# Patient Record
Sex: FEMALE | Race: BLACK OR AFRICAN AMERICAN | Employment: FULL TIME | URBAN - METROPOLITAN AREA
[De-identification: names, ages, dates, MRNs, and addresses within clinical notes are randomized per-mention and may not be internally consistent; named-entity substitution may affect disease eponyms.]

---

## 2017-01-18 ENCOUNTER — HOSPITAL ENCOUNTER (EMERGENCY)
Age: 62
Discharge: HOME | End: 2017-01-18
Attending: EMERGENCY MEDICINE
Payer: OTHER GOVERNMENT

## 2017-01-18 ENCOUNTER — APPOINTMENT (OUTPATIENT)
Dept: CT IMAGING | Age: 62
End: 2017-01-18
Attending: EMERGENCY MEDICINE
Payer: OTHER GOVERNMENT

## 2017-01-18 VITALS
DIASTOLIC BLOOD PRESSURE: 82 MMHG | RESPIRATION RATE: 20 BRPM | WEIGHT: 272 LBS | TEMPERATURE: 97.9 F | OXYGEN SATURATION: 98 % | SYSTOLIC BLOOD PRESSURE: 120 MMHG | HEART RATE: 75 BPM | HEIGHT: 64 IN | BODY MASS INDEX: 46.44 KG/M2

## 2017-01-18 DIAGNOSIS — R10.9 RIGHT FLANK PAIN: Primary | ICD-10-CM

## 2017-01-18 DIAGNOSIS — K51.918 ULCERATIVE COLITIS WITH OTHER COMPLICATION, UNSPECIFIED LOCATION (HCC): ICD-10-CM

## 2017-01-18 LAB
ALBUMIN SERPL BCP-MCNC: 3 G/DL (ref 3.5–5)
ALBUMIN/GLOB SERPL: 0.7 {RATIO} (ref 1.1–2.2)
ALP SERPL-CCNC: 76 U/L (ref 45–117)
ALT SERPL-CCNC: 20 U/L (ref 12–78)
ANION GAP BLD CALC-SCNC: 7 MMOL/L (ref 5–15)
APPEARANCE UR: CLEAR
AST SERPL W P-5'-P-CCNC: 19 U/L (ref 15–37)
BACTERIA URNS QL MICRO: ABNORMAL /HPF
BASOPHILS # BLD AUTO: 0 K/UL (ref 0–0.1)
BASOPHILS # BLD: 0 % (ref 0–1)
BILIRUB SERPL-MCNC: 0.4 MG/DL (ref 0.2–1)
BILIRUB UR QL: NEGATIVE
BUN SERPL-MCNC: 14 MG/DL (ref 6–20)
BUN/CREAT SERPL: 16 (ref 12–20)
CALCIUM SERPL-MCNC: 8.3 MG/DL (ref 8.5–10.1)
CHLORIDE SERPL-SCNC: 107 MMOL/L (ref 97–108)
CO2 SERPL-SCNC: 28 MMOL/L (ref 21–32)
COLOR UR: ABNORMAL
CREAT SERPL-MCNC: 0.9 MG/DL (ref 0.55–1.02)
CRP SERPL-MCNC: <0.29 MG/DL
DIFFERENTIAL METHOD BLD: NORMAL
EOSINOPHIL # BLD: 0.1 K/UL (ref 0–0.4)
EOSINOPHIL NFR BLD: 2 % (ref 0–7)
EPITH CASTS URNS QL MICRO: ABNORMAL /LPF
ERYTHROCYTE [DISTWIDTH] IN BLOOD BY AUTOMATED COUNT: 14.1 % (ref 11.5–14.5)
GLOBULIN SER CALC-MCNC: 4.1 G/DL (ref 2–4)
GLUCOSE SERPL-MCNC: 97 MG/DL (ref 65–100)
GLUCOSE UR STRIP.AUTO-MCNC: NEGATIVE MG/DL
HCT VFR BLD AUTO: 36.4 % (ref 35–47)
HGB BLD-MCNC: 12 G/DL (ref 11.5–16)
HGB UR QL STRIP: NEGATIVE
KETONES UR QL STRIP.AUTO: NEGATIVE MG/DL
LEUKOCYTE ESTERASE UR QL STRIP.AUTO: ABNORMAL
LIPASE SERPL-CCNC: 101 U/L (ref 73–393)
LYMPHOCYTES # BLD AUTO: 39 % (ref 12–49)
LYMPHOCYTES # BLD: 3.2 K/UL (ref 0.8–3.5)
MCH RBC QN AUTO: 31 PG (ref 26–34)
MCHC RBC AUTO-ENTMCNC: 33 G/DL (ref 30–36.5)
MCV RBC AUTO: 94.1 FL (ref 80–99)
MONOCYTES # BLD: 0.7 K/UL (ref 0–1)
MONOCYTES NFR BLD AUTO: 9 % (ref 5–13)
NEUTS SEG # BLD: 4.1 K/UL (ref 1.8–8)
NEUTS SEG NFR BLD AUTO: 50 % (ref 32–75)
NITRITE UR QL STRIP.AUTO: NEGATIVE
PH UR STRIP: 5.5 [PH] (ref 5–8)
PLATELET # BLD AUTO: 251 K/UL (ref 150–400)
POTASSIUM SERPL-SCNC: 3.3 MMOL/L (ref 3.5–5.1)
PROT SERPL-MCNC: 7.1 G/DL (ref 6.4–8.2)
PROT UR STRIP-MCNC: NEGATIVE MG/DL
RBC # BLD AUTO: 3.87 M/UL (ref 3.8–5.2)
RBC #/AREA URNS HPF: ABNORMAL /HPF (ref 0–5)
SODIUM SERPL-SCNC: 142 MMOL/L (ref 136–145)
SP GR UR REFRACTOMETRY: 1.02 (ref 1–1.03)
UA: UC IF INDICATED,UAUC: ABNORMAL
UROBILINOGEN UR QL STRIP.AUTO: 0.2 EU/DL (ref 0.2–1)
WBC # BLD AUTO: 8.2 K/UL (ref 3.6–11)
WBC URNS QL MICRO: ABNORMAL /HPF (ref 0–4)

## 2017-01-18 PROCEDURE — 80053 COMPREHEN METABOLIC PANEL: CPT | Performed by: EMERGENCY MEDICINE

## 2017-01-18 PROCEDURE — 36415 COLL VENOUS BLD VENIPUNCTURE: CPT | Performed by: EMERGENCY MEDICINE

## 2017-01-18 PROCEDURE — 96376 TX/PRO/DX INJ SAME DRUG ADON: CPT

## 2017-01-18 PROCEDURE — 96375 TX/PRO/DX INJ NEW DRUG ADDON: CPT

## 2017-01-18 PROCEDURE — 83690 ASSAY OF LIPASE: CPT | Performed by: EMERGENCY MEDICINE

## 2017-01-18 PROCEDURE — 96361 HYDRATE IV INFUSION ADD-ON: CPT

## 2017-01-18 PROCEDURE — 99284 EMERGENCY DEPT VISIT MOD MDM: CPT

## 2017-01-18 PROCEDURE — 85025 COMPLETE CBC W/AUTO DIFF WBC: CPT | Performed by: EMERGENCY MEDICINE

## 2017-01-18 PROCEDURE — 81001 URINALYSIS AUTO W/SCOPE: CPT | Performed by: EMERGENCY MEDICINE

## 2017-01-18 PROCEDURE — 86140 C-REACTIVE PROTEIN: CPT | Performed by: EMERGENCY MEDICINE

## 2017-01-18 PROCEDURE — 74176 CT ABD & PELVIS W/O CONTRAST: CPT

## 2017-01-18 PROCEDURE — 74011250636 HC RX REV CODE- 250/636: Performed by: EMERGENCY MEDICINE

## 2017-01-18 PROCEDURE — 96374 THER/PROPH/DIAG INJ IV PUSH: CPT

## 2017-01-18 PROCEDURE — 87086 URINE CULTURE/COLONY COUNT: CPT | Performed by: EMERGENCY MEDICINE

## 2017-01-18 RX ORDER — HYDROCHLOROTHIAZIDE 25 MG/1
25 TABLET ORAL DAILY
COMMUNITY
End: 2017-03-13 | Stop reason: SDUPTHER

## 2017-01-18 RX ORDER — OXYCODONE AND ACETAMINOPHEN 5; 325 MG/1; MG/1
1 TABLET ORAL
Qty: 15 TAB | Refills: 0 | Status: SHIPPED | OUTPATIENT
Start: 2017-01-18 | End: 2017-02-22

## 2017-01-18 RX ORDER — ONDANSETRON 2 MG/ML
4 INJECTION INTRAMUSCULAR; INTRAVENOUS
Status: COMPLETED | OUTPATIENT
Start: 2017-01-18 | End: 2017-01-18

## 2017-01-18 RX ORDER — HYDROMORPHONE HYDROCHLORIDE 1 MG/ML
0.5 INJECTION, SOLUTION INTRAMUSCULAR; INTRAVENOUS; SUBCUTANEOUS ONCE
Status: COMPLETED | OUTPATIENT
Start: 2017-01-18 | End: 2017-01-18

## 2017-01-18 RX ORDER — HYDROCORTISONE 100 MG/60ML
100 SUSPENSION RECTAL 2 TIMES DAILY
Qty: 60 ML | Refills: 0 | Status: SHIPPED | OUTPATIENT
Start: 2017-01-18 | End: 2018-01-29

## 2017-01-18 RX ORDER — HYDROMORPHONE HYDROCHLORIDE 1 MG/ML
1 INJECTION, SOLUTION INTRAMUSCULAR; INTRAVENOUS; SUBCUTANEOUS ONCE
Status: COMPLETED | OUTPATIENT
Start: 2017-01-18 | End: 2017-01-18

## 2017-01-18 RX ORDER — ONDANSETRON 4 MG/1
4 TABLET, ORALLY DISINTEGRATING ORAL
Qty: 12 TAB | Refills: 0 | Status: SHIPPED | OUTPATIENT
Start: 2017-01-18 | End: 2017-10-31

## 2017-01-18 RX ORDER — KETOROLAC TROMETHAMINE 30 MG/ML
30 INJECTION, SOLUTION INTRAMUSCULAR; INTRAVENOUS
Status: COMPLETED | OUTPATIENT
Start: 2017-01-18 | End: 2017-01-18

## 2017-01-18 RX ADMIN — HYDROMORPHONE HYDROCHLORIDE 1 MG: 1 INJECTION, SOLUTION INTRAMUSCULAR; INTRAVENOUS; SUBCUTANEOUS at 09:20

## 2017-01-18 RX ADMIN — SODIUM CHLORIDE 1000 ML: 900 INJECTION, SOLUTION INTRAVENOUS at 11:46

## 2017-01-18 RX ADMIN — KETOROLAC TROMETHAMINE 30 MG: 30 INJECTION, SOLUTION INTRAMUSCULAR at 08:15

## 2017-01-18 RX ADMIN — HYDROMORPHONE HYDROCHLORIDE 1 MG: 1 INJECTION, SOLUTION INTRAMUSCULAR; INTRAVENOUS; SUBCUTANEOUS at 08:19

## 2017-01-18 RX ADMIN — ONDANSETRON 4 MG: 2 INJECTION INTRAMUSCULAR; INTRAVENOUS at 09:19

## 2017-01-18 RX ADMIN — ONDANSETRON 4 MG: 2 INJECTION INTRAMUSCULAR; INTRAVENOUS at 08:13

## 2017-01-18 RX ADMIN — HYDROMORPHONE HYDROCHLORIDE 0.5 MG: 1 INJECTION, SOLUTION INTRAMUSCULAR; INTRAVENOUS; SUBCUTANEOUS at 12:20

## 2017-01-18 RX ADMIN — SODIUM CHLORIDE 1000 ML: 900 INJECTION, SOLUTION INTRAVENOUS at 08:15

## 2017-01-18 NOTE — ED NOTES
The patient was discharged home by Dr Tex Ornelas  in stable condition. The patient is alert and oriented, in no respiratory distress and discharge vital signs obtained. The patient's diagnosis, condition and treatment were explained. The patient expressed understanding. Prescription given. A work/school note given. A discharge plan has been developed. A  was not involved in the process. Aftercare instructions were given. Pt ambulatory out of the ED with family.

## 2017-01-18 NOTE — ED NOTES
Pt returned from CT and rpts increased pain. Dr Michelle Pandey aware and additional orders received. Awaiting Pharmacy verification.

## 2017-01-18 NOTE — ED NOTES
Toileting offered; pt declined at present and rpts voiding PTA.  Spouse concerned about pt's discomfort; Dr Alexandro Hector notified of pt's arrival.

## 2017-01-18 NOTE — DISCHARGE INSTRUCTIONS
Ulcerative Colitis: Care Instructions  Your Care Instructions    Ulcerative colitis is an inflammatory bowel disease (IBD). The large intestine (colon) gets inflamed and ulcers form in your colon. These ulcers can bleed. People have \"attacks\" of ulcerative colitis. Attacks can come and go. They can cause painful belly cramps and bloody diarrhea. This disease can affect part or all of the colon. How bad the disease gets will often depend on how much of the colon is affected. Bad attacks are often treated in a hospital. There you can get medicines, fluid, and nutrition through a tube in your vein, called an IV. This lets your digestive system rest and recover. If the medicines don't work well, surgery may be needed to remove the colon. At home, you can help control your ulcerative colitis. Take your medicines and try to eat well. And see your doctor as much as he or she recommends. Follow-up care is a key part of your treatment and safety. Be sure to make and go to all appointments, and call your doctor if you are having problems. It's also a good idea to know your test results and keep a list of the medicines you take. How can you care for yourself at home? · Be safe with medicines. Take your medicines exactly as prescribed. Call your doctor if you think you are having a problem with your medicine. You will get more details on the specific medicines your doctor prescribes. · Do not take anti-inflammatory medicines, such as aspirin, ibuprofen (Advil, Motrin), or naproxen (Aleve). They may make your symptoms worse. · Talk to your doctor before you take any other medicines or herbal products. · Eat healthy foods. Avoid foods that make your symptoms worse. These might include milk, alcohol, or spicy foods. · Make sure to get enough iron. Rectal bleeding may make you lose iron. Foods with a lot of iron include beef, lentils, spinach, and raisins.  They also include iron-enriched breads and cereals. · Take any nutrition supplements that your doctor prescribes. · This disease can affect all parts of your life. Get support from friends and family. You may also want to get some counseling. When should you call for help? Call 911 anytime you think you may need emergency care. For example, call if:  · You have severe belly pain. · You passed out (lost consciousness). Call your doctor now or seek immediate medical care if:  · You have signs of needing more fluids. You have sunken eyes and a dry mouth, and you pass only a little dark urine. · You have a fever or shaking chills. · Your belly is bloated. Watch closely for changes in your health, and be sure to contact your doctor if:  · Your symptoms get worse. · You pass new bloody stools, or the bloody stools are worse. · You have diarrhea for more than 2 weeks. · You have unexplained weight loss. Where can you learn more? Go to http://reza-yen.info/. Enter X668 in the search box to learn more about \"Ulcerative Colitis: Care Instructions. \"  Current as of: August 9, 2016  Content Version: 11.1  © 9589-4326 [employer]. Care instructions adapted under license by [employer] (which disclaims liability or warranty for this information). If you have questions about a medical condition or this instruction, always ask your healthcare professional. [address] any warranty or liability for your use of this information. We hope that we have addressed all of your medical concerns. The examination and treatment you received in the Emergency Department were for an emergent problem and were not intended as complete care. It is important that you follow up with your healthcare provider(s) for ongoing care. If your symptoms worsen or do not improve as expected, and you are unable to reach your usual health care provider(s), you should return to the Emergency Department.       Today's healthcare is undergoing tremendous change, and patient satisfaction surveys are one of the many tools to assess the quality of medical care. You may receive a survey from the [employer] organization regarding your experience in the Emergency Department. I hope that your experience has been completely positive, particularly the medical care that I provided. As such, please participate in the survey; anything less than excellent does not meet my expectations or intentions. [facility] and [address] [place] [address] participate in nationally recognized quality of care measures. If your blood pressure is greater than 120/80, as reported below, we urge that you seek medical care to address the potential of high blood pressure, commonly known as hypertension. Hypertension can be hereditary or can be caused by certain medical conditions, pain, stress, or \"white coat syndrome. \"       Please make an appointment with your health care provider(s) for follow up of your Emergency Department visit. VITALS:   Patient Vitals for the past 8 hrs:   Temp Pulse Resp BP SpO2   01/18/17 1200 - - - 120/82 98 %   01/18/17 1150 - - - - 97 %   01/18/17 1148 - - - 134/76 -   01/18/17 0930 - - - 118/61 93 %   01/18/17 0904 - - - - 99 %   01/18/17 0903 - - - 133/73 -   01/18/17 0846 - - - - 96 %   01/18/17 0830 - - - 130/79 95 %   01/18/17 0820 - - 20 - 100 %   01/18/17 0818 - - - 141/81 -   01/18/17 0751 97.9 °F (36.6 °C) 75 16 179/86 98 %          Thank you for allowing us to provide you with medical care today. We realize that you have many choices for your emergency care needs. Please choose us in the future for any continued health care needs.       Mila Brunner MD    [facility].   Office: [phone]            Recent Results (from the past 24 hour(s))   CBC WITH AUTOMATED DIFF    Collection Time: 01/18/17  8:07 AM   Result Value Ref Range    WBC 8.2 3.6 - 11.0 K/uL    RBC 3.87 3.80 - 5.20 M/uL    HGB 12.0 11.5 - 16.0 g/dL    HCT 36.4 35.0 - 47.0 %    MCV 94.1 80.0 - 99.0 FL    MCH 31.0 26.0 - 34.0 PG    MCHC 33.0 30.0 - 36.5 g/dL    RDW 14.1 11.5 - 14.5 %    PLATELET 165 139 - 948 K/uL    NEUTROPHILS 50 32 - 75 %    LYMPHOCYTES 39 12 - 49 %    MONOCYTES 9 5 - 13 %    EOSINOPHILS 2 0 - 7 %    BASOPHILS 0 0 - 1 %    ABS. NEUTROPHILS 4.1 1.8 - 8.0 K/UL    ABS. LYMPHOCYTES 3.2 0.8 - 3.5 K/UL    ABS. MONOCYTES 0.7 0.0 - 1.0 K/UL    ABS. EOSINOPHILS 0.1 0.0 - 0.4 K/UL    ABS. BASOPHILS 0.0 0.0 - 0.1 K/UL    DF AUTOMATED     METABOLIC PANEL, COMPREHENSIVE    Collection Time: 01/18/17  8:45 AM   Result Value Ref Range    Sodium 142 136 - 145 mmol/L    Potassium 3.3 (L) 3.5 - 5.1 mmol/L    Chloride 107 97 - 108 mmol/L    CO2 28 21 - 32 mmol/L    Anion gap 7 5 - 15 mmol/L    Glucose 97 65 - 100 mg/dL    BUN 14 6 - 20 MG/DL    Creatinine 0.90 0.55 - 1.02 MG/DL    BUN/Creatinine ratio 16 12 - 20      GFR est AA >60 >60 ml/min/1.73m2    GFR est non-AA >60 >60 ml/min/1.73m2    Calcium 8.3 (L) 8.5 - 10.1 MG/DL    Bilirubin, total 0.4 0.2 - 1.0 MG/DL    ALT 20 12 - 78 U/L    AST 19 15 - 37 U/L    Alk.  phosphatase 76 45 - 117 U/L    Protein, total 7.1 6.4 - 8.2 g/dL    Albumin 3.0 (L) 3.5 - 5.0 g/dL    Globulin 4.1 (H) 2.0 - 4.0 g/dL    A-G Ratio 0.7 (L) 1.1 - 2.2     LIPASE    Collection Time: 01/18/17  8:45 AM   Result Value Ref Range    Lipase 101 73 - 393 U/L   C REACTIVE PROTEIN, QT    Collection Time: 01/18/17  8:45 AM   Result Value Ref Range    C-Reactive protein <0.29 <0.60 mg/dL   URINALYSIS W/ REFLEX CULTURE    Collection Time: 01/18/17 11:48 AM   Result Value Ref Range    Color YELLOW/STRAW      Appearance CLEAR CLEAR      Specific gravity 1.020 1.003 - 1.030      pH (UA) 5.5 5.0 - 8.0      Protein NEGATIVE  NEG mg/dL    Glucose NEGATIVE  NEG mg/dL    Ketone NEGATIVE  NEG mg/dL    Bilirubin NEGATIVE  NEG      Blood NEGATIVE  NEG      Urobilinogen 0.2 0.2 - 1.0 EU/dL    Nitrites NEGATIVE  NEG      Leukocyte Esterase MODERATE (A) NEG      WBC 5-10 0 - 4 /hpf    RBC 0-5 0 - 5 /hpf    Epithelial cells FEW FEW /lpf    Bacteria 1+ (A) NEG /hpf    UA:UC IF INDICATED URINE CULTURE ORDERED (A) CNI         Ct Abd Pelv Wo Cont    Result Date: 1/18/2017  EXAM: CT ABDOMEN AND PELVIS WITHOUT CONTRAST INDICATION:  Right flank pain. COMPARISON: None. CONTRAST: None. TECHNIQUE: Unenhanced multislice helical CT was performed from the diaphragm to the symphysis pubis without intravenous contrast administration. Oral contrast was not administered. Contiguous 5 mm axial images were reconstructed and lung and soft tissue windows were generated. Coronal and sagittal reformations were generated. CT dose reduction was achieved through use of a standardized protocol tailored for this examination and automatic exposure control for dose modulation. Adaptive statistical iterative reconstruction (ASIR) was utilized for this examination. FINDINGS: The patient is morbidly obese. LOWER CHEST: The visualized portions of the lung bases are clear. The absence of intravenous contrast material reduces the sensitivity for evaluation of the solid parenchymal organs of the abdomen. ABDOMEN: Liver: The liver is normal in size and contour with no focal abnormality. Gallbladder and bile ducts: There are no calcified stones and there is no biliary duct dilatation. Spleen: No abnormality. Pancreas: No abnormality. Adrenal glands: No abnormality. Kidneys: There is a 4 x 4 by 4.5 cm exophytic right lower pole renal cyst. There is no renal or ureteral calculus or obstruction. PELVIS: Reproductive organs: The uterus is normal. There are bilateral tubal ligation rings. Bladder: No abnormality. RETROPERITONEUM: The aorta tapers without aneurysm. There is no retroperitoneal adenopathy or mass. There is no pelvic mass or adenopathy. BOWEL AND MESENTERY: The small bowel is normal. The appendix is not identified. PERITONEUM: There is no ascites or free intraperitoneal air. BONES AND SOFT TISSUES: There is multilevel degenerative disc disease of the lumbar spine with loss of lordosis. IMPRESSION: 1. No renal or ureteral calculus or other acute abdominal or pelvic abnormality. 2. Right renal cyst. 3. Status post bilateral tubal ligation. 4. Lumbar lordosis. Flank Pain: Care Instructions  Your Care Instructions  Flank pain is pain on the side of the back just below the rib cage and above the waist. It can be on one or both sides. Flank pain has many possible causes, including a kidney stone, a urinary tract infection, or back strain. Flank pain may get better on its own. But don't ignore new symptoms, such as fever, nausea and vomiting, urination problems, pain that gets worse, and dizziness. These may be signs of a more serious problem. You may have to have tests or other treatment. Follow-up care is a key part of your treatment and safety. Be sure to make and go to all appointments, and call your doctor if you are having problems. It's also a good idea to know your test results and keep a list of the medicines you take. How can you care for yourself at home? · Rest until you feel better. · Take pain medicines exactly as directed. ¨ If the doctor gave you a prescription medicine for pain, take it as prescribed. ¨ If you are not taking a prescription pain medicine, ask your doctor if you can take an over-the-counter pain medicine, such as acetaminophen (Tylenol), ibuprofen (Advil, Motrin), or naproxen (Aleve). Read and follow all instructions on the label. · If your doctor prescribed antibiotics, take them as directed. Do not stop taking them just because you feel better. You need to take the full course of antibiotics. · To apply heat, put a warm water bottle, a heating pad set on low, or a warm cloth on the painful area. Do not go to sleep with a heating pad on your skin.   · To prevent dehydration, drink plenty of fluids, enough so that your urine is light yellow or clear like water. Choose water and other caffeine-free clear liquids until you feel better. If you have kidney, heart, or liver disease and have to limit fluids, talk with your doctor before you increase the amount of fluids you drink. When should you call for help? Call your doctor now or seek immediate medical care if:  · Your flank pain gets worse. · You have new symptoms, such as fever, nausea, or vomiting. · You have symptoms of a urinary problem. For example:  ¨ You have blood or pus in your urine. ¨ You have chills or body aches. ¨ It hurts to urinate. ¨ You have groin or belly pain. Watch closely for changes in your health, and be sure to contact your doctor if you do not get better as expected. Where can you learn more? Go to http://reza-yen.info/. Enter S191 in the search box to learn more about \"Flank Pain: Care Instructions. \"  Current as of: May 27, 2016  Content Version: 11.1  © 3796-4252 [employer]. Care instructions adapted under license by [employer] (which disclaims liability or warranty for this information). If you have questions about a medical condition or this instruction, always ask your healthcare professional. [address] any warranty or liability for your use of this information.

## 2017-01-18 NOTE — ED NOTES
Ongoing plan of care discussed and pts/spouses concerns/questions addressed. Pt/spouse informed of time factors with lab/imaging study results. V/S reassessed. IV fluids infusing via gravity without difficulty and pt medicated per orders. Pt provided with a pillow, arm blanket and lights dimmed for comfort. Spouse remains at bedside. Call bell within reach; will continue to monitor.

## 2017-01-18 NOTE — ED PROVIDER NOTES
HPI Comments: Hx UC (followed by Dr. Sai Trujillo), hypothyroidism, HTN, arthritis; presents with severe right flank pain; awoke her from sleep this morning; it's been constant with no known exacerbating/relieving factors; N, V times 3 (non-bloody). She denies a hx of similar pain. She had a colonoscopy 11 months ago which showed mild/moderate ulcerative proctitis. She feels the urge to  her bowels this morning but has been unable to pass much. She says she cannot afford the medication Dr. Sai Trujillo has prescribed for her UC. Patient is a 64 y.o. female presenting with flank pain. Flank Pain           Past Medical History:   Diagnosis Date    Arthritis      ostero arthritis, rhemathoid  lower back     Hypertension     Hypothyroid 3/23/2011    Ulcerative colitis ([facility] [address] 2010    [facility]ative colitis ([facility] 2010       Past Surgical History:   Procedure Laterality Date    Endoscopy, colon, diagnostic      Pr breast surgery procedure unlisted       cyst removed from left breast    Hx  section           Family History:   Problem Relation Age of Onset    Cancer Mother      pancreatic    Cancer Brother      colon       Social History     Social History    Marital status:      Spouse name: N/A    Number of children: N/A    Years of education: N/A     Occupational History    Not on file. Social History Main Topics    Smoking status: Former Smoker     Types: Cigarettes     Quit date: 1980    Smokeless tobacco: Never Used    Alcohol use No    Drug use: No    Sexual activity: Yes     Partners: Male     Other Topics Concern    Not on file     Social History Narrative         ALLERGIES: Sulfa (sulfonamide antibiotics)    Review of Systems   Genitourinary: Positive for flank pain. All other systems reviewed and are negative.       Vitals:    17 0751   BP: 179/86   Pulse: 75   Resp: 16   Temp: 97.9 °F (36.6 °C)   SpO2: 98%   Weight: 123.4 kg (272 lb)   Height: 5' 4\" (1.626 m)            Physical Exam   Constitutional: She is oriented to person, place, and time. She appears well-developed. Overweight; appears uncomfortable; standing and leaning on stretcher. HENT:   Head: Normocephalic and atraumatic. Eyes: Conjunctivae are normal. No scleral icterus. Neck: Neck supple. No tracheal deviation present. Cardiovascular: Normal rate, regular rhythm, normal heart sounds and intact distal pulses. Exam reveals no gallop and no friction rub. No murmur heard. Pulmonary/Chest: Effort normal and breath sounds normal. She has no wheezes. She has no rales. Abdominal: Soft. She exhibits no distension. There is no tenderness. There is no rebound and no guarding. Musculoskeletal: She exhibits no edema. Neurological: She is alert and oriented to person, place, and time. Skin: Skin is warm and dry. No rash noted. Psychiatric: She has a normal mood and affect. Nursing note and vitals reviewed. [facility]  ED Course       Procedures    Consult note: Dr. Dequan Barrett - recommends Brett enemas +/- Prednisone and f/u in the office. Rasta Escalante MD  9:58 AM    Progress Note:  Results, treatment, and follow up plan have been discussed with patient/. Questions were answered. Rasta Escalante MD  12:26 PM    A/P: right flank pain - suspect UC flare; pain moderately better with Dilaudid; VSS; CBC, CMP, sed rate, UA, CT ok; home with Cortenemas, Percocet, Zofran; GI f/u.   Rasta Escalante MD  12:27 PM

## 2017-01-18 NOTE — ED NOTES
Toileting offered again; pt again declined. Pt remains tolerating po water and provided with additional water. Dr Temo Lepe aware. Call bell within reach; will continue to monitor.

## 2017-01-18 NOTE — ED TRIAGE NOTES
Pt presents to the treatment room via w/c in moderate discomfort. Pt rpts awakening at 0430 am with right flank pain. Denies urinary symptoms. Pt rpts hx of ulcerative colitis with last BM just PTA.  + nausea.

## 2017-01-18 NOTE — ED NOTES
Pt tolerated 8oz po water but still rpts she is unable to void. Pt ambulatory in the treatment room and provided with additional water to continue to encourage voiding. Call bell within reach; will continue to monitor.

## 2017-01-18 NOTE — ED NOTES
Dr Shirlene Osei in to speak with pt/spouse regarding current test results. Toileting offered and pt continues to decline at present. Consult placed to GI. Will continue to monitor.

## 2017-01-18 NOTE — ED NOTES
Pt ambulatory to the restroom with RN assist.  Pt rpts she does not feel as though she could void; no specimen collected. After approval by MD pt provided with water to encourage voiding. Call bell within reach; will continue to monitor.

## 2017-01-20 ENCOUNTER — OFFICE VISIT (OUTPATIENT)
Dept: INTERNAL MEDICINE CLINIC | Age: 62
End: 2017-01-20

## 2017-01-20 VITALS
RESPIRATION RATE: 16 BRPM | SYSTOLIC BLOOD PRESSURE: 122 MMHG | HEIGHT: 64 IN | TEMPERATURE: 99.2 F | WEIGHT: 280.4 LBS | DIASTOLIC BLOOD PRESSURE: 70 MMHG | OXYGEN SATURATION: 97 % | HEART RATE: 68 BPM | BODY MASS INDEX: 47.87 KG/M2

## 2017-01-20 DIAGNOSIS — E03.4 HYPOTHYROIDISM DUE TO ACQUIRED ATROPHY OF THYROID: Chronic | ICD-10-CM

## 2017-01-20 DIAGNOSIS — I10 ESSENTIAL HYPERTENSION, BENIGN: ICD-10-CM

## 2017-01-20 DIAGNOSIS — K51.819 OTHER ULCERATIVE COLITIS WITH COMPLICATION (HCC): Primary | Chronic | ICD-10-CM

## 2017-01-20 LAB
BACTERIA SPEC CULT: NORMAL
CC UR VC: NORMAL
SERVICE CMNT-IMP: NORMAL

## 2017-01-20 RX ORDER — POTASSIUM CHLORIDE 750 MG/1
10 TABLET, EXTENDED RELEASE ORAL 2 TIMES DAILY
Qty: 60 TAB | Refills: 1 | Status: SHIPPED | OUTPATIENT
Start: 2017-01-20 | End: 2018-10-01

## 2017-01-20 NOTE — PROGRESS NOTES
HISTORY OF PRESENT ILLNESS  Opal Palm is a 64 y.o. female. [facility]  Hospital follow up: Pt was seen at emergency room on the early morning of 01/18/17 for severe right flank pain. Pt notes symptoms woke her up from sleep around 0430 AM. At the emergency room, a flare up of her ulcerative colitis was suspected. Urinalysis was normal. She was advised to start steroid enema BID and to take Percocet as needed for pain. Pt notes she is only taking the steroid enema once daily and 1/2 tablet Percocet daily. She notes she continues with right flank pain and mucous and blood in stool. Pt is reluctant to take her steroid enemas more regularly as she believes it affects her bones. Hypertension ROS: taking medications as instructed, no medication side effects noted, no TIA's, no chest pain on exertion, no dyspnea on exertion, no swelling of ankles. New concerns: stable. Lab Results   Component Value Date/Time    TSH 4.930 06/14/2016 02:33 PM     Thyroid ROS: denies fatigue, weight changes, heat/cold intolerance, bowel/skin changes or CVS symptoms. Review of Systems   Gastrointestinal: Positive for blood in stool.        + mucous in stool   Genitourinary: Positive for flank pain (right). Physical Exam   Constitutional: She is oriented to person, place, and time. She appears well-developed and well-nourished. HENT:   Head: Normocephalic and atraumatic. Right Ear: External ear normal.   Left Ear: External ear normal.   Nose: Nose normal.   Mouth/Throat: Oropharynx is clear and moist.   Neck: Normal range of motion. Neck supple. No JVD present. Carotid bruit is not present. No thyroid mass and no thyromegaly present. Cardiovascular: Normal rate, regular rhythm, S1 normal, S2 normal, normal heart sounds, intact distal pulses and normal pulses. Exam reveals no gallop and no friction rub. No murmur heard. Pulmonary/Chest: Effort normal and breath sounds normal.   Abdominal: Soft.  Bowel sounds are normal.   Musculoskeletal: Normal range of motion. Neurological: She is alert and oriented to person, place, and time. She has normal strength. Skin: Skin is warm and dry. Psychiatric: She has a normal mood and affect. Her behavior is normal. Judgment and thought content normal.   Nursing note and vitals reviewed. ASSESSMENT and PLAN  Maryjo Hoffman was seen today for hospital follow up. Diagnoses and all orders for this visit:    Other ulcerative colitis with complication ([facility] [address]  Pt was seen at emergency room 01/18/17 for flare up of ulcerative colitis. She continues with right flank pain and blood and mucous in stool. I have advised to take steroid enema BID as advised and to start potassium supplementation. Follow up with GI. Essential hypertension, benign  Bp control stable, continue current regimen. Hypothyroidism due to acquired atrophy of thyroid  Stable, continue current regimen    Other orders  -     potassium chloride (K-DUR, KLOR-CON) 10 mEq tablet; Take 1 Tab by mouth two (2) times a day.     lab results and schedule of future lab studies reviewed with patient  reviewed diet, exercise and weight control  reviewed medications and side effects in detail     Written by ruperto Lujan, as dictated by Son Nelson M.D.

## 2017-02-20 ENCOUNTER — TELEPHONE (OUTPATIENT)
Dept: INTERNAL MEDICINE CLINIC | Age: 62
End: 2017-02-20

## 2017-02-20 NOTE — TELEPHONE ENCOUNTER
----- Message from Hannah Sosa sent at 2/20/2017  3:37 PM EST -----  Regarding: Dr. Caren Kiser H/[phone]     Pt stated, she is experiencing cold sx, sore throat, headache, stuffy nose started yesterday. Pt would like a call back advising what over- the-counter Rx to take or if a Rx can be called into [address] (phone # on file).

## 2017-02-21 NOTE — TELEPHONE ENCOUNTER
Contacted pt to assist but during conversation pts cough and congestion became very obvious and did not provide the impression that an OTC medication would correct. Offered appt to pt, appt provided.

## 2017-02-22 ENCOUNTER — OFFICE VISIT (OUTPATIENT)
Dept: INTERNAL MEDICINE CLINIC | Age: 62
End: 2017-02-22

## 2017-02-22 VITALS
WEIGHT: 270 LBS | HEIGHT: 64 IN | OXYGEN SATURATION: 95 % | SYSTOLIC BLOOD PRESSURE: 137 MMHG | DIASTOLIC BLOOD PRESSURE: 85 MMHG | HEART RATE: 70 BPM | RESPIRATION RATE: 16 BRPM | TEMPERATURE: 98.4 F | BODY MASS INDEX: 46.1 KG/M2

## 2017-02-22 DIAGNOSIS — J20.9 ACUTE BRONCHITIS, UNSPECIFIED ORGANISM: ICD-10-CM

## 2017-02-22 DIAGNOSIS — I10 ESSENTIAL HYPERTENSION, BENIGN: Primary | ICD-10-CM

## 2017-02-22 DIAGNOSIS — M54.50 CHRONIC BILATERAL LOW BACK PAIN WITHOUT SCIATICA: ICD-10-CM

## 2017-02-22 DIAGNOSIS — G89.29 CHRONIC BILATERAL LOW BACK PAIN WITHOUT SCIATICA: ICD-10-CM

## 2017-02-22 RX ORDER — AZITHROMYCIN 250 MG/1
250 TABLET, FILM COATED ORAL SEE ADMIN INSTRUCTIONS
Qty: 6 TAB | Refills: 0 | Status: SHIPPED | OUTPATIENT
Start: 2017-02-22 | End: 2017-02-27

## 2017-02-22 RX ORDER — TRAMADOL HYDROCHLORIDE 50 MG/1
50 TABLET ORAL
Qty: 60 TAB | Refills: 3 | Status: SHIPPED | OUTPATIENT
Start: 2017-02-22 | End: 2017-10-31 | Stop reason: SDUPTHER

## 2017-02-22 RX ORDER — HYDROCODONE POLISTIREX AND CHLORPHENIRAMINE POLISTIREX 10; 8 MG/5ML; MG/5ML
5 SUSPENSION, EXTENDED RELEASE ORAL
Qty: 120 ML | Refills: 0 | Status: SHIPPED | OUTPATIENT
Start: 2017-02-22 | End: 2017-10-31 | Stop reason: ALTCHOICE

## 2017-02-22 NOTE — PROGRESS NOTES
HISTORY OF PRESENT ILLNESS  Isaura Frank is a 64 y.o. female. HPI     Hypertension ROS: taking medications as instructed, no medication side effects noted, no TIA's, no chest pain on exertion, no dyspnea on exertion, no swelling of ankles. New concerns: Stable- bp was 137/85 in the office today. Pt is taking potassium once/day. She complains of cough and congestion. Reports symptoms started 4 days ago with sniffles and progressed into sore throat and coughing. Pt has chills and body aches. Her temperature was 99.1 degrees last night and has not been higher than this. She has a headache and states headache is better today. Pt states she coughed up a small amount of blood this morning. She denies an increased amount of sinus drainage. Pt took cough syrup with codeine last night and was not able to sleep and coughed all night. She states this cough syrup may have helped headache because it is better today. Pt did not have flu shot this year and denies any recent contacts who have had the flu. Pt denies chest tightness, pain, or burning. She needs a refill of Tramadol that she takes periodic joint and back pain. Review of Systems   Constitutional: Positive for chills. HENT: Positive for congestion. Respiratory: Positive for cough. Neurological: Positive for headaches. All other systems reviewed and are negative. Physical Exam   Constitutional: She is oriented to person, place, and time. She appears well-developed and well-nourished. HENT:   Head: Normocephalic and atraumatic. Right Ear: External ear normal.   Left Ear: External ear normal.   Nose: Nose normal.   Mouth/Throat: Oropharynx is clear and moist.   Eyes: Conjunctivae and EOM are normal.   Neck: Normal range of motion. Neck supple. Carotid bruit is not present. No thyroid mass and no thyromegaly present. Cardiovascular: Normal rate, regular rhythm, S1 normal, S2 normal, normal heart sounds and intact distal pulses. Pulmonary/Chest: Effort normal. She has wheezes. Abdominal: Soft. Normal appearance and bowel sounds are normal. There is no hepatosplenomegaly. There is no tenderness. Musculoskeletal: Normal range of motion. Neurological: She is alert and oriented to person, place, and time. She has normal strength. No cranial nerve deficit or sensory deficit. Coordination normal.   Skin: Skin is warm, dry and intact. No abrasion and no rash noted. Psychiatric: She has a normal mood and affect. Her behavior is normal. Judgment and thought content normal.   Nursing note and vitals reviewed. ASSESSMENT and PLAN  Velvet Crouch was seen today for nasal congestion and cough. Diagnoses and all orders for this visit:    Essential hypertension, benign  BP is at goal. I do not recommend any change in medications. Acute bronchitis, unspecified organism  Pt to begin taking ZPak. Symptoms have been present for 4 days therefore out of window if it was the flu. Pt not able to sleep with codeine therefore will prescribe pt Tussionex. I wrote pt a note for work taking her out of work today. Chronic bilateral low back pain without sciatica  Pt to continue taking Tramadol prn.  -     traMADol (ULTRAM) 50 mg tablet; Take 1 Tab by mouth every six (6) hours as needed for Pain. Other orders  -     chlorpheniramine-HYDROcodone (TUSSIONEX) 10-8 mg/5 mL suspension; Take 5 mL by mouth every twelve (12) hours as needed for Cough. Max Daily Amount: 10 mL.  -     azithromycin (ZITHROMAX) 250 mg tablet; Take 1 Tab by mouth See Admin Instructions for 5 days. reviewed medications and side effects in detail     Written by Rafy Flanagan, as dictated by Lucas Michelle MD.     Current diagnosis and concerns discussed with pt at length. Understands risks and benefits or current treatment plan and medications and accepts the treatment and medication with any possible risks. Pt asks appropriate questions which were answered.  Pt instructed to call with any concerns or problems.

## 2017-03-13 RX ORDER — HYDROCHLOROTHIAZIDE 25 MG/1
25 TABLET ORAL DAILY
Qty: 30 TAB | Refills: 2 | Status: SHIPPED | OUTPATIENT
Start: 2017-03-13 | End: 2017-07-05 | Stop reason: SDUPTHER

## 2017-04-18 ENCOUNTER — ANESTHESIA (OUTPATIENT)
Dept: ENDOSCOPY | Age: 62
End: 2017-04-18
Payer: OTHER GOVERNMENT

## 2017-04-18 ENCOUNTER — ANESTHESIA EVENT (OUTPATIENT)
Dept: ENDOSCOPY | Age: 62
End: 2017-04-18
Payer: OTHER GOVERNMENT

## 2017-04-18 ENCOUNTER — HOSPITAL ENCOUNTER (OUTPATIENT)
Age: 62
Setting detail: OUTPATIENT SURGERY
Discharge: HOME | End: 2017-04-18
Attending: INTERNAL MEDICINE | Admitting: INTERNAL MEDICINE
Payer: OTHER GOVERNMENT

## 2017-04-18 VITALS
SYSTOLIC BLOOD PRESSURE: 110 MMHG | BODY MASS INDEX: 46.78 KG/M2 | RESPIRATION RATE: 17 BRPM | TEMPERATURE: 97.6 F | HEIGHT: 64 IN | OXYGEN SATURATION: 100 % | WEIGHT: 274 LBS | DIASTOLIC BLOOD PRESSURE: 72 MMHG | HEART RATE: 65 BPM

## 2017-04-18 PROCEDURE — 77030013992 HC SNR POLYP ENDOSC BSC -B: Performed by: INTERNAL MEDICINE

## 2017-04-18 PROCEDURE — 76060000031 HC ANESTHESIA FIRST 0.5 HR: Performed by: INTERNAL MEDICINE

## 2017-04-18 PROCEDURE — 88305 TISSUE EXAM BY PATHOLOGIST: CPT | Performed by: INTERNAL MEDICINE

## 2017-04-18 PROCEDURE — 74011250636 HC RX REV CODE- 250/636

## 2017-04-18 PROCEDURE — 74011250636 HC RX REV CODE- 250/636: Performed by: INTERNAL MEDICINE

## 2017-04-18 PROCEDURE — 76040000019: Performed by: INTERNAL MEDICINE

## 2017-04-18 RX ORDER — PROPOFOL 10 MG/ML
INJECTION, EMULSION INTRAVENOUS AS NEEDED
Status: DISCONTINUED | OUTPATIENT
Start: 2017-04-18 | End: 2017-04-18 | Stop reason: HOSPADM

## 2017-04-18 RX ORDER — MIDAZOLAM HYDROCHLORIDE 1 MG/ML
.25-5 INJECTION, SOLUTION INTRAMUSCULAR; INTRAVENOUS
Status: DISCONTINUED | OUTPATIENT
Start: 2017-04-18 | End: 2017-04-18 | Stop reason: HOSPADM

## 2017-04-18 RX ORDER — SODIUM CHLORIDE 9 MG/ML
50 INJECTION, SOLUTION INTRAVENOUS CONTINUOUS
Status: DISCONTINUED | OUTPATIENT
Start: 2017-04-18 | End: 2017-04-18 | Stop reason: HOSPADM

## 2017-04-18 RX ORDER — FLUMAZENIL 0.1 MG/ML
0.2 INJECTION INTRAVENOUS
Status: DISCONTINUED | OUTPATIENT
Start: 2017-04-18 | End: 2017-04-18 | Stop reason: HOSPADM

## 2017-04-18 RX ORDER — PROPOFOL 10 MG/ML
INJECTION, EMULSION INTRAVENOUS
Status: DISCONTINUED | OUTPATIENT
Start: 2017-04-18 | End: 2017-04-18 | Stop reason: HOSPADM

## 2017-04-18 RX ORDER — EPINEPHRINE 0.1 MG/ML
1 INJECTION INTRACARDIAC; INTRAVENOUS
Status: DISCONTINUED | OUTPATIENT
Start: 2017-04-18 | End: 2017-04-18 | Stop reason: HOSPADM

## 2017-04-18 RX ORDER — ATROPINE SULFATE 0.1 MG/ML
0.4 INJECTION INTRAVENOUS
Status: DISCONTINUED | OUTPATIENT
Start: 2017-04-18 | End: 2017-04-18 | Stop reason: HOSPADM

## 2017-04-18 RX ORDER — NALOXONE HYDROCHLORIDE 0.4 MG/ML
0.4 INJECTION, SOLUTION INTRAMUSCULAR; INTRAVENOUS; SUBCUTANEOUS
Status: DISCONTINUED | OUTPATIENT
Start: 2017-04-18 | End: 2017-04-18 | Stop reason: HOSPADM

## 2017-04-18 RX ORDER — DEXTROMETHORPHAN/PSEUDOEPHED 2.5-7.5/.8
1.2 DROPS ORAL
Status: DISCONTINUED | OUTPATIENT
Start: 2017-04-18 | End: 2017-04-18 | Stop reason: HOSPADM

## 2017-04-18 RX ADMIN — SODIUM CHLORIDE 50 ML/HR: 900 INJECTION, SOLUTION INTRAVENOUS at 14:44

## 2017-04-18 RX ADMIN — PROPOFOL 80 MG: 10 INJECTION, EMULSION INTRAVENOUS at 15:06

## 2017-04-18 RX ADMIN — PROPOFOL 150 MCG/KG/MIN: 10 INJECTION, EMULSION INTRAVENOUS at 15:06

## 2017-04-18 NOTE — DISCHARGE INSTRUCTIONS
[address] [place] [address]. Roosevelt Trent M.D.  [phone]            COLON DISCHARGE INSTRUCTIONS       2017    Thi Hector  :  1955  [place] Medical Record Number:  [id number]      COLONOSCOPY FINDINGS:  Your colonoscopy showed one small polyp that was removed, and mild to moderate proctosigmoiditis. DISCOMFORT:  Redness at IV site- apply warm compress to area; if redness or soreness persist- contact your physician  There may be a slight amount of blood passed from the rectum  Gaseous discomfort- walking, belching will help relieve any discomfort  You may not operate a vehicle for 12 hours  You may not engage in an occupation involving machinery or appliances for rest of today  You may not drink alcoholic beverages for at least 12 hours  Avoid making any critical decisions for at least 24 hour  DIET:   Low fat diet. - however -  remember your colon is empty and a heavy meal will produce gas. Avoid these foods:  vegetables, fried / greasy foods, carbonated drinks for today     ACTIVITY:  You may resume your normal daily activities it is recommended that you spend the remainder of the day resting -  avoid any strenuous activity. CALL M.D. ANY SIGN OF:   Increasing pain, nausea, vomiting  Abdominal distension (swelling)  New increased bleeding (oral or rectal)  Fever (chills)  Pain in chest area  Bloody discharge from nose or mouth   Shortness of breath    Follow-up Instructions:   Call Dr. Galen Whipple if any questions or problems. Telephone # [phone]  Biopsy results will be available in  5 to 7 days  Should have a repeat colonoscopy in 2 years. Will need treatment for the ulcerative colitis involving the rectum and sigmoid.

## 2017-04-18 NOTE — IP AVS SNAPSHOT
[address] [place] [address] 
[phone] Patient: Tiffany Booth MRN: [id number] QGO:[id number] You are allergic to the following Allergen Reactions Sulfa (Sulfonamide Antibiotics) Swelling Recent Documentation Height Weight Breastfeeding? BMI OB Status Smoking Status 1.626 m 124.3 kg No 47.03 kg/m2 Postmenopausal Former Smoker Emergency Contacts Name Discharge Info Relation Home Work Mobile Omar Joiner ,[address] [facility] CAREGIVER [3] Spouse [3] [phone] Greg BENAVIDEZ  Spouse [3] About your hospitalization You were admitted on:  April 18, 2017 You last received care in the:  [facility] ENDOSCOPY You were discharged on:  April 18, 2017 Unit phone number:  [phone] Why you were hospitalized Your primary diagnosis was:  Not on File Providers Seen During Your Hospitalizations Provider Role Specialty Primary office phone Khris Pimentel MD Attending Provider Gastroenterology [phone] Your Primary Care Physician (PCP) Primary Care Physician Office Phone Office Fax ADRIANAAYDEERELL, [address] [facility] [phone] Follow-up Information None Current Discharge Medication List  
  
CONTINUE these medications which have NOT CHANGED Dose & Instructions Dispensing Information Comments Morning Noon Evening Bedtime  
 chlorpheniramine-HYDROcodone 10-8 mg/5 mL suspension Commonly known as:  Morales Tavarez Your last dose was: Your next dose is:    
   
   
 Dose:  5 mL Take 5 mL by mouth every twelve (12) hours as needed for Cough. Max Daily Amount: 10 mL. Quantity:  120 mL Refills:  0  
     
   
   
   
  
 diclofenac EC 75 mg EC tablet Commonly known as:  VOLTAREN Your last dose was: Your next dose is:    
   
   
 Dose:  75 mg Take 1 Tab by mouth two (2) times a day. Quantity:  60 Tab Refills:  2  
     
   
   
   
  
 fluticasone 50 mcg/actuation nasal spray Commonly known as:  Marco Antonio Cano Your last dose was: Your next dose is:    
   
   
 Dose:  2 Spray  
2 sprays by Both Nostrils route daily. Quantity:  1 Bottle Refills:  0  
     
   
   
   
  
 * hydroCHLOROthiazide 25 mg tablet Commonly known as:  HYDRODIURIL Your last dose was: Your next dose is:    
   
   
 Dose:  25 mg Take 1 Tab by mouth daily for 30 days. Quantity:  30 Tab Refills:  4  
     
   
   
   
  
 * hydroCHLOROthiazide 25 mg tablet Commonly known as:  HYDRODIURIL Your last dose was: Your next dose is:    
   
   
 Dose:  25 mg Take 1 Tab by mouth daily for 30 days. Quantity:  30 Tab Refills:  2 HYDROcodone-acetaminophen  mg tablet Commonly known as:  Amy Yu Your last dose was: Your next dose is:    
   
   
 Dose:  1 Tab Take 1 Tab by mouth every six (6) hours as needed for Pain. Max Daily Amount: 4 Tabs. Quantity:  90 Tab Refills:  0  
     
   
   
   
  
 hydrocortisone 100 mg/60 mL enema Commonly known as:  Nathalie Sanchez Your last dose was: Your next dose is:    
   
   
 Dose:  100 mg Insert 1 Enema into rectum two (2) times a day. Quantity:  60 mL Refills:  0  
     
   
   
   
  
 levothyroxine 100 mcg tablet Commonly known as:  SYNTHROID Your last dose was: Your next dose is: TAKE 1 TAB BY MOUTH DAILY (BEFORE BREAKFAST) FOR 90 DAYS. Quantity:  90 Tab Refills:  0  
     
   
   
   
  
 ondansetron 4 mg disintegrating tablet Commonly known as:  ZOFRAN ODT Your last dose was: Your next dose is:    
   
   
 Dose:  4 mg Take 1 Tab by mouth every eight (8) hours as needed for Nausea. Quantity:  12 Tab Refills:  0  
     
   
   
   
  
 potassium chloride 10 mEq tablet Commonly known as:  K-DUR, KLOR-CON  
   
 Your last dose was: Your next dose is:    
   
   
 Dose:  10 mEq Take 1 Tab by mouth two (2) times a day. Quantity:  60 Tab Refills:  1  
     
   
   
   
  
 traMADol 50 mg tablet Commonly known as:  ULTRAM  
   
Your last dose was: Your next dose is:    
   
   
 Dose:  50 mg Take 1 Tab by mouth every six (6) hours as needed for Pain. Quantity:  60 Tab Refills:  3  
     
   
   
   
  
 * Notice: This list has 2 medication(s) that are the same as other medications prescribed for you. Read the directions carefully, and ask your doctor or other care provider to review them with you. Discharge Instructions [address] [facility]. [facility] Farhan Nolan M.D. 
[phone] COLON DISCHARGE INSTRUCTIONS 
    
2017 Thi Hector :  1955 [facility] Medical Record Number:  [id number] COLONOSCOPY FINDINGS: 
Your colonoscopy showed one small polyp that was removed, and mild to moderate proctosigmoiditis. DISCOMFORT: 
Redness at IV site- apply warm compress to area; if redness or soreness persist- contact your physician There may be a slight amount of blood passed from the rectum Gaseous discomfort- walking, belching will help relieve any discomfort You may not operate a vehicle for 12 hours You may not engage in an occupation involving machinery or appliances for rest of today You may not drink alcoholic beverages for at least 12 hours Avoid making any critical decisions for at least 24 hour DIET: 
 Low fat diet.  however -  remember your colon is empty and a heavy meal will produce gas. Avoid these foods:  vegetables, fried / greasy foods, carbonated drinks for today ACTIVITY: 
You may resume your normal daily activities it is recommended that you spend the remainder of the day resting -  avoid any strenuous activity. CALL M.D. ANY SIGN OF: Increasing pain, nausea, vomiting Abdominal distension (swelling) New increased bleeding (oral or rectal) Fever (chills) Pain in chest area Bloody discharge from nose or mouth Shortness of breath Follow-up Instructions: 
 Call Dr. Sonam Lemus if any questions or problems. Telephone # [phone] Biopsy results will be available in  5 to 7 days Should have a repeat colonoscopy in 2 years. Will need treatment for the ulcerative colitis involving the rectum and sigmoid. Discharge Orders None Introducing [facility] SERVICES! [employer] introduces #waywire patient portal. Now you can access parts of your medical record, email your doctor's office, and request medication refills online. 1. In your internet browser, go to [email or web address] 2. Click on the First Time User? Click Here link in the Sign In box. You will see the New Member Sign Up page. 3. Enter your #waywire Access Code exactly as it appears below. You will not need to use this code after youve completed the sign-up process. If you do not sign up before the expiration date, you must request a new code. · #waywire Access Code: [email or web address] Expires: 7/17/2017  1:01 PM 
 
4. Enter the last four digits of your Social Security Number (xxxx) and Date of Birth (mm/dd/yyyy) as indicated and click Submit. You will be taken to the next sign-up page. 5. Create a #waywire ID. This will be your #waywire login ID and cannot be changed, so think of one that is secure and easy to remember. 6. Create a #waywire password. You can change your password at any time. 7. Enter your Password Reset Question and Answer. This can be used at a later time if you forget your password. 8. Enter your e-mail address. You will receive e-mail notification when new information is available in [address] 9. Click Sign Up. You can now view and download portions of your medical record. 10. Click the Download Summary menu link to download a portable copy of your medical information. If you have questions, please visit the Frequently Asked Questions section of the MyChart website. Remember, MyChart is NOT to be used for urgent needs. For medical emergencies, dial 911. Now available from your iPhone and Android! General Information Please provide this summary of care documentation to your next provider. Patient Signature:  ____________________________________________________________ Date:  ____________________________________________________________  
  
Graciela Newark Provider Signature:  ____________________________________________________________ Date:  ____________________________________________________________

## 2017-04-18 NOTE — ANESTHESIA PREPROCEDURE EVALUATION
Anesthetic History   No history of anesthetic complications            Review of Systems / Medical History  Patient summary reviewed, nursing notes reviewed and pertinent labs reviewed    Pulmonary  Within defined limits                 Neuro/Psych   Within defined limits           Cardiovascular    Hypertension              Exercise tolerance: >4 METS     GI/Hepatic/Renal  Within defined limits              Endo/Other      Hypothyroidism  Morbid obesity and arthritis     Other Findings   Comments: Ulcerative colitis           Physical Exam    Airway  Mallampati: II    Neck ROM: normal range of motion   Mouth opening: Normal     Cardiovascular  Regular rate and rhythm,  S1 and S2 normal,  no murmur, click, rub, or gallop  Rhythm: regular  Rate: normal         Dental  No notable dental hx       Pulmonary  Breath sounds clear to auscultation               Abdominal  GI exam deferred       Other Findings            Anesthetic Plan    ASA: 3  Anesthesia type: MAC          Induction: Intravenous  Anesthetic plan and risks discussed with: Patient

## 2017-04-18 NOTE — PERIOP NOTES
Patient tolerated procedure well. Report from Olivia Garcia CRNA. Patient transported to recovery area and report given to CHRISTIAN Kc RN.

## 2017-04-18 NOTE — H&P
[facility]. Ana Grider  3/7/2017 1:45 PM  Location: [address]  Patient #: [id number]  : 1955   / Language: [place] / Race: Black or   Female    History of Present Illness Mae Suarez FN; 3/10/2017 1:52 PM)  The patient is a 64year old female who presents with a complaint of ulcerative colitis. The patient presents for routine follow-up. The symptoms have been associated with abdominal pain, diarrhea, fecal urgency and hematochezia. The patient had a colonoscopy 1 year(s) ago. Impact of disease: impact on work/school-moderate. Lab results: two months ago. Note for \"Ulcerative Colitis\": 16:Her colonoscopy showed a polyp and proctitis. For the past couple of months she has been using cortenemas every other day. She notices they help when she is using them. She has not had any bleeding since then. She could not get Delizol or Rowasa She reports her arthritis has been bad.    3/7/17: She has 30+ year history of ulcerative colitis. She was in the ER in January for c/o N/V. She reports she does not feel well. She is starting to having similar pain like she had when she went to ER. She took some Tramadol but this does not seem to help. She has Percocet from the ER but this makes her feel unwell as well. She does not like to take it. Last colonoscopy was 2016. She is not eating most of the day. She reports she moved into a different building for school but this has not worked well for her with her other issues including rheumatoid arthritis. She teaches first grade. She reports a lot of ill feeling in the morning and needing to go to the bathroom. She will pass small amounts of blood. She has continued the cortenemas that she got from the ER.       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 Medication History (Cathrine Holstein; 3/7/2017 1:53 PM)  Hydrocodone-Acetaminophen  (Oral as needed) Specific dose unknown - Active. TraMADol HCl  (50MG Tablet, Oral prn) Active. Levothyroxine Sodium  (88MCG Tablet, Oral) Active. Hydrochlorothiazide  (25MG Tablet, 1 Oral daily) Active. Cortenema  (100MG Enema, Rectal bid) Active. Medications Reconciled     Family History (Cathrine Holstein; 3/7/2017 1:46 PM)  Pancreatic cancer (157.9  C25.9)  Mother. Breast Cancer  Mother. Social History (Cathrine Holstein; 3/7/2017 1:46 PM)  Alcohol Use  Has never drank. Blood Transfusion  No.  Tobacco Use  Never smoker. Employment status  Full-time. Marital status  . Pregnancy / Birth History (Cathrine Holstein; 3/7/2017 1:46 PM)  Given birth x 3     Diagnostic Studies History (Cathrine Holstein; 3/7/2017 1:46 PM)  Endoscopy   Colonoscopy     Health Maintenance History (Cathrine Holstein; 3/7/2017 1:46 PM)  Pneumovax  none  Flu Vaccine  none    Review of Systems (Cathrine Holstein; 3/7/2017 1:46 PM)  General Not Present- Chronic Fatigue, Poor Appetite, Weight Gain and Weight Loss. Skin Not Present- Itching, Rash and Skin Color Changes. HEENT Not Present- Hearing Loss and Vertigo. Respiratory Not Present- Difficulty Breathing and TB exposure. Cardiovascular Present- Hypertension. Not Present- Chest Pain, Use of Antibiotics before Dental Procedures and Use of Blood Thinners. Gastrointestinal Present- See HPI. Musculoskeletal Not Present- Arthritis, Hip Replacement Surgery and Knee Replacement Surgery.   Neurological Not Present- Weakness. Psychiatric Not Present- Depression. Endocrine Present- Thyroid Problems. Not Present- Diabetes. Hematology Not Present- Anemia. Vitals (Jessica Thomas; 3/7/2017 1:51 PM)  3/7/2017 1:46 PM  Weight: 268 lb   Height: 64 in    Body Surface Area: 2.34 m²   Body Mass Index: 46 kg/m²  Temp.: 97.8° F (Temporal)    Resp. : 16 (Unlabored)     BP: 138/82 (Sitting, Left Arm, Standard)        Physical Exam [facility] KALYAN Gee Maverick FNP; 3/10/2017 1:50 PM)  General  Posture - Normal posture. Integumentary  Global Assessment  Examination of related systems reveals - Well-developed, well-nourished and in no acute distress; alert and oriented x 3. Eye  Pupil - Bilateral - Normal, Equal and Round. ENMT  Global Assessment  Examination of related systems reveals - normal voice with no communication aids required. Abdomen  Inspection  Inspection of the abdomen reveals - Soft. Contour - Obese. Palpation/Percussion  Tenderness - Left Lower Quadrant (mild) and Right Lower Quadrant (mild). Auscultation  Auscultation of the abdomen reveals - Bowel sounds normal.    Peripheral Vascular  Upper Extremity  Inspection - Bilateral - Acrocyanotic. Neurologic  Neurologic evaluation reveals  - normal attention span and ability to concentrate. Neuropsychiatric  Mental status exam performed with findings of - thought content normal with ability to perform basic computations and apply abstract reasoning, associations are intact and demonstrates appropriate judgment and insight. The patient's mood and affect are described as  - full, not anxious, not agitated. 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  Recommended taking Rowasa twice or three times a week in the meantime but ended up back on cortenemas due to cost. She is due for repeat colonoscopy with biopsies and labs. Needs further evaluation of abdominal pain and bleeding to assess level of disease activity. Current Plans    METABOLIC PANEL, COMPREHENSIVE (28894)  C-REACTIVE PROTEIN (32301)  SED RATE ERYTHROCYTE - FEMALE (ESR-F) (64705)  CBC, PLATELETS & AUT DIFF (97207)  VITAMIN B-12 (CYANOCOBALAMIN) (85722)  Assay Of Calprotectin Fecal (93679)  Colonoscopy (76373) (Discussed risks and benefits with the patient to include:; perforation, post polypectomy, or post biopsy bleeding, missed lesions, and sedation reactions.)  Started Prepopik 10-3.3-14UB-JA-GM, 1 (one) Packet as directed, 1 Packet, 03/10/2017, No Refill. Abdominal pain (789.00  R10.9)  Rectal bleeding (569.3  K62.5)  Current Plans  Pt Education - How to access health information online: discussed with patient and provided information. Patient is to call me for any questions or concerns. Follow up after testing is completed. This patient was reviewed and seen in collaboration with Dr. Rome Vazquez.

## 2017-04-18 NOTE — ROUTINE PROCESS
William Mesa  1955  [id number]    Situation:  Verbal report received from: Raeann Parr RN  Procedure: Procedure(s):  COLONOSCOPY  ENDOSCOPIC POLYPECTOMY    Background:    Preoperative diagnosis: COLITIS  Postoperative diagnosis: transverse polyp    :  Dr. Margaret Deshpande  Assistant(s): Endoscopy Technician-1: Shobha Hernandez  Endoscopy RN-1: Isadora Tyler RN    Specimens:   ID Type Source Tests Collected by Time Destination   1 : polyp Preservative Colon, Transverse  Barbra Garcia MD 4/18/2017 1519 Pathology     H. Pylori  no    Assessment:  Intra-procedure medications     Anesthesia gave intra-procedure sedation and medications, see anesthesia flow sheet yes    Intravenous fluids: NS@ KVO     Vital signs stable     Abdominal assessment: round and soft     Recommendation:  Discharge patient per MD order  Family or Friend   Permission to share finding with family or friend yes    Endoscopy discharge instructions have been reviewed and given to patient and spouse. The patient and spouse verbalized understanding and acceptance of instructions.

## 2017-04-18 NOTE — PROCEDURES
Devyn Steele M.D.  [phone]            2017          Colonoscopy Operative Report  Audra Webster  :  1955  [facility] Medical Record Number:  [id number]      Indications:    Lower rectal bleeding     :  Esteban Ortiz MD    Referring Provider: Cristina Concepcion MD    Sedation:  MAC anesthesia    Pre-Procedural Exam:      Airway: clear,  No airway problems anticipated  Heart: RRR, without gallops or rubs  Lungs: clear bilaterally without wheezes, crackles, or rhonchi  Abdomen: soft, nontender, nondistended, bowel sounds present  Mental Status: awake, alert and oriented to person, place and time     Procedure Details:  After informed consent was obtained with all risks and benefits of procedure explained and preoperative exam completed, the patient was taken to the endoscopy suite and placed in the left lateral decubitus position. Upon sequential sedation as per above, a digital rectal exam was performed. The Olympus videocolonoscope  was inserted in the rectum and carefully advanced to the cecum, which was identified by the ileocecal valve and appendiceal orifice, terminal ileum. The quality of preparation was good. The colonoscope was slowly withdrawn with careful inspection and evaluation between folds. Retroflexion in the rectum was performed. 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 EBL:  None.     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   -Low fat diet.    -Resume normal medication(s). 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     Marily Olmstead MD  4/18/2017  3:28 PM

## 2017-04-18 NOTE — ANESTHESIA POSTPROCEDURE EVALUATION
Post-Anesthesia Evaluation and Assessment    Patient: Inez Thompson MRN: [id number]  SSN: [id number]    YOB: 1955  Age: 64 y.o. Sex: female       Cardiovascular Function/Vital Signs  Visit Vitals    /72    Pulse 70    Temp 36.4 °C (97.6 °F)    Resp 19    Ht 5' 4\" (1.626 m)    Wt 124.3 kg (274 lb)    SpO2 99%    Breastfeeding No    BMI 47.03 kg/m2       Patient is status post MAC anesthesia for Procedure(s):  COLONOSCOPY  ENDOSCOPIC POLYPECTOMY. Nausea/Vomiting: None    Postoperative hydration reviewed and adequate. Pain:  Pain Scale 1: Numeric (0 - 10) (04/18/17 1539)  Pain Intensity 1: 0 (04/18/17 1539)   Managed    Neurological Status: At baseline    Mental Status and Level of Consciousness: Arousable    Pulmonary Status:   O2 Device: Room air (04/18/17 1539)   Adequate oxygenation and airway patent    Complications related to anesthesia: None    Post-anesthesia assessment completed.  No concerns    Signed By: Clem Wood MD     April 18, 2017

## 2017-04-25 ENCOUNTER — TELEPHONE (OUTPATIENT)
Dept: INTERNAL MEDICINE CLINIC | Age: 62
End: 2017-04-25

## 2017-04-26 ENCOUNTER — OFFICE VISIT (OUTPATIENT)
Dept: INTERNAL MEDICINE CLINIC | Age: 62
End: 2017-04-26

## 2017-04-26 VITALS
SYSTOLIC BLOOD PRESSURE: 115 MMHG | HEIGHT: 64 IN | TEMPERATURE: 98.5 F | DIASTOLIC BLOOD PRESSURE: 78 MMHG | RESPIRATION RATE: 14 BRPM | WEIGHT: 264.4 LBS | BODY MASS INDEX: 45.14 KG/M2 | HEART RATE: 85 BPM | OXYGEN SATURATION: 97 %

## 2017-04-26 DIAGNOSIS — E03.4 HYPOTHYROIDISM DUE TO ACQUIRED ATROPHY OF THYROID: Chronic | ICD-10-CM

## 2017-04-26 DIAGNOSIS — I10 ESSENTIAL HYPERTENSION, BENIGN: ICD-10-CM

## 2017-04-26 DIAGNOSIS — R07.9 LEFT SIDED CHEST PAIN: Primary | ICD-10-CM

## 2017-04-26 RX ORDER — HYDROCODONE BITARTRATE AND ACETAMINOPHEN 10; 325 MG/1; MG/1
1 TABLET ORAL
Qty: 90 TAB | Refills: 0 | Status: SHIPPED | OUTPATIENT
Start: 2017-04-26 | End: 2017-10-31 | Stop reason: ALTCHOICE

## 2017-04-26 RX ORDER — CYCLOBENZAPRINE HCL 10 MG
10 TABLET ORAL
Qty: 30 TAB | Refills: 0 | Status: SHIPPED | OUTPATIENT
Start: 2017-04-26 | End: 2017-10-31 | Stop reason: ALTCHOICE

## 2017-04-26 RX ORDER — LEVOTHYROXINE SODIUM 100 UG/1
100 TABLET ORAL
Qty: 90 TAB | Refills: 1 | Status: SHIPPED | OUTPATIENT
Start: 2017-04-26 | End: 2017-10-20 | Stop reason: SDUPTHER

## 2017-04-26 RX ORDER — DICLOFENAC SODIUM 75 MG/1
75 TABLET, DELAYED RELEASE ORAL 2 TIMES DAILY
Qty: 60 TAB | Refills: 2 | Status: SHIPPED | OUTPATIENT
Start: 2017-04-26 | End: 2018-01-29

## 2017-04-26 NOTE — LETTER
4/26/2017 3:41 PM 
 
 
 
 
 
RE: Ms. Meli Herrmann [phone] To Whom It May Concern: 
 
Mrs. Lashae Carlos was seen in my office for left sided shoulder pain. At this time my recommendation is for patient to refrain from lifting and pulling for one week. Thank you for your care and concern of my patient. Sincerely, 
 
 
 
 
 
Sp MUNIZ

## 2017-04-26 NOTE — PROGRESS NOTES
HISTORY OF PRESENT ILLNESS  Kaylynn Hassan is a 64 y.o. female. HPI     She complains of pain that starts in left upper chest and radiates up left shoulder up her neck and across her back. Reports pain started 3-4 days ago. Pain is exacerbated by putting pressure on left arm, internal rotation of left arm, and certain movements of left arm. Reports when she lays on her left side pain is not as severe as when she lays on her right side and it pulls. 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 Reports she has not lifted anything unusually heavy. Pt took Tramadol with no relief. She states she is not short of breath or sweaty with this pain. Pt states that Dr. Roxie Vinson does not want her to take steroids due to her bones. Hypertension ROS: taking medications as instructed, no medication side effects noted, no TIA's, no chest pain on exertion, no dyspnea on exertion, no swelling of ankles. New concerns: Stable- bp was 115/78 in the office today. Hypothyroidism:  Lab Results   Component Value Date/Time    TSH 4.930 06/14/2016 02:33 PM   Thyroid ROS: denies fatigue, weight changes, heat/cold intolerance, bowel/skin changes or CVS symptoms. Review of Systems   All other systems reviewed and are negative. Physical Exam   Constitutional: She is oriented to person, place, and time. She appears well-developed and well-nourished. HENT:   Head: Normocephalic and atraumatic. Right Ear: External ear normal.   Left Ear: External ear normal.   Nose: Nose normal.   Mouth/Throat: Oropharynx is clear and moist.   Eyes: Conjunctivae and EOM are normal.   Neck: Normal range of motion. Neck supple. Carotid bruit is not present. No thyroid mass and no thyromegaly present. Cardiovascular: Normal rate, regular rhythm, S1 normal, S2 normal, normal heart sounds and intact distal pulses.     Pulmonary/Chest: Effort normal and breath sounds normal. She exhibits tenderness (left-sided). Abdominal: Soft. Normal appearance and bowel sounds are normal. There is no hepatosplenomegaly. There is no tenderness. Musculoskeletal: Normal range of motion. She exhibits tenderness. Left shoulder: She exhibits pain (with internal rotation). Cervical back: She exhibits tenderness. Left-sided upper neck point tenderness   Neurological: She is alert and oriented to person, place, and time. She has normal strength. No cranial nerve deficit or sensory deficit. Coordination normal.   Skin: Skin is warm, dry and intact. No abrasion and no rash noted. Psychiatric: She has a normal mood and affect. Her behavior is normal. Judgment and thought content normal.   Nursing note and vitals reviewed. ASSESSMENT and PLAN  Mark Barry was seen today for shoulder pain. Diagnoses and all orders for this visit:    Left sided chest pain  Pain is musculoskeletal. Pt to begin taking Voltaren and Flexeril. Pt can take Hydrocodone prn. I wrote pt a prescription for physical therapy. -     diclofenac EC (VOLTAREN) 75 mg EC tablet; Take 1 Tab by mouth two (2) times a day. -     cyclobenzaprine (FLEXERIL) 10 mg tablet; Take 1 Tab by mouth nightly.  -     HYDROcodone-acetaminophen (NORCO)  mg tablet; Take 1 Tab by mouth every six (6) hours as needed for Pain. Max Daily Amount: 4 Tabs. Essential hypertension, benign  BP is at goal. I do not recommend any change in medications. Hypothyroidism due to acquired atrophy of thyroid  Stable- Continue current medications. -     levothyroxine (SYNTHROID) 100 mcg tablet; Take 1 Tab by mouth Daily (before breakfast). reviewed medications and side effects in detail     Written by Yoly Hernandez, as dictated by Mana Long MD.     Current diagnosis and concerns discussed with pt at length.  Understands risks and benefits or current treatment plan and medications and accepts the treatment and medication with any possible risks. Pt asks appropriate questions which were answered. Pt instructed to call with any concerns or problems.

## 2017-05-11 ENCOUNTER — APPOINTMENT (OUTPATIENT)
Dept: PHYSICAL THERAPY | Age: 62
End: 2017-05-11

## 2017-07-05 RX ORDER — HYDROCHLOROTHIAZIDE 25 MG/1
25 TABLET ORAL DAILY
Qty: 30 TAB | Refills: 2 | Status: SHIPPED | OUTPATIENT
Start: 2017-07-05 | End: 2017-10-21 | Stop reason: SDUPTHER

## 2017-10-20 DIAGNOSIS — E03.4 HYPOTHYROIDISM DUE TO ACQUIRED ATROPHY OF THYROID: Chronic | ICD-10-CM

## 2017-10-20 RX ORDER — LEVOTHYROXINE SODIUM 100 UG/1
TABLET ORAL
Qty: 90 TAB | Refills: 0 | Status: SHIPPED | OUTPATIENT
Start: 2017-10-20 | End: 2018-01-22 | Stop reason: SDUPTHER

## 2017-10-31 ENCOUNTER — OFFICE VISIT (OUTPATIENT)
Dept: INTERNAL MEDICINE CLINIC | Age: 62
End: 2017-10-31

## 2017-10-31 VITALS
DIASTOLIC BLOOD PRESSURE: 75 MMHG | BODY MASS INDEX: 43.26 KG/M2 | HEIGHT: 64 IN | RESPIRATION RATE: 14 BRPM | WEIGHT: 253.4 LBS | TEMPERATURE: 98.3 F | HEART RATE: 79 BPM | OXYGEN SATURATION: 98 % | SYSTOLIC BLOOD PRESSURE: 111 MMHG

## 2017-10-31 DIAGNOSIS — G89.29 CHRONIC BILATERAL LOW BACK PAIN WITHOUT SCIATICA: ICD-10-CM

## 2017-10-31 DIAGNOSIS — I10 ESSENTIAL HYPERTENSION, BENIGN: ICD-10-CM

## 2017-10-31 DIAGNOSIS — M54.50 CHRONIC BILATERAL LOW BACK PAIN WITHOUT SCIATICA: ICD-10-CM

## 2017-10-31 DIAGNOSIS — J06.9 VIRAL UPPER RESPIRATORY TRACT INFECTION: ICD-10-CM

## 2017-10-31 DIAGNOSIS — K51.819 OTHER ULCERATIVE COLITIS WITH COMPLICATION (HCC): Primary | Chronic | ICD-10-CM

## 2017-10-31 DIAGNOSIS — E03.4 HYPOTHYROIDISM DUE TO ACQUIRED ATROPHY OF THYROID: Chronic | ICD-10-CM

## 2017-10-31 RX ORDER — TRAMADOL HYDROCHLORIDE 50 MG/1
50 TABLET ORAL
Qty: 60 TAB | Refills: 3 | Status: SHIPPED | OUTPATIENT
Start: 2017-10-31 | End: 2018-03-20 | Stop reason: SDUPTHER

## 2017-10-31 NOTE — PROGRESS NOTES
HISTORY OF PRESENT ILLNESS  Trista Shepherd is a 64 y.o. female. HPI   Patient reports colon flare from stress. She states she has been having some bloody stool and darker stool. She states she used the steroid enema which helped. Patient reports back pain. Her son was diagnosed with colon cancer and getting treatment and her  is deciding on prostate cancer surgery= very stressed but all of that   In addition, patient reports throat pain and headache for a few days. She denies fever. Hypertension ROS: taking medications as instructed, no medication side effects noted, no TIA's, no chest pain on exertion, no dyspnea on exertion, no swelling of ankles. New concerns:  Patient's BP in office today is 111/75. hypothyroidism. Lab Results   Component Value Date/Time    TSH 4.930 06/14/2016 02:33 PM     Thyroid ROS: denies fatigue, weight changes, heat/cold intolerance, bowel/skin changes or CVS symptoms. Patient refuses flu vaccine today. Review of Systems   All other systems reviewed and are negative. Physical Exam   Constitutional: She is oriented to person, place, and time. She appears well-developed and well-nourished. HENT:   Head: Normocephalic and atraumatic. Right Ear: External ear normal.   Left Ear: External ear normal.   Nose: Nose normal.   Mouth/Throat: Oropharynx is clear and moist.   Eyes: Conjunctivae and EOM are normal.   Neck: Normal range of motion. Neck supple. Carotid bruit is not present. No thyroid mass and no thyromegaly present. Cardiovascular: Normal rate, regular rhythm, S1 normal, S2 normal, normal heart sounds and intact distal pulses. Pulmonary/Chest: Effort normal and breath sounds normal.   Abdominal: Soft. Normal appearance and bowel sounds are normal. There is no hepatosplenomegaly. There is no tenderness. Musculoskeletal: Normal range of motion. Neurological: She is alert and oriented to person, place, and time. She has normal strength.  No cranial nerve deficit or sensory deficit. Coordination normal.   Skin: Skin is warm, dry and intact. No abrasion and no rash noted. Psychiatric: She has a normal mood and affect. Her behavior is normal. Judgment and thought content normal.   Nursing note and vitals reviewed. ASSESSMENT and PLAN  Diagnoses and all orders for this visit:    1. Other ulcerative colitis with complication ([facility] [address]  Continue to monitor. Continue current medications. 2. Essential hypertension, benign  BP is at goal. I do not recommend any change in medications. -     CBC W/O DIFF  -     METABOLIC PANEL, COMPREHENSIVE  -     LIPID PANEL    3. Hypothyroidism due to acquired atrophy of thyroid  Will monitor. Stable - continue current medications.  -     TSH 3RD GENERATION  -     T4, FREE  -     LIPID PANEL    4. Chronic bilateral low back pain without sciatica  Stable, continue to manage.   -     traMADol (ULTRAM) 50 mg tablet; Take 1 Tab by mouth every six (6) hours as needed for Pain. URI- no need for antibiotics right now  Viral upper respiratory infection (URI or \"common cold\")   - Seek medical care if symptoms become more severe or if you develop      chest pain, shortness of breath, confusion or fever > 103 degrees. - Contact us if your symptoms fail to improve after 7-10 days   - Rest as much as possible and stay home from work/school at least 24 hours                  after last fever. Wash hand frequently and cough/sneeze into your sleeve to help prevent       infection of others. Drink plenty of fluids.    - Ibuprofen (Advil, Motrin) 400-800mg every 6 hours or Aleve 220 mg 1-2 pills every 8 hours                 for fever, headache, pain   - Tylenol extra strength 500 mg every 6 hours for pain, headache, fever   - Nasal saline rinses 2-3 times daily for nasal congestion   - Mucinex 1200 mg twice daily or Guaifenesin 400 mg every 4 hours for chest congestion              - Robitussin DM or Delsym for cough   - Cepacol throat losenges and saline gargles (1 tsp salt in 8 oz water) for sore throat   - Tea with honey for cough              - Benadryl (diphenhydramine) 50 mg at night for nasal congestion/allergies   - Pseudophedrine 12-hour tablets twice daily for nasal and inner ear congestion. Ask your         Pharmacist. Use with caution if you have high blood pressure   - Afrin (oxymetazoline) nasal spray 2 sprays in each nostril twice daily for severe      congestion. Do not use this medication for more than 7 days as it may cause        \"rebound congestion. Use with caution if your blood pressure is uncontrolled. lab results and schedule of future lab studies reviewed with patient  reviewed diet, exercise and weight control    Written by Varsha Monterroso, as dictated by Gardenia Deng MD.     Current diagnosis and concerns discussed with pt at length. Understands risks and benefits or current treatment plan and medications and accepts the treatment and medication with any possible risks. Pt asks appropriate questions which were answered. Pt instructed to call with any concerns or problems.

## 2017-11-01 LAB
ALBUMIN SERPL-MCNC: 4 G/DL (ref 3.6–4.8)
ALBUMIN/GLOB SERPL: 1.1 {RATIO} (ref 1.2–2.2)
ALP SERPL-CCNC: 107 IU/L (ref 39–117)
ALT SERPL-CCNC: 21 IU/L (ref 0–32)
AST SERPL-CCNC: 21 IU/L (ref 0–40)
BILIRUB SERPL-MCNC: 0.6 MG/DL (ref 0–1.2)
BUN SERPL-MCNC: 8 MG/DL (ref 8–27)
BUN/CREAT SERPL: 9 (ref 12–28)
CALCIUM SERPL-MCNC: 9.5 MG/DL (ref 8.7–10.3)
CHLORIDE SERPL-SCNC: 100 MMOL/L (ref 96–106)
CHOLEST SERPL-MCNC: 231 MG/DL (ref 100–199)
CO2 SERPL-SCNC: 28 MMOL/L (ref 18–29)
CREAT SERPL-MCNC: 0.9 MG/DL (ref 0.57–1)
ERYTHROCYTE [DISTWIDTH] IN BLOOD BY AUTOMATED COUNT: 14 % (ref 12.3–15.4)
GFR SERPLBLD CREATININE-BSD FMLA CKD-EPI: 69 ML/MIN/1.73
GFR SERPLBLD CREATININE-BSD FMLA CKD-EPI: 80 ML/MIN/1.73
GLOBULIN SER CALC-MCNC: 3.7 G/DL (ref 1.5–4.5)
GLUCOSE SERPL-MCNC: 87 MG/DL (ref 65–99)
HCT VFR BLD AUTO: 38 % (ref 34–46.6)
HDLC SERPL-MCNC: 81 MG/DL
HGB BLD-MCNC: 12.5 G/DL (ref 11.1–15.9)
INTERPRETATION, 910389: NORMAL
LDLC SERPL CALC-MCNC: 134 MG/DL (ref 0–99)
MCH RBC QN AUTO: 30.8 PG (ref 26.6–33)
MCHC RBC AUTO-ENTMCNC: 32.9 G/DL (ref 31.5–35.7)
MCV RBC AUTO: 94 FL (ref 79–97)
PLATELET # BLD AUTO: 286 X10E3/UL (ref 150–379)
POTASSIUM SERPL-SCNC: 3.7 MMOL/L (ref 3.5–5.2)
PROT SERPL-MCNC: 7.7 G/DL (ref 6–8.5)
RBC # BLD AUTO: 4.06 X10E6/UL (ref 3.77–5.28)
SODIUM SERPL-SCNC: 140 MMOL/L (ref 134–144)
T4 FREE SERPL-MCNC: 1.22 NG/DL (ref 0.82–1.77)
TRIGL SERPL-MCNC: 78 MG/DL (ref 0–149)
TSH SERPL DL<=0.005 MIU/L-ACNC: 3.64 UIU/ML (ref 0.45–4.5)
VLDLC SERPL CALC-MCNC: 16 MG/DL (ref 5–40)
WBC # BLD AUTO: 7.6 X10E3/UL (ref 3.4–10.8)

## 2017-11-09 RX ORDER — HYDROCHLOROTHIAZIDE 25 MG/1
TABLET ORAL
Qty: 90 TAB | Refills: 0 | Status: SHIPPED | OUTPATIENT
Start: 2017-11-09 | End: 2018-02-17 | Stop reason: SDUPTHER

## 2018-01-22 DIAGNOSIS — E03.4 HYPOTHYROIDISM DUE TO ACQUIRED ATROPHY OF THYROID: Chronic | ICD-10-CM

## 2018-01-22 RX ORDER — LEVOTHYROXINE SODIUM 100 UG/1
TABLET ORAL
Qty: 90 TAB | Refills: 0 | Status: SHIPPED | OUTPATIENT
Start: 2018-01-22 | End: 2018-04-28 | Stop reason: SDUPTHER

## 2018-01-29 ENCOUNTER — OFFICE VISIT (OUTPATIENT)
Dept: INTERNAL MEDICINE CLINIC | Age: 63
End: 2018-01-29

## 2018-01-29 VITALS
RESPIRATION RATE: 14 BRPM | HEART RATE: 72 BPM | BODY MASS INDEX: 45.48 KG/M2 | WEIGHT: 266.4 LBS | SYSTOLIC BLOOD PRESSURE: 127 MMHG | HEIGHT: 64 IN | OXYGEN SATURATION: 99 % | TEMPERATURE: 98 F | DIASTOLIC BLOOD PRESSURE: 79 MMHG

## 2018-01-29 DIAGNOSIS — M25.562 CHRONIC PAIN OF LEFT KNEE: ICD-10-CM

## 2018-01-29 DIAGNOSIS — G89.29 CHRONIC PAIN OF LEFT KNEE: ICD-10-CM

## 2018-01-29 DIAGNOSIS — I10 ESSENTIAL HYPERTENSION, BENIGN: ICD-10-CM

## 2018-01-29 DIAGNOSIS — H93.11 RINGING IN EAR, RIGHT: Primary | ICD-10-CM

## 2018-01-29 PROBLEM — E66.01 OBESITY, MORBID (HCC): Status: ACTIVE | Noted: 2018-01-29

## 2018-01-29 NOTE — PROGRESS NOTES
HISTORY OF PRESENT ILLNESS  Richi Muñiz is a 58 y.o. female. HPI   Patient reports right ear pain and ringing. Patient notes it started as pain and transitioned into ringing. She states she has not been using medications to treat. She states she had not had problem in a while and ran out of nasal spray. She states ringing started middle of last week and stopped. She states it started again Thursday evening. She denies taking more Advil, diclofenac, or antiinflammatories. Hypertension ROS: taking medications as instructed, no medication side effects noted, no TIA's, no chest pain on exertion, no dyspnea on exertion, no swelling of ankles. New concerns:  Patient's BP in office today is 127/79. Patient continues HCTZ. Patient reports cyst on right shoulder. Patient reports she has not followed up with orthopedic for knee pains. Patient is travelling in March. Review of Systems   All other systems reviewed and are negative. Physical Exam   Constitutional: She is oriented to person, place, and time. She appears well-developed and well-nourished. HENT:   Head: Normocephalic and atraumatic. Right Ear: External ear normal.   Left Ear: External ear normal.   Nose: Nose normal.   Mouth/Throat: Oropharynx is clear and moist.   Eyes: Conjunctivae and EOM are normal.   Neck: Normal range of motion. Neck supple. Carotid bruit is not present. No thyroid mass and no thyromegaly present. Cardiovascular: Normal rate, regular rhythm, S1 normal, S2 normal, normal heart sounds and intact distal pulses. Pulmonary/Chest: Effort normal and breath sounds normal.   Abdominal: Soft. Normal appearance and bowel sounds are normal. There is no hepatosplenomegaly. There is no tenderness. Musculoskeletal: Normal range of motion. Neurological: She is alert and oriented to person, place, and time. She has normal strength. No cranial nerve deficit or sensory deficit.  Coordination normal.   Skin: Skin is warm, dry and intact. No abrasion and no rash noted. Psychiatric: She has a normal mood and affect. Her behavior is normal. Judgment and thought content normal.   Nursing note and vitals reviewed. ASSESSMENT and PLAN  Diagnoses and all orders for this visit:    1. Ringing in ear, right  Both ears are clear. Reassured patient ears do not look infected. Encouraged patient to try Flonase. 2. Essential hypertension, benign  BP is at goal. I do not recommend any change in medications. 3. Chronic pain of left knee  Encouraged patient to follow up with Dr. Sergei Musa. lab results and schedule of future lab studies reviewed with patient  reviewed diet, exercise and weight control    Written by Constantine Hamman, as dictated by Elizabeth Paredes MD.     Current diagnosis and concerns discussed with pt at length. Understands risks and benefits or current treatment plan and medications and accepts the treatment and medication with any possible risks. Pt asks appropriate questions which were answered. Pt instructed to call with any concerns or problems.

## 2018-02-17 RX ORDER — HYDROCHLOROTHIAZIDE 25 MG/1
TABLET ORAL
Qty: 90 TAB | Refills: 0 | Status: SHIPPED | OUTPATIENT
Start: 2018-02-17 | End: 2018-05-17 | Stop reason: SDUPTHER

## 2018-03-09 ENCOUNTER — TELEPHONE (OUTPATIENT)
Dept: INTERNAL MEDICINE CLINIC | Age: 63
End: 2018-03-09

## 2018-03-09 NOTE — TELEPHONE ENCOUNTER
----- Message from Clyde Murillo sent at 3/9/2018  4:53 PM EST -----  Regarding: Dr. Marissa Crump / Telephone  Pt stated she called over a week ago about getting a referral to see Dr. Solomon Blandon ([phone] ext: [phone]) for her arthritis and has not received a phone call back.   Fax: Pt unsure    Pt's best contact [phone]

## 2018-03-20 DIAGNOSIS — G89.29 CHRONIC BILATERAL LOW BACK PAIN WITHOUT SCIATICA: ICD-10-CM

## 2018-03-20 DIAGNOSIS — M54.50 CHRONIC BILATERAL LOW BACK PAIN WITHOUT SCIATICA: ICD-10-CM

## 2018-03-20 NOTE — TELEPHONE ENCOUNTER
Pt would like call back from nurse - she is going to be traveling and she will need a letter from  stating that she needs a wheel chair in the airport.   She also needs a med refill.  [place] pt # [phone]

## 2018-03-21 RX ORDER — TRAMADOL HYDROCHLORIDE 50 MG/1
50 TABLET ORAL
Qty: 60 TAB | Refills: 3 | Status: SHIPPED | OUTPATIENT
Start: 2018-03-21 | End: 2019-11-26 | Stop reason: SDUPTHER

## 2018-04-20 ENCOUNTER — HOSPITAL ENCOUNTER (EMERGENCY)
Age: 63
Discharge: HOME | End: 2018-04-20
Attending: EMERGENCY MEDICINE
Payer: OTHER GOVERNMENT

## 2018-04-20 ENCOUNTER — APPOINTMENT (OUTPATIENT)
Dept: GENERAL RADIOLOGY | Age: 63
End: 2018-04-20
Attending: EMERGENCY MEDICINE
Payer: OTHER GOVERNMENT

## 2018-04-20 VITALS
DIASTOLIC BLOOD PRESSURE: 72 MMHG | TEMPERATURE: 98.2 F | RESPIRATION RATE: 18 BRPM | OXYGEN SATURATION: 97 % | WEIGHT: 257 LBS | SYSTOLIC BLOOD PRESSURE: 118 MMHG | BODY MASS INDEX: 43.87 KG/M2 | HEIGHT: 64 IN | HEART RATE: 64 BPM

## 2018-04-20 DIAGNOSIS — M79.605 LEFT LEG PAIN: ICD-10-CM

## 2018-04-20 DIAGNOSIS — T07.XXXA MULTIPLE CONTUSIONS: ICD-10-CM
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
## 2018-04-20 DIAGNOSIS — M25.552 LEFT HIP PAIN: ICD-10-CM

## 2018-04-20 DIAGNOSIS — M79.602 LEFT ARM PAIN: ICD-10-CM

## 2018-04-20 PROCEDURE — L3809 WHFO W/O JOINTS PRE OTS: HCPCS

## 2018-04-20 PROCEDURE — 73030 X-RAY EXAM OF SHOULDER: CPT

## 2018-04-20 PROCEDURE — 73080 X-RAY EXAM OF ELBOW: CPT

## 2018-04-20 PROCEDURE — 73502 X-RAY EXAM HIP UNI 2-3 VIEWS: CPT

## 2018-04-20 PROCEDURE — 73562 X-RAY EXAM OF KNEE 3: CPT

## 2018-04-20 PROCEDURE — 72050 X-RAY EXAM NECK SPINE 4/5VWS: CPT

## 2018-04-20 PROCEDURE — 73110 X-RAY EXAM OF WRIST: CPT

## 2018-04-20 PROCEDURE — 99283 EMERGENCY DEPT VISIT LOW MDM: CPT

## 2018-04-20 PROCEDURE — 73130 X-RAY EXAM OF HAND: CPT

## 2018-04-20 RX ORDER — HYDROCODONE BITARTRATE AND ACETAMINOPHEN 10; 325 MG/1; MG/1
1 TABLET ORAL AS NEEDED
COMMUNITY
End: 2019-04-23

## 2018-04-20 NOTE — ED TRIAGE NOTES
1030 at school today I tripped and fell with pain in left side of head and left shoulder and left hip and left knee

## 2018-04-21 NOTE — ED NOTES
Patient discharged home after receiving discharge instructions from MD.  Patient and family voiced understanding and doesn't have any questions at this time. Patient in no distress at this time.  Pt wheeled out to car via w/c per Abner Lennox, PCT

## 2018-04-21 NOTE — ED PROVIDER NOTES
HPI Comments: Ariel Younger is a 57 yo F with left arm and leg pain after a fall. 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 Cooworkers had to help her up but she was able to walk unassisted. She went home early and took one of her hydrocodone/APAP tablets around noon. Since then she has had increasing pain and difficulty walking. She did not hit her head and did not pass out. Past Medical History:   Diagnosis Date    Arthritis     ostero arthritis, rhemathoid  lower back     Hypertension     Hypothyroid 3/23/2011    Ulcerative colitis ([facility] [address] 2010    Ulcerative colitis ([facility] [address] 2010       Past Surgical History:   Procedure Laterality Date    BREAST SURGERY PROCEDURE UNLISTED      cyst removed from left breast    COLONOSCOPY N/A 2017    [facility] [place] [facility] END[facility]COPY    ENDOSCOPY, COLON, DIAGNOSTIC      HX  SECTION           Family History:   Problem Relation Age of Onset    Cancer Mother      pancreatic    Cancer Brother      colon       Social History     Social History    Marital status:      Spouse name: N/A    Number of children: N/A    Years of education: N/A     Occupational History    Not on file. Social History Main Topics    Smoking status: Former Smoker     Types: Cigarettes     Quit date: 1980    Smokeless tobacco: Never Used    Alcohol use No    Drug use: No    Sexual activity: Yes     Partners: Male     Other Topics Concern    Not on file     Social History Narrative         ALLERGIES: Sulfa (sulfonamide antibiotics)    Review of Systems   Constitutional: Negative for fever. HENT: Negative for sore throat. Eyes: Negative for visual disturbance. Respiratory: Negative for cough. Cardiovascular: Negative for chest pain.    Gastrointestinal: Negative for abdominal pain. Genitourinary: Negative for dysuria. Musculoskeletal: Positive for arthralgias (left arm, left leg and hip) and neck pain. Skin: Negative for rash. Neurological: Negative for headaches. Vitals:    04/20/18 2004 04/20/18 2214   BP: 152/83 118/72   Pulse: 71 64   Resp: 16 18   Temp: 98.2 °F (36.8 °C)    SpO2: 97% 97%   Weight: 116.6 kg (257 lb)    Height: 5' 4\" (1.626 m)             Physical Exam   Constitutional: She appears well-developed and well-nourished. No distress. HENT:   Head: Normocephalic and atraumatic. Mouth/Throat: Oropharynx is clear and moist.   Eyes: Conjunctivae and EOM are normal.   Neck: Normal range of motion and phonation normal. Muscular tenderness present. No spinous process tenderness present. Cardiovascular: Normal rate and intact distal pulses. Pulmonary/Chest: Effort normal. No respiratory distress. Abdominal: She exhibits no distension. Musculoskeletal:        Left shoulder: She exhibits tenderness. She exhibits normal range of motion. Left elbow: Tenderness found. Left hip: She exhibits tenderness. She exhibits no deformity. Left knee: She exhibits decreased range of motion. She exhibits no deformity and no erythema. Tenderness found. Neurological: She is alert. She is not disoriented. She exhibits normal muscle tone. Skin: Skin is warm and dry. Bruising (distal left upper leg) noted. No abrasion noted. Nursing note and vitals reviewed. MDM      ED Course     XR left shoulder, elbow wrist and hand as well as left hip and knee and cervical spine with no acute injuries. velcro wrist splint applied for comfort.   Procedures

## 2018-04-21 NOTE — ED NOTES
[facility] communication provided and informed of purposeful rounding to include collaboration of entire care team; patient acknowledged understanding. Pt drinking water. Family at bedside. Call bell within reach, will continue to monitor.

## 2018-04-21 NOTE — DISCHARGE INSTRUCTIONS

## 2018-04-26 ENCOUNTER — OFFICE VISIT (OUTPATIENT)
Dept: INTERNAL MEDICINE CLINIC | Age: 63
End: 2018-04-26

## 2018-04-26 VITALS
HEIGHT: 64 IN | WEIGHT: 267 LBS | BODY MASS INDEX: 45.58 KG/M2 | OXYGEN SATURATION: 98 % | DIASTOLIC BLOOD PRESSURE: 85 MMHG | RESPIRATION RATE: 14 BRPM | TEMPERATURE: 98.6 F | HEART RATE: 88 BPM | SYSTOLIC BLOOD PRESSURE: 119 MMHG

## 2018-04-26 DIAGNOSIS — M25.532 LEFT WRIST PAIN: ICD-10-CM

## 2018-04-26 DIAGNOSIS — M25.512 ACUTE PAIN OF LEFT SHOULDER: Primary | ICD-10-CM

## 2018-04-26 DIAGNOSIS — E03.4 HYPOTHYROIDISM DUE TO ACQUIRED ATROPHY OF THYROID: Chronic | ICD-10-CM

## 2018-04-26 DIAGNOSIS — I10 ESSENTIAL HYPERTENSION, BENIGN: ICD-10-CM

## 2018-04-26 NOTE — PROGRESS NOTES
HISTORY OF PRESENT ILLNESS  Sahra Moy is a 58 y.o. female. HPI   Patient admitted to ER on 4/20/18 for fall. Patient tripped over student and fell on her left side. She did not hit head or pass out. She had XR of left shoulder, elbow, wrist, and hand as well as left hip and knee and cervical spine with no acute injuries. Velcro wrist splint applied for comfort. She was discharged without medications. Patient reports she fell while at work. Patient states she stepped back and a student's foot had caused her to fall. Patient hurt wrist, hip and shoulder when she fell. She states she mostly has pain in left shoulder now. She is managing pains with tramadol and Norco.   Hypertension ROS: taking medications as instructed, no medication side effects noted, no TIA's, no chest pain on exertion, no dyspnea on exertion, no swelling of ankles. New concerns:  Patient's BP in office today is 119/85. hypothyroidism. Lab Results   Component Value Date/Time    TSH 3.640 10/31/2017 01:36 PM     Thyroid ROS: denies fatigue, weight changes, heat/cold intolerance, bowel/skin changes or CVS symptoms. Review of Systems   All other systems reviewed and are negative. Physical Exam   Constitutional: She is oriented to person, place, and time. She appears well-developed and well-nourished. HENT:   Head: Normocephalic and atraumatic. Right Ear: External ear normal.   Left Ear: External ear normal.   Nose: Nose normal.   Mouth/Throat: Oropharynx is clear and moist.   Eyes: Conjunctivae and EOM are normal.   Neck: Normal range of motion. Neck supple. Carotid bruit is not present. No thyroid mass and no thyromegaly present. Cardiovascular: Normal rate, regular rhythm, S1 normal, S2 normal, normal heart sounds and intact distal pulses. Pulmonary/Chest: Effort normal and breath sounds normal.   Abdominal: Soft. Normal appearance and bowel sounds are normal. There is no hepatosplenomegaly. There is no tenderness. Musculoskeletal: Normal range of motion. Wears brace on left wrist.    Neurological: She is alert and oriented to person, place, and time. She has normal strength. No cranial nerve deficit or sensory deficit. Coordination normal.   Skin: Skin is warm, dry and intact. No abrasion and no rash noted. Psychiatric: She has a normal mood and affect. Her behavior is normal. Judgment and thought content normal.   Nursing note and vitals reviewed. ASSESSMENT and PLAN  Diagnoses and all orders for this visit:    1. Acute pain of left shoulder  Encouraged patient to stretch and keep moving shoulder. She will continue to monitor and treat pain with tramadol. 2. Left wrist pain  Patient will continue to wear brace on wrist. Encouraged patient to stretch wrist often. She will continue to monitor and treat pain with Tramadol. 3. Essential hypertension, benign  BP is at goal. I do not recommend any change in medications.  -     METABOLIC PANEL, COMPREHENSIVE    4. Hypothyroidism due to acquired atrophy of thyroid  Stable, tolerating meds. I do not recommend a change in medications. -     T4, FREE  -     TSH 3RD GENERATION    lab results and schedule of future lab studies reviewed with patient  reviewed diet, exercise and weight control    Written by Oscar Alamo, as dictated by Amanuel Mims MD.     Current diagnosis and concerns discussed with pt at length. Understands risks and benefits or current treatment plan and medications and accepts the treatment and medication with any possible risks. Pt asks appropriate questions which were answered. Pt instructed to call with any concerns or problems.

## 2018-04-27 LAB
ALBUMIN SERPL-MCNC: 3.8 G/DL (ref 3.6–4.8)
ALBUMIN/GLOB SERPL: 1 {RATIO} (ref 1.2–2.2)
ALP SERPL-CCNC: 90 IU/L (ref 39–117)
ALT SERPL-CCNC: 18 IU/L (ref 0–32)
AST SERPL-CCNC: 19 IU/L (ref 0–40)
BILIRUB SERPL-MCNC: 0.5 MG/DL (ref 0–1.2)
BUN SERPL-MCNC: 11 MG/DL (ref 8–27)
BUN/CREAT SERPL: 12 (ref 12–28)
CALCIUM SERPL-MCNC: 9.5 MG/DL (ref 8.7–10.3)
CHLORIDE SERPL-SCNC: 98 MMOL/L (ref 96–106)
CO2 SERPL-SCNC: 27 MMOL/L (ref 18–29)
CREAT SERPL-MCNC: 0.95 MG/DL (ref 0.57–1)
GFR SERPLBLD CREATININE-BSD FMLA CKD-EPI: 64 ML/MIN/1.73
GFR SERPLBLD CREATININE-BSD FMLA CKD-EPI: 74 ML/MIN/1.73
GLOBULIN SER CALC-MCNC: 3.9 G/DL (ref 1.5–4.5)
GLUCOSE SERPL-MCNC: 80 MG/DL (ref 65–99)
POTASSIUM SERPL-SCNC: 4.3 MMOL/L (ref 3.5–5.2)
PROT SERPL-MCNC: 7.7 G/DL (ref 6–8.5)
SODIUM SERPL-SCNC: 140 MMOL/L (ref 134–144)
T4 FREE SERPL-MCNC: 1.21 NG/DL (ref 0.82–1.77)
TSH SERPL DL<=0.005 MIU/L-ACNC: 4.06 UIU/ML (ref 0.45–4.5)

## 2018-10-01 ENCOUNTER — OFFICE VISIT (OUTPATIENT)
Dept: INTERNAL MEDICINE CLINIC | Age: 63
End: 2018-10-01

## 2018-10-01 VITALS
HEIGHT: 64 IN | DIASTOLIC BLOOD PRESSURE: 78 MMHG | SYSTOLIC BLOOD PRESSURE: 121 MMHG | BODY MASS INDEX: 46.95 KG/M2 | TEMPERATURE: 98.5 F | HEART RATE: 71 BPM | WEIGHT: 275 LBS | OXYGEN SATURATION: 98 % | RESPIRATION RATE: 14 BRPM

## 2018-10-01 DIAGNOSIS — K51.819 OTHER ULCERATIVE COLITIS WITH COMPLICATION (HCC): Chronic | ICD-10-CM

## 2018-10-01 DIAGNOSIS — I10 ESSENTIAL HYPERTENSION, BENIGN: ICD-10-CM

## 2018-10-01 DIAGNOSIS — Z23 ENCOUNTER FOR IMMUNIZATION: ICD-10-CM

## 2018-10-01 DIAGNOSIS — E03.4 HYPOTHYROIDISM DUE TO ACQUIRED ATROPHY OF THYROID: Primary | Chronic | ICD-10-CM

## 2018-10-01 DIAGNOSIS — M25.50 ARTHRALGIA, UNSPECIFIED JOINT: ICD-10-CM

## 2018-10-01 NOTE — PROGRESS NOTES
HISTORY OF PRESENT ILLNESS Tad Murray is a 58 y.o. female. HPI Hypertension ROS: taking medications as instructed, no medication side effects noted, no TIA's, no chest pain on exertion, no dyspnea on exertion, no swelling of ankles. New concerns:  Patient's BP in office today is 121/78. She continues on HCTZ. She doesn't eat frequent, small meals. She eats vegetables, grilled fish, and chicken. She generally follows diabetic diet, b/c  is diabetic.   
 
hypothyroidism. Lab Results Component Value Date/Time TSH 4.060 04/26/2018 03:20 AM  
 
Thyroid ROS: denies fatigue, weight changes, heat/cold intolerance, bowel/skin changes or CVS symptoms. Pt continues on Synthroid. Ulcerative Colitis: Pt reports bowel changes and weight gain. She hasn't been able to f/u with Dr. Gerardo Bain due to finances. Arthralgia: Stable. Pt plans to f/u with Dr. Juan Luis Dennis for knee injections. Review of Systems All other systems reviewed and are negative. Physical Exam  
Constitutional: She is oriented to person, place, and time. She appears well-developed and well-nourished. HENT:  
Head: Normocephalic and atraumatic. Right Ear: External ear normal.  
Left Ear: External ear normal.  
Nose: Nose normal.  
Mouth/Throat: Oropharynx is clear and moist.  
Eyes: Conjunctivae and EOM are normal.  
Neck: Normal range of motion. Neck supple. Carotid bruit is not present. No thyroid mass and no thyromegaly present. Cardiovascular: Normal rate, regular rhythm, S1 normal, S2 normal, normal heart sounds and intact distal pulses. Pulmonary/Chest: Effort normal and breath sounds normal.  
Abdominal: Soft. Normal appearance and bowel sounds are normal. There is no hepatosplenomegaly. There is no tenderness. Musculoskeletal: Normal range of motion. Neurological: She is alert and oriented to person, place, and time. She has normal strength. No cranial nerve deficit or sensory deficit.  Coordination normal.  
 Skin: Skin is warm, dry and intact. No abrasion and no rash noted. Psychiatric: She has a normal mood and affect. Her behavior is normal. Judgment and thought content normal.  
Nursing note and vitals reviewed. ASSESSMENT and PLAN Diagnoses and all orders for this visit: 
 
1. Hypothyroidism due to acquired atrophy of thyroid Thyroid stable. I do not recommend a change in medications. 
-     METABOLIC PANEL, COMPREHENSIVE 
-     LIPID PANEL 
-     TSH 3RD GENERATION 
-     T4, FREE 2. Essential hypertension, benign BP is at goal. I do not recommend any change in medications. 3. Other ulcerative colitis with complication ([facility] [address] Stable, and well-managed. No change in medications. She hasn't been able to f/u with Dr. Khan Flemington due to finances. -     CBC W/O DIFF 4. Encounter for immunization Administered flu shot today in office.  
-     Influenza virus vaccine (QUADRIVALENT PRES FREE SYRINGE) IM ([id number]) 5. Arthralgia, unspecified joint Stable, and well-managed. No change in medications. Pt plans to f/u with Dr. Ignacia Leblanc. 6. BMI 45.0-49.9, adult ([facility] [address] Stable condition. Encouraged pt to continue exercising regularly and monitoring diet. Lab results and schedule of future lab studies reviewed with patient. Reviewed diet, exercise and weight control. Written by Rylee Rodriguez, as dictated by Florecita Mccartney MD.  
 
Current diagnosis and concerns discussed with pt at length. Understands risks and benefits or current treatment plan and medications and accepts the treatment and medication with any possible risks. Pt asks appropriate questions which were answered. Pt instructed to call with any concerns or problems.

## 2018-10-02 LAB
ALBUMIN SERPL-MCNC: 3.9 G/DL (ref 3.6–4.8)
ALBUMIN/GLOB SERPL: 1.3 {RATIO} (ref 1.2–2.2)
ALP SERPL-CCNC: 76 IU/L (ref 39–117)
ALT SERPL-CCNC: 14 IU/L (ref 0–32)
AST SERPL-CCNC: 18 IU/L (ref 0–40)
BILIRUB SERPL-MCNC: 0.5 MG/DL (ref 0–1.2)
BUN SERPL-MCNC: 9 MG/DL (ref 8–27)
BUN/CREAT SERPL: 9 (ref 12–28)
CALCIUM SERPL-MCNC: 9.5 MG/DL (ref 8.7–10.3)
CHLORIDE SERPL-SCNC: 105 MMOL/L (ref 96–106)
CHOLEST SERPL-MCNC: 211 MG/DL (ref 100–199)
CO2 SERPL-SCNC: 25 MMOL/L (ref 20–29)
CREAT SERPL-MCNC: 0.96 MG/DL (ref 0.57–1)
ERYTHROCYTE [DISTWIDTH] IN BLOOD BY AUTOMATED COUNT: 13.7 % (ref 12.3–15.4)
GLOBULIN SER CALC-MCNC: 3.1 G/DL (ref 1.5–4.5)
GLUCOSE SERPL-MCNC: 83 MG/DL (ref 65–99)
HCT VFR BLD AUTO: 35.7 % (ref 34–46.6)
HDLC SERPL-MCNC: 81 MG/DL
HGB BLD-MCNC: 11.8 G/DL (ref 11.1–15.9)
INTERPRETATION, 910389: NORMAL
LDLC SERPL CALC-MCNC: 118 MG/DL (ref 0–99)
MCH RBC QN AUTO: 31.3 PG (ref 26.6–33)
MCHC RBC AUTO-ENTMCNC: 33.1 G/DL (ref 31.5–35.7)
MCV RBC AUTO: 95 FL (ref 79–97)
PLATELET # BLD AUTO: 254 X10E3/UL (ref 150–379)
POTASSIUM SERPL-SCNC: 4.1 MMOL/L (ref 3.5–5.2)
PROT SERPL-MCNC: 7 G/DL (ref 6–8.5)
RBC # BLD AUTO: 3.77 X10E6/UL (ref 3.77–5.28)
SODIUM SERPL-SCNC: 143 MMOL/L (ref 134–144)
T4 FREE SERPL-MCNC: 1.28 NG/DL (ref 0.82–1.77)
TRIGL SERPL-MCNC: 62 MG/DL (ref 0–149)
TSH SERPL DL<=0.005 MIU/L-ACNC: 3.72 UIU/ML (ref 0.45–4.5)
VLDLC SERPL CALC-MCNC: 12 MG/DL (ref 5–40)
WBC # BLD AUTO: 5.8 X10E3/UL (ref 3.4–10.8)

## 2018-10-24 DIAGNOSIS — E03.4 HYPOTHYROIDISM DUE TO ACQUIRED ATROPHY OF THYROID: Chronic | ICD-10-CM

## 2018-10-24 RX ORDER — LEVOTHYROXINE SODIUM 100 UG/1
TABLET ORAL
Qty: 90 TAB | Refills: 1 | Status: SHIPPED | OUTPATIENT
Start: 2018-10-24 | End: 2019-05-07 | Stop reason: SDUPTHER

## 2018-12-21 RX ORDER — HYDROCHLOROTHIAZIDE 25 MG/1
TABLET ORAL
Qty: 90 TAB | Refills: 1 | Status: SHIPPED | OUTPATIENT
Start: 2018-12-21 | End: 2019-07-30 | Stop reason: SDUPTHER

## 2019-04-22 ENCOUNTER — TELEPHONE (OUTPATIENT)
Dept: INTERNAL MEDICINE CLINIC | Age: 64
End: 2019-04-22

## 2019-04-22 NOTE — TELEPHONE ENCOUNTER
----- Message from Albaro Munroe sent at 4/22/2019  3:05 PM EDT -----  Regarding: [place]/telephone  [place] her right [place] and both knees. She does have an appointment tomorrow and she is requesting to know what she could take. She did not go to the ER today. Pts number is [phone].

## 2019-04-23 ENCOUNTER — APPOINTMENT (OUTPATIENT)
Dept: GENERAL RADIOLOGY | Age: 64
End: 2019-04-23
Attending: EMERGENCY MEDICINE
Payer: OTHER GOVERNMENT

## 2019-04-23 ENCOUNTER — HOSPITAL ENCOUNTER (EMERGENCY)
Age: 64
Discharge: HOME | End: 2019-04-23
Attending: EMERGENCY MEDICINE
Payer: OTHER GOVERNMENT

## 2019-04-23 ENCOUNTER — OFFICE VISIT (OUTPATIENT)
Dept: INTERNAL MEDICINE CLINIC | Age: 64
End: 2019-04-23

## 2019-04-23 VITALS
WEIGHT: 281 LBS | SYSTOLIC BLOOD PRESSURE: 137 MMHG | OXYGEN SATURATION: 99 % | HEART RATE: 81 BPM | DIASTOLIC BLOOD PRESSURE: 85 MMHG | RESPIRATION RATE: 20 BRPM | TEMPERATURE: 98.9 F | HEIGHT: 64 IN | BODY MASS INDEX: 47.97 KG/M2

## 2019-04-23 VITALS
DIASTOLIC BLOOD PRESSURE: 77 MMHG | RESPIRATION RATE: 16 BRPM | BODY MASS INDEX: 47.97 KG/M2 | HEIGHT: 64 IN | HEART RATE: 66 BPM | TEMPERATURE: 98.1 F | WEIGHT: 281 LBS | SYSTOLIC BLOOD PRESSURE: 133 MMHG | OXYGEN SATURATION: 98 %

## 2019-04-23 DIAGNOSIS — I10 ESSENTIAL HYPERTENSION, BENIGN: ICD-10-CM
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
## 2019-04-23 DIAGNOSIS — M25.561 ACUTE PAIN OF RIGHT KNEE: Primary | ICD-10-CM

## 2019-04-23 DIAGNOSIS — M25.551 RIGHT HIP PAIN: ICD-10-CM

## 2019-04-23 DIAGNOSIS — E03.4 HYPOTHYROIDISM DUE TO ACQUIRED ATROPHY OF THYROID: ICD-10-CM

## 2019-04-23 DIAGNOSIS — M79.671 ACUTE FOOT PAIN, RIGHT: ICD-10-CM

## 2019-04-23 PROCEDURE — 74011250637 HC RX REV CODE- 250/637: Performed by: EMERGENCY MEDICINE

## 2019-04-23 PROCEDURE — 73502 X-RAY EXAM HIP UNI 2-3 VIEWS: CPT

## 2019-04-23 PROCEDURE — 73630 X-RAY EXAM OF FOOT: CPT

## 2019-04-23 PROCEDURE — 73562 X-RAY EXAM OF KNEE 3: CPT

## 2019-04-23 PROCEDURE — 99283 EMERGENCY DEPT VISIT LOW MDM: CPT

## 2019-04-23 RX ORDER — HYDROCODONE BITARTRATE AND ACETAMINOPHEN 7.5; 325 MG/1; MG/1
1 TABLET ORAL
Qty: 30 TAB | Refills: 0 | Status: SHIPPED | OUTPATIENT
Start: 2019-04-23 | End: 2019-04-30

## 2019-04-23 RX ORDER — HYDROCODONE BITARTRATE AND ACETAMINOPHEN 7.5; 325 MG/1; MG/1
1 TABLET ORAL
Status: COMPLETED | OUTPATIENT
Start: 2019-04-23 | End: 2019-04-23

## 2019-04-23 RX ADMIN — HYDROCODONE BITARTRATE AND ACETAMINOPHEN 1 TABLET: 7.5; 325 TABLET ORAL at 10:09

## 2019-04-23 NOTE — ED TRIAGE NOTES
Pt was wheeled to the treatment area. Pt states \"[place] was [place] uneven ground where the grass meets the concrete. I was standing and went to turn around I lost balance and fell on the concrete. I did not hit my head or pass out. My right hip right knee and right foot are hurting. \"

## 2019-04-23 NOTE — PROGRESS NOTES
HISTORY OF PRESENT ILLNESS Monica Leung is a 61 y.o. female. HPI Acute Pain of Right Knee: Pt went on field trip to [facility] yesterday, tripped on step, and fell on right side onto cement floor. She confirms moderate soreness in right hip. She reports difficulty walking and standing, due to difficulty bending right knee. She is currently using walker. Pt applied ice compress and took Tylenol yesterday (both ineffective). Hypertension ROS: taking medications as instructed, no medication side effects noted, no TIA's, no chest pain on exertion, no dyspnea on exertion, no swelling of ankles. New concerns:  Patient's BP in office today is 137/85. She continues on HCTZ. hypothyroidism. Lab Results Component Value Date/Time TSH 3.720 10/01/2018 12:47 PM  
 
Thyroid ROS: denies fatigue, weight changes, heat/cold intolerance, bowel/skin changes or CVS symptoms. Pt continues on Synthroid. Review of Systems All other systems reviewed and are negative. Physical Exam  
Constitutional: She is oriented to person, place, and time. She appears well-developed and well-nourished. HENT:  
Head: Normocephalic and atraumatic. Right Ear: External ear normal.  
Left Ear: External ear normal.  
Nose: Nose normal.  
Mouth/Throat: Oropharynx is clear and moist.  
Eyes: Conjunctivae and EOM are normal.  
Neck: Normal range of motion. Neck supple. Carotid bruit is not present. No thyroid mass and no thyromegaly present. Cardiovascular: Normal rate, regular rhythm, S1 normal, S2 normal, normal heart sounds and intact distal pulses. Pulmonary/Chest: Effort normal and breath sounds normal.  
Abdominal: Soft. Normal appearance and bowel sounds are normal. There is no hepatosplenomegaly. There is no tenderness. Musculoskeletal: She exhibits edema and tenderness. Unable to flex or extend right knee w/o triggering pain. Ambulates with walker today in clinic. Lymphadenopathy: Fluid build-up in right knee. Neurological: She is alert and oriented to person, place, and time. She has normal strength. No cranial nerve deficit or sensory deficit. Coordination normal.  
Skin: Skin is warm, dry and intact. No abrasion and no rash noted. Psychiatric: She has a normal mood and affect. Her behavior is normal. Judgment and thought content normal.  
Nursing note and vitals reviewed. ASSESSMENT and PLAN Diagnoses and all orders for this visit: 
 
1. Acute pain of right knee Pt will report immediately to [facility] for further evaluation. I wrote doctor's note to excuse pt from work for rest of week. 2. Essential hypertension, benign BP is at goal. I do not recommend any change in medications. 3. Hypothyroidism due to acquired atrophy of thyroid Thyroid stable. I do not recommend a change in medications. Lab results and schedule of future lab studies reviewed with patient. Reviewed diet, exercise and weight control. Written by Ira Padron, as dictated by Michel Gay MD.  
 
Current diagnosis and concerns discussed with pt at length. Understands risks and benefits or current treatment plan and medications and accepts the treatment and medication with any possible risks. Pt asks appropriate questions which were answered. Pt instructed to call with any concerns or problems. This note will not be viewable in [address]

## 2019-04-23 NOTE — ED PROVIDER NOTES
The history is provided by the patient. Fall The accident occurred yesterday. The fall occurred while walking. She fell from a height of ground level. She landed on concrete. There was no blood loss. The point of impact was the right hip and right knee. Pain location: right hip, right knee, right foot. The pain is at a severity of 10/10. She was ambulatory at the scene. There was no entrapment after the fall. There was no drug use involved in the accident. There was no alcohol use involved in the accident. Pertinent negatives include no extremity weakness, no loss of consciousness and no laceration. The symptoms are aggravated by standing, pressure on injury and use of injured limb. Past Medical History:  
Diagnosis Date  Arthritis   
 ostero arthritis, rhemathoid  lower back  Hypertension  Hypothyroid 3/23/2011  Ulcerative colitis ([facility] [address] 2010  Ulcerative colitis ([facility] [address] 2010 Past Surgical History:  
Procedure Laterality Date  BREAST SURGERY PROCEDURE UNLISTED    
 cyst removed from left breast  
 COLONOSCOPY N/A 2017 COLONOSCOPY performed by Ary Baugh MD at [facility] [address]  ENDOSCOPY, COLON, DIAGNOSTIC    
 HX  SECTION Family History:  
Problem Relation Age of Onset  Cancer Mother   
     pancreatic  Cancer Brother   
     colon Social History Socioeconomic History  Marital status:  Spouse name: Not on file  Number of children: Not on file  Years of education: Not on file  Highest education level: Not on file Occupational History  Not on file Social Needs  Financial resource strain: Not on file  Food insecurity:  
  Worry: Not on file Inability: Not on file  Transportation needs:  
  Medical: Not on file Non-medical: Not on file Tobacco Use  Smoking status: Former Smoker Types: Cigarettes Last attempt to quit: 1980 Years since quittin.3  Smokeless tobacco: Never Used Substance and Sexual Activity  Alcohol use: No  
 Drug use: No  
 Sexual activity: Yes  
  Partners: Male Lifestyle  Physical activity:  
  Days per week: Not on file Minutes per session: Not on file  Stress: Not on file Relationships  Social connections:  
  Talks on phone: Not on file Gets together: Not on file Attends Sabianist service: Not on file Active member of club or organization: Not on file Attends meetings of clubs or organizations: Not on file Relationship status: Not on file  Intimate partner violence:  
  Fear of current or ex partner: Not on file Emotionally abused: Not on file Physically abused: Not on file Forced sexual activity: Not on file Other Topics Concern  Not on file Social History Narrative  Not on file ALLERGIES: Sulfa (sulfonamide antibiotics) Review of Systems Musculoskeletal: Negative for extremity weakness. Right hip, right knee and right foot pain, otherwise no acute complaints Neurological: Negative for loss of consciousness. All other systems reviewed and are negative. Vitals:  
 04/23/19 2546 BP: 157/76 Pulse: 76 Resp: 18 Temp: 98.1 °F (36.7 °C) SpO2: 99% Weight: 127.5 kg (281 lb) Height: 5' 4\" (1.626 m) Physical Exam  
Constitutional: She appears well-developed and well-nourished. HENT:  
Head: Normocephalic and atraumatic. Cardiovascular: Normal rate and intact distal pulses. Pulmonary/Chest: Effort normal. No respiratory distress. Musculoskeletal: She exhibits tenderness. She exhibits no deformity. Right hip: She exhibits tenderness. Right knee: Tenderness found. Right ankle: She exhibits no ecchymosis, no deformity, no laceration and normal pulse. Tenderness. Head of 5th metatarsal tenderness found. No lateral malleolus, no medial malleolus and no proximal fibula tenderness found. Legs: Right foot: There is tenderness and bony tenderness. Feet: 
 
Skin: No laceration noted. Nursing note and vitals reviewed. MDM Number of Diagnoses or Management Options Diagnosis management comments: Patient s/p GLF yesterday [place], presents today c/o right knee, right hip and right foot pain. No ankle pain or tenderness noted on exam.  Check x-rays and if neg may d/c home with analgesia and refer to her PCP for PT and to orthopedics as needed. Amount and/or Complexity of Data Reviewed Tests in the radiology section of CPT®: ordered and reviewed Independent visualization of images, tracings, or specimens: yes (Xr of foot, knee, hip - shows extensive DJD, but no obvious fx on my read, final read pending) Procedures VITALS:  
Patient Vitals for the past 8 hrs: 
 Temp Pulse Resp BP SpO2  
04/23/19 1103  66 16 133/77 98 % 04/23/19 0955 98.1 °F (36.7 °C) 76 18 157/76 99 % No results found for this or any previous visit (from the past 24 hour(s)). Xr Hip Rt W Or Wo Pelv 2-3 Vws Result Date: 4/23/2019 EXAM: XR HIP RT W OR WO PELV 2-3 VWS INDICATION: Status post fall with right hip pain. COMPARISON: 4/20/2018. FINDINGS: An AP view of the pelvis and a frogleg lateral view of the right hip demonstrate no fracture, dislocation or other acute abnormality. Degenerative changes are seen in the hip joints bilaterally and visualized lumbar spine. IMPRESSION: No acute abnormality. Bilateral hip osteoarthritis and lumbar degenerative changes. Xr Foot Rt Min 3 V Result Date: 4/23/2019 EXAM: XR FOOT RT MIN 3 V INDICATION: Status post fall with right foot pain. COMPARISON: None. FINDINGS: Three views of the right foot demonstrate no fracture or other acute osseous or articular abnormality. The soft tissues are within normal limits. Hallux valgus is noted. Plantar spurring is evident. IMPRESSION: No acute abnormality. Xr Knee Rt 3 V Result Date: 4/23/2019 EXAM: XR KNEE RT 3 V INDICATION: Status post fall, right knee pain. COMPARISON: None. FINDINGS: Three views of the right knee demonstrate no fracture or other acute osseous or articular abnormality. There is a trace joint effusion. Significant degenerative changes are noted at all 3 compartments. IMPRESSION: Trace effusion. Tricompartmental DJD. No acute osseous abnormality.

## 2019-04-23 NOTE — DISCHARGE INSTRUCTIONS
Patient Education        Preventing Falls: Care Instructions  Your Care Instructions    Getting around your home safely can be a challenge if you have injuries or health problems that make it easy for you to fall. Loose rugs and furniture in walkways are among the dangers for many older people who have problems walking or who have poor eyesight. People who have conditions such as arthritis, osteoporosis, or dementia also have to be careful not to fall. You can make your home safer with a few simple measures. Follow-up care is a key part of your treatment and safety. Be sure to make and go to all appointments, and call your doctor if you are having problems. It's also a good idea to know your test results and keep a list of the medicines you take. How can you care for yourself at home? Taking care of yourself  · You may get dizzy if you do not drink enough water. To prevent dehydration, drink plenty of fluids, enough so that your urine is light yellow or clear like water. Choose water and other caffeine-free clear liquids. If you have kidney, heart, or liver disease and have to limit fluids, talk with your doctor before you increase the amount of fluids you drink. · Exercise regularly to improve your strength, muscle tone, and balance. Walk if you can. Swimming may be a good choice if you cannot walk easily. · Have your vision and hearing checked each year or any time you notice a change. If you have trouble seeing and hearing, you might not be able to avoid objects and could lose your balance. · Know the side effects of the medicines you take. Ask your doctor or pharmacist whether the medicines you take can affect your balance. Sleeping pills or sedatives can affect your balance. · Limit the amount of alcohol you drink. Alcohol can impair your balance and other senses. · Ask your doctor whether calluses or corns on your feet need to be removed.  If you wear loose-fitting shoes because of calluses or corns, you can lose your balance and fall. · Talk to your doctor if you have numbness in your feet. Preventing falls at home  · Remove raised doorway thresholds, throw rugs, and clutter. Repair loose carpet or raised areas in the floor. · Move furniture and electrical cords to keep them out of walking paths. · Use nonskid floor wax, and wipe up spills right away, especially on ceramic tile floors. · If you use a walker or cane, put rubber tips on it. If you use crutches, clean the bottoms of them regularly with an abrasive pad, such as steel wool. · Keep your house well lit, especially [place], and outside walkways. Use night-lights in areas such as hallways and bathrooms. Add extra light switches or use remote switches (such as switches that go on or off when you clap your hands) to make it easier to turn lights on if you have to get up during the night. · Install sturdy handrails on stairways. · Move items in your cabinets so that the things you use a lot are on the lower shelves (about waist level). · Keep a cordless phone and a flashlight with new batteries by your bed. If possible, put a phone in each of the main rooms of your house, or carry a cell phone in case you fall and cannot reach a phone. Or, you can wear a device around your neck or wrist. You push a button that sends a signal for help. · Wear low-heeled shoes that fit well and give your feet good support. Use footwear with nonskid soles. Check the heels and soles of your shoes for wear. Repair or replace worn heels or soles. · Do not wear socks without shoes on wood floors. · Walk on the grass when the sidewalks are slippery. If you live in an area that gets snow and ice in the winter, sprinkle salt on slippery steps and sidewalks. Preventing falls in the bath  · Install grab bars and nonskid mats inside and outside your shower or tub and near the toilet and sinks. · Use shower chairs and bath benches.   · Use a hand-held shower head that will allow you to sit while showering. · Get into a tub or shower by putting the weaker leg in first. Get out of a tub or shower with your strong side first.  · Repair loose toilet seats and consider installing a raised toilet seat to make getting on and off the toilet easier. · Keep your bathroom door unlocked while you are in the shower. Where can you learn more? Go to http://reza-yen.info/. Enter [id number] [id number] in the search box to learn more about \"Preventing Falls: Care Instructions. \"  Current as of: March 15, 2018  Content Version: 11.9  © 9669-9540 [employer]. Care instructions adapted under license by [employer]* (which disclaims liability or warranty for this information). If you have questions about a medical condition or this instruction, always ask your healthcare professional. [place] [address] any warranty or liability for your use of this information. Patient Education        Hip Pain: Care Instructions  Your Care Instructions    Hip pain may be caused by many things, including overuse, a fall, or a twisting movement. Another cause of hip pain is arthritis. Your pain may increase when you stand up, walk, or squat. The pain may come and go or may be constant. Home treatment can help relieve hip pain, swelling, and stiffness. If your pain is ongoing, you may need more tests and treatment. Follow-up care is a key part of your treatment and safety. Be sure to make and go to all appointments, and call your doctor if you are having problems. It's also a good idea to know your test results and keep a list of the medicines you take. How can you care for yourself at home? · Take pain medicines exactly as directed. ? If the doctor gave you a prescription medicine for pain, take it as prescribed. ? If you are not taking a prescription pain medicine, ask your doctor if you can take an over-the-counter medicine.   · Rest and protect your hip. Take a break from any activity, including standing or walking, that may cause pain. · Put ice or a cold pack against your hip for 10 to 20 minutes at a time. Try to do this every 1 to 2 hours for the next 3 days (when you are awake) or until the swelling goes down. Put a thin cloth between the ice and your skin. · Sleep on your healthy side with a pillow between your knees, or sleep on your back with pillows under your knees. · If there is no swelling, you can put moist heat, a heating pad, or a warm cloth on your hip. Do gentle stretching exercises to help keep your hip flexible. · Learn how to prevent falls. Have your vision and hearing checked regularly. Wear slippers or shoes with a nonskid sole. · Stay at a healthy weight. · Wear comfortable shoes. When should you call for help? Call 911 anytime you think you may need emergency care. For example, call if:    · You have sudden chest pain and shortness of breath, or you cough up blood.     · You are not able to stand or walk or bear weight.     · Your buttocks, legs, or feet feel numb or tingly.     · Your leg or foot is cool or pale or changes color.     · You have severe pain.    Call your doctor now or seek immediate medical care if:    · You have signs of infection, such as:  ? Increased pain, swelling, warmth, or redness in the hip area. ? Red streaks leading from the hip area. ? Pus draining from the hip area. ? A fever.     · You have signs of a blood clot, such as:  ? Pain in your calf, back of the knee, thigh, or groin. ? Redness and swelling in your leg or groin.     · You are not able to bend, straighten, or move your leg normally.     · You have trouble urinating or having bowel movements.    Watch closely for changes in your health, and be sure to contact your doctor if:    · You do not get better as expected. Where can you learn more? Go to http://reza-yen.info/.   Enter C856 in the search box to learn more about \"Hip Pain: Care Instructions. \"  Current as of: September 23, 2018  Content Version: 11.9  © 4176-5804 [employer]. Care instructions adapted under license by [employer] (which disclaims liability or warranty for this information). If you have questions about a medical condition or this instruction, always ask your healthcare professional. [facility] any warranty or liability for your use of this information. Patient Education        Knee Pain or Injury: Care Instructions  Your Care Instructions    Injuries are a common cause of knee problems. Sudden (acute) injuries may be caused by a direct blow to the knee. They can also be caused by abnormal twisting, bending, or falling on the knee. Pain, bruising, or swelling may be severe, and may start within minutes of the injury. Overuse is another cause of knee pain. Other causes are climbing stairs, kneeling, and other activities that use the knee. Everyday wear and tear, especially as you get older, also can cause knee pain. Rest, along with home treatment, often relieves pain and allows your knee to heal. If you have a serious knee injury, you may need tests and treatment. Follow-up care is a key part of your treatment and safety. Be sure to make and go to all appointments, and call your doctor if you are having problems. It's also a good idea to know your test results and keep a list of the medicines you take. How can you care for yourself at home? · Be safe with medicines. Read and follow all instructions on the label. ? If the doctor gave you a prescription medicine for pain, take it as prescribed. ? If you are not taking a prescription pain medicine, ask your doctor if you can take an over-the-counter medicine. · Rest and protect your knee. Take a break from any activity that may cause pain. · Put ice or a cold pack on your knee for 10 to 20 minutes at a time.  Put a thin cloth between the ice and your skin. · Prop up a sore knee on a pillow when you ice it or anytime you sit or lie down for the next 3 days. Try to keep it above the level of your heart. This will help reduce swelling. · If your knee is not swollen, you can put moist heat, a heating pad, or a warm cloth on your knee. · If your doctor recommends an elastic bandage, sleeve, or other type of support for your knee, wear it as directed. · Follow your doctor's instructions about how much weight you can put on your leg. Use a cane, crutches, or a walker as instructed. · Follow your doctor's instructions about activity during your healing process. If you can do mild exercise, slowly increase your activity. · Reach and stay at a healthy weight. Extra weight can strain the joints, especially the knees and hips, and make the pain worse. Losing even a few pounds may help. When should you call for help? Call 911 anytime you think you may need emergency care. For example, call if:    · You have symptoms of a blood clot in your lung (called a pulmonary embolism). These may include:  ? Sudden chest pain. ? Trouble breathing. ? Coughing up blood.    Call your doctor now or seek immediate medical care if:    · You have severe or increasing pain.     · Your leg or foot turns cold or changes color.     · You cannot stand or put weight on your knee.     · Your knee looks twisted or bent out of shape.     · You cannot move your knee.     · You have signs of infection, such as:  ? Increased pain, swelling, warmth, or redness. ? Red streaks leading from the knee. ? Pus draining from a place on your knee. ? A fever.     · You have signs of a blood clot in your leg (called a deep vein thrombosis), such as:  ? Pain in your calf, back of the knee, thigh, or groin. ?  Redness and swelling in your leg or groin.    Watch closely for changes in your health, and be sure to contact your doctor if:    · You have tingling, weakness, or numbness in your knee.     · You have any new symptoms, such as swelling.     · You have bruises from a knee injury that last longer than 2 weeks.     · You do not get better as expected. Where can you learn more? Go to http://reza-yen.info/. Enter K195 in the search box to learn more about \"Knee Pain or Injury: Care Instructions. \"  Current as of: September 23, 2018  Content Version: 11.9  © 3320-0927 [employer]. Care instructions adapted under license by [employer] (which disclaims liability or warranty for this information). If you have questions about a medical condition or this instruction, always ask your healthcare professional. [address] any warranty or liability for your use of this information.

## 2019-05-07 DIAGNOSIS — E03.4 HYPOTHYROIDISM DUE TO ACQUIRED ATROPHY OF THYROID: Chronic | ICD-10-CM

## 2019-05-07 RX ORDER — LEVOTHYROXINE SODIUM 100 UG/1
TABLET ORAL
Qty: 90 TAB | Refills: 1 | Status: SHIPPED | OUTPATIENT
Start: 2019-05-07 | End: 2019-10-28 | Stop reason: SDUPTHER

## 2019-07-30 RX ORDER — HYDROCHLOROTHIAZIDE 25 MG/1
TABLET ORAL
Qty: 90 TAB | Refills: 1 | Status: SHIPPED | OUTPATIENT
Start: 2019-07-30 | End: 2020-01-28

## 2019-10-28 DIAGNOSIS — E03.4 HYPOTHYROIDISM DUE TO ACQUIRED ATROPHY OF THYROID: Chronic | ICD-10-CM

## 2019-10-28 RX ORDER — LEVOTHYROXINE SODIUM 100 UG/1
TABLET ORAL
Qty: 90 TAB | Refills: 1 | Status: SHIPPED | OUTPATIENT
Start: 2019-10-28 | End: 2019-11-26 | Stop reason: DRUGHIGH

## 2019-11-21 ENCOUNTER — OFFICE VISIT (OUTPATIENT)
Dept: INTERNAL MEDICINE CLINIC | Age: 64
End: 2019-11-21

## 2019-11-21 ENCOUNTER — HOSPITAL ENCOUNTER (OUTPATIENT)
Dept: LAB | Age: 64
Discharge: HOME | End: 2019-11-21

## 2019-11-21 VITALS
TEMPERATURE: 98.4 F | WEIGHT: 281.6 LBS | OXYGEN SATURATION: 98 % | DIASTOLIC BLOOD PRESSURE: 72 MMHG | HEIGHT: 64 IN | SYSTOLIC BLOOD PRESSURE: 125 MMHG | BODY MASS INDEX: 48.07 KG/M2 | HEART RATE: 85 BPM | RESPIRATION RATE: 18 BRPM

## 2019-11-21 DIAGNOSIS — I10 ESSENTIAL HYPERTENSION, BENIGN: ICD-10-CM

## 2019-11-21 DIAGNOSIS — E03.4 HYPOTHYROIDISM DUE TO ACQUIRED ATROPHY OF THYROID: ICD-10-CM

## 2019-11-21 DIAGNOSIS — I10 ESSENTIAL HYPERTENSION, BENIGN: Primary | ICD-10-CM

## 2019-11-21 DIAGNOSIS — R10.9 RIGHT LATERAL ABDOMINAL PAIN: ICD-10-CM

## 2019-11-21 DIAGNOSIS — M25.50 ARTHRALGIA, UNSPECIFIED JOINT: ICD-10-CM

## 2019-11-21 LAB
ALBUMIN SERPL-MCNC: 3.5 G/DL (ref 3.5–5)
ALBUMIN/GLOB SERPL: 0.9 {RATIO} (ref 1.1–2.2)
ALP SERPL-CCNC: 88 U/L (ref 45–117)
ALT SERPL-CCNC: 19 U/L (ref 12–78)
ANION GAP SERPL CALC-SCNC: 1 MMOL/L (ref 5–15)
AST SERPL-CCNC: 19 U/L (ref 15–37)
BILIRUB SERPL-MCNC: 0.5 MG/DL (ref 0.2–1)
BUN SERPL-MCNC: 12 MG/DL (ref 6–20)
BUN/CREAT SERPL: 13 (ref 12–20)
CALCIUM SERPL-MCNC: 9.2 MG/DL (ref 8.5–10.1)
CHLORIDE SERPL-SCNC: 109 MMOL/L (ref 97–108)
CO2 SERPL-SCNC: 31 MMOL/L (ref 21–32)
CREAT SERPL-MCNC: 0.94 MG/DL (ref 0.55–1.02)
ERYTHROCYTE [DISTWIDTH] IN BLOOD BY AUTOMATED COUNT: 14 % (ref 11.5–14.5)
GLOBULIN SER CALC-MCNC: 3.9 G/DL (ref 2–4)
GLUCOSE SERPL-MCNC: 84 MG/DL (ref 65–100)
HCT VFR BLD AUTO: 36.9 % (ref 35–47)
HGB BLD-MCNC: 11.7 G/DL (ref 11.5–16)
MCH RBC QN AUTO: 30.9 PG (ref 26–34)
MCHC RBC AUTO-ENTMCNC: 31.7 G/DL (ref 30–36.5)
MCV RBC AUTO: 97.4 FL (ref 80–99)
NRBC # BLD: 0 K/UL (ref 0–0.01)
NRBC BLD-RTO: 0 PER 100 WBC
PLATELET # BLD AUTO: 218 K/UL (ref 150–400)
PMV BLD AUTO: 11.6 FL (ref 8.9–12.9)
POTASSIUM SERPL-SCNC: 4.1 MMOL/L (ref 3.5–5.1)
PROT SERPL-MCNC: 7.4 G/DL (ref 6.4–8.2)
RBC # BLD AUTO: 3.79 M/UL (ref 3.8–5.2)
SODIUM SERPL-SCNC: 141 MMOL/L (ref 136–145)
T4 FREE SERPL-MCNC: 1 NG/DL (ref 0.8–1.5)
TSH SERPL DL<=0.05 MIU/L-ACNC: 4.63 UIU/ML (ref 0.36–3.74)
WBC # BLD AUTO: 6.6 K/UL (ref 3.6–11)

## 2019-11-21 NOTE — PROGRESS NOTES
HISTORY OF PRESENT ILLNESS  Jaky Arevalo is a 59 y.o. female. HPI  Hypertension: Stable on hydrochlorothiazide. ROS: taking medications as instructed, no medication side effects noted, no TIA's, no chest pain on exertion, no dyspnea on exertion, no swelling of ankles. BP in office today is 125/72. Hypothyroidism: She says that she is feeling more fatigued and does not have the energy to exercise when she gets home from work. She has also gained weight. She is compliant with Levothyroxine. Lab Results   Component Value Date/Time    TSH 3.720 10/01/2018 12:47 PM     Thyroid ROS: denies heat/cold intolerance, bowel/skin changes or CVS symptoms. Arthralgia: She continues to have pain in her legs, and she notes having injections by Dr. Florencia Celeste, her rheumatologist, that have mildly helped. She says she still feels pain, mostly in her heels. She is taking tramadol 50 mg prn. Abdominal Pain: She says she experienced acute RLQ abdominal pain that has since resolved. Review of Systems   Constitutional: Positive for malaise/fatigue. Negative for chills. Respiratory: Negative for shortness of breath. Cardiovascular: Negative for chest pain. Gastrointestinal: Positive for abdominal pain (resolved). Musculoskeletal: Positive for joint pain. Skin: Negative. All other systems reviewed and are negative. Physical Exam  Vitals signs and nursing note reviewed. Constitutional:       Appearance: She is well-developed and normal weight. HENT:      Head: Normocephalic and atraumatic. Right Ear: External ear normal.      Left Ear: External ear normal.      Nose: Nose normal.      Mouth/Throat:      Pharynx: Oropharynx is clear. Eyes:      Extraocular Movements: Extraocular movements intact. Conjunctiva/sclera: Conjunctivae normal.      Pupils: Pupils are equal, round, and reactive to light. Neck:      Musculoskeletal: Normal range of motion and neck supple.       Thyroid: No thyroid mass or thyromegaly. Vascular: No carotid bruit. Cardiovascular:      Rate and Rhythm: Normal rate and regular rhythm. Pulses: Normal pulses. Pulmonary:      Effort: Pulmonary effort is normal.      Breath sounds: Normal breath sounds. Abdominal:      General: Bowel sounds are normal.      Palpations: Abdomen is soft. Tenderness: There is no tenderness. Musculoskeletal: Normal range of motion. General: No tenderness. Skin:     General: Skin is warm and dry. Findings: No abrasion or rash. Neurological:      Mental Status: She is alert and oriented to person, place, and time. Cranial Nerves: No cranial nerve deficit. Sensory: No sensory deficit. Motor: No weakness. Coordination: Coordination is intact. Psychiatric:         Mood and Affect: Mood and affect normal.         Behavior: Behavior normal.         Thought Content: Thought content normal.         Judgment: Judgment normal.         ASSESSMENT and PLAN  Diagnoses and all orders for this visit:    1. Essential hypertension, benign  Stable with hydrochlorothiazide. BP is at goal. I do not recommend any change in medications. We will have labs drawn today and she will follow-up with me. -     CBC W/O DIFF; Future  -     METABOLIC PANEL, COMPREHENSIVE; Future    2. Hypothyroidism due to acquired atrophy of thyroid  Stable with Synthroid 100 mcg, patient is tolerating pain medications, no myalgias. I do not recommend any change in medications.   -     TSH 3RD GENERATION; Future  -     T4, FREE; Future    3. Arthralgia, unspecified joint  Pain is still bothersome. She will continue to follow-up with rheumatology for treatment. 4. Right lateral abdominal pain  I ordered abdominal ultrasound to further evaluate RLQ abdominal pain. Will reach out with results.      lab results and schedule of future lab studies reviewed with patient  reviewed diet, exercise and weight control        Written by Mary Pimentel Rubio-Reyes, Scribe, as dictated by Sonia Wong MD.      Current diagnosis and concerns discussed with pt at length. Understands risks and benefits or current treatment plan and medications and accepts the treatment and medication with any possible risks. Pt asks appropriate questions which were answered. Pt instructed to call with any concerns or problems. This note will not be viewable in [address]

## 2019-11-25 NOTE — PROGRESS NOTES
Can you see if she is willing to go up on her thyroid medicine to 112 mcg a day and follow up with me in Jan to repeat it?

## 2019-11-26 ENCOUNTER — TELEPHONE (OUTPATIENT)
Dept: INTERNAL MEDICINE CLINIC | Age: 64
End: 2019-11-26

## 2019-11-26 DIAGNOSIS — M54.50 CHRONIC BILATERAL LOW BACK PAIN WITHOUT SCIATICA: ICD-10-CM

## 2019-11-26 DIAGNOSIS — G89.29 CHRONIC BILATERAL LOW BACK PAIN WITHOUT SCIATICA: ICD-10-CM

## 2019-11-26 RX ORDER — LEVOTHYROXINE SODIUM 112 UG/1
112 TABLET ORAL
Qty: 90 TAB | Refills: 1 | Status: SHIPPED | OUTPATIENT
Start: 2019-11-26 | End: 2020-06-07

## 2019-11-26 RX ORDER — TRAMADOL HYDROCHLORIDE 50 MG/1
50 TABLET ORAL
Qty: 60 TAB | Refills: 3 | Status: SHIPPED | OUTPATIENT
Start: 2019-11-26 | End: 2021-03-04 | Stop reason: SDUPTHER

## 2019-11-26 NOTE — TELEPHONE ENCOUNTER
Pt advised of results and agrees to take increased dose of levothyroxine. Pt also asked for a refill on her Tramadol.

## 2019-11-26 NOTE — TELEPHONE ENCOUNTER
----- Message from Ayesha Hermosillo MD sent at 11/25/2019  5:46 PM EST -----  Can you see if she is willing to go up on her thyroid medicine to 112 mcg a day and follow up with me in Jan to repeat it?

## 2020-01-28 RX ORDER — HYDROCHLOROTHIAZIDE 25 MG/1
TABLET ORAL
Qty: 90 TAB | Refills: 1 | Status: SHIPPED | OUTPATIENT
Start: 2020-01-28 | End: 2020-09-01

## 2020-06-07 RX ORDER — LEVOTHYROXINE SODIUM 112 UG/1
TABLET ORAL
Qty: 90 TAB | Refills: 1 | Status: SHIPPED | OUTPATIENT
Start: 2020-06-07 | End: 2020-08-18 | Stop reason: DRUGHIGH

## 2020-08-12 ENCOUNTER — OFFICE VISIT (OUTPATIENT)
Dept: INTERNAL MEDICINE CLINIC | Age: 65
End: 2020-08-12
Payer: OTHER GOVERNMENT

## 2020-08-12 ENCOUNTER — HOSPITAL ENCOUNTER (OUTPATIENT)
Dept: LAB | Age: 65
Discharge: HOME | End: 2020-08-12

## 2020-08-12 VITALS
WEIGHT: 285.4 LBS | BODY MASS INDEX: 48.99 KG/M2 | DIASTOLIC BLOOD PRESSURE: 85 MMHG | OXYGEN SATURATION: 98 % | HEART RATE: 74 BPM | SYSTOLIC BLOOD PRESSURE: 132 MMHG | TEMPERATURE: 98.2 F | RESPIRATION RATE: 18 BRPM

## 2020-08-12 DIAGNOSIS — I10 ESSENTIAL HYPERTENSION, BENIGN: ICD-10-CM

## 2020-08-12 DIAGNOSIS — E03.4 HYPOTHYROIDISM DUE TO ACQUIRED ATROPHY OF THYROID: ICD-10-CM

## 2020-08-12 DIAGNOSIS — G89.29 CHRONIC BILATERAL LOW BACK PAIN WITHOUT SCIATICA: ICD-10-CM

## 2020-08-12 DIAGNOSIS — K51.819 OTHER ULCERATIVE COLITIS WITH COMPLICATION (HCC): ICD-10-CM

## 2020-08-12 DIAGNOSIS — M54.50 CHRONIC BILATERAL LOW BACK PAIN WITHOUT SCIATICA: ICD-10-CM

## 2020-08-12 DIAGNOSIS — I10 ESSENTIAL HYPERTENSION, BENIGN: Primary | ICD-10-CM

## 2020-08-12 LAB
ALBUMIN SERPL-MCNC: 3.4 G/DL (ref 3.5–5)
ALBUMIN/GLOB SERPL: 0.8 {RATIO} (ref 1.1–2.2)
ALP SERPL-CCNC: 92 U/L (ref 45–117)
ALT SERPL-CCNC: 20 U/L (ref 12–78)
ANION GAP SERPL CALC-SCNC: 7 MMOL/L (ref 5–15)
AST SERPL-CCNC: 19 U/L (ref 15–37)
BILIRUB SERPL-MCNC: 0.5 MG/DL (ref 0.2–1)
BUN SERPL-MCNC: 13 MG/DL (ref 6–20)
BUN/CREAT SERPL: 14 (ref 12–20)
CALCIUM SERPL-MCNC: 9.3 MG/DL (ref 8.5–10.1)
CHLORIDE SERPL-SCNC: 106 MMOL/L (ref 97–108)
CHOLEST SERPL-MCNC: 227 MG/DL
CO2 SERPL-SCNC: 27 MMOL/L (ref 21–32)
CREAT SERPL-MCNC: 0.94 MG/DL (ref 0.55–1.02)
GLOBULIN SER CALC-MCNC: 4.1 G/DL (ref 2–4)
GLUCOSE SERPL-MCNC: 82 MG/DL (ref 65–100)
HDLC SERPL-MCNC: 75 MG/DL
HDLC SERPL: 3 {RATIO} (ref 0–5)
LDLC SERPL CALC-MCNC: 138 MG/DL (ref 0–100)
LIPID PROFILE,FLP: ABNORMAL
POTASSIUM SERPL-SCNC: 4 MMOL/L (ref 3.5–5.1)
PROT SERPL-MCNC: 7.5 G/DL (ref 6.4–8.2)
SODIUM SERPL-SCNC: 140 MMOL/L (ref 136–145)
T4 FREE SERPL-MCNC: 1.1 NG/DL (ref 0.8–1.5)
TRIGL SERPL-MCNC: 70 MG/DL (ref ?–150)
TSH SERPL DL<=0.05 MIU/L-ACNC: 5.18 UIU/ML (ref 0.36–3.74)
VLDLC SERPL CALC-MCNC: 14 MG/DL

## 2020-08-12 PROCEDURE — 99214 OFFICE O/P EST MOD 30 MIN: CPT | Performed by: INTERNAL MEDICINE

## 2020-08-12 RX ORDER — TRAMADOL HYDROCHLORIDE 50 MG/1
50 TABLET ORAL
COMMUNITY
End: 2021-03-04 | Stop reason: ALTCHOICE

## 2020-08-12 NOTE — PROGRESS NOTES
HISTORY OF PRESENT ILLNESS  Sergio Jacobson is a 59 y.o. female. HPI  Hypertension ROS: taking medications as instructed, no medication side effects noted, no TIA's, no chest pain on exertion, no dyspnea on exertion, no swelling of ankles. New concerns: BP in office today is 132/85. Continues on HCTZ. Back pain: Pt followed with Dr. Krissy Carrero since last year. Stable, pt continues on Tramadol 1-2 tablets per month. She notes that injections did not provide much relief in the past.     hypothyroidism. Lab Results   Component Value Date/Time    TSH 4.63 (H) 11/21/2019 04:49 PM     Thyroid ROS: denies fatigue, weight changes, heat/cold intolerance, bowel/skin changes or CVS symptoms. Continues on Synthroid. UC: Stable with no noted flares. Health maintenance: Pt states that she plans on getting the flu shot in the Fall. Pt states that she may do a mammogram in the future but she is reluctant. Pt asks to not order one since she is afraid of the results. Pt wants to get a colonoscopy but she is having some issues with billing. Review of Systems   All other systems reviewed and are negative. Physical Exam  Constitutional:       Appearance: Normal appearance. HENT:      Right Ear: Hearing, tympanic membrane and external ear normal.      Left Ear: Hearing, tympanic membrane and external ear normal.      Mouth/Throat:      Mouth: Mucous membranes are moist.      Pharynx: Oropharynx is clear. Cardiovascular:      Rate and Rhythm: Normal rate and regular rhythm. Pulses: Normal pulses. Heart sounds: Normal heart sounds. Pulmonary:      Effort: Pulmonary effort is normal.      Breath sounds: Normal breath sounds and air entry. Musculoskeletal: Normal range of motion. Skin:     General: Skin is warm and dry. Neurological:      General: No focal deficit present. Mental Status: She is alert and oriented to person, place, and time.    Psychiatric:         Mood and Affect: Mood normal. Behavior: Behavior normal.         ASSESSMENT and PLAN  Diagnoses and all orders for this visit:    1. Essential hypertension, benign  BP is at goal. I do not recommend any change in medications.   -     METABOLIC PANEL, COMPREHENSIVE; Future  -     LIPID PANEL; Future    2. Hypothyroidism due to acquired atrophy of thyroid  Thyroid presumed stable. Rechecking labs to confirm. Pt tolerating medication. I do not recommend a change in treatment plan. -     TSH 3RD GENERATION; Future  -     T4, FREE; Future    3. Chronic bilateral low back pain without sciatica  Stable and well-managed. No change in medications. 4. Other ulcerative colitis with complication (HCC)  Stable and well-managed. No change in treatment. Lab results and schedule of future lab studies reviewed with patient. Reviewed diet, exercise and weight control. Written by Zara Valles, as dictated by Michele Thompson MD.     Current diagnosis and concerns discussed with pt at length. Understands risks and benefits or current treatment plan and medications and accepts the treatment and medication with any possible risks. Pt asks appropriate questions which were answered. Pt instructed to call with any concerns or problems.

## 2020-08-17 NOTE — PROGRESS NOTES
Please call patient= thyroid is off and we need to increase the dose.  And we need to recheck the labs in 6-8 weeks

## 2020-08-17 NOTE — PROGRESS NOTES
And the lipids are slightly off so needs to keep working on eating right and making healthy food choices. Lashaun Ask

## 2020-08-18 ENCOUNTER — TELEPHONE (OUTPATIENT)
Dept: INTERNAL MEDICINE CLINIC | Age: 65
End: 2020-08-18

## 2020-08-18 NOTE — TELEPHONE ENCOUNTER
----- Message from Kemar John MD sent at 8/17/2020  7:18 AM EDT -----  Please call patient= thyroid is off and we need to increase the dose.  And we need to recheck the labs in 6-8 weeks

## 2020-08-27 RX ORDER — LEVOTHYROXINE SODIUM 125 UG/1
125 TABLET ORAL
Qty: 90 TAB | Refills: 1 | Status: SHIPPED | OUTPATIENT
Start: 2020-08-27 | End: 2021-02-05

## 2020-09-01 RX ORDER — HYDROCHLOROTHIAZIDE 25 MG/1
TABLET ORAL
Qty: 90 TAB | Refills: 1 | Status: SHIPPED | OUTPATIENT
Start: 2020-09-01 | End: 2021-02-05

## 2020-10-13 ENCOUNTER — NURSE TRIAGE (OUTPATIENT)
Dept: OTHER | Facility: CLINIC | Age: 65
End: 2020-10-13

## 2020-10-13 ENCOUNTER — VIRTUAL VISIT (OUTPATIENT)
Dept: INTERNAL MEDICINE CLINIC | Age: 65
End: 2020-10-13
Payer: OTHER GOVERNMENT

## 2020-10-13 DIAGNOSIS — J30.2 SEASONAL ALLERGIC RHINITIS, UNSPECIFIED TRIGGER: Primary | ICD-10-CM

## 2020-10-13 DIAGNOSIS — R05.9 COUGH: ICD-10-CM

## 2020-10-13 PROCEDURE — 99213 OFFICE O/P EST LOW 20 MIN: CPT | Performed by: NURSE PRACTITIONER

## 2020-10-13 RX ORDER — CETIRIZINE HCL 10 MG
10 TABLET ORAL DAILY
Qty: 30 TAB | Refills: 0
Start: 2020-10-13 | End: 2021-10-14

## 2020-10-13 NOTE — PROGRESS NOTES
Consent: Tony Ibrahim, who was seen by synchronous (real-time) audio-video technology, and/or her healthcare decision maker, is aware that this patient-initiated, Telehealth encounter on 10/13/2020 is a billable service, with coverage as determined by her insurance carrier. She is aware that she may receive a bill and has provided verbal consent to proceed: Yes. 712  Subjective:   Tony Ibrahim is a 59 y.o. female who was seen for Cough (since yesterday) and Nasal Congestion (post nasal drip)    Visit conducted via [employer] me platform. Presents with complaints of clear nasal drainage, post nasal drip, sneezing and occasional cough since yesterday. Has had mildly scratchy throat but denies difficulty swallowing, fever, chills, shortness of breath, purulent mucous. Denies any known contact with Covid positive individuals. Current Outpatient Medications   Medication Sig    cetirizine (ZYRTEC) 10 mg tablet Take 1 Tab by mouth daily.  hydroCHLOROthiazide (HYDRODIURIL) 25 mg tablet TAKE 1 TABLET BY MOUTH EVERY DAY    levothyroxine (SYNTHROID) 125 mcg tablet Take 1 Tab by mouth Daily (before breakfast).  traMADoL (ULTRAM) 50 mg tablet Take 50 mg by mouth every six (6) hours as needed for Pain.  traMADol (ULTRAM) 50 mg tablet Take 1 Tab by mouth every six (6) hours as needed for Pain for up to 30 days.  hydrochlorothiazide (HYDRODIURIL) 25 mg tablet Take 1 Tab by mouth daily for 30 days. No current facility-administered medications for this visit.         Allergies   Allergen Reactions    Sulfa (Sulfonamide Antibiotics) Swelling       Past Medical History:   Diagnosis Date    Arthritis     ostero arthritis, rhemathoid  lower back     Hypertension     Hypothyroid 3/23/2011    Ulcerative colitis ([facility] [address] 9/16/2010    Ulcerative colitis ([facility] [address] 9/16/2010       ROS  All other systems reviewed and negative, unless mentioned in HPI    Objective:   Vital Signs: (As obtained by patient/caregiver at home)  There were no vitals taken for this visit. [INSTRUCTIONS:  \"[x]\" Indicates a positive item  \"[]\" Indicates a negative item  -- DELETE ALL ITEMS NOT EXAMINED]    Constitutional: [x] Appears well-developed and well-nourished [x] No apparent distress      [] Abnormal -     Mental status: [x] Alert and awake  [x] Oriented to person/place/time [x] Able to follow commands    [] Abnormal -     Eyes:   EOM    [x]  Normal    [] Abnormal -   Sclera  [x]  Normal    [] Abnormal -          Discharge [x]  None visible   [] Abnormal -     HENT: [x] Normocephalic, atraumatic  [] Abnormal -   [x] Mouth/Throat: Mucous membranes are moist    External Ears [x] Normal  [] Abnormal -    Neck: [x] No visualized mass [] Abnormal -     Pulmonary/Chest: [x] Respiratory effort normal   [x] No visualized signs of difficulty breathing or respiratory distress        [] Abnormal -      Musculoskeletal:   [x] Normal gait with no signs of ataxia         [x] Normal range of motion of neck        [] Abnormal -     Neurological:        [x] No Facial Asymmetry (Cranial nerve 7 motor function) (limited exam due to video visit)          [x] No gaze palsy        [] Abnormal -          Skin:        [x] No significant exanthematous lesions or discoloration noted on facial skin         [] Abnormal -            Psychiatric:       [x] Normal Affect [] Abnormal -        [x] No Hallucinations          Assessment & Plan:   Diagnoses and all orders for this visit:    1. Seasonal allergic rhinitis, unspecified trigger  -     cetirizine (ZYRTEC) 10 mg tablet; Take 1 Tab by mouth daily. 2. Cough -- low risk of Covid and has not had any known exposure. -     cetirizine (ZYRTEC) 10 mg tablet; Take 1 Tab by mouth daily. We discussed the expected course, resolution and complications of the diagnosis(es) in detail. Medication risks, benefits, costs, interactions, and alternatives were discussed as indicated.   I advised her to contact the office if her condition worsens, changes or fails to improve as anticipated. She expressed understanding with the diagnosis(es) and plan. Heena Hunt is a 59 y.o. female who was evaluated by an audio-video encounter for concerns as above. Patient identification was verified prior to start of the visit. A caregiver was present when appropriate. Due to this being a TeleHealth encounter (During FPJYE-39 public health emergency), evaluation of the following organ systems was limited: Vitals/Constitutional/EENT/Resp/CV/GI//MS/Neuro/Skin/Heme-Lymph-Imm. Pursuant to the emergency declaration under the [facility], 1135 waiver authority and the [employer] Act, this Virtual Visit was conducted, with patient's (and/or legal guardian's) consent, to reduce the patient's risk of exposure to COVID-19 and provide necessary medical care. Services were provided through a synchronous discussion virtually to substitute for in-person clinic visit. I was in the office. The patient was at home.      Nel Flores NP

## 2020-10-13 NOTE — TELEPHONE ENCOUNTER
Received cold call from [address]. Call soft transferred Jay Jay Disla at [place] to [address] to schedule appointment. Advised pt of home care, would like information about testing, connected her with the office. Attention Provider: Thank you for allowing me to participate in the care of your patient. The  patient was connected to triage in response to information provided to the [facility]. Please do not respond through this encounter as the response is not directed to a shared pool. Reason for Disposition   COVID-19 Testing, questions about    Answer Assessment - Initial Assessment Questions  1. COVID-19 DIAGNOSIS: \"Who made your Coronavirus (COVID-19) diagnosis? \" \"Was it confirmed by a positive lab test?\" If not diagnosed by a HCP, ask \"Are there lots of cases (community spread) where you live? \" (See public health department website, if unsure)      Is not aware     2. ONSET: \"When did the COVID-19 symptoms start? \"       10/12/20    3. WORST SYMPTOM: \"What is your worst symptom? \" (e.g., cough, fever, shortness of breath, muscle aches)      Throat itchy    4. COUGH: \"Do you have a cough? \" If so, ask: \"How bad is the cough? \"        Dry cough     5. FEVER: \"Do you have a fever? \" If so, ask: \"What is your temperature, how was it measured, and when did it start? \"      Denies     6. RESPIRATORY STATUS: \"Describe your breathing? \" (e.g., shortness of breath, wheezing, unable to speak)       Denies     7. BETTER-SAME-WORSE: Lanora Pagoda you getting better, staying the same or getting worse compared to yesterday? \"  If getting worse, ask, \"In what way? \"      Same    8. HIGH RISK DISEASE: \"Do you have any chronic medical problems? \" (e.g., asthma, heart or lung disease, weak immune system, etc.)      Ulcerative colitis     9. PREGNANCY: \"Is there any chance you are pregnant? \" \"When was your last menstrual period? \"      N/a     10. OTHER SYMPTOMS: \"Do you have any other symptoms? \"  (e.g., chills, fatigue, headache, loss of smell or taste, muscle pain, sore throat)        Denies    Protocols used: CORONAVIRUS (COVID-19) DIAGNOSED OR SUSPECTED-ADULT-[place]

## 2020-11-06 ENCOUNTER — TELEPHONE (OUTPATIENT)
Dept: INTERNAL MEDICINE CLINIC | Age: 65
End: 2020-11-06

## 2020-11-06 NOTE — TELEPHONE ENCOUNTER
----- Message from Red Scanlon sent at 11/6/2020 12:37 PM EST -----  Regarding: Dr. Joseph Mann first and last name: Pt      Reason for call: Pt is requesting a note stating that she is not able to do jury duty. Callback required yes/no and why: yes      Best contact number(s): [phone]      Details to clarify the request: Pt stated that she is quarantining because someone at her job tested positive for [place].        Red Scanlon

## 2020-11-06 NOTE — LETTER
11/9/2020 1:46 PM 
 
Ms. Jeremie Maxwell [facility] [phone] To whom it may concern: Ms. Leeroy Hardy has been exposed to [phone] and is currently under quarantine, therefore she will be unable to serve jury duty. If you have any questions or concerns please direct them through Ms. Nano Fields to my office. Sincerely, Erick Mckay MD

## 2020-11-06 NOTE — TELEPHONE ENCOUNTER
----- Message from Josh Osborne sent at 11/6/2020 12:37 PM EST -----  Regarding: Dr. Jayla Preston first and last name: Pt      Reason for call: Pt is requesting a note stating that she is not able to do jury duty. Callback required yes/no and why: yes      Best contact number(s): [phone]      Details to clarify the request: Pt stated that she is quarantining because someone at her job tested positive for [place].        Josh Osborne

## 2020-11-10 NOTE — TELEPHONE ENCOUNTER
Left v/m for patient that her letter is ready for  at the screening table. Recommended she have someone come pick it up for her since she is in quarantine due to possible exposure.

## 2021-02-05 RX ORDER — HYDROCHLOROTHIAZIDE 25 MG/1
TABLET ORAL
Qty: 90 TAB | Refills: 1 | Status: SHIPPED | OUTPATIENT
Start: 2021-02-05 | End: 2021-08-11

## 2021-02-05 RX ORDER — LEVOTHYROXINE SODIUM 125 UG/1
TABLET ORAL
Qty: 90 TAB | Refills: 1 | Status: SHIPPED | OUTPATIENT
Start: 2021-02-05 | End: 2021-08-11

## 2021-03-01 ENCOUNTER — TELEPHONE (OUTPATIENT)
Dept: INTERNAL MEDICINE CLINIC | Age: 66
End: 2021-03-01

## 2021-03-01 NOTE — TELEPHONE ENCOUNTER
Patient is requesting to speak with th enurse t discuss her health and which covid vaccine would be right for her . Also PCP asked that she return in 6-8 weeks back in 08/2020 to repeat blood work, can patient still stop by to complete blood work    She can be reached at  [phone].

## 2021-03-03 ENCOUNTER — TELEPHONE (OUTPATIENT)
Dept: INTERNAL MEDICINE CLINIC | Age: 66
End: 2021-03-03

## 2021-03-03 NOTE — TELEPHONE ENCOUNTER
Left message for patient notifying her that Dr. Juan Sanchez would like her to schedule her six month follow-up and we can recheck labs at that appointment. Pt can also come get labs done prior to her f/u appt if she desires.

## 2021-03-03 NOTE — TELEPHONE ENCOUNTER
----- Message from Harsha Castillo sent at 3/3/2021  1:31 PM EST -----  Regarding: Dr. Saintclair Sailor Message  To: [facility]  Subject: Dr. Jean Yo  Patient's first and last name:Zoe Butler  :1955  MRN number:[id number]  Caller's first and last name: paient  Reason for call: Wants to speak w/Nurse or Dr. John Benitez. Callback required yes/no and why: Yes, has questions  Best contact number(s):[phone]  Details to clarify the request:  Ms Elias Butler is needing bloodword for thyroid and concerned about medication that she takes w/regards to the covid vacinne. Also has a question about vaccines if sulphavazine is in one of these vaccines. She had a very bad allergic reaction to Sulphavazine and doesn't want that to happen again.

## 2021-03-04 ENCOUNTER — OFFICE VISIT (OUTPATIENT)
Dept: INTERNAL MEDICINE CLINIC | Age: 66
End: 2021-03-04
Payer: MEDICARE

## 2021-03-04 VITALS
HEIGHT: 64 IN | RESPIRATION RATE: 18 BRPM | DIASTOLIC BLOOD PRESSURE: 78 MMHG | BODY MASS INDEX: 48.79 KG/M2 | SYSTOLIC BLOOD PRESSURE: 119 MMHG | TEMPERATURE: 98.3 F | OXYGEN SATURATION: 98 % | HEART RATE: 78 BPM | WEIGHT: 285.8 LBS

## 2021-03-04 DIAGNOSIS — E03.4 HYPOTHYROIDISM DUE TO ACQUIRED ATROPHY OF THYROID: ICD-10-CM

## 2021-03-04 DIAGNOSIS — I10 ESSENTIAL HYPERTENSION, BENIGN: Primary | ICD-10-CM

## 2021-03-04 DIAGNOSIS — M54.50 CHRONIC BILATERAL LOW BACK PAIN WITHOUT SCIATICA: ICD-10-CM

## 2021-03-04 DIAGNOSIS — G89.29 CHRONIC BILATERAL LOW BACK PAIN WITHOUT SCIATICA: ICD-10-CM

## 2021-03-04 DIAGNOSIS — M25.50 ARTHRALGIA, UNSPECIFIED JOINT: ICD-10-CM

## 2021-03-04 DIAGNOSIS — K51.819 OTHER ULCERATIVE COLITIS WITH COMPLICATION (HCC): ICD-10-CM

## 2021-03-04 LAB
ALBUMIN SERPL-MCNC: 3.3 G/DL (ref 3.5–5)
ALBUMIN/GLOB SERPL: 0.8 {RATIO} (ref 1.1–2.2)
ALP SERPL-CCNC: 90 U/L (ref 45–117)
ALT SERPL-CCNC: 20 U/L (ref 12–78)
ANION GAP SERPL CALC-SCNC: 4 MMOL/L (ref 5–15)
AST SERPL-CCNC: 19 U/L (ref 15–37)
BILIRUB SERPL-MCNC: 0.8 MG/DL (ref 0.2–1)
BUN SERPL-MCNC: 11 MG/DL (ref 6–20)
BUN/CREAT SERPL: 11 (ref 12–20)
CALCIUM SERPL-MCNC: 8.9 MG/DL (ref 8.5–10.1)
CHLORIDE SERPL-SCNC: 107 MMOL/L (ref 97–108)
CHOLEST SERPL-MCNC: 228 MG/DL
CO2 SERPL-SCNC: 28 MMOL/L (ref 21–32)
CREAT SERPL-MCNC: 1.01 MG/DL (ref 0.55–1.02)
ERYTHROCYTE [DISTWIDTH] IN BLOOD BY AUTOMATED COUNT: 13.8 % (ref 11.5–14.5)
GLOBULIN SER CALC-MCNC: 4.1 G/DL (ref 2–4)
GLUCOSE SERPL-MCNC: 85 MG/DL (ref 65–100)
HCT VFR BLD AUTO: 36.2 % (ref 35–47)
HDLC SERPL-MCNC: 87 MG/DL
HDLC SERPL: 2.6 {RATIO} (ref 0–5)
HGB BLD-MCNC: 12 G/DL (ref 11.5–16)
LDLC SERPL CALC-MCNC: 128.2 MG/DL (ref 0–100)
LIPID PROFILE,FLP: ABNORMAL
MCH RBC QN AUTO: 31.6 PG (ref 26–34)
MCHC RBC AUTO-ENTMCNC: 33.1 G/DL (ref 30–36.5)
MCV RBC AUTO: 95.3 FL (ref 80–99)
NRBC # BLD: 0 K/UL (ref 0–0.01)
NRBC BLD-RTO: 0 PER 100 WBC
PLATELET # BLD AUTO: 225 K/UL (ref 150–400)
PMV BLD AUTO: 11.8 FL (ref 8.9–12.9)
POTASSIUM SERPL-SCNC: 3.8 MMOL/L (ref 3.5–5.1)
PROT SERPL-MCNC: 7.4 G/DL (ref 6.4–8.2)
RBC # BLD AUTO: 3.8 M/UL (ref 3.8–5.2)
SODIUM SERPL-SCNC: 139 MMOL/L (ref 136–145)
T4 FREE SERPL-MCNC: 1.3 NG/DL (ref 0.8–1.5)
TRIGL SERPL-MCNC: 64 MG/DL (ref ?–150)
TSH SERPL DL<=0.05 MIU/L-ACNC: 3.46 UIU/ML (ref 0.36–3.74)
VLDLC SERPL CALC-MCNC: 12.8 MG/DL
WBC # BLD AUTO: 6.3 K/UL (ref 3.6–11)

## 2021-03-04 PROCEDURE — 99214 OFFICE O/P EST MOD 30 MIN: CPT | Performed by: INTERNAL MEDICINE

## 2021-03-04 RX ORDER — TRAMADOL HYDROCHLORIDE 50 MG/1
50 TABLET ORAL
Qty: 60 TAB | Refills: 3 | Status: SHIPPED | OUTPATIENT
Start: 2021-03-04 | End: 2021-09-11

## 2021-03-04 NOTE — PROGRESS NOTES
Liz Lezama (: 1955) is a 72 y.o. female, established patient, here for evaluation of the following chief complaint(s):  Follow-up (Thyroid)       ASSESSMENT/PLAN:  1. Essential hypertension, benign  BP is at goal. I do not recommend any change in HCTZ.   -     METABOLIC PANEL, COMPREHENSIVE; Future  -     LIPID PANEL; Future    2. Hypothyroidism due to acquired atrophy of thyroid  Thyroid presumed stable. Rechecking labs to confirm. Pt tolerating medication. I do not recommend a change in Synthroid. -     TSH 3RD GENERATION; Future  -     T4, FREE; Future    3. Other ulcerative colitis with complication ([facility] [address]  Stable and well-managed. Continue with ongoing regimen of stool softener.   -     CBC W/O DIFF; Future    4. Arthralgia, unspecified joint  Stable and well-managed. Continue with ongoing regimen of Tramadol and Tylenol.   -     traMADoL (ULTRAM) 50 mg tablet; Take 1 Tab by mouth every six (6) hours as needed for Pain for up to 30 days. , Normal, Disp-60 Tab, R-3     5. Chronic bilateral low back pain without sciatica  Stable and well-managed. Continue with ongoing regimen of Tramadol and Tylenol.   -     traMADoL (ULTRAM) 50 mg tablet; Take 1 Tab by mouth every six (6) hours as needed for Pain for up to 30 days. , Normal, Disp-60 Tab, R-3      Discussed with pt that she is due for pneumonia vaccine, but I will wait to administer since she will sign up for COVID vaccine soon. Informed pt that I am comfortable with her signing up for the COVID vaccine since she has tolerated other vaccines well in the past.       SUBJECTIVE/OBJECTIVE:  HPI  UC: Pt reports that her bowels have been stable. She notes that she has had a few episodes of constipation which she was able to manage with stool softener. Denies blood in stool or melena. Hypertension ROS: taking medications as instructed, no medication side effects noted, no TIA's, no chest pain on exertion, no dyspnea on exertion, no swelling of ankles. New concerns: BP in office today is 119/78. Continues on HCTZ. She notes that she has not been exercising. Knee pain: Pt reports that she is taking half tablet Tylenol to manage pain. She notes that she takes Tramadol half tablet PRN. hypothyroidism. Lab Results   Component Value Date/Time    TSH 5.18 (H) 08/12/2020 12:04 PM     Thyroid ROS: denies fatigue, weight changes, heat/cold intolerance, bowel/skin changes or CVS symptoms. Review of Systems   All other systems reviewed and are negative. Physical Exam  Constitutional:       Appearance: Normal appearance. HENT:      Right Ear: Tympanic membrane and external ear normal.      Left Ear: Tympanic membrane and external ear normal.      Mouth/Throat:      Mouth: Mucous membranes are moist.      Pharynx: Oropharynx is clear. Cardiovascular:      Rate and Rhythm: Normal rate and regular rhythm. Pulses: Normal pulses. Heart sounds: Normal heart sounds. Pulmonary:      Effort: Pulmonary effort is normal.      Breath sounds: Normal breath sounds. Musculoskeletal: Normal range of motion. Skin:     General: Skin is warm and dry. Neurological:      General: No focal deficit present. Mental Status: She is alert and oriented to person, place, and time. Psychiatric:         Mood and Affect: Mood normal.         Behavior: Behavior normal.               Lab results and schedule of future lab studies reviewed with patient. Reviewed diet, exercise and weight control. Written by Adan Barnard, as dictated by Khushi Banks MD.     Current diagnosis and concerns discussed with pt at length. Understands risks and benefits or current treatment plan and medications and accepts the treatment and medication with any possible risks. Pt asks appropriate questions which were answered. Pt instructed to call with any concerns or problems.

## 2021-08-11 RX ORDER — HYDROCHLOROTHIAZIDE 25 MG/1
TABLET ORAL
Qty: 90 TABLET | Refills: 1 | Status: SHIPPED | OUTPATIENT
Start: 2021-08-11 | End: 2022-02-14

## 2021-08-11 RX ORDER — LEVOTHYROXINE SODIUM 125 UG/1
TABLET ORAL
Qty: 90 TABLET | Refills: 1 | Status: SHIPPED | OUTPATIENT
Start: 2021-08-11 | End: 2022-02-14

## 2021-09-10 DIAGNOSIS — G89.29 CHRONIC BILATERAL LOW BACK PAIN WITHOUT SCIATICA: ICD-10-CM

## 2021-09-10 DIAGNOSIS — M25.50 ARTHRALGIA, UNSPECIFIED JOINT: ICD-10-CM

## 2021-09-10 DIAGNOSIS — M54.50 CHRONIC BILATERAL LOW BACK PAIN WITHOUT SCIATICA: ICD-10-CM

## 2021-09-11 RX ORDER — TRAMADOL HYDROCHLORIDE 50 MG/1
50 TABLET ORAL
Qty: 60 TABLET | Refills: 0 | Status: SHIPPED | OUTPATIENT
Start: 2021-09-11 | End: 2021-09-29

## 2021-09-20 ENCOUNTER — TELEPHONE (OUTPATIENT)
Dept: INTERNAL MEDICINE CLINIC | Age: 66
End: 2021-09-20

## 2021-09-20 RX ORDER — NITROFURANTOIN 25; 75 MG/1; MG/1
100 CAPSULE ORAL 2 TIMES DAILY
Qty: 14 CAPSULE | Refills: 0 | Status: SHIPPED | OUTPATIENT
Start: 2021-09-20 | End: 2021-09-29

## 2021-09-20 NOTE — TELEPHONE ENCOUNTER
Spoke with patient and she states she is having some itching and burning with frequent urination. She states that her urine has been very dark although she is trying to drink a lot of fluids. Advised patient that her symptoms are consistent with a urinary tract infection and Dr. Jeremy Conti said she will send in some antibiotics. Advised patient if her symptoms do not resolve after completing the course of antibiotics she is to contact the office to let us know. Pt understood and was thankful for the call.

## 2021-09-20 NOTE — TELEPHONE ENCOUNTER
----- Message from Jasper Preston sent at 9/20/2021  8:47 AM EDT -----  Regarding: /telephone  Contact: [phone]  General Message/Vendor Calls    Caller's first and last name:pt      Reason for call:vaginal itch;blood in urine      Callback required yes/no and why:yes      Best contact number(s):[phone]      Details to clarify the request:can not come in for an appt;pt  has tested positive for covid;denied virtual visit      [address] [facility]

## 2021-09-23 ENCOUNTER — TELEPHONE (OUTPATIENT)
Dept: INTERNAL MEDICINE CLINIC | Age: 66
End: 2021-09-23

## 2021-09-23 NOTE — TELEPHONE ENCOUNTER
[facility] due to the medication prescribed causing her to feel sick with each dose . States her urine is dark in color and has blood , [facility] asking for us to request her results.  Blood work and urine tested

## 2021-09-24 ENCOUNTER — HOSPITAL ENCOUNTER (INPATIENT)
Age: 66
LOS: 5 days | Discharge: HOME | DRG: 435 | End: 2021-09-29
Attending: STUDENT IN AN ORGANIZED HEALTH CARE EDUCATION/TRAINING PROGRAM | Admitting: HOSPITALIST
Payer: MEDICARE

## 2021-09-24 ENCOUNTER — APPOINTMENT (OUTPATIENT)
Dept: ULTRASOUND IMAGING | Age: 66
DRG: 435 | End: 2021-09-24
Attending: PHYSICIAN ASSISTANT
Payer: MEDICARE

## 2021-09-24 ENCOUNTER — TELEPHONE (OUTPATIENT)
Dept: INTERNAL MEDICINE CLINIC | Age: 66
End: 2021-09-24

## 2021-09-24 ENCOUNTER — APPOINTMENT (OUTPATIENT)
Dept: CT IMAGING | Age: 66
DRG: 435 | End: 2021-09-24
Attending: PHYSICIAN ASSISTANT
Payer: MEDICARE

## 2021-09-24 DIAGNOSIS — R74.01 TRANSAMINITIS: Primary | ICD-10-CM

## 2021-09-24 DIAGNOSIS — C25.0 MALIGNANT NEOPLASM OF HEAD OF PANCREAS (HCC): ICD-10-CM

## 2021-09-24 DIAGNOSIS — K51.819 OTHER ULCERATIVE COLITIS WITH COMPLICATION (HCC): Chronic | ICD-10-CM

## 2021-09-24 DIAGNOSIS — E87.6 HYPOKALEMIA: ICD-10-CM

## 2021-09-24 DIAGNOSIS — K83.8 COMMON BILE DUCT DILATATION: ICD-10-CM

## 2021-09-24 DIAGNOSIS — I10 ESSENTIAL HYPERTENSION, BENIGN: ICD-10-CM

## 2021-09-24 DIAGNOSIS — E03.4 HYPOTHYROIDISM DUE TO ACQUIRED ATROPHY OF THYROID: ICD-10-CM

## 2021-09-24 DIAGNOSIS — K80.21 CALCULUS OF GALLBLADDER WITH BILIARY OBSTRUCTION BUT WITHOUT CHOLECYSTITIS: ICD-10-CM

## 2021-09-24 DIAGNOSIS — K83.1 OBSTRUCTIVE JAUNDICE: ICD-10-CM

## 2021-09-24 DIAGNOSIS — R79.89 ELEVATED LFTS: ICD-10-CM

## 2021-09-24 DIAGNOSIS — K86.89 PANCREATIC MASS: ICD-10-CM

## 2021-09-24 LAB
ALBUMIN SERPL-MCNC: 3.2 G/DL (ref 3.5–5)
ALBUMIN/GLOB SERPL: 0.6 {RATIO} (ref 1.1–2.2)
ALP SERPL-CCNC: 323 U/L (ref 45–117)
ALT SERPL-CCNC: 607 U/L (ref 12–78)
ANION GAP SERPL CALC-SCNC: 6 MMOL/L (ref 5–15)
APPEARANCE UR: CLEAR
AST SERPL-CCNC: 505 U/L (ref 15–37)
BACTERIA URNS QL MICRO: NEGATIVE /HPF
BASOPHILS # BLD: 0 K/UL (ref 0–0.1)
BASOPHILS NFR BLD: 0 % (ref 0–1)
BILIRUB SERPL-MCNC: 6.3 MG/DL (ref 0.2–1)
BILIRUB UR QL CFM: NEGATIVE
BUN SERPL-MCNC: 8 MG/DL (ref 6–20)
BUN/CREAT SERPL: 8 (ref 12–20)
CALCIUM SERPL-MCNC: 9.1 MG/DL (ref 8.5–10.1)
CHLORIDE SERPL-SCNC: 103 MMOL/L (ref 97–108)
CO2 SERPL-SCNC: 28 MMOL/L (ref 21–32)
COLOR UR: ABNORMAL
COMMENT, HOLDF: NORMAL
COVID-19 RAPID TEST, COVR: NOT DETECTED
CREAT SERPL-MCNC: 1.03 MG/DL (ref 0.55–1.02)
DIFFERENTIAL METHOD BLD: NORMAL
EOSINOPHIL # BLD: 0.3 K/UL (ref 0–0.4)
EOSINOPHIL NFR BLD: 4 % (ref 0–7)
EPITH CASTS URNS QL MICRO: ABNORMAL /LPF
ERYTHROCYTE [DISTWIDTH] IN BLOOD BY AUTOMATED COUNT: 14.3 % (ref 11.5–14.5)
GLOBULIN SER CALC-MCNC: 5.1 G/DL (ref 2–4)
GLUCOSE SERPL-MCNC: 90 MG/DL (ref 65–100)
GLUCOSE UR STRIP.AUTO-MCNC: NEGATIVE MG/DL
HCT VFR BLD AUTO: 37.4 % (ref 35–47)
HGB BLD-MCNC: 12.9 G/DL (ref 11.5–16)
HGB UR QL STRIP: NEGATIVE
HYALINE CASTS URNS QL MICRO: ABNORMAL /LPF (ref 0–5)
IMM GRANULOCYTES # BLD AUTO: 0 K/UL (ref 0–0.04)
IMM GRANULOCYTES NFR BLD AUTO: 0 % (ref 0–0.5)
KETONES UR QL STRIP.AUTO: NEGATIVE MG/DL
LEUKOCYTE ESTERASE UR QL STRIP.AUTO: ABNORMAL
LIPASE SERPL-CCNC: 358 U/L (ref 73–393)
LYMPHOCYTES # BLD: 2.8 K/UL (ref 0.8–3.5)
LYMPHOCYTES NFR BLD: 42 % (ref 12–49)
MCH RBC QN AUTO: 31.5 PG (ref 26–34)
MCHC RBC AUTO-ENTMCNC: 34.5 G/DL (ref 30–36.5)
MCV RBC AUTO: 91.4 FL (ref 80–99)
MONOCYTES # BLD: 0.8 K/UL (ref 0–1)
MONOCYTES NFR BLD: 12 % (ref 5–13)
NEUTS SEG # BLD: 2.8 K/UL (ref 1.8–8)
NEUTS SEG NFR BLD: 42 % (ref 32–75)
NITRITE UR QL STRIP.AUTO: NEGATIVE
NRBC # BLD: 0 K/UL (ref 0–0.01)
NRBC BLD-RTO: 0 PER 100 WBC
PH UR STRIP: 6 [PH] (ref 5–8)
PLATELET # BLD AUTO: 234 K/UL (ref 150–400)
PMV BLD AUTO: 11.3 FL (ref 8.9–12.9)
POTASSIUM SERPL-SCNC: 2.7 MMOL/L (ref 3.5–5.1)
PROT SERPL-MCNC: 8.3 G/DL (ref 6.4–8.2)
PROT UR STRIP-MCNC: NEGATIVE MG/DL
RBC # BLD AUTO: 4.09 M/UL (ref 3.8–5.2)
RBC #/AREA URNS HPF: ABNORMAL /HPF (ref 0–5)
SAMPLES BEING HELD,HOLD: NORMAL
SODIUM SERPL-SCNC: 137 MMOL/L (ref 136–145)
SOURCE, COVRS: NORMAL
SP GR UR REFRACTOMETRY: <1.005 (ref 1–1.03)
UR CULT HOLD, URHOLD: NORMAL
UROBILINOGEN UR QL STRIP.AUTO: 0.2 EU/DL (ref 0.2–1)
WBC # BLD AUTO: 6.7 K/UL (ref 3.6–11)
WBC URNS QL MICRO: ABNORMAL /HPF (ref 0–4)

## 2021-09-24 PROCEDURE — 74177 CT ABD & PELVIS W/CONTRAST: CPT

## 2021-09-24 PROCEDURE — 99283 EMERGENCY DEPT VISIT LOW MDM: CPT

## 2021-09-24 PROCEDURE — 80053 COMPREHEN METABOLIC PANEL: CPT

## 2021-09-24 PROCEDURE — 74011250636 HC RX REV CODE- 250/636: Performed by: HOSPITALIST

## 2021-09-24 PROCEDURE — 81001 URINALYSIS AUTO W/SCOPE: CPT

## 2021-09-24 PROCEDURE — 65270000029 HC RM PRIVATE

## 2021-09-24 PROCEDURE — 74011000636 HC RX REV CODE- 636: Performed by: RADIOLOGY

## 2021-09-24 PROCEDURE — 83690 ASSAY OF LIPASE: CPT

## 2021-09-24 PROCEDURE — 36415 COLL VENOUS BLD VENIPUNCTURE: CPT

## 2021-09-24 PROCEDURE — 87635 SARS-COV-2 COVID-19 AMP PRB: CPT

## 2021-09-24 PROCEDURE — 87086 URINE CULTURE/COLONY COUNT: CPT

## 2021-09-24 PROCEDURE — 76705 ECHO EXAM OF ABDOMEN: CPT

## 2021-09-24 PROCEDURE — 94761 N-INVAS EAR/PLS OXIMETRY MLT: CPT

## 2021-09-24 PROCEDURE — 80074 ACUTE HEPATITIS PANEL: CPT

## 2021-09-24 PROCEDURE — 85025 COMPLETE CBC W/AUTO DIFF WBC: CPT

## 2021-09-24 PROCEDURE — 74011250637 HC RX REV CODE- 250/637: Performed by: STUDENT IN AN ORGANIZED HEALTH CARE EDUCATION/TRAINING PROGRAM

## 2021-09-24 RX ORDER — SODIUM CHLORIDE 0.9 % (FLUSH) 0.9 %
5-40 SYRINGE (ML) INJECTION AS NEEDED
Status: DISCONTINUED | OUTPATIENT
Start: 2021-09-24 | End: 2021-09-29 | Stop reason: HOSPADM

## 2021-09-24 RX ORDER — MORPHINE SULFATE 2 MG/ML
2 INJECTION, SOLUTION INTRAMUSCULAR; INTRAVENOUS
Status: DISCONTINUED | OUTPATIENT
Start: 2021-09-24 | End: 2021-09-29 | Stop reason: HOSPADM

## 2021-09-24 RX ORDER — CALCIUM CARB/MAGNESIUM CARB 311-232MG
5 TABLET ORAL
Status: DISCONTINUED | OUTPATIENT
Start: 2021-09-24 | End: 2021-09-29 | Stop reason: HOSPADM

## 2021-09-24 RX ORDER — OXYCODONE HYDROCHLORIDE 5 MG/1
5 TABLET ORAL
Status: DISCONTINUED | OUTPATIENT
Start: 2021-09-24 | End: 2021-09-29 | Stop reason: HOSPADM

## 2021-09-24 RX ORDER — SODIUM CHLORIDE 0.9 % (FLUSH) 0.9 %
5-40 SYRINGE (ML) INJECTION EVERY 8 HOURS
Status: DISCONTINUED | OUTPATIENT
Start: 2021-09-24 | End: 2021-09-29 | Stop reason: HOSPADM

## 2021-09-24 RX ORDER — DIPHENHYDRAMINE HYDROCHLORIDE 50 MG/ML
25 INJECTION, SOLUTION INTRAMUSCULAR; INTRAVENOUS
Status: DISCONTINUED | OUTPATIENT
Start: 2021-09-24 | End: 2021-09-29 | Stop reason: HOSPADM

## 2021-09-24 RX ORDER — SIMETHICONE 80 MG
80 TABLET,CHEWABLE ORAL
Status: DISCONTINUED | OUTPATIENT
Start: 2021-09-24 | End: 2021-09-29 | Stop reason: HOSPADM

## 2021-09-24 RX ORDER — SODIUM CHLORIDE AND POTASSIUM CHLORIDE .9; .15 G/100ML; G/100ML
SOLUTION INTRAVENOUS CONTINUOUS
Status: DISCONTINUED | OUTPATIENT
Start: 2021-09-24 | End: 2021-09-26

## 2021-09-24 RX ORDER — ACETAMINOPHEN 650 MG/1
650 SUPPOSITORY RECTAL
Status: DISCONTINUED | OUTPATIENT
Start: 2021-09-24 | End: 2021-09-29 | Stop reason: HOSPADM

## 2021-09-24 RX ORDER — ONDANSETRON 2 MG/ML
4 INJECTION INTRAMUSCULAR; INTRAVENOUS
Status: DISCONTINUED | OUTPATIENT
Start: 2021-09-24 | End: 2021-09-29 | Stop reason: HOSPADM

## 2021-09-24 RX ORDER — LORAZEPAM 2 MG/ML
0.5 INJECTION INTRAMUSCULAR
Status: DISCONTINUED | OUTPATIENT
Start: 2021-09-24 | End: 2021-09-29 | Stop reason: HOSPADM

## 2021-09-24 RX ORDER — DIPHENHYDRAMINE HCL 25 MG
25 CAPSULE ORAL
Status: DISCONTINUED | OUTPATIENT
Start: 2021-09-24 | End: 2021-09-29 | Stop reason: HOSPADM

## 2021-09-24 RX ORDER — POTASSIUM CHLORIDE 750 MG/1
40 TABLET, FILM COATED, EXTENDED RELEASE ORAL ONCE
Status: COMPLETED | OUTPATIENT
Start: 2021-09-24 | End: 2021-09-25

## 2021-09-24 RX ORDER — POLYETHYLENE GLYCOL 3350 17 G/17G
17 POWDER, FOR SOLUTION ORAL DAILY PRN
Status: DISCONTINUED | OUTPATIENT
Start: 2021-09-24 | End: 2021-09-29 | Stop reason: HOSPADM

## 2021-09-24 RX ORDER — MAG HYDROX/ALUMINUM HYD/SIMETH 200-200-20
30 SUSPENSION, ORAL (FINAL DOSE FORM) ORAL
Status: DISCONTINUED | OUTPATIENT
Start: 2021-09-24 | End: 2021-09-29 | Stop reason: HOSPADM

## 2021-09-24 RX ORDER — HYDRALAZINE HYDROCHLORIDE 20 MG/ML
10 INJECTION INTRAMUSCULAR; INTRAVENOUS
Status: DISCONTINUED | OUTPATIENT
Start: 2021-09-24 | End: 2021-09-29 | Stop reason: HOSPADM

## 2021-09-24 RX ORDER — ACETAMINOPHEN 325 MG/1
650 TABLET ORAL
Status: DISCONTINUED | OUTPATIENT
Start: 2021-09-24 | End: 2021-09-29 | Stop reason: HOSPADM

## 2021-09-24 RX ORDER — FACIAL-BODY WIPES
10 EACH TOPICAL DAILY PRN
Status: DISCONTINUED | OUTPATIENT
Start: 2021-09-24 | End: 2021-09-29 | Stop reason: HOSPADM

## 2021-09-24 RX ADMIN — POTASSIUM CHLORIDE AND SODIUM CHLORIDE: 900; 150 INJECTION, SOLUTION INTRAVENOUS at 23:39

## 2021-09-24 RX ADMIN — IOPAMIDOL 100 ML: 755 INJECTION, SOLUTION INTRAVENOUS at 20:58

## 2021-09-24 RX ADMIN — Medication 10 ML: at 23:39

## 2021-09-24 RX ADMIN — POTASSIUM BICARBONATE 40 MEQ: 391 TABLET, EFFERVESCENT ORAL at 23:38

## 2021-09-24 NOTE — ED TRIAGE NOTES
Pt arrives w/ c/c of nausea. Pt went to [facility] yesterday & they advised her to come to ED d/t elevated LFTs. Pt reports she is fully vaccinated & COVID testing @ [facility] was negative.

## 2021-09-24 NOTE — TELEPHONE ENCOUNTER
Patient is calling to speak back with Valorie Steinberg about some information she got from [facility], please call back when you have a chance.

## 2021-09-24 NOTE — TELEPHONE ENCOUNTER
Spoke with patient and advised her to continue taking the antibiotics that [facility] prescribed for her and they will let her know if what they prescribed will not cure her UTI. Pt understood and was thankful for the call.

## 2021-09-25 ENCOUNTER — APPOINTMENT (OUTPATIENT)
Dept: MRI IMAGING | Age: 66
DRG: 435 | End: 2021-09-25
Attending: INTERNAL MEDICINE
Payer: MEDICARE

## 2021-09-25 PROBLEM — E87.6 HYPOKALEMIA DUE TO LOSS OF POTASSIUM: Status: ACTIVE | Noted: 2021-09-25

## 2021-09-25 LAB
ALBUMIN SERPL-MCNC: 2.8 G/DL (ref 3.5–5)
ALBUMIN/GLOB SERPL: 0.7 {RATIO} (ref 1.1–2.2)
ALP SERPL-CCNC: 272 U/L (ref 45–117)
ALT SERPL-CCNC: 555 U/L (ref 12–78)
ANION GAP SERPL CALC-SCNC: 1 MMOL/L (ref 5–15)
AST SERPL-CCNC: 472 U/L (ref 15–37)
BACTERIA SPEC CULT: NORMAL
BASOPHILS # BLD: 0 K/UL (ref 0–0.1)
BASOPHILS NFR BLD: 0 % (ref 0–1)
BILIRUB SERPL-MCNC: 5.3 MG/DL (ref 0.2–1)
BUN SERPL-MCNC: 6 MG/DL (ref 6–20)
BUN/CREAT SERPL: 7 (ref 12–20)
CALCIUM SERPL-MCNC: 8.3 MG/DL (ref 8.5–10.1)
CEA SERPL-MCNC: 3.5 NG/ML
CHLORIDE SERPL-SCNC: 108 MMOL/L (ref 97–108)
CO2 SERPL-SCNC: 30 MMOL/L (ref 21–32)
CREAT SERPL-MCNC: 0.88 MG/DL (ref 0.55–1.02)
DIFFERENTIAL METHOD BLD: ABNORMAL
EOSINOPHIL # BLD: 0.4 K/UL (ref 0–0.4)
EOSINOPHIL NFR BLD: 6 % (ref 0–7)
ERYTHROCYTE [DISTWIDTH] IN BLOOD BY AUTOMATED COUNT: 14.5 % (ref 11.5–14.5)
GLOBULIN SER CALC-MCNC: 4.1 G/DL (ref 2–4)
GLUCOSE SERPL-MCNC: 84 MG/DL (ref 65–100)
HAV IGM SER QL: NONREACTIVE
HBV CORE IGM SER QL: NONREACTIVE
HBV SURFACE AG SER QL: <0.1 INDEX
HBV SURFACE AG SER QL: NEGATIVE
HCT VFR BLD AUTO: 34.3 % (ref 35–47)
HCV AB SERPL QL IA: NONREACTIVE
HGB BLD-MCNC: 11.7 G/DL (ref 11.5–16)
IMM GRANULOCYTES # BLD AUTO: 0 K/UL (ref 0–0.04)
IMM GRANULOCYTES NFR BLD AUTO: 0 % (ref 0–0.5)
LYMPHOCYTES # BLD: 2.3 K/UL (ref 0.8–3.5)
LYMPHOCYTES NFR BLD: 40 % (ref 12–49)
MCH RBC QN AUTO: 31.3 PG (ref 26–34)
MCHC RBC AUTO-ENTMCNC: 34.1 G/DL (ref 30–36.5)
MCV RBC AUTO: 91.7 FL (ref 80–99)
MONOCYTES # BLD: 0.9 K/UL (ref 0–1)
MONOCYTES NFR BLD: 16 % (ref 5–13)
NEUTS SEG # BLD: 2.1 K/UL (ref 1.8–8)
NEUTS SEG NFR BLD: 38 % (ref 32–75)
NRBC # BLD: 0 K/UL (ref 0–0.01)
NRBC BLD-RTO: 0 PER 100 WBC
PLATELET # BLD AUTO: 218 K/UL (ref 150–400)
PMV BLD AUTO: 11.3 FL (ref 8.9–12.9)
POTASSIUM SERPL-SCNC: 3 MMOL/L (ref 3.5–5.1)
PROT SERPL-MCNC: 6.9 G/DL (ref 6.4–8.2)
RBC # BLD AUTO: 3.74 M/UL (ref 3.8–5.2)
SERVICE CMNT-IMP: NORMAL
SODIUM SERPL-SCNC: 139 MMOL/L (ref 136–145)
SP1: NORMAL
SP2: NORMAL
SP3: NORMAL
WBC # BLD AUTO: 5.7 K/UL (ref 3.6–11)

## 2021-09-25 PROCEDURE — 36415 COLL VENOUS BLD VENIPUNCTURE: CPT

## 2021-09-25 PROCEDURE — 74011250637 HC RX REV CODE- 250/637: Performed by: HOSPITALIST

## 2021-09-25 PROCEDURE — 82378 CARCINOEMBRYONIC ANTIGEN: CPT

## 2021-09-25 PROCEDURE — 74011250636 HC RX REV CODE- 250/636: Performed by: HOSPITALIST

## 2021-09-25 PROCEDURE — 74181 MRI ABDOMEN W/O CONTRAST: CPT

## 2021-09-25 PROCEDURE — 85025 COMPLETE CBC W/AUTO DIFF WBC: CPT

## 2021-09-25 PROCEDURE — 94760 N-INVAS EAR/PLS OXIMETRY 1: CPT

## 2021-09-25 PROCEDURE — 80053 COMPREHEN METABOLIC PANEL: CPT

## 2021-09-25 PROCEDURE — 65270000029 HC RM PRIVATE

## 2021-09-25 PROCEDURE — 74011250637 HC RX REV CODE- 250/637: Performed by: SURGERY

## 2021-09-25 PROCEDURE — 86301 IMMUNOASSAY TUMOR CA 19-9: CPT

## 2021-09-25 RX ORDER — LEVOTHYROXINE SODIUM 125 UG/1
125 TABLET ORAL
Status: DISCONTINUED | OUTPATIENT
Start: 2021-09-25 | End: 2021-09-29 | Stop reason: HOSPADM

## 2021-09-25 RX ADMIN — LEVOTHYROXINE SODIUM 125 MCG: 125 TABLET ORAL at 08:34

## 2021-09-25 RX ADMIN — POTASSIUM CHLORIDE AND SODIUM CHLORIDE: 900; 150 INJECTION, SOLUTION INTRAVENOUS at 07:44

## 2021-09-25 RX ADMIN — POTASSIUM CHLORIDE 40 MEQ: 750 TABLET, FILM COATED, EXTENDED RELEASE ORAL at 01:08

## 2021-09-25 RX ADMIN — Medication 10 ML: at 06:11

## 2021-09-25 RX ADMIN — ONDANSETRON 4 MG: 2 INJECTION INTRAMUSCULAR; INTRAVENOUS at 06:10

## 2021-09-25 NOTE — H&P
[address]  [address]  [phone]     Hospitalist Admission Note      NAME: Selene Alanis   :  1955   MRN:  [id number]     Date/Time:  2021     Patient PCP: Carmen Shannon MD    Emergency Contact:    Extended Emergency Contact Information  Primary Emergency Contact: Leonor Avila. ,[address] Phone: [phone]  Relation: Spouse      Code: Full Code     Isolation Precautions: There are currently no Active Isolations        Subjective:     CHIEF COMPLAINT: Abnormal labs     HISTORY OF PRESENT ILLNESS:     Ms. Leonor Avila is a 72 y.o. female with history that includes HTN, hypothyroidism, and UC has been experiencing malaise for the past couple of weeks along with fatigue. Possibly 5 days ago her urine started becoming dark and she was having some dysuria. She went to her PCP who put her on Macrobid. She noticed that she was having scleral icterus and was then switched over to cefuroxime and underwent blood work. Laboratory studies revealed elevated LFTs and she was sent for evaluation. In the ER she was noted to have bilirubin of 6.3, ALT of 607 AST of 505 and alk phosphatase of 323. In addition she was noted to have a potassium of 2.7.  UA did not reveal bacteria but did have leukocyte esterase. CT of the abdomen showed dilated CBD with stone. She has not experienced any abdominal pain has had occasional nausea. Allergies   Allergen Reactions    Sulfa (Sulfonamide Antibiotics) Swelling       Prior to Admission medications    Medication Sig Start Date End Date Taking? Authorizing Provider   nitrofurantoin, macrocrystal-monohydrate, (MACROBID) 100 mg capsule Take 1 Capsule by mouth two (2) times a day for 7 days. 21  Anthony Mack MD   traMADoL (ULTRAM) 50 mg tablet TAKE 1 TAB BY MOUTH EVERY SIX (6) HOURS AS NEEDED FOR PAIN FOR UP TO 30 DAYS.  9/11/21 10/11/21  Martina Brittnee Roe MD   hydroCHLOROthiazide (HYDRODIURIL) 25 mg tablet TAKE 1 TABLET BY MOUTH EVERY DAY 21   Dennis Barros MD   levothyroxine (SYNTHROID) 125 mcg tablet TAKE 1 TABLET BY MOUTH EVERY DAY BEFORE BREAKFAST 21   Dennis Barros MD   cetirizine (ZYRTEC) 10 mg tablet Take 1 Tab by mouth daily. 10/13/20   Sharan Sinha NP   hydrochlorothiazide (HYDRODIURIL) 25 mg tablet Take 1 Tab by mouth daily for 30 days.  10/3/16 11/21/19  Yoni Smalls MD       Past Medical History:   Diagnosis Date    Arthritis     ostero arthritis, rhemathoid  lower back     Hypertension     Hypothyroid 3/23/2011    Ulcerative colitis ([facility] [address] 2010    Ulcerative colitis ([facility] [address] 2010        Past Surgical History:   Procedure Laterality Date    COLONOSCOPY N/A 2017    COLONOS[facility] Salbador Lobo [facility] ENDOSCOPY    ENDOSCOPY, COLON, DIAGNOSTIC      HX  SECTION      LA BREAST SURGERY PROCEDURE UNLISTED      cyst removed from left breast       Social History     Tobacco Use    Smoking status: Former Smoker     Types: Cigarettes     Quit date: 1980     Years since quittin.7    Smokeless tobacco: Never Used   Substance Use Topics    Alcohol use: No        Family History   Problem Relation Age of Onset    Cancer Mother         pancreatic    Cancer Brother         colon        Review of Systems (14 point ROS):    Gen:   Negative, fatigue, malaise, denies chills and denies fevers  Eyes:  negative except for icterus  ENT:    negative  Resp:  negative  CVS:   negative  GI:       nausea, denies vomiting and denies abdominal pain  :     dysuria  Skin:   negative except for Jaundice  Hem:   negative  MS:     negative  Beto:    negative   Psy:    negative  Endo:  negative  ALL:   negative         Objective:      Visit Vitals  BP (!) 141/98 (BP 1 Location: Right upper arm, BP Patient Position: At rest)   Pulse 88   Temp 97.1 °F (36.2 °C)   Resp 16   Ht 5' 4\" (1.626 m)   Wt 124.7 kg (275 lb)   SpO2 97%   BMI 47.20 kg/m²       Exam:     Physical Exam:    General: alert, well appearing and no distress  Head: Normocephalic, without obvious abnormality, atraumatic  Eyes: PERRL, EOMI, icteric sclerae and conjuntiva clear  ENT: Lips, mucosa, and tongue normal.   Neck: normal, supple and no tenderness  Lungs: clear to auscultation with good breath sounds and normal respiratory effort  Heart: S1, S2 normal, regular rate and regular rhythm  Abd: not distended, soft, nontender, BS present and normactive  Ext: no cyanosis and no edema  Pulses: present 2+ and symmetric  Skin: no rashes, texture normal and slightly jaundiced  Neuro:  alert, oriented x 3, no defects noted in general exam.  Psych: not anxious, cooperative, appropriate affect    Labs:    Recent Labs     09/24/21  1855   WBC 6.7   HGB 12.9   HCT 37.4        Recent Labs     09/24/21  1855      K 2.7*      CO2 28   GLU 90   BUN 8   CREA 1.03*   CA 9.1   ALB 3.2*   TBILI 6.3*   *         Radiology:    US ABD LTD    Result Date: 9/24/2021  Cholelithiasis. Gallbladder sludge. Prominent CBD with mild intrahepatic ductal dilatation as well. Nonemergent MRI with MRCP to delineate etiology for CBD distention. I personally reviewed and interpreted the imaging studies and agree with official reading. The chart, ER course, the above PMSFH, lab work, and radiological studies was reviewed by me on: September 24, 2021         Assessment/Plan:      Calculus of gallbladder with biliary obstruction but without cholecystitis / Common bile duct dilation / Elevated LFTs: Patient will be admitted for further work-up. GI and surgery will be consulted. In the meantime continue patient n.p.o. IV fluids and supportive care. At this point she is having no pain but will add as needed pain medications ranging from Tylenol to morphine depending on degree of pain. Hypokalemia due to loss of potassium:  We will replete and monitor labs.     Body mass index is 47.2 kg/m².:  40 or above Morbid obesity      Risk of deterioration: high      Discussed:  Code Status and Care Plan discussed with: Patient, ED Provider and RN    Prophylaxis:  SCD's    Probable disposition:  Home with family    Total time: 79 Minutes **I personally saw and examined the patient during this time period**    Date of service:    9/24/2021                ___________________________________________________    Admitting Physician: Jamaica Del Castillo MD       CC: Rajesh Crawford MD

## 2021-09-25 NOTE — ED NOTES
Per Dr. Adrianne Jeffery, pt may take first dose of PO potassium (40 mEq) upon arrival to floor, then two hours later take second dose of PO potassium.  (40 mEq)

## 2021-09-25 NOTE — ROUTINE PROCESS
Bedside shift change report given to Chandler Burton RN (oncoming nurse) by Kelle Ferraro RN (offgoing nurse). Report included the following information SBAR, Kardex, Intake/Output, MAR, Accordion, Recent Results and Med Rec Status.

## 2021-09-25 NOTE — PROGRESS NOTES
[facility]: MED RECONCILIATION    Comments/Recommendations:       Information obtained from: RX Query/Pharmacy records    **RxQuery data reflects medications filled and processed through the patient's insurance, however   this data does NOT capture whether the medication was picked, is currently being taken by the patient or if the patient is adherent to the medication in question. Allergies: Sulfa (sulfonamide antibiotics)    Prior to Admission Medications:     Prior to Admission Medications   Prescriptions Last Dose Informant Patient Reported? Taking? cetirizine (ZYRTEC) 10 mg tablet  Other No No   Sig: Take 1 Tab by mouth daily. hydroCHLOROthiazide (HYDRODIURIL) 25 mg tablet  Other No No   Sig: TAKE 1 TABLET BY MOUTH EVERY DAY   levothyroxine (SYNTHROID) 125 mcg tablet  Other No No   Sig: TAKE 1 TABLET BY MOUTH EVERY DAY BEFORE BREAKFAST   nitrofurantoin, macrocrystal-monohydrate, (MACROBID) 100 mg capsule  Other No No   Sig: Take 1 Capsule by mouth two (2) times a day for 7 days. traMADoL (ULTRAM) 50 mg tablet  Other No No   Sig: TAKE 1 TAB BY MOUTH EVERY SIX (6) HOURS AS NEEDED FOR PAIN FOR UP TO 30 DAYS. Facility-Administered Medications: None         [employer]    Clinical Staff Pharmacist  [address] [place] [address]  [phone]

## 2021-09-25 NOTE — PROGRESS NOTES
Bedside shift change report given to Sruthi Thomas RN (oncoming nurse) by Tiana Conroy (offgoing nurse). Report included the following information SBAR, Kardex, Intake/Output and Recent Results.

## 2021-09-25 NOTE — PROGRESS NOTES
[facility] [address], [place], [address] [facility]  [phone]      Medical Progress Note      NAME:         Julianna Burroughs   :        1955  MRM:        [id number]    Date of service: 2021      Subjective: Patient has been seen and examined as a follow up for multiple medical issues. Chart, labs, diagnostics reviewed. She has no abdominal pain but nausea. No vomiting or fever. Denies any cough or SOB    Objective:    Vital Signs:    Visit Vitals  /61 (BP 1 Location: Left upper arm, BP Patient Position: At rest)   Pulse 68   Temp 98.6 °F (37 °C)   Resp 17   Ht 5' 4\" (1.626 m)   Wt 124.7 kg (275 lb)   SpO2 98%   BMI 47.20 kg/m²          Intake/Output Summary (Last 24 hours) at 2021 9007  Last data filed at 2021 0747  Gross per 24 hour   Intake 1016.67 ml   Output 0 ml   Net 1016.67 ml        Physical Examination:    General:   Well looking patient in no acute distress  Eyes:   icteric conjunctivae, PERRLA with no discharge. ENT:   no ottorrhea or rhinorrhea with dry mucous membranes  Neck: no masses, thyroid non-tender and trachea central.  Pulm:  no accessory muscle use, clear breath sounds without crackles or wheezes  Card:  no JVD or murmurs, has regular and normal S1, S2 without thrills, bruits or peripheral edema  Abd:  Soft, non-tender, non-distended, normoactive bowel sounds   Musc:  No cyanosis, clubbing, atrophy or deformities. Skin:  No rashes, bruising or ulcers. Neuro: Awake and alert.  Generally a non focal exam. Follows commands appropriately  Psych:  Has a good insight and is oriented x 3    Current Facility-Administered Medications   Medication Dose Route Frequency    levothyroxine (SYNTHROID) tablet 125 mcg  125 mcg Oral 6am    hydrALAZINE (APRESOLINE) 20 mg/mL injection 10 mg  10 mg IntraVENous Q4H PRN    melatonin (rapid dissolve) tablet 5 mg  5 mg Oral QHS PRN    alum-mag hydroxide-simeth (MYLANTA) oral suspension 30 mL  30 mL Oral Q4H PRN    LORazepam (ATIVAN) injection 0.5 mg  0.5 mg IntraVENous Q6H PRN    oxyCODONE IR (ROXICODONE) tablet 5 mg  5 mg Oral Q4H PRN    morphine injection 2 mg  2 mg IntraVENous Q4H PRN    diphenhydrAMINE (BENADRYL) injection 25 mg  25 mg IntraVENous Q6H PRN    diphenhydrAMINE (BENADRYL) capsule 25 mg  25 mg Oral Q6H PRN    simethicone (MYLICON) tablet 80 mg  80 mg Oral QID PRN    sodium chloride (NS) flush 5-40 mL  5-40 mL IntraVENous Q8H    sodium chloride (NS) flush 5-40 mL  5-40 mL IntraVENous PRN    acetaminophen (TYLENOL) tablet 650 mg  650 mg Oral Q6H PRN    Or    acetaminophen (TYLENOL) suppository 650 mg  650 mg Rectal Q6H PRN    polyethylene glycol (MIRALAX) packet 17 g  17 g Oral DAILY PRN    bisacodyL (DULCOLAX) suppository 10 mg  10 mg Rectal DAILY PRN    ondansetron (ZOFRAN) injection 4 mg  4 mg IntraVENous Q6H PRN    0.9% sodium chloride with KCl 20 mEq/L infusion   IntraVENous CONTINUOUS        Laboratory data and review:    Recent Labs     09/25/21  0509 09/24/21  1855   WBC 5.7 6.7   HGB 11.7 12.9   HCT 34.3* 37.4    234     Recent Labs     09/25/21  0509 09/24/21  1855    137   K 3.0* 2.7*    103   CO2 30 28   GLU 84 90   BUN 6 8   CREA 0.88 1.03*   CA 8.3* 9.1   ALB 2.8* 3.2*   * 607*     No components found for: [place]    Diagnostics: Imaging studies have been reviewed    Assessment and Plan:    Elevated LFTs (9/24/2021) / Common bile duct dilation (9/24/2021) POA: She has moderate intra-hepatic biliary ductal dilatation on CT abdomen. Concern for an impacted stone vs stricture vs other. Rapid COVID neg. Acute hepatitis neg. Get an MRCP. Consult Gi. Monitor LFTs     Calculus of gallbladder with biliary obstruction but without cholecystitis (9/24/2021) POA: patient with minimal symptoms. Given her CBD dilatation, she will need cholecystectomy.  Consult general surgery    Hypothyroidism (3/23/2011) POA: Last TSH was 3.46 in march 2021. Continue Levothyroxine    Essential hypertension, benign (3/23/2011) POA: BP low normal. Hold Hydrodiuril    Hypokalemia due to loss of potassium (9/25/2021) POA: likely from diuretic use. Replete with IV fluids.  Monitor renal panel    Total time spent for the patient's care: [address] discussed with: Patient and Nursing Staff    Discussed:  Care Plan and D/C Planning    Prophylaxis:  SCD's    Anticipated Disposition:  Home w/Family           ___________________________________________________    Attending Physician:   Madison Martin MD

## 2021-09-25 NOTE — CONSULTS
[address]  [address] [facility], [address] [facility]  [phone]          GI CONSULTATION NOTE      NAME:  Alfreda Alcantar   :   1955   MRN:   [id number]       Referring Physician: Dr Isiah Ellis Date: 2021     Chief Complaint:  Painless jaundice    History of Present Illness:  Patient is a 72 y.o. who is seen in consultation at the request of Presley Orellana. Pt presents to her PCP with UTI stx and started on abx 5 days ago. Stx did not iprove and [facility]  Labs perfomed showed marked abnormal LFT's . She coplains of itching and painless jaundice for about 1 weeks. Pt reports some nausea after eating on Tuesday  but no abdominal pain, denies fevers, denies chills, denies bloating, denies reflux. Has been tolerating diet  Got sent here since labs drawn at PCP showed elevated LFTs. On admission TBil 6.3 no 5.3. AST in 600 's  and 's  US showed some stone and sludge, dilated CBD  CT done does not show an obvious  Mass on pancreas,CT but does show dilated CBD. No dilated Pancreatic duct. MRCP is pending. PMH:  Past Medical History:   Diagnosis Date    Arthritis     ostero arthritis, rhemathoid  lower back     Hypertension     Hypothyroid 3/23/2011    Ulcerative colitis ([facility] [address] 2010    Ulcerative colitis ([facility] [address] 2010       PSH:  Past Surgical History:   Procedure Laterality Date    COLONOSCOPY N/A 2017    [facility] Phill [facility] ENDOSCOPY    ENDOSCOPY, COLON, DIAGNOSTIC      HX  SECTION      [place] BREAST SURGERY PROCEDURE UNLISTED      cyst removed from left breast       Allergies: Allergies   Allergen Reactions    Sulfa (Sulfonamide Antibiotics) Swelling       Home Medications:  Prior to Admission Medications   Prescriptions Last Dose Informant Patient Reported? Taking? cetirizine (ZYRTEC) 10 mg tablet  Other No No   Sig: Take 1 Tab by mouth daily.    hydroCHLOROthiazide (HYDRODIURIL) 25 mg tablet Other No No   Sig: TAKE 1 TABLET BY MOUTH EVERY DAY   levothyroxine (SYNTHROID) 125 mcg tablet  Other No No   Sig: TAKE 1 TABLET BY MOUTH EVERY DAY BEFORE BREAKFAST   nitrofurantoin, macrocrystal-monohydrate, (MACROBID) 100 mg capsule  Other No No   Sig: Take 1 Capsule by mouth two (2) times a day for 7 days. traMADoL (ULTRAM) 50 mg tablet  Other No No   Sig: TAKE 1 TAB BY MOUTH EVERY SIX (6) HOURS AS NEEDED FOR PAIN FOR UP TO 30 DAYS.       Facility-Administered Medications: None       Hospital Medications:  Current Facility-Administered Medications   Medication Dose Route Frequency    levothyroxine (SYNTHROID) tablet 125 mcg  125 mcg Oral 6am    hydrALAZINE (APRESOLINE) 20 mg/mL injection 10 mg  10 mg IntraVENous Q4H PRN    melatonin (rapid dissolve) tablet 5 mg  5 mg Oral QHS PRN    alum-mag hydroxide-simeth (MYLANTA) oral suspension 30 mL  30 mL Oral Q4H PRN    LORazepam (ATIVAN) injection 0.5 mg  0.5 mg IntraVENous Q6H PRN    oxyCODONE IR (ROXICODONE) tablet 5 mg  5 mg Oral Q4H PRN    morphine injection 2 mg  2 mg IntraVENous Q4H PRN    diphenhydrAMINE (BENADRYL) injection 25 mg  25 mg IntraVENous Q6H PRN    diphenhydrAMINE (BENADRYL) capsule 25 mg  25 mg Oral Q6H PRN    simethicone (MYLICON) tablet 80 mg  80 mg Oral QID PRN    sodium chloride (NS) flush 5-40 mL  5-40 mL IntraVENous Q8H    sodium chloride (NS) flush 5-40 mL  5-40 mL IntraVENous PRN    acetaminophen (TYLENOL) tablet 650 mg  650 mg Oral Q6H PRN    Or    acetaminophen (TYLENOL) suppository 650 mg  650 mg Rectal Q6H PRN    polyethylene glycol (MIRALAX) packet 17 g  17 g Oral DAILY PRN    bisacodyL (DULCOLAX) suppository 10 mg  10 mg Rectal DAILY PRN    ondansetron (ZOFRAN) injection 4 mg  4 mg IntraVENous Q6H PRN    0.9% sodium chloride with KCl 20 mEq/L infusion   IntraVENous CONTINUOUS       Social History:  Social History     Tobacco Use    Smoking status: Former Smoker     Types: Cigarettes     Quit date: 1/1/1980     Years since quittin.7    Smokeless tobacco: Never Used   Substance Use Topics    Alcohol use: No       Family History:  Family History   Problem Relation Age of Onset    Cancer Mother         pancreatic    Cancer Brother         colon       Review of Systems:  A detailed 10 system ROS is obtained, with pertinent positives as listed in the HPI and PMH. All others are negative. Objective:     Patient Vitals for the past 8 hrs:   BP Temp Pulse Resp SpO2   21 1110 121/83 99 °F (37.2 °C) 66 17 99 %   21 0725 104/61 98.6 °F (37 °C) 68 17 98 %   21 0507 131/80 98 °F (36.7 °C) 73 18 94 %      0701 -  1900  In: 1016.7 [I.V.:1016.7]  Out: 0   No intake/output data recorded. PHYSICAL EXAM:  General: WD, WN. Alert, cooperative, no acute distress    HEENT: NC, Atraumatic. PERRLA, EOMI. Anicteric sclerae. Lungs:  CTA Bilaterally. No Wheezing/Rhonchi/Rales. Heart:  Regular  rhythm,  No murmur (), No Rubs, No Gallops  Abdomen: Soft, Non distended, Non tender.  +Bowel sounds, no HSM  Extremities: No c/c/e  Neurologic:  CN 2-12 gi, Alert and oriented X 3. No acute neurological distress   Psych:   Good insight. Not anxious nor agitated. Data Review     Recent Labs     21  0509 21  1855   WBC 5.7 6.7   HGB 11.7 12.9   HCT 34.3* 37.4    234     Recent Labs     21  0509 21  1855    137   K 3.0* 2.7*    103   CO2 30 28   BUN 6 8   CREA 0.88 1.03*   GLU 84 90   CA 8.3* 9.1     Recent Labs     21  0509 21  1855   * 323*   TP 6.9 8.3*   ALB 2.8* 3.2*   GLOB 4.1* 5.1*   LPSE  --  358     No results for input(s): INR, PTP, APTT, INREXT in the last 72 hours. Imaging studies reviewed          Assessment:    73 yo female with painless jaundice and itching She has fmaily hx of panc ca in mother. Her labs showed cholestasis and transaminitis with biliary obstruction noted on imaging.  Concern for underlying neoplasm vs stone disease given her presentation and fam hx       Plan:   Aubree Bates to give clears  MRI/MRCP today  Ca 19-9 and CEA  ERCP +/- EUS based on w/u  D/w Dr Kenyon Tierney  F/u results       Dierdre Brunner, MD

## 2021-09-25 NOTE — ED NOTES
TRANSFER - OUT REPORT:    Verbal report given to Danna Dia RN (name) on Moni Pablo  being transferred to Med/Surg (unit) for routine progression of care       Report consisted of patients Situation, Background, Assessment and   Recommendations(SBAR). Information from the following report(s) SBAR, Kardex, ED Summary, [facility] and Recent Results was reviewed with the receiving nurse. Lines:   Peripheral IV 09/24/21 Right Antecubital (Active)   Site Assessment Clean, dry, & intact 09/24/21 1855   Phlebitis Assessment 0 09/24/21 1855   Infiltration Assessment 0 09/24/21 1855   Dressing Status Clean, dry, & intact 09/24/21 1855   Hub Color/Line Status Pink 09/24/21 1855   Action Taken Blood drawn 09/24/21 1855        Opportunity for questions and clarification was provided.       Patient transported with:   Registered Nurse

## 2021-09-25 NOTE — ED PROVIDER NOTES
42-year-old female with past medical history significant for UC on no maintenance meds, HTN, hypothyroidism presents ambulatory for evaluation of elevated LFTs, scleral icterus, x 3 days. She began with vaginal itching and urinary sx's on , called in macrobid by PCP and began to have scleral icterus, and urine began to look dark [facility] and [facility]bid was stopped and was started on cefuroxime, labs were drawn and was called today and told her LFTs were \"400\". She states her urinary sx's have improved, but still having scleral icterus. Denies hx of liver issues.  is currently in the hospital due to complications from [place]. She states she is vaccinated against COVID.     GI: Melva               Past Medical History:   Diagnosis Date    Arthritis     ostero arthritis, rhemathoid  lower back     Hypertension     Hypothyroid 3/23/2011    Ulcerative colitis ([address] [address] 2010    Ulcerative colitis ([facility] [address] 2010       Past Surgical History:   Procedure Laterality Date    COLONOSCOPY N/A 2017    COLONOSCOPY performed by Dai Felton [facility] ENDOSCOPY    ENDOSCOPY, COLON, DIAGNOSTIC      HX  SECTION      [place] BREAST SURGERY PROCEDURE UNLISTED      cyst removed from left breast         Family History:   Problem Relation Age of Onset    Cancer Mother         pancreatic    Cancer Brother         colon       Social History     Socioeconomic History    Marital status:      Spouse name: Not on file    Number of children: Not on file    Years of education: Not on file    Highest education level: Not on file   Occupational History    Not on file   Tobacco Use    Smoking status: Former Smoker     Types: Cigarettes     Quit date: 1980     Years since quittin.7    Smokeless tobacco: Never Used   Substance and Sexual Activity    Alcohol use: No    Drug use: No    Sexual activity: Yes     Partners: Male   Other Topics Concern    Not on file   Social History Narrative    Not on file     Social Determinants of Health     Financial Resource Strain:     Difficulty of Paying Living Expenses:    Food Insecurity:     Worried About Running Out of Food in the Last Year:     [address] in the Last Year:    Transportation Needs:     Lack of Transportation (Medical):  Lack of Transportation (Non-Medical):    Physical Activity:     Days of Exercise per Week:     Minutes of Exercise per Session:    Stress:     Feeling of Stress :    Social Connections:     Frequency of Communication with Friends and Family:     Frequency of Social Gatherings with Friends and Family:     Attends Taoism Services:     Active Member of Clubs or Organizations:     Attends Club or Organization Meetings:     Marital Status:    Intimate Partner Violence:     Fear of Current or Ex-Partner:     Emotionally Abused:     Physically Abused:     Sexually Abused: ALLERGIES: Sulfa (sulfonamide antibiotics)    Review of Systems   Constitutional: Negative. Negative for activity change, chills, fatigue and unexpected weight change. HENT: Negative for trouble swallowing. Eyes:        Scleral icterus   Respiratory: Negative for cough, chest tightness, shortness of breath and wheezing. Cardiovascular: Negative. Negative for chest pain and palpitations. Gastrointestinal: Negative. Negative for abdominal pain, diarrhea, nausea and vomiting. Genitourinary: Negative. Negative for dysuria, flank pain, frequency and hematuria. Musculoskeletal: Negative. Negative for arthralgias, back pain, neck pain and neck stiffness. Skin: Negative. Negative for color change and rash. Neurological: Negative. Negative for dizziness, numbness and headaches. All other systems reviewed and are negative.       Vitals:    09/24/21 1809   BP: (!) 141/98   Pulse: 88   Resp: 16   Temp: 97.1 °F (36.2 °C)   SpO2: 97%   Weight: 124.7 kg (275 lb)   Height: 5' 4\" (1.626 m) Physical Exam  Vitals and nursing note reviewed. Constitutional:       General: She is not in acute distress. Appearance: She is well-developed. She is not toxic-appearing or diaphoretic. HENT:      Head: Normocephalic and atraumatic. Eyes:      General: Scleral icterus present. Right eye: No discharge. Left eye: No discharge. Conjunctiva/sclera: Conjunctivae normal.      Pupils: Pupils are equal, round, and reactive to light. Neck:      Trachea: No tracheal tenderness. Cardiovascular:      Rate and Rhythm: Normal rate and regular rhythm. Pulses: Normal pulses. Heart sounds: Normal heart sounds. No murmur heard. No friction rub. No gallop. Pulmonary:      Effort: Pulmonary effort is normal. No respiratory distress. Breath sounds: Normal breath sounds. No wheezing or rales. Chest:      Chest wall: No tenderness. Abdominal:      General: Bowel sounds are normal. There is no distension. Palpations: Abdomen is soft. There is no mass. Tenderness: There is no abdominal tenderness. There is no right CVA tenderness, left CVA tenderness, guarding or rebound. Hernia: No hernia is present. Musculoskeletal:         General: No tenderness. Normal range of motion. Cervical back: Full passive range of motion without pain and normal range of motion. Skin:     General: Skin is warm and dry. Capillary Refill: Capillary refill takes less than 2 seconds. Findings: No abrasion, erythema or rash. Neurological:      Mental Status: She is alert and oriented to person, place, and time. Cranial Nerves: No cranial nerve deficit. Sensory: No sensory deficit.       Coordination: Coordination normal.   Psychiatric:         Speech: Speech normal.         Behavior: Behavior normal.          MDM  Number of Diagnoses or Management Options  Calculus of gallbladder with biliary obstruction but without cholecystitis  Common bile duct dilatation  Hypokalemia  Transaminitis  Diagnosis management comments:   Ddx: Hepatitis, transaminitis, cholecystitis, biliary obstruction       Amount and/or Complexity of Data Reviewed  Clinical lab tests: ordered and reviewed  Tests in the radiology section of CPT®: ordered and reviewed  Decide to obtain previous medical records or to obtain history from someone other than the patient: yes  Discuss the patient with other providers: yes (ER attending, GI, hospitalist)    Patient Progress  Patient progress: stable         Procedures    Presentation, management, and disposition were discussed with the attending physician, Dr. Otilio Irizarry, who is in agreement with plan of care. Patient is over the age of 72 and being admitted to the hospital , and the attending will see the patient. Perfect Serve Consult for Admission  8:26 PM    ED Room Number: ERG/G  Patient Name and age:  Kaylyn Schlatter 72 y.o.  female  Working Diagnosis:   1. Transaminitis    2. Common bile duct dilatation    3. Calculus of gallbladder with biliary obstruction but without cholecystitis    4. Hypokalemia        COVID-19 Suspicion:  Other -   has COVID, she has no respiratory sx's; RAPID covid ordered. Sepsis present:  no  Reassessment needed: N/A  Code Status:  Full Code  Readmission: no  Isolation Requirements:  Other only if COVID+  Recommended Level of Care:  med/surg  Department:[facility] ED - [phone]  Other:  Gallstones, dilated CBD and elevated LFTs with scleral icterus x 3 days. Was on nitrofurantoin recently. I have a consult out for GI for further recommendations, MRI with MRCP is recommended by radiologist.    CONSULT NOTE:   8:47 PM  Zaheer Paz PA-C spoke with Dr. Katie Martinez,   Specialty: GI  Discussed pt's hx, disposition, and available diagnostic and imaging results. Reviewed care plans. Consultant agrees with plans as outlined.  Dr. Katie Martinez recommends CT abd/pelvis with IV contrast to rule out pancreatic mass or other obstructive process and consider adding tumor marker labs. NPO after midnight just in case and he'll see her tomorrow. .   Written by Danna Chavez PA-C. I passed along GI recommendations to the hospitalist Dr. Gilmer Mendez.

## 2021-09-25 NOTE — CONSULTS
Surgery Consult    Subjective:      Myke Hoover is a 72 y.o. female who presented with dark urine and some itchiniess in vaginal area when voiding. She went to PCP who prescribed antibiotics. Reports only mild nausea which got worse with the antibiotics. Denies any abdominal pain, denies fevers, denies chills, denies bloating, denies reflux. Has been tolerating diet  Got sent here since labs drawn at PCP showed elevated LFTs. Patient admitted with painless jaundice  TBil 6.3 no 5.3. AST and ALT all elevated, with slight decrease today  US showed some stone and sludge, dilated CBD  CT done does not show a mass, no stones seen in gallbladder on CT but does show dilated CBD.   No dilated Pancreatic duct    Mother had pancreatic cancer    Past Medical History:   Diagnosis Date    Arthritis     ostero arthritis, rhemathoid  lower back     Hypertension     Hypothyroid 3/23/2011    Ulcerative colitis ([facility] [address] 2010    Ulcerative colitis ([facility] [address] 2010     Past Surgical History:   Procedure Laterality Date    COLONOSCOPY N/A 2017    COLONOSCOPY performed by Todd Frazier MD at [facility] ENDOSCOPY    ENDOSCOPY, COLON, DIAGNOSTIC      HX  SECTION      AR BREAST SURGERY PROCEDURE UNLISTED      cyst removed from left breast      Family History   Problem Relation Age of Onset    Cancer Mother         pancreatic    Cancer Brother         colon     Social History     Socioeconomic History    Marital status:      Spouse name: Not on file    Number of children: Not on file    Years of education: Not on file    Highest education level: Not on file   Tobacco Use    Smoking status: Former Smoker     Types: Cigarettes     Quit date: 1980     Years since quittin.7    Smokeless tobacco: Never Used   Substance and Sexual Activity    Alcohol use: No    Drug use: No    Sexual activity: Yes     Partners: Male     Social Determinants of Health     Financial Resource Strain:     Difficulty of Paying Living Expenses:    Food Insecurity:     Worried About Running Out of Food in the Last Year:     [address] in the Last Year:    Transportation Needs:     Lack of Transportation (Medical):      Lack of Transportation (Non-Medical):    Physical Activity:     Days of Exercise per Week:     Minutes of Exercise per Session:    Stress:     Feeling of Stress :    Social Connections:     Frequency of Communication with Friends and Family:     Frequency of Social Gatherings with Friends and Family:     Attends Yazdanism Services:     Active Member of Clubs or Organizations:     Attends Club or Organization Meetings:     Marital Status:       Current Facility-Administered Medications   Medication Dose Route Frequency    levothyroxine (SYNTHROID) tablet 125 mcg  125 mcg Oral 6am    hydrALAZINE (APRESOLINE) 20 mg/mL injection 10 mg  10 mg IntraVENous Q4H PRN    melatonin (rapid dissolve) tablet 5 mg  5 mg Oral QHS PRN    alum-mag hydroxide-simeth (MYLANTA) oral suspension 30 mL  30 mL Oral Q4H PRN    LORazepam (ATIVAN) injection 0.5 mg  0.5 mg IntraVENous Q6H PRN    oxyCODONE IR (ROXICODONE) tablet 5 mg  5 mg Oral Q4H PRN    morphine injection 2 mg  2 mg IntraVENous Q4H PRN    diphenhydrAMINE (BENADRYL) injection 25 mg  25 mg IntraVENous Q6H PRN    diphenhydrAMINE (BENADRYL) capsule 25 mg  25 mg Oral Q6H PRN    simethicone (MYLICON) tablet 80 mg  80 mg Oral QID PRN    sodium chloride (NS) flush 5-40 mL  5-40 mL IntraVENous Q8H    sodium chloride (NS) flush 5-40 mL  5-40 mL IntraVENous PRN    acetaminophen (TYLENOL) tablet 650 mg  650 mg Oral Q6H PRN    Or    acetaminophen (TYLENOL) suppository 650 mg  650 mg Rectal Q6H PRN    polyethylene glycol (MIRALAX) packet 17 g  17 g Oral DAILY PRN    bisacodyL (DULCOLAX) suppository 10 mg  10 mg Rectal DAILY PRN    ondansetron (ZOFRAN) injection 4 mg  4 mg IntraVENous Q6H PRN    0.9% sodium chloride with KCl 20 mEq/L infusion   IntraVENous CONTINUOUS      Allergies   Allergen Reactions    Sulfa (Sulfonamide Antibiotics) Swelling       Review of Systems:REVIEW OF SYSTEMS:     []     Unable to obtain  ROS due to  []    mental status change  []    sedated   []    intubated   [x]    Total of 12 systems reviewed as follows:    Constitutional: neg for fevers, chills, weight loss, malaise  Eyes: negative for blurry vision  Ears, nose, mouth, throat, and face: negative for sore throat  Respiratory: negative for SOB  Cardiovascular: negative for CP  Gastrointestinal: negative for vomiting, abdominal pain, diarrhea, constipation, melena, hematochezia  Genitourinary: negative for dysuria  Integument/breast: neg for skin rash  Hematologic/lymphatic: neg for bruising  Musculoskeletal: negative for muscle aches  Neurological: no dizziness or h/a    Objective:        Patient Vitals for the past 8 hrs:   BP Temp Pulse Resp SpO2   21 0725 104/61 98.6 °F (37 °C) 68 17 98 %   21 0507 131/80 98 °F (36.7 °C) 73 18 94 %       Temp (24hrs), Av °F (36.7 °C), Min:97.1 °F (36.2 °C), Max:98.6 °F (37 °C)      Physical Exam:  General:  Alert, cooperative, no distress, appears stated age. Eyes:  Conjunctivae/corneas some icterus    Nose: Nares normal. Septum midline   Mouth/Throat: Lips, mucosa, and tongue normal.    Neck: Supple, symmetrical, trachea midline   Lungs:   Clear to auscultation bilaterally. Heart:  Regular rate and rhythm   Abdomen:   Soft, non-tender, non-distended, no rebound, no guarding, normal bowel sounds   Extremities: Extremities normal, atraumatic, no cyanosis or edema.    Skin: Skin color, texture, turgor normal. No rashes or lesions   Neuro: Alert, oriented, speech clear     Labs:   Recent Labs     21  0509   WBC 5.7   HGB 11.7   HCT 34.3*        Recent Labs     21  0509      K 3.0*      CO2 30   GLU 84   BUN 6   CREA 0.88   CA 8.3*   ALB 2.8*   TBILI 5.3*   *     No results for input(s): INR, INREXT in the last 72 hours. Assessment and Plan:   71 y/o female with painless jaundice and elevated LFTs. Only some nausea otherwise no abdominal pain whatsoever and has been tolerating diet.   CT sand US show dilated CBD, no pancreatic mass seen  Agree with MRCP  If nothing seen on MRCP then possible OR for cholecystectomy with IOC  All of this discussed with patient along with risks of surgery and post op care discussed  All questions answered        Signed By: Alicja Purvis MD     September 25, 2021

## 2021-09-26 ENCOUNTER — APPOINTMENT (OUTPATIENT)
Dept: GENERAL RADIOLOGY | Age: 66
DRG: 435 | End: 2021-09-26
Attending: INTERNAL MEDICINE
Payer: MEDICARE

## 2021-09-26 ENCOUNTER — ANESTHESIA EVENT (OUTPATIENT)
Dept: SURGERY | Age: 66
DRG: 435 | End: 2021-09-26
Payer: MEDICARE

## 2021-09-26 ENCOUNTER — ANESTHESIA (OUTPATIENT)
Dept: SURGERY | Age: 66
DRG: 435 | End: 2021-09-26
Payer: MEDICARE

## 2021-09-26 LAB
ALBUMIN SERPL-MCNC: 2.5 G/DL (ref 3.5–5)
ALBUMIN/GLOB SERPL: 0.6 {RATIO} (ref 1.1–2.2)
ALP SERPL-CCNC: 271 U/L (ref 45–117)
ALT SERPL-CCNC: 561 U/L (ref 12–78)
ANION GAP SERPL CALC-SCNC: 2 MMOL/L (ref 5–15)
AST SERPL-CCNC: 428 U/L (ref 15–37)
BILIRUB SERPL-MCNC: 5 MG/DL (ref 0.2–1)
BUN SERPL-MCNC: 5 MG/DL (ref 6–20)
BUN/CREAT SERPL: 6 (ref 12–20)
CALCIUM SERPL-MCNC: 8.1 MG/DL (ref 8.5–10.1)
CHLORIDE SERPL-SCNC: 111 MMOL/L (ref 97–108)
CO2 SERPL-SCNC: 29 MMOL/L (ref 21–32)
CREAT SERPL-MCNC: 0.79 MG/DL (ref 0.55–1.02)
GLOBULIN SER CALC-MCNC: 4.1 G/DL (ref 2–4)
GLUCOSE SERPL-MCNC: 84 MG/DL (ref 65–100)
POTASSIUM SERPL-SCNC: 3.3 MMOL/L (ref 3.5–5.1)
PROT SERPL-MCNC: 6.6 G/DL (ref 6.4–8.2)
SODIUM SERPL-SCNC: 142 MMOL/L (ref 136–145)

## 2021-09-26 PROCEDURE — 74011250636 HC RX REV CODE- 250/636: Performed by: ANESTHESIOLOGY

## 2021-09-26 PROCEDURE — 94760 N-INVAS EAR/PLS OXIMETRY 1: CPT

## 2021-09-26 PROCEDURE — 74330 X-RAY BILE/PANC ENDOSCOPY: CPT

## 2021-09-26 PROCEDURE — 74011250637 HC RX REV CODE- 250/637: Performed by: SURGERY

## 2021-09-26 PROCEDURE — 77030013079 HC BLNKT BAIR HGGR 3M -A: Performed by: NURSE ANESTHETIST, CERTIFIED REGISTERED

## 2021-09-26 PROCEDURE — 76210000006 HC OR PH I REC 0.5 TO 1 HR: Performed by: INTERNAL MEDICINE

## 2021-09-26 PROCEDURE — 80053 COMPREHEN METABOLIC PANEL: CPT

## 2021-09-26 PROCEDURE — 88112 CYTOPATH CELL ENHANCE TECH: CPT

## 2021-09-26 PROCEDURE — 0F798DZ DILATION OF COMMON BILE DUCT WITH INTRALUMINAL DEVICE, VIA NATURAL OR ARTIFICIAL OPENING ENDOSCOPIC: ICD-10-PCS | Performed by: INTERNAL MEDICINE

## 2021-09-26 PROCEDURE — 76060000032 HC ANESTHESIA 0.5 TO 1 HR: Performed by: INTERNAL MEDICINE

## 2021-09-26 PROCEDURE — C2625 STENT, NON-COR, TEM W/DEL SY: HCPCS | Performed by: INTERNAL MEDICINE

## 2021-09-26 PROCEDURE — 2709999900 HC NON-CHARGEABLE SUPPLY: Performed by: INTERNAL MEDICINE

## 2021-09-26 PROCEDURE — 74011250636 HC RX REV CODE- 250/636: Performed by: HOSPITALIST

## 2021-09-26 PROCEDURE — 74011250637 HC RX REV CODE- 250/637: Performed by: INTERNAL MEDICINE

## 2021-09-26 PROCEDURE — 65270000029 HC RM PRIVATE

## 2021-09-26 PROCEDURE — 77030007288 HC DEV LOK BILI BSC -A: Performed by: INTERNAL MEDICINE

## 2021-09-26 PROCEDURE — 74011250636 HC RX REV CODE- 250/636: Performed by: INTERNAL MEDICINE

## 2021-09-26 PROCEDURE — 36415 COLL VENOUS BLD VENIPUNCTURE: CPT

## 2021-09-26 PROCEDURE — 77030036933 HC BRSH RX CYTO WREGD BSC -C: Performed by: INTERNAL MEDICINE

## 2021-09-26 PROCEDURE — 74011000636 HC RX REV CODE- 636: Performed by: INTERNAL MEDICINE

## 2021-09-26 PROCEDURE — C1726 CATH, BAL DIL, NON-VASCULAR: HCPCS | Performed by: INTERNAL MEDICINE

## 2021-09-26 PROCEDURE — 77030031656 HC FCPS ENDO GRSP DISP BSC -B: Performed by: INTERNAL MEDICINE

## 2021-09-26 PROCEDURE — C1769 GUIDE WIRE: HCPCS | Performed by: INTERNAL MEDICINE

## 2021-09-26 PROCEDURE — 74011250637 HC RX REV CODE- 250/637: Performed by: HOSPITALIST

## 2021-09-26 PROCEDURE — 77030009038 HC CATH BILI STN RTVR BSC -C: Performed by: INTERNAL MEDICINE

## 2021-09-26 PROCEDURE — 77030041276 HC SPHNTOM BILI BSC -F: Performed by: INTERNAL MEDICINE

## 2021-09-26 PROCEDURE — 77030011761 HC SPHNTOM BILI BSC -C: Performed by: INTERNAL MEDICINE

## 2021-09-26 PROCEDURE — 77030021593 HC FCPS BIOP ENDOSC BSC -A: Performed by: INTERNAL MEDICINE

## 2021-09-26 PROCEDURE — 77030010603 HC BLN DEV INFL BSC -B: Performed by: INTERNAL MEDICINE

## 2021-09-26 PROCEDURE — 76010000138 HC OR TIME 0.5 TO 1 HR: Performed by: INTERNAL MEDICINE

## 2021-09-26 PROCEDURE — 77030008684 HC TU ET CUF COVD -B: Performed by: NURSE ANESTHETIST, CERTIFIED REGISTERED

## 2021-09-26 PROCEDURE — 74011250636 HC RX REV CODE- 250/636: Performed by: NURSE ANESTHETIST, CERTIFIED REGISTERED

## 2021-09-26 PROCEDURE — 77030010104 HC SEAL PRT ENDOSC BYRN -B: Performed by: INTERNAL MEDICINE

## 2021-09-26 PROCEDURE — 77030026438 HC STYL ET INTUB CARD -A: Performed by: NURSE ANESTHETIST, CERTIFIED REGISTERED

## 2021-09-26 PROCEDURE — 74011000250 HC RX REV CODE- 250: Performed by: NURSE ANESTHETIST, CERTIFIED REGISTERED

## 2021-09-26 PROCEDURE — 77030013992 HC SNR POLYP ENDOSC BSC -B: Performed by: INTERNAL MEDICINE

## 2021-09-26 DEVICE — BILIARY STENT WITH NAVIFLEXTM RX DELIVERY SYSTEM
Type: IMPLANTABLE DEVICE | Status: FUNCTIONAL
Brand: ADVANIX™ BILIARY

## 2021-09-26 RX ORDER — HYDROMORPHONE HYDROCHLORIDE 1 MG/ML
.25-1 INJECTION, SOLUTION INTRAMUSCULAR; INTRAVENOUS; SUBCUTANEOUS
Status: DISCONTINUED | OUTPATIENT
Start: 2021-09-26 | End: 2021-09-26 | Stop reason: HOSPADM

## 2021-09-26 RX ORDER — PROPOFOL 10 MG/ML
INJECTION, EMULSION INTRAVENOUS AS NEEDED
Status: DISCONTINUED | OUTPATIENT
Start: 2021-09-26 | End: 2021-09-26 | Stop reason: HOSPADM

## 2021-09-26 RX ORDER — SUCCINYLCHOLINE CHLORIDE 20 MG/ML
INJECTION INTRAMUSCULAR; INTRAVENOUS AS NEEDED
Status: DISCONTINUED | OUTPATIENT
Start: 2021-09-26 | End: 2021-09-26 | Stop reason: HOSPADM

## 2021-09-26 RX ORDER — ONDANSETRON 2 MG/ML
INJECTION INTRAMUSCULAR; INTRAVENOUS AS NEEDED
Status: DISCONTINUED | OUTPATIENT
Start: 2021-09-26 | End: 2021-09-26 | Stop reason: HOSPADM

## 2021-09-26 RX ORDER — SODIUM CHLORIDE, SODIUM LACTATE, POTASSIUM CHLORIDE, CALCIUM CHLORIDE 600; 310; 30; 20 MG/100ML; MG/100ML; MG/100ML; MG/100ML
125 INJECTION, SOLUTION INTRAVENOUS CONTINUOUS
Status: DISCONTINUED | OUTPATIENT
Start: 2021-09-26 | End: 2021-09-27

## 2021-09-26 RX ORDER — LIDOCAINE HYDROCHLORIDE 10 MG/ML
0.1 INJECTION, SOLUTION EPIDURAL; INFILTRATION; INTRACAUDAL; PERINEURAL AS NEEDED
Status: DISCONTINUED | OUTPATIENT
Start: 2021-09-26 | End: 2021-09-26 | Stop reason: HOSPADM

## 2021-09-26 RX ORDER — LIDOCAINE HYDROCHLORIDE 20 MG/ML
INJECTION, SOLUTION EPIDURAL; INFILTRATION; INTRACAUDAL; PERINEURAL AS NEEDED
Status: DISCONTINUED | OUTPATIENT
Start: 2021-09-26 | End: 2021-09-26 | Stop reason: HOSPADM

## 2021-09-26 RX ORDER — FAMOTIDINE 10 MG/ML
INJECTION INTRAVENOUS AS NEEDED
Status: DISCONTINUED | OUTPATIENT
Start: 2021-09-26 | End: 2021-09-26 | Stop reason: HOSPADM

## 2021-09-26 RX ORDER — DIPHENHYDRAMINE HYDROCHLORIDE 50 MG/ML
12.5 INJECTION, SOLUTION INTRAMUSCULAR; INTRAVENOUS AS NEEDED
Status: DISCONTINUED | OUTPATIENT
Start: 2021-09-26 | End: 2021-09-26 | Stop reason: HOSPADM

## 2021-09-26 RX ORDER — GLYCOPYRROLATE 0.2 MG/ML
INJECTION INTRAMUSCULAR; INTRAVENOUS AS NEEDED
Status: DISCONTINUED | OUTPATIENT
Start: 2021-09-26 | End: 2021-09-26 | Stop reason: HOSPADM

## 2021-09-26 RX ORDER — ROCURONIUM BROMIDE 10 MG/ML
INJECTION, SOLUTION INTRAVENOUS AS NEEDED
Status: DISCONTINUED | OUTPATIENT
Start: 2021-09-26 | End: 2021-09-26 | Stop reason: HOSPADM

## 2021-09-26 RX ORDER — SODIUM CHLORIDE, SODIUM LACTATE, POTASSIUM CHLORIDE, CALCIUM CHLORIDE 600; 310; 30; 20 MG/100ML; MG/100ML; MG/100ML; MG/100ML
125 INJECTION, SOLUTION INTRAVENOUS CONTINUOUS
Status: DISCONTINUED | OUTPATIENT
Start: 2021-09-26 | End: 2021-09-26 | Stop reason: HOSPADM

## 2021-09-26 RX ORDER — FENTANYL CITRATE 50 UG/ML
INJECTION, SOLUTION INTRAMUSCULAR; INTRAVENOUS AS NEEDED
Status: DISCONTINUED | OUTPATIENT
Start: 2021-09-26 | End: 2021-09-26 | Stop reason: HOSPADM

## 2021-09-26 RX ORDER — NALOXONE HYDROCHLORIDE 0.4 MG/ML
0.2 INJECTION, SOLUTION INTRAMUSCULAR; INTRAVENOUS; SUBCUTANEOUS
Status: DISCONTINUED | OUTPATIENT
Start: 2021-09-26 | End: 2021-09-26 | Stop reason: HOSPADM

## 2021-09-26 RX ORDER — MIDAZOLAM HYDROCHLORIDE 1 MG/ML
INJECTION, SOLUTION INTRAMUSCULAR; INTRAVENOUS AS NEEDED
Status: DISCONTINUED | OUTPATIENT
Start: 2021-09-26 | End: 2021-09-26 | Stop reason: HOSPADM

## 2021-09-26 RX ORDER — FLUMAZENIL 0.1 MG/ML
0.2 INJECTION INTRAVENOUS
Status: DISCONTINUED | OUTPATIENT
Start: 2021-09-26 | End: 2021-09-26 | Stop reason: HOSPADM

## 2021-09-26 RX ADMIN — LEVOTHYROXINE SODIUM 125 MCG: 125 TABLET ORAL at 05:52

## 2021-09-26 RX ADMIN — ROCURONIUM BROMIDE 10 MG: 10 INJECTION INTRAVENOUS at 11:15

## 2021-09-26 RX ADMIN — GLUCAGON HYDROCHLORIDE 0.5 MG: KIT at 11:35

## 2021-09-26 RX ADMIN — SODIUM CHLORIDE, POTASSIUM CHLORIDE, SODIUM LACTATE AND CALCIUM CHLORIDE 150 ML/HR: 600; 310; 30; 20 INJECTION, SOLUTION INTRAVENOUS at 20:51

## 2021-09-26 RX ADMIN — MIDAZOLAM 1 MG: 1 INJECTION, SOLUTION INTRAMUSCULAR; INTRAVENOUS at 11:08

## 2021-09-26 RX ADMIN — LIDOCAINE HYDROCHLORIDE 40 MG: 20 INJECTION, SOLUTION EPIDURAL; INFILTRATION; INTRACAUDAL; PERINEURAL at 11:15

## 2021-09-26 RX ADMIN — Medication 10 ML: at 20:51

## 2021-09-26 RX ADMIN — POTASSIUM CHLORIDE AND SODIUM CHLORIDE: 900; 150 INJECTION, SOLUTION INTRAVENOUS at 08:58

## 2021-09-26 RX ADMIN — POTASSIUM CHLORIDE AND SODIUM CHLORIDE: 900; 150 INJECTION, SOLUTION INTRAVENOUS at 00:47

## 2021-09-26 RX ADMIN — FENTANYL CITRATE 50 MCG: 50 INJECTION, SOLUTION INTRAMUSCULAR; INTRAVENOUS at 11:15

## 2021-09-26 RX ADMIN — SODIUM CHLORIDE, POTASSIUM CHLORIDE, SODIUM LACTATE AND CALCIUM CHLORIDE 125 ML/HR: 600; 310; 30; 20 INJECTION, SOLUTION INTRAVENOUS at 10:21

## 2021-09-26 RX ADMIN — FAMOTIDINE 20 MG: 10 INJECTION INTRAVENOUS at 11:09

## 2021-09-26 RX ADMIN — GLYCOPYRROLATE 0.2 MG: 0.2 INJECTION INTRAMUSCULAR; INTRAVENOUS at 11:46

## 2021-09-26 RX ADMIN — ACETAMINOPHEN 650 MG: 325 TABLET ORAL at 14:17

## 2021-09-26 RX ADMIN — SODIUM CHLORIDE, POTASSIUM CHLORIDE, SODIUM LACTATE AND CALCIUM CHLORIDE 150 ML/HR: 600; 310; 30; 20 INJECTION, SOLUTION INTRAVENOUS at 11:19

## 2021-09-26 RX ADMIN — ONDANSETRON HYDROCHLORIDE 4 MG: 2 SOLUTION INTRAMUSCULAR; INTRAVENOUS at 11:09

## 2021-09-26 RX ADMIN — SUCCINYLCHOLINE CHLORIDE 140 MG: 20 INJECTION, SOLUTION INTRAMUSCULAR; INTRAVENOUS at 11:16

## 2021-09-26 RX ADMIN — ONDANSETRON 4 MG: 2 INJECTION INTRAMUSCULAR; INTRAVENOUS at 23:20

## 2021-09-26 RX ADMIN — SODIUM CHLORIDE, POTASSIUM CHLORIDE, SODIUM LACTATE AND CALCIUM CHLORIDE 150 ML/HR: 600; 310; 30; 20 INJECTION, SOLUTION INTRAVENOUS at 12:47

## 2021-09-26 RX ADMIN — PROPOFOL 180 MG: 10 INJECTION, EMULSION INTRAVENOUS at 11:15

## 2021-09-26 RX ADMIN — PROMETHAZINE HYDROCHLORIDE 6.25 MG: 25 INJECTION INTRAMUSCULAR; INTRAVENOUS at 12:19

## 2021-09-26 NOTE — PROGRESS NOTES
Bedside shift change report given to Lorenzo Padron (oncoming nurse) by Monik Kate (offgoing nurse). Report included the following information SBAR, Kardex, Intake/Output and Recent Results.

## 2021-09-26 NOTE — PROCEDURES
[address]  [address] [facility], [address] [facility]  [phone]                   ERCP NOTE    NAME:  Ion Joseph   :   1955   MRN:   [id number]     Date/Time:  2021 12:00 PM    Procedure Type:   ERCPwith biliary sphincterotomy, biliary tissue sampling     Indications: abnormal CT/MRCP, jaundice     Pre-operative Diagnosis: see indication above    Post-operative Diagnosis:  See findings below    : Alyson Rojo MD    Assistants: None    Referring Provider:   , Pavel Holliday MD    Sedation:  General anesthesia    Airway assessment: No airway problems anticipated    Pre-Procedural Exam:  Airway: clear, no airway problems anticipated  Heart: RRR, without gallops or rubs  Lungs: clear bilaterally without wheezes, crackles, or rhonchi  Abdomen: soft, nontender, nondistended, bowel sounds present  Mental Status: awake, alert and oriented to person, place and time     Procedure Details:  After informed consent was obtained with all risks and benefits of procedure explained, the patient was taken to the endoscopy suite and placed in the prone position. The  film was normal.  Upon sequential sedation as per above,  The esophagus was successfully intubated . The scope was carefully advanced advanced to a normal major papilla in the descending duodenum without detailed examination of the pharynx, larynx and associated structures, and upper GI tract. The upper GI tract  was grossly normal .The quality of visualization was excellent. The duodenoscope was withdrawn into a short position.       Findings:   Esophagus:normal  Stomach: normal   Duodenum/jejunum: normal  Ampulla:small   Cholangiogram: -biliary ductal dilation, with a maximum common duct diameter of 15 mm, -stricture in the distal CBD 30 mm, involving the common bile duct , -tumor/mass, located in the common bile duct  brushed  Pancreatogram:not performed    Specimen Removed:  Brushings from the common bile duct    Implants: none    Complications: None. EBL:  None. Interventions:  Endoscopic ampullary sphincterotomy and biliary stent placement      Pancreatic: none  Biliary:  Wire guided selective cannulation of the bile duct was obtained with short-nosed traction sphincterotome. Contrast was injected. I personally interpreted the bile duct images. There was brisk flow of contrast through the ducts. Image quality was adequate. Contrast extended to the cystic duct. Opacification of the entire biliary tree was successful. Marked intra and extrahepatic ductal dilation with malignant appearing severe stenosis in the  distal CBD/Head of pancreas area. A 10 mm biliary sphincterotomy was made with a monofilament short-tip traction sphincterotome using ERBE electrocautery. There was nopost-sphincterotomy bleeding. The biliary tree was swept with a balloon starting at the bifurcation. No stones retrieved. In summary : Canulation  successful,  standard Papillotomy and Stent Placement a straight plastic  7 [facility]  for malignant appearing distal CBD stricture    Impression:  -biliary ductal dilation, with a maximum common duct diameter of 15 mm,                         -Severe stenosis, involving distal CBD /pancreas head area concerning for neoplasia    Recommendations:    -Clear liquid diet and advance as tolerated. -Resume normal medication(s).    -Call for pathology results in 1 week  -EUS based on brushings in pt vs outpt  -Check ca 19-9  -Surgery following  -Chest CT for staging  -Dr Erica Colorado to follow in am      Discharge Disposition:  Return to hospital [facility]il following recovery in [facility] Ruth Encarnacion MD  9/26/2021  12:00 PM

## 2021-09-26 NOTE — ANESTHESIA PREPROCEDURE EVALUATION
Relevant Problems   CARDIOVASCULAR   (+) Essential hypertension, benign      ENDOCRINE   (+) Hypothyroidism   (+) Obesity, morbid (HCC)       Anesthetic History   No history of anesthetic complications            Review of Systems / Medical History  Patient summary reviewed and pertinent labs reviewed    Pulmonary  Within defined limits                 Neuro/Psych   Within defined limits           Cardiovascular    Hypertension              Exercise tolerance: >4 METS     GI/Hepatic/Renal               Comments: [facility] Endo/Other      Hypothyroidism  Morbid obesity and arthritis (RA and OA)     Other Findings   Comments: Patient with elevated LFT's.             Physical Exam    Airway  Mallampati: II  TM Distance: 4 - 6 cm  Neck ROM: normal range of motion   Mouth opening: Normal     Cardiovascular    Rhythm: regular  Rate: normal         Dental    Dentition: Upper dentition intact and Lower dentition intact     Pulmonary  Breath sounds clear to auscultation               Abdominal         Other Findings            Anesthetic Plan    ASA: 3, emergent  Anesthesia type: general          Induction: Intravenous  Anesthetic plan and risks discussed with: Patient

## 2021-09-26 NOTE — ANESTHESIA POSTPROCEDURE EVALUATION
Procedure(s):  ENDOSCOPIC RETROGRADE CHOLANGIOPANCREATOGRAPHY  ENDOSCOPIC STONE EXTRACTION/BALLOON SWEEP  SPHINCTEROTOMY  BILIARY STENT PLACEMENT  ENDOSCOPIC BRUSHING. general    Anesthesia Post Evaluation      Multimodal analgesia: multimodal analgesia used between 6 hours prior to anesthesia start to PACU discharge  Patient location during evaluation: bedside  Patient participation: complete - patient participated  Level of consciousness: awake  Pain management: adequate  Airway patency: patent  Anesthetic complications: no  Cardiovascular status: acceptable  Respiratory status: acceptable  Hydration status: acceptable        INITIAL Post-op Vital signs:   Vitals Value Taken Time   /66 09/26/21 1230   Temp 36.7 °C (98 °F) 09/26/21 1228   Pulse 90 09/26/21 1232   Resp 19 09/26/21 1232   SpO2 94 % 09/26/21 1232   Vitals shown include unvalidated device data.

## 2021-09-26 NOTE — ROUTINE PROCESS
Bedside shift change report given to Dai Marsh RN (oncoming nurse) by Lindsay Garcia RN (offgoing nurse). Report included the following information SBAR, Kardex, Intake/Output, MAR, Accordion, Recent Results and Med Rec Status.

## 2021-09-26 NOTE — PROGRESS NOTES
[facility] MD  [phone]           GI PROGRESS NOTE      NAME: Keyla Landaverde   :  1955   MRN:  [id number]       Subjective:   Pt reports itching better , remains jaundice. No fever overnight        VITALS:   Last 24hrs VS reviewed since prior progress note. Most recent are:  Visit Vitals  /81 (BP 1 Location: Left upper arm, BP Patient Position: At rest)   Pulse 68   Temp 98.7 °F (37.1 °C)   Resp 16   Ht 5' 4\" (1.626 m)   Wt 124.7 kg (275 lb)   SpO2 98%   BMI 47.20 kg/m²     No intake or output data in the 24 hours ending 21 0853  PHYSICAL EXAM:  General: Alert, in no acute distress    HEENT: Anicteric sclerae. Lungs:  CTA Bilaterally. Heart:  Regular  rhythm,    Abdomen: Soft, Non distended, Non tender.  (+)Bowel sounds, no HSM  Extremities: No c/c/e  Neurologic:  CN 2-12 gi, Alert and oriented X 3. No acute neurological distress   Psych:   Good insight. Not anxious nor agitated. Lab Data Reviewed:   Recent Labs     21   WBC 5.7 6.7   HGB 11.7 12.9   HCT 34.3* 37.4    234     Recent Labs     21    139   K 3.3* 3.0*   * 108   CO2 29 30   BUN 5* 6   CREA 0.79 0.88   GLU 84 84   CA 8.1* 8.3*     Recent Labs     21  05021   * 272*   < > 323*   TP 6.6 6.9   < > 8.3*   ALB 2.5* 2.8*   < > 3.2*   GLOB 4.1* 4.1*   < > 5.1*   LPSE  --   --   --  358    < > = values in this interval not displayed.        ________________________________________________________________________  Patient Active Problem List   Diagnosis Code    Ulcerative colitis ([facility] [address] K51.90    Hypothyroidism E03.9    Essential hypertension, benign I10    Obesity, morbid ([facility] E66.01    Common bile duct dilation K83.8    Calculus of gallbladder with biliary obstruction but without cholecystitis K80.21    Elevated LFTs R79.89    Hypokalemia due to loss of potassium E87.6        Assessment:   · Obstructive jaundice- MRCP showed possible extrinsic copression from  mass vs stone. D/w pt in detail plans for ERCP today in OR. Pending ca 19-9 and cea  · Abnl LFT's - due to above  I have reviewed with the patient the indications for the procedure, as well as potential complications(with emphasis on, but not limited to, bleeding, perforation, cardiovascular/cerebrovascular/pulmonary events, reactions to the medications, infection, risk of missing a lesions/a cancer, and the imponderables including death), alternative options, and patient/family voices understanding.    Plan:   · LR IV fluids  · Npo  · Further recommendations after ERCP     Signed By: Martha Rojas MD     9/26/2021  8:53 AM

## 2021-09-26 NOTE — PROGRESS NOTES
General Surgery Daily Progress Note    Patient: Luz Garcia MRN: [id number]  SSN: [id number]    YOB: 1955  Age: 72 y.o. Sex: female      Admit Date: 9/24/2021    POD * No surgery found *    Procedure: * No surgery found *    Subjective:   Denies any pain, nausea or vomiting  Worried because her  is in rehab recovering from [facility] and is overwhelmed with everything going on    Current Facility-Administered Medications   Medication Dose Route Frequency    levothyroxine (SYNTHROID) tablet 125 mcg  125 mcg Oral 6am    hydrALAZINE (APRESOLINE) 20 mg/mL injection 10 mg  10 mg IntraVENous Q4H PRN    melatonin (rapid dissolve) tablet 5 mg  5 mg Oral QHS PRN    alum-mag hydroxide-simeth (MYLANTA) oral suspension 30 mL  30 mL Oral Q4H PRN    LORazepam (ATIVAN) injection 0.5 mg  0.5 mg IntraVENous Q6H PRN    oxyCODONE IR (ROXICODONE) tablet 5 mg  5 mg Oral Q4H PRN    morphine injection 2 mg  2 mg IntraVENous Q4H PRN    diphenhydrAMINE (BENADRYL) injection 25 mg  25 mg IntraVENous Q6H PRN    diphenhydrAMINE (BENADRYL) capsule 25 mg  25 mg Oral Q6H PRN    simethicone (MYLICON) tablet 80 mg  80 mg Oral QID PRN    sodium chloride (NS) flush 5-40 mL  5-40 mL IntraVENous Q8H    sodium chloride (NS) flush 5-40 mL  5-40 mL IntraVENous PRN    acetaminophen (TYLENOL) tablet 650 mg  650 mg Oral Q6H PRN    Or    acetaminophen (TYLENOL) suppository 650 mg  650 mg Rectal Q6H PRN    polyethylene glycol (MIRALAX) packet 17 g  17 g Oral DAILY PRN    bisacodyL (DULCOLAX) suppository 10 mg  10 mg Rectal DAILY PRN    ondansetron (ZOFRAN) injection 4 mg  4 mg IntraVENous Q6H PRN    0.9% sodium chloride with KCl 20 mEq/L infusion   IntraVENous CONTINUOUS        Objective:   No intake/output data recorded.   09/24 1901 - 09/26 0700  In: 1016.7 [I.V.:1016.7]  Out: 0   Patient Vitals for the past 8 hrs:   BP Temp Pulse Resp SpO2   09/26/21 0550 126/81 98.7 °F (37.1 °C) 68 16 98 %       Physical Exam:  General: Alert, cooperative, NAD  Lungs: Unlabored  Abdomen: Soft, NT, ND  Extremities: Warm, moves all, no edema  Skin:  Warm and dry, no rash    Labs:   Recent Labs     09/25/21  0509   WBC 5.7   HGB 11.7   HCT 34.3*        Recent Labs     09/26/21  0055      K 3.3*   *   CO2 29   GLU 84   BUN 5*   CREA 0.79   CA 8.1*   ALB 2.5*   TBILI 5.0*   *       Assessment / Plan:     POD * No surgery found *    Procedure: * No surgery found *  MRCP shows possible cause of obstruction coming from outside duct  Spoke with patient and with Dr. Braden Umanzor and will move forward with ERCP today   Depending on results would either need EUS (likely Tuesday) or if there is stone found then can proceed with cholecystectomy likely tomorrow        Sumi Rodriguez MD

## 2021-09-26 NOTE — PERIOP NOTES
TRANSFER - OUT REPORT:    Verbal report given to kalie xavier(name) on Robert De  being transferred to 53(unit) for routine post - op       Report consisted of patients Situation, Background, Assessment and   Recommendations(SBAR). Information from the following report(s) SBAR, Procedure Summary, Intake/Output and MAR was reviewed with the receiving nurse. Lines:   Peripheral IV 09/24/21 Right Antecubital (Active)   Site Assessment Clean, dry, & intact 09/26/21 1213   Phlebitis Assessment 0 09/26/21 1213   Infiltration Assessment 0 09/26/21 1213   Dressing Status Clean, dry, & intact 09/26/21 1213   Dressing Type Tape;Transparent 09/26/21 1213   Hub Color/Line Status Pink 09/26/21 1213   Action Taken Open ports on tubing capped 09/26/21 1012   Alcohol Cap Used Yes 09/26/21 1012       Peripheral IV 09/26/21 Right Hand (Active)   Site Assessment Clean, dry, & intact 09/26/21 1213   Phlebitis Assessment 0 09/26/21 1213   Infiltration Assessment 0 09/26/21 1213   Dressing Status Clean, dry, & intact 09/26/21 1213   Dressing Type Tape;Transparent 09/26/21 1213   Hub Color/Line Status Green 09/26/21 1213        Opportunity for questions and clarification was provided.       Patient transported with:   Registered Nurse

## 2021-09-26 NOTE — PROGRESS NOTES
[facility] [address]  [address] [facility], [address] [place] [address]  [phone]      Medical Progress Note      NAME:         Julianna Burroughs   :        1955  MRM:        [id number]    Date of service: 2021      Subjective: Patient has been seen and examined as a follow up for multiple medical issues. Chart, labs, diagnostics reviewed. She has no abdominal pain but nausea. No vomiting or fever. Denies any cough or SOB. Going for an ERCP this morning    Objective:    Vital Signs:    Visit Vitals  /81 (BP 1 Location: Left upper arm, BP Patient Position: At rest)   Pulse 68   Temp 98.7 °F (37.1 °C)   Resp 16   Ht 5' 4\" (1.626 m)   Wt 124.7 kg (275 lb)   SpO2 98%   BMI 47.20 kg/m²          Intake/Output Summary (Last 24 hours) at 2021 0925  Last data filed at 2021 2386  Gross per 24 hour   Intake 3147.92 ml   Output 0 ml   Net 3147.92 ml        Physical Examination:    General:   Well looking patient in no acute distress  Eyes:   icteric conjunctivae, PERRLA with no discharge. ENT:   no ottorrhea or rhinorrhea with dry mucous membranes  Neck: no masses, thyroid non-tender and trachea central.  Pulm: clear breath sounds without crackles or wheezes  Card:  no JVD or murmurs, has regular and normal S1, S2 without thrills, bruits or peripheral edema  Abd:  Soft, non-tender, non-distended, normoactive bowel sounds   Musc:  No cyanosis, clubbing, atrophy or deformities. Skin:  No rashes, bruising or ulcers. Neuro: Awake and alert.  Generally a non focal exam. Follows commands appropriately  Psych:  Has a good insight and is oriented x 3    Current Facility-Administered Medications   Medication Dose Route Frequency    lactated Ringers infusion  150 mL/hr IntraVENous CONTINUOUS    levothyroxine (SYNTHROID) tablet 125 mcg  125 mcg Oral 6am    hydrALAZINE (APRESOLINE) 20 mg/mL injection 10 mg  10 mg IntraVENous Q4H PRN    melatonin (rapid dissolve) tablet 5 mg  5 mg Oral QHS PRN    alum-mag hydroxide-simeth (MYLANTA) oral suspension 30 mL  30 mL Oral Q4H PRN    LORazepam (ATIVAN) injection 0.5 mg  0.5 mg IntraVENous Q6H PRN    oxyCODONE IR (ROXICODONE) tablet 5 mg  5 mg Oral Q4H PRN    morphine injection 2 mg  2 mg IntraVENous Q4H PRN    diphenhydrAMINE (BENADRYL) injection 25 mg  25 mg IntraVENous Q6H PRN    diphenhydrAMINE (BENADRYL) capsule 25 mg  25 mg Oral Q6H PRN    simethicone (MYLICON) tablet 80 mg  80 mg Oral QID PRN    sodium chloride (NS) flush 5-40 mL  5-40 mL IntraVENous Q8H    sodium chloride (NS) flush 5-40 mL  5-40 mL IntraVENous PRN    acetaminophen (TYLENOL) tablet 650 mg  650 mg Oral Q6H PRN    Or    acetaminophen (TYLENOL) suppository 650 mg  650 mg Rectal Q6H PRN    polyethylene glycol (MIRALAX) packet 17 g  17 g Oral DAILY PRN    bisacodyL (DULCOLAX) suppository 10 mg  10 mg Rectal DAILY PRN    ondansetron (ZOFRAN) injection 4 mg  4 mg IntraVENous Q6H PRN    0.9% sodium chloride with KCl 20 mEq/L infusion   IntraVENous CONTINUOUS        Laboratory data and review:    Recent Labs     09/25/21  0509 09/24/21  1855   WBC 5.7 6.7   HGB 11.7 12.9   HCT 34.3* 37.4    234     Recent Labs     09/26/21  0055 09/25/21  0509 09/24/21  1855    139 137   K 3.3* 3.0* 2.7*   * 108 103   CO2 29 30 28   GLU 84 84 90   BUN 5* 6 8   CREA 0.79 0.88 1.03*   CA 8.1* 8.3* 9.1   ALB 2.5* 2.8* 3.2*   * 555* 607*     No components found for: [place]    Diagnostics: Imaging studies have been reviewed    Assessment and Plan:    Elevated LFTs (9/24/2021) / Common bile duct dilation (9/24/2021) POA: She has moderate intra-hepatic biliary ductal dilatation on CT abdomen. Concern for an impacted stone vs stricture vs other. Rapid COVID neg. Acute hepatitis neg. 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 Seen by Gi who plan for an ERCP this morning. Continue to monitor LFTs.  Further recommendations to follow     Calculus of gallbladder with biliary obstruction but without cholecystitis (9/24/2021) POA: patient with minimal symptoms. Given her CBD dilatation, she will need cholecystectomy. Seen by general surgery. Await ERCP then plan for surgery. Hypothyroidism (3/23/2011) POA: Last TSH was 3.46 in march 2021. Continue Levothyroxine    Essential hypertension, benign (3/23/2011) POA: BP stable. Continue to hold Hydrodiuril for now    Hypokalemia due to loss of potassium (9/25/2021) POA: likely from diuretic use. Replete with IV fluids.  Monitor renal panel    Total time spent for the patient's care: [address] discussed with: Patient and Nursing Staff as well as consulting physicians    Discussed:  Care Plan and D/C Planning    Prophylaxis:  SCD's    Anticipated Disposition:  Home w/Family           ___________________________________________________    Attending Physician:   Melissa Juárez MD

## 2021-09-27 ENCOUNTER — APPOINTMENT (OUTPATIENT)
Dept: CT IMAGING | Age: 66
DRG: 435 | End: 2021-09-27
Attending: NURSE PRACTITIONER
Payer: MEDICARE

## 2021-09-27 LAB
ALBUMIN SERPL-MCNC: 2.2 G/DL (ref 3.5–5)
ALBUMIN/GLOB SERPL: 0.6 {RATIO} (ref 1.1–2.2)
ALP SERPL-CCNC: 231 U/L (ref 45–117)
ALT SERPL-CCNC: 423 U/L (ref 12–78)
ANION GAP SERPL CALC-SCNC: 3 MMOL/L (ref 5–15)
AST SERPL-CCNC: 250 U/L (ref 15–37)
BILIRUB SERPL-MCNC: 2.1 MG/DL (ref 0.2–1)
BUN SERPL-MCNC: 4 MG/DL (ref 6–20)
BUN/CREAT SERPL: 5 (ref 12–20)
CALCIUM SERPL-MCNC: 8.4 MG/DL (ref 8.5–10.1)
CANCER AG19-9 SERPL-ACNC: 198 U/ML (ref 0–35)
CHLORIDE SERPL-SCNC: 112 MMOL/L (ref 97–108)
CO2 SERPL-SCNC: 27 MMOL/L (ref 21–32)
CREAT SERPL-MCNC: 0.78 MG/DL (ref 0.55–1.02)
ERYTHROCYTE [DISTWIDTH] IN BLOOD BY AUTOMATED COUNT: 15.2 % (ref 11.5–14.5)
GLOBULIN SER CALC-MCNC: 3.8 G/DL (ref 2–4)
GLUCOSE SERPL-MCNC: 88 MG/DL (ref 65–100)
HCT VFR BLD AUTO: 31.9 % (ref 35–47)
HGB BLD-MCNC: 10.6 G/DL (ref 11.5–16)
MAGNESIUM SERPL-MCNC: 1.7 MG/DL (ref 1.6–2.4)
MCH RBC QN AUTO: 31.2 PG (ref 26–34)
MCHC RBC AUTO-ENTMCNC: 33.2 G/DL (ref 30–36.5)
MCV RBC AUTO: 93.8 FL (ref 80–99)
NRBC # BLD: 0 K/UL (ref 0–0.01)
NRBC BLD-RTO: 0 PER 100 WBC
PHOSPHATE SERPL-MCNC: 2.7 MG/DL (ref 2.6–4.7)
PLATELET # BLD AUTO: 206 K/UL (ref 150–400)
PMV BLD AUTO: 11.5 FL (ref 8.9–12.9)
POTASSIUM SERPL-SCNC: 3.3 MMOL/L (ref 3.5–5.1)
PROT SERPL-MCNC: 6 G/DL (ref 6.4–8.2)
RBC # BLD AUTO: 3.4 M/UL (ref 3.8–5.2)
SODIUM SERPL-SCNC: 142 MMOL/L (ref 136–145)
WBC # BLD AUTO: 5.6 K/UL (ref 3.6–11)

## 2021-09-27 PROCEDURE — 94760 N-INVAS EAR/PLS OXIMETRY 1: CPT

## 2021-09-27 PROCEDURE — 83735 ASSAY OF MAGNESIUM: CPT

## 2021-09-27 PROCEDURE — 71260 CT THORAX DX C+: CPT

## 2021-09-27 PROCEDURE — 74011000636 HC RX REV CODE- 636: Performed by: STUDENT IN AN ORGANIZED HEALTH CARE EDUCATION/TRAINING PROGRAM

## 2021-09-27 PROCEDURE — 85027 COMPLETE CBC AUTOMATED: CPT

## 2021-09-27 PROCEDURE — 84100 ASSAY OF PHOSPHORUS: CPT

## 2021-09-27 PROCEDURE — 36415 COLL VENOUS BLD VENIPUNCTURE: CPT

## 2021-09-27 PROCEDURE — 74011250637 HC RX REV CODE- 250/637: Performed by: INTERNAL MEDICINE

## 2021-09-27 PROCEDURE — 80053 COMPREHEN METABOLIC PANEL: CPT

## 2021-09-27 PROCEDURE — 99223 1ST HOSP IP/OBS HIGH 75: CPT | Performed by: INTERNAL MEDICINE

## 2021-09-27 PROCEDURE — 74011250636 HC RX REV CODE- 250/636: Performed by: INTERNAL MEDICINE

## 2021-09-27 PROCEDURE — 74011250637 HC RX REV CODE- 250/637: Performed by: SURGERY

## 2021-09-27 PROCEDURE — 65270000029 HC RM PRIVATE

## 2021-09-27 RX ORDER — DEXTROSE, SODIUM CHLORIDE, AND POTASSIUM CHLORIDE 5; .45; .15 G/100ML; G/100ML; G/100ML
75 INJECTION INTRAVENOUS CONTINUOUS
Status: DISCONTINUED | OUTPATIENT
Start: 2021-09-27 | End: 2021-09-29 | Stop reason: HOSPADM

## 2021-09-27 RX ORDER — CHOLESTYRAMINE 4 G/4.8G
4 POWDER, FOR SUSPENSION ORAL 2 TIMES DAILY WITH MEALS
Status: DISCONTINUED | OUTPATIENT
Start: 2021-09-27 | End: 2021-09-29 | Stop reason: HOSPADM

## 2021-09-27 RX ORDER — LOPERAMIDE HYDROCHLORIDE 2 MG/1
2 CAPSULE ORAL
Status: DISCONTINUED | OUTPATIENT
Start: 2021-09-27 | End: 2021-09-29 | Stop reason: HOSPADM

## 2021-09-27 RX ADMIN — CHOLESTYRAMINE 4 G: 4 POWDER, FOR SUSPENSION ORAL at 18:06

## 2021-09-27 RX ADMIN — CHOLESTYRAMINE 4 G: 4 POWDER, FOR SUSPENSION ORAL at 10:50

## 2021-09-27 RX ADMIN — IOPAMIDOL 100 ML: 612 INJECTION, SOLUTION INTRAVENOUS at 09:56

## 2021-09-27 RX ADMIN — Medication 10 ML: at 15:32

## 2021-09-27 RX ADMIN — POTASSIUM CHLORIDE, DEXTROSE MONOHYDRATE AND SODIUM CHLORIDE 75 ML/HR: 150; 5; 450 INJECTION, SOLUTION INTRAVENOUS at 08:16

## 2021-09-27 RX ADMIN — POTASSIUM CHLORIDE, DEXTROSE MONOHYDRATE AND SODIUM CHLORIDE 75 ML/HR: 150; 5; 450 INJECTION, SOLUTION INTRAVENOUS at 22:22

## 2021-09-27 RX ADMIN — Medication 10 ML: at 22:21

## 2021-09-27 RX ADMIN — LEVOTHYROXINE SODIUM 125 MCG: 125 TABLET ORAL at 06:23

## 2021-09-27 RX ADMIN — Medication 1 CAPSULE: at 10:50

## 2021-09-27 RX ADMIN — Medication 10 ML: at 06:24

## 2021-09-27 RX ADMIN — POTASSIUM BICARBONATE 40 MEQ: 391 TABLET, EFFERVESCENT ORAL at 08:16

## 2021-09-27 RX ADMIN — LOPERAMIDE HYDROCHLORIDE 2 MG: 2 CAPSULE ORAL at 10:50

## 2021-09-27 NOTE — TELEPHONE ENCOUNTER
I discussed results with patient as she is in hospital- still waiting on biospy results. She was given support and told her we would help her once we have the information.  Her  is in rehab post covid but son and daughter have been visiting and involved

## 2021-09-27 NOTE — PROGRESS NOTES
Spiritual Care Partner Volunteer visited patient at [address] [facility] in [place] [address] on 9/27/2021   Documented by:  KALYAN CardonaDiv.  [phone])

## 2021-09-27 NOTE — ROUTINE PROCESS
Bedside shift change report given to Tiffany Sol (oncoming nurse) by Clem Frey (offgoing nurse). Report included the following information SBAR, Kardex, ED Summary, Intake/Output, MAR, Recent Results and Med Rec Status.

## 2021-09-27 NOTE — PROGRESS NOTES
Bedside shift change report given to Alfie Martínez RN (oncoming nurse) by Aurelia Breaux RN (offgoing nurse). Report included the following information SBAR, Kardex, Intake/Output, MAR and Recent Results.

## 2021-09-27 NOTE — PROGRESS NOTES
[address] NP  [phone]           GI PROGRESS NOTE        NAME: Karyn Torres   :  1955   MRN:  [id number]       Subjective:   Complains of sore throat after endoscopic exam yesterday. Objective:   NAD      VITALS:   Last 24hrs VS reviewed since prior progress note. Most recent are:  Visit Vitals  /75 (BP 1 Location: Left upper arm, BP Patient Position: At rest)   Pulse 74   Temp 98 °F (36.7 °C)   Resp 16   Ht 5' 4\" (1.626 m)   Wt 126.6 kg (279 lb 1.6 oz)   SpO2 98%   BMI 47.91 kg/m²       Intake/Output Summary (Last 24 hours) at 2021 1609  Last data filed at 2021 1533  Gross per 24 hour   Intake 3476.25 ml   Output 1150 ml   Net 2326.25 ml       PHYSICAL EXAM:  General: Alert, in no acute distress    HEENT: Anicteric sclerae. Lungs:            CTA Bilaterally. Heart:  Regular  rhythm,    Abdomen: Soft, Non distended, Non tender.  (+)Bowel sounds, no HSM  Extremities: No c/c/e  Neurologic:  CN 2-12 gi, Alert and oriented X 3. No acute neurological distress   Psych:   Good insight. Not anxious nor agitated. Lab Data Reviewed:   Recent Labs     21  0509   WBC 5.6 5.7   HGB 10.6* 11.7   HCT 31.9* 34.3*    218     Recent Labs     21  0055    142   K 3.3* 3.3*   * 111*   CO2 27 29   BUN 4* 5*   CREA 0.78 0.79   GLU 88 84   PHOS 2.7  --    CA 8.4* 8.1*     Recent Labs     21  0325 21  0055 21  0509 21  1855   * 271*   < > 323*   TP 6.0* 6.6   < > 8.3*   ALB 2.2* 2.5*   < > 3.2*   GLOB 3.8 4.1*   < > 5.1*   LPSE  --   --   --  358    < > = values in this interval not displayed.        ________________________________________________________________________  Patient Active Problem List   Diagnosis Code    Ulcerative colitis ([facility] [address] K51.90    Hypothyroidism E03.9    Essential hypertension, benign I10    Obesity, morbid ([facility] [address] E66.01    Common bile duct dilation K83.8    Calculus of gallbladder with biliary obstruction but without cholecystitis K80.21    Elevated LFTs R79.89    Hypokalemia due to loss of potassium E87.6         Assessment and Plan:  Obstructive Jaundice:  Status post ERCP yesterday with plastic stent pcmt. CT scan showing pulmonary nodules concerning for metastatic disease.   CA 19-9 - 198, CEA - 3.5.      - Diet as tolerated  - Continue monitoring LFTs and Bili  - Pathology pending  - Consider EUS based on path report  - Surgery and Oncology are on board    Following    Discussed with Dr. Jorge Bowman By: Shen Lemons NP     9/27/2021  4:09 PM

## 2021-09-27 NOTE — PROGRESS NOTES
Comprehensive Nutrition Assessment    Type and Reason for Visit: RD nutrition re-screen/LOS, Initial    Nutrition Recommendations/Plan:   1. Advance diet to GI bland, low fat as tolerated. 2. Initiate Ensure Clear once daily to increase kcal/protein intake (240 kcal, 52 g carbs, 8 g protein)     Nutrition Assessment:   Pt is a 72year old female admitted with Calculus of gallbladder with biliary obstruction but without cholecystitis. She has a past medical history of Autoimmune disease, Hypertension, Hypothyroid, Ulcerative colitis. Endorses decreased appetite x 1 week- \"since being here\". No weight changes - states #. Patient states some intolerance to lactose, unwilling to try any protein supplements containing milk. No N/V today but c/o diarrhea, chronic. No chewing/swallowing problems. Friends have been providing patient with food, but patient denies eating anything not provided by hospital food services. Patient Vitals for the past 168 hrs:   % Diet Eaten   09/26/21 1839 26 - 50%   09/26/21 0858 0%   09/25/21 0747 0%       Wt Readings from Last 10 Encounters:   09/27/21 126.6 kg (279 lb 1.6 oz)   03/04/21 129.6 kg (285 lb 12.8 oz)   08/12/20 129.5 kg (285 lb 6.4 oz)   11/21/19 127.7 kg (281 lb 9.6 oz)   04/23/19 127.5 kg (281 lb)   04/23/19 127.5 kg (281 lb)   10/01/18 124.7 kg (275 lb)   04/26/18 121.1 kg (267 lb)   04/20/18 116.6 kg (257 lb)   01/29/18 120.8 kg (266 lb 6.4 oz)       Malnutrition Assessment:  Malnutrition Status:   At risk for malnutrition (specify)  - decreased appetite, mass at head of pancreas, onc to see  Context:  Acute illness     Findings of the 6 clinical characteristics of malnutrition:   Energy Intake:  7 - 50% or less of est energy requirements for 5 or more days  Weight Loss:  No significant weight loss     Body Fat Loss:  No significant body fat loss,     Muscle Mass Loss:  No significant muscle mass loss,    Fluid Accumulation:  No significant fluid accumulation,  Strength:  Normal  strength     Estimated Daily Nutrient Needs:  Energy (kcal): 7460; Weight Used for Energy Requirements: Current  Protein (g): 83 (1.5 g/kg IBW); Weight Used for Protein Requirements: Ideal  Fluid (ml/day): 3441; Method Used for Fluid Requirements: 1 ml/kcal    Nutrition Related Findings:    ABD: WNL with active bowel sounds 9/27  Last BM: 9/26, watery  Edema: None noted 9/27    Nutr. Labs:  Mg 1.7 WNL  Phos 2.7 WNL  Lab Results   Component Value Date/Time    GFR est AA >60 09/27/2021 03:25 AM    GFR est non-AA >60 09/27/2021 03:25 AM    Creatinine (POC) 1.0 10/06/2013 08:45 AM    Creatinine 0.78 09/27/2021 03:25 AM    BUN 4 (L) 09/27/2021 03:25 AM    BUN (POC) 11 10/06/2013 08:45 AM    Sodium (POC) 143 10/06/2013 08:45 AM    Sodium 142 09/27/2021 03:25 AM    Potassium 3.3 (L) 09/27/2021 03:25 AM    Potassium (POC) 3.4 (L) 10/06/2013 08:45 AM    Chloride (POC) 104 10/06/2013 08:45 AM    Chloride 112 (H) 09/27/2021 03:25 AM    CO2 27 09/27/2021 03:25 AM       Nutr.  Meds:  Dulcolax  Questran Light  Dextrose 5% - 0.45% NaCl with KCl 20 mEq/L infusion  Risaquad  Synthroid  Imodium  Miralax PRN    Wounds:    Surgical incision (mouth, per flowsheets)       Current Nutrition Therapies:  DIET ONE TIME MESSAGE  ADULT DIET Full Liquid; pt is kosher, please send strained chicken soup (no beef)    Anthropometric Measures:  · Height:  5' 4\" (162.6 cm)  · Current Body Wt:  126.6 kg (279 lb 1.6 oz)   · Usual Body Wt:  127 kg (280 lb)     · Ideal Body Wt:  120 lbs:  232.6 %   · BMI: 47.91  · BMI Category:  Obese class 3 (BMI 40.0 or greater)       Nutrition Diagnosis:   · Inadequate protein-energy intake related to impaired nutrient utilization as evidenced by NPO or clear liquid status due to medical condition, poor intake prior to admission, diarrhea      Nutrition Interventions:   Food and/or Nutrient Delivery: Modify current diet, Start oral nutrition supplement  Nutrition Education and Counseling: Survival skills/brief education completed  Coordination of Nutrition Care: Continue to monitor while inpatient, Interdisciplinary rounds    Goals:  PO intake meeting >/= 80% estimated protein needs within 2 - 3 days       Nutrition Monitoring and Evaluation:   Behavioral-Environmental Outcomes: None identified  Food/Nutrient Intake Outcomes: Food and nutrient intake, Supplement intake  Physical Signs/Symptoms Outcomes: Biochemical data, Skin, Weight, Diarrhea    Discharge Planning:     Too soon to determine     Electronically signed by Susana Carpio RD on 1/62/4397  Contact: [phone] or via [employer]

## 2021-09-27 NOTE — CONSULTS
[facility] [place] [facility] [place] [address] [address] [facility]  [facility]: [phone]  F: [phone]      Reason for Visit:   Kaylyn Schlatter is a 72 y.o. female who is seen for evaluation of new bilary or pancreatic head mass    Hematology/Oncology Treatment History:     Hematology Oncology Treatment History:     Diagnosis:     Stage: Pending    Pathology: 21 ERCP/ Dr Cesar Bryson: brushings from common bile duct: path pending    Prior Treatment: None    Current Treatment: pending    History of Present Illness:   Kaylyn Schlatter is a pleasant 72 y.o. female who was admitted on 21 for malaise, transaminitis, associated with dark urine, dysuria and icterus. Has been on Macrobid and cefuroxime by PCP given complaint of dysuria and dark urine. She then developed nausea and went to [facility]. She was eventually found to have transaminitis and was referred to ED. ED labs notable for K+ 2.7 Creat 1.03, t-bili 6.3, , . CT A/P revealed mild/mod intrahepatic biliary ductal dilatation; prominence of CBD and hydropic gallbladder, suggestive of stricture/stenosis/mass of distal CBD. MRI of abdomen showed narrowing of CBD pancreatic head suggestive of extrinisic compression concerning for periampullary mass vs eccentric intraluminal lesion. GI evaluated and completed ERCP on 21 notable for distal CBD stricture; possible tumor/mass located in the CBD; brushed and biliary stent placed. General surgery evaluated patient for cholelithiasis, but team is awaiting results from recent pathology before making decision on whether cholecystectomy is needed or not. Today, reports diarrhea that started this am. Denies RUQ pain, but states she has abdominal discomfort across the front of her abdomen, associated with nausea. Denies SOB. Of note, her mother  of pancreatic cancer age 80. Has had numerous colonoscopies due to Ulcerative colitis; last one > 3 yrs ago.        PMHx: OA and Rheumatoid arthritis in lower back, Ulcerative colitis, HTN, Hypothyroidism  PSurgHx: , Cyst removed from left breast  SHx: , has 3 children (1 daughter and 2 sons). Works as  in [employer]. Nonsmoker, no EtOH.  in rehab recovering from [facility] in [place]. FHx: Mother  of pancreatic cancer age 80. Brother and son had colon cancer. Past Medical History:   Diagnosis Date    Arthritis     ostero arthritis, rhemathoid  lower back     Autoimmune disease ([address]     Ulcerative Colitis    Hypertension     Hypothyroid 3/23/2011    Ulcerative colitis ([address] 2010    Ulcerative colitis ([facility] 2010    Ulcerative colitis ([facility]        Past Surgical History:   Procedure Laterality Date    COLONOSCOPY N/A 2017    COL[facility] at [facility] ENDOSCOPY    ENDOSCOPY, COLON, DIAGNOSTIC      HX  SECTION      KS BREAST SURGERY PROCEDURE UNLISTED      cyst removed from left breast       Social History     Socioeconomic History    Marital status:      Spouse name: Not on file    Number of children: Not on file    Years of education: Not on file    Highest education level: Not on file   Occupational History    Not on file   Tobacco Use    Smoking status: Former Smoker     Types: Cigarettes     Quit date: 1980     Years since quittin.7    Smokeless tobacco: Never Used   Substance and Sexual Activity    Alcohol use: No    Drug use: No    Sexual activity: Yes     Partners: Male   Other Topics Concern    Not on file   Social History Narrative    Not on file     Social Determinants of Health     Financial Resource Strain:     Difficulty of Paying Living Expenses:    Food Insecurity:     Worried About Running Out of Food in the Last Year:     Ran Out of Food in the Last Year:    Transportation Needs:     Lack of Transportation (Medical):      Lack of Transportation (Non-Medical):    Physical Activity:     Days of Exercise per Week:     Minutes of Exercise per Session:    Stress:     Feeling of Stress :    Social Connections:     Frequency of Communication with Friends and Family:     Frequency of Social Gatherings with Friends and Family:     Attends Advent Services:     Active Member of Clubs or Organizations:     Attends Club or Organization Meetings:     Marital Status:    Intimate Partner Violence:     Fear of Current or Ex-Partner:     Emotionally Abused:     Physically Abused:     Sexually Abused:        Family History   Problem Relation Age of Onset    Cancer Mother         pancreatic    Cancer Brother         colon       Current Facility-Administered Medications   Medication Dose Route Frequency    dextrose 5% - 0.45% NaCl with KCl 20 mEq/L infusion  75 mL/hr IntraVENous CONTINUOUS    phenol throat spray (CHLORASEPTIC) 1 Spray  1 Spray Oral PRN    levothyroxine (SYNTHROID) tablet 125 mcg  125 mcg Oral 6am    hydrALAZINE (APRESOLINE) 20 mg/mL injection 10 mg  10 mg IntraVENous Q4H PRN    melatonin (rapid dissolve) tablet 5 mg  5 mg Oral QHS PRN    alum-mag hydroxide-simeth (MYLANTA) oral suspension 30 mL  30 mL Oral Q4H PRN    LORazepam (ATIVAN) injection 0.5 mg  0.5 mg IntraVENous Q6H PRN    oxyCODONE IR (ROXICODONE) tablet 5 mg  5 mg Oral Q4H PRN    morphine injection 2 mg  2 mg IntraVENous Q4H PRN    diphenhydrAMINE (BENADRYL) injection 25 mg  25 mg IntraVENous Q6H PRN    diphenhydrAMINE (BENADRYL) capsule 25 mg  25 mg Oral Q6H PRN    simethicone (MYLICON) tablet 80 mg  80 mg Oral QID PRN    sodium chloride (NS) flush 5-40 mL  5-40 mL IntraVENous Q8H    sodium chloride (NS) flush 5-40 mL  5-40 mL IntraVENous PRN    acetaminophen (TYLENOL) tablet 650 mg  650 mg Oral Q6H PRN    Or    acetaminophen (TYLENOL) suppository 650 mg  650 mg Rectal Q6H PRN    polyethylene glycol (MIRALAX) packet 17 g  17 g Oral DAILY PRN    bisacodyL (DULCOLAX) suppository 10 mg  10 mg Rectal DAILY PRN    ondansetron (ZOFRAN) injection 4 mg  4 mg IntraVENous Q6H PRN       Allergies   Allergen Reactions    Sulfa (Sulfonamide Antibiotics) Swelling          Review of Systems: A complete review of systems was obtained, reviewed. Pertinent findings reviewed above. Physical Exam:     Visit Vitals  /85 (BP 1 Location: Left upper arm, BP Patient Position: At rest)   Pulse 67   Temp 97.9 °F (36.6 °C)   Resp 18   Ht 5' 4\" (1.626 m)   Wt 124.7 kg (275 lb)   SpO2 95%   BMI 47.20 kg/m²     ECOG PS: 1  General: obese; no distress  Eyes: PERRLA, EOMI, Anicteric sclerae  HENT: atraumatic, oropharynx clear  Neck: supple, no JVD or thyromegaly  Lymphatic: no cervical, supraclavicular, axillary or inguinal adenopathy  Respiratory: CTAB, normal respiratory effort  CV: normal rate, regular rhythm, no murmurs, no peripheral edema  GI: soft, nontender, nondistended, no masses, no hepatomegaly, no splenomegaly  MS: digits without clubbing or cyanosis. Skin: no rashes, no ecchymoses, no petechia. normal temperature, turgor, and texture. Psych: alert, oriented, appropriate affect, normal judgment/insight    Results:     Lab Results   Component Value Date/Time    WBC 5.6 09/27/2021 03:25 AM    HGB 10.6 (L) 09/27/2021 03:25 AM    HCT 31.9 (L) 09/27/2021 03:25 AM    PLATELET 889 23/17/3529 03:25 AM    MCV 93.8 09/27/2021 03:25 AM    ABS.  NEUTROPHILS 2.1 09/25/2021 05:09 AM    Hemoglobin (POC) 12.6 10/06/2013 08:45 AM    Hematocrit (POC) 37 10/06/2013 08:45 AM     Lab Results   Component Value Date/Time    Sodium 142 09/27/2021 03:25 AM    Potassium 3.3 (L) 09/27/2021 03:25 AM    Chloride 112 (H) 09/27/2021 03:25 AM    CO2 27 09/27/2021 03:25 AM    Glucose 88 09/27/2021 03:25 AM    BUN 4 (L) 09/27/2021 03:25 AM    Creatinine 0.78 09/27/2021 03:25 AM    GFR est AA >60 09/27/2021 03:25 AM    GFR est non-AA >60 09/27/2021 03:25 AM    Calcium 8.4 (L) 09/27/2021 03:25 AM    Sodium (POC) 143 10/06/2013 08:45 AM    Potassium (POC) 3.4 (L) 10/06/2013 08:45 AM    Chloride (POC) 104 10/06/2013 08:45 AM    Glucose (POC) 91 10/06/2013 08:45 AM    BUN (POC) 11 10/06/2013 08:45 AM    Creatinine (POC) 1.0 10/06/2013 08:45 AM    Calcium, ionized (POC) 1.27 10/06/2013 08:45 AM     Lab Results   Component Value Date/Time    Bilirubin, total 2.1 (H) 09/27/2021 03:25 AM    ALT (SGPT) 423 (H) 09/27/2021 03:25 AM    Alk. phosphatase 231 (H) 09/27/2021 03:25 AM    Protein, total 6.0 (L) 09/27/2021 03:25 AM    Albumin 2.2 (L) 09/27/2021 03:25 AM    Globulin 3.8 09/27/2021 03:25 AM     Lab Results   Component Value Date/Time    Iron % saturation 32 07/07/2014 02:28 PM    TIBC 304 07/07/2014 02:28 PM    Sed rate (ESR) 17 09/16/2010 12:04 PM    C-Reactive protein <0.29 01/18/2017 08:45 AM    TSH 3.46 03/04/2021 08:57 AM    ALBA, Direct Detected (A) 09/16/2010 12:04 PM    Lipase 358 09/24/2021 06:55 PM    Hep C virus Ab Interp. NONREACTIVE 09/24/2021 06:55 PM     Lab Results   Component Value Date/Time    CEA 3.5 09/25/2021 11:46 AM       Imaging / Procedures:     9/24/21 US ABD   IMPRESSION  Cholelithiasis. Gallbladder sludge. Prominent CBD with mild intrahepatic ductal dilatation as well. Nonemergent MRI with MRCP to delineate etiology for CBD distention. 9/24/21 CT ABD PELV W CONT  IMPRESSION  There is mild/moderate intrahepatic biliary ductal dilatation, prominence of the  CBD and a hydropic gallbladder. No pancreatic duct dilatation. No clearly  delineated pancreatic head mass. Imaging findings suggestive of  stricture/stenosis/mass of the distal CBD, no extrinsic pancreatic head mass is  identified. Gastroenterology consult   Correlation with MRI/MRCP on a nonemergent basis. There is severe degenerative change of the lumbar spine. 9/25/21 MRI ABD WO CONT  IMPRESSION  1. Intrahepatic and extra hepatic biliary dilatation with abrupt narrowing of  the common bile duct pancreatic head just proximal to the ampulla.  Suggestion of  extrinsic compression on the duct. This raises the possibility of a  periampullary mass. Alternatively, this may be an eccentric intraluminal lesion. Evaluation is limited. Recommend GI consultation for possible ERCP and EUS. 2.  Questionable small lesion in the left hepatic lobe with mild increased T2  signal and T1 hypointensity. Recommend follow-up imaging a contrast-enhanced  study preferably MRI. 3.  Cholelithiasis. Distended gallbladder with mild gallbladder wall thickening. Correlate for acute cholecystitis    9/26/21 XR ERCP/ERCB / Dr. Erika Arzola  Impression: Biliary ductal dilation, with a maximum common duct diameter of 15 mm; Severe stenosis, involving distal CBD /pancreas head area concerning for neoplasia  Interventions: Endoscopic ampullary sphincterotomy and biliary stent placement; brushings from the CBD    9/27/21 CT CHEST W CONT:   IMPRESSION  1. There is a minimal right pleural effusion. 2. There are small pulmonary nodules present. There is a nodule in the plane of  the right major fissure and posteriorly in left lower lobe. These were not  present on examination of 1/18/2017. These could be on the basis of metastatic  disease. Close follow-up is suggested. There are also 2 small subpleural nodules  anteriorly in the right upper lobe as described above which can also be  evaluated on follow-up. Assessment/Recommendations:   72 y.o. female with Ulcerative colitis, Hyopthyroidism, admitted with obstructive jaundice concerning for underlying malignant obstructing mass. 1. Obstructive jaundice / Transaminitis:  CBD stricture related to mass or other etiology with admission t-bili 6.3. t-bili now improved to 2.1 after stent placement. Bile duct brushings sent to pathology during ERCP on 9/26/21 by Dr. Erika Arzola. CEA normal, CA 19-9 pending. Chest CT shows small (2mm) pulmonary nodules of unclear etiology which are too small to biopsy or see on PET. Will need to repeat chest CT in 2-3 months.  I suspect patient may need EUS for further evaluation, biopsy and pathologic diagnosis. -- Awaiting pathology from bile duct brushings taken during ERCP    2. Cholelithiasis:   General surgery consulted and evaluated: considering cholecystectomy pending ERCP results    3. Hypokalemia:   Is currently receiving repletion. 4. HTN:   Currently with stable BP and home HCTZ on hold per primary team.     5. Diarrhea: May be related to biliary obstruction. I have personally seen and evaluated the patient in conjunction with Analisa Parks NP. I find the patient's history and physical exam are consistent with the NP's documentation. I agree with the above assessment and plan, which I have edited if needed. The obstructive jaundice with CBD stricture is concerning for malignant process, but discrete mass seen on imaging. Pt may need EUS for further evaluation and biopsy.      Signed By: Heather Calderon MD

## 2021-09-27 NOTE — PROGRESS NOTES
[facility] [address]  [address]  [phone]      Medical Progress Note      NAME: Moni Pablo   :  1955  MRM:  [id number]    Date/Time of service: 2021  10:17 AM       Subjective:     Chief Complaint:  Patient was personally seen and examined by me during this time period. Chart reviewed. Denied abd pain, chest pain, N/V       Objective:       Vitals:       Last 24hrs VS reviewed since prior progress note.  Most recent are:    Visit Vitals  /85 (BP 1 Location: Left upper arm, BP Patient Position: At rest)   Pulse 67   Temp 97.9 °F (36.6 °C)   Resp 18   Ht 5' 4\" (1.626 m)   Wt 124.7 kg (275 lb)   SpO2 95%   BMI 47.20 kg/m²     SpO2 Readings from Last 6 Encounters:   21 95%   21 98%   20 98%   19 98%   19 98%   19 99%            Intake/Output Summary (Last 24 hours) at 2021 1017  Last data filed at 2021 0816  Gross per 24 hour   Intake 4126.25 ml   Output    Net 4126.25 ml        Exam:     Physical Exam:    Gen:  Well-developed, well-nourished, obese, in no acute distress  HEENT:  Pink conjunctivae, PERRL, hearing intact to voice, moist mucous membranes  Neck:  Supple, without masses, thyroid non-tender  Resp:  No accessory muscle use, clear breath sounds without wheezes rales or rhonchi  Card:  No murmurs, normal S1, S2 without thrills, bruits or peripheral edema  Abd:  Soft, non-tender, non-distended, normoactive bowel sounds are present  Musc:  No cyanosis or clubbing  Skin:  No rashes   Neuro:  Cranial nerves 3-12 are grossly intact, follows commands appropriately  Psych:  Good insight, oriented to person, place and time, alert    Medications Reviewed: (see below)    Lab Data Reviewed: (see below)    ______________________________________________________________________    Medications:     Current Facility-Administered Medications   Medication Dose Route Frequency    dextrose 5% - 0.45% NaCl with KCl 20 mEq/L infusion  75 mL/hr IntraVENous CONTINUOUS    phenol throat spray (CHLORASEPTIC) 1 Spray  1 Spray Oral PRN    levothyroxine (SYNTHROID) tablet 125 mcg  125 mcg Oral 6am    hydrALAZINE (APRESOLINE) 20 mg/mL injection 10 mg  10 mg IntraVENous Q4H PRN    melatonin (rapid dissolve) tablet 5 mg  5 mg Oral QHS PRN    alum-mag hydroxide-simeth (MYLANTA) oral suspension 30 mL  30 mL Oral Q4H PRN    LORazepam (ATIVAN) injection 0.5 mg  0.5 mg IntraVENous Q6H PRN    oxyCODONE IR (ROXICODONE) tablet 5 mg  5 mg Oral Q4H PRN    morphine injection 2 mg  2 mg IntraVENous Q4H PRN    diphenhydrAMINE (BENADRYL) injection 25 mg  25 mg IntraVENous Q6H PRN    diphenhydrAMINE (BENADRYL) capsule 25 mg  25 mg Oral Q6H PRN    simethicone (MYLICON) tablet 80 mg  80 mg Oral QID PRN    sodium chloride (NS) flush 5-40 mL  5-40 mL IntraVENous Q8H    sodium chloride (NS) flush 5-40 mL  5-40 mL IntraVENous PRN    acetaminophen (TYLENOL) tablet 650 mg  650 mg Oral Q6H PRN    Or    acetaminophen (TYLENOL) suppository 650 mg  650 mg Rectal Q6H PRN    polyethylene glycol (MIRALAX) packet 17 g  17 g Oral DAILY PRN    bisacodyL (DULCOLAX) suppository 10 mg  10 mg Rectal DAILY PRN    ondansetron (ZOFRAN) injection 4 mg  4 mg IntraVENous Q6H PRN          Lab Review:     Recent Labs     09/27/21  0325 09/25/21  0509 09/24/21  1855   WBC 5.6 5.7 6.7   HGB 10.6* 11.7 12.9   HCT 31.9* 34.3* 37.4    218 234     Recent Labs     09/27/21  0325 09/26/21  0055 09/25/21  0509    142 139   K 3.3* 3.3* 3.0*   * 111* 108   CO2 27 29 30   GLU 88 84 84   BUN 4* 5* 6   CREA 0.78 0.79 0.88   CA 8.4* 8.1* 8.3*   MG 1.7  --   --    PHOS 2.7  --   --    ALB 2.2* 2.5* 2.8*   TBILI 2.1* 5.0* 5.3*   * 561* 555*     Lab Results   Component Value Date/Time    Glucose (POC) 91 10/06/2013 08:45 AM          Assessment / Plan:     Principal Problem:    71 yo hx of HTN, UC, presented w/ weakness, weight loss, elevated LFTs, CBD dilatation. Found to have a biliary/pancreatic head mass    1) Biliary/pancreatic mass/obstructive jaundice/biliary dilatation: s/p ERCP on 09/26. Will await path. Consult Oncology    2) Cholelithiasis: biliary obstruction likely due to mass rather than choledocholithiasis. Will defer to Gen surg    3) HTN: BP stable. Holding HCTZ    4) Hypothyroid: cont synthroid    5) Abnormal U/A: urine Cx neg. No need for abx at this point    6) Diarrhea: due to biliary issues.   Will start pro-biotics, questran, imodium prn    Total time spent with patient: 35 min **I personally saw and examined the patient during this time period**                 Care Plan discussed with: Patient, nursing     Discussed:  Care Plan    Prophylaxis:  SCD's    Disposition:  Home w/Family           ___________________________________________________    Attending Physician: Ros Marin MD

## 2021-09-27 NOTE — PROGRESS NOTES
9/27/21  12:28 PM    Care Management Initial Evaluation:    CM reviewed EMR and met with patient in room to complete the initial evaluation. Confirmed charted demographics.      Reason for Admission:  Calculus of gallbladder                     RUR Score:     12%                Plan for utilizing home health:    No previous history      PCP: First and Last name:  Joie Solano MD     Name of Practice:    Are you a current patient: Yes/No: Yes   Approximate date of last visit: 3/2021   Can you participate in a virtual visit with your PCP:                     Current Advanced Directive/Advance Care Plan: Full Code      Healthcare Decision Maker:   Click here to complete [address] including selection of the Healthcare Decision Maker Relationship (ie \"Primary\")  Spouse-- Analisa Bolanos                        Transition of Care Plan:

## 2021-09-28 ENCOUNTER — ANESTHESIA (OUTPATIENT)
Dept: ENDOSCOPY | Age: 66
DRG: 435 | End: 2021-09-28
Payer: MEDICARE

## 2021-09-28 ENCOUNTER — ANESTHESIA EVENT (OUTPATIENT)
Dept: ENDOSCOPY | Age: 66
DRG: 435 | End: 2021-09-28
Payer: MEDICARE

## 2021-09-28 PROBLEM — C25.0 MALIGNANT NEOPLASM OF HEAD OF PANCREAS (HCC): Status: ACTIVE | Noted: 2021-09-28

## 2021-09-28 LAB
ALBUMIN SERPL-MCNC: 2.4 G/DL (ref 3.5–5)
ALBUMIN/GLOB SERPL: 0.6 {RATIO} (ref 1.1–2.2)
ALP SERPL-CCNC: 211 U/L (ref 45–117)
ALT SERPL-CCNC: 335 U/L (ref 12–78)
ANION GAP SERPL CALC-SCNC: 3 MMOL/L (ref 5–15)
AST SERPL-CCNC: 144 U/L (ref 15–37)
BILIRUB SERPL-MCNC: 1.7 MG/DL (ref 0.2–1)
BUN SERPL-MCNC: 3 MG/DL (ref 6–20)
BUN/CREAT SERPL: 4 (ref 12–20)
CALCIUM SERPL-MCNC: 8.4 MG/DL (ref 8.5–10.1)
CHLORIDE SERPL-SCNC: 110 MMOL/L (ref 97–108)
CO2 SERPL-SCNC: 28 MMOL/L (ref 21–32)
COMMENT, HOLDF: NORMAL
CREAT SERPL-MCNC: 0.76 MG/DL (ref 0.55–1.02)
GLOBULIN SER CALC-MCNC: 4 G/DL (ref 2–4)
GLUCOSE SERPL-MCNC: 96 MG/DL (ref 65–100)
MAGNESIUM SERPL-MCNC: 2.2 MG/DL (ref 1.6–2.4)
PHOSPHATE SERPL-MCNC: 3.5 MG/DL (ref 2.6–4.7)
POTASSIUM SERPL-SCNC: 3.5 MMOL/L (ref 3.5–5.1)
PROT SERPL-MCNC: 6.4 G/DL (ref 6.4–8.2)
SAMPLES BEING HELD,HOLD: NORMAL
SODIUM SERPL-SCNC: 141 MMOL/L (ref 136–145)

## 2021-09-28 PROCEDURE — 2709999900 HC NON-CHARGEABLE SUPPLY: Performed by: INTERNAL MEDICINE

## 2021-09-28 PROCEDURE — 88305 TISSUE EXAM BY PATHOLOGIST: CPT

## 2021-09-28 PROCEDURE — 83735 ASSAY OF MAGNESIUM: CPT

## 2021-09-28 PROCEDURE — 74011250637 HC RX REV CODE- 250/637: Performed by: INTERNAL MEDICINE

## 2021-09-28 PROCEDURE — 74011250636 HC RX REV CODE- 250/636: Performed by: NURSE ANESTHETIST, CERTIFIED REGISTERED

## 2021-09-28 PROCEDURE — 76040000007: Performed by: INTERNAL MEDICINE

## 2021-09-28 PROCEDURE — 74011250636 HC RX REV CODE- 250/636: Performed by: HOSPITALIST

## 2021-09-28 PROCEDURE — 88172 CYTP DX EVAL FNA 1ST EA SITE: CPT

## 2021-09-28 PROCEDURE — 94760 N-INVAS EAR/PLS OXIMETRY 1: CPT

## 2021-09-28 PROCEDURE — 80053 COMPREHEN METABOLIC PANEL: CPT

## 2021-09-28 PROCEDURE — 77030028690 HC NDL ASPIR ULTRSND BSC -E: Performed by: INTERNAL MEDICINE

## 2021-09-28 PROCEDURE — 74011250637 HC RX REV CODE- 250/637: Performed by: HOSPITALIST

## 2021-09-28 PROCEDURE — 65270000029 HC RM PRIVATE

## 2021-09-28 PROCEDURE — 84100 ASSAY OF PHOSPHORUS: CPT

## 2021-09-28 PROCEDURE — 74011250636 HC RX REV CODE- 250/636: Performed by: INTERNAL MEDICINE

## 2021-09-28 PROCEDURE — 0FC98ZZ EXTIRPATION OF MATTER FROM COMMON BILE DUCT, VIA NATURAL OR ARTIFICIAL OPENING ENDOSCOPIC: ICD-10-PCS | Performed by: INTERNAL MEDICINE

## 2021-09-28 PROCEDURE — 0FBG3ZX EXCISION OF PANCREAS, PERCUTANEOUS APPROACH, DIAGNOSTIC: ICD-10-PCS | Performed by: INTERNAL MEDICINE

## 2021-09-28 PROCEDURE — 76060000032 HC ANESTHESIA 0.5 TO 1 HR: Performed by: INTERNAL MEDICINE

## 2021-09-28 PROCEDURE — 99232 SBSQ HOSP IP/OBS MODERATE 35: CPT | Performed by: INTERNAL MEDICINE

## 2021-09-28 PROCEDURE — 74011250637 HC RX REV CODE- 250/637: Performed by: SURGERY

## 2021-09-28 PROCEDURE — 88173 CYTOPATH EVAL FNA REPORT: CPT

## 2021-09-28 RX ORDER — EPINEPHRINE 0.1 MG/ML
1 INJECTION INTRACARDIAC; INTRAVENOUS
Status: DISCONTINUED | OUTPATIENT
Start: 2021-09-28 | End: 2021-09-28 | Stop reason: HOSPADM

## 2021-09-28 RX ORDER — SODIUM CHLORIDE 9 MG/ML
INJECTION, SOLUTION INTRAVENOUS
Status: DISCONTINUED | OUTPATIENT
Start: 2021-09-28 | End: 2021-09-28 | Stop reason: HOSPADM

## 2021-09-28 RX ORDER — LOPERAMIDE HYDROCHLORIDE 2 MG/1
2 CAPSULE ORAL
Qty: 20 CAPSULE | Refills: 0 | Status: SHIPPED | OUTPATIENT
Start: 2021-09-28 | End: 2021-10-08

## 2021-09-28 RX ORDER — FLUMAZENIL 0.1 MG/ML
0.2 INJECTION INTRAVENOUS
Status: DISCONTINUED | OUTPATIENT
Start: 2021-09-28 | End: 2021-09-28 | Stop reason: HOSPADM

## 2021-09-28 RX ORDER — DEXTROMETHORPHAN/PSEUDOEPHED 2.5-7.5/.8
1.2 DROPS ORAL
Status: DISCONTINUED | OUTPATIENT
Start: 2021-09-28 | End: 2021-09-28 | Stop reason: HOSPADM

## 2021-09-28 RX ORDER — MIDAZOLAM HYDROCHLORIDE 1 MG/ML
.25-5 INJECTION, SOLUTION INTRAMUSCULAR; INTRAVENOUS
Status: DISCONTINUED | OUTPATIENT
Start: 2021-09-28 | End: 2021-09-28 | Stop reason: HOSPADM

## 2021-09-28 RX ORDER — CHOLESTYRAMINE 4 G/4.8G
4 POWDER, FOR SUSPENSION ORAL 2 TIMES DAILY WITH MEALS
Qty: 120 PACKET | Refills: 0 | Status: SHIPPED | OUTPATIENT
Start: 2021-09-28 | End: 2021-12-27 | Stop reason: SINTOL

## 2021-09-28 RX ORDER — ATROPINE SULFATE 0.1 MG/ML
0.4 INJECTION INTRAVENOUS
Status: DISCONTINUED | OUTPATIENT
Start: 2021-09-28 | End: 2021-09-28 | Stop reason: HOSPADM

## 2021-09-28 RX ORDER — NALOXONE HYDROCHLORIDE 0.4 MG/ML
0.4 INJECTION, SOLUTION INTRAMUSCULAR; INTRAVENOUS; SUBCUTANEOUS
Status: DISCONTINUED | OUTPATIENT
Start: 2021-09-28 | End: 2021-09-28 | Stop reason: HOSPADM

## 2021-09-28 RX ORDER — PROPOFOL 10 MG/ML
INJECTION, EMULSION INTRAVENOUS AS NEEDED
Status: DISCONTINUED | OUTPATIENT
Start: 2021-09-28 | End: 2021-09-28 | Stop reason: HOSPADM

## 2021-09-28 RX ORDER — OXYCODONE HYDROCHLORIDE 5 MG/1
5 TABLET ORAL
Qty: 10 TABLET | Refills: 0 | Status: SHIPPED | OUTPATIENT
Start: 2021-09-28 | End: 2021-10-04

## 2021-09-28 RX ORDER — ONDANSETRON 4 MG/1
4 TABLET, ORALLY DISINTEGRATING ORAL
Qty: 20 TABLET | Refills: 0 | Status: SHIPPED | OUTPATIENT
Start: 2021-09-28 | End: 2021-12-27

## 2021-09-28 RX ORDER — SODIUM CHLORIDE 9 MG/ML
50 INJECTION, SOLUTION INTRAVENOUS CONTINUOUS
Status: DISPENSED | OUTPATIENT
Start: 2021-09-28 | End: 2021-09-28

## 2021-09-28 RX ADMIN — CHOLESTYRAMINE 4 G: 4 POWDER, FOR SUSPENSION ORAL at 18:06

## 2021-09-28 RX ADMIN — LEVOTHYROXINE SODIUM 125 MCG: 125 TABLET ORAL at 06:05

## 2021-09-28 RX ADMIN — SODIUM CHLORIDE 50 ML/HR: 9 INJECTION, SOLUTION INTRAVENOUS at 13:57

## 2021-09-28 RX ADMIN — SODIUM CHLORIDE: 9 INJECTION, SOLUTION INTRAVENOUS at 12:38

## 2021-09-28 RX ADMIN — Medication 10 ML: at 06:04

## 2021-09-28 RX ADMIN — ONDANSETRON 4 MG: 2 INJECTION INTRAMUSCULAR; INTRAVENOUS at 13:42

## 2021-09-28 RX ADMIN — PROPOFOL 75 MCG/KG/MIN: 10 INJECTION, EMULSION INTRAVENOUS at 12:52

## 2021-09-28 RX ADMIN — MORPHINE SULFATE 2 MG: 2 INJECTION, SOLUTION INTRAMUSCULAR; INTRAVENOUS at 13:56

## 2021-09-28 RX ADMIN — Medication 10 ML: at 15:11

## 2021-09-28 RX ADMIN — PROPOFOL 50 MG: 10 INJECTION, EMULSION INTRAVENOUS at 12:50

## 2021-09-28 RX ADMIN — Medication 10 ML: at 22:41

## 2021-09-28 RX ADMIN — OXYCODONE 5 MG: 5 TABLET ORAL at 15:10

## 2021-09-28 RX ADMIN — PROPOFOL 100 MG: 10 INJECTION, EMULSION INTRAVENOUS at 12:49

## 2021-09-28 NOTE — PROGRESS NOTES
Patient complaining of abdominal pain 8/10. Dr Aris Chen and Dr Kuhn Devoid notified. Dr Aris Chen at bedside talking and examining patient. Patient given morphine sulfate as ordered.

## 2021-09-28 NOTE — PERIOP NOTES
Report from April, CRNA, see anesthesia record. ABD remains soft and non-tender post procedure. Pt has no complaints at this time and tolerated the procedure well. Endoscope was pre-cleaned at bedside immediately following procedure by Beatriz Jose.

## 2021-09-28 NOTE — PROGRESS NOTES
Patient complaining of her stomach \"feeling sick\". Patient has Zofran 4mg ordered, spoke to Dr Som Wolf who approved.

## 2021-09-28 NOTE — DISCHARGE SUMMARY
[facility]  [address] [facility], [address] [place] [address]  [phone]    Physician Discharge Summary     Patient ID:  Ana Flores  [id number]  72 y.o.  1955    Admit date: 9/24/2021    Discharge date and time: 9/28/2021 4:28 PM    Admission Diagnoses: Transaminitis [R74.01]  Calculus of gallbladder with biliary obstruction but without cholecystitis [K80.21]  Common bile duct dilation [K83.8]    Discharge Diagnoses:  Principal Diagnosis Malignant neoplasm of head of pancreas [facility])                                            Principal Problem:    Malignant neoplasm of head of pancreas ([facility] (9/28/2021)    Active Problems:    Hypothyroidism (3/23/2011)      Essential hypertension, benign (3/23/2011)      Common bile duct dilation (9/24/2021)      Calculus of gallbladder with biliary obstruction but without cholecystitis (9/24/2021)      Elevated LFTs (9/24/2021)      Hypokalemia due to loss of potassium (9/25/2021)           Hospital Course:     73 yo hx of HTN, UC, presented w/ weakness, weight loss, elevated LFTs, CBD dilatation. Found to have a biliary/pancreatic head mass     1) Biliary/pancreatic mass/obstructive jaundice/biliary dilatation: s/p ERCP w/ stent on 09/26. GI will also perform EUS w/ biopsy today. Oncology also following. Plan for discharge after procedure. Will f/u with GI, Oncology, surg onc for biopsy results and further management     2) Cholelithiasis: biliary obstruction likely due to mass rather than choledocholithiasis. No need for cholecystectomy per surgery      3) HTN: BP stable. Can resume HCTZ on discharge     4) Hypothyroid: cont synthroid     5) Abnormal U/A: urine Cx neg. No need for abx at this point     6) Diarrhea: due to biliary issues.   Will cont pro-biotics, questran, imodium prn    PCP: Shanel Valentino MD     Consults: GI, gen surg, oncology    Significant Diagnostic Studies: ERCP, EUS with biopsy    Discharge Exam:  Physical Exam:    See daily note    Disposition: home  Discharge Condition: Stable    Patient Instructions:   Current Discharge Medication List      START taking these medications    Details   cholestyramine-aspartame (QUESTRAN LIGHT) 4 gram packet Take 1 Packet by mouth two (2) times daily (with meals). Qty: 120 Packet, Refills: 0      loperamide (IMODIUM) 2 mg capsule Take 1 Capsule by mouth every eight (8) hours as needed for Diarrhea for up to 10 days. Qty: 20 Capsule, Refills: 0      oxyCODONE IR (ROXICODONE) 5 mg immediate release tablet Take 1 Tablet by mouth every six (6) hours as needed for Pain for up to 5 days. Max Daily Amount: 20 mg. Indications: pain  Qty: 10 Tablet, Refills: 0    Associated Diagnoses: Calculus of gallbladder with biliary obstruction but without cholecystitis      ondansetron (Zofran ODT) 4 mg disintegrating tablet Take 1 Tablet by mouth every eight (8) hours as needed for Nausea or Vomiting. Qty: 20 Tablet, Refills: 0         CONTINUE these medications which have NOT CHANGED    Details   hydroCHLOROthiazide (HYDRODIURIL) 25 mg tablet TAKE 1 TABLET BY MOUTH EVERY DAY  Qty: 90 Tablet, Refills: 1      levothyroxine (SYNTHROID) 125 mcg tablet TAKE 1 TABLET BY MOUTH EVERY DAY BEFORE BREAKFAST  Qty: 90 Tablet, Refills: 1      cetirizine (ZYRTEC) 10 mg tablet Take 1 Tab by mouth daily.   Qty: 30 Tab, Refills: 0    Associated Diagnoses: Seasonal allergic rhinitis, unspecified trigger; Cough         STOP taking these medications       nitrofurantoin, macrocrystal-monohydrate, (MACROBID) 100 mg capsule Comments:   Reason for Stopping:         traMADoL (ULTRAM) 50 mg tablet Comments:   Reason for Stopping:             Activity: Activity as tolerated  Diet: Low fat, Low cholesterol  Wound Care: None needed    Follow-up with  Follow-up Information     Follow up With Specialties Details Why Hawa Shane MD Hematology and Oncology, Internal Medicine, Hematology, Oncology Go on 10/8/2021 appt at 1 pm to review pathology and discuss possible treatment options [address] [facility]  [phone]      Eloy Borges MD Internal Medicine Schedule an appointment as soon as possible for a visit in 5 days  [facility] [address]  [address] [facility]  [phone]      Louie Fitzpatrick MD General Surgery, Surgical Oncology, Colon and Rectal Surgery Schedule an appointment as soon as possible for a visit in 2 weeks  [address] [facility] [address]  [phone]            Follow-up tests/labs none    Signed:  Omer Villarreal MD  9/28/2021  4:28 PM  **I personally spent 35 min on discharge**

## 2021-09-28 NOTE — ANESTHESIA POSTPROCEDURE EVALUATION
Procedure(s):  ENDOSCOPIC ULTRASOUND (EUS)  FINE NEEDLE ASPIRATION. MAC    Anesthesia Post Evaluation      Multimodal analgesia: multimodal analgesia not used between 6 hours prior to anesthesia start to PACU discharge  Patient location during evaluation: PACU  Patient participation: complete - patient participated  Level of consciousness: awake  Pain management: adequate  Airway patency: patent  Anesthetic complications: no  Cardiovascular status: acceptable, blood pressure returned to baseline and hemodynamically stable  Respiratory status: acceptable  Hydration status: acceptable  Post anesthesia nausea and vomiting:  controlled      INITIAL Post-op Vital signs:   Vitals Value Taken Time   /95 09/28/21 1341   Temp     Pulse 73 09/28/21 1345   Resp 19 09/28/21 1345   SpO2 98 % 09/28/21 1345   Vitals shown include unvalidated device data.

## 2021-09-28 NOTE — PROGRESS NOTES
Varsha Qualia  1955  [id number]    Situation:  Verbal report received from: Mindy LEDESMA  Procedure: Procedure(s):  ENDOSCOPIC ULTRASOUND (EUS)  FINE NEEDLE ASPIRATION    Background:    Preoperative diagnosis: abnormal CT  Postoperative diagnosis: Pancreas Head Mass    :  Dr. Sanchez Mendez  Assistant(s): Endoscopy Technician-1: Willie Black  Endoscopy RN-1: Peyman Morrell RN    Specimens: * No specimens in log *  H. Pylori  no    Assessment:  Intra-procedure medications     Anesthesia gave intra-procedure sedation and medications, see anesthesia flow sheet yes    Intravenous fluids: NS@ KVO     Vital signs stable yes    Abdominal assessment: round and soft yes    Recommendation:  Discharge patient per MD order yes.   Return to floor yes  Family or Friend  Sister in waiting room  Permission to share finding with family or friend yes

## 2021-09-28 NOTE — PROGRESS NOTES
Bedside and Verbal shift change report given to [address] (oncoming nurse) by Nayana Mendoza (offgoing nurse). Report included the following information SBAR, Kardex and MAR.

## 2021-09-28 NOTE — PROGRESS NOTES
Verbal shift change report given to Lucia Mena RN (oncoming nurse) by Shakeel Pringle RN (offgoing nurse). Report included the following information SBAR, Kardex, Procedure Summary, Intake/Output, MAR, Accordion, Recent Results and Med Rec Status. As above

## 2021-09-28 NOTE — PROCEDURES
Isadora Sheth M.D.  [phone]       2021                EUS Operative Report  Myke Hoover  :  1955  [address] [place] [address] Medical Record Number:  [id number]      Procedure Type: EUS with Fine Needle Biopsy      Indications: Bile duct stricture, Abnormal CT scan showing dilated bile ducts and biliary stricture    Pre-operative Diagnosis: see indication above    Post-operative Diagnosis:  See findings below    : Todd Frazier MD    Referring Provider: Kate Grubbs MD    Procedure Details:    Exam:  Airway: clear, no airway problems anticipated  Heart: RRR, without gallops or rubs  Lungs: clear bilaterally without wheezes, crackles, or rhonchi  Abdomen: soft, nontender, nondistended, bowel sounds present  Mental Status: awake, alert and oriented to person, place and time     Anethesia/Sedation:  MAC anesthesia      Procedure Details   After infom consent was obtained for the procedure, with all risks and benefits of procedure explained the patient was taken to the endoscopy suite and placed in the left lateral decubitus position. Following sequential administration of sedation as per above, the linear echoendoscope was inserted into the mouth and advanced under direct vision to second portion of the duodenum. A careful inspection was made as the gastroscope was withdrawn, including a retroflexed view of the proximal stomach; findings and interventions are described below. Findings:     Endoscopic:   Esophagus:normal   Stomach: normal    Duodenum/jejunum: normal and protruding biliary stent    Ultrasound:   Esophagus: normal findings   Stomach: normal findings   Pancreas:     Areas examined: the entire gland    Parenchyma: -Evidence of a hypoechoic mass with poorly defined borders seen in the head of the pancreas. It appeared involving the CBD where a stent is seen, however it did not involve the major vessels.  It measured about 3.2 cm in widest diameter on one view.    Pancreatic Duct: normal findings, not dilated   Liver:     Parenchyma: normal    Gallbladder: normal    Bile Duct: the common bile duct is dilated in the proximal and mid portion and a plastic stent could be seen               Lymph Node: no adenopathy        Specimen Removed:  Biopsy of  the head of the pancreas    Complications: None. EBL:  None. Interventions: Fine needle aspirate for biopsy of  pancreas  using a 22 gauge needle with 6 of passes with preliminary results suggesting malignancy, adenocarcinoma as interpreted during the procedure by Dr. Joshua Espinoza. Impression:     Pancreas head mass with fine needle biopsy showing adenocarcinoma on preliminary cytology    Recommendations: Follow-up on pathology  Follow-up with oncology, will need further evaluation of pulmonary nodule  Surgical oncology consultation  Resume GI lite diet today and can discharge in am from GI standpoint  Will need outpatient colonoscopy with history of ulcerative procto-sigmoiditis, and one adenoma polyp removed, last colonoscopy was performed in April 2017 and was due for repeat in 2019 but she has not followed up with the practice since her last colonoscopy.       Edie Munoz MD

## 2021-09-28 NOTE — PERIOP NOTES
TRANSFER - OUT REPORT:    Verbal report given to Sanam Bal RN (name) on Luz Garcia  being transferred to 5th floor (unit) for routine post - op       Report consisted of patients Situation, Background, Assessment and   Recommendations(SBAR). Information from the following report(s) Procedure Summary and Recent Results was reviewed with the receiving nurse. Lines:   Peripheral IV 09/26/21 Right Hand (Active)   Site Assessment Clean, dry, & intact 09/28/21 1222   Phlebitis Assessment 0 09/28/21 1222   Infiltration Assessment 0 09/28/21 1222   Dressing Status Clean, dry, & intact 09/28/21 1222   Dressing Type Tape;Transparent 09/28/21 1222   Hub Color/Line Status Green; Infusing 09/28/21 1222   Action Taken Open ports on tubing capped 09/27/21 2015   Alcohol Cap Used Yes 09/27/21 2015        Opportunity for questions and clarification was provided.       Patient transported with:   [facility]

## 2021-09-28 NOTE — PROGRESS NOTES
Spiritual Care Assessment/Progress Note  [address]      NAME: Victor Hugo Babin      MRN: [id number]  AGE: 72 y.o. SEX: female  Denominational Affiliation: Hindu   Language: English     9/28/2021     Total Time (in minutes): 7     Spiritual Assessment begun in [facility]  MED SURG 2 through conversation with:         []Patient        [] Family    [] Friend(s)        Reason for Consult: Initial/Spiritual assessment, patient floor     Spiritual beliefs: (Please include comment if needed)     [] Identifies with a kush tradition:         [] Supported by a kush community:            [] Claims no spiritual orientation:           [] Seeking spiritual identity:                [] Adheres to an individual form of spirituality:           [x] Not able to assess:                           Identified resources for coping:      [] Prayer                               [] Music                  [] Guided Imagery     [] Family/friends                 [] Pet visits     [] Devotional reading                         [x] Unknown     [] Other:                                               Interventions offered during this visit: (See comments for more details)    Patient Interventions: Other (comment) (Unable to assess)           Plan of Care:     [] Support spiritual and/or cultural needs    [] Support AMD and/or advance care planning process      [] Support grieving process   [] Coordinate Rites and/or Rituals    [] Coordination with community clergy   [] No spiritual needs identified at this time   [] Detailed Plan of Care below (See Comments)  [] Make referral to Music Therapy  [] Make referral to Pet Therapy     [] Make referral to Addiction services  [] Make referral to [facility]  [] Make referral to Spiritual Care Partner  [] No future visits requested        [x] Follow up upon further referrals     Comments:  attempted to visit Mrs. Melanie Santiago for an initial spiritual assessment on the Med. Surgical Unit.  Mrs. Melanie Santiago was lying in bed and appeared to be resting comfortably.  did not disturb her at this time. 's are available for further support upon referral  Alessandro Arriaga. [facility].      Paging Service: [phone])

## 2021-09-28 NOTE — ANESTHESIA PREPROCEDURE EVALUATION
Relevant Problems   CARDIOVASCULAR   (+) Essential hypertension, benign      ENDOCRINE   (+) Hypothyroidism   (+) Obesity, morbid (HCC)       Anesthetic History   No history of anesthetic complications            Review of Systems / Medical History  Patient summary reviewed and pertinent labs reviewed    Pulmonary  Within defined limits                 Neuro/Psych   Within defined limits           Cardiovascular    Hypertension              Exercise tolerance: >4 METS     GI/Hepatic/Renal               Comments: [facility] End[facility]/Other      Hypothyroidism  Morbid obesity and arthritis (RA and OA)     Other Findings   Comments: Patient with elevated LFT's.             Physical Exam    Airway  Mallampati: II  TM Distance: 4 - 6 cm  Neck ROM: normal range of motion   Mouth opening: Normal     Cardiovascular    Rhythm: regular  Rate: normal         Dental    Dentition: Upper dentition intact and Lower dentition intact     Pulmonary  Breath sounds clear to auscultation               Abdominal         Other Findings            Anesthetic Plan    ASA: 3  Anesthesia type: MAC          Induction: Intravenous  Anesthetic plan and risks discussed with: Patient

## 2021-09-28 NOTE — PROGRESS NOTES
[facility] [place] [facility] [place] [address] [facility]  [facility]: [phone]  F: [phone]      Reason for Visit:   Kaylyn Schlatter is a 72 y.o. female who is seen for evaluation of new bilary or pancreatic head mass    Hematology/Oncology Treatment History:     Hematology Oncology Treatment History:     Diagnosis:     Stage: Pending    Pathology: 21 ERCP/ Dr Cesar Bryson: brushings from common bile duct: path pending    Prior Treatment: None    Current Treatment: pending    History of Present Illness:   Kaylyn Schlatter is a pleasant 72 y.o. female who was admitted on 21 for malaise, transaminitis, associated with dark urine, dysuria and icterus. Has been on Macrobid and cefuroxime by PCP given complaint of dysuria and dark urine. She then developed nausea and went to [facility]. She was eventually found to have transaminitis and was referred to ED. ED labs notable for K+ 2.7 Creat 1.03, t-bili 6.3, , . CT A/P revealed mild/mod intrahepatic biliary ductal dilatation; prominence of CBD and hydropic gallbladder, suggestive of stricture/stenosis/mass of distal CBD. MRI of abdomen showed narrowing of CBD pancreatic head suggestive of extrinisic compression concerning for periampullary mass vs eccentric intraluminal lesion. GI evaluated and completed ERCP on 21 notable for distal CBD stricture; possible tumor/mass located in the CBD; brushed and biliary stent placed. General surgery evaluated patient for cholelithiasis, but team is awaiting results from recent pathology before making decision on whether cholecystectomy is needed or not. Today, reports diarrhea that started this am. Denies RUQ pain, but states she has abdominal discomfort across the front of her abdomen, associated with nausea. Denies SOB. Of note, her mother  of pancreatic cancer age 80. Has had numerous colonoscopies due to Ulcerative colitis; last one > 3 yrs ago.        PMHx: OA and Rheumatoid arthritis in lower back, Ulcerative colitis, HTN, Hypothyroidism  PSurgHx: , Cyst removed from left breast  SHx: , has 3 children (1 daughter and 2 sons). Works as  in [employer]. Nonsmoker, no EtOH.  in rehab recovering from [facility]. FHx: Mother  of pancreatic cancer age 80. Brother and son had colon cancer. Interval History:     Pt tolerated procedure; having some nausea and pain to lower abd area. Has received medication and symptoms improving. Aware that they found a mass in the pancreas and recommending consult with surgery. Sister at bedside.          Current Facility-Administered Medications   Medication Dose Route Frequency    0.9% sodium chloride infusion  50 mL/hr IntraVENous CONTINUOUS    dextrose 5% - 0.45% NaCl with KCl 20 mEq/L infusion  75 mL/hr IntraVENous CONTINUOUS    L.acidophilus-paracasei-S.thermophil-bifidobacter (RISAQUAD) 8 billion cell capsule  1 Capsule Oral DAILY    loperamide (IMODIUM) capsule 2 mg  2 mg Oral Q8H PRN    cholestyramine-aspartame (QUESTRAN LIGHT) packet 4 g  4 g Oral BID WITH MEALS    phenol throat spray (CHLORASEPTIC) 1 Spray  1 Spray Oral PRN    levothyroxine (SYNTHROID) tablet 125 mcg  125 mcg Oral 6am    hydrALAZINE (APRESOLINE) 20 mg/mL injection 10 mg  10 mg IntraVENous Q4H PRN    melatonin (rapid dissolve) tablet 5 mg  5 mg Oral QHS PRN    alum-mag hydroxide-simeth (MYLANTA) oral suspension 30 mL  30 mL Oral Q4H PRN    LORazepam (ATIVAN) injection 0.5 mg  0.5 mg IntraVENous Q6H PRN    oxyCODONE IR (ROXICODONE) tablet 5 mg  5 mg Oral Q4H PRN    morphine injection 2 mg  2 mg IntraVENous Q4H PRN    diphenhydrAMINE (BENADRYL) injection 25 mg  25 mg IntraVENous Q6H PRN    diphenhydrAMINE (BENADRYL) capsule 25 mg  25 mg Oral Q6H PRN    simethicone (MYLICON) tablet 80 mg  80 mg Oral QID PRN    sodium chloride (NS) flush 5-40 mL  5-40 mL IntraVENous Q8H    sodium chloride (NS) flush 5-40 mL  5-40 mL IntraVENous PRN    acetaminophen (TYLENOL) tablet 650 mg  650 mg Oral Q6H PRN    Or    acetaminophen (TYLENOL) suppository 650 mg  650 mg Rectal Q6H PRN    polyethylene glycol (MIRALAX) packet 17 g  17 g Oral DAILY PRN    bisacodyL (DULCOLAX) suppository 10 mg  10 mg Rectal DAILY PRN    ondansetron (ZOFRAN) injection 4 mg  4 mg IntraVENous Q6H PRN       Allergies   Allergen Reactions    Sulfa (Sulfonamide Antibiotics) Swelling          Review of Systems: A complete review of systems was obtained, reviewed. Pertinent findings reviewed above. Physical Exam:     Visit Vitals  BP (!) 149/87 (BP 1 Location: Right upper arm, BP Patient Position: At rest)   Pulse 60   Temp 97.4 °F (36.3 °C)   Resp 20   Ht 5' 4\" (1.626 m)   Wt 126.6 kg (279 lb 1.6 oz)   SpO2 95%   BMI 47.91 kg/m²     ECOG PS: 1  General: obese; no distress  Eyes: PERRLA, EOMI, Anicteric sclerae  HENT: atraumatic, oropharynx clear  Neck: supple, no JVD or thyromegaly  Lymphatic: no cervical, supraclavicular, axillary or inguinal adenopathy  Respiratory: CTAB, normal respiratory effort  CV: normal rate, regular rhythm, no murmurs, no peripheral edema  GI: soft, nontender, nondistended, no masses, no hepatomegaly, no splenomegaly  MS: digits without clubbing or cyanosis. Skin: no rashes, no ecchymoses, no petechia. normal temperature, turgor, and texture. Psych: alert, oriented, appropriate affect, normal judgment/insight    Results:     Lab Results   Component Value Date/Time    WBC 5.6 09/27/2021 03:25 AM    HGB 10.6 (L) 09/27/2021 03:25 AM    HCT 31.9 (L) 09/27/2021 03:25 AM    PLATELET 718 09/80/7498 03:25 AM    MCV 93.8 09/27/2021 03:25 AM    ABS.  NEUTROPHILS 2.1 09/25/2021 05:09 AM    Hemoglobin (POC) 12.6 10/06/2013 08:45 AM    Hematocrit (POC) 37 10/06/2013 08:45 AM     Lab Results   Component Value Date/Time    Sodium 141 09/28/2021 05:39 AM    Potassium 3.5 09/28/2021 05:39 AM    Chloride 110 (H) 09/28/2021 05:39 AM CO2 28 09/28/2021 05:39 AM    Glucose 96 09/28/2021 05:39 AM    BUN 3 (L) 09/28/2021 05:39 AM    Creatinine 0.76 09/28/2021 05:39 AM    GFR est AA >60 09/28/2021 05:39 AM    GFR est non-AA >60 09/28/2021 05:39 AM    Calcium 8.4 (L) 09/28/2021 05:39 AM    Sodium (POC) 143 10/06/2013 08:45 AM    Potassium (POC) 3.4 (L) 10/06/2013 08:45 AM    Chloride (POC) 104 10/06/2013 08:45 AM    Glucose (POC) 91 10/06/2013 08:45 AM    BUN (POC) 11 10/06/2013 08:45 AM    Creatinine (POC) 1.0 10/06/2013 08:45 AM    Calcium, ionized (POC) 1.27 10/06/2013 08:45 AM     Lab Results   Component Value Date/Time    Bilirubin, total 1.7 (H) 09/28/2021 05:39 AM    ALT (SGPT) 335 (H) 09/28/2021 05:39 AM    Alk. phosphatase 211 (H) 09/28/2021 05:39 AM    Protein, total 6.4 09/28/2021 05:39 AM    Albumin 2.4 (L) 09/28/2021 05:39 AM    Globulin 4.0 09/28/2021 05:39 AM     Lab Results   Component Value Date/Time    Iron % saturation 32 07/07/2014 02:28 PM    TIBC 304 07/07/2014 02:28 PM    Sed rate (ESR) 17 09/16/2010 12:04 PM    C-Reactive protein <0.29 01/18/2017 08:45 AM    TSH 3.46 03/04/2021 08:57 AM    ALBA, Direct Detected (A) 09/16/2010 12:04 PM    Lipase 358 09/24/2021 06:55 PM    Hep C virus Ab Interp. NONREACTIVE 09/24/2021 06:55 PM     Lab Results   Component Value Date/Time    CEA 3.5 09/25/2021 11:46 AM    Carbohydrate Antigen 19-9, (CA 19-9) 198 (H) 09/25/2021 11:46 AM       Imaging / Procedures:     9/24/21 US ABD   IMPRESSION  Cholelithiasis. Gallbladder sludge. Prominent CBD with mild intrahepatic ductal dilatation as well. Nonemergent MRI with MRCP to delineate etiology for CBD distention. 9/24/21 CT ABD PELV W CONT  IMPRESSION  There is mild/moderate intrahepatic biliary ductal dilatation, prominence of the  CBD and a hydropic gallbladder. No pancreatic duct dilatation. No clearly  delineated pancreatic head mass.  Imaging findings suggestive of  stricture/stenosis/mass of the distal CBD, no extrinsic pancreatic head mass is  identified. Gastroenterology consult   Correlation with MRI/MRCP on a nonemergent basis. There is severe degenerative change of the lumbar spine. 9/25/21 MRI ABD WO CONT  IMPRESSION  1. Intrahepatic and extra hepatic biliary dilatation with abrupt narrowing of  the common bile duct pancreatic head just proximal to the ampulla. Suggestion of  extrinsic compression on the duct. This raises the possibility of a  periampullary mass. Alternatively, this may be an eccentric intraluminal lesion. Evaluation is limited. Recommend GI consultation for possible ERCP and EUS. 2.  Questionable small lesion in the left hepatic lobe with mild increased T2  signal and T1 hypointensity. Recommend follow-up imaging a contrast-enhanced  study preferably MRI. 3.  Cholelithiasis. Distended gallbladder with mild gallbladder wall thickening. Correlate for acute cholecystitis    9/26/21 XR ERCP/ERCB / Dr. Edd Mckeon  Impression: Biliary ductal dilation, with a maximum common duct diameter of 15 mm; Severe stenosis, involving distal CBD /pancreas head area concerning for neoplasia  Interventions: Endoscopic ampullary sphincterotomy and biliary stent placement; brushings from the CBD    9/27/21 CT CHEST W CONT:   IMPRESSION  1. There is a minimal right pleural effusion. 2. There are small pulmonary nodules present. There is a nodule in the plane of  the right major fissure and posteriorly in left lower lobe. These were not  present on examination of 1/18/2017. These could be on the basis of metastatic  disease. Close follow-up is suggested. There are also 2 small subpleural nodules  anteriorly in the right upper lobe as described above which can also be  evaluated on follow-up. Assessment/Recommendations:   72 y.o. female with Ulcerative colitis, Hyopthyroidism, admitted with obstructive jaundice concerning for underlying malignant obstructing mass.       1. Obstructive jaundice / Transaminitis:  CBD stricture related to mass or other etiology with admission t-bili 6.3. t-bili continues to trend down  after stent placement. Bile duct brushings sent to pathology during ERCP on 9/26/21 by Dr. Vargas Kelsey. CEA normal, CA 19-9  (198). Chest CT shows small (2mm) pulmonary nodules of unclear etiology which are too small to biopsy or see on PET. Will need to repeat chest CT in 2-3 months.  -- 9/26 ERCP : bile duct brushings; awaiting pathology   --9/28 EUS/ Dr Stacey Markham : hypoechoic mass in head of pancreas involving CBD; bx of head of pancreas obtained with preliminary results showing adenocarcinoma; final path pending   Recommending surgical oncology consultation    2. Cholelithiasis:   General surgery consulted and evaluated: considering cholecystectomy pending ERCP results    3. Hypokalemia:   Is currently receiving repletion. 4. HTN:   Currently with stable BP and home HCTZ on hold per primary team.     5. Diarrhea: May be related to biliary obstruction.      6. Hx Ulcerative procto-sigmoiditis last colonoscopy ( 4/2017) : past due for colonoscopy; recommending outpatient colonoscopy    Plan reviewed with Dr Mine Mims By: Aime Villatoro, MUSA

## 2021-09-28 NOTE — PROGRESS NOTES
Chart reviewed, case discussed with Dr. Amirah Nuñez, I talked to Ms. Fang Stahl on the phone this morning. Will proceed with EUS and possible FNB at noon today.

## 2021-09-28 NOTE — PROGRESS NOTES
General Surgery Daily Progress Note    Patient: Smith Mckeon MRN: [id number]  SSN: [id number]    YOB: 1955  Age: 72 y.o.   Sex: female      Admit Date: 9/24/2021    POD 2 Days Post-Op    Procedure: Procedure(s) with comments:  ENDOSCOPIC RETROGRADE CHOLANGIOPANCREATOGRAPHY - EMERGENCY  ENDOSCOPIC STONE EXTRACTION/BALLOON SWEEP  SPHINCTEROTOMY  BILIARY STENT PLACEMENT  ENDOSCOPIC BRUSHING    Subjective:   Denies abdominal pain  For EUS today    Current Facility-Administered Medications   Medication Dose Route Frequency    dextrose 5% - 0.45% NaCl with KCl 20 mEq/L infusion  75 mL/hr IntraVENous CONTINUOUS    L.acidophilus-paracasei-S.thermophil-bifidobacter (RISAQUAD) 8 billion cell capsule  1 Capsule Oral DAILY    loperamide (IMODIUM) capsule 2 mg  2 mg Oral Q8H PRN    cholestyramine-aspartame (QUESTRAN LIGHT) packet 4 g  4 g Oral BID WITH MEALS    phenol throat spray (CHLORASEPTIC) 1 Spray  1 Spray Oral PRN    levothyroxine (SYNTHROID) tablet 125 mcg  125 mcg Oral 6am    hydrALAZINE (APRESOLINE) 20 mg/mL injection 10 mg  10 mg IntraVENous Q4H PRN    melatonin (rapid dissolve) tablet 5 mg  5 mg Oral QHS PRN    alum-mag hydroxide-simeth (MYLANTA) oral suspension 30 mL  30 mL Oral Q4H PRN    LORazepam (ATIVAN) injection 0.5 mg  0.5 mg IntraVENous Q6H PRN    oxyCODONE IR (ROXICODONE) tablet 5 mg  5 mg Oral Q4H PRN    morphine injection 2 mg  2 mg IntraVENous Q4H PRN    diphenhydrAMINE (BENADRYL) injection 25 mg  25 mg IntraVENous Q6H PRN    diphenhydrAMINE (BENADRYL) capsule 25 mg  25 mg Oral Q6H PRN    simethicone (MYLICON) tablet 80 mg  80 mg Oral QID PRN    sodium chloride (NS) flush 5-40 mL  5-40 mL IntraVENous Q8H    sodium chloride (NS) flush 5-40 mL  5-40 mL IntraVENous PRN    acetaminophen (TYLENOL) tablet 650 mg  650 mg Oral Q6H PRN    Or    acetaminophen (TYLENOL) suppository 650 mg  650 mg Rectal Q6H PRN    polyethylene glycol (MIRALAX) packet 17 g  17 g Oral DAILY PRN    bisacodyL (DULCOLAX) suppository 10 mg  10 mg Rectal DAILY PRN    ondansetron (ZOFRAN) injection 4 mg  4 mg IntraVENous Q6H PRN        Objective:   No intake/output data recorded. 09/26 1901 - 09/28 0700  In: 4533.8 [P.O.:600;  I.V.:3933.8]  Out: 1150   Patient Vitals for the past 8 hrs:   BP Temp Pulse Resp SpO2   09/28/21 1132 (!) 158/75 98.4 °F (36.9 °C) 67 20 94 %   09/28/21 0736 136/85 98.3 °F (36.8 °C) 67 18 99 %       Physical Exam:  General: Alert, cooperative, NAD  Lungs: Unlabored  Abdomen: Soft, NT, ND  Extremities: Warm, moves all, no edema  Skin:  Warm and dry, no rash    Labs:   Recent Labs     09/27/21  0325   WBC 5.6   HGB 10.6*   HCT 31.9*        Recent Labs     09/28/21  0539      K 3.5   *   CO2 28   GLU 96   BUN 3*   CREA 0.76   CA 8.4*   MG 2.2   PHOS 3.5   ALB 2.4*   TBILI 1.7*   *       Assessment / Plan:     POD 2 Days Post-Op    Procedure: Procedure(s) with comments:  ENDOSCOPIC RETROGRADE CHOLANGIOPANCREATOGRAPHY - EMERGENCY  ENDOSCOPIC STONE EXTRACTION/BALLOON SWEEP  SPHINCTEROTOMY  BILIARY STENT PLACEMENT  ENDOSCOPIC BRUSHING    Waiting for results from ERCP brushings  No surgical intervention in regards to gallbladder, as patient completely asymptomatic from it  Provided patient with Dr. Richa colby (Surgical oncologist)        John Espinoza MD

## 2021-09-28 NOTE — PROGRESS NOTES
Attempted to schedule a Hospital Follow Up. Extremely Long Wait Time. Called Practice at 4:30pm and was on a long hold. Not sure if they closed but no one ever came on the line.

## 2021-09-28 NOTE — PROGRESS NOTES
[facility]  [address] [facility], [address] [place] [address]  [phone]      Medical Progress Note      NAME: Smith Mckeon   :  1955  MRM:  [id number]    Date/Time of service: 2021  12:01 PM       Subjective:     Chief Complaint:  Patient was personally seen and examined by me during this time period. Chart reviewed. Awaiting EUS. Spoke to sister at bedside        Objective:       Vitals:       Last 24hrs VS reviewed since prior progress note.  Most recent are:    Visit Vitals  BP (!) 158/75 (BP 1 Location: Left upper arm, BP Patient Position: At rest)   Pulse 67   Temp 98.4 °F (36.9 °C)   Resp 20   Ht 5' 4\" (1.626 m)   Wt 126.6 kg (279 lb 1.6 oz)   SpO2 94%   BMI 47.91 kg/m²     SpO2 Readings from Last 6 Encounters:   21 94%   21 98%   20 98%   19 98%   19 98%   19 99%            Intake/Output Summary (Last 24 hours) at 2021 1201  Last data filed at 2021 2222  Gross per 24 hour   Intake 1297.5 ml   Output 900 ml   Net 397.5 ml        Exam:     Physical Exam:    Gen:  Well-developed, well-nourished, obese, in no acute distress  HEENT:  Pink conjunctivae, PERRL, hearing intact to voice, moist mucous membranes  Neck:  Supple, without masses, thyroid non-tender  Resp:  No accessory muscle use, clear breath sounds without wheezes rales or rhonchi  Card:  No murmurs, normal S1, S2 without thrills, bruits or peripheral edema  Abd:  Soft, non-tender, non-distended, normoactive bowel sounds are present  Musc:  No cyanosis or clubbing  Skin:  No rashes   Neuro:  Cranial nerves 3-12 are grossly intact, follows commands appropriately  Psych:  Good insight, oriented to person, place and time, alert    Medications Reviewed: (see below)    Lab Data Reviewed: (see below)    ______________________________________________________________________    Medications:     Current Facility-Administered Medications   Medication Dose Route Frequency    dextrose 5% - 0.45% NaCl with KCl 20 mEq/L infusion  75 mL/hr IntraVENous CONTINUOUS    L.acidophilus-paracasei-S.thermophil-bifidobacter (RISAQUAD) 8 billion cell capsule  1 Capsule Oral DAILY    loperamide (IMODIUM) capsule 2 mg  2 mg Oral Q8H PRN    cholestyramine-aspartame (QUESTRAN LIGHT) packet 4 g  4 g Oral BID WITH MEALS    phenol throat spray (CHLORASEPTIC) 1 Spray  1 Spray Oral PRN    levothyroxine (SYNTHROID) tablet 125 mcg  125 mcg Oral 6am    hydrALAZINE (APRESOLINE) 20 mg/mL injection 10 mg  10 mg IntraVENous Q4H PRN    melatonin (rapid dissolve) tablet 5 mg  5 mg Oral QHS PRN    alum-mag hydroxide-simeth (MYLANTA) oral suspension 30 mL  30 mL Oral Q4H PRN    LORazepam (ATIVAN) injection 0.5 mg  0.5 mg IntraVENous Q6H PRN    oxyCODONE IR (ROXICODONE) tablet 5 mg  5 mg Oral Q4H PRN    morphine injection 2 mg  2 mg IntraVENous Q4H PRN    diphenhydrAMINE (BENADRYL) injection 25 mg  25 mg IntraVENous Q6H PRN    diphenhydrAMINE (BENADRYL) capsule 25 mg  25 mg Oral Q6H PRN    simethicone (MYLICON) tablet 80 mg  80 mg Oral QID PRN    sodium chloride (NS) flush 5-40 mL  5-40 mL IntraVENous Q8H    sodium chloride (NS) flush 5-40 mL  5-40 mL IntraVENous PRN    acetaminophen (TYLENOL) tablet 650 mg  650 mg Oral Q6H PRN    Or    acetaminophen (TYLENOL) suppository 650 mg  650 mg Rectal Q6H PRN    polyethylene glycol (MIRALAX) packet 17 g  17 g Oral DAILY PRN    bisacodyL (DULCOLAX) suppository 10 mg  10 mg Rectal DAILY PRN    ondansetron (ZOFRAN) injection 4 mg  4 mg IntraVENous Q6H PRN          Lab Review:     Recent Labs     09/27/21  0325   WBC 5.6   HGB 10.6*   HCT 31.9*        Recent Labs     09/28/21  0539 09/27/21  0325 09/26/21  0055    142 142   K 3.5 3.3* 3.3*   * 112* 111*   CO2 28 27 29   GLU 96 88 84   BUN 3* 4* 5*   CREA 0.76 0.78 0.79   CA 8.4* 8.4* 8.1*   MG 2.2 1.7  --    PHOS 3.5 2.7  --    ALB 2.4* 2.2* 2.5*   TBILI 1.7* 2.1* 5.0*   * 423* 561* Lab Results   Component Value Date/Time    Glucose (POC) 91 10/06/2013 08:45 AM          Assessment / Plan:     Principal Problem:    71 yo hx of HTN, UC, presented w/ weakness, weight loss, elevated LFTs, CBD dilatation. Found to have a biliary/pancreatic head mass    1) Biliary/pancreatic mass/obstructive jaundice/biliary dilatation: s/p ERCP w/ stent on 09/26. GI will also perform EUS w/ biopsy today. Oncology also following. Plan for possible discharge after procedure. Will f/u with GI, Oncology, surg onc    2) Cholelithiasis: biliary obstruction likely due to mass rather than choledocholithiasis. No need for cholecystectomy per surgery     3) HTN: BP stable. Holding HCTZ    4) Hypothyroid: cont synthroid    5) Abnormal U/A: urine Cx neg. No need for abx at this point    6) Diarrhea: due to biliary issues.   Will cont pro-biotics, questran, imodium prn    Total time spent with patient: 35 min **I personally saw and examined the patient during this time period**                 Care Plan discussed with: Patient, nursing, family     Discussed:  Care Plan    Prophylaxis:  SCD's    Disposition:  Home w/Family           ___________________________________________________    Attending Physician: Aide Nagel MD

## 2021-09-29 VITALS
OXYGEN SATURATION: 97 % | HEART RATE: 67 BPM | SYSTOLIC BLOOD PRESSURE: 136 MMHG | DIASTOLIC BLOOD PRESSURE: 85 MMHG | TEMPERATURE: 98.2 F | WEIGHT: 279.1 LBS | HEIGHT: 64 IN | BODY MASS INDEX: 47.65 KG/M2 | RESPIRATION RATE: 17 BRPM

## 2021-09-29 DIAGNOSIS — K86.89 PANCREATIC MASS: Primary | ICD-10-CM

## 2021-09-29 LAB
ALBUMIN SERPL-MCNC: 2.3 G/DL (ref 3.5–5)
ALBUMIN/GLOB SERPL: 0.6 {RATIO} (ref 1.1–2.2)
ALP SERPL-CCNC: 182 U/L (ref 45–117)
ALT SERPL-CCNC: 253 U/L (ref 12–78)
ANION GAP SERPL CALC-SCNC: 6 MMOL/L (ref 5–15)
AST SERPL-CCNC: 84 U/L (ref 15–37)
BILIRUB SERPL-MCNC: 1.2 MG/DL (ref 0.2–1)
BUN SERPL-MCNC: 4 MG/DL (ref 6–20)
BUN/CREAT SERPL: 6 (ref 12–20)
CALCIUM SERPL-MCNC: 8.4 MG/DL (ref 8.5–10.1)
CHLORIDE SERPL-SCNC: 113 MMOL/L (ref 97–108)
CO2 SERPL-SCNC: 26 MMOL/L (ref 21–32)
CREAT SERPL-MCNC: 0.68 MG/DL (ref 0.55–1.02)
GLOBULIN SER CALC-MCNC: 3.7 G/DL (ref 2–4)
GLUCOSE SERPL-MCNC: 96 MG/DL (ref 65–100)
LIPASE SERPL-CCNC: 1214 U/L (ref 73–393)
MAGNESIUM SERPL-MCNC: 1.9 MG/DL (ref 1.6–2.4)
PHOSPHATE SERPL-MCNC: 3.9 MG/DL (ref 2.6–4.7)
POTASSIUM SERPL-SCNC: 3.7 MMOL/L (ref 3.5–5.1)
PROT SERPL-MCNC: 6 G/DL (ref 6.4–8.2)
SODIUM SERPL-SCNC: 145 MMOL/L (ref 136–145)

## 2021-09-29 PROCEDURE — 84100 ASSAY OF PHOSPHORUS: CPT

## 2021-09-29 PROCEDURE — 74011250637 HC RX REV CODE- 250/637: Performed by: SURGERY

## 2021-09-29 PROCEDURE — 94760 N-INVAS EAR/PLS OXIMETRY 1: CPT

## 2021-09-29 PROCEDURE — 74011250637 HC RX REV CODE- 250/637: Performed by: INTERNAL MEDICINE

## 2021-09-29 PROCEDURE — 74011250636 HC RX REV CODE- 250/636: Performed by: INTERNAL MEDICINE

## 2021-09-29 PROCEDURE — 80053 COMPREHEN METABOLIC PANEL: CPT

## 2021-09-29 PROCEDURE — 83735 ASSAY OF MAGNESIUM: CPT

## 2021-09-29 PROCEDURE — 99232 SBSQ HOSP IP/OBS MODERATE 35: CPT | Performed by: INTERNAL MEDICINE

## 2021-09-29 PROCEDURE — 36415 COLL VENOUS BLD VENIPUNCTURE: CPT

## 2021-09-29 PROCEDURE — 83690 ASSAY OF LIPASE: CPT

## 2021-09-29 RX ADMIN — Medication 1 CAPSULE: at 07:39

## 2021-09-29 RX ADMIN — POTASSIUM CHLORIDE, DEXTROSE MONOHYDRATE AND SODIUM CHLORIDE 75 ML/HR: 150; 5; 450 INJECTION, SOLUTION INTRAVENOUS at 04:05

## 2021-09-29 RX ADMIN — CHOLESTYRAMINE 4 G: 4 POWDER, FOR SUSPENSION ORAL at 07:39

## 2021-09-29 RX ADMIN — LEVOTHYROXINE SODIUM 125 MCG: 125 TABLET ORAL at 04:07

## 2021-09-29 RX ADMIN — Medication 10 ML: at 04:08

## 2021-09-29 NOTE — PROGRESS NOTES
Written shift change report given to [address] [place] [address] (oncoming nurse) by Molina Hoskins (offgoing nurse). Report included the following information SBAR, Kardex, Intake/Output, MAR, Accordion, Recent Results and Dual Neuro Assessment.

## 2021-09-29 NOTE — PROGRESS NOTES
Discharge instructions reviewed with patient, verbalizes understanding. Made aware of med changes and meds available at preferred pharmacy. Peripheral IV removed. Aware of follow up appointments.

## 2021-09-29 NOTE — DISCHARGE SUMMARY
[facility] [address]  [address] [facility], [address] [place] [address]  [phone]    Physician Discharge Summary     Patient ID:  Keyla Landaverde  [id number]  72 y.o.  1955    Admit date: 9/24/2021    Discharge date and time: 9/29/2021 4:28 PM    Admission Diagnoses: Transaminitis [R74.01]  Calculus of gallbladder with biliary obstruction but without cholecystitis [K80.21]  Common bile duct dilation [K83.8]    Discharge Diagnoses:  Principal Diagnosis Malignant neoplasm of head of pancreas [facility])                                            Principal Problem:    Malignant neoplasm of head of pancreas ([facility] [address] (9/28/2021)    Active Problems:    Hypothyroidism (3/23/2011)      Essential hypertension, benign (3/23/2011)      Common bile duct dilation (9/24/2021)      Calculus of gallbladder with biliary obstruction but without cholecystitis (9/24/2021)      Elevated LFTs (9/24/2021)      Hypokalemia due to loss of potassium (9/25/2021)           Hospital Course:     73 yo hx of HTN, UC, presented w/ weakness, weight loss, elevated LFTs, CBD dilatation. Found to have a biliary/pancreatic head mass     1) Biliary/pancreatic mass/obstructive jaundice/biliary dilatation: s/p ERCP w/ stent on 09/26. GI also performed EUS w/ biopsy on 09/28. Oncology also following. Will f/u with GI, Oncology, surg onc for biopsy results and further management     2) Cholelithiasis: biliary obstruction likely due to mass rather than choledocholithiasis. No need for cholecystectomy per surgery      3) HTN: BP stable. Can resume HCTZ on discharge     4) Hypothyroid: cont synthroid     5) Abnormal U/A: urine Cx neg. No need for abx at this point     6) Diarrhea: due to biliary issues.   Will cont pro-biotics, questran, imodium prn    PCP: Kade Pittman MD     Consults: GI, gen surg, oncology    Significant Diagnostic Studies: ERCP, EUS with biopsy    Discharge Exam:  Physical Exam:    Gen:  Well-developed, well-nourished, obese, in no acute distress  HEENT:  Pink conjunctivae, PERRL, hearing intact to voice, moist mucous membranes  Neck:  Supple, without masses, thyroid non-tender  Resp:  No accessory muscle use, clear breath sounds without wheezes rales or rhonchi  Card:  No murmurs, normal S1, S2 without thrills, bruits or peripheral edema  Abd:  Soft, non-tender, non-distended, normoactive bowel sounds are present  Musc:  No cyanosis or clubbing  Skin:  No rashes   Neuro:  Cranial nerves 3-12 are grossly intact, follows commands appropriately  Psych:  Good insight, oriented to person, place and time, alert    Disposition: home  Discharge Condition: Stable    Patient Instructions:   Current Discharge Medication List      START taking these medications    Details   cholestyramine-aspartame (QUESTRAN LIGHT) 4 gram packet Take 1 Packet by mouth two (2) times daily (with meals). Qty: 120 Packet, Refills: 0      loperamide (IMODIUM) 2 mg capsule Take 1 Capsule by mouth every eight (8) hours as needed for Diarrhea for up to 10 days. Qty: 20 Capsule, Refills: 0      oxyCODONE IR (ROXICODONE) 5 mg immediate release tablet Take 1 Tablet by mouth every six (6) hours as needed for Pain for up to 5 days. Max Daily Amount: 20 mg. Indications: pain  Qty: 10 Tablet, Refills: 0    Associated Diagnoses: Calculus of gallbladder with biliary obstruction but without cholecystitis      ondansetron (Zofran ODT) 4 mg disintegrating tablet Take 1 Tablet by mouth every eight (8) hours as needed for Nausea or Vomiting. Qty: 20 Tablet, Refills: 0         CONTINUE these medications which have NOT CHANGED    Details   hydroCHLOROthiazide (HYDRODIURIL) 25 mg tablet TAKE 1 TABLET BY MOUTH EVERY DAY  Qty: 90 Tablet, Refills: 1      levothyroxine (SYNTHROID) 125 mcg tablet TAKE 1 TABLET BY MOUTH EVERY DAY BEFORE BREAKFAST  Qty: 90 Tablet, Refills: 1      cetirizine (ZYRTEC) 10 mg tablet Take 1 Tab by mouth daily.   Qty: 30 Tab, Refills: 0 Associated Diagnoses: Seasonal allergic rhinitis, unspecified trigger; Cough         STOP taking these medications       nitrofurantoin, macrocrystal-monohydrate, (MACROBID) 100 mg capsule Comments:   Reason for Stopping:         traMADoL (ULTRAM) 50 mg tablet Comments:   Reason for Stopping:             Activity: Activity as tolerated  Diet: Low fat, Low cholesterol  Wound Care: None needed    Follow-up with  Follow-up Information     Follow up With Specialties Details Why Michael Sevilla MD Hematology and Oncology, Internal Medicine, Hematology, Oncology Go on 10/8/2021 appt at 1 pm to review pathology and discuss possible treatment options [address] [place] [address]  [phone]      Venkata Woodruff MD Internal Medicine Schedule an appointment as soon as possible for a visit in 5 days Hospital Follow Up. Please contact your PCP to schedule your appointment.  [address] [facility]  [phone]      Juan Rooney MD General Surgery, Surgical Oncology, Colon and Rectal Surgery Schedule an appointment as soon as possible for a visit in 2 weeks  [address] [place] [id number]  [phone]            Follow-up tests/labs none    Signed:  Taty Garcia MD  9/29/2021  4:28 PM  **I personally spent 35 min on discharge**

## 2021-09-29 NOTE — PROGRESS NOTES
[facility] at [address], [address] [facility] [facility] Evy: [phone]  F: [phone]      Reason for Visit:   Peng Rocha is a 72 y.o. female who is seen for evaluation of new bilary or pancreatic head mass    Hematology/Oncology Treatment History:     Hematology Oncology Treatment History:     Diagnosis:     Stage: Pending    Pathology: 21 ERCP/ Dr Jeannette Philip: brushings from common bile duct: path pending    Prior Treatment: None    Current Treatment: pending    History of Present Illness:   Peng Rocha is a pleasant 72 y.o. female who was admitted on 21 for malaise, transaminitis, associated with dark urine, dysuria and icterus. Has been on Macrobid and cefuroxime by PCP given complaint of dysuria and dark urine. She then developed nausea and went to [facility]. She was eventually found to have transaminitis and was referred to ED. ED labs notable for K+ 2.7 Creat 1.03, t-bili 6.3, , . CT A/P revealed mild/mod intrahepatic biliary ductal dilatation; prominence of CBD and hydropic gallbladder, suggestive of stricture/stenosis/mass of distal CBD. MRI of abdomen showed narrowing of CBD pancreatic head suggestive of extrinisic compression concerning for periampullary mass vs eccentric intraluminal lesion. GI evaluated and completed ERCP on 21 notable for distal CBD stricture; possible tumor/mass located in the CBD; brushed and biliary stent placed. General surgery evaluated patient for cholelithiasis, but team is awaiting results from recent pathology before making decision on whether cholecystectomy is needed or not. Today, reports diarrhea that started this am. Denies RUQ pain, but states she has abdominal discomfort across the front of her abdomen, associated with nausea. Denies SOB. Of note, her mother  of pancreatic cancer age 80. Has had numerous colonoscopies due to Ulcerative colitis; last one > 3 yrs ago.        PMHx: OA and Rheumatoid arthritis in lower back, Ulcerative colitis, HTN, Hypothyroidism  PSurgHx: , Cyst removed from left breast  SHx: , has 3 children (1 daughter and 2 sons). Works as  in [employer]. Nonsmoker, no EtOH.  in rehab recovering from [facility]. FHx: Mother  of pancreatic cancer age 80. Brother and son had colon cancer. Interval History:   Feeling better; no diarrhea, tolerating diet. Pain improved. No family at bedside.      Current Facility-Administered Medications   Medication Dose Route Frequency    dextrose 5% - 0.45% NaCl with KCl 20 mEq/L infusion  75 mL/hr IntraVENous CONTINUOUS    L.acidophilus-paracasei-S.thermophil-bifidobacter (RISAQUAD) 8 billion cell capsule  1 Capsule Oral DAILY    loperamide (IMODIUM) capsule 2 mg  2 mg Oral Q8H PRN    cholestyramine-aspartame (QUESTRAN LIGHT) packet 4 g  4 g Oral BID WITH MEALS    phenol throat spray (CHLORASEPTIC) 1 Spray  1 Spray Oral PRN    levothyroxine (SYNTHROID) tablet 125 mcg  125 mcg Oral 6am    hydrALAZINE (APRESOLINE) 20 mg/mL injection 10 mg  10 mg IntraVENous Q4H PRN    melatonin (rapid dissolve) tablet 5 mg  5 mg Oral QHS PRN    alum-mag hydroxide-simeth (MYLANTA) oral suspension 30 mL  30 mL Oral Q4H PRN    LORazepam (ATIVAN) injection 0.5 mg  0.5 mg IntraVENous Q6H PRN    oxyCODONE IR (ROXICODONE) tablet 5 mg  5 mg Oral Q4H PRN    morphine injection 2 mg  2 mg IntraVENous Q4H PRN    diphenhydrAMINE (BENADRYL) injection 25 mg  25 mg IntraVENous Q6H PRN    diphenhydrAMINE (BENADRYL) capsule 25 mg  25 mg Oral Q6H PRN    simethicone (MYLICON) tablet 80 mg  80 mg Oral QID PRN    sodium chloride (NS) flush 5-40 mL  5-40 mL IntraVENous Q8H    sodium chloride (NS) flush 5-40 mL  5-40 mL IntraVENous PRN    acetaminophen (TYLENOL) tablet 650 mg  650 mg Oral Q6H PRN    Or    acetaminophen (TYLENOL) suppository 650 mg  650 mg Rectal Q6H PRN    polyethylene glycol (MIRALAX) packet 17 g  17 g Oral DAILY PRN    bisacodyL (DULCOLAX) suppository 10 mg  10 mg Rectal DAILY PRN    ondansetron (ZOFRAN) injection 4 mg  4 mg IntraVENous Q6H PRN     Current Outpatient Medications   Medication Sig    cholestyramine-aspartame (QUESTRAN LIGHT) 4 gram packet Take 1 Packet by mouth two (2) times daily (with meals).  loperamide (IMODIUM) 2 mg capsule Take 1 Capsule by mouth every eight (8) hours as needed for Diarrhea for up to 10 days.  oxyCODONE IR (ROXICODONE) 5 mg immediate release tablet Take 1 Tablet by mouth every six (6) hours as needed for Pain for up to 5 days. Max Daily Amount: 20 mg. Indications: pain    ondansetron (Zofran ODT) 4 mg disintegrating tablet Take 1 Tablet by mouth every eight (8) hours as needed for Nausea or Vomiting.  hydroCHLOROthiazide (HYDRODIURIL) 25 mg tablet TAKE 1 TABLET BY MOUTH EVERY DAY    levothyroxine (SYNTHROID) 125 mcg tablet TAKE 1 TABLET BY MOUTH EVERY DAY BEFORE BREAKFAST    cetirizine (ZYRTEC) 10 mg tablet Take 1 Tab by mouth daily. Allergies   Allergen Reactions    Sulfa (Sulfonamide Antibiotics) Swelling          Review of Systems: A complete review of systems was obtained, reviewed. Pertinent findings reviewed above. Physical Exam:     Visit Vitals  /85 (BP 1 Location: Left upper arm, BP Patient Position: At rest)   Pulse 67   Temp 98.2 °F (36.8 °C)   Resp 17   Ht 5' 4\" (1.626 m)   Wt 126.6 kg (279 lb 1.6 oz)   SpO2 97%   BMI 47.91 kg/m²     ECOG PS: 1  General: obese; no distress  Eyes: PERRLA, EOMI, Anicteric sclerae  HENT: atraumatic, oropharynx clear  Neck: supple, no JVD or thyromegaly  Lymphatic: no cervical, supraclavicular, axillary or inguinal adenopathy  Respiratory: CTAB, normal respiratory effort  CV: normal rate, regular rhythm, no murmurs, no peripheral edema  GI: soft, nontender, nondistended, no masses, no hepatomegaly, no splenomegaly  MS: digits without clubbing or cyanosis.   Skin: no rashes, no ecchymoses, no petechia. normal temperature, turgor, and texture. Psych: alert, oriented, appropriate affect, normal judgment/insight    Results:     Lab Results   Component Value Date/Time    WBC 5.6 09/27/2021 03:25 AM    HGB 10.6 (L) 09/27/2021 03:25 AM    HCT 31.9 (L) 09/27/2021 03:25 AM    PLATELET 161 49/13/0140 03:25 AM    MCV 93.8 09/27/2021 03:25 AM    ABS. NEUTROPHILS 2.1 09/25/2021 05:09 AM    Hemoglobin (POC) 12.6 10/06/2013 08:45 AM    Hematocrit (POC) 37 10/06/2013 08:45 AM     Lab Results   Component Value Date/Time    Sodium 145 09/29/2021 04:10 AM    Potassium 3.7 09/29/2021 04:10 AM    Chloride 113 (H) 09/29/2021 04:10 AM    CO2 26 09/29/2021 04:10 AM    Glucose 96 09/29/2021 04:10 AM    BUN 4 (L) 09/29/2021 04:10 AM    Creatinine 0.68 09/29/2021 04:10 AM    GFR est AA >60 09/29/2021 04:10 AM    GFR est non-AA >60 09/29/2021 04:10 AM    Calcium 8.4 (L) 09/29/2021 04:10 AM    Sodium (POC) 143 10/06/2013 08:45 AM    Potassium (POC) 3.4 (L) 10/06/2013 08:45 AM    Chloride (POC) 104 10/06/2013 08:45 AM    Glucose (POC) 91 10/06/2013 08:45 AM    BUN (POC) 11 10/06/2013 08:45 AM    Creatinine (POC) 1.0 10/06/2013 08:45 AM    Calcium, ionized (POC) 1.27 10/06/2013 08:45 AM     Lab Results   Component Value Date/Time    Bilirubin, total 1.2 (H) 09/29/2021 04:10 AM    ALT (SGPT) 253 (H) 09/29/2021 04:10 AM    Alk. phosphatase 182 (H) 09/29/2021 04:10 AM    Protein, total 6.0 (L) 09/29/2021 04:10 AM    Albumin 2.3 (L) 09/29/2021 04:10 AM    Globulin 3.7 09/29/2021 04:10 AM     Lab Results   Component Value Date/Time    Iron % saturation 32 07/07/2014 02:28 PM    TIBC 304 07/07/2014 02:28 PM    Sed rate (ESR) 17 09/16/2010 12:04 PM    C-Reactive protein <0.29 01/18/2017 08:45 AM    TSH 3.46 03/04/2021 08:57 AM    ALBA, Direct Detected (A) 09/16/2010 12:04 PM    Lipase 1,214 (H) 09/29/2021 04:10 AM    Hep C virus Ab Interp.  NONREACTIVE 09/24/2021 06:55 PM     Lab Results   Component Value Date/Time    CEA 3.5 09/25/2021 11:46 AM    Carbohydrate Antigen 19-9, (CA 19-9) 198 (H) 09/25/2021 11:46 AM       Imaging / Procedures:     9/24/21 US ABD   IMPRESSION  Cholelithiasis. Gallbladder sludge. Prominent CBD with mild intrahepatic ductal dilatation as well. Nonemergent MRI with MRCP to delineate etiology for CBD distention. 9/24/21 CT ABD PELV W CONT  IMPRESSION  There is mild/moderate intrahepatic biliary ductal dilatation, prominence of the  CBD and a hydropic gallbladder. No pancreatic duct dilatation. No clearly  delineated pancreatic head mass. Imaging findings suggestive of  stricture/stenosis/mass of the distal CBD, no extrinsic pancreatic head mass is  identified. Gastroenterology consult   Correlation with MRI/MRCP on a nonemergent basis. There is severe degenerative change of the lumbar spine. 9/25/21 MRI ABD WO CONT  IMPRESSION  1. Intrahepatic and extra hepatic biliary dilatation with abrupt narrowing of  the common bile duct pancreatic head just proximal to the ampulla. Suggestion of  extrinsic compression on the duct. This raises the possibility of a  periampullary mass. Alternatively, this may be an eccentric intraluminal lesion. Evaluation is limited. Recommend GI consultation for possible ERCP and EUS. 2.  Questionable small lesion in the left hepatic lobe with mild increased T2  signal and T1 hypointensity. Recommend follow-up imaging a contrast-enhanced  study preferably MRI. 3.  Cholelithiasis. Distended gallbladder with mild gallbladder wall thickening. Correlate for acute cholecystitis    9/26/21 XR ERCP/ERCB / Dr. Amirah Nuñez  Impression: Biliary ductal dilation, with a maximum common duct diameter of 15 mm; Severe stenosis, involving distal CBD /pancreas head area concerning for neoplasia  Interventions: Endoscopic ampullary sphincterotomy and biliary stent placement; brushings from the CBD    9/27/21 CT CHEST W CONT:   IMPRESSION  1. There is a minimal right pleural effusion.   2. There are small pulmonary nodules present. There is a nodule in the plane of  the right major fissure and posteriorly in left lower lobe. These were not  present on examination of 1/18/2017. These could be on the basis of metastatic  disease. Close follow-up is suggested. There are also 2 small subpleural nodules  anteriorly in the right upper lobe as described above which can also be  evaluated on follow-up. Assessment/Recommendations:   72 y.o. female with Ulcerative colitis, Hyopthyroidism, admitted with obstructive jaundice concerning for underlying malignant obstructing mass. 1. Obstructive jaundice / Transaminitis:  CBD stricture related to mass or other etiology with admission t-bili 6.3. t-bili continues to trend down  after stent placement. Bile duct brushings sent to pathology during ERCP on 9/26/21 by Dr. Chetan Fischer and negative for malignancy. CEA normal, CA 19-9  (198). Chest CT shows small (2mm) pulmonary nodules of unclear etiology which are too small to biopsy or see on PET. Will need to repeat chest CT in 2-3 months.  -- 9/26 ERCP : bile duct brushings; no cells diagnostic for malignancy  --9/28 EUS/ Dr Yohannes Larsen : hypoechoic mass in head of pancreas involving CBD; bx of head of pancreas obtained with preliminary results showing adenocarcinoma; final path pending   Recommending surgical oncology consultation  -follow up in our clinic established and placed in discharge summary  -consult with Dr Maryann Quach surgical oncologist; clinic working on establishing appt    2. Cholelithiasis:   General surgery evaluated: no plans for surgical intervention as pt is asymptomatic     3. Hypokalemia:   Resolved: K+ 3.7 this am after  receiving repletion. 4. HTN:   Currently with stable BP and home HCTZ on hold per primary team.     5. Diarrhea: May be related to biliary obstruction. Improved; using Questran in the hospital    6.  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

## 2021-09-30 ENCOUNTER — PATIENT OUTREACH (OUTPATIENT)
Dept: CASE MANAGEMENT | Age: 66
End: 2021-09-30

## 2021-09-30 NOTE — PROGRESS NOTES
Care Transitions Initial Call    Call within 2 business days of discharge: Yes     Patient: Lucy Ibanez Patient : 1955 MRN: [id number]    [facility]       Complaint Diagnosis Description Type Department Provider    21 Abnormal Lab Results Transaminitis . .. ED to Hosp-Admission (Discharged) (ADMIT) [id number] Cyndi Wheat MD; Roshni Bucio. .. Was this an external facility discharge? No     Challenges to be reviewed by the provider   Additional needs identified to be addressed with provider: yes  Component      Latest Ref Rng & Units 2021           4:10 AM   Lipase      73 - 393 U/L 1,214 (H)     Component      Latest Ref Rng & Units 2021   Bilirubin, total      0.2 - 1.0 MG/DL 1.2 (H)   ALT      12 - 78 U/L 253 (H)   AST      15 - 37 U/L 84 (H)   Alk. phosphatase      45 - 117 U/L 182 (H)   Protein, total      6.4 - 8.2 g/dL 6.0 (L)   Albumin      3.5 - 5.0 g/dL 2.3 (L)   Impression: Marisel Alvares MD  21  Pancreas head mass with fine needle biopsy showing adenocarcinoma on preliminary cytology  Recommendations: Follow-up on pathology  Follow-up with oncology, will need further evaluation of pulmonary nodule  Surgical oncology consultation  Will need outpatient colonoscopy      Method of communication with provider : chart routing    Discussed COVID-19 related testing which was available at this time. Test results were negative. Patient informed of results, if available? yes      Advance Care Planning:   Does patient have an Advance Directive:  Radha on file    Inpatient Readmission Risk score: Unplanned Readmit Risk Score: 13    Was this a readmission?  no   Patient stated reason for the admission: weakness, wt.loss, dark urine ,     Patients top risk factors for readmission: medical condition-Pancreas Head Mass   Interventions to address risk factors: Scheduled appointment with PCP-10/6/21, Scheduled appointment with Specialist-10/5/21, Obtained and reviewed discharge summary and/or continuity of care documents and Education of patient/family/caregiver/guardian to support self-management-Pancreatic Mass    Care Transition Nurse (CTN) contacted the patient by telephone to perform post hospital discharge assessment. Verified name and  with patient as identifiers. Provided introduction to self, and explanation of the CTN role. CTN reviewed discharge instructions, medical action plan and red flags with patient who verbalized understanding. Were discharge instructions available to patient? yes. Reviewed appropriate site of care based on symptoms and resources available to patient including: PCP, Specialist, When to call 911 and [facility] . Patient given an opportunity to ask questions and does not have any further questions or concerns at this time. The patient agrees to contact the PCP office for questions related to their healthcare. Medication reconciliation was performed with patient, who verbalizes understanding of administration of home medications. Advised obtaining a 90-day supply of all daily and as-needed medications. Referral to Pharm D needed: no   START taking these medications     Details   cholestyramine-aspartame (QUESTRAN LIGHT) 4 gram packet Take 1 Packet by mouth two (2) times daily (with meals). Qty: 120 Packet, Refills: 0       loperamide (IMODIUM) 2 mg capsule Take 1 Capsule by mouth every eight (8) hours as needed for Diarrhea for up to 10 days. Qty: 20 Capsule, Refills: 0       oxyCODONE IR (ROXICODONE) 5 mg immediate release tablet Take 1 Tablet by mouth every six (6) hours as needed for Pain for up to 5 days. Max Daily Amount: 20 mg.  Indications: pain  Qty: 10 Tablet, Refills: 0     Associated Diagnoses: Calculus of gallbladder with biliary obstruction but without cholecystitis       ondansetron (Zofran ODT) 4 mg disintegrating tablet Take 1 Tablet by mouth every eight (8) hours as needed for Nausea or Vomiting. Qty: 20 Tablet, Refills: 0       STOP taking:  nitrofurantoin (macrocrystal-monohydrate) 100 mg capsule (MACROBID)  traMADoL 50 mg tablet Chanel Caul)    Covid Risk Education    Patient decline education COVID-19 and CDC guidelines. CTN reviewed discharge instructions, medical action plan and red flag symptoms with the patient who verbalized understanding. Discussed COVID vaccination status: yes, vaccinated. Patient was given an opportunity to verbalize any questions and concerns and agrees to contact CTN or health care provider for questions related to their healthcare. Discussed follow-up appointments. If no appointment was previously scheduled, appointment scheduling offered: yes. Is follow up appointment scheduled within 7 days of discharge? yes. [facility] follow up appointment(s):   Future Appointments   Date Time Provider Sally Carrizales   10/5/2021  1:40 PM Salud Gallegos MD [facility]   10/6/2021  8:15 AM Ramos Prather MD [facility]   10/8/2021  1:00 PM Shantal Romero MD [facility]     Plan for follow-up call in 5-7 days based on severity of symptoms and risk factors. Plan for next call: follow up appointment-pcp/specialist  CTN provided contact information for future needs. Goals      Establish PCP relationships and regularly scheduled appointments. 9/30 Pt.will attend all recommended follow ups with pcp and specialist w/n 30 day period. Pt.scheduled hosp.follow up:  Future Appointments   Date Time Provider   10/5/2021  1:40 PM Salud Gallegos MD   10/6/2021  8:15 AM Kathy Verde MD   10/8/2021  1:00 PM Shantal Romero MD   CTN provided pt. information [employer] [phone]) explain briefly type of service; contact information provided, f/u with pt.in 5-7 days. -[address]

## 2021-10-01 ENCOUNTER — TELEPHONE (OUTPATIENT)
Dept: ONCOLOGY | Age: 66
End: 2021-10-01

## 2021-10-01 NOTE — TELEPHONE ENCOUNTER
[address] at [place] [address]  [phone]        10/01/21 12:03 PM Attempted to call patient via home/cell number listed to remind her of her upcoming appointment with Dr. Imer Thomas on Friday, 10/08, at 1 PM. No answer, left message of above. Provided office phone number should patient have any additional questions or needs to reschedule.

## 2021-10-04 ENCOUNTER — OFFICE VISIT (OUTPATIENT)
Dept: INTERNAL MEDICINE CLINIC | Age: 66
End: 2021-10-04
Payer: MEDICARE

## 2021-10-04 VITALS
HEART RATE: 76 BPM | BODY MASS INDEX: 46.2 KG/M2 | RESPIRATION RATE: 16 BRPM | HEIGHT: 64 IN | WEIGHT: 270.6 LBS | OXYGEN SATURATION: 97 % | TEMPERATURE: 98.1 F | SYSTOLIC BLOOD PRESSURE: 130 MMHG | DIASTOLIC BLOOD PRESSURE: 83 MMHG

## 2021-10-04 DIAGNOSIS — E03.4 HYPOTHYROIDISM DUE TO ACQUIRED ATROPHY OF THYROID: ICD-10-CM

## 2021-10-04 DIAGNOSIS — I10 ESSENTIAL HYPERTENSION, BENIGN: ICD-10-CM

## 2021-10-04 DIAGNOSIS — C25.0 MALIGNANT NEOPLASM OF HEAD OF PANCREAS (HCC): Primary | ICD-10-CM

## 2021-10-04 PROCEDURE — G8510 SCR DEP NEG, NO PLAN REQD: HCPCS | Performed by: INTERNAL MEDICINE

## 2021-10-04 PROCEDURE — 99215 OFFICE O/P EST HI 40 MIN: CPT | Performed by: INTERNAL MEDICINE

## 2021-10-04 PROCEDURE — G8752 SYS BP LESS 140: HCPCS | Performed by: INTERNAL MEDICINE

## 2021-10-04 PROCEDURE — G8400 PT W/DXA NO RESULTS DOC: HCPCS | Performed by: INTERNAL MEDICINE

## 2021-10-04 PROCEDURE — 1090F PRES/ABSN URINE INCON ASSESS: CPT | Performed by: INTERNAL MEDICINE

## 2021-10-04 PROCEDURE — G8427 DOCREV CUR MEDS BY ELIG CLIN: HCPCS | Performed by: INTERNAL MEDICINE

## 2021-10-04 PROCEDURE — G8536 NO DOC ELDER MAL SCRN: HCPCS | Performed by: INTERNAL MEDICINE

## 2021-10-04 PROCEDURE — 1111F DSCHRG MED/CURRENT MED MERGE: CPT | Performed by: INTERNAL MEDICINE

## 2021-10-04 PROCEDURE — G8417 CALC BMI ABV UP PARAM F/U: HCPCS | Performed by: INTERNAL MEDICINE

## 2021-10-04 PROCEDURE — G8754 DIAS BP LESS 90: HCPCS | Performed by: INTERNAL MEDICINE

## 2021-10-04 PROCEDURE — 3017F COLORECTAL CA SCREEN DOC REV: CPT | Performed by: INTERNAL MEDICINE

## 2021-10-04 PROCEDURE — 1101F PT FALLS ASSESS-DOCD LE1/YR: CPT | Performed by: INTERNAL MEDICINE

## 2021-10-04 NOTE — PROGRESS NOTES
Luz Garcia (: 1955) is a 72 y.o. female, established patient, here for evaluation of the following chief complaint(s):  Hospital Follow Up (liver enzymes high)       ASSESSMENT/PLAN:  Below is the assessment and plan developed based on review of pertinent history, physical exam, labs, studies, and medications. 1. Malignant neoplasm of head of pancreas ([address]  I discussed at length the type of questions the pt should ask her oncologists given the conversation that I had with her today. I expressed my satisfaction with Dr. Argenis Land and feel comfortable with him performing her surgery. I am pleased and impressed with the mindset that she is fostering. 2. Essential hypertension, benign  BP is at goal. I do not recommend any change in medications. Continue with ongoing regimen of HCTZ. 3. Hypothyroidism due to acquired atrophy of thyroid  Thyroid stable. I do not recommend a change in medications. Continue with ongoing regimen of Synthroid. No follow-ups on file. SUBJECTIVE/OBJECTIVE:  HPI    LONA: Pt was treated for with macrobid for what was believed to be a UTI. She subsequently presented to [facility] for nausea, where she was redirected to the ED. After diagnostic scans and procedures, the pt was diagnosed with pancreatic cancer (adenocarcinoma) via endoscopic US . She has an apt with Dr. Argenis Land on Oct 5th and Dr. Chad Scherer on Oct 8th. Pt is disappointed that she cannot undergo surgery at [address] [place] [address], but agrees to go to [facility]. She states that she attributed her sx this summer to ulcerative colitis, but now knows that it is due to her diagnosis. Pt notes that her tumor was under 3 cm and not attached to any blood vessels, according to the pathology report. Her pain is a lot better since she had stent put in. The records and results were reviewed with the patient     Other: Pt's  had prostate cancer and son suffered from colon cancer.      Review of Systems   All other systems reviewed and are negative. Physical Exam  Constitutional:       Appearance: Normal appearance. HENT:      Right Ear: Tympanic membrane and external ear normal.      Left Ear: Tympanic membrane and external ear normal.      Mouth/Throat:      Mouth: Mucous membranes are moist.      Pharynx: Oropharynx is clear. Cardiovascular:      Rate and Rhythm: Normal rate and regular rhythm. Pulses: Normal pulses. Heart sounds: Normal heart sounds. Pulmonary:      Effort: Pulmonary effort is normal.      Breath sounds: Normal breath sounds. Musculoskeletal:         General: Normal range of motion. Skin:     General: Skin is warm and dry. Neurological:      General: No focal deficit present. Mental Status: She is alert and oriented to person, place, and time. Psychiatric:         Mood and Affect: Mood normal.         Behavior: Behavior normal.     On this date 10/04/2021 I have spent 45 minutes reviewing previous notes, test results and face to face with the patient discussing the diagnosis and importance of compliance with the treatment plan as well as documenting on the day of the visit. An electronic signature was used to authenticate this note. Written by Roma Freitas as dictated by Dr. Jasmine Serrato.    -- Roma Freitas

## 2021-10-05 ENCOUNTER — OFFICE VISIT (OUTPATIENT)
Dept: SURGERY | Age: 66
End: 2021-10-05
Payer: MEDICARE

## 2021-10-05 VITALS
BODY MASS INDEX: 45.58 KG/M2 | SYSTOLIC BLOOD PRESSURE: 137 MMHG | RESPIRATION RATE: 18 BRPM | WEIGHT: 267 LBS | DIASTOLIC BLOOD PRESSURE: 80 MMHG | OXYGEN SATURATION: 95 % | HEART RATE: 83 BPM | TEMPERATURE: 98.5 F | HEIGHT: 64 IN

## 2021-10-05 DIAGNOSIS — C25.9 PANCREATIC ADENOCARCINOMA (HCC): Primary | ICD-10-CM

## 2021-10-05 PROCEDURE — G8427 DOCREV CUR MEDS BY ELIG CLIN: HCPCS | Performed by: SURGERY

## 2021-10-05 PROCEDURE — 1101F PT FALLS ASSESS-DOCD LE1/YR: CPT | Performed by: SURGERY

## 2021-10-05 PROCEDURE — G8510 SCR DEP NEG, NO PLAN REQD: HCPCS | Performed by: SURGERY

## 2021-10-05 PROCEDURE — 99205 OFFICE O/P NEW HI 60 MIN: CPT | Performed by: SURGERY

## 2021-10-05 PROCEDURE — 1090F PRES/ABSN URINE INCON ASSESS: CPT | Performed by: SURGERY

## 2021-10-05 PROCEDURE — G8400 PT W/DXA NO RESULTS DOC: HCPCS | Performed by: SURGERY

## 2021-10-05 PROCEDURE — 1111F DSCHRG MED/CURRENT MED MERGE: CPT | Performed by: SURGERY

## 2021-10-05 PROCEDURE — 3017F COLORECTAL CA SCREEN DOC REV: CPT | Performed by: SURGERY

## 2021-10-05 PROCEDURE — G8754 DIAS BP LESS 90: HCPCS | Performed by: SURGERY

## 2021-10-05 PROCEDURE — G8752 SYS BP LESS 140: HCPCS | Performed by: SURGERY

## 2021-10-05 PROCEDURE — G8536 NO DOC ELDER MAL SCRN: HCPCS | Performed by: SURGERY

## 2021-10-05 PROCEDURE — G8417 CALC BMI ABV UP PARAM F/U: HCPCS | Performed by: SURGERY

## 2021-10-05 NOTE — LETTER
10/5/2021    Patient: Ashley Rodrigues   YOB: 1955   Date of Visit: 10/5/2021     Jose Wolfe MD  [address] [facility]  [address]  [address] [facility]  [address] [place]  [address] [facility] [address] [facility]    Dear MD Rome Barber MD,      Thank you for referring Ms. Jesus Montague to [facility] for evaluation. My notes for this consultation are attached. If you have questions, please do not hesitate to call me. I look forward to following your patient along with you.       Sincerely,    Humberto Nails MD

## 2021-10-05 NOTE — PROGRESS NOTES
[facility] History and Physical    Chief Complaint: Pancreatic adenocarcinoma    History of Present Illness:      Cherise Cobb is a 72 y.o. female who is kindly referred by Dr. Marino Gunderson for evaluation of pancreatic adenocarcinoma. The patient states she had several days of dark urine, urticaria, nausea and fatty stools. She was found to have elevated LFT's and referred to the ER. She was admitted and found to have evidence of a biliary obstruction on imaging. An obvious pancreatic mass was not clearly identified and the patient underwent an ERCP as well as endoscopic ultrasound by [place] Kelsey and Melva. ERCP showed a distal common bile duct stricture within the head of the pancreas concerning for malignancy. EUS revealed a 3.2 cm mass in the pancreatic head. No evidence of vascular involvement was noted on EUS. Biopsy from the endoscopic ultrasound was consistent with adenocarcinoma. The patient did have some areas of question/concern on her staging imaging including small pulmonary nodules as well as a small liver lesion that was indeterminate. The patient states that she feels much better after stent placement. Her urine is now clear and she is having regular bowel movements. She denies any fevers. She is eating well and has a good appetite. She thinks she may have lost a small amount of weight but is unsure how much. She denies any pain complaints. She does have a history of ulcerative colitis which is stable and has not required previous surgery. She is not on medication for this currently. She has not taking steroids in many years.     Past Medical History:   Diagnosis Date    Arthritis     ostero arthritis, rhemathoid  lower back     Autoimmune disease ([facility] [address]     Ulcerative Colitis    Hypertension     Hypothyroid 3/23/2011    Malignant neoplasm of head of pancreas ([address] 9/28/2021    Ulcerative colitis ([address] 9/16/2010    Ulcerative colitis ([address] 9/16/2010    Ulcerative colitis [facility])        Past Surgical History:   Procedure Laterality Date    COLONOSCOPY N/A 2017    COLONOSCOPY performed by Khushi Betts MD at [facility] ENDOSCOPY    ENDOSCOPY, COLON, DIAGNOSTIC      HX  SECTION      NE BREAST SURGERY PROCEDURE UNLISTED      cyst removed from left breast       Social History     Socioeconomic History    Marital status:      Spouse name: Not on file    Number of children: Not on file    Years of education: Not on file    Highest education level: Not on file   Occupational History    Not on file   Tobacco Use    Smoking status: Former Smoker     Types: Cigarettes     Quit date: 1980     Years since quittin.7    Smokeless tobacco: Never Used   Substance and Sexual Activity    Alcohol use: No    Drug use: No    Sexual activity: Yes     Partners: Male   Other Topics Concern    Not on file   Social History Narrative    Not on file     Social Determinants of Health     Financial Resource Strain:     Difficulty of Paying Living Expenses:    Food Insecurity:     Worried About Running Out of Food in the Last Year:     [address] in the Last Year:    Transportation Needs:     Lack of Transportation (Medical):      Lack of Transportation (Non-Medical):    Physical Activity:     Days of Exercise per Week:     Minutes of Exercise per Session:    Stress:     Feeling of Stress :    Social Connections:     Frequency of Communication with Friends and Family:     Frequency of Social Gatherings with Friends and Family:     Attends [employer] Services:     Active Member of Clubs or Organizations:     Attends Club or Organization Meetings:     Marital Status:    Intimate Partner Violence:     Fear of Current or Ex-Partner:     Emotionally Abused:     Physically Abused:     Sexually Abused:        Family History   Problem Relation Age of Onset    Cancer Mother         pancreatic    Cancer Brother         colon         Current Outpatient Medications:     cholestyramine-aspartame (QUESTRAN LIGHT) 4 gram packet, Take 1 Packet by mouth two (2) times daily (with meals). , Disp: 120 Packet, Rfl: 0    loperamide (IMODIUM) 2 mg capsule, Take 1 Capsule by mouth every eight (8) hours as needed for Diarrhea for up to 10 days. , Disp: 20 Capsule, Rfl: 0    hydroCHLOROthiazide (HYDRODIURIL) 25 mg tablet, TAKE 1 TABLET BY MOUTH EVERY DAY, Disp: 90 Tablet, Rfl: 1    levothyroxine (SYNTHROID) 125 mcg tablet, TAKE 1 TABLET BY MOUTH EVERY DAY BEFORE BREAKFAST, Disp: 90 Tablet, Rfl: 1    cetirizine (ZYRTEC) 10 mg tablet, Take 1 Tab by mouth daily. , Disp: 30 Tab, Rfl: 0    ondansetron (Zofran ODT) 4 mg disintegrating tablet, Take 1 Tablet by mouth every eight (8) hours as needed for Nausea or Vomiting. (Patient not taking: Reported on 10/5/2021), Disp: 20 Tablet, Rfl: 0    Allergies   Allergen Reactions    Sulfa (Sulfonamide Antibiotics) Swelling       ROS   Constitutional: Weight loss  Ears, Nose, Mouth, Throat, and Face: Negative  Respiratory: Negative  Cardiovascular: Negative  Gastrointestinal: As in HPI, history of UC. Genitourinary: Negative  Integument/Breast: Negative  Hematologic/Lymphatic: Negative  Behavioral/Psychiatric: Negative  Allergic/Immunologic: Negative      Physical Exam:     Visit Vitals  /80 (BP 1 Location: Right arm, BP Patient Position: Sitting, BP Cuff Size: Adult)   Pulse 83   Temp 98.5 °F (36.9 °C) (Oral)   Resp 18   Ht 5' 4\" (1.626 m)   Wt 267 lb (121.1 kg)   SpO2 95%   BMI 45.83 kg/m²       General - alert and oriented, no apparent distress  HEENT - NC/AT. No scleral icterus  Pulm - CTAB, normal inspiratory effort  CV - RRR, no M/R/G  Abd - soft, NT, ND. No palpable masses or organomegaly. Ext - warm, well perfused, no edema  Lymphatics - no cervical, supraclavicular or inguinal adenopathy noted.    Skin - supple, no rashes  Psychiatric - normal affect, good mood    Labs  Lab Results   Component Value Date/Time WBC 5.6 09/27/2021 03:25 AM    Hemoglobin (POC) 12.6 10/06/2013 08:45 AM    HGB 10.6 (L) 09/27/2021 03:25 AM    Hematocrit (POC) 37 10/06/2013 08:45 AM    HCT 31.9 (L) 09/27/2021 03:25 AM    PLATELET 808 82/04/3343 03:25 AM    MCV 93.8 09/27/2021 03:25 AM     Lab Results   Component Value Date/Time    Sodium 145 09/29/2021 04:10 AM    Potassium 3.7 09/29/2021 04:10 AM    Chloride 113 (H) 09/29/2021 04:10 AM    CO2 26 09/29/2021 04:10 AM    Anion gap 6 09/29/2021 04:10 AM    Glucose 96 09/29/2021 04:10 AM    BUN 4 (L) 09/29/2021 04:10 AM    Creatinine 0.68 09/29/2021 04:10 AM    BUN/Creatinine ratio 6 (L) 09/29/2021 04:10 AM    GFR est AA >60 09/29/2021 04:10 AM    GFR est non-AA >60 09/29/2021 04:10 AM    Calcium 8.4 (L) 09/29/2021 04:10 AM    Bilirubin, total 1.2 (H) 09/29/2021 04:10 AM    Alk. phosphatase 182 (H) 09/29/2021 04:10 AM    Protein, total 6.0 (L) 09/29/2021 04:10 AM    Albumin 2.3 (L) 09/29/2021 04:10 AM    Globulin 3.7 09/29/2021 04:10 AM    A-G Ratio 0.6 (L) 09/29/2021 04:10 AM    ALT (SGPT) 253 (H) 09/29/2021 04:10 AM    AST (SGOT) 84 (H) 09/29/2021 04:10 AM     CA 19-9: 198  CEA: 3.5    Imaging  MRI Results (most recent):  Results from [place] encounter on 09/24/21    MRI ABD WO CONT    Narrative  Procedure: MRI of the abdomen with MRCP    DATE: 9/25/2021 10:31 AM    TECHNIQUE: Multiplanar MRI of the abdomen was performed without contrast  including T2-weighted fat-sat and nonfat sat images, axial in phase and out of  phase imaging, as well as 3-D MRCP. Contrast: No contrast    COMPARISON: CT dated 9/24/2021    INDICATION: CBD dilatation, elevated LFTs    FINDINGS:    Liver: Limited without contrast. Small T1 hyperintense lesion series 10 image 51  segment 3 of the liver. There is subtle increased T2 signal in this region. No  other liver lesions are identified. Gallbladder: Dilated with multiple stones, wall is mildly thickened. Biliary tree:  Intrahepatic and extra hepatic biliary dilatation. Common bile  duct measures approximately 12 mm in pancreatic head. There is focal narrowing  of the distal common bile duct proximal to the ampulla. On series 11, image 28,  there appears to be extrinsic compression of the duct. This is also identified  on. This demonstrates mild increased T2 signal is not well-visualized on the  T1-weighted images. Spleen: Unremarkable    Pancreas: Major of the pancreas is unremarkable    Adrenals: Unremarkable    Kidneys: Simple appearing cysts in the bilateral kidneys. Vascular: Evaluation is limited. Unremarkable    Soft tissues and osseous structures: Unremarkable    Impression  1. Intrahepatic and extra hepatic biliary dilatation with abrupt narrowing of  the common bile duct pancreatic head just proximal to the ampulla. Suggestion of  extrinsic compression on the duct. This raises the possibility of a  periampullary mass. Alternatively, this may be an eccentric intraluminal lesion. Evaluation is limited. Recommend GI consultation for possible ERCP and EUS. 2.  Questionable small lesion in the left hepatic lobe with mild increased T2  signal and T1 hypointensity. Recommend follow-up imaging a contrast-enhanced  study preferably MRI. 3.  Cholelithiasis. Distended gallbladder with mild gallbladder wall thickening. Correlate for acute cholecystitis    CT Results (most recent):  Results from Hospital Encounter encounter on 09/24/21    CT CHEST W CONT    Narrative  INDICATION: Concern for neoplasm distal common bile duct, staging    COMPARISON: CT ABD 9/24/2021 and CT scan abdomen 1/18/2017    CONTRAST: 100 cc of Isovue-300. TECHNIQUE:  Following the uneventful intravenous administration of 100 cc  Isovue-300, 5 mm axial images were obtained through the chest. Coronal and  sagittal reconstructions were generated.  CT dose reduction was achieved through  use of a standardized protocol tailored for this examination and automatic  exposure control for dose modulation. FINDINGS:    THYROID: No nodule. MEDIASTINUM: No mass or lymphadenopathy. EDVIN: No mass or lymphadenopathy. THORACIC AORTA: No aneurysm. MAIN PULMONARY ARTERY: Normal in caliber. TRACHEA/BRONCHI: Patent. ESOPHAGUS: No wall thickening or dilatation. HEART: Normal in size. PLEURA: There is a very small right pleural effusion. .  LUNGS:    The lungs demonstrate minimal areas of atelectasis at each lung base. There is a calcified granuloma in the right middle lobe on image 27. On image 27 in the plane of the right major fissure is a 2 mm nodule-like  density that is consistent with a fissural nodule. However this was not present  previously. Close follow-up is suggested. Posteriorly in left lower lobe on image 27 is a 3 mm nodule that was not present  on examination of 1/18/2017. Anteriorly in the right upper lobe on image 11 are 2 subpleural nodules  measuring 2 mm in size each. These small nodules are too small to characterize  close follow-up is suggested. Metastatic disease is not excluded. INCIDENTALLY IMAGED UPPER ABDOMEN: Partially imaged is a biliary stent. No  biliary dilatation. High density material is noted in the partially imaged  gallbladder. There is a small hiatal hernia. Crystal Liil BONES: No destructive bone lesion. Impression  1. There is a minimal right pleural effusion. 2. There are small pulmonary nodules present. There is a nodule in the plane of  the right major fissure and posteriorly in left lower lobe. These were not  present on examination of 1/18/2017. These could be on the basis of metastatic  disease. Close follow-up is suggested. There are also 2 small subpleural nodules  anteriorly in the right upper lobe as described above which can also be  evaluated on follow-up.         I have reviewed and agree with all of the pertinent images    Assessment:     Milka Anguiano is a 72 y.o. female with a new diagnosis of pancreatic adenocarcinoma that was causing obstructive jaundice. She has indeterminate small pulmonary nodules and a small indeterminate lesion within the liver. Recommendations:     1. The patient's primary pancreatic tumor appears resectable based upon imaging and EUS findings, however the small pulmonary nodules and liver nodule are concerning. I think these are too small to be differentiated on the PET scan or for biopsy. I will plan to review the patient's case at our multidisciplinary tumor board this week but would advocate for a neoadjuvant chemotherapy approach with imaging after 2 to 3 months to assess for response and to further characterize these pulmonary and liver nodules. I discussed the rationale for both upfront surgery and chemotherapy 1st approaches in patients with resectable pancreatic adenocarcinoma. I recommended a chemotherapy 1st approach in this patient for the above reasons. She sees Dr. Linda Freitas with Oncology later this week. I will plan to set her up for port placement if Oncology is in agreement with this. 60 mins of time was spent with the patient of which > 50% of the time involved face-to-face counseling of the patient regarding the proposed treatment plan.       Ashley Gautam MD  10/5/2021    CC: MD Dr. Kaylee Fall

## 2021-10-05 NOTE — PROGRESS NOTES
1. Have you been to the ER, urgent care clinic since your last visit? Hospitalized since your last visit? Yes, 9/24/21     2. Have you seen or consulted any other health care providers outside of the [address] [place] [address] since your last visit? Include any pap smears or colon screening.  No

## 2021-10-06 NOTE — PROGRESS NOTES
[facility] [place] at [address], 2[address] [facility] 100[facility]: [phone]  F: [phone]      Reason for Visit:   Selene Alanis is a 72 y.o. female who is seen for evaluation of new bilary or pancreatic head mass    Hematology/Oncology Treatment History:     Diagnosis: Pancreatic adenocarcinoma    Stage: clinical Stage Ib [T2N0]    Pathology:   -21 Cytology - brushings from common bile duct: Reactive glandular epithelial cells and bile   -21 pancreatic head mass biopsy: Adenocarcinoma. Prior Treatment: None    Current Treatment: planning for neoadjuvant FOLFIRNOX to start 10/18/21. History of Present Illness:   Selene Alanis is a pleasant 72 y.o. female who comes in for evaluation of pancreatic cancer. She presented in 2021 with obstructive jaundice, transaminitis. ERCP on 21 notable for distal CBD stricture; possible tumor/mass located in the CBD; underwent biliary stent placement to relieve biliary obstruction. CT A/P revealed mild/mod intrahepatic biliary ductal dilatation; prominence of CBD and hydropic gallbladder, suggestive of stricture/stenosis/mass of distal CBD. MRI of abdomen showed narrowing of CBD pancreatic head suggestive of extrinisic compression concerning for periampullary mass vs eccentric intraluminal lesion. EUS showed 3.2cm hypoechoic mass in pancreas head. Biopsy revealed adenocarcinoma. After discharge from the hospital, patient met with Dr. Remberto Silverman in [place]. Today, she states she is feeling well without any further lower abdominal discomfort. Her  was also released from the rehab center and is home now. No f/c/n/v/d. PMHx: OA and Rheumatoid arthritis in lower back, Ulcerative colitis, HTN, Hypothyroidism  PSurgHx: , Cyst removed from left breast  SHx: , has 3 children (1 daughter and 2 sons). Works as  in [address] Nonsmoker, no EtOH.   in rehab recovering from [facility]. FHx: Mother  of pancreatic cancer age 80. Brother and son had colon cancer. Review of Systems: A complete review of systems was obtained, reviewed. Pertinent findings reviewed above. Physical Exam:     Visit Vitals  BP (!) 155/96   Pulse 89   Temp 98.2 °F (36.8 °C)   Resp 16   Ht 5' 4\" (1.626 m)   Wt 268 lb 3.2 oz (121.7 kg)   SpO2 98%   BMI 46.04 kg/m²     ECOG PS: 1  General: obese; no distress  Eyes:  Anicteric sclerae  HENT: atraumatic, oropharynx clear  Neck: supple, no JVD or thyromegaly  Lymphatic: Deferred today  Respiratory: CTAB, normal respiratory effort  CV: normal rate, regular rhythm, no murmurs, no peripheral edema  GI: soft, nontender, nondistended, no masses  MS: digits without clubbing or cyanosis. Skin: no rashes, no ecchymoses, no petechia. normal temperature, turgor, and texture. Psych: alert, oriented, appropriate affect, normal judgment/insight    Results:     Lab Results   Component Value Date/Time    WBC 5.6 2021 03:25 AM    HGB 10.6 (L) 2021 03:25 AM    HCT 31.9 (L) 2021 03:25 AM    PLATELET 471  03:25 AM    MCV 93.8 2021 03:25 AM    ABS.  NEUTROPHILS 2.1 2021 05:09 AM    Hemoglobin (POC) 12.6 10/06/2013 08:45 AM    Hematocrit (POC) 37 10/06/2013 08:45 AM     Lab Results   Component Value Date/Time    Sodium 145 2021 04:10 AM    Potassium 3.7 2021 04:10 AM    Chloride 113 (H) 2021 04:10 AM    CO2 26 2021 04:10 AM    Glucose 96 2021 04:10 AM    BUN 4 (L) 2021 04:10 AM    Creatinine 0.68 2021 04:10 AM    GFR est AA >60 2021 04:10 AM    GFR est non-AA >60 2021 04:10 AM    Calcium 8.4 (L) 2021 04:10 AM    Sodium (POC) 143 10/06/2013 08:45 AM    Potassium (POC) 3.4 (L) 10/06/2013 08:45 AM    Chloride (POC) 104 10/06/2013 08:45 AM    Glucose (POC) 91 10/06/2013 08:45 AM    BUN (POC) 11 10/06/2013 08:45 AM    Creatinine (POC) 1.0 10/06/2013 08:45 AM    Calcium, ionized (POC) 1.27 10/06/2013 08:45 AM     Lab Results   Component Value Date/Time    Bilirubin, total 1.2 (H) 09/29/2021 04:10 AM    ALT (SGPT) 253 (H) 09/29/2021 04:10 AM    Alk. phosphatase 182 (H) 09/29/2021 04:10 AM    Protein, total 6.0 (L) 09/29/2021 04:10 AM    Albumin 2.3 (L) 09/29/2021 04:10 AM    Globulin 3.7 09/29/2021 04:10 AM     Lab Results   Component Value Date/Time    Iron % saturation 32 07/07/2014 02:28 PM    TIBC 304 07/07/2014 02:28 PM    Sed rate (ESR) 17 09/16/2010 12:04 PM    C-Reactive protein <0.29 01/18/2017 08:45 AM    TSH 3.46 03/04/2021 08:57 AM    ALBA, Direct Detected (A) 09/16/2010 12:04 PM    Lipase 1,214 (H) 09/29/2021 04:10 AM    Hep C virus Ab Interp. NONREACTIVE 09/24/2021 06:55 PM     Lab Results   Component Value Date/Time    CEA 3.5 09/25/2021 11:46 AM    Carbohydrate Antigen 19-9, (CA 19-9) 198 (H) 09/25/2021 11:46 AM       Imaging / Procedures:     9/24/21 US ABD   IMPRESSION  Cholelithiasis. Gallbladder sludge. Prominent CBD with mild intrahepatic ductal dilatation as well. Nonemergent MRI with MRCP to delineate etiology for CBD distention. 9/24/21 CT ABD PELV W CONT  IMPRESSION  There is mild/moderate intrahepatic biliary ductal dilatation, prominence of the  CBD and a hydropic gallbladder. No pancreatic duct dilatation. No clearly  delineated pancreatic head mass. Imaging findings suggestive of  stricture/stenosis/mass of the distal CBD, no extrinsic pancreatic head mass is  identified. Gastroenterology consult   Correlation with MRI/MRCP on a nonemergent basis. There is severe degenerative change of the lumbar spine. 9/25/21 MRI ABD WO CONT  IMPRESSION  1. Intrahepatic and extra hepatic biliary dilatation with abrupt narrowing of  the common bile duct pancreatic head just proximal to the ampulla. Suggestion of  extrinsic compression on the duct. This raises the possibility of a  periampullary mass. Alternatively, this may be an eccentric intraluminal lesion.   Evaluation is limited. Recommend GI consultation for possible ERCP and EUS. 2.  Questionable small lesion in the left hepatic lobe with mild increased T2  signal and T1 hypointensity. Recommend follow-up imaging a contrast-enhanced  study preferably MRI. 3.  Cholelithiasis. Distended gallbladder with mild gallbladder wall thickening. Correlate for acute cholecystitis    9/26/21 XR ERCP/ERCB / Dr. Heidi Ball  Impression: Biliary ductal dilation, with a maximum common duct diameter of 15 mm; Severe stenosis, involving distal CBD /pancreas head area concerning for neoplasia  Interventions: Endoscopic ampullary sphincterotomy and biliary stent placement; brushings from the CBD    9/27/21 CT CHEST W CONT:   IMPRESSION  1. There is a minimal right pleural effusion. 2. There are small pulmonary nodules present. There is a nodule in the plane of  the right major fissure and posteriorly in left lower lobe. These were not  present on examination of 1/18/2017. These could be on the basis of metastatic  disease. Close follow-up is suggested. There are also 2 small subpleural nodules  anteriorly in the right upper lobe as described above which can also be  evaluated on follow-up. 9/28/21 EUS:  Endoscopic: Esophagus:normal; Stomach: normal; Duodenum/jejunum: normal and protruding biliary stent  Ultrasound: Esophagus: normal findings;     Stomach: normal findings:     Pancreas: Areas examined: the entire gland                          Parenchyma: -Evidence of a hypoechoic mass with poorly defined borders seen in the head of the pancreas. It appeared involving the CBD where a stent is seen, however it did not involve the major vessels. It measured about 3.2 cm in widest diameter on one view.                           Pancreatic Duct: normal findings, not dilated               Liver: Parenchyma: normal                          Gallbladder: normal                          Bile Duct: the common bile duct is dilated in the proximal and mid portion and a plastic stent could be seen                          Lymph Node: no adenopathy  Impression:   Pancreas head mass with fine needle biopsy showing adenocarcinoma on preliminary cytology  Recommendations: Follow-up on pathology  Follow-up with oncology, will need further evaluation of pulmonary nodule  Surgical oncology consultation  Resume GI lite diet today and can discharge in am from GI standpoint  . Assessment/Recommendations:   72 y.o. female with Ulcerative colitis, Hyopthyroidism, admitted with obstructive jaundice concerning for underlying malignant obstructing mass. 1. Pancreatic adenocarcinoma:  Clinically stage Ib [T2N0], but need further evaluation in future regarding pulmonary nodules and liver lesion. Presented with obstructive jaundice and transaminitis, CA 19-9 was 198 in 9/2021 at diagnosis. Underwent biliary stent placement with improvement in biliary obstruction. 3.2cm mass seen in pancreatic head on EUS, not involving vasculature, possibly resectable. Dr. Manfred Andrade in [place] has evaluated patient and recommends consideration of neoadjuvant chemotherapy with close attention to lung nodules and liver lesion. I agree with this recommendation for earlier treatment of micrometastatic disease, ability to determine whether pt has chemosensitive disease. We discussed the risks and benefits of FOLFIRINOX chemotherapy, including potential side effects. These include but are not limited to fatigue, nausea vomiting, diarrhea, neuropathy, taste changes, cold intolerance, esophageal spasm, allergic reactions, alopecia, mucositis, myelosuppression, risk for infection, infertility, and rarely, death. Rarely, a patient may have a condition where they do not metabolize fluorouracil appropriately (called DPD deficiency), and they may have excessive toxicity. A Port-A-Cath will be required in order to deliver the continuous infusion. The patient has consented to beginning therapy.   Supportive care meds include: Emla cream, Zofran, Compazine, Dexamethasone  -- [facility] genetic testing today  -- Port placement with Dr. Leslie Elizabeth  -- Discussed neoadjuvant chemotherapy with mFOLFIRINOX x 6 followed by surgery and adjuvant therapy x 6 cycles. -- Chemo teaching and consent today  -- Check CA 19-9 on date of C1, C6, C7, C12.   -- Chest CT with contrast and abd/pelvis MRI after C4  -- Needs to follow up with Dr. Leslie Elizabeth immediately before C6.  -- Return on 10/18/21 for C1 of mFOLFIRINOX. 2. Diarrhea:  Likely related to biliary obstruction and pt has been started on Cholestyramine (Questran). 3. Pulmonary nodules:   Tiny of unclear etiology. Will repeat imaging after C4 of chemo. 4. HTN:   Currently with stable BP and home HCTZ on hold per primary team.     5. H/o Ulcerative procto-sigmoiditis:  Past due for colonoscopy with one adenoma polyp removed at last colonoscopy in April 2017. Was due for repeat in 2019 but she has not followed up with the GI practice since her last colonoscopy. -- Outpatient colonoscopy overdue    6. Morbid obesity:  Discussed healthy food options and will have dietician reach out to patient once she is starting chemotherapy.     Signed By: Julian Stinson MD

## 2021-10-08 ENCOUNTER — OFFICE VISIT (OUTPATIENT)
Dept: ONCOLOGY | Age: 66
End: 2021-10-08
Payer: MEDICARE

## 2021-10-08 ENCOUNTER — DOCUMENTATION ONLY (OUTPATIENT)
Dept: ONCOLOGY | Age: 66
End: 2021-10-08

## 2021-10-08 VITALS
SYSTOLIC BLOOD PRESSURE: 155 MMHG | WEIGHT: 268.2 LBS | OXYGEN SATURATION: 98 % | RESPIRATION RATE: 16 BRPM | HEIGHT: 64 IN | TEMPERATURE: 98.2 F | DIASTOLIC BLOOD PRESSURE: 96 MMHG | BODY MASS INDEX: 45.79 KG/M2 | HEART RATE: 89 BPM

## 2021-10-08 DIAGNOSIS — K51.20 ULCERATIVE PROCTITIS WITHOUT COMPLICATION (HCC): ICD-10-CM

## 2021-10-08 DIAGNOSIS — R11.2 CINV (CHEMOTHERAPY-INDUCED NAUSEA AND VOMITING): ICD-10-CM

## 2021-10-08 DIAGNOSIS — C25.9 PANCREATIC ADENOCARCINOMA (HCC): Primary | ICD-10-CM

## 2021-10-08 DIAGNOSIS — T45.1X5A CINV (CHEMOTHERAPY-INDUCED NAUSEA AND VOMITING): ICD-10-CM

## 2021-10-08 DIAGNOSIS — E66.01 MORBID OBESITY WITH BMI OF 45.0-49.9, ADULT (HCC): ICD-10-CM

## 2021-10-08 PROCEDURE — G8400 PT W/DXA NO RESULTS DOC: HCPCS | Performed by: INTERNAL MEDICINE

## 2021-10-08 PROCEDURE — 3017F COLORECTAL CA SCREEN DOC REV: CPT | Performed by: INTERNAL MEDICINE

## 2021-10-08 PROCEDURE — G8417 CALC BMI ABV UP PARAM F/U: HCPCS | Performed by: INTERNAL MEDICINE

## 2021-10-08 PROCEDURE — 1101F PT FALLS ASSESS-DOCD LE1/YR: CPT | Performed by: INTERNAL MEDICINE

## 2021-10-08 PROCEDURE — 99215 OFFICE O/P EST HI 40 MIN: CPT | Performed by: INTERNAL MEDICINE

## 2021-10-08 PROCEDURE — G8427 DOCREV CUR MEDS BY ELIG CLIN: HCPCS | Performed by: INTERNAL MEDICINE

## 2021-10-08 PROCEDURE — 1090F PRES/ABSN URINE INCON ASSESS: CPT | Performed by: INTERNAL MEDICINE

## 2021-10-08 PROCEDURE — 1111F DSCHRG MED/CURRENT MED MERGE: CPT | Performed by: INTERNAL MEDICINE

## 2021-10-08 PROCEDURE — G8753 SYS BP > OR = 140: HCPCS | Performed by: INTERNAL MEDICINE

## 2021-10-08 PROCEDURE — G8755 DIAS BP > OR = 90: HCPCS | Performed by: INTERNAL MEDICINE

## 2021-10-08 PROCEDURE — G8536 NO DOC ELDER MAL SCRN: HCPCS | Performed by: INTERNAL MEDICINE

## 2021-10-08 PROCEDURE — G8510 SCR DEP NEG, NO PLAN REQD: HCPCS | Performed by: INTERNAL MEDICINE

## 2021-10-08 RX ORDER — HEPARIN 100 UNIT/ML
300-500 SYRINGE INTRAVENOUS AS NEEDED
Status: CANCELLED
Start: 2021-10-20

## 2021-10-08 RX ORDER — ACETAMINOPHEN 325 MG/1
650 TABLET ORAL AS NEEDED
Status: CANCELLED
Start: 2021-10-18

## 2021-10-08 RX ORDER — LIDOCAINE AND PRILOCAINE 25; 25 MG/G; MG/G
CREAM TOPICAL
Qty: 30 G | Refills: 0 | Status: SHIPPED | OUTPATIENT
Start: 2021-10-08 | End: 2022-03-21 | Stop reason: ALTCHOICE

## 2021-10-08 RX ORDER — ATROPINE SULFATE 0.4 MG/ML
0.4 INJECTION, SOLUTION ENDOTRACHEAL; INTRAMEDULLARY; INTRAMUSCULAR; INTRAVENOUS; SUBCUTANEOUS
Status: CANCELLED | OUTPATIENT
Start: 2021-10-18

## 2021-10-08 RX ORDER — ALBUTEROL SULFATE 0.83 MG/ML
2.5 SOLUTION RESPIRATORY (INHALATION) AS NEEDED
Status: CANCELLED
Start: 2021-10-18

## 2021-10-08 RX ORDER — SODIUM CHLORIDE 9 MG/ML
10 INJECTION INTRAMUSCULAR; INTRAVENOUS; SUBCUTANEOUS AS NEEDED
Status: CANCELLED | OUTPATIENT
Start: 2021-10-18

## 2021-10-08 RX ORDER — EPINEPHRINE 1 MG/ML
0.3 INJECTION, SOLUTION, CONCENTRATE INTRAVENOUS AS NEEDED
Status: CANCELLED | OUTPATIENT
Start: 2021-10-18

## 2021-10-08 RX ORDER — PALONOSETRON 0.05 MG/ML
0.25 INJECTION, SOLUTION INTRAVENOUS ONCE
Status: CANCELLED | OUTPATIENT
Start: 2021-10-18 | End: 2021-10-18

## 2021-10-08 RX ORDER — HEPARIN 100 UNIT/ML
300-500 SYRINGE INTRAVENOUS AS NEEDED
Status: CANCELLED
Start: 2021-10-18

## 2021-10-08 RX ORDER — SODIUM CHLORIDE 9 MG/ML
10 INJECTION INTRAMUSCULAR; INTRAVENOUS; SUBCUTANEOUS AS NEEDED
Status: CANCELLED | OUTPATIENT
Start: 2021-10-20

## 2021-10-08 RX ORDER — SODIUM CHLORIDE 0.9 % (FLUSH) 0.9 %
10 SYRINGE (ML) INJECTION AS NEEDED
Status: CANCELLED | OUTPATIENT
Start: 2021-10-20

## 2021-10-08 RX ORDER — SODIUM CHLORIDE 0.9 % (FLUSH) 0.9 %
10 SYRINGE (ML) INJECTION AS NEEDED
Status: CANCELLED | OUTPATIENT
Start: 2021-10-18

## 2021-10-08 RX ORDER — HYDROCORTISONE SODIUM SUCCINATE 100 MG/2ML
100 INJECTION, POWDER, FOR SOLUTION INTRAMUSCULAR; INTRAVENOUS AS NEEDED
Status: CANCELLED | OUTPATIENT
Start: 2021-10-18

## 2021-10-08 RX ORDER — ONDANSETRON 2 MG/ML
8 INJECTION INTRAMUSCULAR; INTRAVENOUS AS NEEDED
Status: CANCELLED | OUTPATIENT
Start: 2021-10-18

## 2021-10-08 RX ORDER — DEXAMETHASONE 4 MG/1
TABLET ORAL
Qty: 24 TABLET | Refills: 3 | Status: SHIPPED | OUTPATIENT
Start: 2021-10-08 | End: 2022-03-21 | Stop reason: ALTCHOICE

## 2021-10-08 RX ORDER — PROCHLORPERAZINE MALEATE 10 MG
10 TABLET ORAL
Qty: 30 TABLET | Refills: 2 | Status: SHIPPED | OUTPATIENT
Start: 2021-10-08 | End: 2022-05-13 | Stop reason: SDUPTHER

## 2021-10-08 RX ORDER — ONDANSETRON HYDROCHLORIDE 8 MG/1
8 TABLET, FILM COATED ORAL
Qty: 30 TABLET | Refills: 2 | Status: SHIPPED | OUTPATIENT
Start: 2021-10-08 | End: 2022-03-21 | Stop reason: ALTCHOICE

## 2021-10-08 RX ORDER — DIPHENHYDRAMINE HYDROCHLORIDE 50 MG/ML
25 INJECTION, SOLUTION INTRAMUSCULAR; INTRAVENOUS AS NEEDED
Status: CANCELLED
Start: 2021-10-18

## 2021-10-08 RX ORDER — DIPHENHYDRAMINE HYDROCHLORIDE 50 MG/ML
50 INJECTION, SOLUTION INTRAMUSCULAR; INTRAVENOUS AS NEEDED
Status: CANCELLED
Start: 2021-10-18

## 2021-10-08 RX ORDER — DEXTROSE MONOHYDRATE 50 MG/ML
25 INJECTION, SOLUTION INTRAVENOUS CONTINUOUS
Status: CANCELLED
Start: 2021-10-18

## 2021-10-08 NOTE — PROGRESS NOTES
Oncology Social Work  Psychosocial Assessment    Reason for Assessment:      [] Social Work Referral [x] Initial Assessment [] New Diagnosis [] Other    Advance Care Planning:  Advance Care Planning 9/25/2021   Confirm Advance Directive None       Sources of Information:    [x]Patient  []Family  [x]Staff  [x]Medical Record    Mental Status:    [x]Alert  []Lethargic  []Unresponsive   [] Unable to assess   Oriented to:  [x]Person  [x]Place  [x]Time  [x]Situation      Relationship Status:  []Single  [x]  []Significant Other/Life Partner  []  []  []    Living Circumstances:  []Lives Alone  [x]Family/Significant Other in Household  []Roommates  []Children in the Home  []Paid Caregivers  []Assisted Living Facility/Group [address]  []Homeless  []Incarcerated  []Environmental/Care Concerns  []Other:    Employment Status:  [x]Employed Full-time []Employed Part-time []Homemaker  [] Retired [] Short-Term Disability [] [place]  [] Unemployed     Barriers to Learning:    []Language  []Developmental  []Cognitive  []Altered Mental Status  []Visual/Hearing Impairment  []Unable to Read/Write  []Motivational  [] Challenges Understanding Medical Jargon [x]No Barriers Identified      Financial/Legal Concerns:    []Uninsured  []Limited Income/Resources  []Non-Citizen  []Food Insecurity []No Concerns Identified   [x]Other: no income while working    Lutheran/Spiritual/Existential:  Does patient have any spiritual or Hoahaoism beliefs? [x] Yes [] No  Is the patient involved in a spiritual, kush or Hoahaoism community?  [x] Yes [] No  Patient expressing spiritual/existential angst? [] Yes [x] No  Notes: Faith     Support System:    Identified Support Person/Group:  Jessie Vasquez), Daughter, Son, Sister  [x]Strong  []Fair  []Limited    Coping with Illness:   [x]  Coping Well  [] Challenges Coping with Serious Illness [] Situational Depression [] Situational Anxiety [] Anticipatory Grief  [] Recent Loss [] Caregiver [place]            Narrative: Patient here for consultation with Dr. Jero Zavala for the management of pancreatic cancer. Met with patient to introduce social work role and supports. Patient lives with her  in their private residence. They have three adult children who live locally. Her one some is special needs and lives in a group home. She is currently on leave but works full-time as a . She has Medicaid [employer] and [employer]. Patient is well supported by family, friends, and Zoroastrian family with practical and emotional needs. Patient endorsed feeling nervous about the physical effects of chemotherapy as she prefers to use naturopathic medicine. Patient's mother passed from pancreatic cancer. That experience, talking to family and pray has guided her decision to move forward with chemotherapy and surgery. Patient's reports anxiety when she was the primary caregiver to her son with disabilities. She denies anxiety at this time. She feels she is actively coping with this diagnosis. Patient has adequate transportation to appointments. She denies barriers to care a this time. She declined support group referral. Encouraged patient to contact me as needed for ongoing psychosocial support. Plan: Ongoing psychosocial support as desired by patient.      Referral:    Thank you,  Michael Velasco LCSW

## 2021-10-08 NOTE — ONCOLOGY PATHWAY NOTE
START ON PATHWAY REGIMEN - Pancreatic Adenocarcinoma    [id number]        Oxaliplatin (Eloxatin)       Leucovorin       Irinotecan (Camptosar)       Fluorouracil     **Always confirm dose/schedule in your pharmacy ordering system**    Patient Characteristics:  Preoperative (Clinical Staging), Resectable, Neoadjuvant Therapy Planned, BRCA1/2 and PALB2 Mutation Absent/Unknown  Therapeutic Status: Preoperative (Clinical Staging)  AJCC T Category: cT2  AJCC N Category: cN0  Resectability Status: Resectable  AJCC M Category: cM0  AJCC 8 Stage Grouping: IB  BRCA1/2 Mutation Status: Awaiting Test Results  PALB2 Mutation Status: Awaiting Test Results  Intent of Therapy:  Curative Intent, Discussed with Patient

## 2021-10-08 NOTE — PROGRESS NOTES
Chief Complaint   Patient presents with    Follow-up     Jaylin Zuniga is a pleasant 72year old woman who presents as a follow up for new bilary or pancreatic head mass.  She denies pain

## 2021-10-08 NOTE — LETTER
10/8/2021    Patient: Keyla Landaverde   YOB: 1955   Date of Visit: 10/8/2021     Ash Hopkins MD  [address] [facility] [address]  [address] [place] [address]    Dear Ash Hopkins MD,      Thank you for referring Ms. Griselda Flores to [facility] for evaluation. My notes for this consultation are attached. If you have questions, please do not hesitate to call me. I look forward to following your patient along with you.       Sincerely,    Allan Gutierrez MD

## 2021-10-11 ENCOUNTER — TELEPHONE (OUTPATIENT)
Dept: ONCOLOGY | Age: 66
End: 2021-10-11

## 2021-10-11 ENCOUNTER — TRANSCRIBE ORDER (OUTPATIENT)
Dept: REGISTRATION | Age: 66
End: 2021-10-11

## 2021-10-11 DIAGNOSIS — Z01.812 PRE-PROCEDURE LAB EXAM: Primary | ICD-10-CM

## 2021-10-11 NOTE — TELEPHONE ENCOUNTER
[facility] [place] at [address], [address] [place] [phone]  W: [phone]  F: [phone]    Medical Nutrition Therapy  Nutrition Referral:    Referral received regarding elderberry. Called and spoke with patient, explained that RD is available to address nutrition throughout the spectrum of care. She is ok to drink Elderberry tea as needed. Contact info provided.        Signed By: Jean Araiza, [facility]

## 2021-10-11 NOTE — TELEPHONE ENCOUNTER
[address] at [facility]  [phone]        10/11/21 9:01 AM Specimen collected for [facility] testing and requisition completed. Copy scanned into patient chart. Scheduled [employer] express -- #[id number]. Will continue to monitor. 10/18/21 9:07 AM Per [facility] portal, specimen processing. Estimated report date noted as 10/17/21-10/24/21. Will continue to monitor. 10/25/21 9:49 AM Received [facility] results. Copy to be scanned into patient chart and then placed on NP desk for review.

## 2021-10-12 ENCOUNTER — TELEPHONE (OUTPATIENT)
Dept: ONCOLOGY | Age: 66
End: 2021-10-12

## 2021-10-12 ENCOUNTER — HOSPITAL ENCOUNTER (OUTPATIENT)
Dept: PREADMISSION TESTING | Age: 66
Discharge: HOME | End: 2021-10-12
Payer: MEDICARE

## 2021-10-12 DIAGNOSIS — Z01.812 PRE-PROCEDURE LAB EXAM: ICD-10-CM

## 2021-10-12 PROCEDURE — U0005 INFEC AGEN DETEC AMPLI PROBE: HCPCS

## 2021-10-12 NOTE — TELEPHONE ENCOUNTER
Called patient to set up her treatment and she had some questions about her medications.   CJ#[phone]

## 2021-10-13 ENCOUNTER — PATIENT OUTREACH (OUTPATIENT)
Dept: CASE MANAGEMENT | Age: 66
End: 2021-10-13

## 2021-10-13 LAB
SARS-COV-2, XPLCVT: NOT DETECTED
SOURCE, COVRS: NORMAL

## 2021-10-13 NOTE — PROGRESS NOTES
Care Transitions Follow Up Call    Challenges to be reviewed by the provider   Additional needs identified to be addressed with provider: no             Method of communication with provider : none    Care Transition Nurse (CTN) contacted the patient by telephone to follow up after admission on 21. Verified name and  with patient as identifiers. Follow up appointment completed? yes. Was follow up appointment scheduled within 7 days of discharge? yes. [address] follow up appointment(s):   Future Appointments   Date Time Provider Sally Carrizales   10/18/2021  7:30 AM SS INF4 CH2 >7H [facility]   10/18/2021  8:45 AM Codey Paez, [facility]   10/20/2021  2:00 PM SS INF4 CH4 <1H [facility]   10/29/2021 11:00 AM [facility] Davila [facility]   2021  8:00 AM [facility]   11/3/2021  2:30 PM SS INF6 CH4 <1H [facility]   11/15/2021  8:00 AM SS INF3 CH2 >7H [facility]   2021  2:30 PM SS INF7 CH3 <1H [facility]   2021  7:30 AM SS INF6 CH3 >7H [facility]   2021  2:30 PM SS INF4 CH4 <1H [facility]   2021  8:00 AM SS INF6 CH3 >7H [facility]   12/15/2021  2:30 PM SS INF6 CH4 <1H [facility]   2021  8:00 AM SS INF6 CH3 >7H [facility]   2021  2:30 PM SS INF7 [facility]2 [facility]     CTN provided contact information for future needs. Plan for follow-up call in 7-10 days based on severity of symptoms and risk factors. Plan for next call: community resources-as needed, (transportation)     Goals Addressed                 This Visit's Progress     Establish PCP relationships and regularly scheduled appointments.  Pt.will attend all recommended follow ups with pcp and specialist w/n 30 day period.  Pt.scheduled hosp.follow up:  Future Appointments   Date Time Provider   10/5/2021  1:40 PM Lili Hernandez MD   10/6/2021  8:15 AM Ruben Mabry MD 10/8/2021  1:00 PM Dia Smith MD   [facility] information [facility] [phone][facility] explain briefly type of service; contact information provided, f/u with pt.in 5-7 days. -[address]       10/13 Pt. appts attended as scheduled, has upcoming appt. with Mike Mixon MD 10/15/21 (Port-A-Cath). CTN will follow up with pt.in 7-14 days. -[address]

## 2021-10-13 NOTE — TELEPHONE ENCOUNTER
[employer] at [facility]  [phone]        10/13/21 12:36 PM Attempted to call patient via contact number provided. No answer, left message requesting return call. Provided office phone number as well for call back. 10/14/21 1:32 PM Attempted to call patient via home/cell number listed. No answer, left message requesting return call. Provided office phone number as well for call back. 2:00 PM Placed return call to patient. She had questions about dexamethasone instructions. Reviewed dosing and instructions per written order in medication list. Patient also inquired if she was starting with large dose of chemotherapy or if this was something that was going to be titrated up. Patient shared she is nervous about upcoming chemotherapy appointment. She had also talked with a friend who stated he or she could only tolerate one chemotherapy treatment and stopped after that, stating it was too much. Advised this nurse would defer to Dr. Aleks Mar for clarification. Explained that Dr. Aleks Mar may review this with patient at upcoming appointment, but this nurse will call patient back sooner with response if not. Patient voiced understanding.

## 2021-10-14 NOTE — PERIOP NOTES
KIRBY MARTINEZ COMPLETED. GAVE PATIENT PREOP INSTRUCTIONS PER ANESTHESIA PROTOCOL. PATIENT VERBALIZED UNDERSTANDING. EMAILED [facility] SOAP INSTRUCTIONS TO PATIENT.

## 2021-10-15 ENCOUNTER — ANESTHESIA EVENT (OUTPATIENT)
Dept: SURGERY | Age: 66
End: 2021-10-15
Payer: MEDICARE

## 2021-10-15 ENCOUNTER — APPOINTMENT (OUTPATIENT)
Dept: GENERAL RADIOLOGY | Age: 66
End: 2021-10-15
Attending: SURGERY
Payer: MEDICARE

## 2021-10-15 ENCOUNTER — ANESTHESIA (OUTPATIENT)
Dept: SURGERY | Age: 66
End: 2021-10-15
Payer: MEDICARE

## 2021-10-15 ENCOUNTER — HOSPITAL ENCOUNTER (OUTPATIENT)
Age: 66
Setting detail: OUTPATIENT SURGERY
Discharge: HOME | End: 2021-10-15
Attending: SURGERY | Admitting: SURGERY
Payer: MEDICARE

## 2021-10-15 VITALS
HEIGHT: 64 IN | RESPIRATION RATE: 10 BRPM | SYSTOLIC BLOOD PRESSURE: 112 MMHG | WEIGHT: 263.45 LBS | TEMPERATURE: 98 F | HEART RATE: 76 BPM | DIASTOLIC BLOOD PRESSURE: 81 MMHG | OXYGEN SATURATION: 98 % | BODY MASS INDEX: 44.98 KG/M2

## 2021-10-15 DIAGNOSIS — C25.0 MALIGNANT NEOPLASM OF HEAD OF PANCREAS (HCC): Primary | ICD-10-CM

## 2021-10-15 PROCEDURE — 77030002933 HC SUT MCRYL J&J -A: Performed by: SURGERY

## 2021-10-15 PROCEDURE — 77030002986 HC SUT PROL J&J -A: Performed by: SURGERY

## 2021-10-15 PROCEDURE — 76060000034 HC ANESTHESIA 1.5 TO 2 HR: Performed by: SURGERY

## 2021-10-15 PROCEDURE — 74011250636 HC RX REV CODE- 250/636: Performed by: NURSE ANESTHETIST, CERTIFIED REGISTERED

## 2021-10-15 PROCEDURE — 74011250637 HC RX REV CODE- 250/637: Performed by: ANESTHESIOLOGY

## 2021-10-15 PROCEDURE — 76210000021 HC REC RM PH II 0.5 TO 1 HR: Performed by: SURGERY

## 2021-10-15 PROCEDURE — 36561 INSERT TUNNELED CV CATH: CPT | Performed by: PHYSICIAN ASSISTANT

## 2021-10-15 PROCEDURE — 71045 X-RAY EXAM CHEST 1 VIEW: CPT

## 2021-10-15 PROCEDURE — 74011000250 HC RX REV CODE- 250: Performed by: SURGERY

## 2021-10-15 PROCEDURE — 77030010507 HC ADH SKN DERMBND J&J -B: Performed by: SURGERY

## 2021-10-15 PROCEDURE — C1788 PORT, INDWELLING, IMP: HCPCS | Performed by: SURGERY

## 2021-10-15 PROCEDURE — 77030031139 HC SUT VCRL2 J&J -A: Performed by: SURGERY

## 2021-10-15 PROCEDURE — 77030040361 HC SLV COMPR DVT MDII -B: Performed by: SURGERY

## 2021-10-15 PROCEDURE — 74011250636 HC RX REV CODE- 250/636: Performed by: SURGERY

## 2021-10-15 PROCEDURE — 74011000250 HC RX REV CODE- 250: Performed by: NURSE ANESTHETIST, CERTIFIED REGISTERED

## 2021-10-15 PROCEDURE — 2709999900 HC NON-CHARGEABLE SUPPLY: Performed by: SURGERY

## 2021-10-15 PROCEDURE — 74011250636 HC RX REV CODE- 250/636: Performed by: ANESTHESIOLOGY

## 2021-10-15 PROCEDURE — 36561 INSERT TUNNELED CV CATH: CPT | Performed by: SURGERY

## 2021-10-15 PROCEDURE — 76010000153 HC OR TIME 1.5 TO 2 HR: Performed by: SURGERY

## 2021-10-15 PROCEDURE — 77001 FLUOROGUIDE FOR VEIN DEVICE: CPT | Performed by: SURGERY

## 2021-10-15 PROCEDURE — 76937 US GUIDE VASCULAR ACCESS: CPT | Performed by: SURGERY

## 2021-10-15 PROCEDURE — 76210000016 HC OR PH I REC 1 TO 1.5 HR: Performed by: SURGERY

## 2021-10-15 PROCEDURE — 77030040922 HC BLNKT HYPOTHRM STRY -A

## 2021-10-15 DEVICE — POWERPORT IMPLANTABLE PORT WITH ATTACHABLE 8F CHRONOFLEX OPEN-ENDED SINGLE-LUMEN VENOUS CATHETER INTERMEDIATE KIT (WITH SUTURE PLUGS)
Type: IMPLANTABLE DEVICE | Site: CHEST | Status: FUNCTIONAL
Brand: POWERPORT, CHRONOFLEX

## 2021-10-15 RX ORDER — SODIUM CHLORIDE, SODIUM LACTATE, POTASSIUM CHLORIDE, CALCIUM CHLORIDE 600; 310; 30; 20 MG/100ML; MG/100ML; MG/100ML; MG/100ML
125 INJECTION, SOLUTION INTRAVENOUS CONTINUOUS
Status: DISCONTINUED | OUTPATIENT
Start: 2021-10-15 | End: 2021-10-15 | Stop reason: HOSPADM

## 2021-10-15 RX ORDER — ACETAMINOPHEN 325 MG/1
650 TABLET ORAL ONCE
Status: COMPLETED | OUTPATIENT
Start: 2021-10-15 | End: 2021-10-15

## 2021-10-15 RX ORDER — FENTANYL CITRATE 50 UG/ML
25 INJECTION, SOLUTION INTRAMUSCULAR; INTRAVENOUS
Status: DISCONTINUED | OUTPATIENT
Start: 2021-10-15 | End: 2021-10-15 | Stop reason: HOSPADM

## 2021-10-15 RX ORDER — SODIUM CHLORIDE 0.9 % (FLUSH) 0.9 %
5-40 SYRINGE (ML) INJECTION EVERY 8 HOURS
Status: DISCONTINUED | OUTPATIENT
Start: 2021-10-15 | End: 2021-10-15 | Stop reason: HOSPADM

## 2021-10-15 RX ORDER — HEPARIN 100 UNIT/ML
SYRINGE INTRAVENOUS AS NEEDED
Status: DISCONTINUED | OUTPATIENT
Start: 2021-10-15 | End: 2021-10-15 | Stop reason: HOSPADM

## 2021-10-15 RX ORDER — OXYCODONE AND ACETAMINOPHEN 5; 325 MG/1; MG/1
1 TABLET ORAL AS NEEDED
Status: DISCONTINUED | OUTPATIENT
Start: 2021-10-15 | End: 2021-10-15 | Stop reason: HOSPADM

## 2021-10-15 RX ORDER — PROPOFOL 10 MG/ML
INJECTION, EMULSION INTRAVENOUS AS NEEDED
Status: DISCONTINUED | OUTPATIENT
Start: 2021-10-15 | End: 2021-10-15 | Stop reason: HOSPADM

## 2021-10-15 RX ORDER — PROPOFOL 10 MG/ML
INJECTION, EMULSION INTRAVENOUS
Status: DISCONTINUED | OUTPATIENT
Start: 2021-10-15 | End: 2021-10-15 | Stop reason: HOSPADM

## 2021-10-15 RX ORDER — MIDAZOLAM HYDROCHLORIDE 1 MG/ML
1 INJECTION, SOLUTION INTRAMUSCULAR; INTRAVENOUS AS NEEDED
Status: DISCONTINUED | OUTPATIENT
Start: 2021-10-15 | End: 2021-10-15 | Stop reason: HOSPADM

## 2021-10-15 RX ORDER — FENTANYL CITRATE 50 UG/ML
INJECTION, SOLUTION INTRAMUSCULAR; INTRAVENOUS AS NEEDED
Status: DISCONTINUED | OUTPATIENT
Start: 2021-10-15 | End: 2021-10-15 | Stop reason: HOSPADM

## 2021-10-15 RX ORDER — LIDOCAINE HYDROCHLORIDE 20 MG/ML
INJECTION, SOLUTION EPIDURAL; INFILTRATION; INTRACAUDAL; PERINEURAL AS NEEDED
Status: DISCONTINUED | OUTPATIENT
Start: 2021-10-15 | End: 2021-10-15 | Stop reason: HOSPADM

## 2021-10-15 RX ORDER — DEXAMETHASONE SODIUM PHOSPHATE 4 MG/ML
INJECTION, SOLUTION INTRA-ARTICULAR; INTRALESIONAL; INTRAMUSCULAR; INTRAVENOUS; SOFT TISSUE AS NEEDED
Status: DISCONTINUED | OUTPATIENT
Start: 2021-10-15 | End: 2021-10-15 | Stop reason: HOSPADM

## 2021-10-15 RX ORDER — ONDANSETRON 2 MG/ML
4 INJECTION INTRAMUSCULAR; INTRAVENOUS AS NEEDED
Status: DISCONTINUED | OUTPATIENT
Start: 2021-10-15 | End: 2021-10-15 | Stop reason: HOSPADM

## 2021-10-15 RX ORDER — EPHEDRINE SULFATE/0.9% NACL/PF 50 MG/5 ML
5 SYRINGE (ML) INTRAVENOUS AS NEEDED
Status: DISCONTINUED | OUTPATIENT
Start: 2021-10-15 | End: 2021-10-15 | Stop reason: HOSPADM

## 2021-10-15 RX ORDER — SODIUM CHLORIDE 9 MG/ML
1000 INJECTION, SOLUTION INTRAVENOUS CONTINUOUS
Status: DISCONTINUED | OUTPATIENT
Start: 2021-10-15 | End: 2021-10-15 | Stop reason: HOSPADM

## 2021-10-15 RX ORDER — MIDAZOLAM HYDROCHLORIDE 1 MG/ML
0.5 INJECTION, SOLUTION INTRAMUSCULAR; INTRAVENOUS
Status: DISCONTINUED | OUTPATIENT
Start: 2021-10-15 | End: 2021-10-15 | Stop reason: HOSPADM

## 2021-10-15 RX ORDER — FENTANYL CITRATE 50 UG/ML
50 INJECTION, SOLUTION INTRAMUSCULAR; INTRAVENOUS AS NEEDED
Status: DISCONTINUED | OUTPATIENT
Start: 2021-10-15 | End: 2021-10-15 | Stop reason: HOSPADM

## 2021-10-15 RX ORDER — LIDOCAINE HYDROCHLORIDE AND EPINEPHRINE 10; 10 MG/ML; UG/ML
INJECTION, SOLUTION INFILTRATION; PERINEURAL AS NEEDED
Status: DISCONTINUED | OUTPATIENT
Start: 2021-10-15 | End: 2021-10-15 | Stop reason: HOSPADM

## 2021-10-15 RX ORDER — SODIUM CHLORIDE 0.9 % (FLUSH) 0.9 %
5-40 SYRINGE (ML) INJECTION AS NEEDED
Status: DISCONTINUED | OUTPATIENT
Start: 2021-10-15 | End: 2021-10-15 | Stop reason: HOSPADM

## 2021-10-15 RX ORDER — SODIUM CHLORIDE, SODIUM LACTATE, POTASSIUM CHLORIDE, CALCIUM CHLORIDE 600; 310; 30; 20 MG/100ML; MG/100ML; MG/100ML; MG/100ML
INJECTION, SOLUTION INTRAVENOUS
Status: DISCONTINUED | OUTPATIENT
Start: 2021-10-15 | End: 2021-10-15 | Stop reason: HOSPADM

## 2021-10-15 RX ORDER — DIPHENHYDRAMINE HYDROCHLORIDE 50 MG/ML
12.5 INJECTION, SOLUTION INTRAMUSCULAR; INTRAVENOUS AS NEEDED
Status: DISCONTINUED | OUTPATIENT
Start: 2021-10-15 | End: 2021-10-15 | Stop reason: HOSPADM

## 2021-10-15 RX ORDER — SODIUM CHLORIDE 9 MG/ML
50 INJECTION, SOLUTION INTRAVENOUS CONTINUOUS
Status: DISCONTINUED | OUTPATIENT
Start: 2021-10-15 | End: 2021-10-15 | Stop reason: HOSPADM

## 2021-10-15 RX ORDER — LIDOCAINE HYDROCHLORIDE 10 MG/ML
0.1 INJECTION, SOLUTION EPIDURAL; INFILTRATION; INTRACAUDAL; PERINEURAL AS NEEDED
Status: DISCONTINUED | OUTPATIENT
Start: 2021-10-15 | End: 2021-10-15 | Stop reason: HOSPADM

## 2021-10-15 RX ORDER — ONDANSETRON 2 MG/ML
INJECTION INTRAMUSCULAR; INTRAVENOUS AS NEEDED
Status: DISCONTINUED | OUTPATIENT
Start: 2021-10-15 | End: 2021-10-15 | Stop reason: HOSPADM

## 2021-10-15 RX ORDER — HYDROMORPHONE HYDROCHLORIDE 1 MG/ML
0.2 INJECTION, SOLUTION INTRAMUSCULAR; INTRAVENOUS; SUBCUTANEOUS
Status: DISCONTINUED | OUTPATIENT
Start: 2021-10-15 | End: 2021-10-15 | Stop reason: HOSPADM

## 2021-10-15 RX ORDER — MORPHINE SULFATE 2 MG/ML
2 INJECTION, SOLUTION INTRAMUSCULAR; INTRAVENOUS
Status: DISCONTINUED | OUTPATIENT
Start: 2021-10-15 | End: 2021-10-15 | Stop reason: HOSPADM

## 2021-10-15 RX ORDER — TRAMADOL HYDROCHLORIDE 50 MG/1
50 TABLET ORAL
Qty: 12 TABLET | Refills: 0 | Status: SHIPPED | OUTPATIENT
Start: 2021-10-15 | End: 2021-10-22

## 2021-10-15 RX ADMIN — PROPOFOL 75 MCG/KG/MIN: 10 INJECTION, EMULSION INTRAVENOUS at 07:38

## 2021-10-15 RX ADMIN — ONDANSETRON HYDROCHLORIDE 4 MG: 2 INJECTION, SOLUTION INTRAMUSCULAR; INTRAVENOUS at 08:06

## 2021-10-15 RX ADMIN — PROPOFOL 20 MG: 10 INJECTION, EMULSION INTRAVENOUS at 07:47

## 2021-10-15 RX ADMIN — SODIUM CHLORIDE, POTASSIUM CHLORIDE, SODIUM LACTATE AND CALCIUM CHLORIDE 125 ML/HR: 600; 310; 30; 20 INJECTION, SOLUTION INTRAVENOUS at 06:34

## 2021-10-15 RX ADMIN — DEXAMETHASONE SODIUM PHOSPHATE 4 MG: 4 INJECTION, SOLUTION INTRAMUSCULAR; INTRAVENOUS at 07:48

## 2021-10-15 RX ADMIN — PROPOFOL 30 MG: 10 INJECTION, EMULSION INTRAVENOUS at 07:41

## 2021-10-15 RX ADMIN — FENTANYL CITRATE 25 MCG: 50 INJECTION, SOLUTION INTRAMUSCULAR; INTRAVENOUS at 08:21

## 2021-10-15 RX ADMIN — PROPOFOL 30 MG: 10 INJECTION, EMULSION INTRAVENOUS at 08:12

## 2021-10-15 RX ADMIN — PROPOFOL 40 MG: 10 INJECTION, EMULSION INTRAVENOUS at 07:49

## 2021-10-15 RX ADMIN — SODIUM CHLORIDE, POTASSIUM CHLORIDE, SODIUM LACTATE AND CALCIUM CHLORIDE: 600; 310; 30; 20 INJECTION, SOLUTION INTRAVENOUS at 07:26

## 2021-10-15 RX ADMIN — PROPOFOL 40 MG: 10 INJECTION, EMULSION INTRAVENOUS at 07:38

## 2021-10-15 RX ADMIN — PROPOFOL 20 MG: 10 INJECTION, EMULSION INTRAVENOUS at 08:21

## 2021-10-15 RX ADMIN — WATER 2 G: 1 INJECTION INTRAMUSCULAR; INTRAVENOUS; SUBCUTANEOUS at 07:54

## 2021-10-15 RX ADMIN — FENTANYL CITRATE 25 MCG: 50 INJECTION, SOLUTION INTRAMUSCULAR; INTRAVENOUS at 07:48

## 2021-10-15 RX ADMIN — FENTANYL CITRATE 25 MCG: 50 INJECTION, SOLUTION INTRAMUSCULAR; INTRAVENOUS at 08:06

## 2021-10-15 RX ADMIN — ACETAMINOPHEN 650 MG: 325 TABLET ORAL at 06:24

## 2021-10-15 RX ADMIN — LIDOCAINE HYDROCHLORIDE 40 MG: 20 INJECTION, SOLUTION EPIDURAL; INFILTRATION; INTRACAUDAL; PERINEURAL at 07:38

## 2021-10-15 RX ADMIN — PROPOFOL 20 MG: 10 INJECTION, EMULSION INTRAVENOUS at 07:45

## 2021-10-15 RX ADMIN — FENTANYL CITRATE 25 MCG: 50 INJECTION, SOLUTION INTRAMUSCULAR; INTRAVENOUS at 08:32

## 2021-10-15 NOTE — ANESTHESIA POSTPROCEDURE EVALUATION
Procedure(s):  PORT-A-CATH PLACEMENT WITH FLUOROSCOPY AND ULTRASOUND GUIDANCE. MAC    Anesthesia Post Evaluation      Multimodal analgesia: multimodal analgesia used between 6 hours prior to anesthesia start to PACU discharge  Patient location during evaluation: PACU  Patient participation: complete - patient participated  Level of consciousness: awake  Pain score: 2  Pain management: adequate  Airway patency: patent  Anesthetic complications: no  Cardiovascular status: acceptable  Respiratory status: acceptable  Hydration status: acceptable  Comments: I have evaluated the patient and meets criteria for discharge from PACU. Kaylynn Cullen MD  Post anesthesia nausea and vomiting:  controlled  Final Post Anesthesia Temperature Assessment:  Normothermia (36.0-37.5 degrees C)      INITIAL Post-op Vital signs:   Vitals Value Taken Time   /148 10/15/21 1016   Temp 36.4 °C (97.6 °F) 10/15/21 0913   Pulse 83 10/15/21 1016   Resp 19 10/15/21 1016   SpO2 96 % 10/15/21 1016   Vitals shown include unvalidated device data.

## 2021-10-15 NOTE — PROGRESS NOTES
[facility] [place] at [address], [address] [facility] [address] [facility] [address] [facility]  Radha Morrell: [phone]  F: [phone]      Reason for Visit:   Juan Garza is a 72 y.o. female who is seen for evaluation of new bilary or pancreatic head mass    Hematology/Oncology Treatment History:     Diagnosis: Pancreatic adenocarcinoma    Stage: clinical Stage Ib [T2N0]    Pathology:   -21 Cytology - brushings from common bile duct: Reactive glandular epithelial cells and bile   -21 pancreatic head mass biopsy: Adenocarcinoma. Prior Treatment: None    Current Treatment: neoadjuvant FOLFIRNOX to 10/18/21 - current    History of Present Illness:   Juan Garza is a pleasant 72 y.o. female who comes in for evaluation of pancreatic cancer. She presented in 2021 with obstructive jaundice, transaminitis. ERCP on 21 notable for distal CBD stricture; possible tumor/mass located in the CBD; underwent biliary stent placement to relieve biliary obstruction. CT A/P revealed mild/mod intrahepatic biliary ductal dilatation; prominence of CBD and hydropic gallbladder, suggestive of stricture/stenosis/mass of distal CBD. MRI of abdomen showed narrowing of CBD pancreatic head suggestive of extrinisic compression concerning for periampullary mass vs eccentric intraluminal lesion. EUS showed 3.2cm hypoechoic mass in pancreas head. Biopsy revealed adenocarcinoma. Pt met with  [facility] in [place] and plans for neoadjuvant chemotherapy prior to surgery. Interval history:  Patient here for follow up and start of treatment. Since the last visit, pt had her port placed by  [facility]. She has some questions about apprehension about chemotherapy dosing. Reports daily hard stools. Is hesitant to take stool softener given history of colitis. No CP, SOB, nausea, neuropathy, rashes, fevers or chills. She wants the influenza vaccine.        PMHx: OA and Rheumatoid arthritis in lower back, Ulcerative colitis, HTN, Hypothyroidism  PSurgHx: , Cyst removed from left breast  SHx: , has 3 children (1 daughter and 2 sons). Works as  in [employer]. Nonsmoker, no EtOH.  in rehab recovering from [facility]. FHx: Mother  of pancreatic cancer age 80. Brother and son had colon cancer. Review of Systems: A complete review of systems was obtained, reviewed. Pertinent findings reviewed above. Physical Exam:     Visit Vitals  /75   Pulse 74   Temp (!) 96.5 °F (35.8 °C)   Ht 5' 4\" (1.626 m)   Wt 264 lb (119.7 kg)   SpO2 98%   BMI 45.32 kg/m²     ECOG PS: 1  General: obese; no distress  Eyes:  Anicteric sclerae  HENT: atraumatic, face mask in place  Neck: supple  Lymphatic: Deferred today  Respiratory: CTAB, normal respiratory effort  CV: normal rate, regular rhythm, no murmurs, no peripheral edema, port in place. GI: soft, nontender, nondistended, no masses  MS: digits without clubbing or cyanosis. Skin: no rashes, no ecchymoses, no petechia. normal temperature, turgor, and texture. Psych: alert, oriented, appropriate affect, normal judgment/insight    Results:     Lab Results   Component Value Date/Time    WBC 6.8 10/18/2021 08:04 AM    HGB 12.4 10/18/2021 08:04 AM    HCT 37.5 10/18/2021 08:04 AM    PLATELET 382  08:04 AM    MCV 94.5 10/18/2021 08:04 AM    ABS.  NEUTROPHILS 2.3 10/18/2021 08:04 AM    Hemoglobin (POC) 12.6 10/06/2013 08:45 AM    Hematocrit (POC) 37 10/06/2013 08:45 AM     Lab Results   Component Value Date/Time    Sodium 140 10/18/2021 08:04 AM    Potassium 3.5 10/18/2021 08:04 AM    Chloride 108 10/18/2021 08:04 AM    CO2 27 10/18/2021 08:04 AM    Glucose 92 10/18/2021 08:04 AM    BUN 12 10/18/2021 08:04 AM    Creatinine 0.95 10/18/2021 08:04 AM    GFR est AA >60 10/18/2021 08:04 AM    GFR est non-AA 59 (L) 10/18/2021 08:04 AM    Calcium 8.7 10/18/2021 08:04 AM    Sodium (POC) 143 10/06/2013 08:45 AM    Potassium (POC) 3.4 (L) 10/06/2013 08:45 AM Chloride (POC) 104 10/06/2013 08:45 AM    Glucose (POC) 91 10/06/2013 08:45 AM    BUN (POC) 11 10/06/2013 08:45 AM    Creatinine (POC) 1.0 10/06/2013 08:45 AM    Calcium, ionized (POC) 1.27 10/06/2013 08:45 AM     Lab Results   Component Value Date/Time    Bilirubin, total 0.8 10/18/2021 08:04 AM    ALT (SGPT) 36 10/18/2021 08:04 AM    Alk. phosphatase 92 10/18/2021 08:04 AM    Protein, total 6.9 10/18/2021 08:04 AM    Albumin 3.0 (L) 10/18/2021 08:04 AM    Globulin 3.9 10/18/2021 08:04 AM     Lab Results   Component Value Date/Time    Iron % saturation 32 07/07/2014 02:28 PM    TIBC 304 07/07/2014 02:28 PM    Sed rate (ESR) 17 09/16/2010 12:04 PM    C-Reactive protein <0.29 01/18/2017 08:45 AM    TSH 3.46 03/04/2021 08:57 AM    ALBA, Direct Detected (A) 09/16/2010 12:04 PM    Lipase 1,214 (H) 09/29/2021 04:10 AM    Hep C virus Ab Interp. NONREACTIVE 09/24/2021 06:55 PM     Lab Results   Component Value Date/Time    CEA 3.5 09/25/2021 11:46 AM    Carbohydrate Antigen 19-9, (CA 19-9) 198 (H) 09/25/2021 11:46 AM       Imaging / Procedures:     9/24/21 US ABD   IMPRESSION  Cholelithiasis. Gallbladder sludge. Prominent CBD with mild intrahepatic ductal dilatation as well. Nonemergent MRI with MRCP to delineate etiology for CBD distention. 9/24/21 CT ABD PELV W CONT  IMPRESSION  There is mild/moderate intrahepatic biliary ductal dilatation, prominence of the  CBD and a hydropic gallbladder. No pancreatic duct dilatation. No clearly  delineated pancreatic head mass. Imaging findings suggestive of  stricture/stenosis/mass of the distal CBD, no extrinsic pancreatic head mass is  identified. Gastroenterology consult   Correlation with MRI/MRCP on a nonemergent basis. There is severe degenerative change of the lumbar spine. 9/25/21 MRI ABD WO CONT  IMPRESSION  1. Intrahepatic and extra hepatic biliary dilatation with abrupt narrowing of  the common bile duct pancreatic head just proximal to the ampulla.  Suggestion of  extrinsic compression on the duct. This raises the possibility of a  periampullary mass. Alternatively, this may be an eccentric intraluminal lesion. Evaluation is limited. Recommend GI consultation for possible ERCP and EUS. 2.  Questionable small lesion in the left hepatic lobe with mild increased T2  signal and T1 hypointensity. Recommend follow-up imaging a contrast-enhanced  study preferably MRI. 3.  Cholelithiasis. Distended gallbladder with mild gallbladder wall thickening. Correlate for acute cholecystitis    9/26/21 XR ERCP/ERCB / Dr. Faraz Singh  Impression: Biliary ductal dilation, with a maximum common duct diameter of 15 mm; Severe stenosis, involving distal CBD /pancreas head area concerning for neoplasia  Interventions: Endoscopic ampullary sphincterotomy and biliary stent placement; brushings from the CBD    9/27/21 CT CHEST W CONT:   IMPRESSION  1. There is a minimal right pleural effusion. 2. There are small pulmonary nodules present. There is a nodule in the plane of  the right major fissure and posteriorly in left lower lobe. These were not  present on examination of 1/18/2017. These could be on the basis of metastatic  disease. Close follow-up is suggested. There are also 2 small subpleural nodules  anteriorly in the right upper lobe as described above which can also be  evaluated on follow-up. 9/28/21 EUS:  Endoscopic: Esophagus:normal; Stomach: normal; Duodenum/jejunum: normal and protruding biliary stent  Ultrasound: Esophagus: normal findings;     Stomach: normal findings:     Pancreas: Areas examined: the entire gland                          Parenchyma: -Evidence of a hypoechoic mass with poorly defined borders seen in the head of the pancreas. It appeared involving the CBD where a stent is seen, however it did not involve the major vessels. It measured about 3.2 cm in widest diameter on one view.                           Pancreatic Duct: normal findings, not dilated Liver: Parenchyma: normal                          Gallbladder: normal                          Bile Duct: the common bile duct is dilated in the proximal and mid portion and a plastic stent could be seen                          Lymph Node: no adenopathy  Impression:   Pancreas head mass with fine needle biopsy showing adenocarcinoma on preliminary cytology  Recommendations: Follow-up on pathology  Follow-up with oncology, will need further evaluation of pulmonary nodule  Surgical oncology consultation  Resume GI lite diet today and can discharge in am from GI standpoint  . Assessment/Recommendations:   72 y.o. female with Ulcerative colitis, Hyopthyroidism, admitted with obstructive jaundice concerning for underlying malignant obstructing mass. 1. Pancreatic adenocarcinoma:  Clinically stage Ib [T2N0], but need further evaluation in future regarding pulmonary nodules and liver lesion. Presented with obstructive jaundice and transaminitis, CA 19-9 was 198 in 9/2021 at diagnosis. Underwent biliary stent placement with improvement in biliary obstruction. 3.2cm mass seen in pancreatic head on EUS, not involving vasculature, possibly resectable. Dr. Ted Haynes in [place] has evaluated patient and recommends consideration of neoadjuvant chemotherapy with close attention to lung nodules and liver lesion. I agree with this recommendation for earlier treatment of micrometastatic disease, ability to determine whether pt has chemosensitive disease. We discussed the risks and benefits of FOLFIRINOX chemotherapy, including potential side effects. These include but are not limited to fatigue, nausea vomiting, diarrhea, neuropathy, taste changes, cold intolerance, esophageal spasm, allergic reactions, alopecia, mucositis, myelosuppression, risk for infection, infertility, and rarely, death.  Rarely, a patient may have a condition where they do not metabolize fluorouracil appropriately (called DPD deficiency), and they may have excessive toxicity. A Port-A-Cath will be required in order to deliver the continuous infusion. BRCA 1/2 negative. The patient has consented to beginning therapy. Planning for 6 cycles of mFOLFIRINOX in neoadjuvant setting, followed by surgery and adjuvant therapy x 6 cycles. Supportive care meds include: Emla cream, Zofran, Compazine, Dexamethasone  -- Proceed with C1 of neoadjuvant mFOLFIRINOX today  -- Checking CA 19-9 on date of C1, C6, C7, C12.   -- Chest CT with contrast and abd/pelvis MRI after C4 (per Dr. Janine Pandey). -- Needs to follow up with Dr. Janine Pandey immediately before C6.  -- Return in 2 weeks for C2 mFOLFIRINOX. 2. Diarrhea / Constipation:  Has history of UC. Likely related to biliary obstruction and pt has been started on Cholestyramine (Questran). Now having daily hard stools. Discussed management with stool softeners and fiber supplements. 3. Pulmonary nodules:   Tiny of unclear etiology. Will repeat imaging after C4 of chemo. 4. HTN:   Currently with stable BP and home HCTZ on hold per primary team.     5. H/o Ulcerative procto-sigmoiditis:  Past due for colonoscopy with one adenoma polyp removed at last colonoscopy in April 2017. Was due for repeat in 2019 but she has not followed up with the GI practice since her last colonoscopy. -- Outpatient colonoscopy overdue    6. Morbid obesity:  Discussed healthy food options and ways to incorporate more protein into diet. -- Kelly Ford RD     I have personally seen and evaluated the patient in conjunction with Silvia Lizarraga NP. I find the patient's history and physical exam are consistent with the NP's documentation. I agree with the above assessment and plan, which I have modified as needed. Proceed with C1 of mFOLFIRINOX today. Reviewed dosing and when/why we may need dose adjustments.      Signed By: Uziel Dockery MD

## 2021-10-15 NOTE — PERIOP NOTES
I have reviewed discharge instructions with the patient and son, Ben Melton. The patient and son, Ben Melton, verbalized understanding. All belongings returned to patient in PACU.

## 2021-10-15 NOTE — OP NOTES
OPERATIVE NOTE    Date of Procedure: 10/15/2021     Preoperative Diagnosis: PANCREATIC ADENOCARCINOMA    Postoperative Diagnosis: PANCREATIC ADENOCARCINOMA      Procedure:   RIGHT IJ PORT-A-CATH PLACEMENT WITH FLUOROSCOPY AND ULTRASOUND GUIDANCE    Surgeon: Adamaris Jones MD     Assistant(s): JESSICA Jay.  Ev Bourgeois helped with the entirety of the procedure with rectraction due to the patient's body habitus and with closure. Anesthesia: MAC     Estimated Blood Loss: 2 mL    Specimens: * No specimens in log *     Findings: Right IJ vein accessed with ultrasound guidance. Catheter positioned in SVC with fluoroscopy. Port flushed and withdrew easily at the completion of the case. Indication: The patient was recently diagnosed with pancreatic adenocarcinoma. She has some indeterminant lung and liver lesions and the decision was made for chemotherapy as first line of treatment with possible surgical resection if these indeterminant lesions turn out to not be metastatic disease. She was referred for port placement for chemotherapy. Description of the Procedure: A complete discussion of risks, benefits and alternatives to surgery were had with the patient, after which informed consent was obtained. The patient was in agreement to proceed. The patient was brought back to the operating room and placed under MAC anesthesia. She was in the supine position on the operating room table with the right arm tucked. The patient was then prepped and draped in the usual sterile fashion and a proper timeout was performed. The patient was then placed in Trendelenburg position. A sterile ultrasound was used to identify the right IJ vein. This was compressible and adjacent to the carotid artery. Local anesthetic was injected into the skin and subcutaneous tissues with ultrasound guidance adjacent to the IJ vein. A small stab incision was then made in the base of the neck with an 11 blade.   A needle was then passed into the IJ vein using ultrasound guidance and dark venous blood was aspirated. A wire was passed into the vein and the position in the SVC was confirmed on fluoroscopy. The needle was removed. We then created our port pocket 1 fingerbreadth below the clavicle near the delto-pectoral groove. A 2.5 cm incision was made after injection of local anesthetic and we dissected down to the fascia with cautery. A pocket was made anterior to the fascia large enough to house the port and 3 2-0 prolene sutures were placed to anchor the port. The catheter was flushed and then tunneled through the subcutaneous tissues over the clavicle and out through the stab incision in the neck. The dilator and introducer sheath were then passed over the wire into the SVC. Fluoro was used to confirm the position. The catheter was then advanced into the introducer sheath which was then torn away and removed. The catheter was pulled back into appropriate position in the SVC under fluoroscopy. This was then connected to the port and secured with the provided plastic connector. The port was secured in the pocket with the 2-0 prolene sutures. A final fluoroscopy image was obtained to confirm position of the catheter. The port was flushed using heparinized saline followed by 2.5 mL of the heparin lock solution. The port flushed and withdrew easily. The incision was then closed in layers using a 3-0 vicryl for interrupted sutures to close the deep dermis followed by a running 4-0 monocryl subcuticular stitch. The neck incision was closed using an interrupted 4-0 monocryl. Both incisions were covered with dermabond skin adhesive. The patient was then awakened and taken to the PACU in stable condition. All needle and instrument counts were correct at the completion of the case. I was present and scrubbed through the entirety of the case. There were no immediate complications.   A chest xray was ordered to confirm position of the catheter in the PACU. Complications: None    Implants:   Implant Name Type Inv.  Item Serial No.  Lot No. LRB No. Used Action   PORT ATTACH CATH POWERPORT 8FR --  - SNA  PORT ATTACH CATH POWERPORT 8FR --  NA [employer]_WD [id number] N/A 1 Implanted       Katty Blanco MD  10/15/2021

## 2021-10-15 NOTE — DISCHARGE INSTRUCTIONS
Implanted Port: What to Expect at [address] have had a procedure to implant a port. A port is a device placed, in most cases, under the skin of your chest below your collarbone. It is made of plastic, stainless steel, or titanium. It's about the size of a quarter, but thicker. It looks like a small bump under your skin. A thin, flexible tube called a catheter runs under the skin from the port into a large vein. With the port, you will be able to get medicines (such as chemotherapy) with more comfort. You also can get blood, nutrients, or other fluids. Blood can be taken through the port for tests. You will probably have some discomfort and bruising at the port site. This will go away in a few days. The port can be used right away. You may have the port for weeks, months, or longer. Your port will need to be flushed out regularly to keep it open. A nurse or other health professional will do this for you. This care sheet gives you a general idea about how long it will take for you to recover. But each person recovers at a different pace. Follow the steps below to feel better as quickly as possible. How can you care for yourself at home? Activity  ? · Avoid arm and upper body movements that may pull on the catheter. These movements include heavy weight lifting and vigorous use of your arms. ? · You will probably need to take 1 day off from work and will be able to return to normal activities shortly after. This depends on the type of work you do, why you have the catheter, and how you feel. ? · You probably will be able to take baths and swim. But you may need to avoid some activities. Talk to your doctor about any limits on your activity. ? · Ask your doctor when you can drive again. Be careful when you pull your seat belt across your chest so it doesn't pull out the catheter. It's okay if the seat belt lays over the catheter. Medicines  ?  · Your doctor will tell you if and when you can restart your medicines. He or she will also give you instructions about taking any new medicines. ? · If you take blood thinners, such as warfarin (Coumadin), clopidogrel (Plavix), or aspirin, be sure to talk to your doctor. He or she will tell you if and when to start taking those medicines again. Make sure that you understand exactly what your doctor wants you to do. ? · Be safe with medicines. Read and follow all instructions on the label. ¨ If the doctor gave you a prescription medicine for pain, take it as prescribed. ¨ If you are not taking a prescription pain medicine, ask your doctor if you can take an over-the-counter medicine. Incision care  ? · If you have a bandage, your doctor will tell you when you can remove it. After you remove the bandage, you may shower. Wash the area with soap and water and pat it dry. Don't use hydrogen peroxide or alcohol, which can slow healing. You may cover the area with a gauze bandage if it weeps or rubs against clothing. Change the bandage every day. ? · If you have strips of tape on the cut (incision) the doctor made, leave the tape on for a week or until it falls off. Other instructions  ? · Always carry the medical alert card that your doctor gives you. It contains information about your port. It will tell health care workers that you have a port in case you need emergency care. ? · When you get dressed, be careful not to rub the port. Do not wear a bra or suspenders that irritate your skin near the port. ? · Do not have your blood pressure taken on the arm with the port. Follow-up care is a key part of your treatment and safety. Be sure to make and go to all appointments, and call your doctor if you are having problems. It's also a good idea to know your test results and keep a list of the medicines you take. When should you call for help?   Call your doctor now or seek immediate medical care if:  ? · You have signs of infection, such as:  ¨ Increased pain, swelling, warmth, or redness. ¨ Red streaks leading from the area. ¨ Pus draining from the area. ¨ A fever. ? · You have pain or swelling in your neck or arm. ? · You have trouble breathing. ? Watch closely for changes in your health, and be sure to contact your doctor if:  ? · You have any problems with your line or port. Where can you learn more? Go to http://www.gray.com/. Enter M256 in the search box to learn more about \"Implanted Port: What to Expect at Home. \"  Current as of: 2017  Content Version: 11.4  © 5898-3984 [employer]. Care instructions adapted under license by [employer] (which disclaims liability or warranty for this information). If you have questions about a medical condition or this instruction, always ask your healthcare professional. [place] [address] any warranty or liability for your use of this information.         Patient Discharge Instructions    Katerinajt Witt / [id number] : 1955    Admitted 10/15/2021 Discharged: 10/15/2021       · It is important that you take the medication exactly as they are prescribed. · Keep your medication in the bottles provided by the pharmacist and keep a list of the medication names, dosages, and times to be taken in your wallet. · Do not take other medications without consulting your doctor. What to do at Home    Recommended diet: Resume previous diet    Recommended activity: No strenuous activity for 2-3 days until soreness resolves. No Driving While Taking narcotic pain medication. May Take Shower when you go home. Do not submerge your incision under water for at least 7-10 days. If you experience any of the following symptoms Fevers, Chills, Redness or Drainage at Surgical Site(s) or Any Other Questions or Concerns Please Call -  [phone]. Follow-up with Dr. Zehra Lake in ~14 days for wound check. Information obtained by :   I understand that if any problems occur once I am at home I am to contact my physician. I understand and acknowledge receipt of the instructions indicated above. Physician's or R.N.'s Signature                                                                  Date/Time                                                                                                                                              Patient or Representative Signature                                                          Date/Time    ______________________________________________________________________    Anesthesia Discharge Instructions    After general anesthesia or intervenous sedation, for 24 hours or while taking prescription Narcotics:  · Limit your activities  · Do not drive or operate hazardous machinery  · If you have not urinated within 8 hours after discharge, please contact your surgeon on call. · Do not make important personal or business decisions  · Do not drink alcoholic beverages    Report the following to your surgeon:  · Excessive pain, swelling, redness or odor of or around the surgical area  · Temperature over 100.5 degrees  · Nausea and vomiting lasting longer than 4 hours or if unable to take medication  · Any signs of decreased circulation or nerve impairment to extremity:  Change in color, persistent numbness, tingling, coldness or increased pain.   · Any questions      **No Tylenol until after 12:00 pm today**

## 2021-10-15 NOTE — PERIOP NOTES
10/15/2021      RE: Ana Flores      To Whom it May Concern: This is to certify that Ana Flores had surgery at [facility] on Friday, October 15. Please allow her son, Rosa Elliott, to be excused from work, as he was her ride to and from the hospital for surgery. Please feel free to contact Dr. Stevie Dee office ([phone]) if you have any questions or concerns. Thank you for your assistance in this matter.     Sincerely,      Aureliano Goodell

## 2021-10-15 NOTE — ANESTHESIA PREPROCEDURE EVALUATION
Relevant Problems   CARDIOVASCULAR   (+) Essential hypertension, benign      ENDOCRINE   (+) Hypothyroidism   (+) Obesity, morbid (HCC)      PERSONAL HX & FAMILY HX OF CANCER   (+) Malignant neoplasm of head of pancreas (HCC)       Anesthetic History   No history of anesthetic complications            Review of Systems / Medical History  Patient summary reviewed, nursing notes reviewed and pertinent labs reviewed    Pulmonary  Within defined limits                 Neuro/Psych   Within defined limits           Cardiovascular  Within defined limits  Hypertension                   GI/Hepatic/Renal  Within defined limits         PUD    Comments: UC Endo/Other  Within defined limits    Hypothyroidism  Morbid obesity, arthritis and cancer     Other Findings              Physical Exam    Airway  Mallampati: II  TM Distance: > 6 cm  Neck ROM: normal range of motion   Mouth opening: Normal     Cardiovascular  Regular rate and rhythm,  S1 and S2 normal,  no murmur, click, rub, or gallop             Dental  No notable dental hx       Pulmonary  Breath sounds clear to auscultation               Abdominal  GI exam deferred       Other Findings            Anesthetic Plan    ASA: 3  Anesthesia type: MAC            Anesthetic plan and risks discussed with: Patient

## 2021-10-15 NOTE — H&P
Date of Surgery Update:  Luz Garcia was seen and examined. History and physical has been reviewed. The patient has been examined. There have been no significant clinical changes since the completion of the originally dated History and Physical.  Patient with new diagnosis of pancreatic adenocarcinoma. Plan for port-a-cath placement for neoadjuvant chemotherapy. A complete discussion of the risks, benefits and alternatives to surgery were discussed with the patient who was keen to proceed. The patient was counseled at length about the risks of kevyn Covid-19 during their perioperative period and any recovery window from their procedure. The patient was made aware that kevyn Covid-19  may worsen their prognosis for recovering from their procedure and lend to a higher morbidity and/or mortality risk. All material risks, benefits, and reasonable alternatives including postponing the procedure were discussed. The patient does wish to proceed with the procedure at this time. Signed By: Monika Khalil MD     October 15, 2021 7:23 AM         Please note from the office and include the additional information below:    Past Medical History  Past Medical History:   Diagnosis Date    Arthritis     ostero arthritis, rhemathoid  lower back     Autoimmune disease ([address]     Ulcerative Colitis    Hypertension     Hypothyroid 3/23/2011    Malignant neoplasm of head of pancreas ([address] 2021    Ulcerative colitis ([address] 2010    Ulcerative colitis ([address] 2010    Ulcerative colitis ([address]         Past Surgical History  Past Surgical History:   Procedure Laterality Date    COLONOSCOPY N/A 2017    COLONOS[facility] performed by William [facility] at [facility] ENDOSCOPY    ENDOSCOPY, COLON, DIAGNOSTIC      HX  SECTION      [place] BREAST SURGERY PROCEDURE UNLISTED      cyst removed from left breast        Social History  The patient Luz Garcia  reports that she quit smoking about 41 years ago. Her smoking use included cigarettes. She has never used smokeless tobacco. She reports that she does not drink alcohol and does not use drugs.      Family History  Family History   Problem Relation Age of Onset    Cancer Mother         pancreatic    Cancer Brother         colon    Anesth Problems Neg Hx           Matt Hinojosa MD

## 2021-10-18 ENCOUNTER — OFFICE VISIT (OUTPATIENT)
Dept: ONCOLOGY | Age: 66
End: 2021-10-18

## 2021-10-18 ENCOUNTER — HOSPITAL ENCOUNTER (OUTPATIENT)
Dept: INFUSION THERAPY | Age: 66
Discharge: HOME | End: 2021-10-18
Payer: MEDICARE

## 2021-10-18 VITALS
RESPIRATION RATE: 16 BRPM | TEMPERATURE: 96.5 F | HEIGHT: 64 IN | BODY MASS INDEX: 45.07 KG/M2 | OXYGEN SATURATION: 98 % | HEART RATE: 76 BPM | SYSTOLIC BLOOD PRESSURE: 126 MMHG | DIASTOLIC BLOOD PRESSURE: 72 MMHG | WEIGHT: 264 LBS

## 2021-10-18 VITALS
HEIGHT: 64 IN | OXYGEN SATURATION: 98 % | SYSTOLIC BLOOD PRESSURE: 132 MMHG | WEIGHT: 264 LBS | TEMPERATURE: 96.5 F | HEART RATE: 74 BPM | DIASTOLIC BLOOD PRESSURE: 75 MMHG | BODY MASS INDEX: 45.07 KG/M2

## 2021-10-18 DIAGNOSIS — Z51.11 CHEMOTHERAPY MANAGEMENT, ENCOUNTER FOR: ICD-10-CM

## 2021-10-18 DIAGNOSIS — C25.9 PANCREATIC ADENOCARCINOMA (HCC): Primary | ICD-10-CM

## 2021-10-18 DIAGNOSIS — C25.0 MALIGNANT NEOPLASM OF HEAD OF PANCREAS (HCC): Primary | ICD-10-CM

## 2021-10-18 DIAGNOSIS — K51.20 ULCERATIVE PROCTITIS WITHOUT COMPLICATION (HCC): ICD-10-CM

## 2021-10-18 DIAGNOSIS — E66.01 MORBID OBESITY WITH BMI OF 45.0-49.9, ADULT (HCC): ICD-10-CM

## 2021-10-18 LAB
ALBUMIN SERPL-MCNC: 3 G/DL (ref 3.5–5)
ALBUMIN/GLOB SERPL: 0.8 {RATIO} (ref 1.1–2.2)
ALP SERPL-CCNC: 92 U/L (ref 45–117)
ALT SERPL-CCNC: 36 U/L (ref 12–78)
ANION GAP SERPL CALC-SCNC: 5 MMOL/L (ref 5–15)
AST SERPL-CCNC: 24 U/L (ref 15–37)
BASOPHILS # BLD: 0 K/UL (ref 0–0.1)
BASOPHILS NFR BLD: 0 % (ref 0–1)
BILIRUB SERPL-MCNC: 0.8 MG/DL (ref 0.2–1)
BUN SERPL-MCNC: 12 MG/DL (ref 6–20)
BUN/CREAT SERPL: 13 (ref 12–20)
CALCIUM SERPL-MCNC: 8.7 MG/DL (ref 8.5–10.1)
CHLORIDE SERPL-SCNC: 108 MMOL/L (ref 97–108)
CO2 SERPL-SCNC: 27 MMOL/L (ref 21–32)
CREAT SERPL-MCNC: 0.95 MG/DL (ref 0.55–1.02)
DIFFERENTIAL METHOD BLD: ABNORMAL
EOSINOPHIL # BLD: 0.2 K/UL (ref 0–0.4)
EOSINOPHIL NFR BLD: 3 % (ref 0–7)
ERYTHROCYTE [DISTWIDTH] IN BLOOD BY AUTOMATED COUNT: 14.1 % (ref 11.5–14.5)
GLOBULIN SER CALC-MCNC: 3.9 G/DL (ref 2–4)
GLUCOSE SERPL-MCNC: 92 MG/DL (ref 65–100)
HBV SURFACE AB SER QL: NONREACTIVE
HBV SURFACE AB SER-ACNC: <3.1 MIU/ML
HBV SURFACE AG SER QL: 0.45 INDEX
HBV SURFACE AG SER QL: NEGATIVE
HCT VFR BLD AUTO: 37.5 % (ref 35–47)
HGB BLD-MCNC: 12.4 G/DL (ref 11.5–16)
IMM GRANULOCYTES # BLD AUTO: 0 K/UL (ref 0–0.04)
IMM GRANULOCYTES NFR BLD AUTO: 0 % (ref 0–0.5)
LYMPHOCYTES # BLD: 3.3 K/UL (ref 0.8–3.5)
LYMPHOCYTES NFR BLD: 50 % (ref 12–49)
MCH RBC QN AUTO: 31.2 PG (ref 26–34)
MCHC RBC AUTO-ENTMCNC: 33.1 G/DL (ref 30–36.5)
MCV RBC AUTO: 94.5 FL (ref 80–99)
MONOCYTES # BLD: 0.9 K/UL (ref 0–1)
MONOCYTES NFR BLD: 13 % (ref 5–13)
NEUTS SEG # BLD: 2.3 K/UL (ref 1.8–8)
NEUTS SEG NFR BLD: 34 % (ref 32–75)
NRBC # BLD: 0 K/UL (ref 0–0.01)
NRBC BLD-RTO: 0 PER 100 WBC
PLATELET # BLD AUTO: 221 K/UL (ref 150–400)
PMV BLD AUTO: 11.8 FL (ref 8.9–12.9)
POTASSIUM SERPL-SCNC: 3.5 MMOL/L (ref 3.5–5.1)
PROT SERPL-MCNC: 6.9 G/DL (ref 6.4–8.2)
RBC # BLD AUTO: 3.97 M/UL (ref 3.8–5.2)
SODIUM SERPL-SCNC: 140 MMOL/L (ref 136–145)
WBC # BLD AUTO: 6.8 K/UL (ref 3.6–11)

## 2021-10-18 PROCEDURE — 87340 HEPATITIS B SURFACE AG IA: CPT

## 2021-10-18 PROCEDURE — 96368 THER/DIAG CONCURRENT INF: CPT

## 2021-10-18 PROCEDURE — 74011000258 HC RX REV CODE- 258: Performed by: INTERNAL MEDICINE

## 2021-10-18 PROCEDURE — G8427 DOCREV CUR MEDS BY ELIG CLIN: HCPCS | Performed by: NURSE PRACTITIONER

## 2021-10-18 PROCEDURE — G8536 NO DOC ELDER MAL SCRN: HCPCS | Performed by: NURSE PRACTITIONER

## 2021-10-18 PROCEDURE — 86706 HEP B SURFACE ANTIBODY: CPT

## 2021-10-18 PROCEDURE — G8417 CALC BMI ABV UP PARAM F/U: HCPCS | Performed by: NURSE PRACTITIONER

## 2021-10-18 PROCEDURE — 86301 IMMUNOASSAY TUMOR CA 19-9: CPT

## 2021-10-18 PROCEDURE — 80053 COMPREHEN METABOLIC PANEL: CPT

## 2021-10-18 PROCEDURE — G8432 DEP SCR NOT DOC, RNG: HCPCS | Performed by: NURSE PRACTITIONER

## 2021-10-18 PROCEDURE — 3017F COLORECTAL CA SCREEN DOC REV: CPT | Performed by: NURSE PRACTITIONER

## 2021-10-18 PROCEDURE — 90471 IMMUNIZATION ADMIN: CPT

## 2021-10-18 PROCEDURE — 99215 OFFICE O/P EST HI 40 MIN: CPT | Performed by: NURSE PRACTITIONER

## 2021-10-18 PROCEDURE — 1090F PRES/ABSN URINE INCON ASSESS: CPT | Performed by: NURSE PRACTITIONER

## 2021-10-18 PROCEDURE — 96375 TX/PRO/DX INJ NEW DRUG ADDON: CPT

## 2021-10-18 PROCEDURE — 85025 COMPLETE CBC W/AUTO DIFF WBC: CPT

## 2021-10-18 PROCEDURE — 74011250636 HC RX REV CODE- 250/636: Performed by: NURSE PRACTITIONER

## 2021-10-18 PROCEDURE — 1101F PT FALLS ASSESS-DOCD LE1/YR: CPT | Performed by: NURSE PRACTITIONER

## 2021-10-18 PROCEDURE — 96372 THER/PROPH/DIAG INJ SC/IM: CPT

## 2021-10-18 PROCEDURE — 96415 CHEMO IV INFUSION ADDL HR: CPT

## 2021-10-18 PROCEDURE — 36415 COLL VENOUS BLD VENIPUNCTURE: CPT

## 2021-10-18 PROCEDURE — 86704 HEP B CORE ANTIBODY TOTAL: CPT

## 2021-10-18 PROCEDURE — 1111F DSCHRG MED/CURRENT MED MERGE: CPT | Performed by: NURSE PRACTITIONER

## 2021-10-18 PROCEDURE — 77030016057 HC NDL HUBR APOL -B

## 2021-10-18 PROCEDURE — G8754 DIAS BP LESS 90: HCPCS | Performed by: NURSE PRACTITIONER

## 2021-10-18 PROCEDURE — G8752 SYS BP LESS 140: HCPCS | Performed by: NURSE PRACTITIONER

## 2021-10-18 PROCEDURE — 74011250636 HC RX REV CODE- 250/636: Performed by: INTERNAL MEDICINE

## 2021-10-18 PROCEDURE — 96413 CHEMO IV INFUSION 1 HR: CPT

## 2021-10-18 PROCEDURE — 90686 IIV4 VACC NO PRSV 0.5 ML IM: CPT | Performed by: NURSE PRACTITIONER

## 2021-10-18 PROCEDURE — 96416 CHEMO PROLONG INFUSE W/PUMP: CPT

## 2021-10-18 PROCEDURE — 96417 CHEMO IV INFUS EACH ADDL SEQ: CPT

## 2021-10-18 PROCEDURE — G8400 PT W/DXA NO RESULTS DOC: HCPCS | Performed by: NURSE PRACTITIONER

## 2021-10-18 PROCEDURE — 74011000250 HC RX REV CODE- 250: Performed by: INTERNAL MEDICINE

## 2021-10-18 RX ORDER — SODIUM CHLORIDE 9 MG/ML
10 INJECTION INTRAMUSCULAR; INTRAVENOUS; SUBCUTANEOUS AS NEEDED
Status: ACTIVE | OUTPATIENT
Start: 2021-10-18 | End: 2021-10-18

## 2021-10-18 RX ORDER — PALONOSETRON 0.05 MG/ML
0.25 INJECTION, SOLUTION INTRAVENOUS ONCE
Status: COMPLETED | OUTPATIENT
Start: 2021-10-18 | End: 2021-10-18

## 2021-10-18 RX ORDER — SODIUM CHLORIDE 0.9 % (FLUSH) 0.9 %
10 SYRINGE (ML) INJECTION AS NEEDED
Status: DISPENSED | OUTPATIENT
Start: 2021-10-18 | End: 2021-10-18

## 2021-10-18 RX ORDER — HEPARIN 100 UNIT/ML
300-500 SYRINGE INTRAVENOUS AS NEEDED
Status: ACTIVE | OUTPATIENT
Start: 2021-10-18 | End: 2021-10-18

## 2021-10-18 RX ORDER — ATROPINE SULFATE 0.4 MG/ML
0.4 INJECTION, SOLUTION ENDOTRACHEAL; INTRAMEDULLARY; INTRAMUSCULAR; INTRAVENOUS; SUBCUTANEOUS
Status: DISPENSED | OUTPATIENT
Start: 2021-10-18 | End: 2021-10-18

## 2021-10-18 RX ORDER — DEXTROSE MONOHYDRATE 50 MG/ML
25 INJECTION, SOLUTION INTRAVENOUS CONTINUOUS
Status: DISPENSED | OUTPATIENT
Start: 2021-10-18 | End: 2021-10-18

## 2021-10-18 RX ADMIN — ATROPINE SULFATE 0.4 MG: 0.4 INJECTION, SOLUTION INTRAMUSCULAR; INTRAVENOUS; SUBCUTANEOUS at 14:08

## 2021-10-18 RX ADMIN — SODIUM CHLORIDE 10 ML: 9 INJECTION, SOLUTION INTRAMUSCULAR; INTRAVENOUS; SUBCUTANEOUS at 07:47

## 2021-10-18 RX ADMIN — FLUOROURACIL 5616 MG: 50 INJECTION, SOLUTION INTRAVENOUS at 14:42

## 2021-10-18 RX ADMIN — LEUCOVORIN CALCIUM 936 MG: 100 INJECTION, POWDER, LYOPHILIZED, FOR SUSPENSION INTRAMUSCULAR; INTRAVENOUS at 12:55

## 2021-10-18 RX ADMIN — DEXAMETHASONE SODIUM PHOSPHATE 12 MG: 10 INJECTION, SOLUTION INTRAMUSCULAR; INTRAVENOUS at 09:41

## 2021-10-18 RX ADMIN — PALONOSETRON 0.25 MG: 0.25 INJECTION, SOLUTION INTRAVENOUS at 09:40

## 2021-10-18 RX ADMIN — IRINOTECAN HYDROCHLORIDE 351 MG: 20 INJECTION, SOLUTION INTRAVENOUS at 12:55

## 2021-10-18 RX ADMIN — DEXTROSE MONOHYDRATE 25 ML/HR: 50 INJECTION, SOLUTION INTRAVENOUS at 09:40

## 2021-10-18 RX ADMIN — INFLUENZA VIRUS VACCINE 0.5 ML: 15; 15; 15; 15 SUSPENSION INTRAMUSCULAR at 14:49

## 2021-10-18 RX ADMIN — DEXTROSE MONOHYDRATE 199 MG: 5 INJECTION, SOLUTION INTRAVENOUS at 10:41

## 2021-10-18 NOTE — PROGRESS NOTES
Alhaji Fischer is a 72 y.o. female for follow up of pancreatic cancer. Patient with no complaints of pain at this time.

## 2021-10-18 NOTE — PROGRESS NOTES
[facility] Progress Note    Date: 2021    Name: Liz Lezama    MRN: [id number]         : 1955    Ms. Key Doctor Arrived ambulatory and in no distress for C1D1 of Folfirinox Regimen. Assessment was completed by Quiana Lazar RN, no acute issues at this time, no new complaints voiced. PAC accessed without difficulty with 1\" calloway needle, labs drawn & sent for processing. Chemotherapy Flowsheet 10/18/2021   Cycle C1D1   Date 10/18/2021   Drug / Regimen Folfirinox   Pre Meds given   Notes given        Patient proceed to appointment with Dr. Brandon Mackey. Ms. Briseida Palumbo vitals were reviewed. Visit Vitals  /72   Pulse 76   Temp (!) 96.5 °F (35.8 °C)   Resp 16   Ht 5' 4\" (1.626 m)   Wt 119.7 kg (264 lb)   SpO2 98%   Breastfeeding No   BMI 45.32 kg/m²       Lab results were obtained and reviewed. Recent Results (from the past 12 hour(s))   CBC WITH AUTOMATED DIFF    Collection Time: 10/18/21  8:04 AM   Result Value Ref Range    WBC 6.8 3.6 - 11.0 K/uL    RBC 3.97 3.80 - 5.20 M/uL    HGB 12.4 11.5 - 16.0 g/dL    HCT 37.5 35.0 - 47.0 %    MCV 94.5 80.0 - 99.0 FL    MCH 31.2 26.0 - 34.0 PG    MCHC 33.1 30.0 - 36.5 g/dL    RDW 14.1 11.5 - 14.5 %    PLATELET 415 465 - 698 K/uL    MPV 11.8 8.9 - 12.9 FL    NRBC 0.0 0  WBC    ABSOLUTE NRBC 0.00 0.00 - 0.01 K/uL    NEUTROPHILS 34 32 - 75 %    LYMPHOCYTES 50 (H) 12 - 49 %    MONOCYTES 13 5 - 13 %    EOSINOPHILS 3 0 - 7 %    BASOPHILS 0 0 - 1 %    IMMATURE GRANULOCYTES 0 0.0 - 0.5 %    ABS. NEUTROPHILS 2.3 1.8 - 8.0 K/UL    ABS. LYMPHOCYTES 3.3 0.8 - 3.5 K/UL    ABS. MONOCYTES 0.9 0.0 - 1.0 K/UL    ABS. EOSINOPHILS 0.2 0.0 - 0.4 K/UL    ABS. BASOPHILS 0.0 0.0 - 0.1 K/UL    ABS. IMM.  GRANS. 0.0 0.00 - 0.04 K/UL    DF AUTOMATED     METABOLIC PANEL, COMPREHENSIVE    Collection Time: 10/18/21  8:04 AM   Result Value Ref Range    Sodium 140 136 - 145 mmol/L    Potassium 3.5 3.5 - 5.1 mmol/L    Chloride 108 97 - 108 mmol/L    CO2 27 21 - 32 mmol/L    Anion gap 5 5 - 15 mmol/L    Glucose 92 65 - 100 mg/dL    BUN 12 6 - 20 MG/DL    Creatinine 0.95 0.55 - 1.02 MG/DL    BUN/Creatinine ratio 13 12 - 20      GFR est AA >60 >60 ml/min/1.73m2    GFR est non-AA 59 (L) >60 ml/min/1.73m2    Calcium 8.7 8.5 - 10.1 MG/DL    Bilirubin, total 0.8 0.2 - 1.0 MG/DL    ALT (SGPT) 36 12 - 78 U/L    AST (SGOT) 24 15 - 37 U/L    Alk. phosphatase 92 45 - 117 U/L    Protein, total 6.9 6.4 - 8.2 g/dL    Albumin 3.0 (L) 3.5 - 5.0 g/dL    Globulin 3.9 2.0 - 4.0 g/dL    A-G Ratio 0.8 (L) 1.1 - 2.2     HEP B SURFACE AB    Collection Time: 10/18/21  8:04 AM   Result Value Ref Range    Hepatitis B surface Ab <3.10 mIU/mL    Hep B surface Ab Interp. NONREACTIVE NR     HEP B SURFACE AG    Collection Time: 10/18/21  8:04 AM   Result Value Ref Range    Hepatitis B surface Ag 0.45 Index    Hep B surface Ag Interp.  Negative NEG         Medications:  Medications Administered     0.9% sodium chloride injection 10 mL     Admin Date  10/18/2021 Action  Given Dose  10 mL Route  IntraVENous Administered By  Grant Maki, BALTAZAR          atropine 0.4 mg/mL injection 0.4 mg     Admin Date  10/18/2021 Action  Given Dose  0.4 mg Route  SubCUTAneous Administered By  [place], [place]          dexamethasone (DECADRON) 12 mg in 0.9% sodium chloride 50 mL IVPB     Admin Date  10/18/2021 Action  New Bag Dose  12 mg Route  IntraVENous Administered By  [place], [place]          dextrose 5% infusion     Admin Date  10/18/2021 Action  New Bag Dose  25 mL/hr Rate  25 mL/hr Route  IntraVENous Administered By  [place], [place]          fluorouraciL (ADRUCIL) 5,616 mg in 0.9% sodium chloride 250 mL CADD Cassette     Admin Date  10/18/2021 Action  New Bag Dose  5,616 mg Rate  5.4 mL/hr Route  IntraVENous Administered By  [place], [place]          influenza vaccine 2021-22 (6 mos+)(PF) (FLUARIX/FLULAVAL/FLUZONE QUAD) injection 0.5 mL     Admin Date  10/18/2021 Action  Given Dose  0.5 mL Route  IntraMUSCular Administered By  [employer], [place]          irinotecan (CAMPTOSAR) 351 mg in dextrose 5% 250 mL, overfill volume 25 mL chemo infusion     Admin Date  10/18/2021 Action  New Bag Dose  351 mg Rate  195 mL/hr Route  IntraVENous Administered By  Alexandru Beal [place]          leucovorin (WELLCOVORIN) 936 mg in dextrose 5% 250 mL, overfill volume 25 mL IVPB     Admin Date  10/18/2021 Action  New Bag Dose  936 mg Rate  214.5 mL/hr Route  IntraVENous Administered By  Alexandru Beal [place]          oxaliplatin (ELOXATIN) 199 mg in dextrose 5% 250 mL, overfill volume 25 mL chemo infusion     Admin Date  10/18/2021 Action  New Bag Dose  199 mg Rate  157.4 mL/hr Route  IntraVENous Administered By  Alexandru Beal [place]          palonosetron HCl (ALOXI) injection 0.25 mg     Admin Date  10/18/2021 Action  Given Dose  0.25 mg Route  IntraVENous Administered By  Alexandru Beal [place]          sodium chloride (NS) flush 10 mL     Admin Date  10/18/2021 Action  Given Dose  10 mL Route  IntraVENous Administered By  John Stephens RN                Ms. Isai Abdullahi tolerated treatment well and was discharged from [place] [address] in stable condition at 1500. Port remains accessed with Fluorouracil infusing via Cadd pump per order. Patient received Flu vaccine at the end of treatment. She is to return on October 20 at 0400 for her next appointment.     [facility]  October 18, 2021

## 2021-10-19 LAB
CANCER AG19-9 SERPL-ACNC: 138 U/ML (ref 0–35)
HBV CORE AB SERPL QL IA: NEGATIVE

## 2021-10-20 ENCOUNTER — HOSPITAL ENCOUNTER (OUTPATIENT)
Dept: INFUSION THERAPY | Age: 66
Discharge: HOME | End: 2021-10-20
Payer: MEDICARE

## 2021-10-20 ENCOUNTER — TELEPHONE (OUTPATIENT)
Dept: ONCOLOGY | Age: 66
End: 2021-10-20

## 2021-10-20 VITALS
DIASTOLIC BLOOD PRESSURE: 76 MMHG | SYSTOLIC BLOOD PRESSURE: 129 MMHG | HEART RATE: 66 BPM | RESPIRATION RATE: 16 BRPM | TEMPERATURE: 97.9 F

## 2021-10-20 DIAGNOSIS — C25.0 MALIGNANT NEOPLASM OF HEAD OF PANCREAS (HCC): Primary | ICD-10-CM

## 2021-10-20 PROCEDURE — 74011250636 HC RX REV CODE- 250/636: Performed by: INTERNAL MEDICINE

## 2021-10-20 PROCEDURE — 96523 IRRIG DRUG DELIVERY DEVICE: CPT

## 2021-10-20 RX ORDER — SODIUM CHLORIDE 0.9 % (FLUSH) 0.9 %
10 SYRINGE (ML) INJECTION AS NEEDED
Status: DISCONTINUED | OUTPATIENT
Start: 2021-10-20 | End: 2021-10-21 | Stop reason: HOSPADM

## 2021-10-20 RX ORDER — SODIUM CHLORIDE 9 MG/ML
10 INJECTION INTRAMUSCULAR; INTRAVENOUS; SUBCUTANEOUS AS NEEDED
Status: DISCONTINUED | OUTPATIENT
Start: 2021-10-20 | End: 2021-10-21 | Stop reason: HOSPADM

## 2021-10-20 RX ORDER — HEPARIN 100 UNIT/ML
300-500 SYRINGE INTRAVENOUS AS NEEDED
Status: DISCONTINUED | OUTPATIENT
Start: 2021-10-20 | End: 2021-10-21 | Stop reason: HOSPADM

## 2021-10-20 RX ADMIN — SODIUM CHLORIDE 10 ML: 9 INJECTION INTRAMUSCULAR; INTRAVENOUS; SUBCUTANEOUS at 13:50

## 2021-10-20 RX ADMIN — Medication 500 UNITS: at 13:50

## 2021-10-20 NOTE — PROGRESS NOTES
[facility] Progress Note    Date: 2021    Name: Smith Mckeon    MRN: [id number]         : 1955    Ms. Anamaria Xiao Arrived ambulatory and in no distress for Pump Removal.  Assessment was completed, no acute issues at this time, no new complaints voiced. CADD completed-250ml infused per order. Ms. Rick Hensley vitals were reviewed. Visit Vitals  /76   Pulse 66   Temp 97.9 °F (36.6 °C)   Resp 16           Ms. Bhatti tolerated treatment well and was discharged from [place] [address] in stable condition at 1355. Port de-accessed, flushed & heparinized per protocol. She is to return on  at 0800 for her next appointment.     [facility]  2021

## 2021-10-20 NOTE — TELEPHONE ENCOUNTER
[facility] [place] at [address] [facility] [address] [facility]  Lorie Pompa: [phone]  F: [phone]    Medical Nutrition Therapy  Nutrition Referral:    Referral received from Richie Loera NP regarding increasing protein sources. Patient with pancreatic cancer. Does not tolerate milk products. Called patient, no answer. Left a message. Contact information provided. Addendum: Patient called back. We discussed strategies to increase protein intake. She does eat chicken and turkey. We talked about trying Muscle milk and premier protein shakes, aiming for at least one daily. She is appreciative of info.      Chemotherapy Flowsheet 10/18/2021   Cycle C1D1   Date 10/18/2021   Drug / Regimen Folfirinox   Pre Meds given   Notes given     Wt Readings from Last 5 Encounters:   10/18/21 264 lb (119.7 kg)   10/18/21 264 lb (119.7 kg)   10/15/21 263 lb 7.2 oz (119.5 kg)   10/08/21 268 lb 3.2 oz (121.7 kg)   10/05/21 267 lb (121.1 kg)       Signed By: Iza Norris, [address] [employer]

## 2021-10-25 RX ORDER — DIPHENHYDRAMINE HYDROCHLORIDE 50 MG/ML
25 INJECTION, SOLUTION INTRAMUSCULAR; INTRAVENOUS AS NEEDED
Status: CANCELLED
Start: 2021-11-01

## 2021-10-25 RX ORDER — SODIUM CHLORIDE 0.9 % (FLUSH) 0.9 %
10 SYRINGE (ML) INJECTION AS NEEDED
Status: CANCELLED | OUTPATIENT
Start: 2021-11-10

## 2021-10-25 RX ORDER — ACETAMINOPHEN 325 MG/1
650 TABLET ORAL AS NEEDED
Status: CANCELLED
Start: 2021-11-01

## 2021-10-25 RX ORDER — ONDANSETRON 2 MG/ML
8 INJECTION INTRAMUSCULAR; INTRAVENOUS AS NEEDED
Status: CANCELLED | OUTPATIENT
Start: 2021-11-01

## 2021-10-25 RX ORDER — HYDROCORTISONE SODIUM SUCCINATE 100 MG/2ML
100 INJECTION, POWDER, FOR SOLUTION INTRAMUSCULAR; INTRAVENOUS AS NEEDED
Status: CANCELLED | OUTPATIENT
Start: 2021-11-01

## 2021-10-25 RX ORDER — ALBUTEROL SULFATE 0.83 MG/ML
2.5 SOLUTION RESPIRATORY (INHALATION) AS NEEDED
Status: CANCELLED
Start: 2021-11-01

## 2021-10-25 RX ORDER — DIPHENHYDRAMINE HYDROCHLORIDE 50 MG/ML
50 INJECTION, SOLUTION INTRAMUSCULAR; INTRAVENOUS AS NEEDED
Status: CANCELLED
Start: 2021-11-01

## 2021-10-25 RX ORDER — HEPARIN 100 UNIT/ML
300-500 SYRINGE INTRAVENOUS AS NEEDED
Status: CANCELLED
Start: 2021-11-10

## 2021-10-25 RX ORDER — EPINEPHRINE 1 MG/ML
0.3 INJECTION, SOLUTION, CONCENTRATE INTRAVENOUS AS NEEDED
Status: CANCELLED | OUTPATIENT
Start: 2021-11-01

## 2021-10-25 RX ORDER — SODIUM CHLORIDE 9 MG/ML
10 INJECTION INTRAMUSCULAR; INTRAVENOUS; SUBCUTANEOUS AS NEEDED
Status: CANCELLED | OUTPATIENT
Start: 2021-11-10

## 2021-10-25 NOTE — PROGRESS NOTES
Cancer [place] at [place] [address]  [address] [facility], [address] [facility] [address] [facility]  Jorge Cuco: [phone]  F: [phone]      Reason for Visit:   Dominik Quintero is a 72 y.o. female who is seen for evaluation of new bilary or pancreatic head mass    Hematology/Oncology Treatment History:     Diagnosis: Pancreatic adenocarcinoma    Stage: clinical Stage Ib [T2N0]    Pathology:   -9/26/21 Cytology - brushings from common bile duct: Reactive glandular epithelial cells and bile   -9/28/21 pancreatic head mass biopsy: Adenocarcinoma.   -10/22/21 [facility]ie ca-cancer panel -negative     Prior Treatment: None    Current Treatment: neoadjuvant FOLFIRNOX to 10/18/21 - current. Added neulasta with C2. History of Present Illness:   Dominik Quintero is a pleasant 72 y.o. female who comes in for evaluation of pancreatic cancer. She presented in 9/2021 with obstructive jaundice, transaminitis. ERCP on 9/26/21 notable for distal CBD stricture; possible tumor/mass located in the CBD; underwent biliary stent placement to relieve biliary obstruction. CT A/P revealed mild/mod intrahepatic biliary ductal dilatation; prominence of CBD and hydropic gallbladder, suggestive of stricture/stenosis/mass of distal CBD. MRI of abdomen showed narrowing of CBD pancreatic head suggestive of extrinisic compression concerning for periampullary mass vs eccentric intraluminal lesion. EUS showed 3.2cm hypoechoic mass in pancreas head. Biopsy revealed adenocarcinoma. Pt met with Dr. Marybeth Gurrola in [place] and plans for neoadjuvant chemotherapy prior to surgery. Interval history:  Patient here for follow up and C2 of FOLFIRNOX. After the first cycle, she developed nausea and GI upset. Tried Zofran, which she thought made her feel worse. She was advised to try Compazine, but she didn't end up having to take. No vomiting. Was able to eat and drink well. Is drinking Ensure once a day.  Is eating soups with lentils and vegetables, boiled potato, half a turkey sandwich. Was previously constipated, but now normal bowel movements. No neuropathy. PMHx: OA and Rheumatoid arthritis in lower back, Ulcerative colitis, HTN, Hypothyroidism  PSurgHx: , Cyst removed from left breast  SHx: , has 3 children (1 daughter and 2 sons). Works as  in [employer]. Nonsmoker, no EtOH.  in rehab recovering from [facility]. FHx: Mother  of pancreatic cancer age 80. Brother and son had colon cancer. Review of Systems: A complete review of systems was obtained, reviewed. Pertinent findings reviewed above. Physical Exam:     Visit Vitals  /81   Pulse 76   Temp (!) 96.7 °F (35.9 °C)   Ht 5' 4\" (1.626 m)   Wt 256 lb 3.2 oz (116.2 kg)   SpO2 98%   BMI 43.98 kg/m²     ECOG PS: 1  General: obese; no distress  Eyes:  Anicteric sclerae  HENT: atraumatic, face mask in place  Neck: supple  Lymphatic: Deferred today  Respiratory: CTAB, normal respiratory effort  CV: normal rate, regular rhythm, no murmurs, no peripheral edema, port in place. GI: soft, nontender, nondistended, no masses  MS: digits without clubbing or cyanosis. Skin: no rashes, no ecchymoses, no petechia. normal temperature, turgor, and texture. Psych: alert, oriented, appropriate affect, normal judgment/insight    Results:     Lab Results   Component Value Date/Time    WBC 4.4 2021 09:26 AM    HGB 11.9 2021 09:26 AM    HCT 35.1 2021 09:26 AM    PLATELET 927  09:26 AM    MCV 91.6 2021 09:26 AM    ABS.  NEUTROPHILS 0.7 (L) 2021 09:26 AM    Hemoglobin (POC) 12.6 10/06/2013 08:45 AM    Hematocrit (POC) 37 10/06/2013 08:45 AM     Lab Results   Component Value Date/Time    Sodium 136 2021 09:26 AM    Potassium 2.8 (L) 2021 09:26 AM    Chloride 102 2021 09:26 AM    CO2 29 2021 09:26 AM    Glucose 103 (H) 2021 09:26 AM    BUN 12 2021 09:26 AM    Creatinine 0.87 2021 09:26 AM GFR est AA >60 11/01/2021 09:26 AM    GFR est non-AA >60 11/01/2021 09:26 AM    Calcium 8.3 (L) 11/01/2021 09:26 AM    Sodium (POC) 143 10/06/2013 08:45 AM    Potassium (POC) 3.4 (L) 10/06/2013 08:45 AM    Chloride (POC) 104 10/06/2013 08:45 AM    Glucose (POC) 91 10/06/2013 08:45 AM    BUN (POC) 11 10/06/2013 08:45 AM    Creatinine (POC) 1.0 10/06/2013 08:45 AM    Calcium, ionized (POC) 1.27 10/06/2013 08:45 AM     Lab Results   Component Value Date/Time    Bilirubin, total 0.8 10/18/2021 08:04 AM    ALT (SGPT) 36 10/18/2021 08:04 AM    Alk. phosphatase 92 10/18/2021 08:04 AM    Protein, total 6.9 10/18/2021 08:04 AM    Albumin 3.0 (L) 10/18/2021 08:04 AM    Globulin 3.9 10/18/2021 08:04 AM     Lab Results   Component Value Date/Time    Iron % saturation 32 07/07/2014 02:28 PM    TIBC 304 07/07/2014 02:28 PM    Sed rate (ESR) 17 09/16/2010 12:04 PM    C-Reactive protein <0.29 01/18/2017 08:45 AM    TSH 3.46 03/04/2021 08:57 AM    ALBA, Direct Detected (A) 09/16/2010 12:04 PM    Lipase 1,214 (H) 09/29/2021 04:10 AM    Hep C virus Ab Interp. NONREACTIVE 09/24/2021 06:55 PM     Lab Results   Component Value Date/Time    CEA 3.5 09/25/2021 11:46 AM    Carbohydrate Antigen 19-9, (CA 19-9) 138 (H) 10/18/2021 08:04 AM       Imaging / Procedures:     9/24/21 US ABD   IMPRESSION  Cholelithiasis. Gallbladder sludge. Prominent CBD with mild intrahepatic ductal dilatation as well. Nonemergent MRI with MRCP to delineate etiology for CBD distention. 9/24/21 CT ABD PELV W CONT  IMPRESSION  There is mild/moderate intrahepatic biliary ductal dilatation, prominence of the  CBD and a hydropic gallbladder. No pancreatic duct dilatation. No clearly  delineated pancreatic head mass. Imaging findings suggestive of  stricture/stenosis/mass of the distal CBD, no extrinsic pancreatic head mass is  identified. Gastroenterology consult   Correlation with MRI/MRCP on a nonemergent basis.   There is severe degenerative change of the lumbar spine.    9/25/21 MRI ABD WO CONT  IMPRESSION  1. Intrahepatic and extra hepatic biliary dilatation with abrupt narrowing of  the common bile duct pancreatic head just proximal to the ampulla. Suggestion of  extrinsic compression on the duct. This raises the possibility of a  periampullary mass. Alternatively, this may be an eccentric intraluminal lesion. Evaluation is limited. Recommend GI consultation for possible ERCP and EUS. 2.  Questionable small lesion in the left hepatic lobe with mild increased T2  signal and T1 hypointensity. Recommend follow-up imaging a contrast-enhanced  study preferably MRI. 3.  Cholelithiasis. Distended gallbladder with mild gallbladder wall thickening. Correlate for acute cholecystitis    9/26/21 XR ERCP/ERCB / Dr. Cristy Cedillo  Impression: Biliary ductal dilation, with a maximum common duct diameter of 15 mm; Severe stenosis, involving distal CBD /pancreas head area concerning for neoplasia  Interventions: Endoscopic ampullary sphincterotomy and biliary stent placement; brushings from the CBD    9/27/21 CT CHEST W CONT:   IMPRESSION  1. There is a minimal right pleural effusion. 2. There are small pulmonary nodules present. There is a nodule in the plane of  the right major fissure and posteriorly in left lower lobe. These were not  present on examination of 1/18/2017. These could be on the basis of metastatic  disease. Close follow-up is suggested. There are also 2 small subpleural nodules  anteriorly in the right upper lobe as described above which can also be  evaluated on follow-up. 9/28/21 EUS:  Endoscopic: Esophagus:normal; Stomach: normal; Duodenum/jejunum: normal and protruding biliary stent  Ultrasound: Esophagus: normal findings;     Stomach: normal findings:     Pancreas: Areas examined: the entire gland                          Parenchyma: -Evidence of a hypoechoic mass with poorly defined borders seen in the head of the pancreas.  It appeared involving the CBD where a stent is seen, however it did not involve the major vessels. It measured about 3.2 cm in widest diameter on one view. Pancreatic Duct: normal findings, not dilated               Liver: Parenchyma: normal                          Gallbladder: normal                          Bile Duct: the common bile duct is dilated in the proximal and mid portion and a plastic stent could be seen                          Lymph Node: no adenopathy  Impression:   Pancreas head mass with fine needle biopsy showing adenocarcinoma on preliminary cytology  Recommendations: Follow-up on pathology  Follow-up with oncology, will need further evaluation of pulmonary nodule  Surgical oncology consultation  Resume GI lite diet today and can discharge in am from GI standpoint  . Assessment/Recommendations:   72 y.o. female with Ulcerative colitis, Hyopthyroidism, admitted with obstructive jaundice concerning for underlying malignant obstructing mass. 1. Pancreatic adenocarcinoma:  Clinically stage Ib [T2N0], but need further evaluation in future regarding pulmonary nodules and liver lesion. Presented with obstructive jaundice and transaminitis, CA 19-9 was 198 in 9/2021 at diagnosis. Underwent biliary stent placement with improvement in biliary obstruction. 3.2cm mass seen in pancreatic head on EUS, not involving vasculature, possibly resectable. Dr. Uma Ordonez in [place] has evaluated patient and recommends consideration of neoadjuvant chemotherapy with close attention to lung nodules and liver lesion. I agree with this recommendation for earlier treatment of micrometastatic disease, ability to determine whether pt has chemosensitive disease. We discussed the risks and benefits of FOLFIRINOX chemotherapy, including potential side effects.  These include but are not limited to fatigue, nausea vomiting, diarrhea, neuropathy, taste changes, cold intolerance, esophageal spasm, allergic reactions, alopecia, mucositis, myelosuppression, risk for infection, infertility, and rarely, death. Rarely, a patient may have a condition where they do not metabolize fluorouracil appropriately (called DPD deficiency), and they may have excessive toxicity. A Port-A-Cath will be required in order to deliver the continuous infusion. BRCA 1/2 negative. The patient has consented to beginning therapy. Planning for 6 cycles of mFOLFIRINOX in neoadjuvant setting, followed by surgery and adjuvant therapy x 6 cycles. Supportive care meds include: Emla cream, Zofran, Compazine, Dexamethasone  -- Hold C2 of neoadjuvant mFOLFIRINOX due to neutropenia. -- Checking CA 19-9 on date of C1, C6, C7, C12.   -- Chest CT with contrast and abd/pelvis MRI after C4 (per Dr. Sumi Laureano). -- Needs to follow up with Dr. Sumi Laureano immediately before C6.  -- Return in 1 week for retry C2 mFOLFIRINOX with neulasta support, MD/Np visit. 2. Diarrhea / Constipation / hypokalemia:  Has history of UC. Likely related to biliary obstruction and pt has been started on Cholestyramine (Questran). Now having daily hard stools. Discussed management with stool softeners and fiber supplements. Low potassium likely due to diarrhea. -- Potassium 60 meq in [facility] today. -- Start potassium 20meq daily. Rx sent. 3. Pulmonary nodules:   Tiny of unclear etiology. Will repeat imaging after C4 of chemo. 4. HTN:   Currently with stable BP and home HCTZ on hold per primary team.     5. H/o Ulcerative procto-sigmoiditis:  Past due for colonoscopy with one adenoma polyp removed at last colonoscopy in April 2017. Was due for repeat in 2019 but she has not followed up with the GI practice since her last colonoscopy. -- Outpatient colonoscopy overdue    6. Morbid obesity:  Discussed healthy food options and ways to incorporate more protein into diet. -- Candance Rosette, RD     7. Chemotherapy induced neutropenia:   Hold treatment today.  Added neulasta with pump removal.     I have personally seen and evaluated the patient in conjunction with Brooklyn Wagoner NP. I find the patient's history and physical exam are consistent with the NP's documentation. I agree with the above assessment and plan, which I have modified as needed. Holding C2 of FOLFIRINOX given neutropenia. Will add Neulasta to remaining cycles. Advised on nausea and diet while undergoing chemotherapy.     Signed By: Pineda Bolton MD

## 2021-10-26 ENCOUNTER — TELEPHONE (OUTPATIENT)
Dept: ONCOLOGY | Age: 66
End: 2021-10-26

## 2021-10-26 NOTE — TELEPHONE ENCOUNTER
Patient called and left  stating she would like a call back to talk about how she is feeling after her chemo.  Please advise       # [phone]

## 2021-10-26 NOTE — TELEPHONE ENCOUNTER
[address] at [facility]  [phone]        10/26/21 2:31 PM Return call placed to patient. See other telephone encounter.

## 2021-10-26 NOTE — TELEPHONE ENCOUNTER
Patient called and left  stating she was returning a call.  Please advise             CB# [phone]

## 2021-10-26 NOTE — TELEPHONE ENCOUNTER
[address] at [facility]  [phone]        10/26/21 1:00 PM Return call placed to patient. Patient with complaints of \"nervous/quesy stomach. \" Denies abdominal cramping and diarrhea, aside from one loose stool today. Prior to today, patient having bowel movement daily. Patient states that sensation often feels better after having bowel movement. Patient has complaints of nausea as well. Denies vomiting and fevers. Patient attempted Zofran x 3 and stated this made her feel worse. Patient has not yet attempted Compazine. Also shared that she has a history of ulcerative colitis and is unsure if this is contributing to her symptoms. Patient stated that she also experiences dizziness when standing for more than five minutes at a time. Patient voiced frustration because prefers to be active and doing things. Patient states that she performs transitions slowly when changing positions. She stated that she is not eating her typical diet---has been eating soups. Patient is attempting to increase protein today to see if this helps alleviate above symptoms. Patient drinking water and tea, but notes that she is not drinking as much water and also not sleeping well. Patient also shared that she had episode during the night previously where she felt dizzy, \"not thinking straight,\" and nauseous. Patient increased hydration at that point and took Zofran x 1. Episode resolved thereafter. Advised this nurse would forward above to Dr. Porter Sanchez and Duke Vera and call patient back with recommendations. She voiced understanding. 2:28 PM Called patient and advised of NP recommendations. Patient did not proceed with scheduling IV fluids at this time. Encouraged patient to call office back if she would like to schedule IV fluids. Patient voiced understanding. No additional questions or concerns at this time.

## 2021-10-27 ENCOUNTER — TELEPHONE (OUTPATIENT)
Dept: SURGERY | Age: 66
End: 2021-10-27

## 2021-10-27 NOTE — TELEPHONE ENCOUNTER
I tried contacting the patient to remind her of her upcoming appointment on 10/29.     LVM with clinic policies

## 2021-10-29 ENCOUNTER — OFFICE VISIT (OUTPATIENT)
Dept: SURGERY | Age: 66
End: 2021-10-29
Payer: MEDICARE

## 2021-10-29 VITALS
HEIGHT: 64 IN | DIASTOLIC BLOOD PRESSURE: 74 MMHG | BODY MASS INDEX: 43.77 KG/M2 | TEMPERATURE: 98.7 F | OXYGEN SATURATION: 97 % | RESPIRATION RATE: 18 BRPM | WEIGHT: 256.4 LBS | SYSTOLIC BLOOD PRESSURE: 126 MMHG | HEART RATE: 90 BPM

## 2021-10-29 DIAGNOSIS — C25.0 MALIGNANT NEOPLASM OF HEAD OF PANCREAS (HCC): ICD-10-CM

## 2021-10-29 DIAGNOSIS — Z09 POSTOPERATIVE EXAMINATION: Primary | ICD-10-CM

## 2021-10-29 PROCEDURE — 99024 POSTOP FOLLOW-UP VISIT: CPT | Performed by: NURSE PRACTITIONER

## 2021-10-29 NOTE — PROGRESS NOTES
Subjective:      Mandie Henao is a 72 y.o. female presents for postop care port a cath placement on 10/15/2021. She states that she is feeling well today. She has her first chemo on 10/20/2021 and is schedule for her second on 11/1/2021. Ms. Abrahan Godinez has a reminder for a \"due or due soon\" health maintenance. I have asked that she contact her primary care provider for follow-up on this health maintenance. Objective:     Visit Vitals  /74 (BP 1 Location: Right arm, BP Patient Position: Sitting, BP Cuff Size: Adult)   Pulse 90   Temp 98.7 °F (37.1 °C) (Oral)   Resp 18   Ht 5' 4\" (1.626 m)   Wt 256 lb 6.4 oz (116.3 kg)   SpO2 97%   BMI 44.01 kg/m²       General:  alert, cooperative, no distress       Incision:   healing well, no drainage, no erythema, no dehiscence, incision well approximated     Assessment:     Doing well postoperatively. Plan: 1. Follow up with Dr. Paez/oncologu  2. Follow up here when needed. Pt verbalized understanding and questions were answered to the best of my knowledge and ability.

## 2021-10-29 NOTE — PROGRESS NOTES
1. Have you been to the ER, urgent care clinic since your last visit? Hospitalized since your last visit? No    2. Have you seen or consulted any other health care providers outside of the [address] [place] [address] since your last visit? Include any pap smears or colon screening.  No

## 2021-11-01 ENCOUNTER — TELEPHONE (OUTPATIENT)
Dept: ONCOLOGY | Age: 66
End: 2021-11-01

## 2021-11-01 ENCOUNTER — HOSPITAL ENCOUNTER (OUTPATIENT)
Dept: INFUSION THERAPY | Age: 66
Discharge: HOME | End: 2021-11-01
Payer: MEDICARE

## 2021-11-01 ENCOUNTER — OFFICE VISIT (OUTPATIENT)
Dept: ONCOLOGY | Age: 66
End: 2021-11-01

## 2021-11-01 VITALS
DIASTOLIC BLOOD PRESSURE: 81 MMHG | SYSTOLIC BLOOD PRESSURE: 122 MMHG | OXYGEN SATURATION: 98 % | HEIGHT: 64 IN | WEIGHT: 256.2 LBS | BODY MASS INDEX: 43.74 KG/M2 | TEMPERATURE: 96.7 F | HEART RATE: 76 BPM

## 2021-11-01 VITALS
BODY MASS INDEX: 43.74 KG/M2 | RESPIRATION RATE: 16 BRPM | DIASTOLIC BLOOD PRESSURE: 81 MMHG | SYSTOLIC BLOOD PRESSURE: 122 MMHG | TEMPERATURE: 96.7 F | HEIGHT: 64 IN | WEIGHT: 256.2 LBS | OXYGEN SATURATION: 98 % | HEART RATE: 76 BPM

## 2021-11-01 DIAGNOSIS — R11.2 CINV (CHEMOTHERAPY-INDUCED NAUSEA AND VOMITING): ICD-10-CM

## 2021-11-01 DIAGNOSIS — K51.20 ULCERATIVE PROCTITIS WITHOUT COMPLICATION (HCC): ICD-10-CM

## 2021-11-01 DIAGNOSIS — E87.6 HYPOKALEMIA: ICD-10-CM

## 2021-11-01 DIAGNOSIS — Z51.11 CHEMOTHERAPY MANAGEMENT, ENCOUNTER FOR: ICD-10-CM

## 2021-11-01 DIAGNOSIS — C25.9 PANCREATIC ADENOCARCINOMA (HCC): Primary | ICD-10-CM

## 2021-11-01 DIAGNOSIS — C25.0 MALIGNANT NEOPLASM OF HEAD OF PANCREAS (HCC): Primary | ICD-10-CM

## 2021-11-01 DIAGNOSIS — T45.1X5A CINV (CHEMOTHERAPY-INDUCED NAUSEA AND VOMITING): ICD-10-CM

## 2021-11-01 PROBLEM — Z76.89 PREVENTION OF CHEMOTHERAPY-INDUCED NEUTROPENIA: Status: ACTIVE | Noted: 2021-11-01

## 2021-11-01 LAB
ANION GAP SERPL CALC-SCNC: 5 MMOL/L (ref 5–15)
BASOPHILS # BLD: 0 K/UL (ref 0–0.1)
BASOPHILS # BLD: 0 K/UL (ref 0–0.1)
BASOPHILS NFR BLD: 0 % (ref 0–1)
BASOPHILS NFR BLD: 0 % (ref 0–1)
BUN SERPL-MCNC: 12 MG/DL (ref 6–20)
BUN/CREAT SERPL: 14 (ref 12–20)
CALCIUM SERPL-MCNC: 8.3 MG/DL (ref 8.5–10.1)
CHLORIDE SERPL-SCNC: 102 MMOL/L (ref 97–108)
CO2 SERPL-SCNC: 29 MMOL/L (ref 21–32)
CREAT SERPL-MCNC: 0.87 MG/DL (ref 0.55–1.02)
DIFFERENTIAL METHOD BLD: ABNORMAL
DIFFERENTIAL METHOD BLD: ABNORMAL
EOSINOPHIL # BLD: 0.3 K/UL (ref 0–0.4)
EOSINOPHIL # BLD: 0.3 K/UL (ref 0–0.4)
EOSINOPHIL NFR BLD: 6 % (ref 0–7)
EOSINOPHIL NFR BLD: 6 % (ref 0–7)
ERYTHROCYTE [DISTWIDTH] IN BLOOD BY AUTOMATED COUNT: 13.4 % (ref 11.5–14.5)
ERYTHROCYTE [DISTWIDTH] IN BLOOD BY AUTOMATED COUNT: 13.5 % (ref 11.5–14.5)
GLUCOSE SERPL-MCNC: 103 MG/DL (ref 65–100)
HCT VFR BLD AUTO: 35.1 % (ref 35–47)
HCT VFR BLD AUTO: 35.3 % (ref 35–47)
HGB BLD-MCNC: 11.9 G/DL (ref 11.5–16)
HGB BLD-MCNC: 12.1 G/DL (ref 11.5–16)
IMM GRANULOCYTES # BLD AUTO: 0 K/UL (ref 0–0.04)
IMM GRANULOCYTES # BLD AUTO: 0 K/UL (ref 0–0.04)
IMM GRANULOCYTES NFR BLD AUTO: 0 % (ref 0–0.5)
IMM GRANULOCYTES NFR BLD AUTO: 0 % (ref 0–0.5)
LYMPHOCYTES # BLD: 2.7 K/UL (ref 0.8–3.5)
LYMPHOCYTES # BLD: 2.8 K/UL (ref 0.8–3.5)
LYMPHOCYTES NFR BLD: 63 % (ref 12–49)
LYMPHOCYTES NFR BLD: 64 % (ref 12–49)
MCH RBC QN AUTO: 31.1 PG (ref 26–34)
MCH RBC QN AUTO: 31.3 PG (ref 26–34)
MCHC RBC AUTO-ENTMCNC: 33.9 G/DL (ref 30–36.5)
MCHC RBC AUTO-ENTMCNC: 34.3 G/DL (ref 30–36.5)
MCV RBC AUTO: 91.2 FL (ref 80–99)
MCV RBC AUTO: 91.6 FL (ref 80–99)
MONOCYTES # BLD: 0.6 K/UL (ref 0–1)
MONOCYTES # BLD: 0.6 K/UL (ref 0–1)
MONOCYTES NFR BLD: 13 % (ref 5–13)
MONOCYTES NFR BLD: 13 % (ref 5–13)
NEUTS SEG # BLD: 0.7 K/UL (ref 1.8–8)
NEUTS SEG # BLD: 0.8 K/UL (ref 1.8–8)
NEUTS SEG NFR BLD: 17 % (ref 32–75)
NEUTS SEG NFR BLD: 18 % (ref 32–75)
NRBC # BLD: 0 K/UL (ref 0–0.01)
NRBC # BLD: 0 K/UL (ref 0–0.01)
NRBC BLD-RTO: 0 PER 100 WBC
NRBC BLD-RTO: 0 PER 100 WBC
PLATELET # BLD AUTO: 197 K/UL (ref 150–400)
PLATELET # BLD AUTO: 202 K/UL (ref 150–400)
PMV BLD AUTO: 10.1 FL (ref 8.9–12.9)
PMV BLD AUTO: 9.7 FL (ref 8.9–12.9)
POTASSIUM SERPL-SCNC: 2.8 MMOL/L (ref 3.5–5.1)
RBC # BLD AUTO: 3.83 M/UL (ref 3.8–5.2)
RBC # BLD AUTO: 3.87 M/UL (ref 3.8–5.2)
RBC MORPH BLD: ABNORMAL
RBC MORPH BLD: ABNORMAL
SODIUM SERPL-SCNC: 136 MMOL/L (ref 136–145)
WBC # BLD AUTO: 4.4 K/UL (ref 3.6–11)
WBC # BLD AUTO: 4.4 K/UL (ref 3.6–11)
WBC MORPH BLD: ABNORMAL

## 2021-11-01 PROCEDURE — G8400 PT W/DXA NO RESULTS DOC: HCPCS | Performed by: INTERNAL MEDICINE

## 2021-11-01 PROCEDURE — G8754 DIAS BP LESS 90: HCPCS | Performed by: INTERNAL MEDICINE

## 2021-11-01 PROCEDURE — 80048 BASIC METABOLIC PNL TOTAL CA: CPT

## 2021-11-01 PROCEDURE — 74011000258 HC RX REV CODE- 258: Performed by: INTERNAL MEDICINE

## 2021-11-01 PROCEDURE — 74011250636 HC RX REV CODE- 250/636: Performed by: INTERNAL MEDICINE

## 2021-11-01 PROCEDURE — 1090F PRES/ABSN URINE INCON ASSESS: CPT | Performed by: INTERNAL MEDICINE

## 2021-11-01 PROCEDURE — 3017F COLORECTAL CA SCREEN DOC REV: CPT | Performed by: INTERNAL MEDICINE

## 2021-11-01 PROCEDURE — G8417 CALC BMI ABV UP PARAM F/U: HCPCS | Performed by: INTERNAL MEDICINE

## 2021-11-01 PROCEDURE — 85025 COMPLETE CBC W/AUTO DIFF WBC: CPT

## 2021-11-01 PROCEDURE — 99214 OFFICE O/P EST MOD 30 MIN: CPT | Performed by: INTERNAL MEDICINE

## 2021-11-01 PROCEDURE — 77030016057 HC NDL HUBR APOL -B

## 2021-11-01 PROCEDURE — 36591 DRAW BLOOD OFF VENOUS DEVICE: CPT

## 2021-11-01 PROCEDURE — 74011250637 HC RX REV CODE- 250/637: Performed by: NURSE PRACTITIONER

## 2021-11-01 PROCEDURE — G8432 DEP SCR NOT DOC, RNG: HCPCS | Performed by: INTERNAL MEDICINE

## 2021-11-01 PROCEDURE — 36415 COLL VENOUS BLD VENIPUNCTURE: CPT

## 2021-11-01 PROCEDURE — 1101F PT FALLS ASSESS-DOCD LE1/YR: CPT | Performed by: INTERNAL MEDICINE

## 2021-11-01 PROCEDURE — G8536 NO DOC ELDER MAL SCRN: HCPCS | Performed by: INTERNAL MEDICINE

## 2021-11-01 PROCEDURE — 96374 THER/PROPH/DIAG INJ IV PUSH: CPT

## 2021-11-01 PROCEDURE — G8427 DOCREV CUR MEDS BY ELIG CLIN: HCPCS | Performed by: INTERNAL MEDICINE

## 2021-11-01 PROCEDURE — G8752 SYS BP LESS 140: HCPCS | Performed by: INTERNAL MEDICINE

## 2021-11-01 RX ORDER — EPINEPHRINE 1 MG/ML
0.3 INJECTION, SOLUTION, CONCENTRATE INTRAVENOUS AS NEEDED
Status: CANCELLED | OUTPATIENT
Start: 2021-11-08

## 2021-11-01 RX ORDER — ATROPINE SULFATE 0.4 MG/ML
0.4 INJECTION, SOLUTION ENDOTRACHEAL; INTRAMEDULLARY; INTRAMUSCULAR; INTRAVENOUS; SUBCUTANEOUS
Status: CANCELLED | OUTPATIENT
Start: 2021-11-08

## 2021-11-01 RX ORDER — ALBUTEROL SULFATE 0.83 MG/ML
2.5 SOLUTION RESPIRATORY (INHALATION) AS NEEDED
Status: CANCELLED
Start: 2021-11-08

## 2021-11-01 RX ORDER — SODIUM CHLORIDE 0.9 % (FLUSH) 0.9 %
10 SYRINGE (ML) INJECTION AS NEEDED
Status: CANCELLED | OUTPATIENT
Start: 2021-11-08

## 2021-11-01 RX ORDER — ACETAMINOPHEN 325 MG/1
650 TABLET ORAL AS NEEDED
Status: CANCELLED
Start: 2021-11-08

## 2021-11-01 RX ORDER — ONDANSETRON 2 MG/ML
8 INJECTION INTRAMUSCULAR; INTRAVENOUS AS NEEDED
Status: CANCELLED | OUTPATIENT
Start: 2021-11-08

## 2021-11-01 RX ORDER — POTASSIUM CHLORIDE 750 MG/1
60 TABLET, FILM COATED, EXTENDED RELEASE ORAL
Status: COMPLETED | OUTPATIENT
Start: 2021-11-01 | End: 2021-11-01

## 2021-11-01 RX ORDER — SODIUM CHLORIDE 9 MG/ML
10 INJECTION INTRAMUSCULAR; INTRAVENOUS; SUBCUTANEOUS AS NEEDED
Status: CANCELLED | OUTPATIENT
Start: 2021-11-08

## 2021-11-01 RX ORDER — PALONOSETRON 0.05 MG/ML
0.25 INJECTION, SOLUTION INTRAVENOUS ONCE
Status: COMPLETED | OUTPATIENT
Start: 2021-11-01 | End: 2021-11-01

## 2021-11-01 RX ORDER — DEXTROSE MONOHYDRATE 50 MG/ML
25 INJECTION, SOLUTION INTRAVENOUS CONTINUOUS
Status: DISPENSED | OUTPATIENT
Start: 2021-11-01 | End: 2021-11-01

## 2021-11-01 RX ORDER — POTASSIUM CHLORIDE 750 MG/1
20 TABLET, EXTENDED RELEASE ORAL DAILY
Qty: 60 TABLET | Refills: 1 | Status: SHIPPED | OUTPATIENT
Start: 2021-11-01 | End: 2021-11-23

## 2021-11-01 RX ORDER — SODIUM CHLORIDE 0.9 % (FLUSH) 0.9 %
10 SYRINGE (ML) INJECTION AS NEEDED
Status: DISPENSED | OUTPATIENT
Start: 2021-11-01 | End: 2021-11-01

## 2021-11-01 RX ORDER — ATROPINE SULFATE 0.4 MG/ML
0.4 INJECTION, SOLUTION ENDOTRACHEAL; INTRAMEDULLARY; INTRAMUSCULAR; INTRAVENOUS; SUBCUTANEOUS
Status: ACTIVE | OUTPATIENT
Start: 2021-11-01 | End: 2021-11-01

## 2021-11-01 RX ORDER — DEXTROSE MONOHYDRATE 50 MG/ML
25 INJECTION, SOLUTION INTRAVENOUS CONTINUOUS
Status: CANCELLED | OUTPATIENT
Start: 2021-11-08

## 2021-11-01 RX ORDER — DIPHENHYDRAMINE HYDROCHLORIDE 50 MG/ML
25 INJECTION, SOLUTION INTRAMUSCULAR; INTRAVENOUS AS NEEDED
Status: CANCELLED
Start: 2021-11-08

## 2021-11-01 RX ORDER — SODIUM CHLORIDE 9 MG/ML
10 INJECTION INTRAMUSCULAR; INTRAVENOUS; SUBCUTANEOUS AS NEEDED
Status: ACTIVE | OUTPATIENT
Start: 2021-11-01 | End: 2021-11-01

## 2021-11-01 RX ORDER — PALONOSETRON 0.05 MG/ML
0.25 INJECTION, SOLUTION INTRAVENOUS ONCE
Status: CANCELLED | OUTPATIENT
Start: 2021-11-08 | End: 2021-11-08

## 2021-11-01 RX ORDER — HEPARIN 100 UNIT/ML
300-500 SYRINGE INTRAVENOUS AS NEEDED
Status: ACTIVE | OUTPATIENT
Start: 2021-11-01 | End: 2021-11-01

## 2021-11-01 RX ORDER — DIPHENHYDRAMINE HYDROCHLORIDE 50 MG/ML
50 INJECTION, SOLUTION INTRAMUSCULAR; INTRAVENOUS AS NEEDED
Status: CANCELLED
Start: 2021-11-08

## 2021-11-01 RX ORDER — HEPARIN 100 UNIT/ML
300-500 SYRINGE INTRAVENOUS AS NEEDED
Status: CANCELLED | OUTPATIENT
Start: 2021-11-08

## 2021-11-01 RX ORDER — HYDROCORTISONE SODIUM SUCCINATE 100 MG/2ML
100 INJECTION, POWDER, FOR SOLUTION INTRAMUSCULAR; INTRAVENOUS AS NEEDED
Status: CANCELLED | OUTPATIENT
Start: 2021-11-08

## 2021-11-01 RX ADMIN — Medication 500 UNITS: at 10:33

## 2021-11-01 RX ADMIN — POTASSIUM CHLORIDE 60 MEQ: 750 TABLET, FILM COATED, EXTENDED RELEASE ORAL at 10:33

## 2021-11-01 RX ADMIN — SODIUM CHLORIDE 10 ML: 9 INJECTION INTRAMUSCULAR; INTRAVENOUS; SUBCUTANEOUS at 10:33

## 2021-11-01 RX ADMIN — PALONOSETRON 0.25 MG: 0.05 INJECTION, SOLUTION INTRAVENOUS at 09:15

## 2021-11-01 NOTE — PROGRESS NOTES
\[facility] [place] [facility]\"" Progress Note    Date: 2021    Name: Rosmery Zelaya    MRN: [id number]         : 1955    Ms. Lorenza Reza Arrived ambulatory and in no distress for C4D1 of Folfirinox Regimen HELD. Assessment was completed, no acute issues at this time, no new complaints voiced. Right chest wall port accessed without difficulty by Juan R Romano, labs drawn & sent for processing. Covid questionnaire completed. 1. Do you have any symptoms of COVID-19? SOB, coughing, fever, or generally not feeling well - no    2. Have you been exposed to COVID-19 recently? - no    3. Have you had any recent contact with family/friend that has a pending COVID test? - no    Patient proceed to appointment with Dr. Imer Thomas. Ms. Krissy Casillas vitals were reviewed. Visit Vitals  /81 (BP 1 Location: Right arm, BP Patient Position: Sitting)   Pulse 76   Temp (!) 96.7 °F (35.9 °C)   Resp 16   Ht 5' 4\" (1.626 m)   Wt 116.2 kg (256 lb 3.2 oz)   SpO2 98%   BMI 43.98 kg/m²       Lab results were obtained and reviewed. Recent Results (from the past 12 hour(s))   CBC WITH AUTOMATED DIFF    Collection Time: 21  7:57 AM   Result Value Ref Range    WBC 4.4 3.6 - 11.0 K/uL    RBC 3.87 3.80 - 5.20 M/uL    HGB 12.1 11.5 - 16.0 g/dL    HCT 35.3 35.0 - 47.0 %    MCV 91.2 80.0 - 99.0 FL    MCH 31.3 26.0 - 34.0 PG    MCHC 34.3 30.0 - 36.5 g/dL    RDW 13.4 11.5 - 14.5 %    PLATELET 692 029 - 195 K/uL    MPV 9.7 8.9 - 12.9 FL    NRBC 0.0 0  WBC    ABSOLUTE NRBC 0.00 0.00 - 0.01 K/uL    NEUTROPHILS 18 (L) 32 - 75 %    LYMPHOCYTES 63 (H) 12 - 49 %    MONOCYTES 13 5 - 13 %    EOSINOPHILS 6 0 - 7 %    BASOPHILS 0 0 - 1 %    IMMATURE GRANULOCYTES 0 0.0 - 0.5 %    ABS. NEUTROPHILS 0.8 (L) 1.8 - 8.0 K/UL    ABS. LYMPHOCYTES 2.7 0.8 - 3.5 K/UL    ABS. MONOCYTES 0.6 0.0 - 1.0 K/UL    ABS. EOSINOPHILS 0.3 0.0 - 0.4 K/UL    ABS. BASOPHILS 0.0 0.0 - 0.1 K/UL    ABS. IMM.  GRANS. 0.0 0.00 - 0.04 K/UL    DF SMEAR SCANNED      RBC COMMENTS NORMOCYTIC, NORMOCHROMIC          Abs neutrophils below parameter. CBC and CMP redrew per MD office. Treatment RN waiting on results. Recent Results (from the past 12 hour(s))   CBC WITH AUTOMATED DIFF    Collection Time: 11/01/21  7:57 AM   Result Value Ref Range    WBC 4.4 3.6 - 11.0 K/uL    RBC 3.87 3.80 - 5.20 M/uL    HGB 12.1 11.5 - 16.0 g/dL    HCT 35.3 35.0 - 47.0 %    MCV 91.2 80.0 - 99.0 FL    MCH 31.3 26.0 - 34.0 PG    MCHC 34.3 30.0 - 36.5 g/dL    RDW 13.4 11.5 - 14.5 %    PLATELET 495 239 - 947 K/uL    MPV 9.7 8.9 - 12.9 FL    NRBC 0.0 0  WBC    ABSOLUTE NRBC 0.00 0.00 - 0.01 K/uL    NEUTROPHILS 18 (L) 32 - 75 %    LYMPHOCYTES 63 (H) 12 - 49 %    MONOCYTES 13 5 - 13 %    EOSINOPHILS 6 0 - 7 %    BASOPHILS 0 0 - 1 %    IMMATURE GRANULOCYTES 0 0.0 - 0.5 %    ABS. NEUTROPHILS 0.8 (L) 1.8 - 8.0 K/UL    ABS. LYMPHOCYTES 2.7 0.8 - 3.5 K/UL    ABS. MONOCYTES 0.6 0.0 - 1.0 K/UL    ABS. EOSINOPHILS 0.3 0.0 - 0.4 K/UL    ABS. BASOPHILS 0.0 0.0 - 0.1 K/UL    ABS. IMM. GRANS. 0.0 0.00 - 0.04 K/UL    DF SMEAR SCANNED      RBC COMMENTS NORMOCYTIC, NORMOCHROMIC     CBC WITH AUTOMATED DIFF    Collection Time: 11/01/21  9:26 AM   Result Value Ref Range    WBC 4.4 3.6 - 11.0 K/uL    RBC 3.83 3.80 - 5.20 M/uL    HGB 11.9 11.5 - 16.0 g/dL    HCT 35.1 35.0 - 47.0 %    MCV 91.6 80.0 - 99.0 FL    MCH 31.1 26.0 - 34.0 PG    MCHC 33.9 30.0 - 36.5 g/dL    RDW 13.5 11.5 - 14.5 %    PLATELET 180 840 - 351 K/uL    MPV 10.1 8.9 - 12.9 FL    NRBC 0.0 0  WBC    ABSOLUTE NRBC 0.00 0.00 - 0.01 K/uL    NEUTROPHILS 17 (L) 32 - 75 %    LYMPHOCYTES 64 (H) 12 - 49 %    MONOCYTES 13 5 - 13 %    EOSINOPHILS 6 0 - 7 %    BASOPHILS 0 0 - 1 %    IMMATURE GRANULOCYTES 0 0.0 - 0.5 %    ABS. NEUTROPHILS 0.7 (L) 1.8 - 8.0 K/UL    ABS. LYMPHOCYTES 2.8 0.8 - 3.5 K/UL    ABS. MONOCYTES 0.6 0.0 - 1.0 K/UL    ABS. EOSINOPHILS 0.3 0.0 - 0.4 K/UL    ABS. BASOPHILS 0.0 0.0 - 0.1 K/UL    ABS. IMM.  GRANS. 0.0 0.00 - 0.04 K/UL    DF SMEAR SCANNED      RBC COMMENTS NORMOCYTIC, NORMOCHROMIC      WBC COMMENTS REACTIVE LYMPHS     METABOLIC PANEL, BASIC    Collection Time: 11/01/21  9:26 AM   Result Value Ref Range    Sodium 136 136 - 145 mmol/L    Potassium 2.8 (L) 3.5 - 5.1 mmol/L    Chloride 102 97 - 108 mmol/L    CO2 29 21 - 32 mmol/L    Anion gap 5 5 - 15 mmol/L    Glucose 103 (H) 65 - 100 mg/dL    BUN 12 6 - 20 MG/DL    Creatinine 0.87 0.55 - 1.02 MG/DL    BUN/Creatinine ratio 14 12 - 20      GFR est AA >60 >60 ml/min/1.73m2    GFR est non-AA >60 >60 ml/min/1.73m2    Calcium 8.3 (L) 8.5 - 10.1 MG/DL       Medications:  Medications Administered     0.9% sodium chloride injection 10 mL     Admin Date  11/01/2021 Action  Given Dose  10 mL Route  IntraVENous Administered By  John Stephens RN          heparin (porcine) pf 300-500 Units     Admin Date  11/01/2021 Action  Given Dose  500 Units Route  InterCATHeter Administered By  John Stephens RN          palonosetron HCl (ALOXI) injection 0.25 mg     Admin Date  11/01/2021 Action  Given Dose  0.25 mg Route  IntraVENous Administered By  John Stephens RN          potassium chloride SR (KLOR-CON 10) tablet 60 mEq     Admin Date  11/01/2021 Action  Given Dose  60 mEq Route  Oral Administered By  John Stephens RN              Abs neutrophils remain below parameter and MD ordered to HOLD treatment. Pharmacy notified. MD and pharmacy notified that pt did receive aloxi. Ms. Isai Abdullahi was discharged from [place] [address] in stable condition. Port de-accessed, flushed & heparinized per protocol. She is to return on 11/8/21 for her next appointment.     Radha Enamorado RN  November 1, 2021

## 2021-11-01 NOTE — TELEPHONE ENCOUNTER
[address] at [facility]  [phone]        11/01/21 12:20 PM Attempted to call patient via home/cell number listed to notify her of new prescription for potassium 10 mEQ, 2 tablets daily. Patient to begin taking medication tomorrow, per Phill Herring. No answer, left message requesting a call back. Provided office phone number as well. 12:26 PM Patient returned call. Advised of above. Also verified that she is aware of rescheduled [facility] and MD appointments for next week. No further questions or concerns at this time.

## 2021-11-01 NOTE — PROGRESS NOTES
Mandie Henao is a 72 y.o. female here for follow up of pancreatic cancer. Patient with no complaints of pain at this time.

## 2021-11-03 ENCOUNTER — HOSPITAL ENCOUNTER (OUTPATIENT)
Dept: INFUSION THERAPY | Age: 66
End: 2021-11-03

## 2021-11-03 NOTE — PROGRESS NOTES
Cancer [place] at [place] [address] [facility]  Braeden Minor: [phone]  F: [phone]      Reason for Visit:   Liz Lezama is a 72 y.o. female who is seen for evaluation of new bilary or pancreatic head mass    Hematology/Oncology Treatment History:     Diagnosis: Pancreatic adenocarcinoma    Stage: clinical Stage Ib [T2N0]    Pathology:   -9/26/21 Cytology - brushings from common bile duct: Reactive glandular epithelial cells and bile   -9/28/21 pancreatic head mass biopsy: Adenocarcinoma.   -10/22/21 [facility] mult-cancer panel -negative     Prior Treatment: None    Current Treatment: neoadjuvant FOLFIRNOX to 10/18/21 - current. Added neulasta with C2. History of Present Illness:   Liz Lezama is a pleasant 72 y.o. female who comes in for evaluation of pancreatic cancer. She presented in 9/2021 with obstructive jaundice, transaminitis. ERCP on 9/26/21 notable for distal CBD stricture; possible tumor/mass located in the CBD; underwent biliary stent placement to relieve biliary obstruction. CT A/P revealed mild/mod intrahepatic biliary ductal dilatation; prominence of CBD and hydropic gallbladder, suggestive of stricture/stenosis/mass of distal CBD. MRI of abdomen showed narrowing of CBD pancreatic head suggestive of extrinisic compression concerning for periampullary mass vs eccentric intraluminal lesion. EUS showed 3.2cm hypoechoic mass in pancreas head. Biopsy revealed adenocarcinoma. Pt met with Dr. Eric Mckoy in [place] and plans for neoadjuvant chemotherapy prior to surgery. Interval history:  Patient here for follow up and retry of C2 of FOLFIRNOX. Reports one day of constipation- resolved after increasing stool softener to twice daily and eating prunes. Had to strain to have a BM and noticed a scant amount of bright red blood, which she thinks is due to hemorrhoids. Gained weight-attributes to improved appetite.  Denies increased swelling ankles or SOB. No n/v, diarrhea, fevers or chills. PMHx: OA and Rheumatoid arthritis in lower back, Ulcerative colitis, HTN, Hypothyroidism  PSurgHx: , Cyst removed from left breast  SHx: , has 3 children (1 daughter and 2 sons). Works as  in [employer]. Nonsmoker, no EtOH.  in rehab recovering from [facility]. FHx: Mother  of pancreatic cancer age 80. Brother and son had colon cancer. Review of Systems: A complete review of systems was obtained, reviewed. Pertinent findings reviewed above. Physical Exam:     Visit Vitals  /61   Pulse 68   Temp (!) 96.1 °F (35.6 °C)   Ht 5' 4\" (1.626 m)   Wt 266 lb (120.7 kg)   SpO2 98%   BMI 45.66 kg/m²     ECOG PS: 1  General: obese; no distress  Eyes:  Anicteric sclerae  HENT: atraumatic, face mask in place  Neck: supple  Lymphatic: Deferred today  Respiratory: CTAB, normal respiratory effort  CV: normal rate, regular rhythm, no murmurs, no peripheral edema, port in place. GI: soft, nontender, nondistended, no masses  MS: digits without clubbing or cyanosis. Skin: no rashes, no ecchymoses, no petechia. normal temperature, turgor, and texture. Psych: alert, oriented, appropriate affect, normal judgment/insight    Results:     Lab Results   Component Value Date/Time    WBC 6.2 2021 07:36 AM    HGB 11.4 (L) 2021 07:36 AM    HCT 34.0 (L) 2021 07:36 AM    PLATELET 069  07:36 AM    MCV 93.9 2021 07:36 AM    ABS.  NEUTROPHILS 1.8 2021 07:36 AM    Hemoglobin (POC) 12.6 10/06/2013 08:45 AM    Hematocrit (POC) 37 10/06/2013 08:45 AM     Lab Results   Component Value Date/Time    Sodium 140 2021 07:36 AM    Potassium 3.7 2021 07:36 AM    Chloride 109 (H) 2021 07:36 AM    CO2 28 2021 07:36 AM    Glucose 100 2021 07:36 AM    BUN 11 2021 07:36 AM    Creatinine 0.81 2021 07:36 AM    GFR est AA >60 2021 07:36 AM    GFR est non-AA >60 2021 07:36 AM    Calcium 9.0 11/08/2021 07:36 AM    Sodium (POC) 143 10/06/2013 08:45 AM    Potassium (POC) 3.4 (L) 10/06/2013 08:45 AM    Chloride (POC) 104 10/06/2013 08:45 AM    Glucose (POC) 91 10/06/2013 08:45 AM    BUN (POC) 11 10/06/2013 08:45 AM    Creatinine (POC) 1.0 10/06/2013 08:45 AM    Calcium, ionized (POC) 1.27 10/06/2013 08:45 AM     Lab Results   Component Value Date/Time    Bilirubin, total 0.4 11/08/2021 07:36 AM    ALT (SGPT) 26 11/08/2021 07:36 AM    Alk. phosphatase 90 11/08/2021 07:36 AM    Protein, total 7.1 11/08/2021 07:36 AM    Albumin 2.8 (L) 11/08/2021 07:36 AM    Globulin 4.3 (H) 11/08/2021 07:36 AM     Lab Results   Component Value Date/Time    Iron % saturation 32 07/07/2014 02:28 PM    TIBC 304 07/07/2014 02:28 PM    Sed rate (ESR) 17 09/16/2010 12:04 PM    C-Reactive protein <0.29 01/18/2017 08:45 AM    TSH 3.46 03/04/2021 08:57 AM    ALBA, Direct Detected (A) 09/16/2010 12:04 PM    Lipase 1,214 (H) 09/29/2021 04:10 AM    Hep C virus Ab Interp. NONREACTIVE 09/24/2021 06:55 PM     Lab Results   Component Value Date/Time    CEA 3.5 09/25/2021 11:46 AM    Carbohydrate Antigen 19-9, (CA 19-9) 138 (H) 10/18/2021 08:04 AM       10/18/21 CA 19-9:  138      Imaging / Procedures:     9/24/21 US ABD   IMPRESSION  Cholelithiasis. Gallbladder sludge. Prominent CBD with mild intrahepatic ductal dilatation as well. Nonemergent MRI with MRCP to delineate etiology for CBD distention. 9/24/21 CT ABD PELV W CONT  IMPRESSION  There is mild/moderate intrahepatic biliary ductal dilatation, prominence of the  CBD and a hydropic gallbladder. No pancreatic duct dilatation. No clearly  delineated pancreatic head mass. Imaging findings suggestive of  stricture/stenosis/mass of the distal CBD, no extrinsic pancreatic head mass is  identified. Gastroenterology consult   Correlation with MRI/MRCP on a nonemergent basis. There is severe degenerative change of the lumbar spine.     9/25/21 MRI ABD WO CONT  IMPRESSION  1. Intrahepatic and extra hepatic biliary dilatation with abrupt narrowing of  the common bile duct pancreatic head just proximal to the ampulla. Suggestion of  extrinsic compression on the duct. This raises the possibility of a  periampullary mass. Alternatively, this may be an eccentric intraluminal lesion. Evaluation is limited. Recommend GI consultation for possible ERCP and EUS. 2.  Questionable small lesion in the left hepatic lobe with mild increased T2  signal and T1 hypointensity. Recommend follow-up imaging a contrast-enhanced  study preferably MRI. 3.  Cholelithiasis. Distended gallbladder with mild gallbladder wall thickening. Correlate for acute cholecystitis    9/26/21 XR ERCP/ERCB / Dr. Molly Ordonez  Impression: Biliary ductal dilation, with a maximum common duct diameter of 15 mm; Severe stenosis, involving distal CBD /pancreas head area concerning for neoplasia  Interventions: Endoscopic ampullary sphincterotomy and biliary stent placement; brushings from the CBD    9/27/21 CT CHEST W CONT:   IMPRESSION  1. There is a minimal right pleural effusion. 2. There are small pulmonary nodules present. There is a nodule in the plane of  the right major fissure and posteriorly in left lower lobe. These were not  present on examination of 1/18/2017. These could be on the basis of metastatic  disease. Close follow-up is suggested. There are also 2 small subpleural nodules  anteriorly in the right upper lobe as described above which can also be  evaluated on follow-up. 9/28/21 EUS:  Endoscopic: Esophagus:normal; Stomach: normal; Duodenum/jejunum: normal and protruding biliary stent  Ultrasound: Esophagus: normal findings;     Stomach: normal findings:     Pancreas: Areas examined: the entire gland                          Parenchyma: -Evidence of a hypoechoic mass with poorly defined borders seen in the head of the pancreas.  It appeared involving the CBD where a stent is seen, however it did not involve the major vessels. It measured about 3.2 cm in widest diameter on one view. Pancreatic Duct: normal findings, not dilated               Liver: Parenchyma: normal                          Gallbladder: normal                          Bile Duct: the common bile duct is dilated in the proximal and mid portion and a plastic stent could be seen                          Lymph Node: no adenopathy  Impression:   Pancreas head mass with fine needle biopsy showing adenocarcinoma on preliminary cytology  Recommendations: Follow-up on pathology  Follow-up with oncology, will need further evaluation of pulmonary nodule  Surgical oncology consultation  Resume GI lite diet today and can discharge in am from GI standpoint  . Assessment/Recommendations:   72 y.o. female with Ulcerative colitis, Hyopthyroidism, admitted with obstructive jaundice concerning for underlying malignant obstructing mass. 1. Pancreatic adenocarcinoma:  Clinically stage Ib [T2N0], but need further evaluation in future regarding pulmonary nodules and liver lesion. Presented with obstructive jaundice and transaminitis, CA 19-9 was 198 in 9/2021 at diagnosis. Underwent biliary stent placement with improvement in biliary obstruction. 3.2cm mass seen in pancreatic head on EUS, not involving vasculature, possibly resectable. Dr. Ted Haynes in [place] has evaluated patient and recommends consideration of neoadjuvant chemotherapy with close attention to lung nodules and liver lesion. I agree with this recommendation for earlier treatment of micrometastatic disease, ability to determine whether pt has chemosensitive disease. We discussed the risks and benefits of FOLFIRINOX chemotherapy, including potential side effects.  These include but are not limited to fatigue, nausea vomiting, diarrhea, neuropathy, taste changes, cold intolerance, esophageal spasm, allergic reactions, alopecia, mucositis, myelosuppression, risk for infection, infertility, and rarely, death. Rarely, a patient may have a condition where they do not metabolize fluorouracil appropriately (called DPD deficiency), and they may have excessive toxicity. A Port-A-Cath will be required in order to deliver the continuous infusion. BRCA 1/2 negative. The patient has consented to beginning therapy. Planning for 6 cycles of mFOLFIRINOX in neoadjuvant setting, followed by surgery and adjuvant therapy x 6 cycles. Supportive care meds include: Emla cream, Zofran, Compazine, Dexamethasone  -- Proceed with C2 of neoadjuvant mFOLFIRINOX with neulasta support. -- Checking CA 19-9 on date of C1, C6, C7, C12.   -- Chest CT with contrast and abd/pelvis MRI after C4 (per Dr. Argenis Land). -- Needs to follow up with Dr. Argenis Land immediately before C6.  -- Return in 2 weeks for C3, MD/Np visit. 2. Diarrhea / Constipation / hypokalemia:  Has history of UC. Likely related to biliary obstruction and pt has been started on Cholestyramine (Questran). For constipation - discussed management with stool softeners, miralax and fiber supplements. On potassium 20meq daily. 3. Pulmonary nodules:   Tiny of unclear etiology. Will repeat imaging after C4 of chemo. 4. HTN:   Currently with stable BP and home HCTZ on hold per primary team.     5. H/o Ulcerative procto-sigmoiditis:  Past due for colonoscopy with one adenoma polyp removed at last colonoscopy in April 2017. Was due for repeat in 2019 but she has not followed up with the GI practice since her last colonoscopy. -- Outpatient colonoscopy overdue    6. Morbid obesity:  Discussed healthy food options and ways to incorporate more protein into diet. 7. Chemotherapy induced neutropenia:    Added neulasta with pump removal.     I have personally seen and evaluated the patient in conjunction with Tami Shepherd NP. I find the patient's history and physical exam are consistent with the NP's documentation.   I agree with the above assessment and plan, which I have edited if needed. Proceed with C2 of FOLFIRINOX. Added Neulasta given neutropenia after C1. Reviewed nausea and constipation management.       Signed By: Pineda Bolton MD

## 2021-11-08 ENCOUNTER — HOSPITAL ENCOUNTER (OUTPATIENT)
Dept: INFUSION THERAPY | Age: 66
Discharge: HOME | End: 2021-11-08
Payer: MEDICARE

## 2021-11-08 ENCOUNTER — OFFICE VISIT (OUTPATIENT)
Dept: ONCOLOGY | Age: 66
End: 2021-11-08
Payer: MEDICARE

## 2021-11-08 VITALS
BODY MASS INDEX: 45.17 KG/M2 | RESPIRATION RATE: 16 BRPM | HEART RATE: 65 BPM | HEIGHT: 64 IN | WEIGHT: 264.6 LBS | DIASTOLIC BLOOD PRESSURE: 62 MMHG | TEMPERATURE: 97 F | SYSTOLIC BLOOD PRESSURE: 112 MMHG | OXYGEN SATURATION: 98 %

## 2021-11-08 VITALS
OXYGEN SATURATION: 98 % | BODY MASS INDEX: 45.41 KG/M2 | HEART RATE: 68 BPM | SYSTOLIC BLOOD PRESSURE: 103 MMHG | DIASTOLIC BLOOD PRESSURE: 61 MMHG | HEIGHT: 64 IN | TEMPERATURE: 96.1 F | WEIGHT: 266 LBS

## 2021-11-08 DIAGNOSIS — C25.9 PANCREATIC ADENOCARCINOMA (HCC): Primary | ICD-10-CM

## 2021-11-08 DIAGNOSIS — K59.03 DRUG-INDUCED CONSTIPATION: ICD-10-CM

## 2021-11-08 DIAGNOSIS — C25.0 MALIGNANT NEOPLASM OF HEAD OF PANCREAS (HCC): ICD-10-CM

## 2021-11-08 DIAGNOSIS — K51.20 ULCERATIVE PROCTITIS WITHOUT COMPLICATION (HCC): ICD-10-CM

## 2021-11-08 DIAGNOSIS — Z76.89 PREVENTION OF CHEMOTHERAPY-INDUCED NEUTROPENIA: Primary | ICD-10-CM

## 2021-11-08 DIAGNOSIS — Z51.11 CHEMOTHERAPY MANAGEMENT, ENCOUNTER FOR: ICD-10-CM

## 2021-11-08 LAB
ALBUMIN SERPL-MCNC: 2.8 G/DL (ref 3.5–5)
ALBUMIN/GLOB SERPL: 0.7 {RATIO} (ref 1.1–2.2)
ALP SERPL-CCNC: 90 U/L (ref 45–117)
ALT SERPL-CCNC: 26 U/L (ref 12–78)
ANION GAP SERPL CALC-SCNC: 3 MMOL/L (ref 5–15)
AST SERPL-CCNC: 22 U/L (ref 15–37)
BASOPHILS # BLD: 0 K/UL (ref 0–0.1)
BASOPHILS NFR BLD: 1 % (ref 0–1)
BILIRUB SERPL-MCNC: 0.4 MG/DL (ref 0.2–1)
BUN SERPL-MCNC: 11 MG/DL (ref 6–20)
BUN/CREAT SERPL: 14 (ref 12–20)
CALCIUM SERPL-MCNC: 9 MG/DL (ref 8.5–10.1)
CHLORIDE SERPL-SCNC: 109 MMOL/L (ref 97–108)
CO2 SERPL-SCNC: 28 MMOL/L (ref 21–32)
CREAT SERPL-MCNC: 0.81 MG/DL (ref 0.55–1.02)
DIFFERENTIAL METHOD BLD: ABNORMAL
EOSINOPHIL # BLD: 0.2 K/UL (ref 0–0.4)
EOSINOPHIL NFR BLD: 4 % (ref 0–7)
ERYTHROCYTE [DISTWIDTH] IN BLOOD BY AUTOMATED COUNT: 14.4 % (ref 11.5–14.5)
GLOBULIN SER CALC-MCNC: 4.3 G/DL (ref 2–4)
GLUCOSE SERPL-MCNC: 100 MG/DL (ref 65–100)
HCT VFR BLD AUTO: 34 % (ref 35–47)
HGB BLD-MCNC: 11.4 G/DL (ref 11.5–16)
IMM GRANULOCYTES # BLD AUTO: 0 K/UL (ref 0–0.04)
IMM GRANULOCYTES NFR BLD AUTO: 0 % (ref 0–0.5)
LYMPHOCYTES # BLD: 3.2 K/UL (ref 0.8–3.5)
LYMPHOCYTES NFR BLD: 51 % (ref 12–49)
MCH RBC QN AUTO: 31.5 PG (ref 26–34)
MCHC RBC AUTO-ENTMCNC: 33.5 G/DL (ref 30–36.5)
MCV RBC AUTO: 93.9 FL (ref 80–99)
MONOCYTES # BLD: 0.9 K/UL (ref 0–1)
MONOCYTES NFR BLD: 15 % (ref 5–13)
NEUTS SEG # BLD: 1.8 K/UL (ref 1.8–8)
NEUTS SEG NFR BLD: 29 % (ref 32–75)
NRBC # BLD: 0 K/UL (ref 0–0.01)
NRBC BLD-RTO: 0 PER 100 WBC
PLATELET # BLD AUTO: 281 K/UL (ref 150–400)
PMV BLD AUTO: 9.8 FL (ref 8.9–12.9)
POTASSIUM SERPL-SCNC: 3.7 MMOL/L (ref 3.5–5.1)
PROT SERPL-MCNC: 7.1 G/DL (ref 6.4–8.2)
RBC # BLD AUTO: 3.62 M/UL (ref 3.8–5.2)
SODIUM SERPL-SCNC: 140 MMOL/L (ref 136–145)
WBC # BLD AUTO: 6.2 K/UL (ref 3.6–11)

## 2021-11-08 PROCEDURE — 85025 COMPLETE CBC W/AUTO DIFF WBC: CPT

## 2021-11-08 PROCEDURE — 74011000258 HC RX REV CODE- 258: Performed by: NURSE PRACTITIONER

## 2021-11-08 PROCEDURE — 74011250636 HC RX REV CODE- 250/636: Performed by: NURSE PRACTITIONER

## 2021-11-08 PROCEDURE — 1101F PT FALLS ASSESS-DOCD LE1/YR: CPT | Performed by: INTERNAL MEDICINE

## 2021-11-08 PROCEDURE — 36415 COLL VENOUS BLD VENIPUNCTURE: CPT

## 2021-11-08 PROCEDURE — G8417 CALC BMI ABV UP PARAM F/U: HCPCS | Performed by: INTERNAL MEDICINE

## 2021-11-08 PROCEDURE — 96375 TX/PRO/DX INJ NEW DRUG ADDON: CPT

## 2021-11-08 PROCEDURE — 96368 THER/DIAG CONCURRENT INF: CPT

## 2021-11-08 PROCEDURE — G8400 PT W/DXA NO RESULTS DOC: HCPCS | Performed by: INTERNAL MEDICINE

## 2021-11-08 PROCEDURE — G8752 SYS BP LESS 140: HCPCS | Performed by: INTERNAL MEDICINE

## 2021-11-08 PROCEDURE — G8427 DOCREV CUR MEDS BY ELIG CLIN: HCPCS | Performed by: INTERNAL MEDICINE

## 2021-11-08 PROCEDURE — 96417 CHEMO IV INFUS EACH ADDL SEQ: CPT

## 2021-11-08 PROCEDURE — G8432 DEP SCR NOT DOC, RNG: HCPCS | Performed by: INTERNAL MEDICINE

## 2021-11-08 PROCEDURE — 96415 CHEMO IV INFUSION ADDL HR: CPT

## 2021-11-08 PROCEDURE — 80053 COMPREHEN METABOLIC PANEL: CPT

## 2021-11-08 PROCEDURE — 96413 CHEMO IV INFUSION 1 HR: CPT

## 2021-11-08 PROCEDURE — G8536 NO DOC ELDER MAL SCRN: HCPCS | Performed by: INTERNAL MEDICINE

## 2021-11-08 PROCEDURE — 3017F COLORECTAL CA SCREEN DOC REV: CPT | Performed by: INTERNAL MEDICINE

## 2021-11-08 PROCEDURE — G8754 DIAS BP LESS 90: HCPCS | Performed by: INTERNAL MEDICINE

## 2021-11-08 PROCEDURE — 99215 OFFICE O/P EST HI 40 MIN: CPT | Performed by: INTERNAL MEDICINE

## 2021-11-08 PROCEDURE — 77030012965 HC NDL HUBR BBMI -A

## 2021-11-08 PROCEDURE — 1090F PRES/ABSN URINE INCON ASSESS: CPT | Performed by: INTERNAL MEDICINE

## 2021-11-08 PROCEDURE — 96416 CHEMO PROLONG INFUSE W/PUMP: CPT

## 2021-11-08 PROCEDURE — 96367 TX/PROPH/DG ADDL SEQ IV INF: CPT

## 2021-11-08 RX ORDER — POLYETHYLENE GLYCOL 3350 17 G/17G
17 POWDER, FOR SOLUTION ORAL AS NEEDED
COMMUNITY
End: 2021-11-08 | Stop reason: SDUPTHER

## 2021-11-08 RX ORDER — SODIUM CHLORIDE 9 MG/ML
10 INJECTION INTRAMUSCULAR; INTRAVENOUS; SUBCUTANEOUS AS NEEDED
Status: ACTIVE | OUTPATIENT
Start: 2021-11-08 | End: 2021-11-08

## 2021-11-08 RX ORDER — HEPARIN 100 UNIT/ML
300-500 SYRINGE INTRAVENOUS AS NEEDED
Status: ACTIVE | OUTPATIENT
Start: 2021-11-08 | End: 2021-11-08

## 2021-11-08 RX ORDER — POLYETHYLENE GLYCOL 3350 17 G/17G
17 POWDER, FOR SOLUTION ORAL AS NEEDED
Qty: 14 EACH | Refills: 2 | Status: SHIPPED | OUTPATIENT
Start: 2021-11-08 | End: 2022-03-16 | Stop reason: SDUPTHER

## 2021-11-08 RX ORDER — ASPIRIN 81 MG
TABLET, DELAYED RELEASE (ENTERIC COATED) ORAL AS NEEDED
COMMUNITY
End: 2022-10-10

## 2021-11-08 RX ORDER — SODIUM CHLORIDE 0.9 % (FLUSH) 0.9 %
10 SYRINGE (ML) INJECTION AS NEEDED
Status: DISPENSED | OUTPATIENT
Start: 2021-11-08 | End: 2021-11-08

## 2021-11-08 RX ORDER — ATROPINE SULFATE 0.4 MG/ML
0.4 INJECTION, SOLUTION ENDOTRACHEAL; INTRAMEDULLARY; INTRAMUSCULAR; INTRAVENOUS; SUBCUTANEOUS
Status: DISPENSED | OUTPATIENT
Start: 2021-11-08 | End: 2021-11-08

## 2021-11-08 RX ORDER — PALONOSETRON 0.05 MG/ML
0.25 INJECTION, SOLUTION INTRAVENOUS ONCE
Status: COMPLETED | OUTPATIENT
Start: 2021-11-08 | End: 2021-11-08

## 2021-11-08 RX ORDER — DEXTROSE MONOHYDRATE 50 MG/ML
25 INJECTION, SOLUTION INTRAVENOUS CONTINUOUS
Status: DISPENSED | OUTPATIENT
Start: 2021-11-08 | End: 2021-11-08

## 2021-11-08 RX ADMIN — PALONOSETRON 0.25 MG: 0.05 INJECTION, SOLUTION INTRAVENOUS at 09:14

## 2021-11-08 RX ADMIN — SODIUM CHLORIDE 10 ML: 9 INJECTION INTRAMUSCULAR; INTRAVENOUS; SUBCUTANEOUS at 09:11

## 2021-11-08 RX ADMIN — SODIUM CHLORIDE 10 ML: 9 INJECTION INTRAMUSCULAR; INTRAVENOUS; SUBCUTANEOUS at 09:10

## 2021-11-08 RX ADMIN — IRINOTECAN HYDROCHLORIDE 351 MG: 20 INJECTION, SOLUTION INTRAVENOUS at 12:42

## 2021-11-08 RX ADMIN — DEXAMETHASONE SODIUM PHOSPHATE 12 MG: 10 INJECTION, SOLUTION INTRAMUSCULAR; INTRAVENOUS at 09:16

## 2021-11-08 RX ADMIN — LEUCOVORIN CALCIUM 936 MG: 500 INJECTION, POWDER, LYOPHILIZED, FOR SOLUTION INTRAMUSCULAR; INTRAVENOUS at 12:42

## 2021-11-08 RX ADMIN — DEXTROSE MONOHYDRATE 25 ML/HR: 50 INJECTION, SOLUTION INTRAVENOUS at 09:11

## 2021-11-08 RX ADMIN — ATROPINE SULFATE 0.4 MG: 0.4 INJECTION, SOLUTION INTRAMUSCULAR; INTRAVENOUS; SUBCUTANEOUS at 13:42

## 2021-11-08 RX ADMIN — OXALIPLATIN 199 MG: 100 INJECTION, SOLUTION, CONCENTRATE INTRAVENOUS at 10:28

## 2021-11-08 RX ADMIN — FLUOROURACIL 5616 MG: 50 INJECTION, SOLUTION INTRAVENOUS at 14:36

## 2021-11-08 NOTE — PROGRESS NOTES
[facility] - Chemotherapy Progress Note    0730 Pt admit to [facility] for C 2 D 1 Folfirinox ambulatory in stable condition. Assessment completed. No new concerns voiced. PACwith positive blood return. Chemotherapy Flowsheet 11/8/2021   Cycle C2D1   Date 11/8/2021   Drug / Regimen Folfirinox   Pre Meds -   Notes -       Visit Vitals  /61   Pulse 68   Temp (!) 96.1 °F (35.6 °C)   Resp 18   Ht 5' 4\" (1.626 m)   Wt 120 kg (264 lb 9.6 oz)   SpO2 98%   BMI 45.42 kg/m²     Labs obtained and patient proceeded to scheduled appointment. Assessment and access by Valeria Poole. Pt returned- no change to treatment.     Medications:  Medications Administered     0.9% sodium chloride injection 10 mL     Admin Date  11/08/2021 Action  Given Dose  10 mL Route  IntraVENous Administered By  Freddy Mckeon RN           Admin Date  11/08/2021 Action  Given Dose  10 mL Route  IntraVENous Administered By  Freddy Mckeon RN          atropine 0.4 mg/mL injection 0.4 mg     Admin Date  11/08/2021 Action  Given Dose  0.4 mg Route  SubCUTAneous Administered By  Freddy Mckeon RN          dexamethasone (DECADRON) 12 mg in 0.9% sodium chloride 50 mL IVPB     Admin Date  11/08/2021 Action  New Bag Dose  12 mg Route  IntraVENous Administered By  Freddy Mckeon RN          dextrose 5% infusion     Admin Date  11/08/2021 Action  New Bag Dose  25 mL/hr Rate  25 mL/hr Route  IntraVENous Administered By  Freddy Mckeon RN          fluorouraciL (ADRUCIL) 5,616 mg in 0.9% sodium chloride 250 mL CADD Cassette     Admin Date  11/08/2021 Action  New Bag Dose  5,616 mg Rate  5.4 mL/hr Route  IntraVENous Administered By  Freddy Mckeon RN          irinotecan (CAMPTOSAR) 351 mg in dextrose 5% 250 mL, overfill volume 25 mL chemo infusion     Admin Date  11/08/2021 Action  New Bag Dose  351 mg Rate  195 mL/hr Route  IntraVENous Administered By  Freddy Mckeon RN          leucovorin (WELLCOVORIN) 936 mg in dextrose 5% 250 mL, overfill volume 25 mL IVPB     Admin Date  11/08/2021 Action  New Bag Dose  936 mg Rate  214.5 mL/hr Route  IntraVENous Administered By  Adolfo Carrero RN          oxaliplatin (ELOXATIN) 199 mg in dextrose 5% 250 mL, overfill volume 25 mL chemo infusion     Admin Date  11/08/2021 Action  New Bag Dose  199 mg Rate  157.4 mL/hr Route  IntraVENous Administered By  Adolfo Carrero RN          palonosetron HCl (ALOXI) injection 0.25 mg     Admin Date  11/08/2021 Action  Given Dose  0.25 mg Route  IntraVENous Administered By  Adolfo Carrero RN                 Pt tolerated treatment well. PAC maintained positive blood return throughout treatment, flushed with positive blood return at conclusion and throughout treatment. Leti Melo remains intact for home CADD. D/c home ambulatory in no distress. Pt aware of next appointment scheduled for 11/10/21 @ 3 pm.      Recent Results (from the past 12 hour(s))   METABOLIC PANEL, COMPREHENSIVE    Collection Time: 11/08/21  7:36 AM   Result Value Ref Range    Sodium 140 136 - 145 mmol/L    Potassium 3.7 3.5 - 5.1 mmol/L    Chloride 109 (H) 97 - 108 mmol/L    CO2 28 21 - 32 mmol/L    Anion gap 3 (L) 5 - 15 mmol/L    Glucose 100 65 - 100 mg/dL    BUN 11 6 - 20 MG/DL    Creatinine 0.81 0.55 - 1.02 MG/DL    BUN/Creatinine ratio 14 12 - 20      GFR est AA >60 >60 ml/min/1.73m2    GFR est non-AA >60 >60 ml/min/1.73m2    Calcium 9.0 8.5 - 10.1 MG/DL    Bilirubin, total 0.4 0.2 - 1.0 MG/DL    ALT (SGPT) 26 12 - 78 U/L    AST (SGOT) 22 15 - 37 U/L    Alk.  phosphatase 90 45 - 117 U/L    Protein, total 7.1 6.4 - 8.2 g/dL    Albumin 2.8 (L) 3.5 - 5.0 g/dL    Globulin 4.3 (H) 2.0 - 4.0 g/dL    A-G Ratio 0.7 (L) 1.1 - 2.2     CBC WITH AUTOMATED DIFF    Collection Time: 11/08/21  7:36 AM   Result Value Ref Range    WBC 6.2 3.6 - 11.0 K/uL    RBC 3.62 (L) 3.80 - 5.20 M/uL    HGB 11.4 (L) 11.5 - 16.0 g/dL    HCT 34.0 (L) 35.0 - 47.0 %    MCV 93.9 80.0 - 99.0 FL    MCH 31.5 26.0 - 34.0 PG    MCHC 33.5 30.0 - 36.5 g/dL    RDW 14.4 11.5 - 14.5 %    PLATELET 128 994 - 161 K/uL    MPV 9.8 8.9 - 12.9 FL    NRBC 0.0 0  WBC    ABSOLUTE NRBC 0.00 0.00 - 0.01 K/uL    NEUTROPHILS 29 (L) 32 - 75 %    LYMPHOCYTES 51 (H) 12 - 49 %    MONOCYTES 15 (H) 5 - 13 %    EOSINOPHILS 4 0 - 7 %    BASOPHILS 1 0 - 1 %    IMMATURE GRANULOCYTES 0 0.0 - 0.5 %    ABS. NEUTROPHILS 1.8 1.8 - 8.0 K/UL    ABS. LYMPHOCYTES 3.2 0.8 - 3.5 K/UL    ABS. MONOCYTES 0.9 0.0 - 1.0 K/UL    ABS. EOSINOPHILS 0.2 0.0 - 0.4 K/UL    ABS. BASOPHILS 0.0 0.0 - 0.1 K/UL    ABS. IMM.  GRANS. 0.0 0.00 - 0.04 K/UL    DF AUTOMATED

## 2021-11-08 NOTE — PATIENT INSTRUCTIONS
How to increase protein   Hard or semi-soft cheese   ?  Melt or have an extra 2 slices on sandwiches, bread, English muffins, tortillas, hamburgers, hot dogs, other meats or fish, vegetables, eggs, and desserts such as stewed fruit or pies. ?  Add to bread and muffin mixes. ?  Grate and add to soups, sauces, casseroles, vegetable dishes, mashed potatoes, rice, noodles, or meatloaf. ?  Eat as a snack. ?  Melt and use as sauce on vegetables. Cottage cheese/ricotta cheese   ?  Mix with or use as a stuffing for fruits and vegetables   ?  Add to casseroles, spaghetti, noodles, and egg dishes such as omletes, scrambled eggs, and souffles   ?  Use in gelatin, pudding-type desserts, cheesecake, and pancake batter. ?  Use to stuff crepes and pasta shells or manicotti   ?  Mix with jams, jellies, applebutter, or preserves. Milk   ?  Use milk in beverages and in cooking when possible. ?  Use in preparing foods such as hot cereal, soups, cocoa, or pudding. ?  Add cream sauces to vegetables and other dishes. ?  Add a tablespoon of nonfat dry powdered milk to each cup of regular milk, cream soups, and mashed potatoes. ?  Add a tablespoon or two of nonfat dry milk to each cup of regular milk, cream soups, and mashed potatoes. ?  Try whole milk or half and half cream in soups and milk desserts. Powdered milk, evaporated milk, sweetened condensed milk, or carnation instant breakfast powder   ?  Add to regular milk or milk drinks such as pasteurized eggnog and milkshakes. ?  Use in casseroles, meatloaf, hamburgers, bread, muffins, sauces, cream soups, mashed potatoes, puddings, custards, and milk-based desserts. ?  Add one cup dry skim milk powder to one quart whole milk for double strength milk; or one-third cup dry skim milk powder to each cup of whole milk. Nutrition Recommendations   How to increase protein   Ice cream, yogurt, frozen yogurt   ?   Add to carbonate or milk based beverages such as gingerale to make milkshakes. ?  Add to cereals, fruits, gelatin desserts, and pies; blend or whip with soft fruits. ?  [place] ice cream or frozen yogurt between enriched cake slices, cookies, or john crackers. ?  Use on top of pancakes and waffles; fill crepes. Eggs and egg yolks   ?  Add chopped, hard-cooked eggs to salads and dressings, vegetables, casseroles, and creamed meats. ?  Make a rich custard with egg yolks, high-protein milk, and sugar. ?  Add extra hard-cooked yolks to deviled egg filling and sandwich spreads. Nuts, seeds and wheat germ   ?  Add to baked goods such as; casseroles, breads, muffins, pancakes, cookies, and waffles. ?  Sprinkle on fruit, cereal, ice cream, yogurt, vegetables, salads, and toast as a crunchy topping; use in place of bread crumbs. ?  Blend with parlsey or spinach, herbs and cream for a noodle, pasta or vegetable sauce. ?  Roll banana in chopped nuts. ?  Use as a snack by themselves or mix together with dried fruit and chocolate. Peanut Butter   ?  Spread on sandwiches, toast, muffins, crackers, waffles, pancakes, and fruit slices. ?  Use a dip for raw vegetables such as carrots, cauliflower, and celery. ?  Blend with milk drinks and beverages. ?  Swirl through soft ice cream and yogurt. ?  Try \"Peanut butter balls\", mix peanut butter with crushed john crackers, raisins, whole milk, and honey. Roll into balls and chill. Michelle  may be coated with grated coconut or chocolate. ?  Eat a spoonful by itself as a snack. Beans   ?  Cook and use dried peas and beans and tofu into soups or add to casseroles, pastas, and grain dishes that also contain cheese or meat. Meat and Fish   ?  Add chopped cooked meat or fish to vegetables, salads, casseroles, soups, sauces, and biscuit dough. ?  Use in omelets, souffles, quiches, sandwich fillings, and chicken and turkey stuffings.    ?  Adding breading; serve with gravy. ?  Wrap in pie crust or biscuit dough as turnovers. ?  Tuna salad with mayonnaise   ?   Add to stuffed baked potatoes

## 2021-11-10 ENCOUNTER — HOSPITAL ENCOUNTER (OUTPATIENT)
Dept: INFUSION THERAPY | Age: 66
Discharge: HOME | End: 2021-11-10
Payer: MEDICARE

## 2021-11-10 VITALS
TEMPERATURE: 97.8 F | HEART RATE: 74 BPM | DIASTOLIC BLOOD PRESSURE: 57 MMHG | OXYGEN SATURATION: 95 % | SYSTOLIC BLOOD PRESSURE: 123 MMHG | RESPIRATION RATE: 20 BRPM

## 2021-11-10 DIAGNOSIS — C25.0 MALIGNANT NEOPLASM OF HEAD OF PANCREAS (HCC): ICD-10-CM

## 2021-11-10 DIAGNOSIS — Z76.89 PREVENTION OF CHEMOTHERAPY-INDUCED NEUTROPENIA: Primary | ICD-10-CM

## 2021-11-10 PROCEDURE — 96523 IRRIG DRUG DELIVERY DEVICE: CPT

## 2021-11-10 PROCEDURE — 74011250636 HC RX REV CODE- 250/636: Performed by: INTERNAL MEDICINE

## 2021-11-10 PROCEDURE — 74011250636 HC RX REV CODE- 250/636: Performed by: NURSE PRACTITIONER

## 2021-11-10 PROCEDURE — 96377 APPLICATON ON-BODY INJECTOR: CPT

## 2021-11-10 RX ORDER — SODIUM CHLORIDE 0.9 % (FLUSH) 0.9 %
10 SYRINGE (ML) INJECTION AS NEEDED
Status: DISCONTINUED | OUTPATIENT
Start: 2021-11-10 | End: 2021-11-11 | Stop reason: HOSPADM

## 2021-11-10 RX ORDER — HEPARIN 100 UNIT/ML
300-500 SYRINGE INTRAVENOUS AS NEEDED
Status: DISCONTINUED | OUTPATIENT
Start: 2021-11-10 | End: 2021-11-11 | Stop reason: HOSPADM

## 2021-11-10 RX ORDER — SODIUM CHLORIDE 9 MG/ML
10 INJECTION INTRAMUSCULAR; INTRAVENOUS; SUBCUTANEOUS AS NEEDED
Status: DISCONTINUED | OUTPATIENT
Start: 2021-11-10 | End: 2021-11-11 | Stop reason: HOSPADM

## 2021-11-10 RX ADMIN — HEPARIN 500 UNITS: 100 SYRINGE at 15:05

## 2021-11-10 RX ADMIN — PEGFILGRASTIM 6 MG: KIT SUBCUTANEOUS at 15:00

## 2021-11-10 RX ADMIN — SODIUM CHLORIDE 10 ML: 9 INJECTION INTRAMUSCULAR; INTRAVENOUS; SUBCUTANEOUS at 15:05

## 2021-11-10 NOTE — PROGRESS NOTES
[facility] Progress Note    Date: November 10, 2021    Name: Karissa Virgen    MRN: [id number]         : 1955    Ms. Fahad Diaz arrived ambulatory and in no distress for Pump Removal.  Assessment was completed, patient complains of some nausea today and poor appetite. CADD completed- 250 ml infused per order. Ms. Shona Alvarado vitals were reviewed. Visit Vitals  BP (!) 123/57 (BP 1 Location: Left upper arm, BP Patient Position: Sitting)   Pulse 74   Temp 97.8 °F (36.6 °C)   Resp 20   SpO2 95%       Medications Administered     0.9% sodium chloride injection 10 mL     Admin Date  11/10/2021 Action  Given Dose  10 mL Route  IntraVENous Administered By  Gari Goltz, RN          heparin (porcine) pf 300-500 Units     Admin Date  11/10/2021 Action  Given Dose  500 Units Route  InterCATHeter Administered By  Gari Goltz, RN          pegfilgrastim (NEULASTA) wearable SQ injector 6 mg     Admin Date  11/10/2021 Action  Given Dose  6 mg Route  SubCUTAneous Administered By  Gari Goltz, RN                Education provided to patient about Neulasta On Body Injector including: side effects, how/when to remove the device, as well as what to do in the event of device malfunction. Opportunity for questions was provided and all questions were answered. Patient verbalized understanding. Ms. Fahad Diaz tolerated treatment well and was discharged from [place] [address] in stable condition at 1510. Port de-accessed, flushed & heparinized per protocol. She is to return on  at 0730 for her next appointment.     Bee Parry RN  November 10, 2021

## 2021-11-12 RX ORDER — ONDANSETRON 2 MG/ML
8 INJECTION INTRAMUSCULAR; INTRAVENOUS AS NEEDED
Status: CANCELLED | OUTPATIENT
Start: 2021-11-22

## 2021-11-12 RX ORDER — ALBUTEROL SULFATE 0.83 MG/ML
2.5 SOLUTION RESPIRATORY (INHALATION) AS NEEDED
Status: CANCELLED
Start: 2021-11-22

## 2021-11-12 RX ORDER — HEPARIN 100 UNIT/ML
300-500 SYRINGE INTRAVENOUS AS NEEDED
Status: CANCELLED
Start: 2021-11-24

## 2021-11-12 RX ORDER — DIPHENHYDRAMINE HYDROCHLORIDE 50 MG/ML
25 INJECTION, SOLUTION INTRAMUSCULAR; INTRAVENOUS AS NEEDED
Status: CANCELLED
Start: 2021-11-22

## 2021-11-12 RX ORDER — SODIUM CHLORIDE 0.9 % (FLUSH) 0.9 %
10 SYRINGE (ML) INJECTION AS NEEDED
Status: CANCELLED | OUTPATIENT
Start: 2021-11-24

## 2021-11-12 RX ORDER — EPINEPHRINE 1 MG/ML
0.3 INJECTION, SOLUTION, CONCENTRATE INTRAVENOUS AS NEEDED
Status: CANCELLED | OUTPATIENT
Start: 2021-11-22

## 2021-11-12 RX ORDER — DIPHENHYDRAMINE HYDROCHLORIDE 50 MG/ML
50 INJECTION, SOLUTION INTRAMUSCULAR; INTRAVENOUS AS NEEDED
Status: CANCELLED
Start: 2021-11-22

## 2021-11-12 RX ORDER — SODIUM CHLORIDE 9 MG/ML
10 INJECTION INTRAMUSCULAR; INTRAVENOUS; SUBCUTANEOUS AS NEEDED
Status: CANCELLED | OUTPATIENT
Start: 2021-11-24

## 2021-11-12 RX ORDER — ACETAMINOPHEN 325 MG/1
650 TABLET ORAL AS NEEDED
Status: CANCELLED
Start: 2021-11-22

## 2021-11-12 RX ORDER — HYDROCORTISONE SODIUM SUCCINATE 100 MG/2ML
100 INJECTION, POWDER, FOR SOLUTION INTRAMUSCULAR; INTRAVENOUS AS NEEDED
Status: CANCELLED | OUTPATIENT
Start: 2021-11-22

## 2021-11-15 ENCOUNTER — APPOINTMENT (OUTPATIENT)
Dept: INFUSION THERAPY | Age: 66
End: 2021-11-15

## 2021-11-17 ENCOUNTER — APPOINTMENT (OUTPATIENT)
Dept: INFUSION THERAPY | Age: 66
End: 2021-11-17

## 2021-11-17 NOTE — PROGRESS NOTES
[facility] at [address], [address] [facility] [address] [facility]  W: [phone]  F: [phone]      Reason for Visit:   Melissa Yadav is a 77 y.o. female who is seen for evaluation of new bilary or pancreatic head mass    Hematology/Oncology Treatment History:     Diagnosis: Pancreatic adenocarcinoma    Stage: clinical Stage Ib [T2N0]    Pathology:   -9/26/21 Cytology - brushings from common bile duct: Reactive glandular epithelial cells and bile   -9/28/21 pancreatic head mass biopsy: Adenocarcinoma.   -10/22/21 [facility] mult-cancer panel -negative     Prior Treatment: None    Current Treatment: neoadjuvant FOLFIRNOX to 10/18/21 - current. Added neulasta with C2. History of Present Illness:   Melissa Yadav is a pleasant 77 y.o. female who comes in for evaluation of pancreatic cancer. She presented in 9/2021 with obstructive jaundice, transaminitis. ERCP on 9/26/21 notable for distal CBD stricture; possible tumor/mass located in the CBD; underwent biliary stent placement to relieve biliary obstruction. CT A/P revealed mild/mod intrahepatic biliary ductal dilatation; prominence of CBD and hydropic gallbladder, suggestive of stricture/stenosis/mass of distal CBD. MRI of abdomen showed narrowing of CBD pancreatic head suggestive of extrinisic compression concerning for periampullary mass vs eccentric intraluminal lesion. EUS showed 3.2cm hypoechoic mass in pancreas head. Biopsy revealed adenocarcinoma. Pt met with Dr. Gregorio Peña in [place] and plans for neoadjuvant chemotherapy prior to surgery. Interval history:  Patient here for follow up and retry of C3 of FOLFIRNOX. Reports nausea after treatment - but notes improvement with taking zofran every day. She is not alternating with compazine. Forgot steroid medication on day 2 but took on day 3 and 4. Staying hydrated. Mild constipation - managed with stool softeners. Appetite stable - lost some weight.  No fevers, chills, rashes. PMHx: OA and Rheumatoid arthritis in lower back, Ulcerative colitis, HTN, Hypothyroidism  PSurgHx: , Cyst removed from left breast  SHx: , has 3 children (1 daughter and 2 sons). Works as  in [employer]. Nonsmoker, no EtOH.  in rehab [facility] from [facility]. FHx: Mother  of pancreatic cancer age 80. Brother and son had colon cancer. Review of Systems: A complete review of systems was obtained, reviewed. Pertinent findings reviewed above. Physical Exam:     Visit Vitals  /72   Pulse 72   Temp 97.8 °F (36.6 °C)   Ht 5' 4\" (1.626 m)   Wt 257 lb (116.6 kg)   SpO2 98%   BMI 44.11 kg/m²     ECOG PS: 1  General: obese; no distress  Eyes:  Anicteric sclerae  HENT: atraumatic, face mask in place  Neck: supple  Lymphatic: Deferred today  Respiratory: CTAB, normal respiratory effort  CV: normal rate, regular rhythm, no murmurs, no peripheral edema, port in place. GI: soft, nontender, nondistended, no masses  MS: digits without clubbing or cyanosis. Skin: no rashes, no ecchymoses, no petechia. normal temperature, turgor, and texture. Psych: alert, oriented, appropriate affect, normal judgment/insight    Results:     Lab Results   Component Value Date/Time    WBC 16.7 (H) 2021 07:42 AM    HGB 12.1 2021 07:42 AM    HCT 35.7 2021 07:42 AM    PLATELET 536  07:42 AM    MCV 94.7 2021 07:42 AM    ABS.  NEUTROPHILS PENDING 2021 07:42 AM    Hemoglobin (POC) 12.6 10/06/2013 08:45 AM    Hematocrit (POC) 37 10/06/2013 08:45 AM     Lab Results   Component Value Date/Time    Sodium 138 2021 07:42 AM    Potassium 3.2 (L) 2021 07:42 AM    Chloride 106 2021 07:42 AM    CO2 28 2021 07:42 AM    Glucose 95 2021 07:42 AM    BUN 11 2021 07:42 AM    Creatinine 0.96 2021 07:42 AM    GFR est AA >60 2021 07:42 AM    GFR est non-AA 58 (L) 2021 07:42 AM    Calcium 9.1 2021 07:42 AM Sodium (POC) 143 10/06/2013 08:45 AM    Potassium (POC) 3.4 (L) 10/06/2013 08:45 AM    Chloride (POC) 104 10/06/2013 08:45 AM    Glucose (POC) 91 10/06/2013 08:45 AM    BUN (POC) 11 10/06/2013 08:45 AM    Creatinine (POC) 1.0 10/06/2013 08:45 AM    Calcium, ionized (POC) 1.27 10/06/2013 08:45 AM     Lab Results   Component Value Date/Time    Bilirubin, total 0.5 11/22/2021 07:42 AM    ALT (SGPT) 38 11/22/2021 07:42 AM    Alk. phosphatase 120 (H) 11/22/2021 07:42 AM    Protein, total 7.5 11/22/2021 07:42 AM    Albumin 3.1 (L) 11/22/2021 07:42 AM    Globulin 4.4 (H) 11/22/2021 07:42 AM     Lab Results   Component Value Date/Time    Iron % saturation 32 07/07/2014 02:28 PM    TIBC 304 07/07/2014 02:28 PM    Sed rate (ESR) 17 09/16/2010 12:04 PM    C-Reactive protein <0.29 01/18/2017 08:45 AM    TSH 3.46 03/04/2021 08:57 AM    ALBA, Direct Detected (A) 09/16/2010 12:04 PM    Lipase 1,214 (H) 09/29/2021 04:10 AM    Hep C virus Ab Interp. NONREACTIVE 09/24/2021 06:55 PM     Lab Results   Component Value Date/Time    CEA 3.5 09/25/2021 11:46 AM    Carbohydrate Antigen 19-9, (CA 19-9) 138 (H) 10/18/2021 08:04 AM       10/18/21 CA 19-9:  138      Imaging / Procedures:     9/24/21 US ABD   IMPRESSION  Cholelithiasis. Gallbladder sludge. Prominent CBD with mild intrahepatic ductal dilatation as well. Nonemergent MRI with MRCP to delineate etiology for CBD distention. 9/24/21 CT ABD PELV W CONT  IMPRESSION  There is mild/moderate intrahepatic biliary ductal dilatation, prominence of the  CBD and a hydropic gallbladder. No pancreatic duct dilatation. No clearly  delineated pancreatic head mass. Imaging findings suggestive of  stricture/stenosis/mass of the distal CBD, no extrinsic pancreatic head mass is  identified. Gastroenterology consult   Correlation with MRI/MRCP on a nonemergent basis. There is severe degenerative change of the lumbar spine. 9/25/21 MRI ABD WO CONT  IMPRESSION  1.   Intrahepatic and extra hepatic biliary dilatation with abrupt narrowing of  the common bile duct pancreatic head just proximal to the ampulla. Suggestion of  extrinsic compression on the duct. This raises the possibility of a  periampullary mass. Alternatively, this may be an eccentric intraluminal lesion. Evaluation is limited. Recommend GI consultation for possible ERCP and EUS. 2.  Questionable small lesion in the left hepatic lobe with mild increased T2  signal and T1 hypointensity. Recommend follow-up imaging a contrast-enhanced  study preferably MRI. 3.  Cholelithiasis. Distended gallbladder with mild gallbladder wall thickening. Correlate for acute cholecystitis    9/26/21 XR ERCP/ERCB / Dr. Micaela Simon  Impression: Biliary ductal dilation, with a maximum common duct diameter of 15 mm; Severe stenosis, involving distal CBD /pancreas head area concerning for neoplasia  Interventions: Endoscopic ampullary sphincterotomy and biliary stent placement; brushings from the CBD    9/27/21 CT CHEST W CONT:   IMPRESSION  1. There is a minimal right pleural effusion. 2. There are small pulmonary nodules present. There is a nodule in the plane of  the right major fissure and posteriorly in left lower lobe. These were not  present on examination of 1/18/2017. These could be on the basis of metastatic  disease. Close follow-up is suggested. There are also 2 small subpleural nodules  anteriorly in the right upper lobe as described above which can also be  evaluated on follow-up. 9/28/21 EUS:  Endoscopic: Esophagus:normal; Stomach: normal; Duodenum/jejunum: normal and protruding biliary stent  Ultrasound: Esophagus: normal findings;     Stomach: normal findings:     Pancreas: Areas examined: the entire gland                          Parenchyma: -Evidence of a hypoechoic mass with poorly defined borders seen in the head of the pancreas. It appeared involving the CBD where a stent is seen, however it did not involve the major vessels.  It measured about 3.2 cm in widest diameter on one view. Pancreatic Duct: normal findings, not dilated               Liver: Parenchyma: normal                          Gallbladder: normal                          Bile Duct: the common bile duct is dilated in the proximal and mid portion and a plastic stent could be seen                          Lymph Node: no adenopathy  Impression:   Pancreas head mass with fine needle biopsy showing adenocarcinoma on preliminary cytology  Recommendations: Follow-up on pathology  Follow-up with oncology, will need further evaluation of pulmonary nodule  Surgical oncology consultation  Resume GI lite diet today and can discharge in am from GI standpoint  . Assessment/Recommendations:   77 y.o. female with Ulcerative colitis, Hyopthyroidism, admitted with obstructive jaundice concerning for underlying malignant obstructing mass. 1. Pancreatic adenocarcinoma:  Clinically stage Ib [T2N0], but need further evaluation in future regarding pulmonary nodules and liver lesion. Presented with obstructive jaundice and transaminitis, CA 19-9 was 198 in 9/2021 at diagnosis. Underwent biliary stent placement with improvement in biliary obstruction. 3.2cm mass seen in pancreatic head on EUS, not involving vasculature, possibly resectable. Dr. Timmy Brown in [place] has evaluated patient and recommends consideration of neoadjuvant chemotherapy with close attention to lung nodules and liver lesion. I agree with this recommendation for earlier treatment of micrometastatic disease, ability to determine whether pt has chemosensitive disease. We discussed the risks and benefits of FOLFIRINOX chemotherapy, including potential side effects.  These include but are not limited to fatigue, nausea vomiting, diarrhea, neuropathy, taste changes, cold intolerance, esophageal spasm, allergic reactions, alopecia, mucositis, myelosuppression, risk for infection, infertility, and rarely, death. Rarely, a patient may have a condition where they do not metabolize fluorouracil appropriately (called DPD deficiency), and they may have excessive toxicity. A Port-A-Cath will be required in order to deliver the continuous infusion. BRCA 1/2 negative. The patient has consented to beginning therapy. Planning for 6 cycles of mFOLFIRINOX in neoadjuvant setting, followed by surgery and adjuvant therapy x 6 cycles. Supportive care meds include: Emla cream, Zofran, Compazine, Dexamethasone  -- Proceed with C3 of neoadjuvant mFOLFIRINOX with neulasta support. -- Checking CA 19-9 on date of C1, C6, C7, C12.   -- Chest CT with contrast and abd/pelvis MRI after C4 (per Dr. Leti Samayoa). -- Needs to follow up with Dr. Leti Samayoa immediately before C6.  -- Return in 2 weeks for C4, MD/Np visit. 2. Diarrhea / Constipation / hypokalemia:  Has history of UC. Likely related to biliary obstruction and pt has been started on Cholestyramine (Questran). For constipation - discussed management with stool softeners, miralax and fiber supplements. On potassium 20meq daily. -- Extra dose KCL 40 meq in opic today. -- For nausea - discussed increasing frequency of zofran and compazine if needed. 3. Pulmonary nodules:   Tiny of unclear etiology. Will repeat imaging after C4 of chemo. 4. HTN:   Currently with stable BP and home HCTZ on hold per primary team.     5. H/o Ulcerative procto-sigmoiditis:  Past due for colonoscopy with one adenoma polyp removed at last colonoscopy in April 2017. Was due for repeat in 2019 but she has not followed up with the GI practice since her last colonoscopy. -- Outpatient colonoscopy overdue    6. Morbid obesity:  Discussed healthy food options and ways to incorporate more protein into diet. 7. Chemotherapy induced neutropenia / Leukocytosis:    Added neulasta with pump removal. Leukocytosis due to neulasta.      I have personally seen and evaluated the patient in conjunction with Fransico Mcdowell MUSA Paez. I find the patient's history and physical exam are consistent with the NP's documentation. I agree with the above assessment and plan, which I have modified as needed. Proceed with C3 neoadjuvant FOLFOX which pt is tolerating moderately well, albeit with nausea. Advised on nausea and constipation, diet.      Signed By: Sejal Rucker MD

## 2021-11-22 ENCOUNTER — HOSPITAL ENCOUNTER (OUTPATIENT)
Dept: INFUSION THERAPY | Age: 66
Discharge: HOME | End: 2021-11-22
Payer: MEDICARE

## 2021-11-22 ENCOUNTER — OFFICE VISIT (OUTPATIENT)
Dept: ONCOLOGY | Age: 66
End: 2021-11-22
Payer: MEDICARE

## 2021-11-22 VITALS
DIASTOLIC BLOOD PRESSURE: 78 MMHG | SYSTOLIC BLOOD PRESSURE: 110 MMHG | RESPIRATION RATE: 16 BRPM | BODY MASS INDEX: 43.71 KG/M2 | HEART RATE: 86 BPM | HEIGHT: 64 IN | TEMPERATURE: 97.8 F | WEIGHT: 256 LBS | OXYGEN SATURATION: 98 %

## 2021-11-22 VITALS
BODY MASS INDEX: 43.87 KG/M2 | DIASTOLIC BLOOD PRESSURE: 72 MMHG | TEMPERATURE: 97.8 F | HEART RATE: 72 BPM | WEIGHT: 257 LBS | HEIGHT: 64 IN | OXYGEN SATURATION: 98 % | SYSTOLIC BLOOD PRESSURE: 133 MMHG

## 2021-11-22 DIAGNOSIS — T45.1X5A CINV (CHEMOTHERAPY-INDUCED NAUSEA AND VOMITING): ICD-10-CM

## 2021-11-22 DIAGNOSIS — Z76.89 PREVENTION OF CHEMOTHERAPY-INDUCED NEUTROPENIA: Primary | ICD-10-CM

## 2021-11-22 DIAGNOSIS — R11.2 CINV (CHEMOTHERAPY-INDUCED NAUSEA AND VOMITING): ICD-10-CM

## 2021-11-22 DIAGNOSIS — C25.9 PANCREATIC ADENOCARCINOMA (HCC): Primary | ICD-10-CM

## 2021-11-22 DIAGNOSIS — D72.828 OTHER ELEVATED WHITE BLOOD CELL (WBC) COUNT: ICD-10-CM

## 2021-11-22 DIAGNOSIS — E87.6 HYPOKALEMIA: ICD-10-CM

## 2021-11-22 DIAGNOSIS — C25.0 MALIGNANT NEOPLASM OF HEAD OF PANCREAS (HCC): ICD-10-CM

## 2021-11-22 DIAGNOSIS — Z51.11 CHEMOTHERAPY MANAGEMENT, ENCOUNTER FOR: ICD-10-CM

## 2021-11-22 LAB
ALBUMIN SERPL-MCNC: 3.1 G/DL (ref 3.5–5)
ALBUMIN/GLOB SERPL: 0.7 {RATIO} (ref 1.1–2.2)
ALP SERPL-CCNC: 120 U/L (ref 45–117)
ALT SERPL-CCNC: 38 U/L (ref 12–78)
ANION GAP SERPL CALC-SCNC: 4 MMOL/L (ref 5–15)
AST SERPL-CCNC: 34 U/L (ref 15–37)
BASOPHILS # BLD: 0 K/UL (ref 0–0.1)
BASOPHILS NFR BLD: 0 % (ref 0–1)
BILIRUB SERPL-MCNC: 0.5 MG/DL (ref 0.2–1)
BUN SERPL-MCNC: 11 MG/DL (ref 6–20)
BUN/CREAT SERPL: 11 (ref 12–20)
CALCIUM SERPL-MCNC: 9.1 MG/DL (ref 8.5–10.1)
CHLORIDE SERPL-SCNC: 106 MMOL/L (ref 97–108)
CO2 SERPL-SCNC: 28 MMOL/L (ref 21–32)
CREAT SERPL-MCNC: 0.96 MG/DL (ref 0.55–1.02)
DIFFERENTIAL METHOD BLD: ABNORMAL
EOSINOPHIL # BLD: 0.5 K/UL (ref 0–0.4)
EOSINOPHIL NFR BLD: 3 % (ref 0–7)
ERYTHROCYTE [DISTWIDTH] IN BLOOD BY AUTOMATED COUNT: 14.7 % (ref 11.5–14.5)
GLOBULIN SER CALC-MCNC: 4.4 G/DL (ref 2–4)
GLUCOSE SERPL-MCNC: 95 MG/DL (ref 65–100)
HCT VFR BLD AUTO: 35.7 % (ref 35–47)
HGB BLD-MCNC: 12.1 G/DL (ref 11.5–16)
IMM GRANULOCYTES # BLD AUTO: 0 K/UL
IMM GRANULOCYTES NFR BLD AUTO: 0 %
LYMPHOCYTES # BLD: 4.2 K/UL (ref 0.8–3.5)
LYMPHOCYTES NFR BLD: 25 % (ref 12–49)
MCH RBC QN AUTO: 32.1 PG (ref 26–34)
MCHC RBC AUTO-ENTMCNC: 33.9 G/DL (ref 30–36.5)
MCV RBC AUTO: 94.7 FL (ref 80–99)
METAMYELOCYTES NFR BLD MANUAL: 2 %
MONOCYTES # BLD: 1.8 K/UL (ref 0–1)
MONOCYTES NFR BLD: 11 % (ref 5–13)
NEUTS BAND NFR BLD MANUAL: 11 % (ref 0–6)
NEUTS SEG # BLD: 9.9 K/UL (ref 1.8–8)
NEUTS SEG NFR BLD: 48 % (ref 32–75)
NRBC # BLD: 0.05 K/UL (ref 0–0.01)
NRBC BLD-RTO: 0.3 PER 100 WBC
PLATELET # BLD AUTO: 176 K/UL (ref 150–400)
PLATELET COMMENTS,PCOM: ABNORMAL
PMV BLD AUTO: 11.2 FL (ref 8.9–12.9)
POTASSIUM SERPL-SCNC: 3.2 MMOL/L (ref 3.5–5.1)
PROT SERPL-MCNC: 7.5 G/DL (ref 6.4–8.2)
RBC # BLD AUTO: 3.77 M/UL (ref 3.8–5.2)
RBC MORPH BLD: ABNORMAL
SODIUM SERPL-SCNC: 138 MMOL/L (ref 136–145)
WBC # BLD AUTO: 16.7 K/UL (ref 3.6–11)
WBC MORPH BLD: ABNORMAL

## 2021-11-22 PROCEDURE — 99215 OFFICE O/P EST HI 40 MIN: CPT | Performed by: INTERNAL MEDICINE

## 2021-11-22 PROCEDURE — 74011000258 HC RX REV CODE- 258: Performed by: INTERNAL MEDICINE

## 2021-11-22 PROCEDURE — 96417 CHEMO IV INFUS EACH ADDL SEQ: CPT

## 2021-11-22 PROCEDURE — G8432 DEP SCR NOT DOC, RNG: HCPCS | Performed by: INTERNAL MEDICINE

## 2021-11-22 PROCEDURE — 74011250637 HC RX REV CODE- 250/637: Performed by: NURSE PRACTITIONER

## 2021-11-22 PROCEDURE — 96413 CHEMO IV INFUSION 1 HR: CPT

## 2021-11-22 PROCEDURE — 74011250636 HC RX REV CODE- 250/636: Performed by: INTERNAL MEDICINE

## 2021-11-22 PROCEDURE — 3017F COLORECTAL CA SCREEN DOC REV: CPT | Performed by: INTERNAL MEDICINE

## 2021-11-22 PROCEDURE — 85025 COMPLETE CBC W/AUTO DIFF WBC: CPT

## 2021-11-22 PROCEDURE — 1090F PRES/ABSN URINE INCON ASSESS: CPT | Performed by: INTERNAL MEDICINE

## 2021-11-22 PROCEDURE — 96416 CHEMO PROLONG INFUSE W/PUMP: CPT

## 2021-11-22 PROCEDURE — 36415 COLL VENOUS BLD VENIPUNCTURE: CPT

## 2021-11-22 PROCEDURE — 96415 CHEMO IV INFUSION ADDL HR: CPT

## 2021-11-22 PROCEDURE — G8417 CALC BMI ABV UP PARAM F/U: HCPCS | Performed by: INTERNAL MEDICINE

## 2021-11-22 PROCEDURE — G8400 PT W/DXA NO RESULTS DOC: HCPCS | Performed by: INTERNAL MEDICINE

## 2021-11-22 PROCEDURE — 77030016057 HC NDL HUBR APOL -B

## 2021-11-22 PROCEDURE — G8536 NO DOC ELDER MAL SCRN: HCPCS | Performed by: INTERNAL MEDICINE

## 2021-11-22 PROCEDURE — G8427 DOCREV CUR MEDS BY ELIG CLIN: HCPCS | Performed by: INTERNAL MEDICINE

## 2021-11-22 PROCEDURE — G0498 CHEMO EXTEND IV INFUS W/PUMP: HCPCS

## 2021-11-22 PROCEDURE — 1101F PT FALLS ASSESS-DOCD LE1/YR: CPT | Performed by: INTERNAL MEDICINE

## 2021-11-22 PROCEDURE — 96375 TX/PRO/DX INJ NEW DRUG ADDON: CPT

## 2021-11-22 PROCEDURE — G8754 DIAS BP LESS 90: HCPCS | Performed by: INTERNAL MEDICINE

## 2021-11-22 PROCEDURE — G8752 SYS BP LESS 140: HCPCS | Performed by: INTERNAL MEDICINE

## 2021-11-22 PROCEDURE — 80053 COMPREHEN METABOLIC PANEL: CPT

## 2021-11-22 RX ORDER — POTASSIUM CHLORIDE 750 MG/1
40 TABLET, FILM COATED, EXTENDED RELEASE ORAL
Status: COMPLETED | OUTPATIENT
Start: 2021-11-22 | End: 2021-11-22

## 2021-11-22 RX ORDER — SODIUM CHLORIDE 9 MG/ML
10 INJECTION INTRAMUSCULAR; INTRAVENOUS; SUBCUTANEOUS AS NEEDED
Status: ACTIVE | OUTPATIENT
Start: 2021-11-22 | End: 2021-11-22

## 2021-11-22 RX ORDER — HEPARIN 100 UNIT/ML
300-500 SYRINGE INTRAVENOUS AS NEEDED
Status: ACTIVE | OUTPATIENT
Start: 2021-11-22 | End: 2021-11-22

## 2021-11-22 RX ORDER — DEXTROSE MONOHYDRATE 50 MG/ML
25 INJECTION, SOLUTION INTRAVENOUS CONTINUOUS
Status: DISPENSED | OUTPATIENT
Start: 2021-11-22 | End: 2021-11-22

## 2021-11-22 RX ORDER — SODIUM CHLORIDE 0.9 % (FLUSH) 0.9 %
10 SYRINGE (ML) INJECTION AS NEEDED
Status: DISPENSED | OUTPATIENT
Start: 2021-11-22 | End: 2021-11-22

## 2021-11-22 RX ORDER — PALONOSETRON 0.05 MG/ML
0.25 INJECTION, SOLUTION INTRAVENOUS ONCE
Status: COMPLETED | OUTPATIENT
Start: 2021-11-22 | End: 2021-11-22

## 2021-11-22 RX ORDER — ATROPINE SULFATE 0.4 MG/ML
0.4 INJECTION, SOLUTION ENDOTRACHEAL; INTRAMEDULLARY; INTRAMUSCULAR; INTRAVENOUS; SUBCUTANEOUS
Status: DISPENSED | OUTPATIENT
Start: 2021-11-22 | End: 2021-11-22

## 2021-11-22 RX ADMIN — LEUCOVORIN CALCIUM 936 MG: 350 INJECTION, POWDER, LYOPHILIZED, FOR SUSPENSION INTRAMUSCULAR; INTRAVENOUS at 13:15

## 2021-11-22 RX ADMIN — PALONOSETRON 0.25 MG: 0.25 INJECTION, SOLUTION INTRAVENOUS at 09:22

## 2021-11-22 RX ADMIN — FLUOROURACIL 5616 MG: 50 INJECTION, SOLUTION INTRAVENOUS at 15:14

## 2021-11-22 RX ADMIN — Medication 10 ML: at 07:41

## 2021-11-22 RX ADMIN — OXALIPLATIN 199 MG: 5 INJECTION, SOLUTION INTRAVENOUS at 10:38

## 2021-11-22 RX ADMIN — DEXAMETHASONE SODIUM PHOSPHATE 12 MG: 10 INJECTION, SOLUTION INTRAMUSCULAR; INTRAVENOUS at 09:25

## 2021-11-22 RX ADMIN — IRINOTECAN HYDROCHLORIDE 351 MG: 20 INJECTION, SOLUTION INTRAVENOUS at 13:15

## 2021-11-22 RX ADMIN — ATROPINE SULFATE 0.4 MG: 0.4 INJECTION, SOLUTION INTRAMUSCULAR; INTRAVENOUS; SUBCUTANEOUS at 13:50

## 2021-11-22 RX ADMIN — POTASSIUM CHLORIDE 40 MEQ: 750 TABLET, FILM COATED, EXTENDED RELEASE ORAL at 09:19

## 2021-11-22 RX ADMIN — SODIUM CHLORIDE 10 ML: 9 INJECTION INTRAMUSCULAR; INTRAVENOUS; SUBCUTANEOUS at 07:41

## 2021-11-22 RX ADMIN — DEXTROSE MONOHYDRATE 25 ML/HR: 50 INJECTION, SOLUTION INTRAVENOUS at 09:19

## 2021-11-22 NOTE — PROGRESS NOTES
Carrol Lau is a 77 y.o. female here for follow up of pancreatic cancer. Patient with no complaints of pain at this time.

## 2021-11-22 NOTE — PROGRESS NOTES
[facility] Progress Note    Date: 2021    Name: Ivan Bassett    MRN: [id number]         : 1955    Ms. Roman Echevarria Arrived ambulatory and in no distress for C3D1 of Folfirinox Regimen. Assessment was completed by Adriel Hay RN, no acute issues at this time, no new complaints voiced. Right chest wall port accessed without difficulty, labs drawn & sent for processing. Chemotherapy Flowsheet 2021   Cycle C3D1   Date 2021   Drug / Regimen Folfirinox   Pre Meds given   Notes given + Oral Potassium     Patient proceed to appointment with Dr. Narendra Kauffman. Ms. Amiel Primrose vitals were reviewed. Visit Vitals  /72   Pulse 72   Temp 97.8 °F (36.6 °C)   Resp 16   Ht 5' 4\" (1.626 m)   Wt 116.1 kg (256 lb)   SpO2 98%   Breastfeeding No   BMI 43.94 kg/m²       Lab results were obtained and reviewed. Recent Results (from the past 12 hour(s))   CBC WITH AUTOMATED DIFF    Collection Time: 21  7:42 AM   Result Value Ref Range    WBC 16.7 (H) 3.6 - 11.0 K/uL    RBC 3.77 (L) 3.80 - 5.20 M/uL    HGB 12.1 11.5 - 16.0 g/dL    HCT 35.7 35.0 - 47.0 %    MCV 94.7 80.0 - 99.0 FL    MCH 32.1 26.0 - 34.0 PG    MCHC 33.9 30.0 - 36.5 g/dL    RDW 14.7 (H) 11.5 - 14.5 %    PLATELET 139 779 - 415 K/uL    MPV 11.2 8.9 - 12.9 FL    NRBC 0.3 (H) 0  WBC    ABSOLUTE NRBC 0.05 (H) 0.00 - 0.01 K/uL    NEUTROPHILS 48 32 - 75 %    BAND NEUTROPHILS 11 (H) 0 - 6 %    LYMPHOCYTES 25 12 - 49 %    MONOCYTES 11 5 - 13 %    EOSINOPHILS 3 0 - 7 %    BASOPHILS 0 0 - 1 %    METAMYELOCYTES 2 (H) 0 %    IMMATURE GRANULOCYTES 0 %    ABS. NEUTROPHILS 9.9 (H) 1.8 - 8.0 K/UL    ABS. LYMPHOCYTES 4.2 (H) 0.8 - 3.5 K/UL    ABS. MONOCYTES 1.8 (H) 0.0 - 1.0 K/UL    ABS. EOSINOPHILS 0.5 (H) 0.0 - 0.4 K/UL    ABS. BASOPHILS 0.0 0.0 - 0.1 K/UL    ABS. IMM.  GRANS. 0.0 K/UL    DF MANUAL      PLATELET COMMENTS Large Platelets      RBC COMMENTS NORMOCYTIC, NORMOCHROMIC      WBC COMMENTS REACTIVE LYMPHS     METABOLIC PANEL, COMPREHENSIVE    Collection Time: 11/22/21  7:42 AM   Result Value Ref Range    Sodium 138 136 - 145 mmol/L    Potassium 3.2 (L) 3.5 - 5.1 mmol/L    Chloride 106 97 - 108 mmol/L    CO2 28 21 - 32 mmol/L    Anion gap 4 (L) 5 - 15 mmol/L    Glucose 95 65 - 100 mg/dL    BUN 11 6 - 20 MG/DL    Creatinine 0.96 0.55 - 1.02 MG/DL    BUN/Creatinine ratio 11 (L) 12 - 20      GFR est AA >60 >60 ml/min/1.73m2    GFR est non-AA 58 (L) >60 ml/min/1.73m2    Calcium 9.1 8.5 - 10.1 MG/DL    Bilirubin, total 0.5 0.2 - 1.0 MG/DL    ALT (SGPT) 38 12 - 78 U/L    AST (SGOT) 34 15 - 37 U/L    Alk.  phosphatase 120 (H) 45 - 117 U/L    Protein, total 7.5 6.4 - 8.2 g/dL    Albumin 3.1 (L) 3.5 - 5.0 g/dL    Globulin 4.4 (H) 2.0 - 4.0 g/dL    A-G Ratio 0.7 (L) 1.1 - 2.2         Medications:  Medications Administered     0.9% sodium chloride injection 10 mL     Admin Date  11/22/2021 Action  Given Dose  10 mL Route  IntraVENous Administered By  Vincent Rodriguez RN          atropine 0.4 mg/mL injection 0.4 mg     Admin Date  11/22/2021 Action  Given Dose  0.4 mg Route  SubCUTAneous Administered By  Fany Sanchez RN          dexamethasone (DECADRON) 12 mg in 0.9% sodium chloride 50 mL IVPB     Admin Date  11/22/2021 Action  New Bag Dose  12 mg Route  IntraVENous Administered By  Fany Sanchez RN          dextrose 5% infusion     Admin Date  11/22/2021 Action  New Bag Dose  25 mL/hr Rate  25 mL/hr Route  IntraVENous Administered By  Fany Sanchez RN          fluorouraciL (ADRUCIL) 5,616 mg in 0.9% sodium chloride 250 mL CADD Cassette     Admin Date  11/22/2021 Action  New Bag Dose  5,616 mg Rate  5.4 mL/hr Route  IntraVENous Administered By  Zak Muñiz RN          irinotecan (CAMPTOSAR) 351 mg in dextrose 5% 250 mL, overfill volume 25 mL chemo infusion     Admin Date  11/22/2021 Action  New Bag Dose  351 mg Rate  195 mL/hr Route  IntraVENous Administered By  Fany Sanchez RN          leucovorin (WELLCOVORIN) 936 mg in dextrose 5% 250 mL, overfill volume 25 mL IVPB     Admin Date  11/22/2021 Action  New Bag Dose  936 mg Rate  214.5 mL/hr Route  IntraVENous Administered By  Rod Harris RN          oxaliplatin (ELOXATIN) 199 mg in dextrose 5% 250 mL, overfill volume 25 mL chemo infusion     Admin Date  11/22/2021 Action  New Bag Dose  199 mg Rate  157.4 mL/hr Route  IntraVENous Administered By  Brain Tamayo RN          palonosetron HCl (ALOXI) injection 0.25 mg     Admin Date  11/22/2021 Action  Given Dose  0.25 mg Route  IntraVENous Administered By  Rod Harris RN          potassium chloride SR (KLOR-CON 10) tablet 40 mEq     Admin Date  11/22/2021 Action  Given Dose  40 mEq Route  Oral Administered By  Rod Harris RN          sodium chloride (NS) flush 10 mL     Admin Date  11/22/2021 Action  Given Dose  10 mL Route  IntraVENous Administered By  Bishop Jacqueline RN              Patient started having some abdominal cramping and nausea about 30 minutes into Irinotecan infusion. Atropine given. Symptoms resolved shortly after. Ms. Cortes Thomas tolerated treatment well and was discharged from [place] [address] in stable condition at 1520. Port flushed and attached to 5FU CADD cassette. Cassette infusing. She is to return on November 24 2021 at 1430 for her next appointment.     Jared Gardner RN  November 22, 2021

## 2021-11-23 DIAGNOSIS — E87.6 HYPOKALEMIA: ICD-10-CM

## 2021-11-23 RX ORDER — POTASSIUM CHLORIDE 750 MG/1
TABLET, EXTENDED RELEASE ORAL
Qty: 60 TABLET | Refills: 1 | Status: SHIPPED | OUTPATIENT
Start: 2021-11-23 | End: 2021-12-14 | Stop reason: DRUGHIGH

## 2021-11-24 ENCOUNTER — HOSPITAL ENCOUNTER (OUTPATIENT)
Dept: INFUSION THERAPY | Age: 66
Discharge: HOME | End: 2021-11-24
Payer: MEDICARE

## 2021-11-24 VITALS
HEART RATE: 42 BPM | RESPIRATION RATE: 16 BRPM | DIASTOLIC BLOOD PRESSURE: 64 MMHG | SYSTOLIC BLOOD PRESSURE: 136 MMHG | TEMPERATURE: 98.7 F | OXYGEN SATURATION: 96 %

## 2021-11-24 DIAGNOSIS — C25.0 MALIGNANT NEOPLASM OF HEAD OF PANCREAS (HCC): ICD-10-CM

## 2021-11-24 DIAGNOSIS — Z76.89 PREVENTION OF CHEMOTHERAPY-INDUCED NEUTROPENIA: Primary | ICD-10-CM

## 2021-11-24 PROCEDURE — 96377 APPLICATON ON-BODY INJECTOR: CPT

## 2021-11-24 PROCEDURE — 74011250636 HC RX REV CODE- 250/636: Performed by: INTERNAL MEDICINE

## 2021-11-24 RX ORDER — SODIUM CHLORIDE 0.9 % (FLUSH) 0.9 %
10 SYRINGE (ML) INJECTION AS NEEDED
Status: DISCONTINUED | OUTPATIENT
Start: 2021-11-24 | End: 2021-11-25 | Stop reason: HOSPADM

## 2021-11-24 RX ORDER — SODIUM CHLORIDE 9 MG/ML
10 INJECTION INTRAMUSCULAR; INTRAVENOUS; SUBCUTANEOUS AS NEEDED
Status: DISCONTINUED | OUTPATIENT
Start: 2021-11-24 | End: 2021-11-25 | Stop reason: HOSPADM

## 2021-11-24 RX ORDER — HEPARIN 100 UNIT/ML
300-500 SYRINGE INTRAVENOUS AS NEEDED
Status: DISCONTINUED | OUTPATIENT
Start: 2021-11-24 | End: 2021-11-25 | Stop reason: HOSPADM

## 2021-11-24 RX ADMIN — HEPARIN 500 UNITS: 100 SYRINGE at 14:56

## 2021-11-24 RX ADMIN — PEGFILGRASTIM 6 MG: KIT SUBCUTANEOUS at 14:53

## 2021-11-24 RX ADMIN — SODIUM CHLORIDE 10 ML: 9 INJECTION INTRAMUSCULAR; INTRAVENOUS; SUBCUTANEOUS at 14:56

## 2021-11-24 NOTE — PROGRESS NOTES
11/24/21 2:43 PM Spoke with [facility] RN - advised I would like patient to proceed with neulasta on-body today b/c I am unsure if she will be approved for injection by Friday. Will discuss at next visit if she needs to continue with neulasta vs. Changing to the injection.

## 2021-11-24 NOTE — PROGRESS NOTES
[facility]   Visit Progress Note    0134 Patient admitted to [facility] for pump DC and Neulasta OBI in stable condition. Assessment completed. Pt reports increased neuropathy. Pt reports anxiety and dislike for Neulasta OBI - was interested in returning to [facility] for injection instead. Provider notified. Pt to get OBI today and will revisit with provider next visit. VS  Patient Vitals for the past 12 hrs:   Temp Pulse Resp BP SpO2   11/24/21 1445 98.7 °F (37.1 °C) (!) 42 16 136/64 96 %       Pt reports that CADD completed at approx 1315. No visible med remains in cassette. PAC with positive blood return. Medications:  Medications Administered     0.9% sodium chloride injection 10 mL     Admin Date  11/24/2021 Action  Given Dose  10 mL Route  IntraVENous Administered By  Linda Melo RN          heparin (porcine) pf 300-500 Units     Admin Date  11/24/2021 Action  Given Dose  500 Units Route  InterCATHeter Administered By  Linda Melo RN          pegfilgrastim (NEULASTA) wearable SQ injector 6 mg     Admin Date  11/24/2021 Action  Given Dose  6 mg Route  SubCUTAneous Administered By  Linda Melo RN                  Education provided to patient about Neulasta On Body Injector including: side effects, how/when to remove the device, as well as what to do in the event of device malfunction. Opportunity for questions was provided and all questions were answered. Patient verbalized understanding. 1500 Patient tolerated treatment well. Patient discharged from [place] [address] in no distress. Patient aware of next appointment.

## 2021-11-27 ENCOUNTER — TELEPHONE (OUTPATIENT)
Dept: ONCOLOGY | Age: 66
End: 2021-11-27

## 2021-11-29 ENCOUNTER — TELEPHONE (OUTPATIENT)
Dept: ONCOLOGY | Age: 66
End: 2021-11-29

## 2021-11-29 ENCOUNTER — HOSPITAL ENCOUNTER (OUTPATIENT)
Dept: INFUSION THERAPY | Age: 66
Discharge: HOME | End: 2021-11-29
Payer: MEDICARE

## 2021-11-29 ENCOUNTER — APPOINTMENT (OUTPATIENT)
Dept: INFUSION THERAPY | Age: 66
End: 2021-11-29

## 2021-11-29 VITALS
DIASTOLIC BLOOD PRESSURE: 63 MMHG | HEART RATE: 80 BPM | OXYGEN SATURATION: 99 % | WEIGHT: 247 LBS | HEIGHT: 64 IN | BODY MASS INDEX: 42.17 KG/M2 | RESPIRATION RATE: 16 BRPM | SYSTOLIC BLOOD PRESSURE: 114 MMHG | TEMPERATURE: 97.4 F

## 2021-11-29 DIAGNOSIS — Z76.89 PREVENTION OF CHEMOTHERAPY-INDUCED NEUTROPENIA: ICD-10-CM

## 2021-11-29 DIAGNOSIS — C25.0 MALIGNANT NEOPLASM OF HEAD OF PANCREAS (HCC): ICD-10-CM

## 2021-11-29 DIAGNOSIS — K52.1 DIARRHEA DUE TO DRUG: Primary | ICD-10-CM

## 2021-11-29 DIAGNOSIS — E86.0 DEHYDRATION: Primary | ICD-10-CM

## 2021-11-29 LAB
ANION GAP SERPL CALC-SCNC: 8 MMOL/L (ref 5–15)
BUN SERPL-MCNC: 13 MG/DL (ref 6–20)
BUN/CREAT SERPL: 14 (ref 12–20)
CALCIUM SERPL-MCNC: 9 MG/DL (ref 8.5–10.1)
CHLORIDE SERPL-SCNC: 106 MMOL/L (ref 97–108)
CO2 SERPL-SCNC: 26 MMOL/L (ref 21–32)
CREAT SERPL-MCNC: 0.93 MG/DL (ref 0.55–1.02)
ERYTHROCYTE [DISTWIDTH] IN BLOOD BY AUTOMATED COUNT: 14.1 % (ref 11.5–14.5)
GLUCOSE SERPL-MCNC: 98 MG/DL (ref 65–100)
HCT VFR BLD AUTO: 37.7 % (ref 35–47)
HGB BLD-MCNC: 12.8 G/DL (ref 11.5–16)
MCH RBC QN AUTO: 32 PG (ref 26–34)
MCHC RBC AUTO-ENTMCNC: 34 G/DL (ref 30–36.5)
MCV RBC AUTO: 94.3 FL (ref 80–99)
NRBC # BLD: 0 K/UL (ref 0–0.01)
NRBC BLD-RTO: 0 PER 100 WBC
PLATELET # BLD AUTO: 74 K/UL (ref 150–400)
PMV BLD AUTO: 12.3 FL (ref 8.9–12.9)
POTASSIUM SERPL-SCNC: 3.5 MMOL/L (ref 3.5–5.1)
RBC # BLD AUTO: 4 M/UL (ref 3.8–5.2)
SODIUM SERPL-SCNC: 140 MMOL/L (ref 136–145)
WBC # BLD AUTO: 11.8 K/UL (ref 3.6–11)

## 2021-11-29 PROCEDURE — 77030016057 HC NDL HUBR APOL -B

## 2021-11-29 PROCEDURE — 36415 COLL VENOUS BLD VENIPUNCTURE: CPT

## 2021-11-29 PROCEDURE — 74011250636 HC RX REV CODE- 250/636: Performed by: NURSE PRACTITIONER

## 2021-11-29 PROCEDURE — 85027 COMPLETE CBC AUTOMATED: CPT

## 2021-11-29 PROCEDURE — 96360 HYDRATION IV INFUSION INIT: CPT

## 2021-11-29 PROCEDURE — 74011000250 HC RX REV CODE- 250: Performed by: NURSE PRACTITIONER

## 2021-11-29 PROCEDURE — 80048 BASIC METABOLIC PNL TOTAL CA: CPT

## 2021-11-29 RX ORDER — SODIUM CHLORIDE 9 MG/ML
10 INJECTION INTRAMUSCULAR; INTRAVENOUS; SUBCUTANEOUS AS NEEDED
Status: CANCELLED | OUTPATIENT
Start: 2021-11-29

## 2021-11-29 RX ORDER — SODIUM CHLORIDE 0.9 % (FLUSH) 0.9 %
10 SYRINGE (ML) INJECTION AS NEEDED
Status: DISCONTINUED | OUTPATIENT
Start: 2021-11-29 | End: 2021-11-30 | Stop reason: HOSPADM

## 2021-11-29 RX ORDER — HEPARIN 100 UNIT/ML
300-500 SYRINGE INTRAVENOUS AS NEEDED
Status: CANCELLED
Start: 2021-11-29

## 2021-11-29 RX ORDER — ONDANSETRON 2 MG/ML
8 INJECTION INTRAMUSCULAR; INTRAVENOUS AS NEEDED
Status: CANCELLED | OUTPATIENT
Start: 2021-12-06

## 2021-11-29 RX ORDER — ACETAMINOPHEN 325 MG/1
650 TABLET ORAL AS NEEDED
Status: CANCELLED
Start: 2021-12-06

## 2021-11-29 RX ORDER — DIPHENHYDRAMINE HYDROCHLORIDE 50 MG/ML
25 INJECTION, SOLUTION INTRAMUSCULAR; INTRAVENOUS AS NEEDED
Status: CANCELLED
Start: 2021-12-06

## 2021-11-29 RX ORDER — SODIUM CHLORIDE 9 MG/ML
10 INJECTION INTRAMUSCULAR; INTRAVENOUS; SUBCUTANEOUS AS NEEDED
Status: CANCELLED | OUTPATIENT
Start: 2021-12-08

## 2021-11-29 RX ORDER — SODIUM CHLORIDE 0.9 % (FLUSH) 0.9 %
10 SYRINGE (ML) INJECTION AS NEEDED
Status: CANCELLED | OUTPATIENT
Start: 2021-11-29

## 2021-11-29 RX ORDER — ALBUTEROL SULFATE 0.83 MG/ML
2.5 SOLUTION RESPIRATORY (INHALATION) AS NEEDED
Status: CANCELLED
Start: 2021-12-06

## 2021-11-29 RX ORDER — HEPARIN 100 UNIT/ML
300-500 SYRINGE INTRAVENOUS AS NEEDED
Status: DISCONTINUED | OUTPATIENT
Start: 2021-11-29 | End: 2021-11-30 | Stop reason: HOSPADM

## 2021-11-29 RX ORDER — DIPHENOXYLATE HYDROCHLORIDE AND ATROPINE SULFATE 2.5; .025 MG/1; MG/1
1 TABLET ORAL
Qty: 21 TABLET | Refills: 1 | Status: SHIPPED | OUTPATIENT
Start: 2021-11-29 | End: 2022-02-15

## 2021-11-29 RX ORDER — EPINEPHRINE 1 MG/ML
0.3 INJECTION, SOLUTION, CONCENTRATE INTRAVENOUS AS NEEDED
Status: CANCELLED | OUTPATIENT
Start: 2021-12-06

## 2021-11-29 RX ORDER — HEPARIN 100 UNIT/ML
300-500 SYRINGE INTRAVENOUS AS NEEDED
Status: CANCELLED
Start: 2021-12-08

## 2021-11-29 RX ORDER — HYDROCORTISONE SODIUM SUCCINATE 100 MG/2ML
100 INJECTION, POWDER, FOR SOLUTION INTRAMUSCULAR; INTRAVENOUS AS NEEDED
Status: CANCELLED | OUTPATIENT
Start: 2021-12-06

## 2021-11-29 RX ORDER — SODIUM CHLORIDE 9 MG/ML
10 INJECTION INTRAMUSCULAR; INTRAVENOUS; SUBCUTANEOUS AS NEEDED
Status: DISCONTINUED | OUTPATIENT
Start: 2021-11-29 | End: 2021-11-30 | Stop reason: HOSPADM

## 2021-11-29 RX ORDER — SODIUM CHLORIDE 0.9 % (FLUSH) 0.9 %
10 SYRINGE (ML) INJECTION AS NEEDED
Status: CANCELLED | OUTPATIENT
Start: 2021-12-08

## 2021-11-29 RX ORDER — DIPHENHYDRAMINE HYDROCHLORIDE 50 MG/ML
50 INJECTION, SOLUTION INTRAMUSCULAR; INTRAVENOUS AS NEEDED
Status: CANCELLED
Start: 2021-12-06

## 2021-11-29 RX ADMIN — SODIUM CHLORIDE 10 ML: 9 INJECTION, SOLUTION INTRAMUSCULAR; INTRAVENOUS; SUBCUTANEOUS at 14:23

## 2021-11-29 RX ADMIN — SODIUM CHLORIDE 10 ML: 9 INJECTION, SOLUTION INTRAMUSCULAR; INTRAVENOUS; SUBCUTANEOUS at 15:34

## 2021-11-29 RX ADMIN — SODIUM CHLORIDE 1000 ML: 9 INJECTION, SOLUTION INTRAVENOUS at 14:30

## 2021-11-29 RX ADMIN — SODIUM CHLORIDE 10 ML: 9 INJECTION, SOLUTION INTRAMUSCULAR; INTRAVENOUS; SUBCUTANEOUS at 14:22

## 2021-11-29 RX ADMIN — HEPARIN 500 UNITS: 100 SYRINGE at 15:34

## 2021-11-29 NOTE — PROGRESS NOTES
Called patient and advised of lab results per Rosas Pate NP. Patient voiced understanding. No further questions or concerns at this time.

## 2021-11-29 NOTE — ADDENDUM NOTE
Encounter addended by: Karol Howe RN on: 11/29/2021 2:43 PM   Actions taken: Charge Capture section accepted

## 2021-11-29 NOTE — PROGRESS NOTES
[facility] Progress Note    Date: 2021    Name: Myke Hoover    MRN: [id number]         : 1955    Ms. Kelley Jordan Arrived ambulatory and in no distress for Hydration Regimen. Assessment was completed, pt having diarrhea constantly and feels like she cannot eat anything without it going through her. Right chest wall port accessed without difficulty, labs drawn & sent for processing. Please see [facility]. Covid questionnaire completed. 1. Do you have any symptoms of COVID-19? SOB, coughing, fever, or generally not feeling well - no    2. Have you been exposed to COVID-19 recently? - no    3. Have you had any recent contact with family/friend that has a pending COVID test? - no      Ms. Bhatti's vitals were reviewed. Patient Vitals for the past 12 hrs:   Temp Pulse Resp BP SpO2   21 1419 97.4 °F (36.3 °C) (!) 51 17 (!) 140/73 99 %       Medications:  1 L NS    Ms. Kelley Jordan tolerated treatment well and was discharged from [place] [address] in stable condition. Port de-accessed, flushed & heparinized per protocol. She is to return on 21 for her next appointment.     Du Salcedo RN  2021

## 2021-11-29 NOTE — TELEPHONE ENCOUNTER
[address] at [place]  [phone]        11/29/21 11:15 AM Return call placed to patient. Patient with complaints of persistent diarrhea since chemotherapy treatment. Symptoms had started to lessen when speaking with on call provider on 11/27, but worsened again that night. Patient has complaints of watery stool after each time she eats or drinks. Denies blood in stool. Denies fevers as well. Patient had attempted broths and dry turkey. Patient states she has had at least 3-4 stools per day over weekend. Patient with decreased PO intake since she has to go to the bathroom anytime she eats or drinks. Patient with complaints of weakness and dizziness, shared she is only able to be up for short amount of time before needing to lie down. Patient has also attempted Imodium which decreases frequency of stools. Patient requesting to come in for IV fluids today and also inquired if this is an expected side effect from chemotherapy as she had not experienced this with prior cycles. Advised this nurse would forward above to Dr. Natalie Lacey and Ann Gates and call back with their recommendations. Patient voiced understanding. 1:26 PM Called patient and advised of NP response and recommendations. Discussed following BRAT diet until diarrhea lessens. Per [facility] charge nurse, 2 PM appointment available today for labs and IV fluids. Patient states she will attempt to arrange transportation and will call this nurse back to verify that she is able to attend appointment. 1:43 PM Patient called back and confirmed that she can attend [facility] appointment today at 2 PM. No further questions or concerns at this time.

## 2021-11-29 NOTE — TELEPHONE ENCOUNTER
Patient called and stated that she is having a really hard time with chemo she stated she can not eat or drink anything without running to the bathroom.  Please advise      CB# [phone]

## 2021-11-30 ENCOUNTER — TELEPHONE (OUTPATIENT)
Dept: ONCOLOGY | Age: 66
End: 2021-11-30

## 2021-12-01 ENCOUNTER — TELEPHONE (OUTPATIENT)
Dept: ONCOLOGY | Age: 66
End: 2021-12-01

## 2021-12-01 ENCOUNTER — APPOINTMENT (OUTPATIENT)
Dept: INFUSION THERAPY | Age: 66
End: 2021-12-01

## 2021-12-01 NOTE — TELEPHONE ENCOUNTER
[employer] at [facility]  [phone]      12/01/21 8:28 AM Received incoming call from patient. Patient tearful on phone, stating she is not feeling better. Has complaints of continued diarrhea, averaging 3-4 watery stools per day which occur after patient eats or drinks anything. Patient had been taking Imodium, but took first dose of Lomotil last night. Patient reports no stools overnight. Had another episode of watery stool this morning so patient took an additional dose of Lomotil prior to call. Patient has been trying to eat a bland diet, such as rice, and drinking water. Denies dizziness, blood in stool, and fevers. Patient states she feels weak. Patient shared that she has not experienced this symptom before in relation to treatment and her typical symptoms would have resolved by now. Advised this nurse would forward above update to NP and call back with further recommendations. Patient voiced understanding.

## 2021-12-01 NOTE — PROGRESS NOTES
[facility] at [address], [address] [facility] [address] [facility]  W: [phone]  F: [phone]      Reason for Visit:   Bisi Velasquez is a 77 y.o. female who is seen for evaluation of new bilary or pancreatic head mass    Hematology/Oncology Treatment History:     Diagnosis: Pancreatic adenocarcinoma    Stage: clinical Stage Ib [T2N0]    Pathology:   -9/26/21 Cytology - brushings from common bile duct: Reactive glandular epithelial cells and bile   -9/28/21 pancreatic head mass biopsy: Adenocarcinoma.   -10/22/21 [facility] ca-cancer panel -negative     Prior Treatment: None    Current Treatment: neoadjuvant FOLFIRNOX to 10/18/21 - current. Added neulasta with C2. History of Present Illness:   Bisi Velasquez is a pleasant 77 y.o. female who comes in for evaluation of pancreatic cancer. She presented in 9/2021 with obstructive jaundice, transaminitis. ERCP on 9/26/21 notable for distal CBD stricture; possible tumor/mass located in the CBD; underwent biliary stent placement to relieve biliary obstruction. CT A/P revealed mild/mod intrahepatic biliary ductal dilatation; prominence of CBD and hydropic gallbladder, suggestive of stricture/stenosis/mass of distal CBD. MRI of abdomen showed narrowing of CBD pancreatic head suggestive of extrinisic compression concerning for periampullary mass vs eccentric intraluminal lesion. EUS showed 3.2cm hypoechoic mass in pancreas head. Biopsy revealed adenocarcinoma. Pt met with Dr. Boris Florentino in [place] and plans for neoadjuvant chemotherapy prior to surgery. Interval history:  Patient here for follow up and retry of C4 of FOLFIRNOX. Reports frequent, watery stools that started day after the last treatment. Took imodium with minimal relief of loose stools. Once she started lomotil, was able to get relief for several hours. Now having semi-formed once daily stool for the past 3-4 days. No blood in stool.  Nausea a few days after treatment managed with antiemetics. No fevers, chills, sob or CP. PMHx: OA and Rheumatoid arthritis in lower back, Ulcerative colitis, HTN, Hypothyroidism  PSurgHx: , Cyst removed from left breast  SHx: , has 3 children (1 daughter and 2 sons). Works as  in [employer]. Nonsmoker, no EtOH.  in rehab recovering from [facility]. FHx: Mother  of pancreatic cancer age 80. Brother and son had colon cancer. Review of Systems: A complete review of systems was obtained, reviewed. Pertinent findings reviewed above. Physical Exam:     Visit Vitals  BP (!) 140/65   Pulse 87   Temp (!) 96.7 °F (35.9 °C)   Ht 5' 4\" (1.626 m)   Wt 247 lb 14.4 oz (112.4 kg)   SpO2 97%   BMI 42.55 kg/m²     ECOG PS: 1  General: obese; no distress  Eyes:  Anicteric sclerae  HENT: atraumatic, face mask in place  Neck: supple  Lymphatic: Deferred today  Respiratory: CTAB, normal respiratory effort  CV: normal rate, regular rhythm, no murmurs, no peripheral edema, port in place. GI: soft, nontender, nondistended, no masses  MS: digits without clubbing or cyanosis. Skin: no rashes, no ecchymoses, no petechia. normal temperature, turgor, and texture. Psych: alert, oriented, appropriate affect, normal judgment/insight    Results:     Lab Results   Component Value Date/Time    WBC 10.0 2021 07:55 AM    HGB 11.7 2021 07:55 AM    HCT 34.0 (L) 2021 07:55 AM    PLATELET 504  07:55 AM    MCV 92.6 2021 07:55 AM    ABS.  NEUTROPHILS PENDING 2021 07:55 AM    Hemoglobin (POC) 12.6 10/06/2013 08:45 AM    Hematocrit (POC) 37 10/06/2013 08:45 AM     Lab Results   Component Value Date/Time    Sodium 140 2021 02:27 PM    Potassium 3.5 2021 02:27 PM    Chloride 106 2021 02:27 PM    CO2 26 2021 02:27 PM    Glucose 98 2021 02:27 PM    BUN 13 2021 02:27 PM    Creatinine 0.93 2021 02:27 PM    GFR est AA >60 2021 02:27 PM    GFR est non-AA >60 11/29/2021 02:27 PM    Calcium 9.0 11/29/2021 02:27 PM    Sodium (POC) 143 10/06/2013 08:45 AM    Potassium (POC) 3.4 (L) 10/06/2013 08:45 AM    Chloride (POC) 104 10/06/2013 08:45 AM    Glucose (POC) 91 10/06/2013 08:45 AM    BUN (POC) 11 10/06/2013 08:45 AM    Creatinine (POC) 1.0 10/06/2013 08:45 AM    Calcium, ionized (POC) 1.27 10/06/2013 08:45 AM     Lab Results   Component Value Date/Time    Bilirubin, total 0.5 11/22/2021 07:42 AM    ALT (SGPT) 38 11/22/2021 07:42 AM    Alk. phosphatase 120 (H) 11/22/2021 07:42 AM    Protein, total 7.5 11/22/2021 07:42 AM    Albumin 3.1 (L) 11/22/2021 07:42 AM    Globulin 4.4 (H) 11/22/2021 07:42 AM     Lab Results   Component Value Date/Time    Iron % saturation 32 07/07/2014 02:28 PM    TIBC 304 07/07/2014 02:28 PM    Sed rate (ESR) 17 09/16/2010 12:04 PM    C-Reactive protein <0.29 01/18/2017 08:45 AM    TSH 3.46 03/04/2021 08:57 AM    ALBA, Direct Detected (A) 09/16/2010 12:04 PM    Lipase 1,214 (H) 09/29/2021 04:10 AM    Hep C virus Ab Interp. NONREACTIVE 09/24/2021 06:55 PM     Lab Results   Component Value Date/Time    CEA 3.5 09/25/2021 11:46 AM    Carbohydrate Antigen 19-9, (CA 19-9) 138 (H) 10/18/2021 08:04 AM       10/18/21 CA 19-9:  138      Imaging / Procedures:     9/24/21 US ABD   IMPRESSION  Cholelithiasis. Gallbladder sludge. Prominent CBD with mild intrahepatic ductal dilatation as well. Nonemergent MRI with MRCP to delineate etiology for CBD distention. 9/24/21 CT ABD PELV W CONT  IMPRESSION  There is mild/moderate intrahepatic biliary ductal dilatation, prominence of the  CBD and a hydropic gallbladder. No pancreatic duct dilatation. No clearly  delineated pancreatic head mass. Imaging findings suggestive of  stricture/stenosis/mass of the distal CBD, no extrinsic pancreatic head mass is  identified. Gastroenterology consult   Correlation with MRI/MRCP on a nonemergent basis.   There is severe degenerative change of the lumbar spine.    9/25/21 MRI ABD WO CONT  IMPRESSION  1. Intrahepatic and extra hepatic biliary dilatation with abrupt narrowing of  the common bile duct pancreatic head just proximal to the ampulla. Suggestion of  extrinsic compression on the duct. This raises the possibility of a  periampullary mass. Alternatively, this may be an eccentric intraluminal lesion. Evaluation is limited. Recommend GI consultation for possible ERCP and EUS. 2.  Questionable small lesion in the left hepatic lobe with mild increased T2  signal and T1 hypointensity. Recommend follow-up imaging a contrast-enhanced  study preferably MRI. 3.  Cholelithiasis. Distended gallbladder with mild gallbladder wall thickening. Correlate for acute cholecystitis    9/26/21 XR ERCP/ERCB / Dr. Pavithra Bronson  Impression: Biliary ductal dilation, with a maximum common duct diameter of 15 mm; Severe stenosis, involving distal CBD /pancreas head area concerning for neoplasia  Interventions: Endoscopic ampullary sphincterotomy and biliary stent placement; brushings from the CBD    9/27/21 CT CHEST W CONT:   IMPRESSION  1. There is a minimal right pleural effusion. 2. There are small pulmonary nodules present. There is a nodule in the plane of  the right major fissure and posteriorly in left lower lobe. These were not  present on examination of 1/18/2017. These could be on the basis of metastatic  disease. Close follow-up is suggested. There are also 2 small subpleural nodules  anteriorly in the right upper lobe as described above which can also be  evaluated on follow-up. 9/28/21 EUS:  Endoscopic: Esophagus:normal; Stomach: normal; Duodenum/jejunum: normal and protruding biliary stent  Ultrasound: Esophagus: normal findings;     Stomach: normal findings:     Pancreas: Areas examined: the entire gland                          Parenchyma: -Evidence of a hypoechoic mass with poorly defined borders seen in the head of the pancreas.  It appeared involving the CBD where a stent is seen, however it did not involve the major vessels. It measured about 3.2 cm in widest diameter on one view. Pancreatic Duct: normal findings, not dilated               Liver: Parenchyma: normal                          Gallbladder: normal                          Bile Duct: the common bile duct is dilated in the proximal and mid portion and a plastic stent could be seen                          Lymph Node: no adenopathy  Impression:   Pancreas head mass with fine needle biopsy showing adenocarcinoma on preliminary cytology  Recommendations: Follow-up on pathology  Follow-up with oncology, will need further evaluation of pulmonary nodule  Surgical oncology consultation  Resume GI lite diet today and can discharge in am from GI standpoint  . Assessment/Recommendations:   77 y.o. female with Ulcerative colitis, Hyopthyroidism, admitted with obstructive jaundice concerning for underlying malignant obstructing mass. 1. Pancreatic adenocarcinoma:  Clinically stage Ib [T2N0], but need further evaluation in future regarding pulmonary nodules and liver lesion. Presented with obstructive jaundice and transaminitis, CA 19-9 was 198 in 9/2021 at diagnosis. Underwent biliary stent placement with improvement in biliary obstruction. 3.2cm mass seen in pancreatic head on EUS, not involving vasculature, possibly resectable. Dr. Kimo Cooney in [place] has evaluated patient and recommends consideration of neoadjuvant chemotherapy with close attention to lung nodules and liver lesion. I agree with this recommendation for earlier treatment of micrometastatic disease, ability to determine whether pt has chemosensitive disease. We discussed the risks and benefits of FOLFIRINOX chemotherapy, including potential side effects.  These include but are not limited to fatigue, nausea vomiting, diarrhea, neuropathy, taste changes, cold intolerance, esophageal spasm, allergic reactions, alopecia, mucositis, myelosuppression, risk for infection, infertility, and rarely, death. Rarely, a patient may have a condition where they do not metabolize fluorouracil appropriately (called DPD deficiency), and they may have excessive toxicity. A Port-A-Cath will be required in order to deliver the continuous infusion. BRCA 1/2 negative. The patient has consented to beginning therapy. Planning for 6 cycles of mFOLFIRINOX in neoadjuvant setting, followed by surgery and adjuvant therapy x 6 cycles. Supportive care meds include: Emla cream, Zofran, Compazine, Dexamethasone  -- Proceed with C4 of neoadjuvant mFOLFIRINOX with neulasta support. -- Checking CA 19-9 on date of C1, C6, C7, C12.   -- Chest CT with contrast and abd/pelvis MRI after C4 (per Dr. Timmy Brown). -- Needs to follow up with Dr. Timmy Brown immediately before C6.  -- Return in 2 weeks for C5, MD/Np visit. 2. Diarrhea / Constipation / hypokalemia:  Has history of UC. Likely related to biliary obstruction and pt has been started on Cholestyramine (Questran). For constipation - discussed management with stool softeners, miralax and fiber supplements. On potassium 20meq daily. Had worsening diarrhea after C3 of treatment. -- For nausea - discussed increasing frequency of zofran and compazine if needed. -- Advised alternating lomotil and imodium prn diarrhea  -- Referral to Dr. Arin Olivas for management of UC    3. Pulmonary nodules:   Tiny of unclear etiology. Will repeat imaging after C4 of chemo. 4. HTN:   Currently with stable BP and home HCTZ on hold per primary team.     5. H/o Ulcerative procto-sigmoiditis:  Past due for colonoscopy with one adenoma polyp removed at last colonoscopy in April 2017. Was due for repeat in 2019 but she has not followed up with the GI practice since her last colonoscopy. -- Outpatient colonoscopy overdue - referral to Dr. Arin Olivas placed     6.  Morbid obesity:  Discussed healthy food options and ways to incorporate more protein into diet. 7. Chemotherapy induced neutropenia / Leukocytosis:    Added neulasta with pump removal. Leukocytosis due to neulasta. I have personally seen and evaluated the patient in conjunction with Kirby Bravo NP. I find the patient's history and physical exam are consistent with the NP's documentation. I agree with the above assessment and plan, which I have modified as needed. Proceed with C4 of FOLFIRINOX today. Adding Atropine, advised on Imodium and Lomotil,  and additional IVF on pump disconnect day. Repeat imaging next week to evaluate response to treatment.     Signed By: Cari Lam MD

## 2021-12-01 NOTE — TELEPHONE ENCOUNTER
12/01/21 1:19 PM Called patient to review symptoms - reports only one small loose stool since this morning. Discussed follow up with GI provider - patient is in agreement.

## 2021-12-01 NOTE — TELEPHONE ENCOUNTER
12/01/21 9:38 AM Called patient to follow up on her symptoms. Reports since taking lomotil, no watery stools overnight and none today. She was able to eat turkey w/o a loose stool and is hydrating well. Reports weakness improved since getting IV fluids. No nausea or abdominal pain. Denies blood in stool. She questions if this is a colitis flare up due to her UC. Advised patient she should take lomotil up to three times daily, continue hydrating and following bland diet. Will discuss with Dr. Narendra Kauffman if she needs steroid. Also, discussed stool sample needs to be collected to r/o cdiff if she has recurrence of watery stools. Will call patient back later today to check on her. Patient verbalized understanding.

## 2021-12-02 DIAGNOSIS — K51.818 OTHER ULCERATIVE COLITIS WITH OTHER COMPLICATION (HCC): Primary | ICD-10-CM

## 2021-12-03 ENCOUNTER — TELEPHONE (OUTPATIENT)
Dept: ONCOLOGY | Age: 66
End: 2021-12-03

## 2021-12-03 NOTE — TELEPHONE ENCOUNTER
[address] at [facility]  [phone]        12/03/21 10:20 AM Return call placed to patient. Patient reports that she is feeling much better and more like herself. Has not had any recent watery stools. Patient states she took one Imodium Wednesday, 12/01, as precaution. Patient had one loose bowel movement yesterday after eating broth and crackers. Patient tolerated eating eggs with mushrooms and toast as well. Notes increased gas, but denies abdominal pain and nausea. Patient also states she is able to drink more water. Patient inquired if stool sample was still needed. Advised, per previous telephone encounter, that Dr. Nathaniel Malone recommended that stool sample be collected if diarrhea returned. Discussed that test likely not needed at this time since patient's symptoms are improving; however, will defer to Dr. Nathaniel Malone and Fransico Shelton to confirm. Advised clinical team will either call patient back if stool sample needed or discuss further with patient while here on Monday, 12/06, for appointment. Patient voiced understanding. No additional questions or concerns at this time.

## 2021-12-03 NOTE — TELEPHONE ENCOUNTER
Patient called and stated that she was informed to call team to give an update on her diarrhea and she stated that team had mention maybe a stool sample.  Please advise       CB# [phone]

## 2021-12-06 ENCOUNTER — HOSPITAL ENCOUNTER (OUTPATIENT)
Dept: INFUSION THERAPY | Age: 66
Discharge: HOME | End: 2021-12-06
Payer: MEDICARE

## 2021-12-06 ENCOUNTER — OFFICE VISIT (OUTPATIENT)
Dept: ONCOLOGY | Age: 66
End: 2021-12-06
Payer: MEDICARE

## 2021-12-06 VITALS
TEMPERATURE: 96.3 F | BODY MASS INDEX: 42.32 KG/M2 | RESPIRATION RATE: 18 BRPM | DIASTOLIC BLOOD PRESSURE: 66 MMHG | SYSTOLIC BLOOD PRESSURE: 119 MMHG | HEART RATE: 81 BPM | HEIGHT: 64 IN | WEIGHT: 247.9 LBS

## 2021-12-06 VITALS
DIASTOLIC BLOOD PRESSURE: 65 MMHG | HEIGHT: 64 IN | HEART RATE: 87 BPM | BODY MASS INDEX: 42.32 KG/M2 | TEMPERATURE: 96.7 F | SYSTOLIC BLOOD PRESSURE: 140 MMHG | OXYGEN SATURATION: 97 % | WEIGHT: 247.9 LBS

## 2021-12-06 DIAGNOSIS — K51.80 OTHER ULCERATIVE COLITIS WITHOUT COMPLICATION (HCC): ICD-10-CM

## 2021-12-06 DIAGNOSIS — R91.8 PULMONARY NODULES: ICD-10-CM

## 2021-12-06 DIAGNOSIS — K52.1 DIARRHEA DUE TO DRUG: ICD-10-CM

## 2021-12-06 DIAGNOSIS — Z76.89 PREVENTION OF CHEMOTHERAPY-INDUCED NEUTROPENIA: Primary | ICD-10-CM

## 2021-12-06 DIAGNOSIS — C25.0 MALIGNANT NEOPLASM OF HEAD OF PANCREAS (HCC): ICD-10-CM

## 2021-12-06 DIAGNOSIS — C25.9 PANCREATIC ADENOCARCINOMA (HCC): Primary | ICD-10-CM

## 2021-12-06 DIAGNOSIS — Z51.11 CHEMOTHERAPY MANAGEMENT, ENCOUNTER FOR: ICD-10-CM

## 2021-12-06 LAB
ALBUMIN SERPL-MCNC: 3 G/DL (ref 3.5–5)
ALBUMIN/GLOB SERPL: 0.8 {RATIO} (ref 1.1–2.2)
ALP SERPL-CCNC: 115 U/L (ref 45–117)
ALT SERPL-CCNC: 55 U/L (ref 12–78)
ANION GAP SERPL CALC-SCNC: 7 MMOL/L (ref 5–15)
AST SERPL-CCNC: 46 U/L (ref 15–37)
BASOPHILS # BLD: 0 K/UL (ref 0–0.1)
BASOPHILS NFR BLD: 0 % (ref 0–1)
BILIRUB SERPL-MCNC: 0.5 MG/DL (ref 0.2–1)
BUN SERPL-MCNC: 8 MG/DL (ref 6–20)
BUN/CREAT SERPL: 8 (ref 12–20)
CALCIUM SERPL-MCNC: 8.6 MG/DL (ref 8.5–10.1)
CHLORIDE SERPL-SCNC: 107 MMOL/L (ref 97–108)
CO2 SERPL-SCNC: 27 MMOL/L (ref 21–32)
CREAT SERPL-MCNC: 1 MG/DL (ref 0.55–1.02)
DIFFERENTIAL METHOD BLD: ABNORMAL
EOSINOPHIL # BLD: 0.3 K/UL (ref 0–0.4)
EOSINOPHIL NFR BLD: 3 % (ref 0–7)
ERYTHROCYTE [DISTWIDTH] IN BLOOD BY AUTOMATED COUNT: 15.3 % (ref 11.5–14.5)
GLOBULIN SER CALC-MCNC: 3.8 G/DL (ref 2–4)
GLUCOSE SERPL-MCNC: 93 MG/DL (ref 65–100)
HCT VFR BLD AUTO: 34 % (ref 35–47)
HGB BLD-MCNC: 11.7 G/DL (ref 11.5–16)
IMM GRANULOCYTES # BLD AUTO: 0 K/UL
IMM GRANULOCYTES NFR BLD AUTO: 0 %
LYMPHOCYTES # BLD: 4.4 K/UL (ref 0.8–3.5)
LYMPHOCYTES NFR BLD: 44 % (ref 12–49)
MCH RBC QN AUTO: 31.9 PG (ref 26–34)
MCHC RBC AUTO-ENTMCNC: 34.4 G/DL (ref 30–36.5)
MCV RBC AUTO: 92.6 FL (ref 80–99)
METAMYELOCYTES NFR BLD MANUAL: 1 %
MONOCYTES # BLD: 1.2 K/UL (ref 0–1)
MONOCYTES NFR BLD: 12 % (ref 5–13)
MYELOCYTES NFR BLD MANUAL: 2 %
NEUTS BAND NFR BLD MANUAL: 2 % (ref 0–6)
NEUTS SEG # BLD: 3.8 K/UL (ref 1.8–8)
NEUTS SEG NFR BLD: 36 % (ref 32–75)
NRBC # BLD: 0.02 K/UL (ref 0–0.01)
NRBC BLD-RTO: 0.2 PER 100 WBC
PLATELET # BLD AUTO: 153 K/UL (ref 150–400)
PMV BLD AUTO: 11.3 FL (ref 8.9–12.9)
POTASSIUM SERPL-SCNC: 2.8 MMOL/L (ref 3.5–5.1)
PROT SERPL-MCNC: 6.8 G/DL (ref 6.4–8.2)
RBC # BLD AUTO: 3.67 M/UL (ref 3.8–5.2)
RBC MORPH BLD: ABNORMAL
SODIUM SERPL-SCNC: 141 MMOL/L (ref 136–145)
WBC # BLD AUTO: 10 K/UL (ref 3.6–11)
WBC MORPH BLD: ABNORMAL

## 2021-12-06 PROCEDURE — 74011000250 HC RX REV CODE- 250: Performed by: INTERNAL MEDICINE

## 2021-12-06 PROCEDURE — 74011000258 HC RX REV CODE- 258: Performed by: INTERNAL MEDICINE

## 2021-12-06 PROCEDURE — 77030012965 HC NDL HUBR BBMI -A

## 2021-12-06 PROCEDURE — 74011250637 HC RX REV CODE- 250/637: Performed by: NURSE PRACTITIONER

## 2021-12-06 PROCEDURE — G8417 CALC BMI ABV UP PARAM F/U: HCPCS | Performed by: INTERNAL MEDICINE

## 2021-12-06 PROCEDURE — 96361 HYDRATE IV INFUSION ADD-ON: CPT

## 2021-12-06 PROCEDURE — 74011250636 HC RX REV CODE- 250/636: Performed by: INTERNAL MEDICINE

## 2021-12-06 PROCEDURE — 96415 CHEMO IV INFUSION ADDL HR: CPT

## 2021-12-06 PROCEDURE — 1101F PT FALLS ASSESS-DOCD LE1/YR: CPT | Performed by: INTERNAL MEDICINE

## 2021-12-06 PROCEDURE — 85025 COMPLETE CBC W/AUTO DIFF WBC: CPT

## 2021-12-06 PROCEDURE — G8754 DIAS BP LESS 90: HCPCS | Performed by: INTERNAL MEDICINE

## 2021-12-06 PROCEDURE — G8536 NO DOC ELDER MAL SCRN: HCPCS | Performed by: INTERNAL MEDICINE

## 2021-12-06 PROCEDURE — 80053 COMPREHEN METABOLIC PANEL: CPT

## 2021-12-06 PROCEDURE — 36415 COLL VENOUS BLD VENIPUNCTURE: CPT

## 2021-12-06 PROCEDURE — 96375 TX/PRO/DX INJ NEW DRUG ADDON: CPT

## 2021-12-06 PROCEDURE — G8432 DEP SCR NOT DOC, RNG: HCPCS | Performed by: INTERNAL MEDICINE

## 2021-12-06 PROCEDURE — 1090F PRES/ABSN URINE INCON ASSESS: CPT | Performed by: INTERNAL MEDICINE

## 2021-12-06 PROCEDURE — 99215 OFFICE O/P EST HI 40 MIN: CPT | Performed by: INTERNAL MEDICINE

## 2021-12-06 PROCEDURE — G8427 DOCREV CUR MEDS BY ELIG CLIN: HCPCS | Performed by: INTERNAL MEDICINE

## 2021-12-06 PROCEDURE — G8400 PT W/DXA NO RESULTS DOC: HCPCS | Performed by: INTERNAL MEDICINE

## 2021-12-06 PROCEDURE — G8753 SYS BP > OR = 140: HCPCS | Performed by: INTERNAL MEDICINE

## 2021-12-06 PROCEDURE — 96413 CHEMO IV INFUSION 1 HR: CPT

## 2021-12-06 PROCEDURE — 3017F COLORECTAL CA SCREEN DOC REV: CPT | Performed by: INTERNAL MEDICINE

## 2021-12-06 PROCEDURE — 96417 CHEMO IV INFUS EACH ADDL SEQ: CPT

## 2021-12-06 PROCEDURE — 74011250636 HC RX REV CODE- 250/636: Performed by: NURSE PRACTITIONER

## 2021-12-06 PROCEDURE — 96368 THER/DIAG CONCURRENT INF: CPT

## 2021-12-06 PROCEDURE — 96416 CHEMO PROLONG INFUSE W/PUMP: CPT

## 2021-12-06 RX ORDER — SODIUM CHLORIDE 9 MG/ML
10 INJECTION INTRAMUSCULAR; INTRAVENOUS; SUBCUTANEOUS AS NEEDED
Status: ACTIVE | OUTPATIENT
Start: 2021-12-06 | End: 2021-12-06

## 2021-12-06 RX ORDER — DEXTROSE MONOHYDRATE 50 MG/ML
25 INJECTION, SOLUTION INTRAVENOUS CONTINUOUS
Status: DISPENSED | OUTPATIENT
Start: 2021-12-06 | End: 2021-12-06

## 2021-12-06 RX ORDER — SODIUM CHLORIDE 9 MG/ML
1000 INJECTION, SOLUTION INTRAVENOUS CONTINUOUS
Status: CANCELLED
Start: 2021-12-08

## 2021-12-06 RX ORDER — SODIUM CHLORIDE 0.9 % (FLUSH) 0.9 %
10 SYRINGE (ML) INJECTION AS NEEDED
Status: DISPENSED | OUTPATIENT
Start: 2021-12-06 | End: 2021-12-06

## 2021-12-06 RX ORDER — SODIUM CHLORIDE 9 MG/ML
500 INJECTION, SOLUTION INTRAVENOUS CONTINUOUS
Status: DISCONTINUED | OUTPATIENT
Start: 2021-12-06 | End: 2021-12-08 | Stop reason: HOSPADM

## 2021-12-06 RX ORDER — HEPARIN 100 UNIT/ML
300-500 SYRINGE INTRAVENOUS AS NEEDED
Status: ACTIVE | OUTPATIENT
Start: 2021-12-06 | End: 2021-12-06

## 2021-12-06 RX ORDER — PALONOSETRON 0.05 MG/ML
0.25 INJECTION, SOLUTION INTRAVENOUS ONCE
Status: COMPLETED | OUTPATIENT
Start: 2021-12-06 | End: 2021-12-06

## 2021-12-06 RX ORDER — ATROPINE SULFATE 0.4 MG/ML
0.4 INJECTION, SOLUTION ENDOTRACHEAL; INTRAMEDULLARY; INTRAMUSCULAR; INTRAVENOUS; SUBCUTANEOUS ONCE
Status: DISCONTINUED | OUTPATIENT
Start: 2021-12-06 | End: 2021-12-06 | Stop reason: SDUPTHER

## 2021-12-06 RX ORDER — POTASSIUM CHLORIDE 750 MG/1
60 TABLET, FILM COATED, EXTENDED RELEASE ORAL
Status: COMPLETED | OUTPATIENT
Start: 2021-12-06 | End: 2021-12-06

## 2021-12-06 RX ORDER — ATROPINE SULFATE 0.4 MG/ML
0.4 INJECTION, SOLUTION ENDOTRACHEAL; INTRAMEDULLARY; INTRAMUSCULAR; INTRAVENOUS; SUBCUTANEOUS
Status: ACTIVE | OUTPATIENT
Start: 2021-12-06 | End: 2021-12-06

## 2021-12-06 RX ORDER — ATROPINE SULFATE 0.4 MG/ML
0.4 INJECTION, SOLUTION ENDOTRACHEAL; INTRAMEDULLARY; INTRAMUSCULAR; INTRAVENOUS; SUBCUTANEOUS
Status: DISCONTINUED | OUTPATIENT
Start: 2021-12-06 | End: 2021-12-06

## 2021-12-06 RX ADMIN — DEXAMETHASONE SODIUM PHOSPHATE 12 MG: 10 INJECTION, SOLUTION INTRAMUSCULAR; INTRAVENOUS at 09:34

## 2021-12-06 RX ADMIN — POTASSIUM CHLORIDE 60 MEQ: 750 TABLET, FILM COATED, EXTENDED RELEASE ORAL at 09:22

## 2021-12-06 RX ADMIN — SODIUM CHLORIDE 500 ML: 9 INJECTION, SOLUTION INTRAVENOUS at 09:29

## 2021-12-06 RX ADMIN — SODIUM CHLORIDE 10 ML: 9 INJECTION, SOLUTION INTRAMUSCULAR; INTRAVENOUS; SUBCUTANEOUS at 07:45

## 2021-12-06 RX ADMIN — LEUCOVORIN CALCIUM 936 MG: 350 INJECTION, POWDER, LYOPHILIZED, FOR SUSPENSION INTRAMUSCULAR; INTRAVENOUS at 13:33

## 2021-12-06 RX ADMIN — DEXTROSE MONOHYDRATE 199 MG: 5 INJECTION, SOLUTION INTRAVENOUS at 10:45

## 2021-12-06 RX ADMIN — PALONOSETRON 0.25 MG: 0.05 INJECTION, SOLUTION INTRAVENOUS at 09:34

## 2021-12-06 RX ADMIN — FLUOROURACIL 5616 MG: 50 INJECTION, SOLUTION INTRAVENOUS at 15:07

## 2021-12-06 RX ADMIN — ATROPINE SULFATE 0.4 MG: 0.4 INJECTION, SOLUTION INTRAMUSCULAR; INTRAVENOUS; SUBCUTANEOUS at 13:21

## 2021-12-06 RX ADMIN — DEXTROSE MONOHYDRATE 25 ML/HR: 50 INJECTION, SOLUTION INTRAVENOUS at 10:41

## 2021-12-06 RX ADMIN — IRINOTECAN HYDROCHLORIDE 351 MG: 20 INJECTION, SOLUTION INTRAVENOUS at 13:28

## 2021-12-06 RX ADMIN — SODIUM CHLORIDE 10 ML: 9 INJECTION INTRAMUSCULAR; INTRAVENOUS; SUBCUTANEOUS at 09:34

## 2021-12-06 NOTE — PROGRESS NOTES
Ayden Benitez is a 77 y.o. female here for follow up of pancreatic cancer. Patient with no complaints of pain at this time.

## 2021-12-06 NOTE — PROGRESS NOTES
[facility] [place] Progress Note    Date: 2021    Name: Alfreda Alcantar    MRN: [id number]         : 1955    Ms. Fang Stahl Arrived ambulatory and in no distress for C4D1 of Folfirinox Regimen. Assessment was completed, no acute issues at this time, no new complaints voiced. Right chest wall port accessed without difficulty, labs drawn & sent for processing. Chemotherapy Flowsheet 2021   Cycle C4D1   Date 2021   Drug / Regimen Folfirinox   Pre Meds -   Notes -        Patient proceed to appointment with Dr. Felipa Umanzor. Ms. Ash Owens vitals were reviewed. Visit Vitals  BP (!) 140/65   Pulse 87   Temp (!) 96.3 °F (35.7 °C)   Resp 18   Ht 5' 4\" (1.626 m)   Wt 112.4 kg (247 lb 14.4 oz)   BMI 42.55 kg/m²       Lab results were obtained and reviewed. Recent Results (from the past 12 hour(s))   CBC WITH AUTOMATED DIFF    Collection Time: 21  7:55 AM   Result Value Ref Range    WBC 10.0 3.6 - 11.0 K/uL    RBC 3.67 (L) 3.80 - 5.20 M/uL    HGB 11.7 11.5 - 16.0 g/dL    HCT 34.0 (L) 35.0 - 47.0 %    MCV 92.6 80.0 - 99.0 FL    MCH 31.9 26.0 - 34.0 PG    MCHC 34.4 30.0 - 36.5 g/dL    RDW 15.3 (H) 11.5 - 14.5 %    PLATELET 579 173 - 194 K/uL    MPV 11.3 8.9 - 12.9 FL    NRBC 0.2 (H) 0  WBC    ABSOLUTE NRBC 0.02 (H) 0.00 - 0.01 K/uL    NEUTROPHILS 36 32 - 75 %    BAND NEUTROPHILS 2 0 - 6 %    LYMPHOCYTES 44 12 - 49 %    MONOCYTES 12 5 - 13 %    EOSINOPHILS 3 0 - 7 %    BASOPHILS 0 0 - 1 %    METAMYELOCYTES 1 (H) 0 %    MYELOCYTES 2 (H) 0 %    IMMATURE GRANULOCYTES 0 %    ABS. NEUTROPHILS 3.8 1.8 - 8.0 K/UL    ABS. LYMPHOCYTES 4.4 (H) 0.8 - 3.5 K/UL    ABS. MONOCYTES 1.2 (H) 0.0 - 1.0 K/UL    ABS. EOSINOPHILS 0.3 0.0 - 0.4 K/UL    ABS. BASOPHILS 0.0 0.0 - 0.1 K/UL    ABS. IMM.  GRANS. 0.0 K/UL    DF MANUAL      RBC COMMENTS NORMOCYTIC, NORMOCHROMIC      WBC COMMENTS ATYPICAL LYMPHOCYTES PRESENT     METABOLIC PANEL, COMPREHENSIVE    Collection Time: 21  7:55 AM   Result Value Ref Range    Sodium 141 136 - 145 mmol/L    Potassium 2.8 (L) 3.5 - 5.1 mmol/L    Chloride 107 97 - 108 mmol/L    CO2 27 21 - 32 mmol/L    Anion gap 7 5 - 15 mmol/L    Glucose 93 65 - 100 mg/dL    BUN 8 6 - 20 MG/DL    Creatinine 1.00 0.55 - 1.02 MG/DL    BUN/Creatinine ratio 8 (L) 12 - 20      GFR est AA >60 >60 ml/min/1.73m2    GFR est non-AA 55 (L) >60 ml/min/1.73m2    Calcium 8.6 8.5 - 10.1 MG/DL    Bilirubin, total 0.5 0.2 - 1.0 MG/DL    ALT (SGPT) 55 12 - 78 U/L    AST (SGOT) 46 (H) 15 - 37 U/L    Alk.  phosphatase 115 45 - 117 U/L    Protein, total 6.8 6.4 - 8.2 g/dL    Albumin 3.0 (L) 3.5 - 5.0 g/dL    Globulin 3.8 2.0 - 4.0 g/dL    A-G Ratio 0.8 (L) 1.1 - 2.2         Medications:  Medications Administered     0.9% sodium chloride infusion 500 mL     Admin Date  12/06/2021 Action  New Bag Dose  500 mL Rate  1,000 mL/hr Route  IntraVENous Administered By  Aury Lopez RN          0.9% sodium chloride injection 10 mL     Admin Date  12/06/2021 Action  Given Dose  10 mL Route  IntraVENous Administered By  Aury Lopez RN          atropine 0.4 mg/mL injection 0.4 mg     Admin Date  12/06/2021 Action  Given Dose  0.4 mg Route  IntraVENous Administered By  Aury Lopez RN          dexamethasone (DECADRON) 12 mg in 0.9% sodium chloride 50 mL IVPB     Admin Date  12/06/2021 Action  New Bag Dose  12 mg Route  IntraVENous Administered By  Aury Lopez RN          dextrose 5% infusion     Admin Date  12/06/2021 Action  New Bag Dose  25 mL/hr Rate  25 mL/hr Route  IntraVENous Administered By  Aury Lopez RN          fluorouraciL (ADRUCIL) 5,616 mg in 0.9% sodium chloride 250 mL CADD Cassette     Admin Date  12/06/2021 Action  New Bag Dose  5,616 mg Rate  5.4 mL/hr Route  IntraVENous Administered By  Aury Lopez RN          irinotecan (CAMPTOSAR) 351 mg in dextrose 5% 250 mL, overfill volume 25 mL chemo infusion     Admin Date  12/06/2021 Action  New Bag Dose  351 mg Rate  195 mL/hr Route  IntraVENous Administered By  Millie Lim RN          leucovorin (WELLCOVORIN) 936 mg in dextrose 5% 250 mL, overfill volume 25 mL IVPB     Admin Date  12/06/2021 Action  New Bag Dose  936 mg Rate  214.5 mL/hr Route  IntraVENous Administered By  Millie Lim RN          oxaliplatin (ELOXATIN) 199 mg in dextrose 5% 250 mL, overfill volume 25 mL chemo infusion     Admin Date  12/06/2021 Action  New Bag Dose  199 mg Rate  157.4 mL/hr Route  IntraVENous Administered By  Millie Lim RN          palonosetron HCl (ALOXI) injection 0.25 mg     Admin Date  12/06/2021 Action  Given Dose  0.25 mg Route  IntraVENous Administered By  Millie Lim RN          potassium chloride SR (KLOR-CON 10) tablet 60 mEq     Admin Date  12/06/2021 Action  Given Dose  60 mEq Route  Oral Administered By  Millie Lim RN          sodium chloride (NS) flush 10 mL     Admin Date  12/06/2021 Action  Given Dose  10 mL Route  IntraVENous Administered By  Dasha Andrade RN                  Ms. Carrie Isabel tolerated treatment well and was discharged from [place] [address] in stable condition at 1510. Discharged with CADD pump infusing. She is to return on December 8 for her next appointment.     Dara Diana RN  December 6, 2021

## 2021-12-08 ENCOUNTER — HOSPITAL ENCOUNTER (OUTPATIENT)
Dept: INFUSION THERAPY | Age: 66
Discharge: HOME | End: 2021-12-08
Payer: MEDICARE

## 2021-12-08 VITALS — TEMPERATURE: 97.5 F | SYSTOLIC BLOOD PRESSURE: 112 MMHG | DIASTOLIC BLOOD PRESSURE: 77 MMHG | RESPIRATION RATE: 18 BRPM

## 2021-12-08 DIAGNOSIS — C25.0 MALIGNANT NEOPLASM OF HEAD OF PANCREAS (HCC): ICD-10-CM

## 2021-12-08 DIAGNOSIS — Z76.89 PREVENTION OF CHEMOTHERAPY-INDUCED NEUTROPENIA: Primary | ICD-10-CM

## 2021-12-08 PROCEDURE — 96377 APPLICATON ON-BODY INJECTOR: CPT

## 2021-12-08 PROCEDURE — 96523 IRRIG DRUG DELIVERY DEVICE: CPT

## 2021-12-08 PROCEDURE — 74011250636 HC RX REV CODE- 250/636: Performed by: INTERNAL MEDICINE

## 2021-12-08 RX ORDER — SODIUM CHLORIDE 0.9 % (FLUSH) 0.9 %
10 SYRINGE (ML) INJECTION AS NEEDED
Status: DISCONTINUED | OUTPATIENT
Start: 2021-12-08 | End: 2021-12-09 | Stop reason: HOSPADM

## 2021-12-08 RX ORDER — SODIUM CHLORIDE 9 MG/ML
1000 INJECTION, SOLUTION INTRAVENOUS CONTINUOUS
Status: DISCONTINUED | OUTPATIENT
Start: 2021-12-08 | End: 2021-12-10 | Stop reason: HOSPADM

## 2021-12-08 RX ORDER — HEPARIN 100 UNIT/ML
300-500 SYRINGE INTRAVENOUS AS NEEDED
Status: DISCONTINUED | OUTPATIENT
Start: 2021-12-08 | End: 2021-12-09 | Stop reason: HOSPADM

## 2021-12-08 RX ORDER — SODIUM CHLORIDE 9 MG/ML
10 INJECTION INTRAMUSCULAR; INTRAVENOUS; SUBCUTANEOUS AS NEEDED
Status: DISCONTINUED | OUTPATIENT
Start: 2021-12-08 | End: 2021-12-09 | Stop reason: HOSPADM

## 2021-12-08 RX ADMIN — Medication 10 ML: at 14:49

## 2021-12-08 RX ADMIN — HEPARIN 500 UNITS: 100 SYRINGE at 14:49

## 2021-12-08 RX ADMIN — PEGFILGRASTIM 6 MG: KIT SUBCUTANEOUS at 14:49

## 2021-12-08 NOTE — PROGRESS NOTES
\[facility]\"" Progress Note    Date: 2021    Name: Alfreda Alcantar    MRN: [id number]         : 1955    Ms. Fang Stahl Arrived ambulatory and in no distress for Pump Removal.  Assessment was completed, no acute issues at this time, no new complaints voiced. CADD completed-250ml infused per order. Patient declined hydration option ordered for today. Ms. Ash Owens vitals were reviewed. Visit Vitals  /77   Temp 97.5 °F (36.4 °C)   Resp 18     Education provided to patient about Neulasta On Body Injector including; side effects, how/when to remove the device, as well as what to do in the event of device malfunction. Opportunity for questions was provided and all questions were answered. Patient verbalized understanding. Ms. Fang Stahl tolerated treatment well and was discharged from [place] [address] in stable condition at 1500. Port de-accessed, flushed & heparinized per protocol. She is to return on  at 0815 for her next appointment.     [facility]  2021

## 2021-12-12 ENCOUNTER — TELEPHONE (OUTPATIENT)
Dept: ONCOLOGY | Age: 66
End: 2021-12-12

## 2021-12-12 NOTE — TELEPHONE ENCOUNTER
[address] at [facility]  [phone]    Patient called to report she is feeling dehydrated and asks if she can receive IVFs today. Having nausea which is limiting her PO intake. She has been taking zofran every 8 hours and compazine every 6 hours, fairly consistently, though yesterday she \"lost track. \"    She is also having significant diarrhea, taking imodium and lomotil. However, not taking these as often as she can. Discussed increasing frequency. Unfortunately, the [address] [place] [address] Road is closed today and the [facility] closes in about half an hour, so we can't give any outpatient IVFs today. She thinks she can wait until tomorrow. Discussed urgent care or ED this weekend if things worsen.

## 2021-12-13 ENCOUNTER — APPOINTMENT (OUTPATIENT)
Dept: INFUSION THERAPY | Age: 66
End: 2021-12-13

## 2021-12-13 DIAGNOSIS — C25.9 PANCREATIC ADENOCARCINOMA (HCC): Primary | ICD-10-CM

## 2021-12-13 DIAGNOSIS — R91.8 PULMONARY NODULES: ICD-10-CM

## 2021-12-13 RX ORDER — DEXTROSE MONOHYDRATE 50 MG/ML
25 INJECTION, SOLUTION INTRAVENOUS CONTINUOUS
Status: CANCELLED
Start: 2021-12-20

## 2021-12-13 RX ORDER — SODIUM CHLORIDE 0.9 % (FLUSH) 0.9 %
10 SYRINGE (ML) INJECTION AS NEEDED
Status: CANCELLED | OUTPATIENT
Start: 2021-12-20

## 2021-12-13 RX ORDER — PALONOSETRON 0.05 MG/ML
0.25 INJECTION, SOLUTION INTRAVENOUS ONCE
Status: CANCELLED | OUTPATIENT
Start: 2021-12-20 | End: 2021-12-20

## 2021-12-13 RX ORDER — SODIUM CHLORIDE 0.9 % (FLUSH) 0.9 %
10 SYRINGE (ML) INJECTION AS NEEDED
Status: CANCELLED | OUTPATIENT
Start: 2021-12-29

## 2021-12-13 RX ORDER — ONDANSETRON 2 MG/ML
8 INJECTION INTRAMUSCULAR; INTRAVENOUS AS NEEDED
Status: CANCELLED | OUTPATIENT
Start: 2021-12-20

## 2021-12-13 RX ORDER — SODIUM CHLORIDE 9 MG/ML
10 INJECTION INTRAMUSCULAR; INTRAVENOUS; SUBCUTANEOUS AS NEEDED
Status: CANCELLED | OUTPATIENT
Start: 2021-12-14

## 2021-12-13 RX ORDER — DIPHENHYDRAMINE HYDROCHLORIDE 50 MG/ML
25 INJECTION, SOLUTION INTRAMUSCULAR; INTRAVENOUS AS NEEDED
Status: CANCELLED
Start: 2021-12-20

## 2021-12-13 RX ORDER — HYDROCORTISONE SODIUM SUCCINATE 100 MG/2ML
100 INJECTION, POWDER, FOR SOLUTION INTRAMUSCULAR; INTRAVENOUS AS NEEDED
Status: CANCELLED | OUTPATIENT
Start: 2021-12-20

## 2021-12-13 RX ORDER — DIPHENHYDRAMINE HYDROCHLORIDE 50 MG/ML
50 INJECTION, SOLUTION INTRAMUSCULAR; INTRAVENOUS AS NEEDED
Status: CANCELLED
Start: 2021-12-20

## 2021-12-13 RX ORDER — EPINEPHRINE 1 MG/ML
0.3 INJECTION, SOLUTION, CONCENTRATE INTRAVENOUS AS NEEDED
Status: CANCELLED | OUTPATIENT
Start: 2021-12-20

## 2021-12-13 RX ORDER — HEPARIN 100 UNIT/ML
300-500 SYRINGE INTRAVENOUS AS NEEDED
Status: CANCELLED | OUTPATIENT
Start: 2021-12-14

## 2021-12-13 RX ORDER — HEPARIN 100 UNIT/ML
300-500 SYRINGE INTRAVENOUS AS NEEDED
Status: CANCELLED
Start: 2021-12-29

## 2021-12-13 RX ORDER — ACETAMINOPHEN 325 MG/1
650 TABLET ORAL AS NEEDED
Status: CANCELLED
Start: 2021-12-20

## 2021-12-13 RX ORDER — HEPARIN 100 UNIT/ML
300-500 SYRINGE INTRAVENOUS AS NEEDED
Status: CANCELLED
Start: 2021-12-20

## 2021-12-13 RX ORDER — SODIUM CHLORIDE 0.9 % (FLUSH) 0.9 %
10 SYRINGE (ML) INJECTION AS NEEDED
Status: CANCELLED | OUTPATIENT
Start: 2021-12-14

## 2021-12-13 RX ORDER — SODIUM CHLORIDE 9 MG/ML
10 INJECTION INTRAMUSCULAR; INTRAVENOUS; SUBCUTANEOUS AS NEEDED
Status: CANCELLED | OUTPATIENT
Start: 2021-12-29

## 2021-12-13 RX ORDER — ATROPINE SULFATE 0.4 MG/ML
0.4 INJECTION, SOLUTION ENDOTRACHEAL; INTRAMEDULLARY; INTRAMUSCULAR; INTRAVENOUS; SUBCUTANEOUS
Status: CANCELLED | OUTPATIENT
Start: 2021-12-20

## 2021-12-13 RX ORDER — ALBUTEROL SULFATE 0.83 MG/ML
2.5 SOLUTION RESPIRATORY (INHALATION) AS NEEDED
Status: CANCELLED
Start: 2021-12-20

## 2021-12-13 RX ORDER — SODIUM CHLORIDE 9 MG/ML
10 INJECTION INTRAMUSCULAR; INTRAVENOUS; SUBCUTANEOUS AS NEEDED
Status: CANCELLED | OUTPATIENT
Start: 2021-12-20

## 2021-12-13 NOTE — TELEPHONE ENCOUNTER
[address] at [facility]  [phone]        12/13/21 12:32 PM Called patient to check on her. Patient reports that diarrhea and nausea have not been as bad as what she experienced with past treatments. Patient keeping medication log to keep track of when to take her anti-nausea medications and anti-diarrheal medications as suggested by on call provider. Patient states diarrhea has decreased overnight, has had one loose stool today. Patient with complaints of weakness and dizziness; however, symptoms improving today. Patient able to drink more yesterday and tolerated chicken and rice soup as well as a boiled potato today. Patient shared she did note light colored blood with stool x 1 on Saturday--patient describes that there were a couple spots of blood on toilet paper. GI referral pending. Patient states she received call from Dr. Ailyn Mejia' office but was unavailable to phone at time of call. She is planning on calling office back today. Patient also inquired if she should still take Imodium and/or Lomotil on a schedule, even if not experiencing diarrhea. Patient voiced concern about upcoming imaging appointments on 12/16 and what she would do if she had to use the restroom. Advised this nurse would forward above to Dr. Jonnathan Dhaliwal and Mannie Acosta and call patient back regarding her medication question. Patient voiced understanding. 1:46 PM Called patient and advised of NP response and recommendations. Patient voiced understanding. Scheduled for IV fluids tomorrow, 12/14, at 3 PM. Patient states she has also been in communication with GI. Office is attempting to coordinate appointment around treatment dates and will be calling patient back. No further questions or concerns at this time.

## 2021-12-14 ENCOUNTER — HOSPITAL ENCOUNTER (OUTPATIENT)
Dept: INFUSION THERAPY | Age: 66
Discharge: HOME | End: 2021-12-14
Payer: MEDICARE

## 2021-12-14 ENCOUNTER — TELEPHONE (OUTPATIENT)
Dept: ONCOLOGY | Age: 66
End: 2021-12-14

## 2021-12-14 VITALS
OXYGEN SATURATION: 96 % | HEART RATE: 80 BPM | RESPIRATION RATE: 16 BRPM | TEMPERATURE: 97.5 F | SYSTOLIC BLOOD PRESSURE: 119 MMHG | DIASTOLIC BLOOD PRESSURE: 80 MMHG

## 2021-12-14 DIAGNOSIS — C25.0 MALIGNANT NEOPLASM OF HEAD OF PANCREAS (HCC): ICD-10-CM

## 2021-12-14 DIAGNOSIS — E87.6 HYPOKALEMIA: ICD-10-CM

## 2021-12-14 DIAGNOSIS — E86.0 DEHYDRATION: Primary | ICD-10-CM

## 2021-12-14 LAB
ANION GAP SERPL CALC-SCNC: 8 MMOL/L (ref 5–15)
BUN SERPL-MCNC: 12 MG/DL (ref 6–20)
BUN/CREAT SERPL: 13 (ref 12–20)
CALCIUM SERPL-MCNC: 8.8 MG/DL (ref 8.5–10.1)
CHLORIDE SERPL-SCNC: 100 MMOL/L (ref 97–108)
CO2 SERPL-SCNC: 30 MMOL/L (ref 21–32)
CREAT SERPL-MCNC: 0.94 MG/DL (ref 0.55–1.02)
GLUCOSE SERPL-MCNC: 101 MG/DL (ref 65–100)
MAGNESIUM SERPL-MCNC: 2 MG/DL (ref 1.6–2.4)
POTASSIUM SERPL-SCNC: 2.8 MMOL/L (ref 3.5–5.1)
SODIUM SERPL-SCNC: 138 MMOL/L (ref 136–145)

## 2021-12-14 PROCEDURE — 83735 ASSAY OF MAGNESIUM: CPT

## 2021-12-14 PROCEDURE — 96361 HYDRATE IV INFUSION ADD-ON: CPT

## 2021-12-14 PROCEDURE — 77030012965 HC NDL HUBR BBMI -A

## 2021-12-14 PROCEDURE — 74011250636 HC RX REV CODE- 250/636: Performed by: NURSE PRACTITIONER

## 2021-12-14 PROCEDURE — 36415 COLL VENOUS BLD VENIPUNCTURE: CPT

## 2021-12-14 PROCEDURE — 96365 THER/PROPH/DIAG IV INF INIT: CPT

## 2021-12-14 PROCEDURE — 96360 HYDRATION IV INFUSION INIT: CPT

## 2021-12-14 PROCEDURE — 80048 BASIC METABOLIC PNL TOTAL CA: CPT

## 2021-12-14 PROCEDURE — 74011250637 HC RX REV CODE- 250/637: Performed by: NURSE PRACTITIONER

## 2021-12-14 RX ORDER — SODIUM CHLORIDE 0.9 % (FLUSH) 0.9 %
10 SYRINGE (ML) INJECTION AS NEEDED
Status: DISCONTINUED | OUTPATIENT
Start: 2021-12-14 | End: 2021-12-15 | Stop reason: HOSPADM

## 2021-12-14 RX ORDER — SODIUM CHLORIDE 9 MG/ML
10 INJECTION INTRAMUSCULAR; INTRAVENOUS; SUBCUTANEOUS AS NEEDED
Status: DISCONTINUED | OUTPATIENT
Start: 2021-12-14 | End: 2021-12-15 | Stop reason: HOSPADM

## 2021-12-14 RX ORDER — POTASSIUM CHLORIDE 14.9 MG/ML
10 INJECTION INTRAVENOUS ONCE
Status: DISCONTINUED | OUTPATIENT
Start: 2021-12-14 | End: 2021-12-14

## 2021-12-14 RX ORDER — HEPARIN 100 UNIT/ML
300-500 SYRINGE INTRAVENOUS AS NEEDED
Status: DISCONTINUED | OUTPATIENT
Start: 2021-12-14 | End: 2021-12-15 | Stop reason: HOSPADM

## 2021-12-14 RX ORDER — POTASSIUM CHLORIDE 750 MG/1
20 TABLET, EXTENDED RELEASE ORAL 2 TIMES DAILY
Qty: 120 TABLET | Refills: 0 | Status: SHIPPED | OUTPATIENT
Start: 2021-12-14 | End: 2022-01-10

## 2021-12-14 RX ORDER — POTASSIUM CHLORIDE 7.45 MG/ML
10 INJECTION INTRAVENOUS ONCE
Status: COMPLETED | OUTPATIENT
Start: 2021-12-14 | End: 2021-12-14

## 2021-12-14 RX ORDER — POTASSIUM CHLORIDE 750 MG/1
40 TABLET, FILM COATED, EXTENDED RELEASE ORAL
Status: COMPLETED | OUTPATIENT
Start: 2021-12-14 | End: 2021-12-14

## 2021-12-14 RX ADMIN — SODIUM CHLORIDE 1000 ML: 9 INJECTION, SOLUTION INTRAVENOUS at 15:19

## 2021-12-14 RX ADMIN — POTASSIUM CHLORIDE 40 MEQ: 750 TABLET, FILM COATED, EXTENDED RELEASE ORAL at 16:34

## 2021-12-14 RX ADMIN — POTASSIUM CHLORIDE 10 MEQ: 10 INJECTION, SOLUTION INTRAVENOUS at 16:34

## 2021-12-14 NOTE — TELEPHONE ENCOUNTER
[address] at [facility]  [phone]        12/14/21 12:01 PM Attempted to place return call via contact number provided. No answer, left message requesting a call back. Provided office phone number as well. 4:30 PM Spoke with Angela Blackwood nurse, and advised of MD message. Reviewed Imodium administration instructions. Nurse performed read back and will review with patient. No further questions or concerns at this time.

## 2021-12-14 NOTE — TELEPHONE ENCOUNTER
Patient called and stated that she does not think she will be able to come to her treatment on Monday due to her feeling so weak and still having diarrhea.  Please advise        CB# [phone]

## 2021-12-14 NOTE — PROGRESS NOTES
[facility] Progress Note    Date: 2021    Name: Cesar Betts    MRN: [id number]         : 1955    Ms. Yue Ely Arrived ambulatory and in no distress for Hydration. Assessment was completed, patient having a lot of diarrhea, minimal PO intake. .  R chest wall port accessed without difficulty, labs drawn & sent for processing. Ms. Douglas Loving vitals were reviewed. Visit Vitals  /78   Pulse 74   Temp 97.5 °F (36.4 °C)   Resp 16   SpO2 96%     Recent Results (from the past 12 hour(s))   MAGNESIUM    Collection Time: 21  3:13 PM   Result Value Ref Range    Magnesium 2.0 1.6 - 2.4 mg/dL   METABOLIC PANEL, BASIC    Collection Time: 21  3:13 PM   Result Value Ref Range    Sodium 138 136 - 145 mmol/L    Potassium 2.8 (L) 3.5 - 5.1 mmol/L    Chloride 100 97 - 108 mmol/L    CO2 30 21 - 32 mmol/L    Anion gap 8 5 - 15 mmol/L    Glucose 101 (H) 65 - 100 mg/dL    BUN 12 6 - 20 MG/DL    Creatinine 0.94 0.55 - 1.02 MG/DL    BUN/Creatinine ratio 13 12 - 20      GFR est AA >60 >60 ml/min/1.73m2    GFR est non-AA 60 (L) >60 ml/min/1.73m2    Calcium 8.8 8.5 - 10.1 MG/DL         Medications:  Medications Administered     sodium chloride 0.9 % bolus infusion 1,000 mL     Admin Date  2021 Action  New Bag Dose  1,000 mL Rate  1,000 mL/hr Route  IntraVENous Administered By  Kota Pederson RN              Communicated K 2.8 level to Dr Brittnee Dumont nurse, waiting for  Communication about potassium repletion.      SBAR given to BALTAZAR Yeh RN  2021

## 2021-12-14 NOTE — PROGRESS NOTES
[facility] Short Visit Progress Note             SBAR received from Desiree Subramanian RN. Vital Signs:  Visit Vitals  /80   Pulse 80   Temp 97.5 °F (36.4 °C)   Resp 16   SpO2 96%         Orders obtained for IV and PO potassium. Medications:  Medications Administered     potassium chloride 10 mEq in 100 ml IVPB     Admin Date  12/14/2021 Action  New Bag Dose  10 mEq Rate  100 mL/hr Route  IntraVENous Administered By  [place], [place]          potassium chloride SR (KLOR-CON 10) tablet 40 mEq     Admin Date  12/14/2021 Action  Given Dose  40 mEq Route  Oral Administered By  [place], [place]          sodium chloride 0.9 % bolus infusion 1,000 mL     Admin Date  12/14/2021 Action  New Bag Dose  1,000 mL Rate  1,000 mL/hr Route  IntraVENous Administered By  Bridget Cavanaugh RN                 Patient tolerated treatment well. Port flushed, heparinized and de accessed per protocol. Patient discharged from [place] [address] ambulatory in no distress at 1745. Patient aware of next appointment.     Future Appointments   Date Time Provider Sally Carrizales   12/16/2021  4:00 PM [facility] CT 1 SFMRCT [facility]   12/20/2021  7:30 AM [facility]   12/20/2021  8:15 AM Omero Paez, NP [facility]   12/22/2021  2:30 PM [facility]6 [facility]   1/3/2022  7:30 AM SS INF4 [facility]2 >7H [facility]   1/3/2022  8:15 AM Omero Paez, MUSA [facility]   1/5/2022  1:30 PM SS INF6 CH4 <1H [facility]   1/17/2022  7:30 AM [facility]5 [facility]   1/17/2022  8:45 AM Omero Paez, NP [facility]   1/19/2022  2:30 PM SS INF6 CH4 <1H [facility]   1/31/2022  7:30 AM SS INF5 [facility]7H [facility]   1/31/2022  8:15 AM Omero Paez, NP [facility]   2/2/2022  2:30 PM SS INF6 CH4 <1H [facility]   2/14/2022  7:30 AM SS INF5 CH1 >7H [facility]   2/14/2022  8:15 AM Omero Paez, MUSA [facility]B 2/16/2022  2:30 PM [facility]   2/28/2022  7:30 AM SS INF2 CH2 >7[facility]   3/2/2022  2:30 PM [facility]   3/14/2022  7:30 AM SS INF1 [facility]2 >7[facility]   3/16/2022  2:30 PM SS INF6 [facility]4 [facility]H [facility] [address] [place] [address]

## 2021-12-15 ENCOUNTER — TELEPHONE (OUTPATIENT)
Dept: ONCOLOGY | Age: 66
End: 2021-12-15

## 2021-12-15 ENCOUNTER — APPOINTMENT (OUTPATIENT)
Dept: INFUSION THERAPY | Age: 66
End: 2021-12-15

## 2021-12-15 NOTE — TELEPHONE ENCOUNTER
[address] at [facility]  [phone]        12/15/21 1:40 PM Return call placed to patient. Patient states that her weakness is improved after receiving IV fluids yesterday. Patient ate an egg this morning, 1/2 piece of bread, and a protein drink. Patient also took her potassium with water. Afterwards, has had watery stool x 2 today. Took two Imodium this morning, but patient is unsure if she should take another Imodium at this point or Lomotil. Patient shared that Lomotil seems to alleviate symptoms more so than Imodium. Patient denies any blood in stool. Also denies fevers and abdominal pain. Patient with complaints of mild nausea. Reviewed administration instructions for Imodium per MD recommendations in prior telephone encounter. Patient states she will take another Imodium now. Advised this nurse will clarify timing of when patient should take Lomotil in relation to her Imodium and call patient back. Patient states she is also scheduled to see GI this Friday, 12/17. Patient's MRI abd/pelv also cancelled as equipment is down. Advised this nurse will defer to Dr. Natalie Lacey to confirm how to proceed. Per last note, MRI imaging ordered per Dr. Kasi West. Patient voiced understanding. 2:08 PM Called patient and advised of MD response and recommendations. Patient voiced understanding. No further questions or concerns at this time. 12/17/21 11:25 AM Called patient to check on her. She states she is still having diarrhea, though lessened in frequency. Yesterday, patient had episodes of diarrhea about 30-40 minutes after each time she ate. Patient has had one episode today, has not taken any anti-diarrheals yet today. Seeing GI at 3:30 PM for appointment today as well. Patient reports that she is able to drink a lot more the past couple days--drinking water, sugar free Gatorade, and Ensure clear.  Patient states she still feels very weak and does not feel she can proceed with chemotherapy treatment as scheduled next week. Patient denies recent falls. Patient also inquired what timeframe she should reschedule her imaging. Advised this nurse would forward above to Dr. Jake Gibson and Rosas Pate and call patient back. She voiced understanding. 1:38 PM Called patient and advised of NP response with exception that Dr. Jake Gibson would like patient to proceed with MD visit. Patient verified she has  number to call to reschedule her imaging. No further questions or concerns at this time.

## 2021-12-15 NOTE — TELEPHONE ENCOUNTER
Patient called and stated that she is still having diarrhea. Patient is scheduled for an MRI scan tomorrow and stated that the equipment is down and they are not able to do this scan. Patient also stated that she is not going to be able to get  MRI done while going through chemo.  Please advise       CB# [phone]

## 2021-12-17 ENCOUNTER — TELEPHONE (OUTPATIENT)
Dept: ONCOLOGY | Age: 66
End: 2021-12-17

## 2021-12-17 RX ORDER — HEPARIN 100 UNIT/ML
300-500 SYRINGE INTRAVENOUS AS NEEDED
Status: CANCELLED | OUTPATIENT
Start: 2021-12-20

## 2021-12-17 RX ORDER — HEPARIN 100 UNIT/ML
300-500 SYRINGE INTRAVENOUS AS NEEDED
Status: CANCELLED
Start: 2021-12-27

## 2021-12-17 RX ORDER — SODIUM CHLORIDE 0.9 % (FLUSH) 0.9 %
10 SYRINGE (ML) INJECTION AS NEEDED
Status: CANCELLED | OUTPATIENT
Start: 2021-12-27

## 2021-12-17 RX ORDER — DEXTROSE MONOHYDRATE 50 MG/ML
25 INJECTION, SOLUTION INTRAVENOUS CONTINUOUS
Status: CANCELLED
Start: 2021-12-27

## 2021-12-17 RX ORDER — EPINEPHRINE 1 MG/ML
0.3 INJECTION, SOLUTION, CONCENTRATE INTRAVENOUS AS NEEDED
Status: CANCELLED | OUTPATIENT
Start: 2021-12-27

## 2021-12-17 RX ORDER — ALBUTEROL SULFATE 0.83 MG/ML
2.5 SOLUTION RESPIRATORY (INHALATION) AS NEEDED
Status: CANCELLED
Start: 2021-12-27

## 2021-12-17 RX ORDER — DIPHENHYDRAMINE HYDROCHLORIDE 50 MG/ML
25 INJECTION, SOLUTION INTRAMUSCULAR; INTRAVENOUS AS NEEDED
Status: CANCELLED
Start: 2021-12-27

## 2021-12-17 RX ORDER — SODIUM CHLORIDE 0.9 % (FLUSH) 0.9 %
10 SYRINGE (ML) INJECTION AS NEEDED
Status: CANCELLED | OUTPATIENT
Start: 2021-12-20

## 2021-12-17 RX ORDER — ONDANSETRON 2 MG/ML
8 INJECTION INTRAMUSCULAR; INTRAVENOUS AS NEEDED
Status: CANCELLED | OUTPATIENT
Start: 2021-12-27

## 2021-12-17 RX ORDER — ATROPINE SULFATE 0.4 MG/ML
0.4 INJECTION, SOLUTION ENDOTRACHEAL; INTRAMEDULLARY; INTRAMUSCULAR; INTRAVENOUS; SUBCUTANEOUS
Status: CANCELLED | OUTPATIENT
Start: 2021-12-27

## 2021-12-17 RX ORDER — SODIUM CHLORIDE 9 MG/ML
10 INJECTION INTRAMUSCULAR; INTRAVENOUS; SUBCUTANEOUS AS NEEDED
Status: CANCELLED | OUTPATIENT
Start: 2021-12-27

## 2021-12-17 RX ORDER — ACETAMINOPHEN 325 MG/1
650 TABLET ORAL AS NEEDED
Status: CANCELLED
Start: 2021-12-27

## 2021-12-17 RX ORDER — DIPHENHYDRAMINE HYDROCHLORIDE 50 MG/ML
50 INJECTION, SOLUTION INTRAMUSCULAR; INTRAVENOUS AS NEEDED
Status: CANCELLED
Start: 2021-12-27

## 2021-12-17 RX ORDER — PALONOSETRON 0.05 MG/ML
0.25 INJECTION, SOLUTION INTRAVENOUS ONCE
Status: CANCELLED | OUTPATIENT
Start: 2021-12-27 | End: 2021-12-27

## 2021-12-17 RX ORDER — HYDROCORTISONE SODIUM SUCCINATE 100 MG/2ML
100 INJECTION, POWDER, FOR SOLUTION INTRAMUSCULAR; INTRAVENOUS AS NEEDED
Status: CANCELLED | OUTPATIENT
Start: 2021-12-27

## 2021-12-17 RX ORDER — SODIUM CHLORIDE 9 MG/ML
10 INJECTION INTRAMUSCULAR; INTRAVENOUS; SUBCUTANEOUS AS NEEDED
Status: CANCELLED | OUTPATIENT
Start: 2021-12-20

## 2021-12-17 NOTE — PROGRESS NOTES
[facility] at [address], [address] [facility] 100[address] [facility]  W: [phone]  F: [phone]      Reason for Visit:   Varsha Paredes is a 77 y.o. female who is seen for evaluation of new bilary or pancreatic head mass    Hematology/Oncology Treatment History:     Diagnosis: Pancreatic adenocarcinoma    Stage: clinical Stage Ib [T2N0]    Pathology:   -9/26/21 Cytology - brushings from common bile duct: Reactive glandular epithelial cells and bile   -9/28/21 pancreatic head mass biopsy: Adenocarcinoma.   -10/22/21 [facility]-cancer panel -negative     Prior Treatment: None    Current Treatment: neoadjuvant FOLFIRNOX to 10/18/21 - current. Added neulasta with C2. History of Present Illness:   Varsha Paredes is a pleasant 77 y.o. female who comes in for evaluation of pancreatic cancer. She presented in 9/2021 with obstructive jaundice, transaminitis. ERCP on 9/26/21 notable for distal CBD stricture; possible tumor/mass located in the CBD; underwent biliary stent placement to relieve biliary obstruction. CT A/P revealed mild/mod intrahepatic biliary ductal dilatation; prominence of CBD and hydropic gallbladder, suggestive of stricture/stenosis/mass of distal CBD. MRI of abdomen showed narrowing of CBD pancreatic head suggestive of extrinisic compression concerning for periampullary mass vs eccentric intraluminal lesion. EUS showed 3.2cm hypoechoic mass in pancreas head. Biopsy revealed adenocarcinoma. Pt met with Dr. Franchesca Castrejon in [place] and plans for neoadjuvant chemotherapy prior to surgery. Interval history:  Patient here for follow up of pancreas cancer. Holding C5 of FOLFIRNOX today due to diarrhea and weakness. Had consultation with GI on 12/17/21. Diarrhea started slowing down this past Thursday. States prior to this she was passing stools 7-8 times per day, like a water faucet. Yesterday she had 2 stools (soft, loose), today one bowel movement so far.  Saturday this past week had formed stools. States she is feeling hungry and ate more on , this may have led to more soft stools. Stomach feels \"weak\". Had 1/2 turkey sandwich with water. Weight down 8 lbs since last visit. PMHx: OA and Rheumatoid arthritis in lower back, Ulcerative colitis, HTN, Hypothyroidism  PSurgHx: , Cyst removed from left breast  SHx: , has 3 children (1 daughter and 2 sons). Works as  in [employer]. Nonsmoker, no EtOH.  in rehab recovering from [employer]. FHx: Mother  of pancreatic cancer age 80. Brother and son had colon cancer. Review of Systems: A complete review of systems was obtained, reviewed. Pertinent findings reviewed above. Physical Exam:     Visit Vitals  /62   Pulse (!) 50   Temp 97.8 °F (36.6 °C)   Ht 5' 4\" (1.626 m)   Wt 239 lb 8 oz (108.6 kg)   SpO2 98%   BMI 41.11 kg/m²     ECOG PS: 1  General: obese; no distress  Eyes:  Anicteric sclerae  HENT: atraumatic, face mask in place  Neck: supple  Lymphatic: Deferred today  Respiratory: CTAB, normal respiratory effort  CV: normal rate, regular rhythm, no murmurs, no peripheral edema, port in place. GI: soft, nontender, nondistended, no masses  MS: digits without clubbing or cyanosis. Skin: no rashes, no ecchymoses, no petechia. normal temperature, turgor, and texture. Darkening of hands due to chemotherapy noted  Psych: alert, oriented, appropriate affect, normal judgment/insight    Results:     Lab Results   Component Value Date/Time    WBC 11.9 (H) 2021 08:44 AM    HGB 11.8 2021 08:44 AM    HCT 34.2 (L) 2021 08:44 AM    PLATELET 943 (L)  08:44 AM    MCV 91.7 2021 08:44 AM    ABS.  NEUTROPHILS PENDING 2021 08:44 AM    Hemoglobin (POC) 12.6 10/06/2013 08:45 AM    Hematocrit (POC) 37 10/06/2013 08:45 AM     Lab Results   Component Value Date/Time    Sodium 140 2021 08:44 AM    Potassium 2.8 (L) 2021 08:44 AM Chloride 105 12/20/2021 08:44 AM    CO2 26 12/20/2021 08:44 AM    Glucose 105 (H) 12/20/2021 08:44 AM    BUN 10 12/20/2021 08:44 AM    Creatinine 1.24 (H) 12/20/2021 08:44 AM    GFR est AA 52 (L) 12/20/2021 08:44 AM    GFR est non-AA 43 (L) 12/20/2021 08:44 AM    Calcium 8.6 12/20/2021 08:44 AM    Sodium (POC) 143 10/06/2013 08:45 AM    Potassium (POC) 3.4 (L) 10/06/2013 08:45 AM    Chloride (POC) 104 10/06/2013 08:45 AM    Glucose (POC) 91 10/06/2013 08:45 AM    BUN (POC) 11 10/06/2013 08:45 AM    Creatinine (POC) 1.0 10/06/2013 08:45 AM    Calcium, ionized (POC) 1.27 10/06/2013 08:45 AM     Lab Results   Component Value Date/Time    Bilirubin, total 0.6 12/20/2021 08:44 AM    ALT (SGPT) 55 12/20/2021 08:44 AM    Alk. phosphatase 111 12/20/2021 08:44 AM    Protein, total 6.7 12/20/2021 08:44 AM    Albumin 3.0 (L) 12/20/2021 08:44 AM    Globulin 3.7 12/20/2021 08:44 AM     Lab Results   Component Value Date/Time    Iron % saturation 32 07/07/2014 02:28 PM    TIBC 304 07/07/2014 02:28 PM    Sed rate (ESR) 17 09/16/2010 12:04 PM    C-Reactive protein <0.29 01/18/2017 08:45 AM    TSH 3.46 03/04/2021 08:57 AM    ALBA, Direct Detected (A) 09/16/2010 12:04 PM    Lipase 1,214 (H) 09/29/2021 04:10 AM    Hep C virus Ab Interp. NONREACTIVE 09/24/2021 06:55 PM     Lab Results   Component Value Date/Time    CEA 3.5 09/25/2021 11:46 AM    Carbohydrate Antigen 19-9, (CA 19-9) 138 (H) 10/18/2021 08:04 AM       10/18/21 CA 19-9:  138      Imaging / Procedures:     9/24/21 US ABD   IMPRESSION  Cholelithiasis. Gallbladder sludge. Prominent CBD with mild intrahepatic ductal dilatation as well. Nonemergent MRI with MRCP to delineate etiology for CBD distention. 9/24/21 CT ABD PELV W CONT  There is mild/moderate intrahepatic biliary ductal dilatation, prominence of the CBD and a hydropic gallbladder. No pancreatic duct dilatation. No clearly  delineated pancreatic head mass.  Imaging findings suggestive of stricture/stenosis/mass of the distal CBD, no extrinsic pancreatic head mass is  identified. Gastroenterology consult  Correlation with MRI/MRCP on a nonemergent basis. There is severe degenerative change of the lumbar spine. 9/25/21 MRI ABD WO CONT  IMPRESSION  1. Intrahepatic and extra hepatic biliary dilatation with abrupt narrowing of  the common bile duct pancreatic head just proximal to the ampulla. Suggestion of  extrinsic compression on the duct. This raises the possibility of a  periampullary mass. Alternatively, this may be an eccentric intraluminal lesion. Evaluation is limited. Recommend GI consultation for possible ERCP and EUS. 2.  Questionable small lesion in the left hepatic lobe with mild increased T2  signal and T1 hypointensity. Recommend follow-up imaging a contrast-enhanced  study preferably MRI. 3.  Cholelithiasis. Distended gallbladder with mild gallbladder wall thickening. Correlate for acute cholecystitis    9/26/21 XR ERCP/ERCB / Dr. Mehta Mode  Impression: Biliary ductal dilation, with a maximum common duct diameter of 15 mm; Severe stenosis, involving distal CBD /pancreas head area concerning for neoplasia  Interventions: Endoscopic ampullary sphincterotomy and biliary stent placement; brushings from the CBD    9/27/21 CT CHEST W CONT:   IMPRESSION  1. There is a minimal right pleural effusion. 2. There are small pulmonary nodules present. There is a nodule in the plane of  the right major fissure and posteriorly in left lower lobe. These were not  present on examination of 1/18/2017. These could be on the basis of metastatic  disease. Close follow-up is suggested. There are also 2 small subpleural nodules  anteriorly in the right upper lobe as described above which can also be  evaluated on follow-up.     9/28/21 EUS:  Endoscopic: Esophagus:normal; Stomach: normal; Duodenum/jejunum: normal and protruding biliary stent  Ultrasound: Esophagus: normal findings;     Stomach: normal findings:     Pancreas: Areas examined: the entire gland                          Parenchyma: -Evidence of a hypoechoic mass with poorly defined borders seen in the head of the pancreas. It appeared involving the CBD where a stent is seen, however it did not involve the major vessels. It measured about 3.2 cm in widest diameter on one view. Pancreatic Duct: normal findings, not dilated               Liver: Parenchyma: normal                          Gallbladder: normal                          Bile Duct: the common bile duct is dilated in the proximal and mid portion and a plastic stent could be seen                          Lymph Node: no adenopathy  Impression:   Pancreas head mass with fine needle biopsy showing adenocarcinoma on preliminary cytology  Recommendations: Follow-up on pathology  Follow-up with oncology, will need further evaluation of pulmonary nodule  Surgical oncology consultation  Resume GI lite diet today and can discharge in am from GI standpoint  . Assessment/Recommendations:   77 y.o. female with Ulcerative colitis, Hyopthyroidism, admitted with obstructive jaundice concerning for underlying malignant obstructing mass. 1. Pancreatic adenocarcinoma:  Clinically stage Ib [T2N0], but need further evaluation in future regarding pulmonary nodules and liver lesion. Presented with obstructive jaundice and transaminitis, CA 19-9 was 198 in 9/2021 at diagnosis. Underwent biliary stent placement with improvement in biliary obstruction. 3.2cm mass seen in pancreatic head on EUS, not involving vasculature, possibly resectable. Dr. Arely Ledezma in [place] has evaluated patient and recommends consideration of neoadjuvant chemotherapy with close attention to lung nodules and liver lesion. I agree with this recommendation for earlier treatment of micrometastatic disease, ability to determine whether pt has chemosensitive disease.  We discussed the risks and benefits of FOLFIRINOX chemotherapy, including potential side effects. These include but are not limited to fatigue, nausea vomiting, diarrhea, neuropathy, taste changes, cold intolerance, esophageal spasm, allergic reactions, alopecia, mucositis, myelosuppression, risk for infection, infertility, and rarely, death. Rarely, a patient may have a condition where they do not metabolize fluorouracil appropriately (called DPD deficiency), and they may have excessive toxicity. A Port-A-Cath will be required in order to deliver the continuous infusion. BRCA 1/2 negative. The patient has consented to beginning therapy. Planning for 6 cycles of mFOLFIRINOX in neoadjuvant setting, followed by surgery and adjuvant therapy x 6 cycles. Supportive care meds include: Emla cream, Zofran, Compazine, Dexamethasone  -- Hold C5 of neoadjuvant mFOLFIRINOX with neulasta support, delay until next week  -- Fluids and labs today. -- Checking CA 19-9 on date of C1, C6, C7, C12.   -- Chest CT with contrast and abd/pelvis MRI after C4 (per Dr. Jaz Lopez), scheduled 12/20/2021  -- Needs to follow up with Dr. Jaz Lopez immediately before C6.  -- Return in 1 week for retry of C5 (10% DR taken)    2. Diarrhea / hypokalemia:  Has history of UC. Likely related to biliary obstruction and pt has been started on Cholestyramine (Questran). For constipation - discussed management with stool softeners, miralax and fiber supplements. On potassium 20meq BID, confirmed she is taking this as prescribed. Had worsening diarrhea after C3/4 of treatment. Was evaluated by GI - with plan for stool studies completed, awaiting kit and then results. Due to have colonoscopy upcoming, date TBD.    -- For diarrhea - advised alternating lomotil and imodium prn diarrhea  -- Continue PO KCl 20meq bid at home and give PO KCl 40meq once today. 3. Pulmonary nodules:   Tiny of unclear etiology. Will repeat imaging after C4 of chemo.     4. HTN:   Currently with stable BP and home HCTZ on hold per primary team.     5. H/o Ulcerative procto-sigmoiditis:  Past due for colonoscopy with one adenoma polyp removed at last colonoscopy in April 2017. Was due for repeat in 2019 but she has not followed up with the GI practice since her last colonoscopy. -- Outpatient colonoscopy overdue     6. Morbid obesity:  Discussed healthy food options and ways to incorporate more protein into diet. 7. Chemotherapy induced neutropenia / Leukocytosis: Added neulasta with pump removal. Leukocytosis due to neulasta. I have personally seen and evaluated the patient in conjunction with Angel Ennis NP. I find the patient's history and physical exam are consistent with the NP's documentation. I agree with the above assessment and plan, which I have modified as needed. Hold chemotherapy today given pt fatigue and recent chemotherapy induced diarrhea. Will have pt return in 1 week for retry of C5 with 10% dose reduction in entire regimen.     Signed By: Jose Em MD

## 2021-12-17 NOTE — TELEPHONE ENCOUNTER
[address] at [facility]  [phone]        12/17/21 11:29 AM Spoke with patient. See other telephone encounter.

## 2021-12-17 NOTE — TELEPHONE ENCOUNTER
Patient left a voicemail stating that she does not think she will be able to attend Mondays treatment. Please advise and call patient back.     CB# [phone]

## 2021-12-20 ENCOUNTER — HOSPITAL ENCOUNTER (OUTPATIENT)
Dept: MRI IMAGING | Age: 66
Discharge: HOME | End: 2021-12-20
Attending: NURSE PRACTITIONER
Payer: MEDICARE

## 2021-12-20 ENCOUNTER — HOSPITAL ENCOUNTER (OUTPATIENT)
Dept: INFUSION THERAPY | Age: 66
Discharge: HOME | End: 2021-12-20
Payer: MEDICARE

## 2021-12-20 ENCOUNTER — OFFICE VISIT (OUTPATIENT)
Dept: ONCOLOGY | Age: 66
End: 2021-12-20
Payer: MEDICARE

## 2021-12-20 ENCOUNTER — HOSPITAL ENCOUNTER (OUTPATIENT)
Dept: CT IMAGING | Age: 66
Discharge: HOME | End: 2021-12-20
Attending: NURSE PRACTITIONER
Payer: MEDICARE

## 2021-12-20 VITALS
TEMPERATURE: 97.8 F | SYSTOLIC BLOOD PRESSURE: 106 MMHG | HEART RATE: 50 BPM | OXYGEN SATURATION: 98 % | BODY MASS INDEX: 40.89 KG/M2 | DIASTOLIC BLOOD PRESSURE: 62 MMHG | WEIGHT: 239.5 LBS | HEIGHT: 64 IN

## 2021-12-20 VITALS
HEIGHT: 64 IN | DIASTOLIC BLOOD PRESSURE: 62 MMHG | BODY MASS INDEX: 40.89 KG/M2 | TEMPERATURE: 97.8 F | WEIGHT: 239.5 LBS | RESPIRATION RATE: 16 BRPM | SYSTOLIC BLOOD PRESSURE: 106 MMHG | HEART RATE: 50 BPM

## 2021-12-20 VITALS — BODY MASS INDEX: 40.17 KG/M2 | WEIGHT: 234 LBS

## 2021-12-20 DIAGNOSIS — R91.8 PULMONARY NODULES: ICD-10-CM

## 2021-12-20 DIAGNOSIS — C25.9 PANCREATIC ADENOCARCINOMA (HCC): ICD-10-CM

## 2021-12-20 DIAGNOSIS — E87.6 HYPOKALEMIA: ICD-10-CM

## 2021-12-20 DIAGNOSIS — K52.1 DIARRHEA DUE TO DRUG: ICD-10-CM

## 2021-12-20 DIAGNOSIS — C25.9 PANCREATIC ADENOCARCINOMA (HCC): Primary | ICD-10-CM

## 2021-12-20 DIAGNOSIS — E86.0 DEHYDRATION: Primary | ICD-10-CM

## 2021-12-20 DIAGNOSIS — K51.80 OTHER ULCERATIVE COLITIS WITHOUT COMPLICATION (HCC): ICD-10-CM

## 2021-12-20 DIAGNOSIS — C25.0 MALIGNANT NEOPLASM OF HEAD OF PANCREAS (HCC): ICD-10-CM

## 2021-12-20 LAB
ALBUMIN SERPL-MCNC: 3 G/DL (ref 3.5–5)
ALBUMIN/GLOB SERPL: 0.8 {RATIO} (ref 1.1–2.2)
ALP SERPL-CCNC: 111 U/L (ref 45–117)
ALT SERPL-CCNC: 55 U/L (ref 12–78)
ANION GAP SERPL CALC-SCNC: 9 MMOL/L (ref 5–15)
AST SERPL-CCNC: 45 U/L (ref 15–37)
BASOPHILS # BLD: 0 K/UL (ref 0–0.1)
BASOPHILS NFR BLD: 0 % (ref 0–1)
BILIRUB SERPL-MCNC: 0.6 MG/DL (ref 0.2–1)
BUN SERPL-MCNC: 10 MG/DL (ref 6–20)
BUN/CREAT SERPL: 8 (ref 12–20)
CALCIUM SERPL-MCNC: 8.6 MG/DL (ref 8.5–10.1)
CHLORIDE SERPL-SCNC: 105 MMOL/L (ref 97–108)
CO2 SERPL-SCNC: 26 MMOL/L (ref 21–32)
CREAT SERPL-MCNC: 1.24 MG/DL (ref 0.55–1.02)
DIFFERENTIAL METHOD BLD: ABNORMAL
EOSINOPHIL # BLD: 0 K/UL (ref 0–0.4)
EOSINOPHIL NFR BLD: 0 % (ref 0–7)
ERYTHROCYTE [DISTWIDTH] IN BLOOD BY AUTOMATED COUNT: 16.7 % (ref 11.5–14.5)
GLOBULIN SER CALC-MCNC: 3.7 G/DL (ref 2–4)
GLUCOSE SERPL-MCNC: 105 MG/DL (ref 65–100)
HCT VFR BLD AUTO: 34.2 % (ref 35–47)
HGB BLD-MCNC: 11.8 G/DL (ref 11.5–16)
IMM GRANULOCYTES # BLD AUTO: 0 K/UL
IMM GRANULOCYTES NFR BLD AUTO: 0 %
LYMPHOCYTES # BLD: 4.2 K/UL (ref 0.8–3.5)
LYMPHOCYTES NFR BLD: 35 % (ref 12–49)
MCH RBC QN AUTO: 31.6 PG (ref 26–34)
MCHC RBC AUTO-ENTMCNC: 34.5 G/DL (ref 30–36.5)
MCV RBC AUTO: 91.7 FL (ref 80–99)
METAMYELOCYTES NFR BLD MANUAL: 1 %
MONOCYTES # BLD: 1.8 K/UL (ref 0–1)
MONOCYTES NFR BLD: 15 % (ref 5–13)
MYELOCYTES NFR BLD MANUAL: 1 %
NEUTS BAND NFR BLD MANUAL: 11 % (ref 0–6)
NEUTS SEG # BLD: 5.7 K/UL (ref 1.8–8)
NEUTS SEG NFR BLD: 37 % (ref 32–75)
NRBC # BLD: 0.06 K/UL (ref 0–0.01)
NRBC BLD-RTO: 0.5 PER 100 WBC
PLATELET # BLD AUTO: 133 K/UL (ref 150–400)
PMV BLD AUTO: 10.9 FL (ref 8.9–12.9)
POTASSIUM SERPL-SCNC: 2.8 MMOL/L (ref 3.5–5.1)
PROT SERPL-MCNC: 6.7 G/DL (ref 6.4–8.2)
RBC # BLD AUTO: 3.73 M/UL (ref 3.8–5.2)
RBC MORPH BLD: ABNORMAL
RBC MORPH BLD: ABNORMAL
SODIUM SERPL-SCNC: 140 MMOL/L (ref 136–145)
WBC # BLD AUTO: 11.9 K/UL (ref 3.6–11)

## 2021-12-20 PROCEDURE — 80053 COMPREHEN METABOLIC PANEL: CPT

## 2021-12-20 PROCEDURE — 96360 HYDRATION IV INFUSION INIT: CPT

## 2021-12-20 PROCEDURE — G8754 DIAS BP LESS 90: HCPCS | Performed by: INTERNAL MEDICINE

## 2021-12-20 PROCEDURE — G8427 DOCREV CUR MEDS BY ELIG CLIN: HCPCS | Performed by: INTERNAL MEDICINE

## 2021-12-20 PROCEDURE — G8400 PT W/DXA NO RESULTS DOC: HCPCS | Performed by: INTERNAL MEDICINE

## 2021-12-20 PROCEDURE — 74011250637 HC RX REV CODE- 250/637: Performed by: NURSE PRACTITIONER

## 2021-12-20 PROCEDURE — A9575 INJ GADOTERATE MEGLUMI 0.1ML: HCPCS | Performed by: RADIOLOGY

## 2021-12-20 PROCEDURE — 36415 COLL VENOUS BLD VENIPUNCTURE: CPT

## 2021-12-20 PROCEDURE — 74011250636 HC RX REV CODE- 250/636: Performed by: RADIOLOGY

## 2021-12-20 PROCEDURE — 71260 CT THORAX DX C+: CPT

## 2021-12-20 PROCEDURE — 74011250636 HC RX REV CODE- 250/636: Performed by: NURSE PRACTITIONER

## 2021-12-20 PROCEDURE — 1090F PRES/ABSN URINE INCON ASSESS: CPT | Performed by: INTERNAL MEDICINE

## 2021-12-20 PROCEDURE — 74011000636 HC RX REV CODE- 636: Performed by: NURSE PRACTITIONER

## 2021-12-20 PROCEDURE — 77030016057 HC NDL HUBR APOL -B

## 2021-12-20 PROCEDURE — G8752 SYS BP LESS 140: HCPCS | Performed by: INTERNAL MEDICINE

## 2021-12-20 PROCEDURE — 77030021566

## 2021-12-20 PROCEDURE — 74183 MRI ABD W/O CNTR FLWD CNTR: CPT

## 2021-12-20 PROCEDURE — 1101F PT FALLS ASSESS-DOCD LE1/YR: CPT | Performed by: INTERNAL MEDICINE

## 2021-12-20 PROCEDURE — 72197 MRI PELVIS W/O & W/DYE: CPT

## 2021-12-20 PROCEDURE — G8432 DEP SCR NOT DOC, RNG: HCPCS | Performed by: INTERNAL MEDICINE

## 2021-12-20 PROCEDURE — 99214 OFFICE O/P EST MOD 30 MIN: CPT | Performed by: INTERNAL MEDICINE

## 2021-12-20 PROCEDURE — 3017F COLORECTAL CA SCREEN DOC REV: CPT | Performed by: INTERNAL MEDICINE

## 2021-12-20 PROCEDURE — G8417 CALC BMI ABV UP PARAM F/U: HCPCS | Performed by: INTERNAL MEDICINE

## 2021-12-20 PROCEDURE — G8536 NO DOC ELDER MAL SCRN: HCPCS | Performed by: INTERNAL MEDICINE

## 2021-12-20 PROCEDURE — 85025 COMPLETE CBC W/AUTO DIFF WBC: CPT

## 2021-12-20 RX ORDER — POTASSIUM CHLORIDE 750 MG/1
40 TABLET, FILM COATED, EXTENDED RELEASE ORAL
Status: COMPLETED | OUTPATIENT
Start: 2021-12-20 | End: 2021-12-20

## 2021-12-20 RX ORDER — HEPARIN 100 UNIT/ML
500 SYRINGE INTRAVENOUS
Status: COMPLETED | OUTPATIENT
Start: 2021-12-20 | End: 2021-12-20

## 2021-12-20 RX ORDER — GADOTERATE MEGLUMINE 376.9 MG/ML
20 INJECTION INTRAVENOUS
Status: COMPLETED | OUTPATIENT
Start: 2021-12-20 | End: 2021-12-20

## 2021-12-20 RX ADMIN — POTASSIUM CHLORIDE 40 MEQ: 750 TABLET, FILM COATED, EXTENDED RELEASE ORAL at 11:04

## 2021-12-20 RX ADMIN — GADOTERATE MEGLUMINE 20 ML: 376.9 INJECTION INTRAVENOUS at 19:03

## 2021-12-20 RX ADMIN — HEPARIN 300 UNITS: 100 SYRINGE at 19:04

## 2021-12-20 RX ADMIN — IOPAMIDOL 100 ML: 612 INJECTION, SOLUTION INTRAVENOUS at 17:35

## 2021-12-20 RX ADMIN — SODIUM CHLORIDE 1000 ML: 9 INJECTION, SOLUTION INTRAVENOUS at 10:00

## 2021-12-20 NOTE — PROGRESS NOTES
Outpatient [facility] Short Visit Progress Note    0845 Patient admitted to [facility] for hydration ambulatory in stable condition. Assessment completed. No new concerns voiced. Vital Signs:  Visit Vitals  /62   Pulse (!) 50   Temp 97.8 °F (36.6 °C)   Resp 16   Ht 5' 4\" (1.626 m)   Wt 108.6 kg (239 lb 8 oz)   BMI 41.11 kg/m²         Right chest wall port with positive blood return. Lab Results:  Recent Results (from the past 12 hour(s))   CBC WITH AUTOMATED DIFF    Collection Time: 12/20/21  8:44 AM   Result Value Ref Range    WBC 11.9 (H) 3.6 - 11.0 K/uL    RBC 3.73 (L) 3.80 - 5.20 M/uL    HGB 11.8 11.5 - 16.0 g/dL    HCT 34.2 (L) 35.0 - 47.0 %    MCV 91.7 80.0 - 99.0 FL    MCH 31.6 26.0 - 34.0 PG    MCHC 34.5 30.0 - 36.5 g/dL    RDW 16.7 (H) 11.5 - 14.5 %    PLATELET 852 (L) 669 - 400 K/uL    MPV 10.9 8.9 - 12.9 FL    NRBC 0.5 (H) 0  WBC    ABSOLUTE NRBC 0.06 (H) 0.00 - 0.01 K/uL    NEUTROPHILS 37 32 - 75 %    BAND NEUTROPHILS 11 (H) 0 - 6 %    LYMPHOCYTES 35 12 - 49 %    MONOCYTES 15 (H) 5 - 13 %    EOSINOPHILS 0 0 - 7 %    BASOPHILS 0 0 - 1 %    METAMYELOCYTES 1 (H) 0 %    MYELOCYTES 1 (H) 0 %    IMMATURE GRANULOCYTES 0 %    ABS. NEUTROPHILS 5.7 1.8 - 8.0 K/UL    ABS. LYMPHOCYTES 4.2 (H) 0.8 - 3.5 K/UL    ABS. MONOCYTES 1.8 (H) 0.0 - 1.0 K/UL    ABS. EOSINOPHILS 0.0 0.0 - 0.4 K/UL    ABS. BASOPHILS 0.0 0.0 - 0.1 K/UL    ABS. IMM.  GRANS. 0.0 K/UL    DF MANUAL      RBC COMMENTS ANISOCYTOSIS  1+        RBC COMMENTS POLYCHROMASIA  1+       METABOLIC PANEL, COMPREHENSIVE    Collection Time: 12/20/21  8:44 AM   Result Value Ref Range    Sodium 140 136 - 145 mmol/L    Potassium 2.8 (L) 3.5 - 5.1 mmol/L    Chloride 105 97 - 108 mmol/L    CO2 26 21 - 32 mmol/L    Anion gap 9 5 - 15 mmol/L    Glucose 105 (H) 65 - 100 mg/dL    BUN 10 6 - 20 MG/DL    Creatinine 1.24 (H) 0.55 - 1.02 MG/DL    BUN/Creatinine ratio 8 (L) 12 - 20      GFR est AA 52 (L) >60 ml/min/1.73m2    GFR est non-AA 43 (L) >60 ml/min/1.73m2    Calcium 8.6 8.5 - 10.1 MG/DL    Bilirubin, total 0.6 0.2 - 1.0 MG/DL    ALT (SGPT) 55 12 - 78 U/L    AST (SGOT) 45 (H) 15 - 37 U/L    Alk. phosphatase 111 45 - 117 U/L    Protein, total 6.7 6.4 - 8.2 g/dL    Albumin 3.0 (L) 3.5 - 5.0 g/dL    Globulin 3.7 2.0 - 4.0 g/dL    A-G Ratio 0.8 (L) 1.1 - 2.2               Medications:  Medications Administered     potassium chloride SR (KLOR-CON 10) tablet 40 mEq     Admin Date  12/20/2021 Action  Given Dose  40 mEq Route  Oral Administered By  Sarah Rosenbaum RN          sodium chloride 0.9 % bolus infusion 1,000 mL     Admin Date  12/20/2021 Action  New Bag Dose  1,000 mL Rate  1,000 mL/hr Route  IntraVENous Administered By  Kemal Ace RN                2068 Patient tolerated treatment well. Patient discharged from [place] [address] ambulatory in no distress. Patient aware of next appointment on 12/27/21.     Flaquita Wolff, BALTAZAR    Future Appointments   Date Time Provider Sally Carrizales   12/20/2021  6:30 PM [facility] CT 1 SFMRCT [facility]   12/20/2021  7:45 PM [facility] MRI 1 SFMRMRI [facility]   12/20/2021  8:30 PM [facility] MRI 1 SFMRMRI [facility]   12/27/2021  7:30 AM [facility]   12/27/2021  8:15 AM Omero Paez, NP [facility] BS AMB   12/29/2021  3:00 PM [facility]5 [facility]   1/10/2022  7:30 AM SS INF2 CH2 >7H [facility]   1/12/2022  2:30 PM SS IN[facility]4 [facility]   1/24/2022  7:30 AM SS [facility]   1/26/2022  2:30 PM SS INF5 CH4 <1H [facility]   2/7/2022  7:30 AM SS [facility]2 [facility]   2/9/2022  2:30 PM SS IN[facility]5 [facility]   2/21/2022  7:30 AM SS [facility]2 [facility]   2/23/2022  2:30 PM [facility]5 [facility]H [facility]   3/7/2022  7:30 AM SS INF2 CH2 >7H [facility]   3/9/2022  2:30 PM SS INF4 CH4 <1H [facility] [address] [place] [address]

## 2021-12-20 NOTE — PROGRESS NOTES
Cherise Cobb is a 77 y.o. female here for follow up of pancreatic cancer. Patient with no complaints of pain at this time.

## 2021-12-22 ENCOUNTER — HOSPITAL ENCOUNTER (OUTPATIENT)
Dept: INFUSION THERAPY | Age: 66
End: 2021-12-22

## 2021-12-27 ENCOUNTER — HOSPITAL ENCOUNTER (OUTPATIENT)
Dept: INFUSION THERAPY | Age: 66
Discharge: HOME | End: 2021-12-27
Payer: MEDICARE

## 2021-12-27 ENCOUNTER — APPOINTMENT (OUTPATIENT)
Dept: INFUSION THERAPY | Age: 66
End: 2021-12-27

## 2021-12-27 ENCOUNTER — OFFICE VISIT (OUTPATIENT)
Dept: ONCOLOGY | Age: 66
End: 2021-12-27

## 2021-12-27 VITALS
DIASTOLIC BLOOD PRESSURE: 64 MMHG | HEIGHT: 64 IN | OXYGEN SATURATION: 98 % | HEART RATE: 83 BPM | TEMPERATURE: 97.8 F | BODY MASS INDEX: 42.51 KG/M2 | SYSTOLIC BLOOD PRESSURE: 104 MMHG | WEIGHT: 249 LBS

## 2021-12-27 VITALS
TEMPERATURE: 97.8 F | BODY MASS INDEX: 42.59 KG/M2 | HEIGHT: 64 IN | HEART RATE: 51 BPM | RESPIRATION RATE: 18 BRPM | SYSTOLIC BLOOD PRESSURE: 118 MMHG | DIASTOLIC BLOOD PRESSURE: 69 MMHG | OXYGEN SATURATION: 98 % | WEIGHT: 249.5 LBS

## 2021-12-27 DIAGNOSIS — E86.0 DEHYDRATION: Primary | ICD-10-CM

## 2021-12-27 DIAGNOSIS — Z76.89 PREVENTION OF CHEMOTHERAPY-INDUCED NEUTROPENIA: ICD-10-CM

## 2021-12-27 DIAGNOSIS — D64.9 NORMOCYTIC ANEMIA: ICD-10-CM

## 2021-12-27 DIAGNOSIS — C25.0 MALIGNANT NEOPLASM OF HEAD OF PANCREAS (HCC): ICD-10-CM

## 2021-12-27 DIAGNOSIS — E87.6 HYPOKALEMIA: ICD-10-CM

## 2021-12-27 DIAGNOSIS — C25.9 PANCREATIC ADENOCARCINOMA (HCC): Primary | ICD-10-CM

## 2021-12-27 DIAGNOSIS — Z51.11 CHEMOTHERAPY MANAGEMENT, ENCOUNTER FOR: ICD-10-CM

## 2021-12-27 DIAGNOSIS — K52.1 DIARRHEA DUE TO DRUG: ICD-10-CM

## 2021-12-27 LAB
ALBUMIN SERPL-MCNC: 2.6 G/DL (ref 3.5–5)
ALBUMIN/GLOB SERPL: 0.7 {RATIO} (ref 1.1–2.2)
ALP SERPL-CCNC: 106 U/L (ref 45–117)
ALT SERPL-CCNC: 40 U/L (ref 12–78)
ANION GAP SERPL CALC-SCNC: 2 MMOL/L (ref 5–15)
AST SERPL-CCNC: 45 U/L (ref 15–37)
BASOPHILS # BLD: 0.1 K/UL (ref 0–0.1)
BASOPHILS NFR BLD: 1 % (ref 0–1)
BILIRUB SERPL-MCNC: 0.7 MG/DL (ref 0.2–1)
BUN SERPL-MCNC: 11 MG/DL (ref 6–20)
BUN/CREAT SERPL: 14 (ref 12–20)
CALCIUM SERPL-MCNC: 8.7 MG/DL (ref 8.5–10.1)
CHLORIDE SERPL-SCNC: 108 MMOL/L (ref 97–108)
CO2 SERPL-SCNC: 31 MMOL/L (ref 21–32)
CREAT SERPL-MCNC: 0.8 MG/DL (ref 0.55–1.02)
DIFFERENTIAL METHOD BLD: ABNORMAL
EOSINOPHIL # BLD: 0.2 K/UL (ref 0–0.4)
EOSINOPHIL NFR BLD: 2 % (ref 0–7)
ERYTHROCYTE [DISTWIDTH] IN BLOOD BY AUTOMATED COUNT: 18.7 % (ref 11.5–14.5)
FERRITIN SERPL-MCNC: 244 NG/ML (ref 26–388)
GLOBULIN SER CALC-MCNC: 3.9 G/DL (ref 2–4)
GLUCOSE SERPL-MCNC: 95 MG/DL (ref 65–100)
HCT VFR BLD AUTO: 29.7 % (ref 35–47)
HGB BLD-MCNC: 9.9 G/DL (ref 11.5–16)
IMM GRANULOCYTES # BLD AUTO: 0.2 K/UL (ref 0–0.04)
IMM GRANULOCYTES NFR BLD AUTO: 2 % (ref 0–0.5)
IRON SATN MFR SERPL: 27 % (ref 20–50)
IRON SERPL-MCNC: 87 UG/DL (ref 35–150)
LYMPHOCYTES # BLD: 3.9 K/UL (ref 0.8–3.5)
LYMPHOCYTES NFR BLD: 45 % (ref 12–49)
MCH RBC QN AUTO: 31.9 PG (ref 26–34)
MCHC RBC AUTO-ENTMCNC: 33.3 G/DL (ref 30–36.5)
MCV RBC AUTO: 95.8 FL (ref 80–99)
MONOCYTES # BLD: 1.4 K/UL (ref 0–1)
MONOCYTES NFR BLD: 16 % (ref 5–13)
NEUTS SEG # BLD: 3 K/UL (ref 1.8–8)
NEUTS SEG NFR BLD: 35 % (ref 32–75)
NRBC # BLD: 0.02 K/UL (ref 0–0.01)
NRBC BLD-RTO: 0.2 PER 100 WBC
PLATELET # BLD AUTO: 240 K/UL (ref 150–400)
PMV BLD AUTO: 10.9 FL (ref 8.9–12.9)
POTASSIUM SERPL-SCNC: 3 MMOL/L (ref 3.5–5.1)
PROT SERPL-MCNC: 6.5 G/DL (ref 6.4–8.2)
RBC # BLD AUTO: 3.1 M/UL (ref 3.8–5.2)
SODIUM SERPL-SCNC: 141 MMOL/L (ref 136–145)
TIBC SERPL-MCNC: 327 UG/DL (ref 250–450)
WBC # BLD AUTO: 8.6 K/UL (ref 3.6–11)

## 2021-12-27 PROCEDURE — 96415 CHEMO IV INFUSION ADDL HR: CPT

## 2021-12-27 PROCEDURE — 99215 OFFICE O/P EST HI 40 MIN: CPT | Performed by: INTERNAL MEDICINE

## 2021-12-27 PROCEDURE — 85025 COMPLETE CBC W/AUTO DIFF WBC: CPT

## 2021-12-27 PROCEDURE — 83540 ASSAY OF IRON: CPT

## 2021-12-27 PROCEDURE — 74011000258 HC RX REV CODE- 258: Performed by: NURSE PRACTITIONER

## 2021-12-27 PROCEDURE — 96417 CHEMO IV INFUS EACH ADDL SEQ: CPT

## 2021-12-27 PROCEDURE — G8432 DEP SCR NOT DOC, RNG: HCPCS | Performed by: INTERNAL MEDICINE

## 2021-12-27 PROCEDURE — 1101F PT FALLS ASSESS-DOCD LE1/YR: CPT | Performed by: INTERNAL MEDICINE

## 2021-12-27 PROCEDURE — G8427 DOCREV CUR MEDS BY ELIG CLIN: HCPCS | Performed by: INTERNAL MEDICINE

## 2021-12-27 PROCEDURE — 96372 THER/PROPH/DIAG INJ SC/IM: CPT

## 2021-12-27 PROCEDURE — 96416 CHEMO PROLONG INFUSE W/PUMP: CPT

## 2021-12-27 PROCEDURE — 82728 ASSAY OF FERRITIN: CPT

## 2021-12-27 PROCEDURE — 74011250637 HC RX REV CODE- 250/637: Performed by: NURSE PRACTITIONER

## 2021-12-27 PROCEDURE — 80053 COMPREHEN METABOLIC PANEL: CPT

## 2021-12-27 PROCEDURE — G8752 SYS BP LESS 140: HCPCS | Performed by: INTERNAL MEDICINE

## 2021-12-27 PROCEDURE — 3017F COLORECTAL CA SCREEN DOC REV: CPT | Performed by: INTERNAL MEDICINE

## 2021-12-27 PROCEDURE — G8417 CALC BMI ABV UP PARAM F/U: HCPCS | Performed by: INTERNAL MEDICINE

## 2021-12-27 PROCEDURE — 77030012965 HC NDL HUBR BBMI -A

## 2021-12-27 PROCEDURE — G8536 NO DOC ELDER MAL SCRN: HCPCS | Performed by: INTERNAL MEDICINE

## 2021-12-27 PROCEDURE — 96375 TX/PRO/DX INJ NEW DRUG ADDON: CPT

## 2021-12-27 PROCEDURE — 74011250636 HC RX REV CODE- 250/636: Performed by: NURSE PRACTITIONER

## 2021-12-27 PROCEDURE — 96368 THER/DIAG CONCURRENT INF: CPT

## 2021-12-27 PROCEDURE — G8754 DIAS BP LESS 90: HCPCS | Performed by: INTERNAL MEDICINE

## 2021-12-27 PROCEDURE — 1090F PRES/ABSN URINE INCON ASSESS: CPT | Performed by: INTERNAL MEDICINE

## 2021-12-27 PROCEDURE — 96366 THER/PROPH/DIAG IV INF ADDON: CPT

## 2021-12-27 PROCEDURE — 36415 COLL VENOUS BLD VENIPUNCTURE: CPT

## 2021-12-27 PROCEDURE — G8400 PT W/DXA NO RESULTS DOC: HCPCS | Performed by: INTERNAL MEDICINE

## 2021-12-27 PROCEDURE — 96413 CHEMO IV INFUSION 1 HR: CPT

## 2021-12-27 RX ORDER — DEXTROSE MONOHYDRATE 50 MG/ML
25 INJECTION, SOLUTION INTRAVENOUS CONTINUOUS
Status: DISPENSED | OUTPATIENT
Start: 2021-12-27 | End: 2021-12-27

## 2021-12-27 RX ORDER — HEPARIN 100 UNIT/ML
300-500 SYRINGE INTRAVENOUS AS NEEDED
Status: CANCELLED | OUTPATIENT
Start: 2022-01-06

## 2021-12-27 RX ORDER — ATROPINE SULFATE 0.4 MG/ML
0.4 INJECTION, SOLUTION ENDOTRACHEAL; INTRAMEDULLARY; INTRAMUSCULAR; INTRAVENOUS; SUBCUTANEOUS
Status: DISPENSED | OUTPATIENT
Start: 2021-12-27 | End: 2021-12-27

## 2021-12-27 RX ORDER — POTASSIUM CHLORIDE 750 MG/1
40 TABLET, FILM COATED, EXTENDED RELEASE ORAL
Status: COMPLETED | OUTPATIENT
Start: 2021-12-27 | End: 2021-12-27

## 2021-12-27 RX ORDER — HEPARIN 100 UNIT/ML
300-500 SYRINGE INTRAVENOUS AS NEEDED
Status: ACTIVE | OUTPATIENT
Start: 2021-12-27 | End: 2021-12-27

## 2021-12-27 RX ORDER — SODIUM CHLORIDE 9 MG/ML
10 INJECTION INTRAMUSCULAR; INTRAVENOUS; SUBCUTANEOUS AS NEEDED
Status: CANCELLED | OUTPATIENT
Start: 2022-01-06

## 2021-12-27 RX ORDER — SODIUM CHLORIDE 0.9 % (FLUSH) 0.9 %
10 SYRINGE (ML) INJECTION AS NEEDED
Status: DISPENSED | OUTPATIENT
Start: 2021-12-27 | End: 2021-12-27

## 2021-12-27 RX ORDER — SODIUM CHLORIDE 9 MG/ML
1000 INJECTION, SOLUTION INTRAVENOUS ONCE
Status: CANCELLED
Start: 2022-01-06 | End: 2022-01-03

## 2021-12-27 RX ORDER — SODIUM CHLORIDE 0.9 % (FLUSH) 0.9 %
10 SYRINGE (ML) INJECTION AS NEEDED
Status: CANCELLED | OUTPATIENT
Start: 2022-01-06

## 2021-12-27 RX ORDER — SODIUM CHLORIDE 9 MG/ML
1000 INJECTION, SOLUTION INTRAVENOUS ONCE
Status: CANCELLED
Start: 2021-12-29 | End: 2021-12-29

## 2021-12-27 RX ORDER — SODIUM CHLORIDE 9 MG/ML
10 INJECTION INTRAMUSCULAR; INTRAVENOUS; SUBCUTANEOUS AS NEEDED
Status: ACTIVE | OUTPATIENT
Start: 2021-12-27 | End: 2021-12-27

## 2021-12-27 RX ORDER — PALONOSETRON 0.05 MG/ML
0.25 INJECTION, SOLUTION INTRAVENOUS ONCE
Status: COMPLETED | OUTPATIENT
Start: 2021-12-27 | End: 2021-12-27

## 2021-12-27 RX ADMIN — PALONOSETRON 0.25 MG: 0.05 INJECTION, SOLUTION INTRAVENOUS at 09:27

## 2021-12-27 RX ADMIN — DEXAMETHASONE SODIUM PHOSPHATE 12 MG: 10 INJECTION, SOLUTION INTRAMUSCULAR; INTRAVENOUS at 09:30

## 2021-12-27 RX ADMIN — LEUCOVORIN CALCIUM 810 MG: 350 INJECTION, POWDER, LYOPHILIZED, FOR SUSPENSION INTRAMUSCULAR; INTRAVENOUS at 12:41

## 2021-12-27 RX ADMIN — POTASSIUM CHLORIDE 40 MEQ: 750 TABLET, FILM COATED, EXTENDED RELEASE ORAL at 09:16

## 2021-12-27 RX ADMIN — FLUOROURACIL 4860 MG: 50 INJECTION, SOLUTION INTRAVENOUS at 14:26

## 2021-12-27 RX ADMIN — DEXTROSE MONOHYDRATE 172 MG: 5 INJECTION, SOLUTION INTRAVENOUS at 10:20

## 2021-12-27 RX ADMIN — DEXTROSE MONOHYDRATE 25 ML/HR: 50 INJECTION, SOLUTION INTRAVENOUS at 09:27

## 2021-12-27 RX ADMIN — DEXTROSE MONOHYDRATE 304 MG: 5 INJECTION, SOLUTION INTRAVENOUS at 12:41

## 2021-12-27 RX ADMIN — ATROPINE SULFATE 0.4 MG: 0.4 INJECTION, SOLUTION INTRAMUSCULAR; INTRAVENOUS; SUBCUTANEOUS at 12:20

## 2021-12-27 NOTE — PROGRESS NOTES
[facility] [place] at [address], [address] [facility] 100[address] [facility]  W: [phone]  F: [phone]      Reason for Visit:   Juan Garza is a 77 y.o. female who is seen for evaluation of new bilary or pancreatic head mass    Hematology/Oncology Treatment History:     Diagnosis: Pancreatic adenocarcinoma    Stage: clinical Stage Ib [T2N0]    Pathology:   -9/26/21 Cytology - brushings from common bile duct: Reactive glandular epithelial cells and bile   -9/28/21 pancreatic head mass biopsy: Adenocarcinoma.   -10/22/21 Invdeyanira ca-cancer panel -negative     Prior Treatment: None    Current Treatment: neoadjuvant FOLFIRNOX to 10/18/21 - current. Added neulasta with C2. History of Present Illness:   Juan Garza is a pleasant 77 y.o. female who comes in for evaluation of pancreatic cancer. She presented in 9/2021 with obstructive jaundice, transaminitis. ERCP on 9/26/21 notable for distal CBD stricture; possible tumor/mass located in the CBD; underwent biliary stent placement to relieve biliary obstruction. CT A/P revealed mild/mod intrahepatic biliary ductal dilatation; prominence of CBD and hydropic gallbladder, suggestive of stricture/stenosis/mass of distal CBD. MRI of abdomen showed narrowing of CBD pancreatic head suggestive of extrinisic compression concerning for periampullary mass vs eccentric intraluminal lesion. EUS showed 3.2cm hypoechoic mass in pancreas head. Biopsy revealed adenocarcinoma. Pt met with Dr. Jaz Lopez in [place] and plans for neoadjuvant chemotherapy prior to surgery. Interval history:  Patient here for follow up of pancreas cancer. Completed MRI and CT imaging. Reports on Tuesday this week diarrhea resolved - now having daily solid stools. Has not required anti-diarrheals since last Thursday. Reports she is feeling stronger - eating and hydrating well - gained weight. Nausea has resolved.  Reports after MRI - developed stinging and tenderness at port site - denies redness or drainage at port site. Reports port flushed well this morning with good blood returned. Denies melena/hematochezia, no epistaxis or hematuria. Dropped off stool sample at [facility] today per GI recommendations. No fevers, chills, SOB or CP. PMHx: OA and Rheumatoid arthritis in lower back, Ulcerative colitis, HTN, Hypothyroidism  PSurgHx: , Cyst removed from left breast  SHx: , has 3 children (1 daughter and 2 sons). Works as  in [employer]. Nonsmoker, no EtOH.  in rehab [facility]. FHx: Mother  of pancreatic cancer age 80. Brother and son had colon cancer. Review of Systems: A complete review of systems was obtained, reviewed. Pertinent findings reviewed above. Physical Exam:     Visit Vitals  /64   Pulse 83   Temp 97.8 °F (36.6 °C)   Ht 5' 4\" (1.626 m)   Wt 249 lb (112.9 kg)   SpO2 98%   BMI 42.74 kg/m²     ECOG PS: 1  General: obese; no distress  Eyes:  Anicteric sclerae  HENT: atraumatic, face mask in place  Neck: supple  Lymphatic: Deferred today  Respiratory: CTAB, normal respiratory effort  CV: normal rate, regular rhythm, no murmurs, no peripheral edema, port in place. GI: soft, nontender, nondistended, no masses  MS: digits without clubbing or cyanosis. Skin: no rashes, no ecchymoses, no petechia. normal temperature, turgor, and texture. Darkening of hands due to chemotherapy noted  Psych: alert, oriented, appropriate affect, normal judgment/insight    Results:     Lab Results   Component Value Date/Time    WBC 8.6 2021 07:52 AM    HGB 9.9 (L) 2021 07:52 AM    HCT 29.7 (L) 2021 07:52 AM    PLATELET 149  07:52 AM    MCV 95.8 2021 07:52 AM    ABS.  NEUTROPHILS 3.0 2021 07:52 AM    Hemoglobin (POC) 12.6 10/06/2013 08:45 AM    Hematocrit (POC) 37 10/06/2013 08:45 AM     Lab Results   Component Value Date/Time    Sodium 141 2021 07:52 AM    Potassium 3.0 (L) 12/27/2021 07:52 AM    Chloride 108 12/27/2021 07:52 AM    CO2 31 12/27/2021 07:52 AM    Glucose 95 12/27/2021 07:52 AM    BUN 11 12/27/2021 07:52 AM    Creatinine 0.80 12/27/2021 07:52 AM    GFR est AA >60 12/27/2021 07:52 AM    GFR est non-AA >60 12/27/2021 07:52 AM    Calcium 8.7 12/27/2021 07:52 AM    Sodium (POC) 143 10/06/2013 08:45 AM    Potassium (POC) 3.4 (L) 10/06/2013 08:45 AM    Chloride (POC) 104 10/06/2013 08:45 AM    Glucose (POC) 91 10/06/2013 08:45 AM    BUN (POC) 11 10/06/2013 08:45 AM    Creatinine (POC) 1.0 10/06/2013 08:45 AM    Calcium, ionized (POC) 1.27 10/06/2013 08:45 AM     Lab Results   Component Value Date/Time    Bilirubin, total 0.7 12/27/2021 07:52 AM    ALT (SGPT) 40 12/27/2021 07:52 AM    Alk. phosphatase 106 12/27/2021 07:52 AM    Protein, total 6.5 12/27/2021 07:52 AM    Albumin 2.6 (L) 12/27/2021 07:52 AM    Globulin 3.9 12/27/2021 07:52 AM     Lab Results   Component Value Date/Time    Iron % saturation 32 07/07/2014 02:28 PM    TIBC 304 07/07/2014 02:28 PM    Sed rate (ESR) 17 09/16/2010 12:04 PM    C-Reactive protein <0.29 01/18/2017 08:45 AM    TSH 3.46 03/04/2021 08:57 AM    ALBA, Direct Detected (A) 09/16/2010 12:04 PM    Lipase 1,214 (H) 09/29/2021 04:10 AM    Hep C virus Ab Interp. NONREACTIVE 09/24/2021 06:55 PM     Lab Results   Component Value Date/Time    CEA 3.5 09/25/2021 11:46 AM    Carbohydrate Antigen 19-9, (CA 19-9) 138 (H) 10/18/2021 08:04 AM       10/18/21 CA 19-9:  138      Imaging / Procedures:     9/24/21 US ABD   IMPRESSION  Cholelithiasis. Gallbladder sludge. Prominent CBD with mild intrahepatic ductal dilatation as well. Nonemergent MRI with MRCP to delineate etiology for CBD distention. 9/24/21 CT ABD PELV W CONT  There is mild/moderate intrahepatic biliary ductal dilatation, prominence of the CBD and a hydropic gallbladder. No pancreatic duct dilatation. No clearly  delineated pancreatic head mass.  Imaging findings suggestive of stricture/stenosis/mass of the distal CBD, no extrinsic pancreatic head mass is  identified. Gastroenterology consult  Correlation with MRI/MRCP on a nonemergent basis. There is severe degenerative change of the lumbar spine. 9/25/21 MRI ABD WO CONT  IMPRESSION  1. Intrahepatic and extra hepatic biliary dilatation with abrupt narrowing of  the common bile duct pancreatic head just proximal to the ampulla. Suggestion of  extrinsic compression on the duct. This raises the possibility of a  periampullary mass. Alternatively, this may be an eccentric intraluminal lesion. Evaluation is limited. Recommend GI consultation for possible ERCP and EUS. 2.  Questionable small lesion in the left hepatic lobe with mild increased T2  signal and T1 hypointensity. Recommend follow-up imaging a contrast-enhanced  study preferably MRI. 3.  Cholelithiasis. Distended gallbladder with mild gallbladder wall thickening. Correlate for acute cholecystitis    9/26/21 XR ERCP/ERCB / Dr. Tea Whelan  Impression: Biliary ductal dilation, with a maximum common duct diameter of 15 mm; Severe stenosis, involving distal CBD /pancreas head area concerning for neoplasia  Interventions: Endoscopic ampullary sphincterotomy and biliary stent placement; brushings from the CBD    9/27/21 CT CHEST W CONT:   IMPRESSION  1. There is a minimal right pleural effusion. 2. There are small pulmonary nodules present. There is a nodule in the plane of  the right major fissure and posteriorly in left lower lobe. These were not  present on examination of 1/18/2017. These could be on the basis of metastatic  disease. Close follow-up is suggested. There are also 2 small subpleural nodules  anteriorly in the right upper lobe as described above which can also be  evaluated on follow-up.     9/28/21 EUS:  Endoscopic: Esophagus:normal; Stomach: normal; Duodenum/jejunum: normal and protruding biliary stent  Ultrasound: Esophagus: normal findings;     Stomach: normal findings:     Pancreas: Areas examined: the entire gland                          Parenchyma: -Evidence of a hypoechoic mass with poorly defined borders seen in the head of the pancreas. It appeared involving the CBD where a stent is seen, however it did not involve the major vessels. It measured about 3.2 cm in widest diameter on one view. Pancreatic Duct: normal findings, not dilated               Liver: Parenchyma: normal                          Gallbladder: normal                          Bile Duct: the common bile duct is dilated in the proximal and mid portion and a plastic stent could be seen                          Lymph Node: no adenopathy  Impression:   Pancreas head mass with fine needle biopsy showing adenocarcinoma on preliminary cytology  Recommendations: Follow-up on pathology  Follow-up with oncology, will need further evaluation of pulmonary nodule  Surgical oncology consultation  Resume GI lite diet today and can discharge in am from GI standpoint    12/20/2021:  MRI abd w wo cont:  IMPRESSION  1. Positive response to therapy. Decreased size of the pancreatic head mass, which now has a maximum AP diameter of 2.3 cm. No obstruction of the biliary tree or pancreatic duct. No involvement of the SMA. Based on imaging, patient is a candidate for resection. 2. Cholelithiasis. No acute cholecystitis or biliary obstruction. MRI Pelv w wo cont:  IMPRESSION  No acute process or evidence of malignant neoplasm/ metastatic disease. CT chest w cont:  IMPRESSION  1. Interval apparent resolution of previously seen scattered subcentimeter  bilateral pulmonary nodules and right pleural effusion. 2.  Please see separately dictated reports for the MRI abdomen and pelvis  obtained the same day for subdiaphragmatic findings.     Assessment/Recommendations:   77 y.o. female with Ulcerative colitis, Hyopthyroidism, admitted with obstructive jaundice concerning for underlying malignant obstructing mass. 1. Pancreatic adenocarcinoma:  Clinically stage Ib [T2N0], but need further evaluation in future regarding pulmonary nodules and liver lesion. Presented with obstructive jaundice and transaminitis, CA 19-9 was 198 in 9/2021 at diagnosis. Underwent biliary stent placement with improvement in biliary obstruction. 3.2cm mass seen in pancreatic head on EUS, not involving vasculature, possibly resectable. Dr. Eric Mckoy in [place] has evaluated patient and recommends consideration of neoadjuvant chemotherapy with close attention to lung nodules and liver lesion. I agree with this recommendation for earlier treatment of micrometastatic disease, ability to determine whether pt has chemosensitive disease. We discussed the risks and benefits of FOLFIRINOX chemotherapy, including potential side effects. These include but are not limited to fatigue, nausea vomiting, diarrhea, neuropathy, taste changes, cold intolerance, esophageal spasm, allergic reactions, alopecia, mucositis, myelosuppression, risk for infection, infertility, and rarely, death. Rarely, a patient may have a condition where they do not metabolize fluorouracil appropriately (called DPD deficiency), and they may have excessive toxicity. A Port-A-Cath will be required in order to deliver the continuous infusion. BRCA 1/2 negative. The patient has consented to beginning therapy. 12/2021 MRI pelvis without disease and MRI abdomen showed decrease in size of pancreatic head mass. Planning for 6 cycles of mFOLFIRINOX in neoadjuvant setting, followed by surgery and adjuvant therapy x 6 cycles. Supportive care meds include: Emla cream, Zofran, Compazine, Dexamethasone  -- Proceed with C5 of neoadjuvant mFOLFIRINOX with neulasta support (10% DR taken)  -- Fluids with pump removal and next week.    -- Checking CA 19-9 on date of C1, C6, C7, C12.   -- Needs to follow up with Dr. Eric Mckoy immediately before C6.   -- Return in 2 weeks for C6 mFOLFIRINOX, MD/NP visit. 2. Diarrhea / hypokalemia:  Has history of UC. Likely related to biliary obstruction and pt has been started on Cholestyramine (Questran) but not taking due to intolerance. For constipation - discussed management with stool softeners, miralax and fiber supplements. On potassium 20meq BID - confirmed she is taking this as prescribed. Had worsening diarrhea after C 3/4 of treatment. -- Was evaluated by GI - with plan for stool studies, dropped off sample today and awaiting results. -- For diarrhea - advised alternating lomotil and imodium prn diarrhea - can be more proactive during the weeks of treatment. -- Continue PO KCl 20meq bid at home. Additional 40 meq KCL in [facility] today. 3. Pulmonary nodules:   Tiny of unclear etiology. Repeat imaging after C4 of chemo showed resolution of pulmonary nodules. 4. HTN:   Currently with stable BP. Managed on HCTZ. 5. H/o Ulcerative procto-sigmoiditis:  Past due for colonoscopy with one adenoma polyp removed at last colonoscopy in April 2017. Was due for repeat in 2019 but she has not followed up with the GI practice since her last colonoscopy. -- Established with GI and plans to have repeat colonoscopy soon. 6. Morbid obesity:  Discussed healthy food options and ways to incorporate more protein into diet. 7. Chemotherapy induced neutropenia:   Neulasta with pump removal.     8. Normocytic anemia:  Likely due to chemotherapy. -- Iron profile, ferritin today. I have personally seen and evaluated the patient in conjunction with Odalys Trejo NP. I find the patient's history and physical exam are consistent with the NP's documentation. I agree with the above assessment and plan, which I have modified as needed. She is feeling much better after a week off of therapy. We will resume treatment now with dose reduction.       Signed By: Felipe Zhu MD

## 2021-12-27 NOTE — PROGRESS NOTES
[facility]patient [facility]fusion Center - Chemotherapy Progress Note    3145 Pt admit to [facility] for C5D1 Folfirinox in stable condition. Assessment completed. No new concerns voiced. PAC with positive blood return.     Chemotherapy Flowsheet 12/27/2021   Cycle C5D1   Date 12/27/2021   Drug / Regimen Folfirinox   Pre Hydration -   Pre Meds -   Notes -         VS  Patient Vitals for the past 12 hrs:   Temp Pulse Resp BP SpO2   12/27/21 1421  (!) 51  118/69    12/27/21 0739 97.8 °F (36.6 °C) 83 18 104/64 98 %         Medications:  Medications Administered     atropine 0.4 mg/mL injection 0.4 mg     Admin Date  12/27/2021 Action  Given Dose  0.4 mg Route  SubCUTAneous Administered By  Chris Camacho RN          dexamethasone (DECADRON) 12 mg in 0.9% sodium chloride 50 mL IVPB     Admin Date  12/27/2021 Action  New Bag Dose  12 mg Route  IntraVENous Administered By  Chris Camacho RN          dextrose 5% infusion     Admin Date  12/27/2021 Action  New Bag Dose  25 mL/hr Rate  25 mL/hr Route  IntraVENous Administered By  Chris Camacho RN          fluorouraciL (ADRUCIL) 4,860 mg in 0.9% sodium chloride 100 mL CADD Cassette     Admin Date  12/27/2021 Action  New Bag Dose  4,860 mg Rate  2.2 mL/hr Route  IntraVENous Administered By  Chris Camacho RN          irinotecan (CAMPTOSAR) 304 mg in dextrose 5% 250 mL, overfill volume 25 mL chemo infusion     Admin Date  12/27/2021 Action  New Bag Dose  304 mg Rate  193.5 mL/hr Route  IntraVENous Administered By  Chris Camacho RN          leucovorin (WELLCOVORIN) 810 mg in dextrose 5% 250 mL, overfill volume 25 mL IVPB     Admin Date  12/27/2021 Action  New Bag Dose  810 mg Rate  210.3 mL/hr Route  IntraVENous Administered By  Chris Camacho RN          oxaliplatin (ELOXATIN) 172 mg in dextrose 5% 250 mL, overfill volume 25 mL chemo infusion     Admin Date  12/27/2021 Action  New Bag Dose  172 mg Rate  154.7 mL/hr Route  IntraVENous Administered By  Huma Perry RN          palonosetron HCl (ALOXI) injection 0.25 mg     Admin Date  12/27/2021 Action  Given Dose  0.25 mg Route  IntraVENous Administered By  Huma Perry RN          potassium chloride SR (KLOR-CON 10) tablet 40 mEq     Admin Date  12/27/2021 Action  Given Dose  40 mEq Route  Oral Administered By  Huma Perry RN                  3918 Pt tolerated treatment well. PAC maintained positive blood return throughout treatment, flushed with positive blood return at conclusion and connected to infusing CADD pump. D/c home in no distress. Pt aware of next appointment scheduled for 12/29 at 3. Labs - Ordered iron studies drawn and sent for processing. Will result in CC when available. Recent Results (from the past 12 hour(s))   CBC WITH AUTOMATED DIFF    Collection Time: 12/27/21  7:52 AM   Result Value Ref Range    WBC 8.6 3.6 - 11.0 K/uL    RBC 3.10 (L) 3.80 - 5.20 M/uL    HGB 9.9 (L) 11.5 - 16.0 g/dL    HCT 29.7 (L) 35.0 - 47.0 %    MCV 95.8 80.0 - 99.0 FL    MCH 31.9 26.0 - 34.0 PG    MCHC 33.3 30.0 - 36.5 g/dL    RDW 18.7 (H) 11.5 - 14.5 %    PLATELET 451 509 - 605 K/uL    MPV 10.9 8.9 - 12.9 FL    NRBC 0.2 (H) 0  WBC    ABSOLUTE NRBC 0.02 (H) 0.00 - 0.01 K/uL    NEUTROPHILS 35 32 - 75 %    LYMPHOCYTES 45 12 - 49 %    MONOCYTES 16 (H) 5 - 13 %    EOSINOPHILS 2 0 - 7 %    BASOPHILS 1 0 - 1 %    IMMATURE GRANULOCYTES 2 (H) 0.0 - 0.5 %    ABS. NEUTROPHILS 3.0 1.8 - 8.0 K/UL    ABS. LYMPHOCYTES 3.9 (H) 0.8 - 3.5 K/UL    ABS. MONOCYTES 1.4 (H) 0.0 - 1.0 K/UL    ABS. EOSINOPHILS 0.2 0.0 - 0.4 K/UL    ABS. BASOPHILS 0.1 0.0 - 0.1 K/UL    ABS. IMM.  GRANS. 0.2 (H) 0.00 - 0.04 K/UL    DF AUTOMATED     METABOLIC PANEL, COMPREHENSIVE    Collection Time: 12/27/21  7:52 AM   Result Value Ref Range    Sodium 141 136 - 145 mmol/L    Potassium 3.0 (L) 3.5 - 5.1 mmol/L    Chloride 108 97 - 108 mmol/L    CO2 31 21 - 32 mmol/L    Anion gap 2 (L) 5 - 15 mmol/L Glucose 95 65 - 100 mg/dL    BUN 11 6 - 20 MG/DL    Creatinine 0.80 0.55 - 1.02 MG/DL    BUN/Creatinine ratio 14 12 - 20      GFR est AA >60 >60 ml/min/1.73m2    GFR est non-AA >60 >60 ml/min/1.73m2    Calcium 8.7 8.5 - 10.1 MG/DL    Bilirubin, total 0.7 0.2 - 1.0 MG/DL    ALT (SGPT) 40 12 - 78 U/L    AST (SGOT) 45 (H) 15 - 37 U/L    Alk.  phosphatase 106 45 - 117 U/L    Protein, total 6.5 6.4 - 8.2 g/dL    Albumin 2.6 (L) 3.5 - 5.0 g/dL    Globulin 3.9 2.0 - 4.0 g/dL    A-G Ratio 0.7 (L) 1.1 - 2.2

## 2021-12-29 ENCOUNTER — APPOINTMENT (OUTPATIENT)
Dept: INFUSION THERAPY | Age: 66
End: 2021-12-29

## 2021-12-29 ENCOUNTER — HOSPITAL ENCOUNTER (OUTPATIENT)
Dept: INFUSION THERAPY | Age: 66
Discharge: HOME | End: 2021-12-29
Payer: MEDICARE

## 2021-12-29 VITALS
HEART RATE: 50 BPM | TEMPERATURE: 97.5 F | SYSTOLIC BLOOD PRESSURE: 130 MMHG | DIASTOLIC BLOOD PRESSURE: 60 MMHG | RESPIRATION RATE: 16 BRPM

## 2021-12-29 DIAGNOSIS — C25.0 MALIGNANT NEOPLASM OF HEAD OF PANCREAS (HCC): ICD-10-CM

## 2021-12-29 DIAGNOSIS — Z76.89 PREVENTION OF CHEMOTHERAPY-INDUCED NEUTROPENIA: ICD-10-CM

## 2021-12-29 DIAGNOSIS — E86.0 DEHYDRATION: Primary | ICD-10-CM

## 2021-12-29 PROCEDURE — 96360 HYDRATION IV INFUSION INIT: CPT

## 2021-12-29 PROCEDURE — 74011250636 HC RX REV CODE- 250/636: Performed by: NURSE PRACTITIONER

## 2021-12-29 PROCEDURE — 96377 APPLICATON ON-BODY INJECTOR: CPT

## 2021-12-29 PROCEDURE — 74011250636 HC RX REV CODE- 250/636: Performed by: INTERNAL MEDICINE

## 2021-12-29 RX ORDER — HEPARIN 100 UNIT/ML
300-500 SYRINGE INTRAVENOUS AS NEEDED
Status: DISCONTINUED | OUTPATIENT
Start: 2021-12-29 | End: 2021-12-30 | Stop reason: HOSPADM

## 2021-12-29 RX ORDER — SODIUM CHLORIDE 9 MG/ML
10 INJECTION INTRAMUSCULAR; INTRAVENOUS; SUBCUTANEOUS AS NEEDED
Status: DISCONTINUED | OUTPATIENT
Start: 2021-12-29 | End: 2021-12-30 | Stop reason: HOSPADM

## 2021-12-29 RX ORDER — SODIUM CHLORIDE 9 MG/ML
1000 INJECTION, SOLUTION INTRAVENOUS ONCE
Status: COMPLETED | OUTPATIENT
Start: 2021-12-29 | End: 2021-12-29

## 2021-12-29 RX ORDER — SODIUM CHLORIDE 0.9 % (FLUSH) 0.9 %
10 SYRINGE (ML) INJECTION AS NEEDED
Status: DISCONTINUED | OUTPATIENT
Start: 2021-12-29 | End: 2021-12-30 | Stop reason: HOSPADM

## 2021-12-29 RX ADMIN — SODIUM CHLORIDE 1000 ML: 9 INJECTION, SOLUTION INTRAVENOUS at 15:07

## 2021-12-29 RX ADMIN — PEGFILGRASTIM 6 MG: KIT SUBCUTANEOUS at 15:24

## 2021-12-29 RX ADMIN — Medication 10 ML: at 16:10

## 2021-12-29 RX ADMIN — Medication 500 UNITS: at 16:10

## 2021-12-29 NOTE — PROGRESS NOTES
[facility] Progress Note    Date: 2021    Name: Yasmine Chadwick    MRN: [id number]         : 1955    Ms. Brody Voss Arrived ambulatory and in no distress for Pump Removal+hydration+Neulasta on body Regimen. Assessment was completed, no acute issues at this time, no new complaints voiced. Port alrady accessed and connected to 5FU pump. 0 ml left to infuse. Covid questionnaire completed. 1. Do you have any symptoms of COVID-19? SOB, coughing, fever, or generally not feeling well - no    2. Have you been exposed to COVID-19 recently? - no    3. Have you had any recent contact with family/friend that has a pending COVID test? - no      Ms. Bhatti's vitals were reviewed. Visit Vitals  /78   Pulse (!) 57   Temp 97.5 °F (36.4 °C)   Resp 17       Medications:  Medications Administered     0.9% sodium chloride infusion 1,000 mL     Admin Date  2021 Action  New Bag Dose  1,000 mL Rate  1,000 mL/hr Route  IntraVENous Administered By  Quang Perez RN          heparin (porcine) pf 300-500 Units     Admin Date  2021 Action  Given Dose  500 Units Route  InterCATHeter Administered By  Quang Perez RN          pegfilgrastim (NEULASTA) wearable SQ injector 6 mg     Admin Date  2021 Action  Given Dose  6 mg Route  SubCUTAneous Administered By  Quang Perez RN          sodium chloride (NS) flush 10 mL     Admin Date  2021 Action  Given Dose  10 mL Route  IntraVENous Administered By  Quang Perez RN                Ms. Brody Voss tolerated treatment well and was discharged from [place] [address] in stable condition. Port de-accessed, flushed & heparinized per protocol. She is to return on 1/3/21 for her next appointment.     Kedar Coffman RN  2021 no chest pain, no cough, and no shortness of breath. yes

## 2022-01-03 ENCOUNTER — APPOINTMENT (OUTPATIENT)
Dept: INFUSION THERAPY | Age: 67
End: 2022-01-03

## 2022-01-03 ENCOUNTER — HOSPITAL ENCOUNTER (OUTPATIENT)
Dept: INFUSION THERAPY | Age: 67
Discharge: HOME | End: 2022-01-03

## 2022-01-03 RX ORDER — DIPHENHYDRAMINE HYDROCHLORIDE 50 MG/ML
25 INJECTION, SOLUTION INTRAMUSCULAR; INTRAVENOUS AS NEEDED
Status: CANCELLED
Start: 2022-01-10

## 2022-01-03 RX ORDER — HEPARIN 100 UNIT/ML
300-500 SYRINGE INTRAVENOUS AS NEEDED
Status: CANCELLED
Start: 2022-01-12

## 2022-01-03 RX ORDER — SODIUM CHLORIDE 9 MG/ML
1000 INJECTION, SOLUTION INTRAVENOUS ONCE
Status: CANCELLED
Start: 2022-01-12 | End: 2022-01-12

## 2022-01-03 RX ORDER — ACETAMINOPHEN 325 MG/1
650 TABLET ORAL AS NEEDED
Status: CANCELLED
Start: 2022-01-10

## 2022-01-03 RX ORDER — ALBUTEROL SULFATE 0.83 MG/ML
2.5 SOLUTION RESPIRATORY (INHALATION) AS NEEDED
Status: CANCELLED
Start: 2022-01-10

## 2022-01-03 RX ORDER — ONDANSETRON 2 MG/ML
8 INJECTION INTRAMUSCULAR; INTRAVENOUS AS NEEDED
Status: CANCELLED | OUTPATIENT
Start: 2022-01-10

## 2022-01-03 RX ORDER — EPINEPHRINE 1 MG/ML
0.3 INJECTION, SOLUTION, CONCENTRATE INTRAVENOUS AS NEEDED
Status: CANCELLED | OUTPATIENT
Start: 2022-01-10

## 2022-01-03 RX ORDER — DIPHENHYDRAMINE HYDROCHLORIDE 50 MG/ML
50 INJECTION, SOLUTION INTRAMUSCULAR; INTRAVENOUS AS NEEDED
Status: CANCELLED
Start: 2022-01-10

## 2022-01-03 RX ORDER — SODIUM CHLORIDE 9 MG/ML
10 INJECTION INTRAMUSCULAR; INTRAVENOUS; SUBCUTANEOUS AS NEEDED
Status: CANCELLED | OUTPATIENT
Start: 2022-01-12

## 2022-01-03 RX ORDER — SODIUM CHLORIDE 0.9 % (FLUSH) 0.9 %
10 SYRINGE (ML) INJECTION AS NEEDED
Status: CANCELLED | OUTPATIENT
Start: 2022-01-12

## 2022-01-03 RX ORDER — HYDROCORTISONE SODIUM SUCCINATE 100 MG/2ML
100 INJECTION, POWDER, FOR SOLUTION INTRAMUSCULAR; INTRAVENOUS AS NEEDED
Status: CANCELLED | OUTPATIENT
Start: 2022-01-10

## 2022-01-05 ENCOUNTER — APPOINTMENT (OUTPATIENT)
Dept: INFUSION THERAPY | Age: 67
End: 2022-01-05

## 2022-01-06 ENCOUNTER — OFFICE VISIT (OUTPATIENT)
Dept: SURGERY | Age: 67
End: 2022-01-06
Payer: MEDICARE

## 2022-01-06 ENCOUNTER — TELEPHONE (OUTPATIENT)
Dept: ONCOLOGY | Age: 67
End: 2022-01-06

## 2022-01-06 ENCOUNTER — HOSPITAL ENCOUNTER (OUTPATIENT)
Dept: INFUSION THERAPY | Age: 67
Discharge: HOME | End: 2022-01-06
Payer: MEDICARE

## 2022-01-06 VITALS
DIASTOLIC BLOOD PRESSURE: 95 MMHG | SYSTOLIC BLOOD PRESSURE: 127 MMHG | BODY MASS INDEX: 39.71 KG/M2 | TEMPERATURE: 98.3 F | HEART RATE: 103 BPM | HEIGHT: 64 IN | RESPIRATION RATE: 19 BRPM | WEIGHT: 232.6 LBS

## 2022-01-06 VITALS
TEMPERATURE: 97 F | HEART RATE: 54 BPM | DIASTOLIC BLOOD PRESSURE: 64 MMHG | OXYGEN SATURATION: 98 % | SYSTOLIC BLOOD PRESSURE: 113 MMHG | RESPIRATION RATE: 16 BRPM

## 2022-01-06 DIAGNOSIS — E86.0 DEHYDRATION: Primary | ICD-10-CM

## 2022-01-06 DIAGNOSIS — C25.9 PANCREATIC ADENOCARCINOMA (HCC): Primary | ICD-10-CM

## 2022-01-06 DIAGNOSIS — C25.0 MALIGNANT NEOPLASM OF HEAD OF PANCREAS (HCC): ICD-10-CM

## 2022-01-06 LAB
ALBUMIN SERPL-MCNC: 3.3 G/DL (ref 3.5–5)
ALBUMIN/GLOB SERPL: 0.8 {RATIO} (ref 1.1–2.2)
ALP SERPL-CCNC: 180 U/L (ref 45–117)
ALT SERPL-CCNC: 72 U/L (ref 12–78)
ANION GAP SERPL CALC-SCNC: 8 MMOL/L (ref 5–15)
AST SERPL-CCNC: 58 U/L (ref 15–37)
BILIRUB SERPL-MCNC: 0.7 MG/DL (ref 0.2–1)
BUN SERPL-MCNC: 8 MG/DL (ref 6–20)
BUN/CREAT SERPL: 8 (ref 12–20)
CALCIUM SERPL-MCNC: 9.4 MG/DL (ref 8.5–10.1)
CHLORIDE SERPL-SCNC: 103 MMOL/L (ref 97–108)
CO2 SERPL-SCNC: 25 MMOL/L (ref 21–32)
CREAT SERPL-MCNC: 0.95 MG/DL (ref 0.55–1.02)
GLOBULIN SER CALC-MCNC: 4.1 G/DL (ref 2–4)
GLUCOSE SERPL-MCNC: 95 MG/DL (ref 65–100)
POTASSIUM SERPL-SCNC: 3.4 MMOL/L (ref 3.5–5.1)
PROT SERPL-MCNC: 7.4 G/DL (ref 6.4–8.2)
SODIUM SERPL-SCNC: 136 MMOL/L (ref 136–145)

## 2022-01-06 PROCEDURE — 1090F PRES/ABSN URINE INCON ASSESS: CPT | Performed by: SURGERY

## 2022-01-06 PROCEDURE — 74011250636 HC RX REV CODE- 250/636: Performed by: NURSE PRACTITIONER

## 2022-01-06 PROCEDURE — 3017F COLORECTAL CA SCREEN DOC REV: CPT | Performed by: SURGERY

## 2022-01-06 PROCEDURE — G8427 DOCREV CUR MEDS BY ELIG CLIN: HCPCS | Performed by: SURGERY

## 2022-01-06 PROCEDURE — G8754 DIAS BP LESS 90: HCPCS | Performed by: SURGERY

## 2022-01-06 PROCEDURE — 74011250637 HC RX REV CODE- 250/637: Performed by: NURSE PRACTITIONER

## 2022-01-06 PROCEDURE — 36415 COLL VENOUS BLD VENIPUNCTURE: CPT

## 2022-01-06 PROCEDURE — G8752 SYS BP LESS 140: HCPCS | Performed by: SURGERY

## 2022-01-06 PROCEDURE — 80053 COMPREHEN METABOLIC PANEL: CPT

## 2022-01-06 PROCEDURE — 96360 HYDRATION IV INFUSION INIT: CPT

## 2022-01-06 PROCEDURE — G8400 PT W/DXA NO RESULTS DOC: HCPCS | Performed by: SURGERY

## 2022-01-06 PROCEDURE — G8536 NO DOC ELDER MAL SCRN: HCPCS | Performed by: SURGERY

## 2022-01-06 PROCEDURE — G8417 CALC BMI ABV UP PARAM F/U: HCPCS | Performed by: SURGERY

## 2022-01-06 PROCEDURE — G8510 SCR DEP NEG, NO PLAN REQD: HCPCS | Performed by: SURGERY

## 2022-01-06 PROCEDURE — 1101F PT FALLS ASSESS-DOCD LE1/YR: CPT | Performed by: SURGERY

## 2022-01-06 PROCEDURE — 74011000250 HC RX REV CODE- 250: Performed by: NURSE PRACTITIONER

## 2022-01-06 PROCEDURE — 99215 OFFICE O/P EST HI 40 MIN: CPT | Performed by: SURGERY

## 2022-01-06 RX ORDER — POTASSIUM CHLORIDE 750 MG/1
20 TABLET, FILM COATED, EXTENDED RELEASE ORAL
Status: COMPLETED | OUTPATIENT
Start: 2022-01-06 | End: 2022-01-06

## 2022-01-06 RX ORDER — SODIUM CHLORIDE 9 MG/ML
10 INJECTION INTRAMUSCULAR; INTRAVENOUS; SUBCUTANEOUS AS NEEDED
Status: ACTIVE | OUTPATIENT
Start: 2022-01-06 | End: 2022-01-06

## 2022-01-06 RX ORDER — SODIUM CHLORIDE 0.9 % (FLUSH) 0.9 %
10 SYRINGE (ML) INJECTION AS NEEDED
Status: DISPENSED | OUTPATIENT
Start: 2022-01-06 | End: 2022-01-06

## 2022-01-06 RX ORDER — SODIUM CHLORIDE 9 MG/ML
1000 INJECTION, SOLUTION INTRAVENOUS ONCE
Status: COMPLETED | OUTPATIENT
Start: 2022-01-06 | End: 2022-01-06

## 2022-01-06 RX ORDER — HEPARIN 100 UNIT/ML
300-500 SYRINGE INTRAVENOUS AS NEEDED
Status: ACTIVE | OUTPATIENT
Start: 2022-01-06 | End: 2022-01-06

## 2022-01-06 RX ADMIN — POTASSIUM CHLORIDE 20 MEQ: 750 TABLET, FILM COATED, EXTENDED RELEASE ORAL at 12:56

## 2022-01-06 RX ADMIN — SODIUM CHLORIDE 1000 ML: 9 INJECTION, SOLUTION INTRAVENOUS at 10:59

## 2022-01-06 RX ADMIN — SODIUM CHLORIDE, PRESERVATIVE FREE 10 ML: 5 INJECTION INTRAVENOUS at 11:00

## 2022-01-06 RX ADMIN — Medication 500 UNITS: at 12:10

## 2022-01-06 RX ADMIN — SODIUM CHLORIDE, PRESERVATIVE FREE 10 ML: 5 INJECTION INTRAVENOUS at 12:10

## 2022-01-06 NOTE — PROGRESS NOTES
1. Have you been to the ER, urgent care clinic since your last visit? Hospitalized since your last visit? No    2. Have you seen or consulted any other health care providers outside of the [address] [place] [address] since your last visit? Include any pap smears or colon screening. No    Patient stated she feels dehydrated. Wasn't able to get fluids due to weather. Patient to call to get fluids. Offered assistance is needed.

## 2022-01-06 NOTE — PROGRESS NOTES
Outpatient Infusion Center Short Visit Progress Note    4243 [facility]. Assessment completed. No new concerns voiced. Vital Signs:  Visit Vitals  /64   Pulse (!) 54   Temp 97 °F (36.1 °C)   Resp 16   SpO2 98%         PAC with positive blood return. Lab Results:  Recent Results (from the past 12 hour(s))   METABOLIC PANEL, COMPREHENSIVE    Collection Time: 01/06/22 10:44 AM   Result Value Ref Range    Sodium 136 136 - 145 mmol/L    Potassium 3.4 (L) 3.5 - 5.1 mmol/L    Chloride 103 97 - 108 mmol/L    CO2 25 21 - 32 mmol/L    Anion gap 8 5 - 15 mmol/L    Glucose 95 65 - 100 mg/dL    BUN 8 6 - 20 MG/DL    Creatinine 0.95 0.55 - 1.02 MG/DL    BUN/Creatinine ratio 8 (L) 12 - 20      GFR est AA >60 >60 ml/min/1.73m2    GFR est non-AA 59 (L) >60 ml/min/1.73m2    Calcium 9.4 8.5 - 10.1 MG/DL    Bilirubin, total 0.7 0.2 - 1.0 MG/DL    ALT (SGPT) 72 12 - 78 U/L    AST (SGOT) 58 (H) 15 - 37 U/L    Alk.  phosphatase 180 (H) 45 - 117 U/L    Protein, total 7.4 6.4 - 8.2 g/dL    Albumin 3.3 (L) 3.5 - 5.0 g/dL    Globulin 4.1 (H) 2.0 - 4.0 g/dL    A-G Ratio 0.8 (L) 1.1 - 2.2             Medications:  Medications Administered     0.9% sodium chloride infusion 1,000 mL     Admin Date  01/06/2022 Action  New Bag Dose  1,000 mL Rate  1,000 mL/hr Route  IntraVENous Administered By  Serjio Zavala RN          0.9% sodium chloride injection 10 mL     Admin Date  01/06/2022 Action  Given Dose  10 mL Route  IntraVENous Administered By  Serjio Zavala, [address] [facility] [address] Date  01/06/2022 Action  Given Dose  10 mL Route  IntraVENous Administered By  Serjio Zavala RN          heparin (porcine) pf 300-500 Units     Admin Date  01/06/2022 Action  Given Dose  500 Units Route  InterCATHeter Administered By  Serjio Zavala RN          potassium chloride SR (KLOR-CON 10) tablet 20 mEq     Admin Date  01/06/2022 Action  Given Dose  20 mEq Route  Oral Administered By  Serjio Zavala, RN                [phone] Patient tolerated treatment well. Patient discharged from [facility] 58 ambulatory in no distress at 1220. Patient aware of next appointment.     Future Appointments   Date Time Provider Sally Carrizales   1/10/2022  7:30 AM SS INF2 CH2 >7H [facility]   1/10/2022  8:45 AM Ashlee Paez, M[facility] AMB   1/12/2022  2:30 PM SS INF4 CH4 <1H [facility]   1/24/2022  7:30 AM SS INF2 CH2 >7H [facility]   1/26/2022  2:30 PM SS INF5 CH4 <1H [facility]   2/7/2022  7:30 AM SS INF2 CH2 >7H [facility]   2/9/2022  2:30 PM SS INF5 CH4 <1H [facility]   2/21/2022  7:30 AM SS INF2 CH2 >7H [facility]   2/23/2022  2:30 PM SS INF5 CH4 <1H [facility]   3/7/2022  7:30 AM SS INF2 CH2 >7H [facility]   3/9/2022  2:30 PM SS INF4 CH4 <1H [facility] [address] [place] [address]

## 2022-01-06 NOTE — LETTER
1/10/2022    Patient: Deven New   YOB: 1955   Date of Visit: 1/6/2022     Jennifer Pedro MD  [address] [facility]  [address]  [facility]    Dear Jennifer Pedro MD,      Thank you for referring Ms. Blair Block to [facility] for evaluation. My notes for this consultation are attached. If you have questions, please do not hesitate to call me. I look forward to following your patient along with you.       Sincerely,    Jany Rivero MD

## 2022-01-06 NOTE — PROGRESS NOTES
[facility] Short Visit Progress Note    2266 Patient admitted to [facility] for hydration ambulatory in stable condition. Assessment completed. No new concerns voiced. Covid Screening      :  Visit Vitals  /74   Pulse 65   Temp 97.6 °F (36.4 °C)   Resp 18   SpO2 98%         PAC with positive blood return. Lab Results:  ***LABS***        Medications:  Medications Administered     0.9% sodium chloride infusion 1,000 mL     Admin Date  01/06/2022 Action  New Bag Dose  1,000 mL Rate  1,000 mL/hr Route  IntraVENous Administered By  Minnie Madden, BALTAZAR          0.9% sodium chloride injection 10 mL     Admin Date  01/06/2022 Action  Given Dose  10 mL Route  IntraVENous Administered By  Minnie Madden RN                *** Patient tolerated treatment well. Patient discharged from [place] [address] ambulatory in no distress at ***. Patient aware of next appointment.     Future Appointments   Date Time Provider Sally Carrizales   1/10/2022  7:30 AM [facility]   1/10/2022  8:45 AM Odalys Paez, MUSA [facility] BS AMB   1/12/2022  2:30 PM [facility]4 [facility]   1/24/2022  7:30 AM SS INF2 CH2 >7H [facility]   1/26/2022  2:30 PM SS INF5 [facility]4 [facility]   2/7/2022  7:30 AM SS INF2 [facility]   2/9/2022  2:30 PM SS INF5 [facility]4 [facility]   2/21/2022  7:30 AM SS INF2 [facility]   2/23/2022  2:30 PM SS [facility]5 [facility]4 [facility]   3/7/2022  7:30 AM SS [facility]2 [facility]   3/9/2022  2:30 PM SS INF4 CH4 <1H [facility] Turk

## 2022-01-06 NOTE — TELEPHONE ENCOUNTER
[address] [facility] [place]  [phone]        01/06/22 8:44 AM Received incoming call from patient. Patient states she suspects she needs IV fluids. Patient had appointment scheduled on 01/03 but was unable to attend due to inclement weather. Patient with complaints of dizziness when standing for length of time (5 minutes or longer) and weakness. Patient also states she has dry mouth despite drinking fluids. Patient states she is having intermittent nausea, often occurring when she has dizziness. Denies vomiting. States that diarrhea has improved--averaging one loose stool per day, but stool now becoming more normal in consistency. Advised this nurse would update Dr. Gini Castillo and NP of above. Will call patient back with appointment information. She voiced understanding. 9:38 AM Scheduled patient for IV fluids today at 11 AM. Patient notified of appointment as well. No further questions or concerns at this time.

## 2022-01-08 DIAGNOSIS — E87.6 HYPOKALEMIA: ICD-10-CM

## 2022-01-10 ENCOUNTER — OFFICE VISIT (OUTPATIENT)
Dept: ONCOLOGY | Age: 67
End: 2022-01-10
Payer: MEDICARE

## 2022-01-10 ENCOUNTER — TELEPHONE (OUTPATIENT)
Dept: SURGERY | Age: 67
End: 2022-01-10

## 2022-01-10 ENCOUNTER — HOSPITAL ENCOUNTER (OUTPATIENT)
Dept: INFUSION THERAPY | Age: 67
Discharge: HOME | End: 2022-01-10
Payer: MEDICARE

## 2022-01-10 VITALS
OXYGEN SATURATION: 97 % | DIASTOLIC BLOOD PRESSURE: 76 MMHG | BODY MASS INDEX: 41.02 KG/M2 | RESPIRATION RATE: 16 BRPM | SYSTOLIC BLOOD PRESSURE: 128 MMHG | TEMPERATURE: 97.8 F | WEIGHT: 239 LBS | HEART RATE: 67 BPM

## 2022-01-10 VITALS
BODY MASS INDEX: 40.84 KG/M2 | SYSTOLIC BLOOD PRESSURE: 109 MMHG | HEART RATE: 78 BPM | DIASTOLIC BLOOD PRESSURE: 66 MMHG | OXYGEN SATURATION: 97 % | WEIGHT: 239.2 LBS | RESPIRATION RATE: 16 BRPM | HEIGHT: 64 IN | TEMPERATURE: 97.8 F

## 2022-01-10 DIAGNOSIS — C25.0 MALIGNANT NEOPLASM OF HEAD OF PANCREAS (HCC): ICD-10-CM

## 2022-01-10 DIAGNOSIS — Z51.11 CHEMOTHERAPY MANAGEMENT, ENCOUNTER FOR: ICD-10-CM

## 2022-01-10 DIAGNOSIS — C25.9 PANCREATIC ADENOCARCINOMA (HCC): Primary | ICD-10-CM

## 2022-01-10 DIAGNOSIS — K51.20 ULCERATIVE PROCTITIS WITHOUT COMPLICATION (HCC): ICD-10-CM

## 2022-01-10 DIAGNOSIS — E86.0 DEHYDRATION: Primary | ICD-10-CM

## 2022-01-10 DIAGNOSIS — K52.1 DIARRHEA DUE TO DRUG: ICD-10-CM

## 2022-01-10 DIAGNOSIS — Z76.89 PREVENTION OF CHEMOTHERAPY-INDUCED NEUTROPENIA: ICD-10-CM

## 2022-01-10 DIAGNOSIS — E87.6 HYPOKALEMIA: ICD-10-CM

## 2022-01-10 DIAGNOSIS — D64.9 NORMOCYTIC ANEMIA: ICD-10-CM

## 2022-01-10 LAB
ALBUMIN SERPL-MCNC: 3 G/DL (ref 3.5–5)
ALBUMIN/GLOB SERPL: 0.8 {RATIO} (ref 1.1–2.2)
ALP SERPL-CCNC: 133 U/L (ref 45–117)
ALT SERPL-CCNC: 44 U/L (ref 12–78)
ANION GAP SERPL CALC-SCNC: 6 MMOL/L (ref 5–15)
AST SERPL-CCNC: 40 U/L (ref 15–37)
BASOPHILS # BLD: 0 K/UL (ref 0–0.1)
BASOPHILS NFR BLD: 0 % (ref 0–1)
BILIRUB SERPL-MCNC: 0.7 MG/DL (ref 0.2–1)
BUN SERPL-MCNC: 12 MG/DL (ref 6–20)
BUN/CREAT SERPL: 13 (ref 12–20)
CALCIUM SERPL-MCNC: 8.7 MG/DL (ref 8.5–10.1)
CHLORIDE SERPL-SCNC: 106 MMOL/L (ref 97–108)
CO2 SERPL-SCNC: 27 MMOL/L (ref 21–32)
CREAT SERPL-MCNC: 0.96 MG/DL (ref 0.55–1.02)
DIFFERENTIAL METHOD BLD: ABNORMAL
EOSINOPHIL # BLD: 0.2 K/UL (ref 0–0.4)
EOSINOPHIL NFR BLD: 2 % (ref 0–7)
ERYTHROCYTE [DISTWIDTH] IN BLOOD BY AUTOMATED COUNT: 19.4 % (ref 11.5–14.5)
GLOBULIN SER CALC-MCNC: 3.7 G/DL (ref 2–4)
GLUCOSE SERPL-MCNC: 92 MG/DL (ref 65–100)
HCT VFR BLD AUTO: 31.7 % (ref 35–47)
HGB BLD-MCNC: 10.6 G/DL (ref 11.5–16)
IMM GRANULOCYTES # BLD AUTO: 0 K/UL (ref 0–0.04)
IMM GRANULOCYTES NFR BLD AUTO: 0 % (ref 0–0.5)
LYMPHOCYTES # BLD: 4.9 K/UL (ref 0.8–3.5)
LYMPHOCYTES NFR BLD: 47 % (ref 12–49)
MCH RBC QN AUTO: 31.9 PG (ref 26–34)
MCHC RBC AUTO-ENTMCNC: 33.4 G/DL (ref 30–36.5)
MCV RBC AUTO: 95.5 FL (ref 80–99)
METAMYELOCYTES NFR BLD MANUAL: 1 %
MONOCYTES # BLD: 0.5 K/UL (ref 0–1)
MONOCYTES NFR BLD: 5 % (ref 5–13)
MYELOCYTES NFR BLD MANUAL: 2 %
NEUTS BAND NFR BLD MANUAL: 4 %
NEUTS SEG # BLD: 4.5 K/UL (ref 1.8–8)
NEUTS SEG NFR BLD: 39 % (ref 32–75)
NRBC # BLD: 0.02 K/UL (ref 0–0.01)
NRBC BLD-RTO: 0.2 PER 100 WBC
PLATELET # BLD AUTO: 158 K/UL (ref 150–400)
PMV BLD AUTO: 11.3 FL (ref 8.9–12.9)
POTASSIUM SERPL-SCNC: 3.3 MMOL/L (ref 3.5–5.1)
PROT SERPL-MCNC: 6.7 G/DL (ref 6.4–8.2)
RBC # BLD AUTO: 3.32 M/UL (ref 3.8–5.2)
RBC MORPH BLD: ABNORMAL
SODIUM SERPL-SCNC: 139 MMOL/L (ref 136–145)
WBC # BLD AUTO: 10.5 K/UL (ref 3.6–11)
WBC MORPH BLD: ABNORMAL

## 2022-01-10 PROCEDURE — G8754 DIAS BP LESS 90: HCPCS | Performed by: NURSE PRACTITIONER

## 2022-01-10 PROCEDURE — 1101F PT FALLS ASSESS-DOCD LE1/YR: CPT | Performed by: NURSE PRACTITIONER

## 2022-01-10 PROCEDURE — 74011250637 HC RX REV CODE- 250/637: Performed by: NURSE PRACTITIONER

## 2022-01-10 PROCEDURE — G8427 DOCREV CUR MEDS BY ELIG CLIN: HCPCS | Performed by: NURSE PRACTITIONER

## 2022-01-10 PROCEDURE — 36415 COLL VENOUS BLD VENIPUNCTURE: CPT

## 2022-01-10 PROCEDURE — 96413 CHEMO IV INFUSION 1 HR: CPT

## 2022-01-10 PROCEDURE — 74011250636 HC RX REV CODE- 250/636: Performed by: INTERNAL MEDICINE

## 2022-01-10 PROCEDURE — G8400 PT W/DXA NO RESULTS DOC: HCPCS | Performed by: NURSE PRACTITIONER

## 2022-01-10 PROCEDURE — 86301 IMMUNOASSAY TUMOR CA 19-9: CPT

## 2022-01-10 PROCEDURE — 74011000258 HC RX REV CODE- 258: Performed by: INTERNAL MEDICINE

## 2022-01-10 PROCEDURE — 96368 THER/DIAG CONCURRENT INF: CPT

## 2022-01-10 PROCEDURE — 96361 HYDRATE IV INFUSION ADD-ON: CPT

## 2022-01-10 PROCEDURE — 3017F COLORECTAL CA SCREEN DOC REV: CPT | Performed by: NURSE PRACTITIONER

## 2022-01-10 PROCEDURE — 96417 CHEMO IV INFUS EACH ADDL SEQ: CPT

## 2022-01-10 PROCEDURE — G8536 NO DOC ELDER MAL SCRN: HCPCS | Performed by: NURSE PRACTITIONER

## 2022-01-10 PROCEDURE — 96372 THER/PROPH/DIAG INJ SC/IM: CPT

## 2022-01-10 PROCEDURE — 96415 CHEMO IV INFUSION ADDL HR: CPT

## 2022-01-10 PROCEDURE — 96366 THER/PROPH/DIAG IV INF ADDON: CPT

## 2022-01-10 PROCEDURE — G8417 CALC BMI ABV UP PARAM F/U: HCPCS | Performed by: NURSE PRACTITIONER

## 2022-01-10 PROCEDURE — 1090F PRES/ABSN URINE INCON ASSESS: CPT | Performed by: NURSE PRACTITIONER

## 2022-01-10 PROCEDURE — 85025 COMPLETE CBC W/AUTO DIFF WBC: CPT

## 2022-01-10 PROCEDURE — 77030016057 HC NDL HUBR APOL -B

## 2022-01-10 PROCEDURE — 96416 CHEMO PROLONG INFUSE W/PUMP: CPT

## 2022-01-10 PROCEDURE — G8752 SYS BP LESS 140: HCPCS | Performed by: NURSE PRACTITIONER

## 2022-01-10 PROCEDURE — 99215 OFFICE O/P EST HI 40 MIN: CPT | Performed by: NURSE PRACTITIONER

## 2022-01-10 PROCEDURE — 74011250636 HC RX REV CODE- 250/636: Performed by: NURSE PRACTITIONER

## 2022-01-10 PROCEDURE — G8432 DEP SCR NOT DOC, RNG: HCPCS | Performed by: NURSE PRACTITIONER

## 2022-01-10 PROCEDURE — 80053 COMPREHEN METABOLIC PANEL: CPT

## 2022-01-10 PROCEDURE — 96375 TX/PRO/DX INJ NEW DRUG ADDON: CPT

## 2022-01-10 RX ORDER — SODIUM CHLORIDE 9 MG/ML
10 INJECTION INTRAMUSCULAR; INTRAVENOUS; SUBCUTANEOUS AS NEEDED
Status: ACTIVE | OUTPATIENT
Start: 2022-01-10 | End: 2022-01-10

## 2022-01-10 RX ORDER — SODIUM CHLORIDE 0.9 % (FLUSH) 0.9 %
10 SYRINGE (ML) INJECTION AS NEEDED
Status: DISPENSED | OUTPATIENT
Start: 2022-01-10 | End: 2022-01-10

## 2022-01-10 RX ORDER — ATROPINE SULFATE 0.4 MG/ML
0.4 INJECTION, SOLUTION ENDOTRACHEAL; INTRAMEDULLARY; INTRAMUSCULAR; INTRAVENOUS; SUBCUTANEOUS
Status: DISPENSED | OUTPATIENT
Start: 2022-01-10 | End: 2022-01-10

## 2022-01-10 RX ORDER — HEPARIN 100 UNIT/ML
300-500 SYRINGE INTRAVENOUS AS NEEDED
Status: ACTIVE | OUTPATIENT
Start: 2022-01-10 | End: 2022-01-10

## 2022-01-10 RX ORDER — POTASSIUM CHLORIDE 750 MG/1
TABLET, EXTENDED RELEASE ORAL
Qty: 120 TABLET | Refills: 0 | Status: SHIPPED | OUTPATIENT
Start: 2022-01-10 | End: 2022-02-04

## 2022-01-10 RX ORDER — SODIUM CHLORIDE 9 MG/ML
10 INJECTION INTRAMUSCULAR; INTRAVENOUS; SUBCUTANEOUS AS NEEDED
Status: CANCELLED | OUTPATIENT
Start: 2022-01-24

## 2022-01-10 RX ORDER — PALONOSETRON 0.05 MG/ML
0.25 INJECTION, SOLUTION INTRAVENOUS ONCE
Status: COMPLETED | OUTPATIENT
Start: 2022-01-10 | End: 2022-01-10

## 2022-01-10 RX ORDER — SODIUM CHLORIDE 0.9 % (FLUSH) 0.9 %
10 SYRINGE (ML) INJECTION AS NEEDED
Status: CANCELLED | OUTPATIENT
Start: 2022-01-24

## 2022-01-10 RX ORDER — DEXTROSE MONOHYDRATE 50 MG/ML
25 INJECTION, SOLUTION INTRAVENOUS CONTINUOUS
Status: DISPENSED | OUTPATIENT
Start: 2022-01-10 | End: 2022-01-10

## 2022-01-10 RX ORDER — HEPARIN 100 UNIT/ML
300-500 SYRINGE INTRAVENOUS AS NEEDED
Status: CANCELLED | OUTPATIENT
Start: 2022-01-24

## 2022-01-10 RX ORDER — POTASSIUM CHLORIDE 750 MG/1
20 TABLET, FILM COATED, EXTENDED RELEASE ORAL
Status: COMPLETED | OUTPATIENT
Start: 2022-01-10 | End: 2022-01-10

## 2022-01-10 RX ORDER — SODIUM CHLORIDE 9 MG/ML
1000 INJECTION, SOLUTION INTRAVENOUS ONCE
Status: CANCELLED | OUTPATIENT
Start: 2022-01-24 | End: 2022-01-24

## 2022-01-10 RX ORDER — SODIUM CHLORIDE 9 MG/ML
1000 INJECTION, SOLUTION INTRAVENOUS CONTINUOUS
Status: DISCONTINUED | OUTPATIENT
Start: 2022-01-10 | End: 2022-01-12 | Stop reason: HOSPADM

## 2022-01-10 RX ADMIN — DEXAMETHASONE SODIUM PHOSPHATE 12 MG: 10 INJECTION, SOLUTION INTRAMUSCULAR; INTRAVENOUS at 09:42

## 2022-01-10 RX ADMIN — OXALIPLATIN 172 MG: 5 INJECTION, SOLUTION INTRAVENOUS at 11:16

## 2022-01-10 RX ADMIN — SODIUM CHLORIDE 1000 ML: 9 INJECTION, SOLUTION INTRAVENOUS at 10:06

## 2022-01-10 RX ADMIN — PALONOSETRON 0.25 MG: 0.05 INJECTION, SOLUTION INTRAVENOUS at 09:39

## 2022-01-10 RX ADMIN — FLUOROURACIL 4860 MG: 50 INJECTION, SOLUTION INTRAVENOUS at 15:46

## 2022-01-10 RX ADMIN — DEXTROSE MONOHYDRATE 304 MG: 5 INJECTION, SOLUTION INTRAVENOUS at 14:05

## 2022-01-10 RX ADMIN — DEXTROSE MONOHYDRATE 25 ML/HR: 50 INJECTION, SOLUTION INTRAVENOUS at 11:15

## 2022-01-10 RX ADMIN — LEUCOVORIN CALCIUM 810 MG: 350 INJECTION, POWDER, LYOPHILIZED, FOR SUSPENSION INTRAMUSCULAR; INTRAVENOUS at 14:05

## 2022-01-10 RX ADMIN — ATROPINE SULFATE 0.4 MG: 0.4 INJECTION, SOLUTION INTRAMUSCULAR; INTRAVENOUS; SUBCUTANEOUS at 13:53

## 2022-01-10 RX ADMIN — POTASSIUM CHLORIDE 20 MEQ: 750 TABLET, FILM COATED, EXTENDED RELEASE ORAL at 09:38

## 2022-01-10 NOTE — PROGRESS NOTES
[facility] [place] [facility]  [address] [facility], [address] [place] [address]  W: [phone]  F: [phone]      Reason for Visit:   Lissette Church is a 77 y.o. female who is seen for evaluation of new bilary or pancreatic head mass    Hematology/Oncology Treatment History:     Diagnosis: Pancreatic adenocarcinoma    Stage: clinical Stage Ib [T2N0]    Pathology:   -9/26/21 Cytology - brushings from common bile duct: Reactive glandular epithelial cells and bile   -9/28/21 pancreatic head mass biopsy: Adenocarcinoma.   -10/22/21 [facility] ca-cancer panel -negative     Prior Treatment: None    Current Treatment: neoadjuvant FOLFIRNOX to 10/18/21 - current. Added neulasta with C2. History of Present Illness:   Lissette Church is a pleasant 77 y.o. female who comes in for evaluation of pancreatic cancer. She presented in 9/2021 with obstructive jaundice, transaminitis. ERCP on 9/26/21 notable for distal CBD stricture; possible tumor/mass located in the CBD; underwent biliary stent placement to relieve biliary obstruction. CT A/P revealed mild/mod intrahepatic biliary ductal dilatation; prominence of CBD and hydropic gallbladder, suggestive of stricture/stenosis/mass of distal CBD. MRI of abdomen showed narrowing of CBD pancreatic head suggestive of extrinisic compression concerning for periampullary mass vs eccentric intraluminal lesion. EUS showed 3.2cm hypoechoic mass in pancreas head. Biopsy revealed adenocarcinoma. Pt met with Dr. Camargo in [place] and plans for neoadjuvant chemotherapy prior to surgery. Interval history:  Patient here for follow up of pancreas cancer and treatment. Reports diarrhea better controlled with lower dose reduction and taking antidiarrheals more consistently. Nausea better controlled but requires daily nausea medication. Eating and hydrating ok - additional fluids during treatment help.  Followed up with Dr. Camargo - needs to call after treatment today to determine a date. No fevers, chills, Sob or CP. PMHx: OA and Rheumatoid arthritis in lower back, Ulcerative colitis, HTN, Hypothyroidism  PSurgHx: , Cyst removed from left breast  SHx: , has 3 children (1 daughter and 2 sons). Works as  in [employer]. Nonsmoker, no EtOH.  in rehab recovering from [facility] [employer]. FHx: Mother  of pancreatic cancer age 80. Brother and son had colon cancer. Review of Systems: A complete review of systems was obtained, reviewed. Pertinent findings reviewed above. Physical Exam:     Visit Vitals  /76   Pulse 67   Temp 97.8 °F (36.6 °C)   Resp 16   Wt 239 lb (108.4 kg)   SpO2 97%   BMI 41.02 kg/m²     ECOG PS: 1  General: obese; no distress  Eyes:  Anicteric sclerae  HENT: atraumatic, face mask in place  Neck: supple  Lymphatic: Deferred today  Respiratory: CTAB, normal respiratory effort  CV: normal rate, regular rhythm, no murmurs, no peripheral edema, port in place. GI: soft, nontender, nondistended, no masses  MS: digits without clubbing or cyanosis. Skin: no rashes, no ecchymoses, no petechia. normal temperature, turgor, and texture. Darkening of hands due to chemotherapy noted  Psych: alert, oriented, appropriate affect, normal judgment/insight    Results:     Lab Results   Component Value Date/Time    WBC 10.5 01/10/2022 07:48 AM    HGB 10.6 (L) 01/10/2022 07:48 AM    HCT 31.7 (L) 01/10/2022 07:48 AM    PLATELET 932  07:48 AM    MCV 95.5 01/10/2022 07:48 AM    ABS.  NEUTROPHILS PENDING 01/10/2022 07:48 AM    Hemoglobin (POC) 12.6 10/06/2013 08:45 AM    Hematocrit (POC) 37 10/06/2013 08:45 AM     Lab Results   Component Value Date/Time    Sodium 139 01/10/2022 07:48 AM    Potassium 3.3 (L) 01/10/2022 07:48 AM    Chloride 106 01/10/2022 07:48 AM    CO2 27 01/10/2022 07:48 AM    Glucose 92 01/10/2022 07:48 AM    BUN 12 01/10/2022 07:48 AM    Creatinine 0.96 01/10/2022 07:48 AM    GFR est AA >60 01/10/2022 07:48 AM    GFR est non-AA 58 (L) 01/10/2022 07:48 AM    Calcium 8.7 01/10/2022 07:48 AM    Sodium (POC) 143 10/06/2013 08:45 AM    Potassium (POC) 3.4 (L) 10/06/2013 08:45 AM    Chloride (POC) 104 10/06/2013 08:45 AM    Glucose (POC) 91 10/06/2013 08:45 AM    BUN (POC) 11 10/06/2013 08:45 AM    Creatinine (POC) 1.0 10/06/2013 08:45 AM    Calcium, ionized (POC) 1.27 10/06/2013 08:45 AM     Lab Results   Component Value Date/Time    Bilirubin, total 0.7 01/06/2022 10:44 AM    ALT (SGPT) 72 01/06/2022 10:44 AM    Alk. phosphatase 180 (H) 01/06/2022 10:44 AM    Protein, total 7.4 01/06/2022 10:44 AM    Albumin 3.3 (L) 01/06/2022 10:44 AM    Globulin 4.1 (H) 01/06/2022 10:44 AM     Lab Results   Component Value Date/Time    Iron % saturation 27 12/27/2021 09:21 AM    TIBC 327 12/27/2021 09:21 AM    Ferritin 244 12/27/2021 09:22 AM    Sed rate (ESR) 17 09/16/2010 12:04 PM    C-Reactive protein <0.29 01/18/2017 08:45 AM    TSH 3.46 03/04/2021 08:57 AM    ALBA, Direct Detected (A) 09/16/2010 12:04 PM    Lipase 1,214 (H) 09/29/2021 04:10 AM    Hep C virus Ab Interp. NONREACTIVE 09/24/2021 06:55 PM     Lab Results   Component Value Date/Time    CEA 3.5 09/25/2021 11:46 AM    Carbohydrate Antigen 19-9, (CA 19-9) 138 (H) 10/18/2021 08:04 AM       10/18/21 CA 19-9:  138      Imaging / Procedures:     9/24/21 US ABD   IMPRESSION  Cholelithiasis. Gallbladder sludge. Prominent CBD with mild intrahepatic ductal dilatation as well. Nonemergent MRI with MRCP to delineate etiology for CBD distention. 9/24/21 CT ABD PELV W CONT  There is mild/moderate intrahepatic biliary ductal dilatation, prominence of the CBD and a hydropic gallbladder. No pancreatic duct dilatation. No clearly  delineated pancreatic head mass. Imaging findings suggestive of stricture/stenosis/mass of the distal CBD, no extrinsic pancreatic head mass is  identified. Gastroenterology consult  Correlation with MRI/MRCP on a nonemergent basis.   There is severe degenerative change of the lumbar spine. 9/25/21 MRI ABD WO CONT  IMPRESSION  1. Intrahepatic and extra hepatic biliary dilatation with abrupt narrowing of  the common bile duct pancreatic head just proximal to the ampulla. Suggestion of  extrinsic compression on the duct. This raises the possibility of a  periampullary mass. Alternatively, this may be an eccentric intraluminal lesion. Evaluation is limited. Recommend GI consultation for possible ERCP and EUS. 2.  Questionable small lesion in the left hepatic lobe with mild increased T2  signal and T1 hypointensity. Recommend follow-up imaging a contrast-enhanced  study preferably MRI. 3.  Cholelithiasis. Distended gallbladder with mild gallbladder wall thickening. Correlate for acute cholecystitis    9/26/21 XR ERCP/ERCB / Dr. Elie Naranjo  Impression: Biliary ductal dilation, with a maximum common duct diameter of 15 mm; Severe stenosis, involving distal CBD /pancreas head area concerning for neoplasia  Interventions: Endoscopic ampullary sphincterotomy and biliary stent placement; brushings from the CBD    9/27/21 CT CHEST W CONT:   IMPRESSION  1. There is a minimal right pleural effusion. 2. There are small pulmonary nodules present. There is a nodule in the plane of  the right major fissure and posteriorly in left lower lobe. These were not  present on examination of 1/18/2017. These could be on the basis of metastatic  disease. Close follow-up is suggested. There are also 2 small subpleural nodules  anteriorly in the right upper lobe as described above which can also be  evaluated on follow-up. 9/28/21 EUS:  Endoscopic: Esophagus:normal; Stomach: normal; Duodenum/jejunum: normal and protruding biliary stent  Ultrasound: Esophagus: normal findings;     Stomach: normal findings:     Pancreas: Areas examined: the entire gland                          Parenchyma: -Evidence of a hypoechoic mass with poorly defined borders seen in the head of the pancreas. It appeared involving the CBD where a stent is seen, however it did not involve the major vessels. It measured about 3.2 cm in widest diameter on one view. Pancreatic Duct: normal findings, not dilated               Liver: Parenchyma: normal                          Gallbladder: normal                          Bile Duct: the common bile duct is dilated in the proximal and mid portion and a plastic stent could be seen                          Lymph Node: no adenopathy  Impression:   Pancreas head mass with fine needle biopsy showing adenocarcinoma on preliminary cytology  Recommendations: Follow-up on pathology  Follow-up with oncology, will need further evaluation of pulmonary nodule  Surgical oncology consultation  Resume GI lite diet today and can discharge in am from GI standpoint    12/20/2021:  MRI abd w wo cont:  IMPRESSION  1. Positive response to therapy. Decreased size of the pancreatic head mass, which now has a maximum AP diameter of 2.3 cm. No obstruction of the biliary tree or pancreatic duct. No involvement of the SMA. Based on imaging, patient is a candidate for resection. 2. Cholelithiasis. No acute cholecystitis or biliary obstruction. MRI Pelv w wo cont:  IMPRESSION  No acute process or evidence of malignant neoplasm/ metastatic disease. CT chest w cont:  IMPRESSION  1. Interval apparent resolution of previously seen scattered subcentimeter  bilateral pulmonary nodules and right pleural effusion. 2.  Please see separately dictated reports for the MRI abdomen and pelvis  obtained the same day for subdiaphragmatic findings. Assessment/Recommendations:   77 y.o. female with Ulcerative colitis, Hyopthyroidism, admitted with obstructive jaundice concerning for underlying malignant obstructing mass. 1. Pancreatic adenocarcinoma:  Clinically stage Ib [T2N0], but need further evaluation in future regarding pulmonary nodules and liver lesion.  Presented with obstructive jaundice and transaminitis, CA 19-9 was 198 in 9/2021 at diagnosis. Underwent biliary stent placement with improvement in biliary obstruction. 3.2cm mass seen in pancreatic head on EUS, not involving vasculature, possibly resectable. Dr. Tara Sanchez in [place] has evaluated patient and recommends consideration of neoadjuvant chemotherapy with close attention to lung nodules and liver lesion. I agree with this recommendation for earlier treatment of micrometastatic disease, ability to determine whether pt has chemosensitive disease. We discussed the risks and benefits of FOLFIRINOX chemotherapy, including potential side effects. These include but are not limited to fatigue, nausea vomiting, diarrhea, neuropathy, taste changes, cold intolerance, esophageal spasm, allergic reactions, alopecia, mucositis, myelosuppression, risk for infection, infertility, and rarely, death. Rarely, a patient may have a condition where they do not metabolize fluorouracil appropriately (called DPD deficiency), and they may have excessive toxicity. A Port-A-Cath will be required in order to deliver the continuous infusion. BRCA 1/2 negative. The patient has consented to beginning therapy. 12/2021 MRI pelvis without disease and MRI abdomen showed decrease in size of pancreatic head mass. Planning for 6 cycles of mFOLFIRINOX in neoadjuvant setting, followed by surgery and adjuvant therapy x 6 cycles. Supportive care meds include: Emla cream, Zofran, Compazine, Dexamethasone  -- Proceed with C6 of neoadjuvant mFOLFIRINOX with neulasta support (10% DR taken)  -- Fluids with pump removal and today. -- Checking CA 19-9 on date of C1, C6, C7, C12.   -- Needs to contact Dr Tara Sanchez after C6 to set surgery date - sending CC message. -- Return in 2 weeks for labs, fluids, MD/NP visit. 2. Diarrhea / hypokalemia:  Has history of UC.  Likely related to biliary obstruction and pt has been started on Cholestyramine (Questran) but not taking due to intolerance. On potassium 20meq BID - confirmed she is taking this as prescribed. Had worsening diarrhea but now better managed with chemotherapy dose reduction and scheduled imodium. -- Was evaluated by GI - with plan for stool studies, awaiting results. -- Adding 20meq KCl in [facility] today along with at home repletion. 3. Pulmonary nodules:   Tiny of unclear etiology. Repeat imaging after C4 of chemo showed resolution of pulmonary nodules. 4. HTN / hypotension:   Currently with low BP. Managed on HCTZ. Advised checking BP at home and if systolic <053, then hold HCTZ. -- Additional fluids in [facility] today. 5. H/o Ulcerative procto-sigmoiditis:  Past due for colonoscopy with one adenoma polyp removed at last colonoscopy in April 2017. Was due for repeat in 2019 but she has not followed up with the GI practice since her last colonoscopy. -- Established with GI and plans to have repeat colonoscopy soon. 6. Morbid obesity:  Discussed healthy food options and ways to incorporate more protein into diet. 7. Chemotherapy induced neutropenia:   Neulasta with pump removal.     8. Normocytic anemia:  Likely due to chemotherapy. Normal iron profile and ferritin. I personally saw and evaluated the patient and performed the key components of medical decision making. The history, physical exam, and documentation were performed by Tanya Trujillo NP. I reviewed and verified the above documentation and modified it as needed. Proceed with C6 today. Tolerated C5 much better than prior. Will alert Dr. Josh Olson about C6 today.     Signed By: Jose Ramon Hay MD

## 2022-01-10 NOTE — TELEPHONE ENCOUNTER
Patient called in stating Dr. Noah Parker wanted her to call in when she got her 6th chemo treatment. Patient states that is today.   C:B [phone]

## 2022-01-10 NOTE — PROGRESS NOTES
[facility] Progress Note    Date: January 10, 2022    Name: Tameka Lyon    MRN: [id number]         : 1955    Ms. Julianna Noriega Arrived ambulatory and in no distress for cycle 6 day 1 of Folfirinox regimen. Assessment was completed, no acute issues at this time, no new complaints voiced. R chest port accessed without difficulty, labs drawn and in process. Chemotherapy Flowsheet 1/10/2022   Cycle C6D1   Date 1/10/2022   Drug / Regimen Folfirinox   Pre Hydration given   Pre Meds given   Notes oral K         Proceeded to appt with Dr. Gutierrez Section    Ms. Bhatti's vitals were reviewed. Visit Vitals  /66   Pulse 78   Temp 97.8 °F (36.6 °C)   Resp 16   Ht 5' 4\" (1.626 m)   Wt 108.5 kg (239 lb 3.2 oz)   SpO2 97%   BMI 41.06 kg/m²       Lab results were obtained and reviewed. Recent Results (from the past 12 hour(s))   CBC WITH AUTOMATED DIFF    Collection Time: 01/10/22  7:48 AM   Result Value Ref Range    WBC 10.5 3.6 - 11.0 K/uL    RBC 3.32 (L) 3.80 - 5.20 M/uL    HGB 10.6 (L) 11.5 - 16.0 g/dL    HCT 31.7 (L) 35.0 - 47.0 %    MCV 95.5 80.0 - 99.0 FL    MCH 31.9 26.0 - 34.0 PG    MCHC 33.4 30.0 - 36.5 g/dL    RDW 19.4 (H) 11.5 - 14.5 %    PLATELET 476 239 - 455 K/uL    MPV 11.3 8.9 - 12.9 FL    NRBC 0.2 (H) 0  WBC    ABSOLUTE NRBC 0.02 (H) 0.00 - 0.01 K/uL    NEUTROPHILS 39 32 - 75 %    BAND NEUTROPHILS 4 %    LYMPHOCYTES 47 12 - 49 %    MONOCYTES 5 5 - 13 %    EOSINOPHILS 2 0 - 7 %    BASOPHILS 0 0 - 1 %    METAMYELOCYTES 1 %    MYELOCYTES 2 %    IMMATURE GRANULOCYTES 0 0.0 - 0.5 %    ABS. NEUTROPHILS 4.5 1.8 - 8.0 K/UL    ABS. LYMPHOCYTES 4.9 (H) 0.8 - 3.5 K/UL    ABS. MONOCYTES 0.5 0.0 - 1.0 K/UL    ABS. EOSINOPHILS 0.2 0.0 - 0.4 K/UL    ABS. BASOPHILS 0.0 0.0 - 0.1 K/UL    ABS. IMM.  GRANS. 0.0 0.00 - 0.04 K/UL    DF MANUAL      RBC COMMENTS ANISOCYTOSIS  1+        WBC COMMENTS ATYPICAL LYMPHOCYTES PRESENT     METABOLIC PANEL, COMPREHENSIVE    Collection Time: 01/10/22  7:48 AM   Result Value Ref Range    Sodium 139 136 - 145 mmol/L    Potassium 3.3 (L) 3.5 - 5.1 mmol/L    Chloride 106 97 - 108 mmol/L    CO2 27 21 - 32 mmol/L    Anion gap 6 5 - 15 mmol/L    Glucose 92 65 - 100 mg/dL    BUN 12 6 - 20 MG/DL    Creatinine 0.96 0.55 - 1.02 MG/DL    BUN/Creatinine ratio 13 12 - 20      GFR est AA >60 >60 ml/min/1.73m2    GFR est non-AA 58 (L) >60 ml/min/1.73m2    Calcium 8.7 8.5 - 10.1 MG/DL    Bilirubin, total 0.7 0.2 - 1.0 MG/DL    ALT (SGPT) 44 12 - 78 U/L    AST (SGOT) 40 (H) 15 - 37 U/L    Alk. phosphatase 133 (H) 45 - 117 U/L    Protein, total 6.7 6.4 - 8.2 g/dL    Albumin 3.0 (L) 3.5 - 5.0 g/dL    Globulin 3.7 2.0 - 4.0 g/dL    A-G Ratio 0.8 (L) 1.1 - 2.2         Pre-medications  were administered as ordered and chemotherapy was initiated.      Medications Administered     0.9% sodium chloride infusion 1,000 mL     Admin Date  01/10/2022 Action  New Bag Dose  1,000 mL Rate  1,000 mL/hr Route  IntraVENous Administered By  Ly Noriega          atropine 0.4 mg/mL injection 0.4 mg     Admin Date  01/10/2022 Action  Given Dose  0.4 mg Route  SubCUTAneous Administered By  Jay Cristina, RN          dexamethasone (DECADRON) 12 mg in 0.9% sodium chloride 50 mL IVPB     Admin Date  01/10/2022 Action  New Bag Dose  12 mg Route  IntraVENous Administered By  Ly Noriega          dextrose 5% infusion     Admin Date  01/10/2022 Action  New Bag Dose  25 mL/hr Rate  25 mL/hr Route  IntraVENous Administered By  Ly Noriega          fluorouraciL (ADRUCIL) 4,860 mg in 0.9% sodium chloride 100 mL CADD Cassette     Admin Date  01/10/2022 Action  New Bag Dose  4,860 mg Rate  2.2 mL/hr Route  IntraVENous Administered By  Ly Noriega          irinotecan (CAMPTOSAR) 304 mg in dextrose 5% 250 mL, overfill volume 25 mL chemo infusion     Admin Date  01/10/2022 Action  New Bag Dose  304 mg Rate  193.5 mL/hr Route  IntraVENous Administered By  Ly Noriega          leucovorin (WELLCOVORIN) 810 mg in dextrose 5% 250 mL, overfill volume 25 mL IVPB     Admin Date  01/10/2022 Action  New Bag Dose  810 mg Rate  210.3 mL/hr Route  IntraVENous Administered By  Copoer Spence          oxaliplatin (ELOXATIN) 172 mg in dextrose 5% 250 mL, overfill volume 25 mL chemo infusion     Admin Date  01/10/2022 Action  New Bag Dose  172 mg Rate  154.7 mL/hr Route  IntraVENous Administered By  Cooper Spence          palonosetron HCl (ALOXI) injection 0.25 mg     Admin Date  01/10/2022 Action  Given Dose  0.25 mg Route  IntraVENous Administered By  Cooper Spence          potassium chloride SR (KLOR-CON 10) tablet 20 mEq     Admin Date  01/10/2022 Action  Given Dose  20 mEq Route  Oral Administered By  Cooper Spence                  Ms. Caio Trujillo tolerated treatment well and was discharged from [place] [address] in stable condition. She is to return on 1/12 for her next appointment.     Gavin Hurtado  January 10, 2022

## 2022-01-11 LAB — CANCER AG19-9 SERPL-ACNC: 130 U/ML (ref 0–35)

## 2022-01-11 NOTE — PROGRESS NOTES
[facility]      Clinic Note - Follow up    [address] Branham returns for scheduled follow up today. She is known to me for history of pancreatic adenocarcinoma in the head of the pancreas. Please see my previous note for details regarding her diagnostic work-up. She has now completed 5 cycles of chemotherapy under the care of Dr. Bharti Mccarty. She has had to have a couple of delayed treatment secondary to toxicities. Specifically she is having lots of diarrhea with her chemotherapy. She has also had to have some dose reductions. She is continuing to lose weight. Today she feels dehydrated and has called the infusion center to come get extra fluids for hydration. She denies any other new medical conditions, surgeries or other changes. She is here to discuss surgery which was planned after cycle 6 of chemotherapy. The patient denies any changes to the [facility], PSx, SHx or FHx since their last visit except as mentioned above. ROS is negative except as mentioned in the HPI. Objective     Visit Vitals  BP (!) 127/95 (BP 1 Location: Left upper arm, BP Patient Position: Sitting, BP Cuff Size: Adult)   Pulse (!) 103   Temp 98.3 °F (36.8 °C) (Oral)   Resp 19   Ht 5' 4\" (1.626 m)   Wt 232 lb 9.6 oz (105.5 kg)   BMI 39.93 kg/m²         PE  GEN - Awake, alert, communicating appropriately. NAD  Pulm - CTAB  CV - RRR  Abd - soft, NT, ND. No palpable masses or organomegaly  Ext - warm, well perfused, no edema. All other systems negative unless indicated above.       Labs  Lab Results   Component Value Date/Time    WBC 10.5 01/10/2022 07:48 AM    Hemoglobin (POC) 12.6 10/06/2013 08:45 AM    HGB 10.6 (L) 01/10/2022 07:48 AM    Hematocrit (POC) 37 10/06/2013 08:45 AM    HCT 31.7 (L) 01/10/2022 07:48 AM    PLATELET 446 76/67/4358 07:48 AM    MCV 95.5 01/10/2022 07:48 AM     Lab Results   Component Value Date/Time    Sodium 139 01/10/2022 07:48 AM    Potassium 3.3 (L) 01/10/2022 07:48 AM    Chloride 106 01/10/2022 07:48 AM    CO2 27 01/10/2022 07:48 AM    Anion gap 6 01/10/2022 07:48 AM    Glucose 92 01/10/2022 07:48 AM    BUN 12 01/10/2022 07:48 AM    Creatinine 0.96 01/10/2022 07:48 AM    BUN/Creatinine ratio 13 01/10/2022 07:48 AM    GFR est AA >60 01/10/2022 07:48 AM    GFR est non-AA 58 (L) 01/10/2022 07:48 AM    Calcium 8.7 01/10/2022 07:48 AM    Bilirubin, total 0.7 01/10/2022 07:48 AM    Alk. phosphatase 133 (H) 01/10/2022 07:48 AM    Protein, total 6.7 01/10/2022 07:48 AM    Albumin 3.0 (L) 01/10/2022 07:48 AM    Globulin 3.7 01/10/2022 07:48 AM    A-G Ratio 0.8 (L) 01/10/2022 07:48 AM    ALT (SGPT) 44 01/10/2022 07:48 AM    AST (SGOT) 40 (H) 01/10/2022 07:48 AM     CA 19-9: 138    Imaging  MRI Abd:   FINDINGS: Liver: Signal is within normal limits. No evidence of mass. Size is  within normal limits. Surface is smooth.     Biliary tree: Subcentimeter gallstones are unchanged. No cholecystitis. No  biliary obstruction. CBD is mildly irregular and measures up to 0.4 cm in  thickness. No filling defect. No obstructing mass.     Pancreas: Ill-defined enhancing mass in the pancreatic head has decreased in  size from a maximum oblique AP diameter of 3.5 cm to a maximum oblique AP  diameter of 2.3. Overall volume of the mass is 2.3 AP x 1.3 tx x 3.2 cc cm. Close Approximation to the pancreatic duct and EBD without obstruction. The mass  contacts less than 25% of the superior mesenteric vein. No involvement of the  superior mesenteric artery, which is surrounded by mesenteric fat. No pancreatic  ductal dilatation. No inflammation.     Spleen: Within normal limits.     Adrenal glands: Within normal limits.     Kidneys: Bilateral renal cysts are unchanged and simple. Largest measures 5.1 cm  in craniocaudal dimension and is in the lower pole of the right kidney.  No solid  renal mass or hydronephrosis.     Vasculature: Patent.     Bowel: No evidence of inflammation.     Lymph nodes: No lymphadenopathy.     Miscellaneous: No ascites. Lumbar spinal stenosis is partially imaged. Muscle  atrophy is unchanged.     RESIST     Pancreatic head mass 2.3 x 1.3 x 3.2 cm (series [id number], image 76)     IMPRESSION  1. Positive response to therapy. Decreased size of the pancreatic head mass,  which now has a maximum AP diameter of 2.3 cm. No obstruction of the biliary  tree or pancreatic duct. No involvement of the SMA. Based on imaging, patient is  a candidate for resection. 2. Cholelithiasis. No acute cholecystitis or biliary obstruction. CT Results (most recent):  Results from Hospital Encounter encounter on 12/20/21    CT CHEST W CONT    Narrative  INDICATION: pancreatic cancer - history of pulmonary nodules need to r/o mets    COMPARISON: CT chest 9/27/2021, chest radiograph 10/15/2021    TECHNIQUE:  Following the uneventful intravenous administration of 100 cc  Isovue-300, 5 mm axial images were obtained through the chest. Coronal and  sagittal reformats were generated. CT dose reduction was achieved through use  of a standardized protocol tailored for this examination and automatic exposure  control for dose modulation. FINDINGS:    CHEST WALL: No mass or axillary lymphadenopathy. Right pectoral infusion port  THYROID: No nodule. MEDIASTINUM: No mass or lymphadenopathy. EDVIN: No mass or lymphadenopathy. THORACIC AORTA: No dissection or aneurysm. MAIN PULMONARY ARTERY: Normal in caliber. TRACHEA/BRONCHI: Patent. ESOPHAGUS: No wall thickening or dilatation. HEART: Normal in size. PLEURA: Interval resolution of previously trace right pleural effusion. No  pneumothorax. LUNGS: Interval apparent resolution of previously seen scattered subcentimeter  bilateral pulmonary nodules. Stable right middle lobe calcified granuloma. No  mass or airspace disease.   INCIDENTALLY IMAGED UPPER ABDOMEN: Partially visualized plastic common bile duct  stent partially visualized 1.0 cm mid left renal cyst  BONES: No destructive bone lesion. Impression  1. Interval apparent resolution of previously seen scattered subcentimeter  bilateral pulmonary nodules and right pleural effusion. 2.  Please see separately dictated reports for the MRI abdomen and pelvis  obtained the same day for subdiaphragmatic findings. Assessment     Todd Cazares is a 77 y. o.yr old female with pancreatic adenocarcinoma in the head of the pancreas. Plan     The patient appears to have had a positive response to neoadjuvant chemotherapy. She has no evidence of distant metastatic disease and appears resectable based upon imaging criteria. I have discussed with her completing her 6th cycle of chemotherapy and then planning for a Whipple procedure approximately 4 weeks after her last dose of chemotherapy. We had a lengthy discussion regarding the details of the procedure, potential complications, expected outcomes and long-term sequelae. I will plan to see her again for virtual visit prior to her surgery to answer any last questions. 40 mins of time was spent with the patient of which > 50% of the time involved face-to-face counseling of the patient regarding the proposed treatment plan.       Nayana Cavanaugh MD  1/6/2022    CC: MD Dr. David Calvo

## 2022-01-12 ENCOUNTER — HOSPITAL ENCOUNTER (OUTPATIENT)
Dept: INFUSION THERAPY | Age: 67
Discharge: HOME | End: 2022-01-12
Payer: MEDICARE

## 2022-01-12 VITALS
TEMPERATURE: 97.7 F | RESPIRATION RATE: 16 BRPM | HEART RATE: 83 BPM | DIASTOLIC BLOOD PRESSURE: 70 MMHG | OXYGEN SATURATION: 96 % | SYSTOLIC BLOOD PRESSURE: 119 MMHG

## 2022-01-12 DIAGNOSIS — Z76.89 PREVENTION OF CHEMOTHERAPY-INDUCED NEUTROPENIA: Primary | ICD-10-CM

## 2022-01-12 DIAGNOSIS — C25.0 MALIGNANT NEOPLASM OF HEAD OF PANCREAS (HCC): ICD-10-CM

## 2022-01-12 PROCEDURE — 96360 HYDRATION IV INFUSION INIT: CPT

## 2022-01-12 PROCEDURE — 96377 APPLICATON ON-BODY INJECTOR: CPT

## 2022-01-12 PROCEDURE — 74011250636 HC RX REV CODE- 250/636: Performed by: INTERNAL MEDICINE

## 2022-01-12 PROCEDURE — 74011000250 HC RX REV CODE- 250: Performed by: INTERNAL MEDICINE

## 2022-01-12 RX ORDER — SODIUM CHLORIDE 9 MG/ML
10 INJECTION INTRAMUSCULAR; INTRAVENOUS; SUBCUTANEOUS AS NEEDED
Status: DISCONTINUED | OUTPATIENT
Start: 2022-01-12 | End: 2022-01-13 | Stop reason: HOSPADM

## 2022-01-12 RX ORDER — SODIUM CHLORIDE 9 MG/ML
1000 INJECTION, SOLUTION INTRAVENOUS ONCE
Status: COMPLETED | OUTPATIENT
Start: 2022-01-12 | End: 2022-01-12

## 2022-01-12 RX ORDER — SODIUM CHLORIDE 0.9 % (FLUSH) 0.9 %
10 SYRINGE (ML) INJECTION AS NEEDED
Status: DISCONTINUED | OUTPATIENT
Start: 2022-01-12 | End: 2022-01-13 | Stop reason: HOSPADM

## 2022-01-12 RX ORDER — HEPARIN 100 UNIT/ML
300-500 SYRINGE INTRAVENOUS AS NEEDED
Status: DISCONTINUED | OUTPATIENT
Start: 2022-01-12 | End: 2022-01-13 | Stop reason: HOSPADM

## 2022-01-12 RX ADMIN — PEGFILGRASTIM 6 MG: KIT SUBCUTANEOUS at 14:35

## 2022-01-12 RX ADMIN — Medication 10 ML: at 14:30

## 2022-01-12 RX ADMIN — Medication 10 ML: at 15:35

## 2022-01-12 RX ADMIN — HEPARIN 500 UNITS: 100 SYRINGE at 15:35

## 2022-01-12 RX ADMIN — SODIUM CHLORIDE 1000 ML: 9 INJECTION, SOLUTION INTRAVENOUS at 14:30

## 2022-01-12 NOTE — PROGRESS NOTES
[facility] Progress Note    Date: 2022    Name: Tameka Lyon    MRN: [id number]         : 1955    Ms. Julianna Noriega Arrived ambulatory and in no distress for Pump Removal + Hydration. Assessment was completed, no acute issues at this time, no new complaints voiced. CADD completed- 100 ml infused per order. Ms. Kaveh Marquez vitals were reviewed. Patient Vitals for the past 12 hrs:   Temp Pulse Resp BP SpO2   22 1541 97.7 °F (36.5 °C)   119/70    22 1425 98 °F (36.7 °C) 83 16 118/82 96 %       Medications Administered     0.9% sodium chloride infusion 1,000 mL     Admin Date  2022 Action  New Bag Dose  1,000 mL Rate  1,000 mL/hr Route  IntraVENous Administered By  Caitlyn Sousa RN          heparin (porcine) pf 300-500 Units     Admin Date  2022 Action  Given Dose  500 Units Route  InterCATHeter Administered By  Caitlyn Sousa RN          pegfilgrastim (NEULASTA) wearable SQ injector 6 mg     Admin Date  2022 Action  Given Dose  6 mg Route  SubCUTAneous Administered By  Caitlyn Sousa RN          sodium chloride (NS) flush 10 mL     Admin Date  2022 Action  Given Dose  10 mL Route  IntraVENous Administered By  Caitlyn Sousa RN           Admin Date  2022 Action  Given Dose  10 mL Route  IntraVENous Administered By  Caitlyn Sousa RN              Education provided to patient about Neulasta On Body Injector including: side effects, how/when to remove the device, as well as what to do in the event of device malfunction. Opportunity for questions was provided and all questions were answered. Patient verbalized understanding. Ms. Julianna Noriega tolerated treatment well and was discharged from [place] [address] in stable condition at 1540. Port de-accessed, flushed & heparinized per protocol. She is to return on  for her next appointment.     Marina Simon RN  2022

## 2022-01-17 ENCOUNTER — APPOINTMENT (OUTPATIENT)
Dept: INFUSION THERAPY | Age: 67
End: 2022-01-17

## 2022-01-19 ENCOUNTER — APPOINTMENT (OUTPATIENT)
Dept: INFUSION THERAPY | Age: 67
End: 2022-01-19

## 2022-01-19 RX ORDER — SODIUM CHLORIDE 0.9 % (FLUSH) 0.9 %
10 SYRINGE (ML) INJECTION AS NEEDED
Status: CANCELLED | OUTPATIENT
Start: 2022-09-03

## 2022-01-19 RX ORDER — HEPARIN 100 UNIT/ML
300-500 SYRINGE INTRAVENOUS AS NEEDED
Status: CANCELLED | OUTPATIENT
Start: 2022-09-03

## 2022-01-19 RX ORDER — SODIUM CHLORIDE 9 MG/ML
10 INJECTION INTRAMUSCULAR; INTRAVENOUS; SUBCUTANEOUS AS NEEDED
Status: CANCELLED | OUTPATIENT
Start: 2022-09-03

## 2022-01-19 NOTE — PROGRESS NOTES
[facility] at [address], [address] [facility] [address] [facility]  Jonathan Carbajalel: [phone]  F: [phone]      Reason for Visit:   Satira Peabody is a 77 y.o. female who is seen for evaluation of new bilary or pancreatic head mass    Hematology/Oncology Treatment History:     Diagnosis: Pancreatic adenocarcinoma    Stage: clinical Stage Ib [T2N0]    Pathology:   -9/26/21 Cytology - brushings from common bile duct: Reactive glandular epithelial cells and bile   -9/28/21 pancreatic head mass biopsy: Adenocarcinoma.   -10/22/21 Invdeyanira ca-cancer panel -negative     Prior Treatment:   Neoadjuvant FOLFIRNOX x 6 cycles, 10/18/21-1/10/22    Current Treatment: adjuvant FOLFIRNOX x 6 cycles, date TBD. Added neulasta with C2. History of Present Illness:   Satira Peabody is a pleasant 77 y.o. female who comes in for evaluation of pancreatic cancer. She presented in 9/2021 with obstructive jaundice, transaminitis. ERCP on 9/26/21 notable for distal CBD stricture; possible tumor/mass located in the CBD; underwent biliary stent placement to relieve biliary obstruction. CT A/P revealed mild/mod intrahepatic biliary ductal dilatation; prominence of CBD and hydropic gallbladder, suggestive of stricture/stenosis/mass of distal CBD. MRI of abdomen showed narrowing of CBD pancreatic head suggestive of extrinisic compression concerning for periampullary mass vs eccentric intraluminal lesion. EUS showed 3.2cm hypoechoic mass in pancreas head. Biopsy revealed adenocarcinoma. Pt met with Dr. Abhinav Ingram in [place] and plans for neoadjuvant chemotherapy prior to surgery. Interval history:  Patient here for follow up of pancreas cancer. Completed 6 x cycles of neoadjuvant chemotherapy. Followed up with Dr. Abhinav Ingram and plans to have whipple surgery on 2/23/22. States the 6th cycle of chemo was pretty rough with 6-7 loose stools per day on some days. Imodium worked at times. She finally feels better today. Intermittent nausea. PMHx: OA and Rheumatoid arthritis in lower back, Ulcerative colitis, HTN, Hypothyroidism  PSurgHx: , Cyst removed from left breast  SHx: , has 3 children (1 daughter and 2 sons). Works as  in [employer]. Nonsmoker, no EtOH.  in rehab recovering from [facility]. FHx: Mother  of pancreatic cancer age 80. Brother and son had colon cancer. Review of Systems: A complete review of systems was obtained, reviewed. Pertinent findings reviewed above. Physical Exam:     Visit Vitals  /89   Pulse 64   Temp 97 °F (36.1 °C)   Resp 18   Ht 5' 4\" (1.626 m)   Wt 227 lb (103 kg)   SpO2 97%   BMI 38.96 kg/m²     ECOG PS: 1  General: obese; no distress  Eyes:  Anicteric sclerae  HENT: atraumatic, face mask in place  Neck: supple  Lymphatic: Deferred today  Respiratory: CTAB, normal respiratory effort  CV: normal rate, regular rhythm, no murmurs, no peripheral edema, port in place. GI: soft, nontender, nondistended, no masses  MS: digits without clubbing or cyanosis. Skin: no rashes, no ecchymoses, no petechia. normal temperature, turgor, and texture. Darkening of hands due to chemotherapy noted  Psych: alert, oriented, appropriate affect, normal judgment/insight    Results:     Lab Results   Component Value Date/Time    WBC 11.2 (H) 2022 09:42 AM    HGB 10.6 (L) 2022 09:42 AM    HCT 31.4 (L) 2022 09:42 AM    PLATELET 816 (L)  09:42 AM    MCV 99.7 (H) 2022 09:42 AM    ABS.  NEUTROPHILS 4.1 2022 09:42 AM    Hemoglobin (POC) 12.6 10/06/2013 08:45 AM    Hematocrit (POC) 37 10/06/2013 08:45 AM     Lab Results   Component Value Date/Time    Sodium 138 2022 09:42 AM    Potassium 3.1 (L) 2022 09:42 AM    Chloride 105 2022 09:42 AM    CO2 26 2022 09:42 AM    Glucose 93 2022 09:42 AM    BUN 7 2022 09:42 AM    Creatinine 1.01 2022 09:42 AM    GFR est AA >60 2022 09:42 AM GFR est non-AA 55 (L) 01/24/2022 09:42 AM    Calcium 8.9 01/24/2022 09:42 AM    Sodium (POC) 143 10/06/2013 08:45 AM    Potassium (POC) 3.4 (L) 10/06/2013 08:45 AM    Chloride (POC) 104 10/06/2013 08:45 AM    Glucose (POC) 91 10/06/2013 08:45 AM    BUN (POC) 11 10/06/2013 08:45 AM    Creatinine (POC) 1.0 10/06/2013 08:45 AM    Calcium, ionized (POC) 1.27 10/06/2013 08:45 AM     Lab Results   Component Value Date/Time    Bilirubin, total 0.8 01/24/2022 09:42 AM    ALT (SGPT) 36 01/24/2022 09:42 AM    Alk. phosphatase 138 (H) 01/24/2022 09:42 AM    Protein, total 6.8 01/24/2022 09:42 AM    Albumin 3.1 (L) 01/24/2022 09:42 AM    Globulin 3.7 01/24/2022 09:42 AM     Lab Results   Component Value Date/Time    Iron % saturation 27 12/27/2021 09:21 AM    TIBC 327 12/27/2021 09:21 AM    Ferritin 244 12/27/2021 09:22 AM    Sed rate (ESR) 17 09/16/2010 12:04 PM    C-Reactive protein <0.29 01/18/2017 08:45 AM    TSH 3.46 03/04/2021 08:57 AM    ALBA, Direct Detected (A) 09/16/2010 12:04 PM    Lipase 1,214 (H) 09/29/2021 04:10 AM    Hep C virus Ab Interp. NONREACTIVE 09/24/2021 06:55 PM     Lab Results   Component Value Date/Time    CEA 3.5 09/25/2021 11:46 AM    Carbohydrate Antigen 19-9, (CA 19-9) 130 (H) 01/10/2022 10:08 AM       10/18/21 CA 19-9:  138      Imaging / Procedures:     9/24/21 US ABD   IMPRESSION  Cholelithiasis. Gallbladder sludge. Prominent CBD with mild intrahepatic ductal dilatation as well. Nonemergent MRI with MRCP to delineate etiology for CBD distention. 9/24/21 CT ABD PELV W CONT  There is mild/moderate intrahepatic biliary ductal dilatation, prominence of the CBD and a hydropic gallbladder. No pancreatic duct dilatation. No clearly  delineated pancreatic head mass. Imaging findings suggestive of stricture/stenosis/mass of the distal CBD, no extrinsic pancreatic head mass is  identified. Gastroenterology consult  Correlation with MRI/MRCP on a nonemergent basis.   There is severe degenerative change of the lumbar spine. 9/25/21 MRI ABD WO CONT  IMPRESSION  1. Intrahepatic and extra hepatic biliary dilatation with abrupt narrowing of  the common bile duct pancreatic head just proximal to the ampulla. Suggestion of  extrinsic compression on the duct. This raises the possibility of a  periampullary mass. Alternatively, this may be an eccentric intraluminal lesion. Evaluation is limited. Recommend GI consultation for possible ERCP and EUS. 2.  Questionable small lesion in the left hepatic lobe with mild increased T2  signal and T1 hypointensity. Recommend follow-up imaging a contrast-enhanced  study preferably MRI. 3.  Cholelithiasis. Distended gallbladder with mild gallbladder wall thickening. Correlate for acute cholecystitis    9/26/21 XR ERCP/ERCB / Dr. Ruddy Dee  Impression: Biliary ductal dilation, with a maximum common duct diameter of 15 mm; Severe stenosis, involving distal CBD /pancreas head area concerning for neoplasia  Interventions: Endoscopic ampullary sphincterotomy and biliary stent placement; brushings from the CBD    9/27/21 CT CHEST W CONT:   IMPRESSION  1. There is a minimal right pleural effusion. 2. There are small pulmonary nodules present. There is a nodule in the plane of  the right major fissure and posteriorly in left lower lobe. These were not  present on examination of 1/18/2017. These could be on the basis of metastatic  disease. Close follow-up is suggested. There are also 2 small subpleural nodules  anteriorly in the right upper lobe as described above which can also be  evaluated on follow-up. 9/28/21 EUS:  Endoscopic: Esophagus:normal; Stomach: normal; Duodenum/jejunum: normal and protruding biliary stent  Ultrasound: Esophagus: normal findings;     Stomach: normal findings:     Pancreas: Areas examined: the entire gland                          Parenchyma: -Evidence of a hypoechoic mass with poorly defined borders seen in the head of the pancreas.  It appeared involving the CBD where a stent is seen, however it did not involve the major vessels. It measured about 3.2 cm in widest diameter on one view. Pancreatic Duct: normal findings, not dilated               Liver: Parenchyma: normal                          Gallbladder: normal                          Bile Duct: the common bile duct is dilated in the proximal and mid portion and a plastic stent could be seen                          Lymph Node: no adenopathy  Impression:   Pancreas head mass with fine needle biopsy showing adenocarcinoma on preliminary cytology  Recommendations: Follow-up on pathology  Follow-up with oncology, will need further evaluation of pulmonary nodule  Surgical oncology consultation  Resume GI lite diet today and can discharge in am from GI standpoint    12/20/2021:  MRI abd w wo cont:  IMPRESSION  1. Positive response to therapy. Decreased size of the pancreatic head mass, which now has a maximum AP diameter of 2.3 cm. No obstruction of the biliary tree or pancreatic duct. No involvement of the SMA. Based on imaging, patient is a candidate for resection. 2. Cholelithiasis. No acute cholecystitis or biliary obstruction. MRI Pelv w wo cont:  IMPRESSION  No acute process or evidence of malignant neoplasm/ metastatic disease. CT chest w cont:  IMPRESSION  1. Interval apparent resolution of previously seen scattered subcentimeter bilateral pulmonary nodules and right pleural effusion. 2.  Please see separately dictated reports for the MRI abdomen and pelvis obtained the same day for subdiaphragmatic findings. Assessment/Recommendations:   77 y.o. female with Ulcerative colitis, Hyopthyroidism, admitted with obstructive jaundice concerning for underlying malignant obstructing mass. 1. Pancreatic adenocarcinoma:  Clinically stage Ib [T2N0], but need further evaluation in future regarding pulmonary nodules and liver lesion.  Presented with obstructive jaundice and transaminitis, CA 19-9 was 198 in 9/2021 at diagnosis. Underwent biliary stent placement with improvement in biliary obstruction. 3.2cm mass seen in pancreatic head on EUS, not involving vasculature, possibly resectable. Dr. Carmelina Lacey in [place] has evaluated patient and recommends consideration of neoadjuvant chemotherapy with close attention to lung nodules and liver lesion. I agree with this recommendation for earlier treatment of micrometastatic disease, ability to determine whether pt has chemosensitive disease. We discussed the risks and benefits of FOLFIRINOX chemotherapy, including potential side effects. These include but are not limited to fatigue, nausea vomiting, diarrhea, neuropathy, taste changes, cold intolerance, esophageal spasm, allergic reactions, alopecia, mucositis, myelosuppression, risk for infection, infertility, and rarely, death. Rarely, a patient may have a condition where they do not metabolize fluorouracil appropriately (called DPD deficiency), and they may have excessive toxicity. A Port-A-Cath will be required in order to deliver the continuous infusion. BRCA 1/2 negative. The patient has consented to beginning therapy. 12/2021 MRI pelvis without disease and MRI abdomen showed decrease in size of pancreatic head mass. Planning for 6 cycles of mFOLFIRINOX in neoadjuvant setting, followed by surgery and adjuvant therapy x 6 cycles. Supportive care meds include: Emla cream, Zofran, Compazine, Dexamethasone  -- Completed C6 of neoadjuvant mFOLFIRINOX with neulasta support (10% DR brad) on 1/10/22. -- Checking CA 19-9 on date of C1, C6, C7, C12.   -- Whipple surgery planned with Dr. Carmelina Lacey on 2/23/22. -- Return in 7 weeks for labs, fluids, MD/NP visit, review of pathology, and discussion of cycle 7.       2. Diarrhea / hypokalemia:  Has history of UC. Likely related to biliary obstruction and pt has been started on Cholestyramine (Questran) but not taking due to intolerance.  Usually take Potassium 20meq BID - but was unable to take this past week. Was evaluated by Dr. Bridget Armenta in GI who ordered stool studies and prescribed a medication for diarrhea. Asked pt to call us with name of this - it may be pancreatic enzyme replacement. 3. Pulmonary nodules:   Repeat imaging after C4 of chemo showed resolution of pulmonary nodules. 4. HTN / hypotension:    Managed on HCTZ. Advised checking BP at home and if systolic <237, then hold HCTZ. Advised holding today and for a few days. 5. H/o Ulcerative procto-sigmoiditis:  Past due for colonoscopy with one adenoma polyp removed at last colonoscopy in April 2017. Was due for repeat in 2019 but she has not followed up with the GI practice since her last colonoscopy. -- Established with GI and plans to have repeat colonoscopy soon. 6. Morbid obesity:  Discussed healthy food options and ways to incorporate more protein into diet. 7. H/o Chemotherapy induced neutropenia:   Received Neulasta on pump disconnect days. 8. Normocytic anemia:  Likely due to chemotherapy. Normal iron profile and ferritin.       Signed By: Brendon Palacios MD

## 2022-01-24 ENCOUNTER — HOSPITAL ENCOUNTER (OUTPATIENT)
Dept: INFUSION THERAPY | Age: 67
Discharge: HOME | End: 2022-01-24
Payer: MEDICARE

## 2022-01-24 ENCOUNTER — APPOINTMENT (OUTPATIENT)
Dept: INFUSION THERAPY | Age: 67
End: 2022-01-24

## 2022-01-24 ENCOUNTER — OFFICE VISIT (OUTPATIENT)
Dept: ONCOLOGY | Age: 67
End: 2022-01-24

## 2022-01-24 VITALS
SYSTOLIC BLOOD PRESSURE: 102 MMHG | DIASTOLIC BLOOD PRESSURE: 89 MMHG | BODY MASS INDEX: 38.76 KG/M2 | TEMPERATURE: 97 F | HEIGHT: 64 IN | HEART RATE: 64 BPM | WEIGHT: 227 LBS | RESPIRATION RATE: 18 BRPM | OXYGEN SATURATION: 97 %

## 2022-01-24 VITALS
SYSTOLIC BLOOD PRESSURE: 112 MMHG | RESPIRATION RATE: 18 BRPM | HEIGHT: 64 IN | WEIGHT: 227.4 LBS | BODY MASS INDEX: 38.82 KG/M2 | TEMPERATURE: 97.1 F | HEART RATE: 67 BPM | OXYGEN SATURATION: 97 % | DIASTOLIC BLOOD PRESSURE: 82 MMHG

## 2022-01-24 DIAGNOSIS — K51.20 ULCERATIVE PROCTITIS WITHOUT COMPLICATION (HCC): ICD-10-CM

## 2022-01-24 DIAGNOSIS — C25.0 MALIGNANT NEOPLASM OF HEAD OF PANCREAS (HCC): ICD-10-CM

## 2022-01-24 DIAGNOSIS — E87.6 HYPOKALEMIA: ICD-10-CM

## 2022-01-24 DIAGNOSIS — C25.9 PANCREATIC ADENOCARCINOMA (HCC): Primary | ICD-10-CM

## 2022-01-24 DIAGNOSIS — K52.1 DIARRHEA DUE TO DRUG: ICD-10-CM

## 2022-01-24 DIAGNOSIS — D64.9 NORMOCYTIC ANEMIA: ICD-10-CM

## 2022-01-24 DIAGNOSIS — E86.0 DEHYDRATION: Primary | ICD-10-CM

## 2022-01-24 LAB
ALBUMIN SERPL-MCNC: 3.1 G/DL (ref 3.5–5)
ALBUMIN/GLOB SERPL: 0.8 {RATIO} (ref 1.1–2.2)
ALP SERPL-CCNC: 138 U/L (ref 45–117)
ALT SERPL-CCNC: 36 U/L (ref 12–78)
ANION GAP SERPL CALC-SCNC: 7 MMOL/L (ref 5–15)
AST SERPL-CCNC: 38 U/L (ref 15–37)
BASOPHILS # BLD: 0 K/UL (ref 0–0.1)
BASOPHILS NFR BLD: 0 % (ref 0–1)
BILIRUB SERPL-MCNC: 0.8 MG/DL (ref 0.2–1)
BUN SERPL-MCNC: 7 MG/DL (ref 6–20)
BUN/CREAT SERPL: 7 (ref 12–20)
CALCIUM SERPL-MCNC: 8.9 MG/DL (ref 8.5–10.1)
CHLORIDE SERPL-SCNC: 105 MMOL/L (ref 97–108)
CO2 SERPL-SCNC: 26 MMOL/L (ref 21–32)
CREAT SERPL-MCNC: 1.01 MG/DL (ref 0.55–1.02)
DIFFERENTIAL METHOD BLD: ABNORMAL
EOSINOPHIL # BLD: 0.1 K/UL (ref 0–0.4)
EOSINOPHIL NFR BLD: 1 % (ref 0–7)
ERYTHROCYTE [DISTWIDTH] IN BLOOD BY AUTOMATED COUNT: 19.5 % (ref 11.5–14.5)
GLOBULIN SER CALC-MCNC: 3.7 G/DL (ref 2–4)
GLUCOSE SERPL-MCNC: 93 MG/DL (ref 65–100)
HCT VFR BLD AUTO: 31.4 % (ref 35–47)
HGB BLD-MCNC: 10.6 G/DL (ref 11.5–16)
IMM GRANULOCYTES # BLD AUTO: 0.6 K/UL (ref 0–0.04)
IMM GRANULOCYTES NFR BLD AUTO: 5 % (ref 0–0.5)
LYMPHOCYTES # BLD: 4.7 K/UL (ref 0.8–3.5)
LYMPHOCYTES NFR BLD: 42 % (ref 12–49)
MCH RBC QN AUTO: 33.7 PG (ref 26–34)
MCHC RBC AUTO-ENTMCNC: 33.8 G/DL (ref 30–36.5)
MCV RBC AUTO: 99.7 FL (ref 80–99)
MONOCYTES # BLD: 1.7 K/UL (ref 0–1)
MONOCYTES NFR BLD: 15 % (ref 5–13)
NEUTS SEG # BLD: 4.1 K/UL (ref 1.8–8)
NEUTS SEG NFR BLD: 37 % (ref 32–75)
NRBC # BLD: 0.04 K/UL (ref 0–0.01)
NRBC BLD-RTO: 0.4 PER 100 WBC
PLATELET # BLD AUTO: 100 K/UL (ref 150–400)
PMV BLD AUTO: 11.8 FL (ref 8.9–12.9)
POTASSIUM SERPL-SCNC: 3.1 MMOL/L (ref 3.5–5.1)
PROT SERPL-MCNC: 6.8 G/DL (ref 6.4–8.2)
RBC # BLD AUTO: 3.15 M/UL (ref 3.8–5.2)
RBC MORPH BLD: ABNORMAL
SODIUM SERPL-SCNC: 138 MMOL/L (ref 136–145)
WBC # BLD AUTO: 11.2 K/UL (ref 3.6–11)
WBC MORPH BLD: ABNORMAL

## 2022-01-24 PROCEDURE — G8417 CALC BMI ABV UP PARAM F/U: HCPCS | Performed by: INTERNAL MEDICINE

## 2022-01-24 PROCEDURE — 80053 COMPREHEN METABOLIC PANEL: CPT

## 2022-01-24 PROCEDURE — G8536 NO DOC ELDER MAL SCRN: HCPCS | Performed by: INTERNAL MEDICINE

## 2022-01-24 PROCEDURE — 3017F COLORECTAL CA SCREEN DOC REV: CPT | Performed by: INTERNAL MEDICINE

## 2022-01-24 PROCEDURE — 77030012965 HC NDL HUBR BBMI -A

## 2022-01-24 PROCEDURE — 99214 OFFICE O/P EST MOD 30 MIN: CPT | Performed by: INTERNAL MEDICINE

## 2022-01-24 PROCEDURE — G8432 DEP SCR NOT DOC, RNG: HCPCS | Performed by: INTERNAL MEDICINE

## 2022-01-24 PROCEDURE — 74011000250 HC RX REV CODE- 250: Performed by: NURSE PRACTITIONER

## 2022-01-24 PROCEDURE — 36415 COLL VENOUS BLD VENIPUNCTURE: CPT

## 2022-01-24 PROCEDURE — G8427 DOCREV CUR MEDS BY ELIG CLIN: HCPCS | Performed by: INTERNAL MEDICINE

## 2022-01-24 PROCEDURE — 74011250637 HC RX REV CODE- 250/637: Performed by: NURSE PRACTITIONER

## 2022-01-24 PROCEDURE — 1090F PRES/ABSN URINE INCON ASSESS: CPT | Performed by: INTERNAL MEDICINE

## 2022-01-24 PROCEDURE — 1101F PT FALLS ASSESS-DOCD LE1/YR: CPT | Performed by: INTERNAL MEDICINE

## 2022-01-24 PROCEDURE — G8752 SYS BP LESS 140: HCPCS | Performed by: INTERNAL MEDICINE

## 2022-01-24 PROCEDURE — G8400 PT W/DXA NO RESULTS DOC: HCPCS | Performed by: INTERNAL MEDICINE

## 2022-01-24 PROCEDURE — 74011250636 HC RX REV CODE- 250/636: Performed by: NURSE PRACTITIONER

## 2022-01-24 PROCEDURE — 74011250636 HC RX REV CODE- 250/636: Performed by: INTERNAL MEDICINE

## 2022-01-24 PROCEDURE — G8754 DIAS BP LESS 90: HCPCS | Performed by: INTERNAL MEDICINE

## 2022-01-24 PROCEDURE — 85025 COMPLETE CBC W/AUTO DIFF WBC: CPT

## 2022-01-24 RX ORDER — HEPARIN 100 UNIT/ML
300-500 SYRINGE INTRAVENOUS AS NEEDED
Status: ACTIVE | OUTPATIENT
Start: 2022-01-24 | End: 2022-01-24

## 2022-01-24 RX ORDER — POTASSIUM CHLORIDE 750 MG/1
40 TABLET, FILM COATED, EXTENDED RELEASE ORAL
Status: COMPLETED | OUTPATIENT
Start: 2022-01-24 | End: 2022-01-24

## 2022-01-24 RX ORDER — SODIUM CHLORIDE 0.9 % (FLUSH) 0.9 %
10 SYRINGE (ML) INJECTION AS NEEDED
Status: DISPENSED | OUTPATIENT
Start: 2022-01-24 | End: 2022-01-24

## 2022-01-24 RX ORDER — SODIUM CHLORIDE 9 MG/ML
10 INJECTION INTRAMUSCULAR; INTRAVENOUS; SUBCUTANEOUS AS NEEDED
Status: ACTIVE | OUTPATIENT
Start: 2022-01-24 | End: 2022-01-24

## 2022-01-24 RX ADMIN — Medication 500 UNITS: at 11:39

## 2022-01-24 RX ADMIN — SODIUM CHLORIDE, PRESERVATIVE FREE 10 ML: 5 INJECTION INTRAVENOUS at 09:50

## 2022-01-24 RX ADMIN — SODIUM CHLORIDE 1000 ML: 9 INJECTION, SOLUTION INTRAVENOUS at 10:38

## 2022-01-24 RX ADMIN — POTASSIUM CHLORIDE 40 MEQ: 750 TABLET, FILM COATED, EXTENDED RELEASE ORAL at 12:01

## 2022-01-24 NOTE — PROGRESS NOTES
Yolie Lopez is a 77 y.o. female follow up for pancreatic mass. 1. Have you been to the ER, urgent care clinic since your last visit? Hospitalized since your last visit?no     2. Have you seen or consulted any other health care providers outside of the [address] [place] [address] since your last visit? Include any pap smears or colon screening.  No    Vitals 1/24/2022   Blood Pressure 102/89   Pulse 64   Temp 97   Resp 18   Height 5' 4\"   Weight 227 lb 6.4 oz   SpO2 97   BSA 2.16 m2   BMI 39.03 kg/m2

## 2022-01-24 NOTE — PROGRESS NOTES
[facility]patient [facility]fusion Center Short Visit Progress Note    7102 Patient admitted to [facility] for   hydration ambulatory in stable condition. Assessment completed. No new concerns voiced. Covid Screening      1. Do you have any symptoms of COVID-19? SOB, coughing, fever, or generally not feeling well ? no  2. Have you been exposed to COVID-19 recently? no  3. Have you had any recent contact with family/friend that has a pending COVID test? no    Vital Signs:  Visit Vitals  /89   Pulse 64   Temp 97 °F (36.1 °C)   Resp 18   Ht 5' 4\" (1.626 m)   Wt 103.1 kg (227 lb 6.4 oz)   SpO2 97%   BMI 39.03 kg/m²         PAC with positive blood return. Lab Results:  Recent Results (from the past 12 hour(s))   CBC WITH AUTOMATED DIFF    Collection Time: 01/24/22  9:42 AM   Result Value Ref Range    WBC 11.2 (H) 3.6 - 11.0 K/uL    RBC 3.15 (L) 3.80 - 5.20 M/uL    HGB 10.6 (L) 11.5 - 16.0 g/dL    HCT 31.4 (L) 35.0 - 47.0 %    MCV 99.7 (H) 80.0 - 99.0 FL    MCH 33.7 26.0 - 34.0 PG    MCHC 33.8 30.0 - 36.5 g/dL    RDW 19.5 (H) 11.5 - 14.5 %    PLATELET 875 (L) 522 - 400 K/uL    MPV 11.8 8.9 - 12.9 FL    NRBC 0.4 (H) 0  WBC    ABSOLUTE NRBC 0.04 (H) 0.00 - 0.01 K/uL    NEUTROPHILS 37 32 - 75 %    LYMPHOCYTES 42 12 - 49 %    MONOCYTES 15 (H) 5 - 13 %    EOSINOPHILS 1 0 - 7 %    BASOPHILS 0 0 - 1 %    IMMATURE GRANULOCYTES 5 (H) 0.0 - 0.5 %    ABS. NEUTROPHILS 4.1 1.8 - 8.0 K/UL    ABS. LYMPHOCYTES 4.7 (H) 0.8 - 3.5 K/UL    ABS. MONOCYTES 1.7 (H) 0.0 - 1.0 K/UL    ABS. EOSINOPHILS 0.1 0.0 - 0.4 K/UL    ABS. BASOPHILS 0.0 0.0 - 0.1 K/UL    ABS. IMM.  GRANS. 0.6 (H) 0.00 - 0.04 K/UL    DF SMEAR SCANNED      RBC COMMENTS ANISOCYTOSIS  2+        WBC COMMENTS TOXIC GRANULATION     METABOLIC PANEL, COMPREHENSIVE    Collection Time: 01/24/22  9:42 AM   Result Value Ref Range    Sodium 138 136 - 145 mmol/L    Potassium 3.1 (L) 3.5 - 5.1 mmol/L    Chloride 105 97 - 108 mmol/L    CO2 26 21 - 32 mmol/L    Anion gap 7 5 - 15 mmol/L Glucose 93 65 - 100 mg/dL    BUN 7 6 - 20 MG/DL    Creatinine 1.01 0.55 - 1.02 MG/DL    BUN/Creatinine ratio 7 (L) 12 - 20      GFR est AA >60 >60 ml/min/1.73m2    GFR est non-AA 55 (L) >60 ml/min/1.73m2    Calcium 8.9 8.5 - 10.1 MG/DL    Bilirubin, total 0.8 0.2 - 1.0 MG/DL    ALT (SGPT) 36 12 - 78 U/L    AST (SGOT) 38 (H) 15 - 37 U/L    Alk. phosphatase 138 (H) 45 - 117 U/L    Protein, total 6.8 6.4 - 8.2 g/dL    Albumin 3.1 (L) 3.5 - 5.0 g/dL    Globulin 3.7 2.0 - 4.0 g/dL    A-G Ratio 0.8 (L) 1.1 - 2.2             Medications:  Medications Administered     0.9% sodium chloride injection 10 mL     Admin Date  01/24/2022 Action  Given Dose  10 mL Route  IntraVENous Administered By  Pablito Garcia RN          heparin (porcine) pf 300-500 Units     Admin Date  01/24/2022 Action  Given Dose  500 Units Route  InterCATHeter Administered By  Pablito Garcia RN          sodium chloride (NS) flush 10 mL     Admin Date  01/24/2022 Action  Given Dose  10 mL Route  IntraVENous Administered By  Pablito Garcia RN          sodium chloride 0.9 % bolus infusion 1,000 mL     Admin Date  01/24/2022 Action  New Bag Dose  1,000 mL Rate  1,000 mL/hr Route  IntraVENous Administered By  Pablito Garcia RN                1210 Patient tolerated treatment well. Patient discharged from [facility] ambulatory in no distress at 1210. Patient aware of next appointment.     Future Appointments   Date Time Provider Sally Sofie   3/10/2022  9:20 AM JESSICA Rolwey [facility] BS AMB   3/15/2022  8:30 AM Jelena Lester MD [facility] BS AMB

## 2022-01-26 ENCOUNTER — APPOINTMENT (OUTPATIENT)
Dept: INFUSION THERAPY | Age: 67
End: 2022-01-26

## 2022-01-28 ENCOUNTER — TELEPHONE (OUTPATIENT)
Dept: SURGERY | Age: 67
End: 2022-01-28

## 2022-01-28 NOTE — TELEPHONE ENCOUNTER
Returned call to patient. Two patient identifiers used. Patient stated GI called in a rx for Creon, however the medication is $600. Patient spoke to Dr. Seymour Albert who suggested that patient not take medication at this point, however she should speak to Dr. Veto Victoria. Patient had her last round of chemo on 1/17/22. Patient is scheduled to have a Whipple on 2/23/22. Patient denies diarrhea at this time and is keeping hydrated and taking in protein as much as possible. Patient is really trying different methods to get in suggested protein. Advised patient to hold off picking up Creon as I will speak to Dr. Veto Victoria and get back to her later today, but no later than Monday. Also, explained to patient if Dr. Veto Victoria does prescribe Creon for her I will print off a financial assistance application from the [employer] site. Rosy in agreement.

## 2022-01-28 NOTE — TELEPHONE ENCOUNTER
Outgoing message stated telephone number on record. Left voicemail letting patient know per Dr. Tr Sol she can hold off taking Creon. Name and office number left in case patients needs to return call.

## 2022-01-28 NOTE — TELEPHONE ENCOUNTER
Pt calling about a medication prescribed by her GI doctor. She said she thinks it may be the medication White talked about. She wants to confirm if she should take it as it is so expensive. Asks for a call back please.

## 2022-01-31 ENCOUNTER — APPOINTMENT (OUTPATIENT)
Dept: INFUSION THERAPY | Age: 67
End: 2022-01-31

## 2022-02-01 ENCOUNTER — TELEPHONE (OUTPATIENT)
Dept: ONCOLOGY | Age: 67
End: 2022-02-01

## 2022-02-01 DIAGNOSIS — C25.9 PANCREATIC ADENOCARCINOMA (HCC): Primary | ICD-10-CM

## 2022-02-01 NOTE — TELEPHONE ENCOUNTER
[address] at [facility]  [phone]        02/01/22 3:23 PM Called patient and advised that letter was prepared. Will place at  for patient to  at her convenience. While on phone, patient inquired how she would know if protein levels were improved prior to surgery. Per Radha Hooper, advised that this is assessed via blood work. Offered that patient can have labs drawn at [facility] lab sometime prior to surgery. Patient voiced understanding and agreeable. No further questions or concerns at this time.

## 2022-02-01 NOTE — TELEPHONE ENCOUNTER
Patient called and stated she has jury duty and she did not know if she needed the team to make a letter stating she is unable to do this or does she need to have her PCP make this letter.  Please advise     # [phone]

## 2022-02-02 ENCOUNTER — APPOINTMENT (OUTPATIENT)
Dept: INFUSION THERAPY | Age: 67
End: 2022-02-02

## 2022-02-02 DIAGNOSIS — C25.9 PANCREATIC ADENOCARCINOMA (HCC): ICD-10-CM

## 2022-02-02 LAB
ALBUMIN SERPL-MCNC: 3.1 G/DL (ref 3.5–5)
ALBUMIN/GLOB SERPL: 0.9 {RATIO} (ref 1.1–2.2)
ALP SERPL-CCNC: 146 U/L (ref 45–117)
ALT SERPL-CCNC: 37 U/L (ref 12–78)
ANION GAP SERPL CALC-SCNC: 2 MMOL/L (ref 5–15)
AST SERPL-CCNC: 40 U/L (ref 15–37)
BILIRUB SERPL-MCNC: 0.7 MG/DL (ref 0.2–1)
BUN SERPL-MCNC: 9 MG/DL (ref 6–20)
BUN/CREAT SERPL: 12 (ref 12–20)
CALCIUM SERPL-MCNC: 9.6 MG/DL (ref 8.5–10.1)
CHLORIDE SERPL-SCNC: 109 MMOL/L (ref 97–108)
CO2 SERPL-SCNC: 29 MMOL/L (ref 21–32)
CREAT SERPL-MCNC: 0.77 MG/DL (ref 0.55–1.02)
GLOBULIN SER CALC-MCNC: 3.4 G/DL (ref 2–4)
GLUCOSE SERPL-MCNC: 94 MG/DL (ref 65–100)
POTASSIUM SERPL-SCNC: 4.7 MMOL/L (ref 3.5–5.1)
PROT SERPL-MCNC: 6.5 G/DL (ref 6.4–8.2)
SODIUM SERPL-SCNC: 140 MMOL/L (ref 136–145)

## 2022-02-03 NOTE — PROGRESS NOTES
Let patient know her albumin is improving and should continue to improve as long as she is following a nutritious diet/supplementing with protein.

## 2022-02-03 NOTE — PROGRESS NOTES
Called patient and advised of lab results per David Adhikari NP. Patient voiced understanding. No further questions or concerns at this time.

## 2022-02-07 ENCOUNTER — APPOINTMENT (OUTPATIENT)
Dept: INFUSION THERAPY | Age: 67
End: 2022-02-07

## 2022-02-09 ENCOUNTER — APPOINTMENT (OUTPATIENT)
Dept: INFUSION THERAPY | Age: 67
End: 2022-02-09

## 2022-02-14 ENCOUNTER — APPOINTMENT (OUTPATIENT)
Dept: INFUSION THERAPY | Age: 67
End: 2022-02-14

## 2022-02-14 RX ORDER — LEVOTHYROXINE SODIUM 125 UG/1
TABLET ORAL
Qty: 90 TABLET | Refills: 1 | Status: SHIPPED | OUTPATIENT
Start: 2022-02-14 | End: 2022-08-17

## 2022-02-14 RX ORDER — HYDROCHLOROTHIAZIDE 25 MG/1
TABLET ORAL
Qty: 90 TABLET | Refills: 1 | Status: SHIPPED | OUTPATIENT
Start: 2022-02-14 | End: 2022-07-05

## 2022-02-15 ENCOUNTER — HOSPITAL ENCOUNTER (OUTPATIENT)
Dept: PREADMISSION TESTING | Age: 67
Discharge: HOME | End: 2022-02-15
Payer: MEDICARE

## 2022-02-15 VITALS
OXYGEN SATURATION: 98 % | HEIGHT: 64 IN | BODY MASS INDEX: 39.09 KG/M2 | DIASTOLIC BLOOD PRESSURE: 75 MMHG | SYSTOLIC BLOOD PRESSURE: 117 MMHG | RESPIRATION RATE: 16 BRPM | HEART RATE: 79 BPM | TEMPERATURE: 98.1 F | WEIGHT: 228.99 LBS

## 2022-02-15 LAB
ABO + RH BLD: NORMAL
ALBUMIN SERPL-MCNC: 3 G/DL (ref 3.5–5)
ALBUMIN/GLOB SERPL: 0.8 {RATIO} (ref 1.1–2.2)
ALP SERPL-CCNC: 171 U/L (ref 45–117)
ALT SERPL-CCNC: 46 U/L (ref 12–78)
ANION GAP SERPL CALC-SCNC: 3 MMOL/L (ref 5–15)
APTT PPP: 27.3 SEC (ref 22.1–31)
AST SERPL-CCNC: 51 U/L (ref 15–37)
BILIRUB SERPL-MCNC: 0.8 MG/DL (ref 0.2–1)
BLOOD GROUP ANTIBODIES SERPL: NORMAL
BUN SERPL-MCNC: 8 MG/DL (ref 6–20)
BUN/CREAT SERPL: 8 (ref 12–20)
CALCIUM SERPL-MCNC: 9.5 MG/DL (ref 8.5–10.1)
CHLORIDE SERPL-SCNC: 107 MMOL/L (ref 97–108)
CO2 SERPL-SCNC: 27 MMOL/L (ref 21–32)
CREAT SERPL-MCNC: 0.96 MG/DL (ref 0.55–1.02)
ERYTHROCYTE [DISTWIDTH] IN BLOOD BY AUTOMATED COUNT: 18.7 % (ref 11.5–14.5)
GLOBULIN SER CALC-MCNC: 3.9 G/DL (ref 2–4)
GLUCOSE SERPL-MCNC: 83 MG/DL (ref 65–100)
HCT VFR BLD AUTO: 32 % (ref 35–47)
HGB BLD-MCNC: 10.2 G/DL (ref 11.5–16)
HISTORY CHECKED?,CKHIST: NORMAL
INR PPP: 1 (ref 0.9–1.1)
MCH RBC QN AUTO: 34.1 PG (ref 26–34)
MCHC RBC AUTO-ENTMCNC: 31.9 G/DL (ref 30–36.5)
MCV RBC AUTO: 107 FL (ref 80–99)
NRBC # BLD: 0 K/UL (ref 0–0.01)
NRBC BLD-RTO: 0 PER 100 WBC
PLATELET # BLD AUTO: 275 K/UL (ref 150–400)
PMV BLD AUTO: 10.6 FL (ref 8.9–12.9)
POTASSIUM SERPL-SCNC: 3.9 MMOL/L (ref 3.5–5.1)
PROT SERPL-MCNC: 6.9 G/DL (ref 6.4–8.2)
PROTHROMBIN TIME: 10 SEC (ref 9–11.1)
RBC # BLD AUTO: 2.99 M/UL (ref 3.8–5.2)
SODIUM SERPL-SCNC: 137 MMOL/L (ref 136–145)
SPECIMEN EXP DATE BLD: NORMAL
THERAPEUTIC RANGE,PTTT: NORMAL SECS (ref 58–77)
WBC # BLD AUTO: 5.4 K/UL (ref 3.6–11)

## 2022-02-15 PROCEDURE — 85730 THROMBOPLASTIN TIME PARTIAL: CPT

## 2022-02-15 PROCEDURE — 85610 PROTHROMBIN TIME: CPT

## 2022-02-15 PROCEDURE — 80053 COMPREHEN METABOLIC PANEL: CPT

## 2022-02-15 PROCEDURE — 93005 ELECTROCARDIOGRAM TRACING: CPT

## 2022-02-15 PROCEDURE — 85027 COMPLETE CBC AUTOMATED: CPT

## 2022-02-15 PROCEDURE — 36415 COLL VENOUS BLD VENIPUNCTURE: CPT

## 2022-02-15 PROCEDURE — 86900 BLOOD TYPING SEROLOGIC ABO: CPT

## 2022-02-15 NOTE — PERIOP NOTES
[address] INSTRUCTIONS    Surgery Date:   2/23/22    [facility] staff will call you between 4 PM- 8 PM the day before surgery with your arrival time. If your surgery is on a Monday, we will call you the preceding Friday. Please call [phone] after 8 PM if you did not receive your arrival time. 1. Please report to [facility] Patient Access/Admitting on the 1st floor. Bring your insurance card, photo identification, and any copayment ( if applicable). 2. If you are going home the same day of your surgery, you must have a responsible adult to drive you home. You need to have a responsible adult to stay with you the first 24 hours after surgery and you should not drive a car for 24 hours following your surgery. 3. Nothing to eat or drink after midnight the night before surgery. This includes no water, gum, mints, coffee, juice, etc.  Please note special instructions, if applicable, below for medications. 4. Do NOT drink alcohol or smoke 24 hours before surgery. STOP smoking for 14 days prior as it helps with breathing and healing after surgery. 5. If you are being admitted to the hospital, please leave personal belongings/luggage in your car until you have an assigned hospital room number. 6. Please wear comfortable clothes. Wear your glasses instead of contacts. We ask that all money, jewelry and valuables be left at home. Wear no make up, particularly mascara, the day of surgery. 7.  All body piercings, rings, and jewelry need to be removed and left at home. Please remove any nail polish or artificial nails from your fingernails. Please wear your hair loose or down. Please no pony-tails, buns, or any metal hair accessories. If you shower the morning of surgery, please do not apply any lotions or powders afterwards. You may wear deodorant, unless having breast surgery. Do not shave any body area within 24 hours of your surgery.   8. Please follow all instructions to avoid any potential surgical cancellation. 9. Should your physical condition change, (i.e. fever, cold, flu, etc.) please notify your surgeon as soon as possible. 10. It is important to be on time. If a situation occurs where you may be delayed, please call:  [phone] / [phone] on the day of surgery. 11. The Preadmission Testing staff can be reached at [phone]. 12. Special instructions: NONE      Current Outpatient Medications   Medication Sig    hydroCHLOROthiazide (HYDRODIURIL) 25 mg tablet TAKE 1 TABLET BY MOUTH EVERY DAY    levothyroxine (SYNTHROID) 125 mcg tablet TAKE 1 TABLET BY MOUTH EVERY DAY BEFORE BREAKFAST    potassium chloride (Klor-Con M10) 10 mEq tablet Take 2 Tablets by mouth daily.  docusate sodium (Stool Softener) 100 mg tab Take  by mouth daily.  polyethylene glycol (Miralax) 17 gram packet Take 1 Packet by mouth as needed for Constipation.  OTHER Alive multivitamin    ondansetron hcl (ZOFRAN) 8 mg tablet Take 1 Tablet by mouth every eight (8) hours as needed for Nausea or Vomiting.  prochlorperazine (Compazine) 10 mg tablet Take 1 Tablet by mouth every six (6) hours as needed for Nausea or Vomiting.  lidocaine-prilocaine (EMLA) topical cream Apply a dime size amount to port site 30 minutes before treatment to prevent pain.  dexAMETHasone (DECADRON) 4 mg tablet Take 8mg (2 x tabs) on days 2 and 3 after treatment to prevent nausea. Take in the AM with food. No current facility-administered medications for this encounter. 1. YOU MUST ONLY TAKE THESE MEDICATIONS THE MORNING OF SURGERY WITH A SIP OF WATER: LEVOTHYROXINE  2. MEDICATIONS TO TAKE THE MORNING OF SURGERY ONLY IF NEEDED:NONE  3. HOLD these prescription medications BEFORE Surgery: NONE  4. Ask your surgeon/prescribing physician about when/if to STOP taking these medications: NONE  5. Stop all vitamins, herbal medicines and Aspirin containing products 7 days prior to surgery.  Stop any non-steroidal anti-inflammatory drugs (i.e. Ibuprofen, Naproxen, Advil, Aleve) 3 days before surgery. You may take Tylenol. 6. If you are currently taking Plavix, Coumadin, or any other blood-thinning/anticoagulant medication contact your prescribing physician for instructions. Preventing Infections Before and After  Your Surgery    IMPORTANT INSTRUCTIONS    Please read and follow these instructions carefully. If you are unable to comply with the below instructions your procedure will be cancelled. You play an important role in your health and preparation for surgery. To reduce the germs on your skin you will need to shower with CHG soap (Chorhexidine gluconate 4%) two times before surgery. CHG soap (Hibiclens, Hex-A-Clens or store brand)   CHG soap will be provided at your Preadmission Testing (PAT) appointment.  If you do not have a PAT appointment before surgery, you may arrange to  CHG soap from our office or purchase CHG soap at a pharmacy, grocery or department store.  You need to purchase TWO 4 ounce bottles to use for your 2 showers. Steps to follow:  1. Wash your hair with your normal shampoo and your body with regular soap and rinse well to remove shampoo and soap from your skin. 2. Wet a clean washcloth and turn off the shower. 3. Put CHG soap on washcloth and apply to your entire body from the neck down. Do not use on your head, face or private parts(genitals). Do not use CHG soap on open sores, wounds or areas of skin irritation. 4. Wash you body gently for 5 minutes. Do not wash your skin too hard. This soap does not create lather. Pay special attention to your underarms and from your belly button to your feet. 5. Turn the shower back on and rinse well to get CHG soap off your body. 6. Pat your skin dry with a clean, dry towel. Do not apply lotions or moisturizer. 7. Put on clean clothes and sleep on fresh bed sheets and do not allow pets to sleep with you.     Shower with CHG soap 2 times before your surgery   The evening before your surgery   The morning of your surgery      Tips to help prevent infections after your surgery:  1. Protect your surgical wound from germs:  ? Hand washing is the most important thing you and your caregivers can do to prevent infections. ? Keep your bandage clean and dry! ? Do not touch your surgical wound. 2. Use clean, freshly washed towels and washcloths every time you shower; do not share bath linens with others. 3. Until your surgical wound is healed, wear clothing and sleep on bed linens each day that are clean and freshly washed. 4. Do not allow pets to sleep in your bed with you or touch your surgical wound. 5. Do not smoke  smoking delays wound healing. This may be a good time to stop smoking. 6. If you have diabetes, it is important for you to manage your blood sugar levels properly before your surgery as well as after your surgery. Poorly managed blood sugar levels slow down wound healing and prevent you from healing completely. [address]   Instructions for Pre-Surgery COVID-19 Testing     Across our ministry we have established standard guidelines to ensure the health and safety of our patients, residents and associates as we resume elective services for patients. All patients presenting for surgery are required to have a COVID-19 test result within 96 hours of their scheduled surgery. [facility] is providing this test free of charge to the patient.    Instructions for COVID-19 Testing:     Patients will receive a call from Pre-Admission Testing 4-5 days prior to surgery to schedule a date and time to come to the [address] [place] [address] Drive for their COVID-19 test   Patients are advised to self-quarantine after testing until their scheduled surgery   Once on site, patients will be registered and receive COVID test in their vehicle   If a patient is scheduled for normal Pre Admission Testing 96 hours from date of surgery, the patient will still have their COVID test done at the [address] [place] [address] located at [address] [place] [address] Positive results will be shared with the surgeon and anesthesiologist and may result in cancellation of the elective procedure    Testing Hours and Location:   Address:  [address] Admission [facility] in the Discharge Lot on [address] (Map Attached)  [facility], [address]   Hours: Monday- Friday 7a-3p    PAT Phone Number: [phone]            Patient Information Regarding COVID Restrictions    Patients are advised to self-quarantine after COVID testing up to the day of the scheduled procedure. Day of Procedure     Please park in the parking deck or any designated visitor parking lot.  Enter the facility through the Main Entrance of the hospital.   A temperature check and appropriate symptom/exposure screening will be done prior to entry to the facility.  On the day of surgery, please provide the cell phone number of the person who will be waiting for you to the Patient Access representative at the time of registration.  Please wear a mask on the day of your procedure.  We are now allowing one designated visitor per stay. Pediatric patients may have 2 designated visitors. This one person may come in with you on the day of your procedure.  No visitors under the age of 13.  The designated visitor must also wear a mask.  Once your procedure and the immediate recovery period is completed, a nurse in the recovery area will contact your designated visitor to inform them of your room number or to otherwise review other pertinent information regarding your care.  Social distancing practices are to be adhered to in waiting areas and the cafeteria. The patient was contacted  in person. She verbalized understanding of all instructions does not  need reinforcement.

## 2022-02-16 ENCOUNTER — APPOINTMENT (OUTPATIENT)
Dept: INFUSION THERAPY | Age: 67
End: 2022-02-16

## 2022-02-16 LAB
ATRIAL RATE: 76 BPM
CALCULATED P AXIS, ECG09: 41 DEGREES
CALCULATED R AXIS, ECG10: -5 DEGREES
CALCULATED T AXIS, ECG11: 4 DEGREES
DIAGNOSIS, 93000: NORMAL
P-R INTERVAL, ECG05: 148 MS
Q-T INTERVAL, ECG07: 392 MS
QRS DURATION, ECG06: 82 MS
QTC CALCULATION (BEZET), ECG08: 441 MS
VENTRICULAR RATE, ECG03: 76 BPM

## 2022-02-17 ENCOUNTER — VIRTUAL VISIT (OUTPATIENT)
Dept: SURGERY | Age: 67
End: 2022-02-17
Payer: MEDICARE

## 2022-02-17 DIAGNOSIS — C25.9 PANCREATIC ADENOCARCINOMA (HCC): Primary | ICD-10-CM

## 2022-02-17 PROCEDURE — 1101F PT FALLS ASSESS-DOCD LE1/YR: CPT | Performed by: SURGERY

## 2022-02-17 PROCEDURE — G8510 SCR DEP NEG, NO PLAN REQD: HCPCS | Performed by: SURGERY

## 2022-02-17 PROCEDURE — 3017F COLORECTAL CA SCREEN DOC REV: CPT | Performed by: SURGERY

## 2022-02-17 PROCEDURE — G8400 PT W/DXA NO RESULTS DOC: HCPCS | Performed by: SURGERY

## 2022-02-17 PROCEDURE — G8427 DOCREV CUR MEDS BY ELIG CLIN: HCPCS | Performed by: SURGERY

## 2022-02-17 PROCEDURE — G8417 CALC BMI ABV UP PARAM F/U: HCPCS | Performed by: SURGERY

## 2022-02-17 PROCEDURE — G8756 NO BP MEASURE DOC: HCPCS | Performed by: SURGERY

## 2022-02-17 PROCEDURE — 1090F PRES/ABSN URINE INCON ASSESS: CPT | Performed by: SURGERY

## 2022-02-17 PROCEDURE — 99212 OFFICE O/P EST SF 10 MIN: CPT | Performed by: SURGERY

## 2022-02-17 PROCEDURE — G8536 NO DOC ELDER MAL SCRN: HCPCS | Performed by: SURGERY

## 2022-02-17 NOTE — PROGRESS NOTES
I was in the office while conducting this encounter. Consent:  She and/or her healthcare decision maker is aware that this patient-initiated Telehealth encounter is a billable service, with coverage as determined by her insurance carrier. She is aware that she may receive a bill and has provided verbal consent to proceed: Yes    This virtual visit was conducted via [employer] me. Pursuant to the emergency declaration under the [facility], [facility] waiver authority and the [employer] Act, this Virtual  Visit was conducted to reduce the patient's risk of exposure to COVID-19 and provide continuity of care for an established patient. Services were provided through a video synchronous discussion virtually to substitute for in-person clinic visit. Due to this being a TeleHealth evaluation, many elements of the physical examination are unable to be assessed. Total Time: minutes: 5-10 minutes. [facility]      Clinic Note - Follow up    [address] Branham returns for scheduled follow up today. She is known to me for history of pancreatic adenocarcinoma in the head of the pancreas. She is completed 6 cycles of neoadjuvant chemotherapy. She now presents for preop discussion. The patient states that she is feeling well. Her energy and appetite have improved significantly off chemotherapy. Her diarrhea has resolved. She has no complaints of abdominal pain. She feels well overall. The patient denies any changes to the [facility], PSHx, SHx or FHx since their last visit except as mentioned above. ROS is negative except as mentioned in the HPI. Objective     There were no vitals taken for this visit. PE  GEN - Awake, alert, communicating appropriately. NAD  Pulm -normal inspiratory effort  Remainder of exam not performed for this virtual encounter.     Labs  Lab Results   Component Value Date/Time    WBC 5.4 02/15/2022 12:19 PM    Hemoglobin (POC) 12.6 10/06/2013 08:45 AM    HGB 10.2 (L) 02/15/2022 12:19 PM    Hematocrit (POC) 37 10/06/2013 08:45 AM    HCT 32.0 (L) 02/15/2022 12:19 PM    PLATELET 871 22/95/3539 12:19 PM    .0 (H) 02/15/2022 12:19 PM     Lab Results   Component Value Date/Time    Sodium 137 02/15/2022 12:19 PM    Potassium 3.9 02/15/2022 12:19 PM    Chloride 107 02/15/2022 12:19 PM    CO2 27 02/15/2022 12:19 PM    Anion gap 3 (L) 02/15/2022 12:19 PM    Glucose 83 02/15/2022 12:19 PM    BUN 8 02/15/2022 12:19 PM    Creatinine 0.96 02/15/2022 12:19 PM    BUN/Creatinine ratio 8 (L) 02/15/2022 12:19 PM    GFR est AA >60 02/15/2022 12:19 PM    GFR est non-AA 58 (L) 02/15/2022 12:19 PM    Calcium 9.5 02/15/2022 12:19 PM    Bilirubin, total 0.8 02/15/2022 12:19 PM    Alk. phosphatase 171 (H) 02/15/2022 12:19 PM    Protein, total 6.9 02/15/2022 12:19 PM    Albumin 3.0 (L) 02/15/2022 12:19 PM    Globulin 3.9 02/15/2022 12:19 PM    A-G Ratio 0.8 (L) 02/15/2022 12:19 PM    ALT (SGPT) 46 02/15/2022 12:19 PM    AST (SGOT) 51 (H) 02/15/2022 12:19 PM     CA 19-9: 130    Imaging  None new    Assessment     Jose Eduardo Mclaughlin is a 77 y. o.yr old female with pancreatic adenocarcinoma in the head of the pancreas. She has completed 6 cycles of neoadjuvant chemotherapy. Plan     The patient is scheduled for Whipple procedure next week. Her last imaging is 2 months old however, so I would like to repeat this to ensure she appears resectable prior to her surgery next week. We again discussed the operation including expected outcomes, possible complications, expected recovery, long-term sequelae and hospital course. She is in agreement to proceed.           Petros May MD  2/17/2022    CC: MD Dr. Prince Damaso Hendrix

## 2022-02-17 NOTE — TELEPHONE ENCOUNTER
Patient called following up on the Creon medication. She stated she just wanted to make sure that the financial assistance form will be completed prior to prescribing the medication, if it is necessary. Patient wants to make sure it is covered due to financial issues. Please advise.

## 2022-02-17 NOTE — PROGRESS NOTES
1. Have you been to the ER, urgent care clinic since your last visit? Hospitalized since your last visit? No    2. Have you seen or consulted any other health care providers outside of the [address] [place] [address] since your last visit? Include any pap smears or colon screening.  No

## 2022-02-18 ENCOUNTER — HOSPITAL ENCOUNTER (OUTPATIENT)
Dept: PREADMISSION TESTING | Age: 67
Discharge: HOME | End: 2022-02-18
Attending: SURGERY
Payer: MEDICARE

## 2022-02-18 ENCOUNTER — TELEPHONE (OUTPATIENT)
Dept: SURGERY | Age: 67
End: 2022-02-18

## 2022-02-18 ENCOUNTER — TRANSCRIBE ORDER (OUTPATIENT)
Dept: REGISTRATION | Age: 67
End: 2022-02-18

## 2022-02-18 DIAGNOSIS — U07.1 COVID-19: ICD-10-CM

## 2022-02-18 DIAGNOSIS — U07.1 COVID-19: Primary | ICD-10-CM

## 2022-02-18 PROCEDURE — U0005 INFEC AGEN DETEC AMPLI PROBE: HCPCS

## 2022-02-18 NOTE — TELEPHONE ENCOUNTER
Voicemail identified patient. I left a voicemail with date and time of test and instructions of no food 4 hours prior to test and no NSAIDs the day of test.  Left number to return call with questions.

## 2022-02-18 NOTE — TELEPHONE ENCOUNTER
Returned call to patient and went to voicemail. Left message letting patient know full address is on her voicemail. If patient calls back please ask her to check her voicemail messages.      Address is   Era Thomas , [facility], [address] [place] [address]  Phone: [phone]

## 2022-02-19 LAB
SARS-COV-2, XPLCVT: NOT DETECTED
SOURCE, COVRS: NORMAL

## 2022-02-21 ENCOUNTER — APPOINTMENT (OUTPATIENT)
Dept: INFUSION THERAPY | Age: 67
End: 2022-02-21

## 2022-02-21 ENCOUNTER — HOSPITAL ENCOUNTER (OUTPATIENT)
Dept: CT IMAGING | Age: 67
Discharge: HOME | DRG: 405 | End: 2022-02-21
Attending: SURGERY
Payer: MEDICARE

## 2022-02-21 DIAGNOSIS — C25.9 PANCREATIC ADENOCARCINOMA (HCC): ICD-10-CM

## 2022-02-21 PROCEDURE — 74177 CT ABD & PELVIS W/CONTRAST: CPT

## 2022-02-21 PROCEDURE — 74011000250 HC RX REV CODE- 250: Performed by: SURGERY

## 2022-02-21 PROCEDURE — 74011000636 HC RX REV CODE- 636: Performed by: SURGERY

## 2022-02-21 RX ORDER — BARIUM SULFATE 20 MG/ML
900 SUSPENSION ORAL ONCE
Status: COMPLETED | OUTPATIENT
Start: 2022-02-21 | End: 2022-02-21

## 2022-02-21 RX ADMIN — BARIUM SULFATE 900 ML: 20 SUSPENSION ORAL at 18:48

## 2022-02-21 RX ADMIN — IOPAMIDOL 100 ML: 755 INJECTION, SOLUTION INTRAVENOUS at 18:48

## 2022-02-22 ENCOUNTER — ANESTHESIA EVENT (OUTPATIENT)
Dept: SURGERY | Age: 67
DRG: 405 | End: 2022-02-22
Payer: MEDICARE

## 2022-02-23 ENCOUNTER — APPOINTMENT (OUTPATIENT)
Dept: GENERAL RADIOLOGY | Age: 67
DRG: 405 | End: 2022-02-23
Attending: STUDENT IN AN ORGANIZED HEALTH CARE EDUCATION/TRAINING PROGRAM
Payer: MEDICARE

## 2022-02-23 ENCOUNTER — HOSPITAL ENCOUNTER (INPATIENT)
Age: 67
LOS: 10 days | Discharge: HOME HEALTH | DRG: 405 | End: 2022-03-05
Attending: SURGERY | Admitting: SURGERY
Payer: MEDICARE

## 2022-02-23 ENCOUNTER — ANESTHESIA (OUTPATIENT)
Dept: SURGERY | Age: 67
DRG: 405 | End: 2022-02-23
Payer: MEDICARE

## 2022-02-23 ENCOUNTER — APPOINTMENT (OUTPATIENT)
Dept: INFUSION THERAPY | Age: 67
End: 2022-02-23

## 2022-02-23 DIAGNOSIS — C25.9 PANCREATIC ADENOCARCINOMA (HCC): ICD-10-CM

## 2022-02-23 DIAGNOSIS — R73.9 HYPERGLYCEMIA: ICD-10-CM

## 2022-02-23 LAB
ALBUMIN SERPL-MCNC: 2.9 G/DL (ref 3.5–5)
ALBUMIN/GLOB SERPL: 0.8 {RATIO} (ref 1.1–2.2)
ALP SERPL-CCNC: 126 U/L (ref 45–117)
ALT SERPL-CCNC: 58 U/L (ref 12–78)
ANION GAP SERPL CALC-SCNC: 5 MMOL/L (ref 5–15)
AST SERPL-CCNC: 88 U/L (ref 15–37)
BILIRUB SERPL-MCNC: 1.1 MG/DL (ref 0.2–1)
BUN SERPL-MCNC: 8 MG/DL (ref 6–20)
BUN/CREAT SERPL: 9 (ref 12–20)
CALCIUM SERPL-MCNC: 8.6 MG/DL (ref 8.5–10.1)
CHLORIDE SERPL-SCNC: 112 MMOL/L (ref 97–108)
CO2 SERPL-SCNC: 23 MMOL/L (ref 21–32)
CREAT SERPL-MCNC: 0.92 MG/DL (ref 0.55–1.02)
ERYTHROCYTE [DISTWIDTH] IN BLOOD BY AUTOMATED COUNT: 16 % (ref 11.5–14.5)
GLOBULIN SER CALC-MCNC: 3.7 G/DL (ref 2–4)
GLUCOSE BLD STRIP.AUTO-MCNC: 106 MG/DL (ref 65–117)
GLUCOSE BLD STRIP.AUTO-MCNC: 116 MG/DL (ref 65–117)
GLUCOSE SERPL-MCNC: 155 MG/DL (ref 65–100)
HCT VFR BLD AUTO: 29.8 % (ref 35–47)
HGB BLD-MCNC: 9.7 G/DL (ref 11.5–16)
MAGNESIUM SERPL-MCNC: 1.8 MG/DL (ref 1.6–2.4)
MCH RBC QN AUTO: 34.5 PG (ref 26–34)
MCHC RBC AUTO-ENTMCNC: 32.6 G/DL (ref 30–36.5)
MCV RBC AUTO: 106 FL (ref 80–99)
NRBC # BLD: 0 K/UL (ref 0–0.01)
NRBC BLD-RTO: 0 PER 100 WBC
PHOSPHATE SERPL-MCNC: 3.6 MG/DL (ref 2.6–4.7)
PLATELET # BLD AUTO: 214 K/UL (ref 150–400)
PMV BLD AUTO: 10.9 FL (ref 8.9–12.9)
POTASSIUM SERPL-SCNC: 3.8 MMOL/L (ref 3.5–5.1)
PROT SERPL-MCNC: 6.6 G/DL (ref 6.4–8.2)
RBC # BLD AUTO: 2.81 M/UL (ref 3.8–5.2)
SERVICE CMNT-IMP: NORMAL
SERVICE CMNT-IMP: NORMAL
SODIUM SERPL-SCNC: 140 MMOL/L (ref 136–145)
WBC # BLD AUTO: 11.4 K/UL (ref 3.6–11)

## 2022-02-23 PROCEDURE — 88300 SURGICAL PATH GROSS: CPT

## 2022-02-23 PROCEDURE — 88331 PATH CONSLTJ SURG 1 BLK 1SPC: CPT

## 2022-02-23 PROCEDURE — 77030008771 HC TU NG SALEM SUMP -A: Performed by: ANESTHESIOLOGY

## 2022-02-23 PROCEDURE — 0FD93ZX EXTRACTION OF COMMON BILE DUCT, PERCUTANEOUS APPROACH, DIAGNOSTIC: ICD-10-PCS | Performed by: SURGERY

## 2022-02-23 PROCEDURE — 77030042556 HC PNCL CAUT -B: Performed by: SURGERY

## 2022-02-23 PROCEDURE — 36415 COLL VENOUS BLD VENIPUNCTURE: CPT

## 2022-02-23 PROCEDURE — 77030040506 HC DRN WND MDII -A: Performed by: SURGERY

## 2022-02-23 PROCEDURE — 77030031139 HC SUT VCRL2 J&J -A: Performed by: SURGERY

## 2022-02-23 PROCEDURE — 0FBG0ZZ EXCISION OF PANCREAS, OPEN APPROACH: ICD-10-PCS | Performed by: SURGERY

## 2022-02-23 PROCEDURE — 77030013801 HC KT VES OCCL ARMD -A: Performed by: SURGERY

## 2022-02-23 PROCEDURE — 74011250636 HC RX REV CODE- 250/636: Performed by: SURGERY

## 2022-02-23 PROCEDURE — 77030002933 HC SUT MCRYL J&J -A: Performed by: SURGERY

## 2022-02-23 PROCEDURE — 77030040361 HC SLV COMPR DVT MDII -B: Performed by: SURGERY

## 2022-02-23 PROCEDURE — 77030009965 HC RELD STPLR ENDOS COVD -D: Performed by: SURGERY

## 2022-02-23 PROCEDURE — 77030002966 HC SUT PDS J&J -A: Performed by: SURGERY

## 2022-02-23 PROCEDURE — 74011250636 HC RX REV CODE- 250/636: Performed by: STUDENT IN AN ORGANIZED HEALTH CARE EDUCATION/TRAINING PROGRAM

## 2022-02-23 PROCEDURE — 77030005401 HC CATH RAD ARRO -A: Performed by: ANESTHESIOLOGY

## 2022-02-23 PROCEDURE — 2709999900 HC NON-CHARGEABLE SUPPLY: Performed by: SURGERY

## 2022-02-23 PROCEDURE — 71045 X-RAY EXAM CHEST 1 VIEW: CPT

## 2022-02-23 PROCEDURE — 65660000000 HC RM CCU STEPDOWN

## 2022-02-23 PROCEDURE — 74011000250 HC RX REV CODE- 250: Performed by: STUDENT IN AN ORGANIZED HEALTH CARE EDUCATION/TRAINING PROGRAM

## 2022-02-23 PROCEDURE — 74011250636 HC RX REV CODE- 250/636: Performed by: ANESTHESIOLOGY

## 2022-02-23 PROCEDURE — 88304 TISSUE EXAM BY PATHOLOGIST: CPT

## 2022-02-23 PROCEDURE — 77030002986 HC SUT PROL J&J -A: Performed by: SURGERY

## 2022-02-23 PROCEDURE — 88305 TISSUE EXAM BY PATHOLOGIST: CPT

## 2022-02-23 PROCEDURE — 77030014008 HC SPNG HEMSTAT J&J -C: Performed by: SURGERY

## 2022-02-23 PROCEDURE — 77030010938 HC CLP LIG TELE -A: Performed by: SURGERY

## 2022-02-23 PROCEDURE — 07BD0ZX EXCISION OF AORTIC LYMPHATIC, OPEN APPROACH, DIAGNOSTIC: ICD-10-PCS | Performed by: SURGERY

## 2022-02-23 PROCEDURE — 77030002996 HC SUT SLK J&J -A: Performed by: SURGERY

## 2022-02-23 PROCEDURE — 76010000181 HC OR TIME 7 TO 7.5 HR INTENSV-TIER 1: Performed by: SURGERY

## 2022-02-23 PROCEDURE — 48153 PANCREATECTOMY: CPT | Performed by: PHYSICIAN ASSISTANT

## 2022-02-23 PROCEDURE — 88309 TISSUE EXAM BY PATHOLOGIST: CPT

## 2022-02-23 PROCEDURE — 77030008684 HC TU ET CUF COVD -B: Performed by: ANESTHESIOLOGY

## 2022-02-23 PROCEDURE — 77030014125 HC TY EPDRL BBMI -B: Performed by: ANESTHESIOLOGY

## 2022-02-23 PROCEDURE — 80053 COMPREHEN METABOLIC PANEL: CPT

## 2022-02-23 PROCEDURE — 77030010939 HC CLP LIG TELE -B: Performed by: SURGERY

## 2022-02-23 PROCEDURE — P9045 ALBUMIN (HUMAN), 5%, 250 ML: HCPCS | Performed by: ANESTHESIOLOGY

## 2022-02-23 PROCEDURE — 77030038157 HC DEV PWR CNTR DISP SIGNIA COVD -C: Performed by: SURGERY

## 2022-02-23 PROCEDURE — 74011000250 HC RX REV CODE- 250: Performed by: ANESTHESIOLOGY

## 2022-02-23 PROCEDURE — 0FT40ZZ RESECTION OF GALLBLADDER, OPEN APPROACH: ICD-10-PCS | Performed by: SURGERY

## 2022-02-23 PROCEDURE — 74011000250 HC RX REV CODE- 250: Performed by: SURGERY

## 2022-02-23 PROCEDURE — 77030010507 HC ADH SKN DERMBND J&J -B: Performed by: SURGERY

## 2022-02-23 PROCEDURE — 77030026438 HC STYL ET INTUB CARD -A: Performed by: ANESTHESIOLOGY

## 2022-02-23 PROCEDURE — 77030040504 HC DRN WND MDII -B: Performed by: SURGERY

## 2022-02-23 PROCEDURE — 48153 PANCREATECTOMY: CPT | Performed by: SURGERY

## 2022-02-23 PROCEDURE — 85027 COMPLETE CBC AUTOMATED: CPT

## 2022-02-23 PROCEDURE — 88307 TISSUE EXAM BY PATHOLOGIST: CPT

## 2022-02-23 PROCEDURE — 74011000258 HC RX REV CODE- 258: Performed by: STUDENT IN AN ORGANIZED HEALTH CARE EDUCATION/TRAINING PROGRAM

## 2022-02-23 PROCEDURE — 77030020061 HC IV BLD WRMR ADMIN SET 3M -B: Performed by: ANESTHESIOLOGY

## 2022-02-23 PROCEDURE — 77030019702 HC WRP THER MENM -C: Performed by: SURGERY

## 2022-02-23 PROCEDURE — 84100 ASSAY OF PHOSPHORUS: CPT

## 2022-02-23 PROCEDURE — 83735 ASSAY OF MAGNESIUM: CPT

## 2022-02-23 PROCEDURE — 77030005513 HC CATH URETH FOL11 MDII -B: Performed by: SURGERY

## 2022-02-23 PROCEDURE — 76210000001 HC OR PH I REC 2.5 TO 3 HR: Performed by: SURGERY

## 2022-02-23 PROCEDURE — 82962 GLUCOSE BLOOD TEST: CPT

## 2022-02-23 PROCEDURE — 77030002916 HC SUT ETHLN J&J -A: Performed by: SURGERY

## 2022-02-23 PROCEDURE — 76060000045 HC ANESTHESIA 7 TO 7.5 HR: Performed by: SURGERY

## 2022-02-23 DEVICE — CLIP LIG L TI MTL LIG SYS 24 COUNT HORZ: Type: IMPLANTABLE DEVICE | Status: FUNCTIONAL

## 2022-02-23 RX ORDER — NALOXONE HYDROCHLORIDE 0.4 MG/ML
0.4 INJECTION, SOLUTION INTRAMUSCULAR; INTRAVENOUS; SUBCUTANEOUS AS NEEDED
Status: DISCONTINUED | OUTPATIENT
Start: 2022-02-23 | End: 2022-03-05 | Stop reason: HOSPADM

## 2022-02-23 RX ORDER — LIDOCAINE HYDROCHLORIDE 20 MG/ML
INJECTION, SOLUTION EPIDURAL; INFILTRATION; INTRACAUDAL; PERINEURAL AS NEEDED
Status: DISCONTINUED | OUTPATIENT
Start: 2022-02-23 | End: 2022-02-23 | Stop reason: HOSPADM

## 2022-02-23 RX ORDER — SODIUM CHLORIDE 9 MG/ML
1000 INJECTION, SOLUTION INTRAVENOUS CONTINUOUS
Status: DISCONTINUED | OUTPATIENT
Start: 2022-02-23 | End: 2022-02-23

## 2022-02-23 RX ORDER — MIDAZOLAM HYDROCHLORIDE 1 MG/ML
1 INJECTION, SOLUTION INTRAMUSCULAR; INTRAVENOUS AS NEEDED
Status: DISCONTINUED | OUTPATIENT
Start: 2022-02-23 | End: 2022-02-23 | Stop reason: HOSPADM

## 2022-02-23 RX ORDER — KETAMINE HCL IN 0.9 % NACL 50 MG/5 ML
SYRINGE (ML) INTRAVENOUS AS NEEDED
Status: DISCONTINUED | OUTPATIENT
Start: 2022-02-23 | End: 2022-02-23 | Stop reason: HOSPADM

## 2022-02-23 RX ORDER — ONDANSETRON 2 MG/ML
4 INJECTION INTRAMUSCULAR; INTRAVENOUS
Status: DISCONTINUED | OUTPATIENT
Start: 2022-02-23 | End: 2022-02-23 | Stop reason: SDUPTHER

## 2022-02-23 RX ORDER — SODIUM CHLORIDE, SODIUM LACTATE, POTASSIUM CHLORIDE, CALCIUM CHLORIDE 600; 310; 30; 20 MG/100ML; MG/100ML; MG/100ML; MG/100ML
125 INJECTION, SOLUTION INTRAVENOUS CONTINUOUS
Status: DISCONTINUED | OUTPATIENT
Start: 2022-02-23 | End: 2022-02-23 | Stop reason: HOSPADM

## 2022-02-23 RX ORDER — ALBUMIN HUMAN 50 G/1000ML
SOLUTION INTRAVENOUS AS NEEDED
Status: DISCONTINUED | OUTPATIENT
Start: 2022-02-23 | End: 2022-02-23 | Stop reason: HOSPADM

## 2022-02-23 RX ORDER — ROCURONIUM BROMIDE 10 MG/ML
INJECTION, SOLUTION INTRAVENOUS AS NEEDED
Status: DISCONTINUED | OUTPATIENT
Start: 2022-02-23 | End: 2022-02-23 | Stop reason: HOSPADM

## 2022-02-23 RX ORDER — ONDANSETRON 2 MG/ML
INJECTION INTRAMUSCULAR; INTRAVENOUS AS NEEDED
Status: DISCONTINUED | OUTPATIENT
Start: 2022-02-23 | End: 2022-02-23 | Stop reason: HOSPADM

## 2022-02-23 RX ORDER — SODIUM CHLORIDE 9 MG/ML
125 INJECTION, SOLUTION INTRAVENOUS CONTINUOUS
Status: DISCONTINUED | OUTPATIENT
Start: 2022-02-23 | End: 2022-02-23 | Stop reason: HOSPADM

## 2022-02-23 RX ORDER — HYDROCHLOROTHIAZIDE 25 MG/1
25 TABLET ORAL DAILY
Status: DISCONTINUED | OUTPATIENT
Start: 2022-02-24 | End: 2022-03-05 | Stop reason: HOSPADM

## 2022-02-23 RX ORDER — LEVOTHYROXINE SODIUM 125 UG/1
125 TABLET ORAL
Status: DISCONTINUED | OUTPATIENT
Start: 2022-02-24 | End: 2022-03-05 | Stop reason: HOSPADM

## 2022-02-23 RX ORDER — SODIUM CHLORIDE 0.9 % (FLUSH) 0.9 %
5-40 SYRINGE (ML) INJECTION AS NEEDED
Status: DISCONTINUED | OUTPATIENT
Start: 2022-02-23 | End: 2022-02-23 | Stop reason: HOSPADM

## 2022-02-23 RX ORDER — ONDANSETRON 2 MG/ML
4 INJECTION INTRAMUSCULAR; INTRAVENOUS
Status: DISCONTINUED | OUTPATIENT
Start: 2022-02-23 | End: 2022-03-04

## 2022-02-23 RX ORDER — MAGNESIUM SULFATE 100 %
4 CRYSTALS MISCELLANEOUS AS NEEDED
Status: DISCONTINUED | OUTPATIENT
Start: 2022-02-23 | End: 2022-03-05 | Stop reason: HOSPADM

## 2022-02-23 RX ORDER — MIDAZOLAM HYDROCHLORIDE 1 MG/ML
0.5 INJECTION, SOLUTION INTRAMUSCULAR; INTRAVENOUS
Status: DISCONTINUED | OUTPATIENT
Start: 2022-02-23 | End: 2022-02-23

## 2022-02-23 RX ORDER — DIPHENHYDRAMINE HYDROCHLORIDE 50 MG/ML
12.5 INJECTION, SOLUTION INTRAMUSCULAR; INTRAVENOUS
Status: ACTIVE | OUTPATIENT
Start: 2022-02-23 | End: 2022-02-24

## 2022-02-23 RX ORDER — KETOROLAC TROMETHAMINE 30 MG/ML
15 INJECTION, SOLUTION INTRAMUSCULAR; INTRAVENOUS EVERY 6 HOURS
Status: DISPENSED | OUTPATIENT
Start: 2022-02-23 | End: 2022-02-24

## 2022-02-23 RX ORDER — PROPOFOL 10 MG/ML
INJECTION, EMULSION INTRAVENOUS AS NEEDED
Status: DISCONTINUED | OUTPATIENT
Start: 2022-02-23 | End: 2022-02-23 | Stop reason: HOSPADM

## 2022-02-23 RX ORDER — MAGNESIUM SULFATE HEPTAHYDRATE 40 MG/ML
2 INJECTION, SOLUTION INTRAVENOUS ONCE
Status: COMPLETED | OUTPATIENT
Start: 2022-02-23 | End: 2022-02-24

## 2022-02-23 RX ORDER — FENTANYL CITRATE 50 UG/ML
25 INJECTION, SOLUTION INTRAMUSCULAR; INTRAVENOUS
Status: COMPLETED | OUTPATIENT
Start: 2022-02-23 | End: 2022-02-23

## 2022-02-23 RX ORDER — LIDOCAINE HYDROCHLORIDE 10 MG/ML
0.1 INJECTION, SOLUTION EPIDURAL; INFILTRATION; INTRACAUDAL; PERINEURAL AS NEEDED
Status: DISCONTINUED | OUTPATIENT
Start: 2022-02-23 | End: 2022-02-23 | Stop reason: HOSPADM

## 2022-02-23 RX ORDER — INSULIN LISPRO 100 [IU]/ML
INJECTION, SOLUTION INTRAVENOUS; SUBCUTANEOUS
Status: DISCONTINUED | OUTPATIENT
Start: 2022-02-23 | End: 2022-03-02

## 2022-02-23 RX ORDER — SODIUM CHLORIDE, SODIUM LACTATE, POTASSIUM CHLORIDE, CALCIUM CHLORIDE 600; 310; 30; 20 MG/100ML; MG/100ML; MG/100ML; MG/100ML
INJECTION, SOLUTION INTRAVENOUS
Status: DISCONTINUED | OUTPATIENT
Start: 2022-02-23 | End: 2022-02-23 | Stop reason: HOSPADM

## 2022-02-23 RX ORDER — SODIUM CHLORIDE, SODIUM LACTATE, POTASSIUM CHLORIDE, CALCIUM CHLORIDE 600; 310; 30; 20 MG/100ML; MG/100ML; MG/100ML; MG/100ML
1000 INJECTION, SOLUTION INTRAVENOUS CONTINUOUS
Status: DISCONTINUED | OUTPATIENT
Start: 2022-02-23 | End: 2022-02-23

## 2022-02-23 RX ORDER — SUCCINYLCHOLINE CHLORIDE 20 MG/ML
INJECTION INTRAMUSCULAR; INTRAVENOUS AS NEEDED
Status: DISCONTINUED | OUTPATIENT
Start: 2022-02-23 | End: 2022-02-23 | Stop reason: HOSPADM

## 2022-02-23 RX ORDER — DEXAMETHASONE SODIUM PHOSPHATE 4 MG/ML
INJECTION, SOLUTION INTRA-ARTICULAR; INTRALESIONAL; INTRAMUSCULAR; INTRAVENOUS; SOFT TISSUE AS NEEDED
Status: DISCONTINUED | OUTPATIENT
Start: 2022-02-23 | End: 2022-02-23 | Stop reason: HOSPADM

## 2022-02-23 RX ORDER — ONDANSETRON 2 MG/ML
4 INJECTION INTRAMUSCULAR; INTRAVENOUS AS NEEDED
Status: DISCONTINUED | OUTPATIENT
Start: 2022-02-23 | End: 2022-02-23

## 2022-02-23 RX ORDER — GLYCOPYRROLATE 0.2 MG/ML
INJECTION INTRAMUSCULAR; INTRAVENOUS AS NEEDED
Status: DISCONTINUED | OUTPATIENT
Start: 2022-02-23 | End: 2022-02-23 | Stop reason: HOSPADM

## 2022-02-23 RX ORDER — ACETAMINOPHEN 500 MG
1000 TABLET ORAL ONCE
Status: DISCONTINUED | OUTPATIENT
Start: 2022-02-23 | End: 2022-02-23 | Stop reason: HOSPADM

## 2022-02-23 RX ORDER — SODIUM CHLORIDE 0.9 % (FLUSH) 0.9 %
5-40 SYRINGE (ML) INJECTION AS NEEDED
Status: DISCONTINUED | OUTPATIENT
Start: 2022-02-23 | End: 2022-03-05 | Stop reason: HOSPADM

## 2022-02-23 RX ORDER — HYDROMORPHONE HYDROCHLORIDE 1 MG/ML
0.2 INJECTION, SOLUTION INTRAMUSCULAR; INTRAVENOUS; SUBCUTANEOUS
Status: DISCONTINUED | OUTPATIENT
Start: 2022-02-23 | End: 2022-02-23

## 2022-02-23 RX ORDER — FENTANYL CITRATE 50 UG/ML
INJECTION, SOLUTION INTRAMUSCULAR; INTRAVENOUS AS NEEDED
Status: DISCONTINUED | OUTPATIENT
Start: 2022-02-23 | End: 2022-02-23 | Stop reason: HOSPADM

## 2022-02-23 RX ORDER — HYDROMORPHONE HYDROCHLORIDE 2 MG/ML
INJECTION, SOLUTION INTRAMUSCULAR; INTRAVENOUS; SUBCUTANEOUS AS NEEDED
Status: DISCONTINUED | OUTPATIENT
Start: 2022-02-23 | End: 2022-02-23 | Stop reason: HOSPADM

## 2022-02-23 RX ORDER — PHENYLEPHRINE HCL IN 0.9% NACL 0.4MG/10ML
SYRINGE (ML) INTRAVENOUS AS NEEDED
Status: DISCONTINUED | OUTPATIENT
Start: 2022-02-23 | End: 2022-02-23 | Stop reason: HOSPADM

## 2022-02-23 RX ORDER — OXYCODONE AND ACETAMINOPHEN 5; 325 MG/1; MG/1
1 TABLET ORAL AS NEEDED
Status: DISCONTINUED | OUTPATIENT
Start: 2022-02-23 | End: 2022-02-23

## 2022-02-23 RX ORDER — FENTANYL CITRATE 50 UG/ML
50 INJECTION, SOLUTION INTRAMUSCULAR; INTRAVENOUS AS NEEDED
Status: DISCONTINUED | OUTPATIENT
Start: 2022-02-23 | End: 2022-02-23 | Stop reason: HOSPADM

## 2022-02-23 RX ORDER — SODIUM CHLORIDE 0.9 % (FLUSH) 0.9 %
5-40 SYRINGE (ML) INJECTION AS NEEDED
Status: DISCONTINUED | OUTPATIENT
Start: 2022-02-23 | End: 2022-02-23

## 2022-02-23 RX ORDER — BUPIVACAINE HYDROCHLORIDE 2.5 MG/ML
INJECTION, SOLUTION EPIDURAL; INFILTRATION; INTRACAUDAL AS NEEDED
Status: DISCONTINUED | OUTPATIENT
Start: 2022-02-23 | End: 2022-02-23 | Stop reason: HOSPADM

## 2022-02-23 RX ORDER — SODIUM CHLORIDE 9 MG/ML
INJECTION, SOLUTION INTRAVENOUS
Status: DISCONTINUED | OUTPATIENT
Start: 2022-02-23 | End: 2022-02-23 | Stop reason: HOSPADM

## 2022-02-23 RX ORDER — NALOXONE HYDROCHLORIDE 0.4 MG/ML
0.4 INJECTION, SOLUTION INTRAMUSCULAR; INTRAVENOUS; SUBCUTANEOUS AS NEEDED
Status: DISCONTINUED | OUTPATIENT
Start: 2022-02-23 | End: 2022-02-23 | Stop reason: SDUPTHER

## 2022-02-23 RX ORDER — SODIUM CHLORIDE 0.9 % (FLUSH) 0.9 %
5-40 SYRINGE (ML) INJECTION EVERY 8 HOURS
Status: DISCONTINUED | OUTPATIENT
Start: 2022-02-23 | End: 2022-02-23 | Stop reason: HOSPADM

## 2022-02-23 RX ORDER — KETOROLAC TROMETHAMINE 30 MG/ML
30 INJECTION, SOLUTION INTRAMUSCULAR; INTRAVENOUS EVERY 6 HOURS
Status: DISCONTINUED | OUTPATIENT
Start: 2022-02-23 | End: 2022-02-23

## 2022-02-23 RX ORDER — HEPARIN SODIUM 5000 [USP'U]/ML
5000 INJECTION, SOLUTION INTRAVENOUS; SUBCUTANEOUS EVERY 12 HOURS
Status: DISCONTINUED | OUTPATIENT
Start: 2022-02-23 | End: 2022-02-28

## 2022-02-23 RX ORDER — DIPHENHYDRAMINE HYDROCHLORIDE 50 MG/ML
25 INJECTION, SOLUTION INTRAMUSCULAR; INTRAVENOUS
Status: DISCONTINUED | OUTPATIENT
Start: 2022-02-23 | End: 2022-03-05 | Stop reason: HOSPADM

## 2022-02-23 RX ORDER — EPHEDRINE SULFATE/0.9% NACL/PF 50 MG/5 ML
5 SYRINGE (ML) INTRAVENOUS AS NEEDED
Status: DISCONTINUED | OUTPATIENT
Start: 2022-02-23 | End: 2022-02-23

## 2022-02-23 RX ORDER — EPHEDRINE SULFATE/0.9% NACL/PF 50 MG/5 ML
SYRINGE (ML) INTRAVENOUS AS NEEDED
Status: DISCONTINUED | OUTPATIENT
Start: 2022-02-23 | End: 2022-02-23 | Stop reason: HOSPADM

## 2022-02-23 RX ORDER — SODIUM CHLORIDE, SODIUM LACTATE, POTASSIUM CHLORIDE, CALCIUM CHLORIDE 600; 310; 30; 20 MG/100ML; MG/100ML; MG/100ML; MG/100ML
50 INJECTION, SOLUTION INTRAVENOUS CONTINUOUS
Status: DISCONTINUED | OUTPATIENT
Start: 2022-02-23 | End: 2022-03-01

## 2022-02-23 RX ORDER — DIPHENHYDRAMINE HYDROCHLORIDE 50 MG/ML
12.5 INJECTION, SOLUTION INTRAMUSCULAR; INTRAVENOUS AS NEEDED
Status: DISCONTINUED | OUTPATIENT
Start: 2022-02-23 | End: 2022-02-23

## 2022-02-23 RX ORDER — SODIUM CHLORIDE 0.9 % (FLUSH) 0.9 %
5-40 SYRINGE (ML) INJECTION EVERY 8 HOURS
Status: DISCONTINUED | OUTPATIENT
Start: 2022-02-23 | End: 2022-02-23

## 2022-02-23 RX ORDER — MORPHINE SULFATE 2 MG/ML
2 INJECTION, SOLUTION INTRAMUSCULAR; INTRAVENOUS
Status: DISCONTINUED | OUTPATIENT
Start: 2022-02-23 | End: 2022-02-23

## 2022-02-23 RX ORDER — LIDOCAINE HYDROCHLORIDE AND EPINEPHRINE 15; 5 MG/ML; UG/ML
INJECTION, SOLUTION EPIDURAL
Status: COMPLETED | OUTPATIENT
Start: 2022-02-23 | End: 2022-02-23

## 2022-02-23 RX ORDER — NEOSTIGMINE METHYLSULFATE 1 MG/ML
INJECTION, SOLUTION INTRAVENOUS AS NEEDED
Status: DISCONTINUED | OUTPATIENT
Start: 2022-02-23 | End: 2022-02-23 | Stop reason: HOSPADM

## 2022-02-23 RX ORDER — SODIUM CHLORIDE 0.9 % (FLUSH) 0.9 %
5-40 SYRINGE (ML) INJECTION EVERY 8 HOURS
Status: DISCONTINUED | OUTPATIENT
Start: 2022-02-23 | End: 2022-03-05 | Stop reason: HOSPADM

## 2022-02-23 RX ORDER — ACETAMINOPHEN 325 MG/1
650 TABLET ORAL
Status: DISCONTINUED | OUTPATIENT
Start: 2022-02-23 | End: 2022-03-05 | Stop reason: HOSPADM

## 2022-02-23 RX ADMIN — ONDANSETRON HYDROCHLORIDE 4 MG: 2 INJECTION, SOLUTION INTRAMUSCULAR; INTRAVENOUS at 13:00

## 2022-02-23 RX ADMIN — MEPERIDINE HYDROCHLORIDE 12.5 MG: 25 INJECTION, SOLUTION INTRAMUSCULAR; INTRAVENOUS; SUBCUTANEOUS at 15:18

## 2022-02-23 RX ADMIN — Medication 10 MG: at 09:30

## 2022-02-23 RX ADMIN — LIDOCAINE HYDROCHLORIDE 100 MG: 20 INJECTION, SOLUTION EPIDURAL; INFILTRATION; INTRACAUDAL; PERINEURAL at 07:42

## 2022-02-23 RX ADMIN — ROCURONIUM BROMIDE 40 MG: 10 SOLUTION INTRAVENOUS at 07:55

## 2022-02-23 RX ADMIN — SUCCINYLCHOLINE CHLORIDE 140 MG: 20 INJECTION, SOLUTION INTRAMUSCULAR; INTRAVENOUS at 07:43

## 2022-02-23 RX ADMIN — PROPOFOL 100 MG: 10 INJECTION, EMULSION INTRAVENOUS at 07:42

## 2022-02-23 RX ADMIN — ROCURONIUM BROMIDE 20 MG: 10 SOLUTION INTRAVENOUS at 11:02

## 2022-02-23 RX ADMIN — Medication 2 MG: at 14:28

## 2022-02-23 RX ADMIN — WATER 2 G: 1 INJECTION INTRAMUSCULAR; INTRAVENOUS; SUBCUTANEOUS at 12:20

## 2022-02-23 RX ADMIN — MIDAZOLAM 7 MG: 1 INJECTION INTRAMUSCULAR; INTRAVENOUS at 07:15

## 2022-02-23 RX ADMIN — SODIUM CHLORIDE, PRESERVATIVE FREE 10 ML: 5 INJECTION INTRAVENOUS at 21:49

## 2022-02-23 RX ADMIN — FENTANYL CITRATE 4 ML/HR: 50 INJECTION INTRAMUSCULAR; INTRAVENOUS at 14:16

## 2022-02-23 RX ADMIN — SODIUM CHLORIDE: 900 INJECTION, SOLUTION INTRAVENOUS at 07:40

## 2022-02-23 RX ADMIN — ROCURONIUM BROMIDE 10 MG: 10 SOLUTION INTRAVENOUS at 13:15

## 2022-02-23 RX ADMIN — HYDROMORPHONE HYDROCHLORIDE 0.2 MG: 1 INJECTION, SOLUTION INTRAMUSCULAR; INTRAVENOUS; SUBCUTANEOUS at 16:26

## 2022-02-23 RX ADMIN — BUPIVACAINE HYDROCHLORIDE 3 ML: 2.5 INJECTION, SOLUTION EPIDURAL; INFILTRATION; INTRACAUDAL; PERINEURAL at 13:51

## 2022-02-23 RX ADMIN — Medication 15 MG: at 08:30

## 2022-02-23 RX ADMIN — SODIUM CHLORIDE, POTASSIUM CHLORIDE, SODIUM LACTATE AND CALCIUM CHLORIDE 150 ML/HR: 600; 310; 30; 20 INJECTION, SOLUTION INTRAVENOUS at 17:00

## 2022-02-23 RX ADMIN — HYDROMORPHONE HYDROCHLORIDE 0.2 MG: 1 INJECTION, SOLUTION INTRAMUSCULAR; INTRAVENOUS; SUBCUTANEOUS at 16:36

## 2022-02-23 RX ADMIN — ROCURONIUM BROMIDE 20 MG: 10 SOLUTION INTRAVENOUS at 10:16

## 2022-02-23 RX ADMIN — GLYCOPYRROLATE 0.2 MG: 0.2 INJECTION, SOLUTION INTRAMUSCULAR; INTRAVENOUS at 07:43

## 2022-02-23 RX ADMIN — ROCURONIUM BROMIDE 10 MG: 10 SOLUTION INTRAVENOUS at 10:40

## 2022-02-23 RX ADMIN — FENTANYL CITRATE 50 MCG: 50 INJECTION, SOLUTION INTRAMUSCULAR; INTRAVENOUS at 07:42

## 2022-02-23 RX ADMIN — FENTANYL CITRATE 50 MCG: 50 INJECTION, SOLUTION INTRAMUSCULAR; INTRAVENOUS at 08:10

## 2022-02-23 RX ADMIN — KETOROLAC TROMETHAMINE 15 MG: 30 INJECTION, SOLUTION INTRAMUSCULAR; INTRAVENOUS at 18:28

## 2022-02-23 RX ADMIN — WATER 2 G: 1 INJECTION INTRAMUSCULAR; INTRAVENOUS; SUBCUTANEOUS at 08:20

## 2022-02-23 RX ADMIN — FENTANYL CITRATE 25 MCG: 50 INJECTION, SOLUTION INTRAMUSCULAR; INTRAVENOUS at 15:30

## 2022-02-23 RX ADMIN — FENTANYL CITRATE 25 MCG: 50 INJECTION, SOLUTION INTRAMUSCULAR; INTRAVENOUS at 15:35

## 2022-02-23 RX ADMIN — BUPIVACAINE HYDROCHLORIDE 2 ML: 2.5 INJECTION, SOLUTION EPIDURAL; INFILTRATION; INTRACAUDAL; PERINEURAL at 10:12

## 2022-02-23 RX ADMIN — Medication 10 MG: at 08:55

## 2022-02-23 RX ADMIN — PROPOFOL 25 MG: 10 INJECTION, EMULSION INTRAVENOUS at 10:39

## 2022-02-23 RX ADMIN — HYDROMORPHONE HYDROCHLORIDE 0.2 MG: 1 INJECTION, SOLUTION INTRAMUSCULAR; INTRAVENOUS; SUBCUTANEOUS at 17:17

## 2022-02-23 RX ADMIN — ROCURONIUM BROMIDE 30 MG: 10 SOLUTION INTRAVENOUS at 08:45

## 2022-02-23 RX ADMIN — HYDROMORPHONE HYDROCHLORIDE 0.2 MG: 1 INJECTION, SOLUTION INTRAMUSCULAR; INTRAVENOUS; SUBCUTANEOUS at 17:02

## 2022-02-23 RX ADMIN — BUPIVACAINE HYDROCHLORIDE 3 ML: 2.5 INJECTION, SOLUTION EPIDURAL; INFILTRATION; INTRACAUDAL; PERINEURAL at 11:52

## 2022-02-23 RX ADMIN — MAGNESIUM SULFATE HEPTAHYDRATE 2 G: 40 INJECTION, SOLUTION INTRAVENOUS at 18:28

## 2022-02-23 RX ADMIN — FENTANYL CITRATE 100 MCG: 50 INJECTION INTRAMUSCULAR; INTRAVENOUS at 07:15

## 2022-02-23 RX ADMIN — HYDROMORPHONE HYDROCHLORIDE 0.2 MG: 1 INJECTION, SOLUTION INTRAMUSCULAR; INTRAVENOUS; SUBCUTANEOUS at 16:46

## 2022-02-23 RX ADMIN — ALBUMIN (HUMAN) 250 ML: 12.5 INJECTION, SOLUTION INTRAVENOUS at 12:16

## 2022-02-23 RX ADMIN — ROCURONIUM BROMIDE 10 MG: 10 SOLUTION INTRAVENOUS at 12:01

## 2022-02-23 RX ADMIN — Medication 60 MCG: at 11:36

## 2022-02-23 RX ADMIN — FENTANYL CITRATE 25 MCG: 50 INJECTION, SOLUTION INTRAMUSCULAR; INTRAVENOUS at 15:25

## 2022-02-23 RX ADMIN — Medication 1.5 ML: at 06:50

## 2022-02-23 RX ADMIN — DEXAMETHASONE SODIUM PHOSPHATE 4 MG: 4 INJECTION, SOLUTION INTRAMUSCULAR; INTRAVENOUS at 07:50

## 2022-02-23 RX ADMIN — BUPIVACAINE HYDROCHLORIDE 2 ML: 2.5 INJECTION, SOLUTION EPIDURAL; INFILTRATION; INTRACAUDAL; PERINEURAL at 10:40

## 2022-02-23 RX ADMIN — FENTANYL CITRATE 25 MCG: 50 INJECTION, SOLUTION INTRAMUSCULAR; INTRAVENOUS at 15:18

## 2022-02-23 RX ADMIN — PHENYLEPHRINE HYDROCHLORIDE 20 MCG/MIN: 10 INJECTION INTRAVENOUS at 08:15

## 2022-02-23 RX ADMIN — HEPARIN SODIUM 5000 UNITS: 5000 INJECTION INTRAVENOUS; SUBCUTANEOUS at 21:35

## 2022-02-23 RX ADMIN — HYDROMORPHONE HYDROCHLORIDE 0.3 MG: 2 INJECTION, SOLUTION INTRAMUSCULAR; INTRAVENOUS; SUBCUTANEOUS at 14:55

## 2022-02-23 RX ADMIN — GLYCOPYRROLATE 0.4 MG: 0.2 INJECTION, SOLUTION INTRAMUSCULAR; INTRAVENOUS at 14:28

## 2022-02-23 RX ADMIN — HYDROMORPHONE HYDROCHLORIDE 0.2 MG: 2 INJECTION, SOLUTION INTRAMUSCULAR; INTRAVENOUS; SUBCUTANEOUS at 14:09

## 2022-02-23 RX ADMIN — SODIUM CHLORIDE, POTASSIUM CHLORIDE, SODIUM LACTATE AND CALCIUM CHLORIDE: 600; 310; 30; 20 INJECTION, SOLUTION INTRAVENOUS at 07:40

## 2022-02-23 RX ADMIN — ROCURONIUM BROMIDE 10 MG: 10 SOLUTION INTRAVENOUS at 07:42

## 2022-02-23 RX ADMIN — Medication 25 MG: at 07:42

## 2022-02-23 NOTE — PERIOP NOTES
Spoke with patient's  and informed them that patient is still in surgery and updated on patient's well being.

## 2022-02-23 NOTE — ANESTHESIA PREPROCEDURE EVALUATION
Relevant Problems   CARDIOVASCULAR   (+) Essential hypertension, benign      ENDOCRINE   (+) Hypothyroidism   (+) Obesity, morbid (HCC)      PERSONAL HX & FAMILY HX OF CANCER   (+) Malignant neoplasm of head of pancreas (HCC)   (+) Pancreatic adenocarcinoma (HCC)       Anesthetic History   No history of anesthetic complications            Review of Systems / Medical History  Patient summary reviewed, nursing notes reviewed and pertinent labs reviewed    Pulmonary  Within defined limits                 Neuro/Psych   Within defined limits           Cardiovascular  Within defined limits  Hypertension                   GI/Hepatic/Renal  Within defined limits         PUD    Comments: UC Endo/Other      Hypothyroidism  Obesity, arthritis and cancer     Other Findings              Physical Exam    Airway  Mallampati: II  TM Distance: > 6 cm  Neck ROM: normal range of motion   Mouth opening: Normal     Cardiovascular  Regular rate and rhythm,  S1 and S2 normal,  no murmur, click, rub, or gallop             Dental    Dentition: Poor dentition     Pulmonary  Breath sounds clear to auscultation               Abdominal  GI exam deferred       Other Findings            Anesthetic Plan    ASA: 3  Anesthesia type: general and epidural    Monitoring Plan: Arterial line and CVP  Post-op pain plan if not by surgeon: indwelling epidural catheter    Induction: Intravenous  Anesthetic plan and risks discussed with: Patient

## 2022-02-23 NOTE — ANESTHESIA PROCEDURE NOTES
Arterial Line Placement    Start time: 2/23/2022 7:50 AM  End time: 2/23/2022 7:55 AM  Performed by: Aba Rivera CRNA  Authorized by: Fly Hoffmann DO     Pre-Procedure  Indications:  Arterial pressure monitoring  Preanesthetic Checklist: patient identified, risks and benefits discussed, anesthesia consent, site marked, patient being monitored, timeout performed and patient being monitored    Timeout Time: 07:50 EST        Procedure:   Prep:  Chlorhexidine  Seldinger Technique?: Yes    Orientation:  Right  Location:  Radial artery  Catheter size:  20 G  Number of attempts:  2  Cont Cardiac Output Sensor: No      Assessment:   Post-procedure:  Line secured and sterile dressing applied  Patient Tolerance:  Patient tolerated the procedure well with no immediate complications

## 2022-02-23 NOTE — ROUTINE PROCESS
Patient: Ivan Lora MRN: [id number]  SSN: [id number]   YOB: 1955  Age: 77 y.o. Sex: female     Patient is status post Procedure(s):  PYLORUS PRESERVING WHIPPLE PROCEDURE AND PORTAL LYPHMADENECTOMY.     Surgeon(s) and Role:     * Porter Jackson MD - Primary    Local/Dose/Irrigation: none              Quad Lumen 02/23/22 Right (Active)      Venous Access Device (Active)      Peripheral IV 02/23/22 Left;Posterior Hand (Active)      Arterial Line 02/23/22 Right Radial artery (Active)              Epidural/Intrathecal 02/23/22 Thoracic spine (comment) (Active)               Dressing/Packing:  Incision 02/23/22 Abdomen-Dressing/Treatment: Skin glue (02/23/22 8543)

## 2022-02-23 NOTE — PERIOP NOTES
Spoke with patient's  and sister and informed them that patient is still in surgery and updated on patient's well being.

## 2022-02-23 NOTE — ANESTHESIA PROCEDURE NOTES
Central Line Placement    Start time: 2/23/2022 7:45 AM  End time: 2/23/2022 7:50 AM  Performed by: Rosalina Dowell DO  Authorized by: Rosalina Dowell DO     Indications: vascular access  Preanesthetic Checklist: patient identified, risks and benefits discussed, anesthesia consent, site marked, patient being monitored and timeout performed    Timeout Time: 07:45 EST       Pre-procedure: All elements of maximal sterile barrier technique followed?  Yes    2% Chlorhexidine for cutaneous antisepsis, Hand hygiene performed prior to catheter insertion and Ultrasound guidance    Sterile Ultrasound Technique followed?: Yes            Procedure:   Prep:  Chlorhexidine  Location: internal jugular  Orientation:  Right    Catheter type:  Quad lumen  Catheter size:  8.5 Fr  Catheter length:  16 cm  Number of attempts:  1  Successful placement: Yes      Assessment:   Post-procedure:  Catheter secured, sterile dressing applied and sterile dressing with CHG applied  Assessment:  Blood return through all ports, free fluid flow and guidewire removal verified  Insertion:  Uncomplicated  Patient tolerance:  Patient tolerated the procedure well with no immediate complications

## 2022-02-23 NOTE — BRIEF OP NOTE
Brief Postoperative Note    Patient: Shannon Montalvo  YOB: 1955  MRN: [id number]    Date of Procedure: 2/23/2022     Pre-Op Diagnosis: Pancreatic adenocarcinoma ([facility] [address] [C25.9]    Post-Op Diagnosis: Same as preoperative diagnosis. Procedure(s):  PYLORUS PRESERVING WHIPPLE PROCEDURE, PORTAL LYPHMADENECTOMY    Surgeon(s): Shahram Espinoza MD    Surgical Assistant: Physician Assistant: JESSICA Huang    Anesthesia: General     Estimated Blood Loss (mL): 500 mL    Complications: None    Specimens:   ID Type Source Tests Collected by Time Destination   1 : GALLBLADDER Fresh Gallbladder  Shahram Espinoza MD 2/23/2022 1840 Pathology   2 : PORTAL LYMPHADENECTOMY Fresh OTHER (use comments)  Shahram Espinoza MD 2/23/2022 1041 Pathology   3 : PYLORIC LYMPH NODE Frozen Section Lymph Node  Shahram Espinoza MD 2/23/2022 1035 Pathology   4 : WHIPPLE SPECIMEN Frozen Section OTHER (use comments)  Shahram Espinoza MD 2/23/2022 1155 Pathology   5 : BILE DUCT STENT Fresh OTHER (use comments)  Shahram Espinoza MD 2/23/2022 1157 Pathology        Implants: * No implants in log *    Drains: * No LDAs found *    Findings: Margins of resection negative on frozen section. No evidence of distant metastatic disease. Common bile duct stent removed intact.       Electronically Signed by Author Orlin MD on 2/23/2022 at 3:07 PM

## 2022-02-23 NOTE — ANESTHESIA PROCEDURE NOTES
Epidural Block    Patient location during procedure: pre-op  Start time: 2/23/2022 6:50 AM  End time: 2/23/2022 7:15 AM  Reason for block: post-op pain management  Staffing  Performed: attending   Anesthesiologist: Demetrius Flowers,   Preanesthetic Checklist  Completed: patient identified, IV checked, site marked, risks and benefits discussed, surgical consent, monitors and equipment checked, pre-op evaluation and timeout performed  Block Placement  Patient position: sitting  Prep: DuraPrep  Sterility prep: mask, gloves, cap and drape  Sedation level: no sedation  Patient monitoring: heart rate, frequent blood pressure checks and continuous pulse oximetry  Approach: midline  Location: thoracic  Thoracic location: T6-T7  Epidural  Loss of resistance technique: air  Guidance: landmark technique  Needle  Needle type: Tuohy   Needle gauge: 17 G  Needle length: 9 cm  Needle insertion depth: 4.5 cm  Catheter type: stylet  Catheter size: 19 G  Catheter at skin depth: 9 cm  Catheter securement method: clear occlusive dressing, liquid medical adhesive and surgical tape  Test dose: negative  Medications Administered  Lidocaine-EPINEPHrine (XYLOCAINE) 1.5 %-1:200,000 Epidural, 1.5 mL  Assessment  Block outcome: block to be assessed in the OR  Number of attempts: 1  Procedure assessment: patient tolerated procedure well with no immediate complications

## 2022-02-23 NOTE — ANESTHESIA POSTPROCEDURE EVALUATION
Post-Anesthesia Evaluation and Assessment    Patient: Rashid Gibson MRN: [id number]  SSN: [id number]    YOB: 1955  Age: 77 y.o. Sex: female      I have evaluated the patient and they are stable and ready for discharge from the PACU. Cardiovascular Function/Vital Signs  Visit Vitals  BP (!) 113/58   Pulse (!) 101   Temp 36.9 °C (98.5 °F)   Resp 17   Ht 5' 4\" (1.626 m)   Wt 103.9 kg (228 lb 15.9 oz)   SpO2 100%   BMI 39.31 kg/m²       Patient is status post General anesthesia for Procedure(s):  PYLORUS PRESERVING WHIPPLE PROCEDURE AND PORTAL LYPHMADENECTOMY. Nausea/Vomiting: None    Postoperative hydration reviewed and adequate. Pain:  Pain Scale 1: Numeric (0 - 10) (02/23/22 0612)  Pain Intensity 1: 0 (02/23/22 0612)   Managed    Neurological Status: At baseline    Mental Status, Level of Consciousness: Alert and  oriented to person, place, and time    Pulmonary Status:   O2 Device: Nasal cannula (02/23/22 1459)   Adequate oxygenation and airway patent    Complications related to anesthesia: None    Post-anesthesia assessment completed. No concerns    Signed By: Duane Perez MD     February 23, 2022              Procedure(s):  PYLORUS PRESERVING WHIPPLE PROCEDURE AND PORTAL LYPHMADENECTOMY. general, epidural    <BSHSIANPOST>    INITIAL Post-op Vital signs:   Vitals Value Taken Time   /70 02/23/22 1505   Temp 36.9 °C (98.5 °F) 02/23/22 1459   Pulse 101 02/23/22 1506   Resp 26 02/23/22 1506   SpO2 100 % 02/23/22 1506   Vitals shown include unvalidated device data.

## 2022-02-23 NOTE — H&P
Date of Surgery Update:  Lupis Whitman was seen and examined. History and physical has been reviewed. The patient has been examined. There have been no significant clinical changes since the completion of the originally dated History and Physical.  Patient with pancreatic cancer s/p neoadjuvant chemotherapy. Subsequent imaging shows response to treatment without evidence of distant metastatic disease. Plan for whipple procedure. A complete discussion of the risks, benefits and alternatives to surgery were discussed with the patient who was keen to proceed. The patient was counseled at length about the risks of kevyn Covid-19 during their perioperative period and any recovery window from their procedure. The patient was made aware that kevyn Covid-19  may worsen their prognosis for recovering from their procedure and lend to a higher morbidity and/or mortality risk. All material risks, benefits, and reasonable alternatives including postponing the procedure were discussed. The patient does wish to proceed with the procedure at this time. Signed By: Bentley Garcia MD     2022 7:19 AM         Please note from the office and include the additional information below:    Past Medical History  Past Medical History:   Diagnosis Date    Arthritis     ostero arthritis, rhemathoid  lower back     Autoimmune disease ([address]     Ulcerative Colitis.  Hypertension     CONTROLLED WITH MEDS    Hypothyroid 3/23/2011    Malignant neoplasm of head of pancreas ([address] 2021    Ulcerative colitis ([address] 2010    Ulcerative colitis ([address] 2010    Ulcerative colitis ([address]         Past Surgical History  Past Surgical History:   Procedure Laterality Date    COLONOSCOPY N/A 2017    COL[facility]OS[facility] performed [facility] Jessenia Webster MD at [facility] ENDOSCOPY    ENDOSCOPY, COLON, DIAGNOSTIC      HX  SECTION      HX UROLOGICAL      URETHERAL STENT.     HX VASCULAR ACCESS      PLACEMENT OF PORT-A-CATH RIGHT SIDE    NM ABDOMEN SURGERY PROC UNLISTED      ENDOSCOPIC, PLACEMENT OF BILIARY STENT    NM BREAST SURGERY PROCEDURE UNLISTED      cyst removed from left breast        Social History  The patient Neptali Johnson  reports that she quit smoking about 42 years ago. Her smoking use included cigarettes. She has never used smokeless tobacco. She reports that she does not drink alcohol and does not use drugs.      Family History  Family History   Problem Relation Age of Onset    Cancer Mother         pancreatic    Cancer Brother         colon    Anesth Problems Neg Hx           Augustin Dee MD

## 2022-02-24 LAB
ALBUMIN SERPL-MCNC: 2.7 G/DL (ref 3.5–5)
ALBUMIN/GLOB SERPL: 0.7 {RATIO} (ref 1.1–2.2)
ALP SERPL-CCNC: 108 U/L (ref 45–117)
ALT SERPL-CCNC: 58 U/L (ref 12–78)
ANION GAP SERPL CALC-SCNC: 3 MMOL/L (ref 5–15)
AST SERPL-CCNC: 103 U/L (ref 15–37)
BASOPHILS # BLD: 0 K/UL (ref 0–0.1)
BASOPHILS NFR BLD: 0 % (ref 0–1)
BILIRUB SERPL-MCNC: 1.3 MG/DL (ref 0.2–1)
BUN SERPL-MCNC: 12 MG/DL (ref 6–20)
BUN/CREAT SERPL: 12 (ref 12–20)
CALCIUM SERPL-MCNC: 8.5 MG/DL (ref 8.5–10.1)
CHLORIDE SERPL-SCNC: 112 MMOL/L (ref 97–108)
CO2 SERPL-SCNC: 25 MMOL/L (ref 21–32)
CREAT SERPL-MCNC: 1.01 MG/DL (ref 0.55–1.02)
DIFFERENTIAL METHOD BLD: ABNORMAL
EOSINOPHIL # BLD: 0 K/UL (ref 0–0.4)
EOSINOPHIL NFR BLD: 0 % (ref 0–7)
ERYTHROCYTE [DISTWIDTH] IN BLOOD BY AUTOMATED COUNT: 16.4 % (ref 11.5–14.5)
EST. AVERAGE GLUCOSE BLD GHB EST-MCNC: 97 MG/DL
GLOBULIN SER CALC-MCNC: 3.7 G/DL (ref 2–4)
GLUCOSE BLD STRIP.AUTO-MCNC: 101 MG/DL (ref 65–117)
GLUCOSE BLD STRIP.AUTO-MCNC: 108 MG/DL (ref 65–117)
GLUCOSE BLD STRIP.AUTO-MCNC: 85 MG/DL (ref 65–117)
GLUCOSE BLD STRIP.AUTO-MCNC: 86 MG/DL (ref 65–117)
GLUCOSE SERPL-MCNC: 113 MG/DL (ref 65–100)
HBA1C MFR BLD: 5 % (ref 4–5.6)
HCT VFR BLD AUTO: 29.4 % (ref 35–47)
HGB BLD-MCNC: 9.3 G/DL (ref 11.5–16)
IMM GRANULOCYTES # BLD AUTO: 0.1 K/UL (ref 0–0.04)
IMM GRANULOCYTES NFR BLD AUTO: 1 % (ref 0–0.5)
LYMPHOCYTES # BLD: 2.1 K/UL (ref 0.8–3.5)
LYMPHOCYTES NFR BLD: 17 % (ref 12–49)
MAGNESIUM SERPL-MCNC: 2.5 MG/DL (ref 1.6–2.4)
MCH RBC QN AUTO: 34.8 PG (ref 26–34)
MCHC RBC AUTO-ENTMCNC: 31.6 G/DL (ref 30–36.5)
MCV RBC AUTO: 110.1 FL (ref 80–99)
MONOCYTES # BLD: 1.1 K/UL (ref 0–1)
MONOCYTES NFR BLD: 9 % (ref 5–13)
NEUTS SEG # BLD: 9.2 K/UL (ref 1.8–8)
NEUTS SEG NFR BLD: 73 % (ref 32–75)
NRBC # BLD: 0 K/UL (ref 0–0.01)
NRBC BLD-RTO: 0 PER 100 WBC
PHOSPHATE SERPL-MCNC: 2.7 MG/DL (ref 2.6–4.7)
PLATELET # BLD AUTO: 208 K/UL (ref 150–400)
PMV BLD AUTO: 11.2 FL (ref 8.9–12.9)
POTASSIUM SERPL-SCNC: 3.9 MMOL/L (ref 3.5–5.1)
PROT SERPL-MCNC: 6.4 G/DL (ref 6.4–8.2)
RBC # BLD AUTO: 2.67 M/UL (ref 3.8–5.2)
RBC MORPH BLD: ABNORMAL
RBC MORPH BLD: ABNORMAL
SERVICE CMNT-IMP: NORMAL
SODIUM SERPL-SCNC: 140 MMOL/L (ref 136–145)
WBC # BLD AUTO: 12.5 K/UL (ref 3.6–11)

## 2022-02-24 PROCEDURE — 74011250637 HC RX REV CODE- 250/637: Performed by: SURGERY

## 2022-02-24 PROCEDURE — 99024 POSTOP FOLLOW-UP VISIT: CPT | Performed by: PHYSICIAN ASSISTANT

## 2022-02-24 PROCEDURE — 74011000258 HC RX REV CODE- 258: Performed by: SURGERY

## 2022-02-24 PROCEDURE — 65660000000 HC RM CCU STEPDOWN

## 2022-02-24 PROCEDURE — 74011000250 HC RX REV CODE- 250: Performed by: SURGERY

## 2022-02-24 PROCEDURE — 36415 COLL VENOUS BLD VENIPUNCTURE: CPT

## 2022-02-24 PROCEDURE — 83036 HEMOGLOBIN GLYCOSYLATED A1C: CPT

## 2022-02-24 PROCEDURE — 97530 THERAPEUTIC ACTIVITIES: CPT

## 2022-02-24 PROCEDURE — 83735 ASSAY OF MAGNESIUM: CPT

## 2022-02-24 PROCEDURE — 74011250636 HC RX REV CODE- 250/636: Performed by: SURGERY

## 2022-02-24 PROCEDURE — 80053 COMPREHEN METABOLIC PANEL: CPT

## 2022-02-24 PROCEDURE — 85025 COMPLETE CBC W/AUTO DIFF WBC: CPT

## 2022-02-24 PROCEDURE — 82962 GLUCOSE BLOOD TEST: CPT

## 2022-02-24 PROCEDURE — 97161 PT EVAL LOW COMPLEX 20 MIN: CPT

## 2022-02-24 PROCEDURE — 99233 SBSQ HOSP IP/OBS HIGH 50: CPT | Performed by: CLINICAL NURSE SPECIALIST

## 2022-02-24 PROCEDURE — 84100 ASSAY OF PHOSPHORUS: CPT

## 2022-02-24 RX ADMIN — HEPARIN SODIUM 5000 UNITS: 5000 INJECTION INTRAVENOUS; SUBCUTANEOUS at 09:25

## 2022-02-24 RX ADMIN — KETOROLAC TROMETHAMINE 15 MG: 30 INJECTION, SOLUTION INTRAMUSCULAR; INTRAVENOUS at 06:17

## 2022-02-24 RX ADMIN — HEPARIN SODIUM 5000 UNITS: 5000 INJECTION INTRAVENOUS; SUBCUTANEOUS at 20:44

## 2022-02-24 RX ADMIN — ACETAMINOPHEN 650 MG: 325 TABLET ORAL at 16:40

## 2022-02-24 RX ADMIN — SODIUM CHLORIDE, PRESERVATIVE FREE 10 ML: 5 INJECTION INTRAVENOUS at 22:03

## 2022-02-24 RX ADMIN — SODIUM CHLORIDE, POTASSIUM CHLORIDE, SODIUM LACTATE AND CALCIUM CHLORIDE 150 ML/HR: 600; 310; 30; 20 INJECTION, SOLUTION INTRAVENOUS at 16:04

## 2022-02-24 RX ADMIN — SODIUM CHLORIDE, POTASSIUM CHLORIDE, SODIUM LACTATE AND CALCIUM CHLORIDE 150 ML/HR: 600; 310; 30; 20 INJECTION, SOLUTION INTRAVENOUS at 09:33

## 2022-02-24 RX ADMIN — SODIUM CHLORIDE, POTASSIUM CHLORIDE, SODIUM LACTATE AND CALCIUM CHLORIDE 150 ML/HR: 600; 310; 30; 20 INJECTION, SOLUTION INTRAVENOUS at 02:15

## 2022-02-24 RX ADMIN — SODIUM CHLORIDE, POTASSIUM CHLORIDE, SODIUM LACTATE AND CALCIUM CHLORIDE 150 ML/HR: 600; 310; 30; 20 INJECTION, SOLUTION INTRAVENOUS at 20:03

## 2022-02-24 RX ADMIN — SODIUM CHLORIDE, POTASSIUM CHLORIDE, SODIUM LACTATE AND CALCIUM CHLORIDE 500 ML: 600; 310; 30; 20 INJECTION, SOLUTION INTRAVENOUS at 17:26

## 2022-02-24 RX ADMIN — KETOROLAC TROMETHAMINE 15 MG: 30 INJECTION, SOLUTION INTRAMUSCULAR; INTRAVENOUS at 01:02

## 2022-02-24 RX ADMIN — LEVOTHYROXINE SODIUM 125 MCG: 0.12 TABLET ORAL at 06:39

## 2022-02-24 RX ADMIN — FENTANYL CITRATE 8 ML/HR: 50 INJECTION INTRAMUSCULAR; INTRAVENOUS at 20:06

## 2022-02-24 NOTE — PROGRESS NOTES
Bedside and Verbal shift change report given to Vidhya Castelan RN (oncoming nurse) by Varsha Hanna RN (offgoing nurse). Report included the following information SBAR, Kardex, Intake/Output, MAR, Recent Results and Cardiac Rhythm NSR.

## 2022-02-24 NOTE — PROGRESS NOTES
Epidural Follow-up Note    1 Day Post-Op sp Procedure(s):  PYLORUS PRESERVING WHIPPLE PROCEDURE AND PORTAL LYPHMADENECTOMY. Visit Vitals  BP (!) 90/52 (BP 1 Location: Left upper arm, BP Patient Position: At rest;Lying) Comment: second check    Pulse (!) 110   Temp 37 °C (98.6 °F)   Resp 19   Ht 5' 4\" (1.626 m)   Wt 103.9 kg (228 lb 15.9 oz)   SpO2 99%   BMI 39.31 kg/m²   . Pain is moderately controlled with epidural infusion at 6 ml/hr. Epidural site is clean, dry and intact. No LE weakness or numbness. Pt appears to be volume down with some slight hypotension and mild tachycardia. Plan: Increase epidural infusion to 8 ml/hr and encouraged PCEA use if BP will allow. 500 ml bolus vs 250 albumin if ok with surgeons.

## 2022-02-24 NOTE — DIABETES MGMT
[address] [facility]    CLINICAL NURSE SPECIALIST CONSULT     Initial Presentation   Megan Sotelo is a 77 y.o. female admitted 2/23/22 after surgical management of pancreatic adenocarcinoma. HX:   Past Medical History:   Diagnosis Date    Arthritis     ostero arthritis, rhemathoid  lower back     Autoimmune disease ([address]     Ulcerative Colitis.  Hypertension     CONTROLLED WITH MEDS    Hypothyroid 3/23/2011    Malignant neoplasm of head of pancreas ([address] 9/28/2021    Ulcerative colitis ([address] 9/16/2010    Ulcerative colitis ([address] 9/16/2010    Ulcerative colitis ([address]         INITIAL DX:   Pancreatic adenocarcinoma ([address] [C25.9]     Current Treatment     TX: Pyloris preserving whipple with portal lymphadenectomy      Consulted by Garvin Merlin, MD for advanced diabetes nursing assessment and care for:   [x] Inpatient management strategy    Hospital Course   Clinical progress has been uncomplicated. Diabetes History   No hx prediabetes/diabetes  A1C 5.0%  PCP: Palak Bourne MD    Diabetes Medication History  Key Antihyperglycemic Medications     Patient is on no antihyperglycemic meds. Subjective   I have never had diabetes but my  does so I eat healthy with him.        Lives with her - they have adult children and grandchildren  Underwent 6 rounds chemo- reported that this flaired her ulcerative colitis- lost about 60 lbs s/t food adjustments with chemo/UC. Pancreatic cancer s/p neoadjuvant chemotherapy. Post chemo imaging showed response to treatment without evidence of metastatic disease   Objective   Physical exam  General Overweight AA female in acute pain/ill-appearing.  Conversant and cooperative  Neuro  Alert, oriented   Vital Signs   Visit Vitals  /62 (BP 1 Location: Left upper arm, BP Patient Position: At rest;Lying left side)   Pulse (!) 112   Temp 98.2 °F (36.8 °C)   Resp 22   Ht 5' 4\" (1.626 m)   Wt 103.9 kg (228 lb 15.9 oz) SpO2 97%   BMI 39.31 kg/m²     Skin  Warm and dry. Abdominal surgical incisions  Heart   Regular rate and rhythm. No murmurs, rubs or gallops  Lungs  Clear to auscultation without rales or rhonchi  Extremities No foot wounds      Laboratory  Recent Labs     02/24/22  0517 02/23/22  1523   * 155*   AGAP 3* 5   WBC 12.5* 11.4*   CREA 1.01 0.92   GFRNA 55* >60   * 88*   ALT 58 58       Factors impacting BG management  Factor Dose Comments   Nutrition:  Standard meals     Sips of clears    Drugs:  Steroids     Dexamethasone 4mg  x1 on IR    Pain Epidural     Whipple  Decreased insulin secretion      Blood glucose pattern      Significant diabetes-related events over the past 24-72 hours  24h BG pattern: 106-116  Sips of clears  LR IVF    Assessment and Plan   Nursing Diagnosis Risk for unstable blood glucose pattern   Nursing Intervention Domain 5256 Decision-making Support   Nursing Interventions Examined current inpatient diabetes/blood glucose control   Explored factors facilitating and impeding inpatient management  Explored corrective strategies with patient and responsible inpatient provider   Informed patient of rational for insulin strategy while hospitalized     Evaluation   Sudhir Owens is a 77year old female, with a history of pre-diabetes, who was admitted with pancreatic adenocarcinoma now s/p pyloris sparing whipple. A1C is currently 5.0% indicating a normal PTA glucose pattern. Initially, pancreatic manipulation and inflammation leads to decreased insulin production/mild hyperglycemia. This should be reasonably controlled with correctional humalog. Therefore, following pre-prandial blood sugars will help to determine antihyperglycemic agent adjustments required to control glucose once nutrition advanced. The best inpatient antihyperglycemic agents to use if glucose sustained over goal (140-180) will be basal/bolus insulin.       A common consequence after pancreatic resection is new or worsened diabetes s/t removal of insulin producing b-cells. Given that she didn't have a history of diabetes and was able to retain a good portion of her pancreas, insulin may likely not be required long-term. Recommendations   1. POC glucose Q6, change to ACHS when oral intake advanced    2. Diet advancement per primary team, consider nutrition referral     3. Continue non-dextrose IVF    4. Continue Correctional Humalog at Normal Sensitivity Q6    5. A1C add-on     6. If BG sustained over 180mg/dl, start low dose lantus at 0.15units/kg/day     Billing Code(s)   [x] 22222   Before making these care recommendations, I personally reviewed the hospitalization record, including notes, laboratory & diagnostic data and current medications, and examined the patient at the bedside (circumstances permitting) before making care recommendations. More than fifty (50) percent of the time was spent in patient counseling and/or care coordination.   Total minutes: 28    SHAY Mendoza  Diabetes Clinical Nurse Specialist  [facility]  Access via [employer]

## 2022-02-24 NOTE — PROGRESS NOTES
LONA: Anticipate discharge home with [facility] pending medical progress. Transportation likely in car with family. Reason for Admission:  Pancreatic carcinoma--whipple procedure                    RUR Score: 19%                 PCP: First and Last name:   Delia Xiong MD     Name of Practice:    Are you a current patient: Yes/No: yes   Approximate date of last visit: September 2021   Can you participate in a virtual visit if needed:     Do you (patient/family) have any concerns for transition/discharge? None mentioned              Plan for utilizing home health: no preference      Current Advanced Directive/Advance Care Plan:  Prior      Healthcare Decision Maker:   Click here to complete [address] including selection of the Healthcare Decision Maker Relationship (ie \"Primary\")           Transition of Care Plan:  CM met with patient at bedside. Patient is alert and oriented x4. Demographics confirmed. Patient and her  reside in a two story home with 3 steps to enter and 7 steps to the second floor. No DME. Patient is independent in ADLs and ambulation. Hx: arthritis, ulcerative colitis, HTN. Patient does drive. PCP confirmed and pharmacy used is [facility]. No barriers to discharge noted. Transition of Care Plan:     The Plan for Transition of Care is related to the following treatment goals: [facility]    The Patient was provided with a choice of provider and agrees  with the discharge plan. Yes [x] No []    A Freedom of choice list was provided with basic dialogue that supports the patient's individualized plan of care/goals and shares the quality data associated with the providers. Yes [x] No []         Care Management Interventions  PCP Verified by CM: Yes (Dr. Lion Nava)  Mode of Transport at Discharge:  Other (see comment) (in car with family)  MyChart Signup: No  Discharge Durable Medical Equipment: No  Physical Therapy Consult: Yes  Occupational Therapy Consult: No  Speech Therapy Consult: No  Support Systems: Spouse/Significant Other,Other Family Member(s)  Confirm Follow Up Transport: Family  The Plan for Transition of Care is Related to the Following Treatment Goals : home with home health  The Patient and/or Patient Representative was Provided with a Choice of Provider and Agrees with the Discharge Plan?: Yes  Freedom of Choice List was Provided with Basic Dialogue that Supports the Patient's Individualized Plan of Care/Goals, Treatment Preferences and Shares the Quality Data Associated with the Providers?: Yes  Discharge Location  Patient Expects to be Discharged to[de-identified] Home with home health     Nora Amador, [facility],[address]  [phone]

## 2022-02-24 NOTE — PROGRESS NOTES
Patient on continuous monitoring      02/24/22 0812   Vitals   Temp 98.2 °F (36.8 °C)   Temp Source Oral   Pulse (Heart Rate) (!) 112   Heart Rate Source Monitor   Resp Rate 22   O2 Sat (%) 97 %   Level of Consciousness Alert (0)   BP 99/65   MAP (Calculated) 76   BP 1 Location Left upper arm   BP 1 Method Automatic   BP Patient Position At rest;Lying left side   Cardiac Rhythm Sinus Tachy   MEWS Score 5

## 2022-02-24 NOTE — PROGRESS NOTES
Problem: Mobility Impaired (Adult and Pediatric)  Goal: *Acute Goals and Plan of Care (Insert Text)  Description: FUNCTIONAL STATUS PRIOR TO ADMISSION: Patient was independent and active without use of DME. However, patient does have access to a cane and walker. HOME SUPPORT PRIOR TO ADMISSION: The patient lived with her , but did not require assistance. Of note, patient's  also has cancer. Physical Therapy Goals  Initiated 2/24/2022  1. Patient will move from supine to sit and sit to supine, scoot up and down, and roll side to side in bed with modified independence within 7 day(s). 2.  Patient will transfer from bed to chair and chair to bed with modified independence using the least restrictive device within 7 day(s). 3.  Patient will perform sit to stand with modified independence within 7 day(s). 4.  Patient will ambulate with modified independence for 150 feet with the least restrictive device within 7 day(s). 5.  Patient will ascend/descend 12 stairs with handrail(s) with supervision/set-up within 7 day(s). 2/24/2022 1250 by Jasvir Camarena  Outcome: Not Met     PHYSICAL THERAPY EVALUATION  Patient: Deven New (5 y.o. female)  Date: 2/24/2022  Primary Diagnosis: Pancreatic adenocarcinoma ([facility] [address] [C25.9]  Procedure(s) (LRB):  PYLORUS PRESERVING WHIPPLE PROCEDURE AND PORTAL LYPHMADENECTOMY (N/A) 1 Day Post-Op   Precautions: Fall       ASSESSMENT  Based on the objective data described below, the patient presents with post-op abdominal pain, soft blood pressure (107Y - 1teens systolic), and generalized weakness following WHIPPLE procedure. Patient required some verbal coaxing and mobility education to participate in session. Patient required additional time to complete all functional tasks, however, only contact guard to minimal physical assistance provided. Patient responds positively to step-by-step instruction.  Anticipate continued functional progress once pain management achieved. Patient is prepared for gait training tomorrow. Current Level of Function Impacting Discharge (mobility/balance): CGA bed mobility, Min A sit - stand transfers, Min A x 1 ft ambulation to bedside chair    Functional Outcome Measure: The patient scored 35/100 on the Barthel Index outcome measure which is indicative of a 65% impaired-ability to independently perform ADLs and functional tasks. Other factors to consider for discharge:  at home with cancer, Has access to a single point cane, walker, and shower chair     Patient will benefit from skilled therapy intervention to address the above noted impairments. PLAN :  Recommendations and Planned Interventions: bed mobility training, transfer training, gait training, therapeutic exercises, edema management/control, patient and family training/education, and therapeutic activities      Frequency/Duration: Patient will be followed by physical therapy:  5 times a week to address goals. Recommendation for discharge: (in order for the patient to meet his/her long term goals)  Physical therapy at least 2 days/week in the home AND ensure assist and/or supervision for safety with community reintegration     This discharge recommendation:  Has been made in collaboration with the attending provider and/or case management    IF patient discharges home will need the following DME: patient owns DME required for discharge         SUBJECTIVE:   Patient stated I didn't want to get up last night when they asked me.     OBJECTIVE DATA SUMMARY:   HISTORY:    Past Medical History:   Diagnosis Date    Arthritis     ostero arthritis, rhemathoid  lower back     Autoimmune disease ([address]     Ulcerative Colitis.      Hypertension     CONTROLLED WITH MEDS    Hypothyroid 3/23/2011    Malignant neoplasm of head of pancreas ([address] 9/28/2021    Ulcerative colitis ([address] 9/16/2010    Ulcerative colitis ([facility] [address] 9/16/2010    Ulcerative colitis [facility])      Past Surgical History:   Procedure Laterality Date    COLONOSCOPY N/A 2017    COLONOSCOPY performed by Manfred Lambert MD at [facility] ENDOSCOPY    ENDOSCOPY, COLON, DIAGNOSTIC      HX  SECTION      HX UROLOGICAL      URETHERAL STENT. HX VASCULAR ACCESS      PLACEMENT OF PORT-A-CATH RIGHT SIDE    NY ABDOMEN SURGERY PROC UNLISTED      ENDOSCOPIC, PLACEMENT OF BILIARY STENT    NY BREAST SURGERY PROCEDURE UNLISTED      cyst removed from left breast       Personal factors and/or comorbidities impacting plan of care: PMH    Home Situation  Home Environment: Private residence  # Steps to Enter: 1  Rails to Enter: No  One/Two Story Residence: Two story  # of Interior Steps: 15  Interior Rails: Both  Living Alone: No  Support Systems: Spouse/Significant Other,Child(tommie),Other Family Member(s) ()  Patient Expects to be Discharged to[de-identified] Home with home health  Current DME Used/Available at Home: [address] [facility] [place], straight,Walker,Shower chair  Tub or Shower Type: Tub/Shower combination    EXAMINATION/PRESENTATION/DECISION MAKING:   Critical Behavior:  Neurologic State: Appropriate for age    Range Of Motion:  AROM: Within functional limits           PROM: Within functional limits           Strength:    Strength: Within functional limits                    Tone & Sensation:   Tone: Normal              Sensation: Intact               Coordination:  Coordination: Within functional limits    Functional Mobility:  Bed Mobility:  Rolling: Contact guard assistance; Additional time  Supine to Sit: Contact guard assistance; Additional time     Scooting: Contact guard assistance; Additional time  Transfers:  Sit to Stand: Minimum assistance; Additional time  Stand to Sit: Minimum assistance; Additional time        Bed to Chair: Minimum assistance; Additional time              Balance:   Sitting: Intact; Without support  Standing: Impaired; Without support  Standing - Static: Good  Standing - Dynamic : Fair;Occasional    Functional Measure:  Barthel Index:    Bathin  Bladder: 0  Bowels: 5  Groomin  Dressin  Feedin  Mobility: 0  Stairs: 0  Toilet Use: 5  Transfer (Bed to Chair and Back): 10  Total: 35/100       The Barthel ADL Index: Guidelines  1. The index should be used as a record of what a patient does, not as a record of what a patient could do. 2. The main aim is to establish degree of independence from any help, physical or verbal, however minor and for whatever reason. 3. The need for supervision renders the patient not independent. 4. A patient's performance should be established using the best available evidence. Asking the patient, friends/relatives and nurses are the usual sources, but direct observation and common sense are also important. However direct testing is not needed. 5. Usually the patient's performance over the preceding 24-48 hours is important, but occasionally longer periods will be relevant. 6. Middle categories imply that the patient supplies over 50 per cent of the effort. 7. Use of aids to be independent is allowed. Score Interpretation (from [address] [address])    Independent   60-79 Minimally independent   40-59 Partially dependent   20-39 Very dependent   <20 Totally dependent     -Alessandra Wheeler., Barthel, D.W. (1965). Functional evaluation: the Barthel Index. [facility] ([address] (1997). The Barthel activities of daily living index: self-reporting versus actual performance in the old (> or = 75 years). Journal [address] 45(7), [facility], J.J.M.F, Afshan Manning., Matt Sandoval. (1999). Measuring the change in disability after inpatient rehabilitation; comparison of the responsiveness of the Barthel Index and Functional Williamson Measure. Journal of Neurology, Neurosurgery, and Psychiatry, 66(4), 704-467.   -MILLY Alfonso.DEYANIRA, OSCAR Yanes, & Hardy Calderon MDeeptiA. (2004) Assessment of post-stroke quality of life in cost-effectiveness studies: The usefulness of the Barthel Index and the EuroQoL-5D. Quality of Life Research, 13, 148-16      Based on the above components, the patient evaluation is determined to be of the following complexity level: LOW     Pain Rating:  10/10 abdominal pain - Utilized PCA x 2 throughout session    Activity Tolerance:   Good; Soft BPs, though stable in supine, sitting, and standing     After treatment patient left in no apparent distress:   Sitting in chair and Call bell within reach    COMMUNICATION/EDUCATION:   The patients plan of care was discussed with: Registered nurse. Fall prevention education was provided and the patient/caregiver indicated understanding., Patient/family have participated as able in goal setting and plan of care. , and Patient/family agree to work toward stated goals and plan of care.     Thank you for this referral.  Ravin Oropeza, PT, DPT   Time Calculation: 26 mins

## 2022-02-24 NOTE — PROGRESS NOTES
0800 Bedside shift change report given to   Elo Coles(oncoming nurse) by Adan Thompson (offgoing nurse).  Report included the following information SBAR, Kardex, ED Summary, OR Summary, Procedure Summary, Intake/Output, MAR, Accordion, Recent Results, Med Rec Status, and Cardiac Rhythm  ST/SR

## 2022-02-24 NOTE — OP NOTES
[address] [facility]  OPERATIVE REPORT    Name:  Ashwini Contreras  MR#:  [id number]  :  1955  ACCOUNT #:  [de-identified]  DATE OF SERVICE:  2022      PREOPERATIVE DIAGNOSIS:  Pancreatic adenocarcinoma. POSTOPERATIVE DIAGNOSIS:  Pancreatic adenocarcinoma. PROCEDURES PERFORMED:  1. Pylorus-preserving Whipple procedure. 2.  Portal lymphadenectomy. SURGEON:  Yanique Shukla. Prem Yi MD    ASSISTANT:  JESSICA Red.  Genevia Spurling assistance was required secondary to the complex nature of this operation. She assisted throughout with the entirety of the operation including retraction, exposure, and with closure. ANESTHESIA:  General.    COMPLICATIONS:  None. SPECIMENS REMOVED:  1.  Gallbladder. 2.  Portal lymphadenectomy. 3.  Pyloric lymph node. 4.  Whipple specimen. 5.  Bile duct stent. Disposition of all specimens to Pathology. IMPLANTS:  None. ESTIMATED BLOOD LOSS:  150 mL. DISPOSITION:  PACU. FINDINGS:  The patient had no evidence of distant metastatic disease. The margins of resection were negative on frozen section analysis. INDICATIONS:  The patient is a 68-year-old female who was previously diagnosed with a pancreatic adenocarcinoma in the head of the pancreas. She required ERCP and stent placement and had an endoscopic ultrasound and biopsy which confirmed the malignancy. She underwent neoadjuvant chemotherapy and completed this with evidence of response on imaging. She again had no evidence of distant metastatic disease and appeared resectable and thus was referred for surgical resection. We discussed a Whipple procedure at length and the patient was in agreement to proceed and informed consent was obtained. DESCRIPTION OF THE OPERATION:  After complete discussion of risks, benefits, and alternatives to surgery, the patient signed informed consent.   She was then brought back to the operating room and placed in supine position on the operating room table with her arms extended. Prior to coming back to the operating room, she did have an epidural catheter placed as well as central line and arterial line placed by the Anesthesia Service. Her abdomen was then prepped and draped in the usual sterile fashion. A Beal catheter had been placed. She was then prepped and draped in the usual sterile fashion and a proper time-out was performed. With this completed, we made an upper midline incision just beneath the xiphoid process. We started with a small incision that was just large enough to pass the hand into the abdomen and we dissected down through the subcutaneous fat to the midline fascia and this was divided with cautery. The peritoneum was encountered and entered sharply. The abdomen was entered safely. We palpated the peritoneal surfaces as well as the omentum, the surfaces of the liver, the falciform ligament, and the area of the pancreatic mass and no evidence of distant metastatic disease was identified. The pancreas felt mobile and no obvious bulky adenopathy was noted. We then extended our incision down to just above the umbilicus in the midline. A LigaSure device was used to take down the falciform ligament and this was divided up over the liver. We then put a Esparza retractor system in place which provided nice exposure to the upper abdomen. We began by entering the lesser sac along the midportion of the stomach and transverse colon and this was done with the LigaSure device. We divided the gastrocolic ligament, preserving the gastroepiploic vessels going out towards the hepatic flexure of the colon. The patient had a fairly redundant cecum which made some of this dissection difficult but we were ultimately able to mobilize the colon down and away exposing the duodenal sweep. We dissected down until we identified the superior mesenteric vein at the inferior border of the pancreas.   We were able to dissect anterior to this plane without any significant difficulty behind the neck of the pancreas. We then performed a complete Kocher maneuver, dissecting from this area lateral to the superior mesenteric vein out towards the second and third portion of the duodenum,  this away from the transverse mesocolon. The Kocher maneuver was performed elevating the duodenum up off the inferior vena cava and this was completed until the lateral edge of the aorta was exposed. With this completed, we then turned our attention towards the gallbladder. The gallbladder was dissected off of the liver bed in a dome down style fashion using combination of cautery and the LigaSure device. The gallbladder was somewhat intrahepatic and a small perforation was created in the gallbladder but green bile was obtained that was non-purulent, this was suctioned clear. The gallbladder was dissected down to the cystic duct. The cystic artery was taken with the LigaSure device. The cystic duct was dissected free of all surrounding tissues and then controlled with 2 large clips on the patient side and 1 on the specimen side. This was divided with scissors and the gallbladder was then passed off to Pathology. We then turned our attention towards dissecting out the sarah hepatis. We divided the peritoneum overlying the sarah hepatis adjacent to the edge of the duodenum and cleared the peritoneal tissue and lymphatic tissue anterior to the bile duct and common and proper hepatic artery. These structures were skeletonized anteriorly and this tissue was passed off as a portion of the portal lymphadenectomy specimen. We controlled the common hepatic artery with a vessel loop and dissected around the gastroduodenal artery. We compressed the gastroduodenal artery to confirm presence of flow in the proper hepatic artery  and there was a bifurcation to right and left hepatic artery which were clearly identified and preserved.   We then dissected around the common bile duct and controlled this with a vessel loop as well. This nicely helped us to expose the portal vein as it coursed behind the neck of the pancreas. We dissected anterior to this as well and there was a small branch coming off the portal vein which we were able to control and ligate with a 3-0 silk tie as well as metal clip. Once this was divided, the space between the portal vein and the neck of the pancreas was carefully dissected free and this as well was controlled with a vessel loop. We then divided the gastroduodenal artery. This was done by controlling the stump of this with a 3-0 Prolene suture ligature as well as a metal clip. The specimen side was controlled with a 3-0 silk tie and metal clip. We then prepared to divide the duodenum. There was what felt like a somewhat firm and enlarged lymph node adjacent to the first portion of the duodenum and I did remove this separately and sent this for Pathology with the thought that if this showed malignancy, I would perform a traditional Whipple as opposed to the pylorus preserving approach. Pathology from this did not show any evidence of malignancy and so we divided the duodenum with a 60-mm tan Endo ELIANE staple load approximately 1 cm distal to the pylorus. The stomach was then reflected up into the left upper quadrant. We then elevated the transverse colon and identified the jejunum as it passed through the ligament of Treitz. Approximately 15 cm distal to this, the jejunum was divided using a 60-mm tan Endo ELIANE staple load and the mesentery was divided using a LigaSure down towards the ligament of Treitz. The ligament of Treitz was completely dissected free, taking care to preserve the inferior mesenteric vein. Once this was completely freed, the duodenum and jejunum were rolled through the retroperitoneum and brought out through the right upper quadrant.   There were some lymph nodes along the posterior aspect of the portal vein that were also dissected free and sent with the portal lymphadenectomy specimen as well as a large lymph node overlying the common hepatic artery that was dissected free and sent with that specimen as well. Once this was completed, we divided the bile duct just proximal to the takeoff of the cystic duct and the bile duct stent was extracted and removed. This was removed intact and sent for Pathology. The common hepatic duct was controlled with a bulldog clamp and then we closed the common bile duct with a metal clip to temporize spillage for the specimen to be extracted. At this point, our only remaining attachments were the neck of the pancreas as well as the uncinate process around the SMA and SMV. The neck of the pancreas was divided using cautery and we carefully dissected the head and uncinate process of the pancreas away from the underlying portal vein SMV confluence and the superior mesenteric artery. Both these vessels were skeletonized leaving the pancreatic tissue behind. Small vessels and branches were controlled with metal clips and divided with the LigaSure device. Once this was completed, the specimen was freed. I marked the specimen for orientation and this was sent to Pathology and the margins were assessed and these were found to be negative on frozen section. We then prepared for the reconstruction. Good hemostasis had been maintained. We copiously irrigated the abdomen at this point. A small defect was made in the transverse colon mesentery just to the right of the middle colic vessels and the divided jejunum was brought up through this defect and oriented in a C-loop conformation. The pancreatic anastomosis was then prepared. This was done using 2-0 Prolene double-armed sutures to create transpancreatic sutures that would imbricate the jejunum into the anastomosis in a Blumgart style anastomosis.   Once these were placed, we then made a small  enterotomy in the antimesenteric border of the jejunum and this was secured with serosa to mucosa using four 5-0 PDS sutures in each quadrant. We then did a duct-to-mucosa anastomosis using 4-0 PDS sutures x6, which nicely brought the anastomosis together. The duct was probed prior to throwing the last 2 stitches to ensure that this was open. The pancreatic parenchyma was firm and the duct was approximately 3 mm in size. Once the duct-to-mucosa anastomosis was completed, the remaining 2-0 Prolene sutures were used to imbricate the anterior aspect of the jejunum which dunked the anastomosis into the wall of the jejunum. Again, this was done in a Blumgart style anastomotic technique as an end-to-side duct-to-mucosa anastomosis. We then approximately 4-5 cm distal to this on the jejunum again made a small enterotomy in the antimesenteric border and secured the serosa to the mucosa using 5-0 PDS sutures. We then created an end-to-side duct-to-mucosa hepaticojejunostomy using interrupted 4-0 PDS sutures circumferentially. This anastomosis came together nicely and without any significant tension. Both anastomoses were irrigated and inspected for leak and none was identified. The tissues appeared healthy. As the jejunum came through the transverse colon mesentery, we tacked this to the mesentery using 4-0 PDS sutures to prevent a site for internal herniation. Then approximately 25 cm distal to this, we created an end-to-side 2-layer hand-sewn duodenojejunostomy. This was done with a posterior row of 3-0 silk sutures in a Lembert style fashion. The staple line from the duodenal division had been removed with cautery and an enterotomy made on the antimesenteric border of the jejunum. We then used a 4-0 PDS suture in running fashion to create the full-thickness anastomosis and this was run on the posterior aspect and then a [place] type suture was used for the anterior row. This came together very nicely.   We then reinforced this anteriorly with interrupted 3-0 silk Lembert style sutures. The anastomosis was palpated and noted to be widely patent. There was no tension on the anastomosis. The abdomen was then re-inspected, irrigated, and no evidence of leakage or other complication was noted. No bleeding was identified. All sponges were removed and the abdominal contents were returned to the normal configuration aside from the anastomoses. We then placed 2 drains. A 19-Pu[place]ican round Vera Poke drain was placed exiting the lateral aspect of the right abdomen and placed in Morison's pouch and sitting posterior to the hepaticojejunostomy. Just medial to this, another 19-[place] round Jean Carlos drain was passed through the abdomen and placed adjacent to the pancreaticojejunostomy. These were secured to the skin using 2-0 nylon sutures. We then prepared for closure. The retractor system was removed. Needle and instrument count was performed which was correct. All soft goods were also counted and these were correct. We then closed the midline fascia using double-stranded looped #1 PDS suture in running fashion from above and below. The incision was then copiously irrigated. We used a 2-0 Vicryl suture to reapproximate the Rafia's fascia in a running fashion. Interrupted 3-0 Vicryl sutures were then used to reapproximate the deep dermis and subcutaneous tissue followed by 4-0 Monocryl subcuticular stitch at the skin. Dermabond skin adhesive was placed over the incision. The patient was then awakened, extubated, and taken to the postanesthesia care unit in stable condition. All needle and instrument counts were correct at the completion of the case. I was present and scrubbed throughout the entirety of the case. There were no immediate complications.         MD ANA LUISA [email or web address]  D:  02/23/2022 15:40  T:  02/24/2022 1:50  JOB #:  [id number]

## 2022-02-24 NOTE — CONSULTS
Comprehensive Nutrition Assessment    Type and Reason for Visit: Initial,Consult    Nutrition Recommendations/Plan:      Whipple diet education initiated with pt and  - written literature provided with RD contact information    Diet advancement per surgical team.  Pt follows a Kosher diet and avoids milk products. Recommend adding \"Kosher\" and \"lactose-controlled\" modifiers to diet order when advanced (under additional restrictions). Will continue to follow closely    Nutrition Assessment:     RD consulted for Whipple diet education. Given nutrition risk/ implications, will complete full assessment. PMHx includes arthritis, UC, HTN, hypothyroidism, and pancreatic cancer s/p neoadjuvant chemotherapy. Subsequent imaging shows response to treatment without evidence of distant metastatic disease. Presented to [facility] for planned surgical intervention. Now POD#1 s/p pylorus-preserving Whipple procedure and portal lymphadenopathy. Met with pt and  at bedside. Pt states she has no appetite or desire to even sip on clear liquids. Reports chemo caused UC flare and subsequently lost ~60 lb, wt hx supports 52 lb wt loss in past 5 months (18.6% BW) which is significant. Total, 60 lb wt loss in past year (20% BW). Pt states she follows a Kosher diet and does not consume beef or pork, but does eat poultry and fish. Will eat eggs, but not a big fan of them. Does not tolerate milk products/ aggravates her colitis. Was not able to take typical ONS, Muscle Milk, or even Premier Protein as recommended by OP dietitian. Pt states Dr. Gael Davis told her he would let her know about need for enzymes. Diabetes RN following, input appreciated. Initially, pancreatic manipulation and inflammation leads to decreased insulin production/mild hyperglycemia. This should be reasonably controlled with correctional humalog.   Therefore, following pre-prandial blood sugars will help to determine antihyperglycemic agent adjustments required to control glucose once nutrition advanced. The best inpatient antihyperglycemic agents to use if glucose sustained over goal (140-180) will be basal/bolus insulin.     A common consequence after pancreatic resection is new or worsened diabetes s/t removal of insulin producing b-cells. Given that she didn't have a history of diabetes and was able to retain a good portion of her pancreas, insulin may likely not be required long-term. Malnutrition Assessment:  Malnutrition Status:  Severe malnutrition    Context:  Chronic illness     Findings of the 6 clinical characteristics of malnutrition:   Energy Intake:  7 - 75% or less est energy requirements for 1 month or longer  Weight Loss:  7.00 - Greater than 10% over 6 months     Body Fat Loss:  Unable to assess,     Muscle Mass Loss:  Unable to assess,    Fluid Accumulation:  No significant fluid accumulation,     Strength:  Not performed           Nutritionally Significant Medications:   Fentanyl epidural, SSI, LR @ 150 mL/hr, Synthroid,  PRN: toradol    Estimated Daily Nutrient Needs:   Energy (kcal): 9441-8209 (MSJ x 1-1.2 x 1.3)  Wt used: Current (103.8 kg)  Protein (gm): 100-135 (1.5-2 gm/kg adj BW)  Wt used:  Adjusted (~67 kg)   Fluid (mL/day): 1 mL/kcal       Nutrition Related Findings:   Edema:  No data recorded    Last BM: PTA      Wounds:    Surgical incision       Current Nutrition Therapies:  Diet: NPO with sips of clears      Anthropometric Measures:  · Height:  5' 4\" (162.6 cm)  · Current Body Wt:  103.9 kg (228 lb 15.9 oz) (as of 2/15, pre-admit)   · Usual Body Wt:  129.3 kg (285 lb)     · Ideal Body Wt:  120 lbs:  190.8 %   · BMI Category:  Obese class 3 (BMI 40.0 or greater)       Wt Readings from Last 30 Encounters:   02/23/22 103.9 kg (228 lb 15.9 oz)   02/15/22 103.9 kg (228 lb 15.9 oz)   01/24/22 103.1 kg (227 lb 6.4 oz)   01/24/22 103 kg (227 lb)   01/10/22 108.5 kg (239 lb 3.2 oz)   01/10/22 108.4 kg (239 lb) 01/06/22 105.5 kg (232 lb 9.6 oz)   12/27/21 113.2 kg (249 lb 8 oz)   12/27/21 112.9 kg (249 lb)   12/20/21 106.1 kg (234 lb)   12/20/21 108.6 kg (239 lb 8 oz)   12/20/21 108.6 kg (239 lb 8 oz)   12/06/21 112.4 kg (247 lb 14.4 oz)   12/06/21 112.4 kg (247 lb 14.4 oz)   11/29/21 112 kg (247 lb)   11/22/21 116.1 kg (256 lb)   11/22/21 116.6 kg (257 lb)   11/08/21 120 kg (264 lb 9.6 oz)   11/08/21 120.7 kg (266 lb)   11/01/21 116.2 kg (256 lb 3.2 oz)   11/01/21 116.2 kg (256 lb 3.2 oz)   10/29/21 116.3 kg (256 lb 6.4 oz)   10/18/21 119.7 kg (264 lb)   10/18/21 119.7 kg (264 lb)   10/15/21 119.5 kg (263 lb 7.2 oz)   10/08/21 121.7 kg (268 lb 3.2 oz)   10/05/21 121.1 kg (267 lb)   10/04/21 122.7 kg (270 lb 9.6 oz)   09/27/21 126.6 kg (279 lb 1.6 oz)   03/04/21 129.6 kg (285 lb 12.8 oz)     Nutrition Diagnosis:   · Inadequate oral intake related to altered GI function as evidenced by NPO or clear liquid status due to medical condition    · Increased nutrient needs related to increased demand for energy/nutrients as evidenced by GI abnormality (s/p Whipple)      Nutrition Interventions:   Food and/or Nutrient Delivery: Continue NPO  Nutrition Education and Counseling: Education initiated  Coordination of Nutrition Care: Continue to monitor while inpatient    Goals:  advancement/ tolerance of CLD within 24-48 hours; tolerance/advancement to fulls or beyond within 3-5 days       Nutrition Monitoring and Evaluation:   Behavioral-Environmental Outcomes: None identified  Food/Nutrient Intake Outcomes: Diet advancement/tolerance  Physical Signs/Symptoms Outcomes: Biochemical data,GI status,Nutrition focused physical findings,Meal time behavior,Weight    Discharge Planning:     ([place] diet)     Recent Labs     02/24/22  0517 02/23/22  1523   * 155*   BUN 12 8   CREA 1.01 0.92    140   K 3.9 3.8   * 112*   CO2 25 23   CA 8.5 8.6   PHOS 2.7 3.6   MG 2.5* 1.8         Recent Labs     02/24/22  1104 02/24/22  0621 02/23/22  2130 02/23/22  0715   GLUCPOC 85 108 116 106       Lab Results   Component Value Date/Time    Hemoglobin A1c 5.0 02/24/2022 05:17 AM         Giselle Car RD  Available via [employer]

## 2022-02-24 NOTE — PROGRESS NOTES
General Surgery Daily Progress Note    Admit Date: 2022  Post-Operative Day: 1 Day Post-Op from Procedure(s):  PYLORUS PRESERVING WHIPPLE PROCEDURE AND PORTAL LYPHMADENECTOMY     Subjective:     Last 24 hrs: C/o pain overnight. Epidural adjusted this AM by anesthesia. Reports chronic back pain bothering her as well. Denies NV  Has not been out of bed       Objective:     Blood pressure 101/62, pulse (!) 112, temperature 98.2 °F (36.8 °C), resp. rate 22, height 5' 4\" (1.626 m), weight 228 lb 15.9 oz (103.9 kg), SpO2 97 %. Temp (24hrs), Av.3 °F (36.8 °C), Min:97.9 °F (36.6 °C), Max:98.6 °F (37 °C)      _____________________  Physical Exam:     Alert and Oriented, pleasant, in no acute distress. Cardiovascular: RRR, mild tachy, hypotensive  Lungs:CTAB on RA  Abdomen: soft, round, Appro TTP  Incisions c/d/i  2 RLQ Drains w 120ml (lat) & 60ml (medial)/24 hrs pink SS fluid output. Assessment:   Principal Problem:    Pancreatic adenocarcinoma ([address] (2022)            Plan:     May try sips & ice chips  IVF  DVT proph  Daily PT  Daily labs  Keep philip today  Continue epidural      Data Review:    Recent Labs     22  0517 22  1523   WBC 12.5* 11.4*   HGB 9.3* 9.7*   HCT 29.4* 29.8*    214     Recent Labs     22  0517 22  1523    140   K 3.9 3.8   * 112*   CO2 25 23   * 155*   BUN 12 8   CREA 1.01 0.92   CA 8.5 8.6   MG 2.5* 1.8   PHOS 2.7 3.6   ALB 2.7* 2.9*   ALT 58 58     No results for input(s): AML, LPSE in the last 72 hours.         ______________________  Medications:    Current Facility-Administered Medications   Medication Dose Route Frequency    fentaNYL 4 mcg/ml-bupivacaine (PF) 0.0625% epidural  1-18 mL/hr Epidural TITRATE    diphenhydrAMINE (BENADRYL) injection 25 mg  25 mg IntraVENous Q4H PRN    ketorolac (TORADOL) injection 15 mg  15 mg IntraVENous Q6H    hydroCHLOROthiazide (HYDRODIURIL) tablet 25 mg  25 mg Oral DAILY    levothyroxine (SYNTHROID) tablet 125 mcg  125 mcg Oral ACB    sodium chloride (NS) flush 5-40 mL  5-40 mL IntraVENous Q8H    sodium chloride (NS) flush 5-40 mL  5-40 mL IntraVENous PRN    lactated Ringers infusion  150 mL/hr IntraVENous CONTINUOUS    acetaminophen (TYLENOL) tablet 650 mg  650 mg Oral Q6H PRN    naloxone (NARCAN) injection 0.4 mg  0.4 mg IntraVENous PRN    ondansetron (ZOFRAN) injection 4 mg  4 mg IntraVENous Q4H PRN    diphenhydrAMINE (BENADRYL) injection 12.5 mg  12.5 mg IntraVENous ONCE PRN    heparin (porcine) injection 5,000 Units  5,000 Units SubCUTAneous Q12H    glucose chewable tablet 16 g  4 Tablet Oral PRN    glucagon (GLUCAGEN) injection 1 mg  1 mg IntraMUSCular PRN    dextrose 10 % infusion 0-250 mL  0-250 mL IntraVENous PRN    insulin lispro (HUMALOG) injection   SubCUTAneous AC&HS       Gladis Hairston  2/24/2022

## 2022-02-25 LAB
ALBUMIN SERPL-MCNC: 2.4 G/DL (ref 3.5–5)
ALBUMIN SERPL-MCNC: 2.4 G/DL (ref 3.5–5)
ALBUMIN/GLOB SERPL: 0.6 {RATIO} (ref 1.1–2.2)
ALBUMIN/GLOB SERPL: 0.7 {RATIO} (ref 1.1–2.2)
ALP SERPL-CCNC: 106 U/L (ref 45–117)
ALP SERPL-CCNC: 114 U/L (ref 45–117)
ALT SERPL-CCNC: 93 U/L (ref 12–78)
ALT SERPL-CCNC: 99 U/L (ref 12–78)
ANION GAP SERPL CALC-SCNC: 4 MMOL/L (ref 5–15)
ANION GAP SERPL CALC-SCNC: 5 MMOL/L (ref 5–15)
AST SERPL-CCNC: 106 U/L (ref 15–37)
AST SERPL-CCNC: 153 U/L (ref 15–37)
BASOPHILS # BLD: 0 K/UL (ref 0–0.1)
BASOPHILS NFR BLD: 0 % (ref 0–1)
BILIRUB SERPL-MCNC: 1.6 MG/DL (ref 0.2–1)
BILIRUB SERPL-MCNC: 1.6 MG/DL (ref 0.2–1)
BUN SERPL-MCNC: 16 MG/DL (ref 6–20)
BUN SERPL-MCNC: 17 MG/DL (ref 6–20)
BUN/CREAT SERPL: 13 (ref 12–20)
BUN/CREAT SERPL: 15 (ref 12–20)
CALCIUM SERPL-MCNC: 8.1 MG/DL (ref 8.5–10.1)
CALCIUM SERPL-MCNC: 8.4 MG/DL (ref 8.5–10.1)
CHLORIDE SERPL-SCNC: 109 MMOL/L (ref 97–108)
CHLORIDE SERPL-SCNC: 110 MMOL/L (ref 97–108)
CO2 SERPL-SCNC: 24 MMOL/L (ref 21–32)
CO2 SERPL-SCNC: 26 MMOL/L (ref 21–32)
CREAT SERPL-MCNC: 1.11 MG/DL (ref 0.55–1.02)
CREAT SERPL-MCNC: 1.19 MG/DL (ref 0.55–1.02)
DIFFERENTIAL METHOD BLD: ABNORMAL
EOSINOPHIL # BLD: 0 K/UL (ref 0–0.4)
EOSINOPHIL NFR BLD: 0 % (ref 0–7)
ERYTHROCYTE [DISTWIDTH] IN BLOOD BY AUTOMATED COUNT: 16 % (ref 11.5–14.5)
GLOBULIN SER CALC-MCNC: 3.4 G/DL (ref 2–4)
GLOBULIN SER CALC-MCNC: 3.7 G/DL (ref 2–4)
GLUCOSE BLD STRIP.AUTO-MCNC: 105 MG/DL (ref 65–117)
GLUCOSE BLD STRIP.AUTO-MCNC: 79 MG/DL (ref 65–117)
GLUCOSE BLD STRIP.AUTO-MCNC: 80 MG/DL (ref 65–117)
GLUCOSE BLD STRIP.AUTO-MCNC: 87 MG/DL (ref 65–117)
GLUCOSE BLD STRIP.AUTO-MCNC: 91 MG/DL (ref 65–117)
GLUCOSE SERPL-MCNC: 83 MG/DL (ref 65–100)
GLUCOSE SERPL-MCNC: 94 MG/DL (ref 65–100)
HCT VFR BLD AUTO: 27.5 % (ref 35–47)
HGB BLD-MCNC: 8.6 G/DL (ref 11.5–16)
IMM GRANULOCYTES # BLD AUTO: 0 K/UL (ref 0–0.04)
IMM GRANULOCYTES NFR BLD AUTO: 0 % (ref 0–0.5)
LYMPHOCYTES # BLD: 2.7 K/UL (ref 0.8–3.5)
LYMPHOCYTES NFR BLD: 14 % (ref 12–49)
MAGNESIUM SERPL-MCNC: 2.4 MG/DL (ref 1.6–2.4)
MCH RBC QN AUTO: 34.3 PG (ref 26–34)
MCHC RBC AUTO-ENTMCNC: 31.3 G/DL (ref 30–36.5)
MCV RBC AUTO: 109.6 FL (ref 80–99)
MONOCYTES # BLD: 1.9 K/UL (ref 0–1)
MONOCYTES NFR BLD: 10 % (ref 5–13)
NEUTS SEG # BLD: 14.5 K/UL (ref 1.8–8)
NEUTS SEG NFR BLD: 76 % (ref 32–75)
NRBC # BLD: 0 K/UL (ref 0–0.01)
NRBC BLD-RTO: 0 PER 100 WBC
PHOSPHATE SERPL-MCNC: 2.7 MG/DL (ref 2.6–4.7)
PLATELET # BLD AUTO: 190 K/UL (ref 150–400)
PMV BLD AUTO: 10.8 FL (ref 8.9–12.9)
POTASSIUM SERPL-SCNC: 3.7 MMOL/L (ref 3.5–5.1)
POTASSIUM SERPL-SCNC: 3.8 MMOL/L (ref 3.5–5.1)
PROT SERPL-MCNC: 5.8 G/DL (ref 6.4–8.2)
PROT SERPL-MCNC: 6.1 G/DL (ref 6.4–8.2)
RBC # BLD AUTO: 2.51 M/UL (ref 3.8–5.2)
RBC MORPH BLD: ABNORMAL
RBC MORPH BLD: ABNORMAL
SERVICE CMNT-IMP: NORMAL
SODIUM SERPL-SCNC: 138 MMOL/L (ref 136–145)
SODIUM SERPL-SCNC: 140 MMOL/L (ref 136–145)
WBC # BLD AUTO: 19.1 K/UL (ref 3.6–11)

## 2022-02-25 PROCEDURE — 80053 COMPREHEN METABOLIC PANEL: CPT

## 2022-02-25 PROCEDURE — 74011000250 HC RX REV CODE- 250: Performed by: SURGERY

## 2022-02-25 PROCEDURE — 85025 COMPLETE CBC W/AUTO DIFF WBC: CPT

## 2022-02-25 PROCEDURE — 36415 COLL VENOUS BLD VENIPUNCTURE: CPT

## 2022-02-25 PROCEDURE — 99231 SBSQ HOSP IP/OBS SF/LOW 25: CPT | Performed by: CLINICAL NURSE SPECIALIST

## 2022-02-25 PROCEDURE — 74011000258 HC RX REV CODE- 258: Performed by: SURGERY

## 2022-02-25 PROCEDURE — 84100 ASSAY OF PHOSPHORUS: CPT

## 2022-02-25 PROCEDURE — 74011250637 HC RX REV CODE- 250/637: Performed by: SURGERY

## 2022-02-25 PROCEDURE — 82962 GLUCOSE BLOOD TEST: CPT

## 2022-02-25 PROCEDURE — 99024 POSTOP FOLLOW-UP VISIT: CPT | Performed by: PHYSICIAN ASSISTANT

## 2022-02-25 PROCEDURE — 83735 ASSAY OF MAGNESIUM: CPT

## 2022-02-25 PROCEDURE — 65660000000 HC RM CCU STEPDOWN

## 2022-02-25 PROCEDURE — 97116 GAIT TRAINING THERAPY: CPT

## 2022-02-25 PROCEDURE — 74011250636 HC RX REV CODE- 250/636: Performed by: SURGERY

## 2022-02-25 PROCEDURE — 97530 THERAPEUTIC ACTIVITIES: CPT

## 2022-02-25 RX ADMIN — ACETAMINOPHEN 650 MG: 325 TABLET ORAL at 23:39

## 2022-02-25 RX ADMIN — HEPARIN SODIUM 5000 UNITS: 5000 INJECTION INTRAVENOUS; SUBCUTANEOUS at 20:49

## 2022-02-25 RX ADMIN — PIPERACILLIN AND TAZOBACTAM 3.38 G: 3; .375 INJECTION, POWDER, LYOPHILIZED, FOR SOLUTION INTRAVENOUS at 23:15

## 2022-02-25 RX ADMIN — HEPARIN SODIUM 5000 UNITS: 5000 INJECTION INTRAVENOUS; SUBCUTANEOUS at 09:06

## 2022-02-25 RX ADMIN — FENTANYL CITRATE 8 ML/HR: 50 INJECTION INTRAMUSCULAR; INTRAVENOUS at 08:30

## 2022-02-25 RX ADMIN — SODIUM CHLORIDE, POTASSIUM CHLORIDE, SODIUM LACTATE AND CALCIUM CHLORIDE 150 ML/HR: 600; 310; 30; 20 INJECTION, SOLUTION INTRAVENOUS at 16:19

## 2022-02-25 RX ADMIN — PIPERACILLIN AND TAZOBACTAM 3.38 G: 3; .375 INJECTION, POWDER, LYOPHILIZED, FOR SOLUTION INTRAVENOUS at 16:48

## 2022-02-25 RX ADMIN — LEVOTHYROXINE SODIUM 125 MCG: 0.12 TABLET ORAL at 07:08

## 2022-02-25 RX ADMIN — PIPERACILLIN AND TAZOBACTAM 3.38 G: 3; .375 INJECTION, POWDER, LYOPHILIZED, FOR SOLUTION INTRAVENOUS at 09:06

## 2022-02-25 RX ADMIN — SODIUM CHLORIDE, POTASSIUM CHLORIDE, SODIUM LACTATE AND CALCIUM CHLORIDE 150 ML/HR: 600; 310; 30; 20 INJECTION, SOLUTION INTRAVENOUS at 03:59

## 2022-02-25 RX ADMIN — FENTANYL CITRATE 8 ML/HR: 50 INJECTION INTRAMUSCULAR; INTRAVENOUS at 16:19

## 2022-02-25 RX ADMIN — SODIUM CHLORIDE, PRESERVATIVE FREE 10 ML: 5 INJECTION INTRAVENOUS at 06:00

## 2022-02-25 RX ADMIN — ACETAMINOPHEN 650 MG: 325 TABLET ORAL at 09:06

## 2022-02-25 RX ADMIN — ACETAMINOPHEN 650 MG: 325 TABLET ORAL at 00:17

## 2022-02-25 RX ADMIN — SODIUM CHLORIDE, PRESERVATIVE FREE 10 ML: 5 INJECTION INTRAVENOUS at 21:00

## 2022-02-25 RX ADMIN — SODIUM CHLORIDE, POTASSIUM CHLORIDE, SODIUM LACTATE AND CALCIUM CHLORIDE 150 ML/HR: 600; 310; 30; 20 INJECTION, SOLUTION INTRAVENOUS at 11:22

## 2022-02-25 NOTE — PROGRESS NOTES
Problem: Pressure Injury - Risk of  Goal: *Prevention of pressure injury  Description: Document Arie Scale and appropriate interventions in the flowsheet. Outcome: Progressing Towards Goal  Note: Pressure Injury Interventions: Activity Interventions: Pressure redistribution bed/mattress(bed type)    Mobility Interventions: Assess need for specialty bed,Pressure redistribution bed/mattress (bed type)    Nutrition Interventions: Document food/fluid/supplement intake                     Problem: Patient Education: Go to Patient Education Activity  Goal: Patient/Family Education  Outcome: Progressing Towards Goal     Problem: Falls - Risk of  Goal: *Absence of Falls  Description: Document [place] Fall Risk and appropriate interventions in the flowsheet.   Outcome: Progressing Towards Goal  Note: Fall Risk Interventions:            Medication Interventions: Evaluate medications/consider consulting pharmacy    Elimination Interventions: Call light in reach,Patient to call for help with toileting needs              Problem: Patient Education: Go to Patient Education Activity  Goal: Patient/Family Education  Outcome: Progressing Towards Goal     Problem: Patient Education: Go to Patient Education Activity  Goal: Patient/Family Education  Outcome: Progressing Towards Goal     Problem: Surgical Pathway Day of Surgery  Goal: Off Pathway (Use only if patient is Off Pathway)  Outcome: Progressing Towards Goal  Goal: Activity/Safety  Outcome: Progressing Towards Goal  Goal: Consults, if ordered  Outcome: Progressing Towards Goal  Goal: Nutrition/Diet  Outcome: Progressing Towards Goal  Goal: Medications  Outcome: Progressing Towards Goal  Goal: Respiratory  Outcome: Progressing Towards Goal  Goal: Treatments/Interventions/Procedures  Outcome: Progressing Towards Goal  Goal: Psychosocial  Outcome: Progressing Towards Goal  Goal: *No signs and symptoms of infection or wound complications  Outcome: Progressing Towards Goal  Goal: *Optimal pain control at patient's stated goal  Outcome: Progressing Towards Goal  Goal: *Adequate urinary output (equal to or greater than 30 milliliters/hour)  Description: Ambulatory Surgery patients voiding without difficulty.   Outcome: Progressing Towards Goal  Goal: *Hemodynamically stable  Outcome: Progressing Towards Goal  Goal: *Tolerating diet  Outcome: Progressing Towards Goal  Goal: *Demonstrates progressive activity  Outcome: Progressing Towards Goal     Problem: Surgical Pathway Post-Op Day 1  Goal: Off Pathway (Use only if patient is Off Pathway)  Outcome: Progressing Towards Goal  Goal: Activity/Safety  Outcome: Progressing Towards Goal  Goal: Diagnostic Test/Procedures  Outcome: Progressing Towards Goal  Goal: Nutrition/Diet  Outcome: Progressing Towards Goal  Goal: Discharge Planning  Outcome: Progressing Towards Goal  Goal: Medications  Outcome: Progressing Towards Goal  Goal: Respiratory  Outcome: Progressing Towards Goal  Goal: Treatments/Interventions/Procedures  Outcome: Progressing Towards Goal  Goal: Psychosocial  Outcome: Progressing Towards Goal  Goal: *No signs and symptoms of infection or wound complications  Outcome: Progressing Towards Goal  Goal: *Optimal pain control at patient's stated goal  Outcome: Progressing Towards Goal  Goal: *Adequate urinary output (equal to or greater than 30 milliliters/hour)  Outcome: Progressing Towards Goal  Goal: *Hemodynamically stable  Outcome: Progressing Towards Goal  Goal: *Tolerating diet  Outcome: Progressing Towards Goal  Goal: *Demonstrates progressive activity  Outcome: Progressing Towards Goal  Goal: *Lungs clear or at baseline  Outcome: Progressing Towards Goal     Problem: Surgical Pathway Post-Op Day 2 through Discharge  Goal: Off Pathway (Use only if patient is Off Pathway)  Outcome: Progressing Towards Goal  Goal: Activity/Safety  Outcome: Progressing Towards Goal  Goal: Nutrition/Diet  Outcome: Progressing Towards Goal  Goal: Discharge Planning  Outcome: Progressing Towards Goal  Goal: Medications  Outcome: Progressing Towards Goal  Goal: Respiratory  Outcome: Progressing Towards Goal  Goal: Treatments/Interventions/Procedures  Outcome: Progressing Towards Goal  Goal: Psychosocial  Outcome: Progressing Towards Goal  Goal: *No signs and symptoms of infection or wound complications  Outcome: Progressing Towards Goal  Goal: *Optimal pain control at patient's stated goal  Outcome: Progressing Towards Goal  Goal: *Adequate urinary output (equal to or greater than 30 milliliters/hour)  Outcome: Progressing Towards Goal  Goal: *Hemodynamically stable  Outcome: Progressing Towards Goal  Goal: *Tolerating diet  Outcome: Progressing Towards Goal  Goal: *Demonstrates progressive activity  Outcome: Progressing Towards Goal  Goal: *Lungs clear or at baseline  Outcome: Progressing Towards Goal     Problem: Surgical Pathway: Discharge Outcomes  Goal: *Hemodynamically stable  Outcome: Progressing Towards Goal  Goal: *Lungs clear or at baseline  Outcome: Progressing Towards Goal  Goal: *Demonstrates independent activity or return to baseline  Outcome: Progressing Towards Goal  Goal: *Optimal pain control at patient's stated goal  Outcome: Progressing Towards Goal  Goal: *Verbalizes understanding and describes prescribed diet  Outcome: Progressing Towards Goal  Goal: *Tolerating diet  Outcome: Progressing Towards Goal  Goal: *Verbalizes name, dosage, time, side effects, and number of days to continue medications  Outcome: Progressing Towards Goal  Goal: *No signs and symptoms of infection or wound complications  Outcome: Progressing Towards Goal  Goal: *Anxiety reduced or absent  Outcome: Progressing Towards Goal  Goal: *Understands and describes signs and symptoms to report to providers(Stroke Metric)  Outcome: Progressing Towards Goal  Goal: *Describes follow-up/return visits to physicians  Outcome: Progressing Towards Goal  Goal: *Describes available resources and support systems  Outcome: Progressing Towards Goal     Problem: Patient Education: Go to Patient Education Activity  Goal: Patient/Family Education  Outcome: Progressing Towards Goal     Problem: Nutrition Deficit  Goal: *Optimize nutritional status  Outcome: Progressing Towards Goal

## 2022-02-25 NOTE — PROGRESS NOTES
General Surgery Daily Progress Note    Admit Date: 2022  Post-Operative Day: 2 Days Post-Op from Procedure(s):  PYLORUS PRESERVING WHIPPLE PROCEDURE AND PORTAL LYPHMADENECTOMY     Subjective:     Last 24 hrs: Doing better this AM. Pain well-controlled w epidural  Denies NV. No flatus or BM  Worked w PT yesterday & OOB in bedside chair   Low grade fevers overnight, but did not feel feverish        Objective:     Blood pressure (!) 100/56, pulse (!) 108, temperature 99.6 °F (37.6 °C), resp. rate 20, height 5' 4\" (1.626 m), weight 248 lb 8 oz (112.7 kg), SpO2 95 %. Temp (24hrs), Av.8 °F (37.7 °C), Min:98.2 °F (36.8 °C), Max:100.9 °F (38.3 °C)      _____________________  Physical Exam:     Alert and Oriented, pleasant, in no acute distress. Cardiovascular: RRR  Lungs:CTAB on RA  Abdomen: soft, min TTP Incisions c/d/i  Drains x2 w 240ml/24 hrs clear pink SS fluid output      Assessment:   Principal Problem:    Pancreatic adenocarcinoma ([address] (2022)            Plan:     Awaiting return of bowel funtion  IVF  IV analgesics PRN  D/c philip & central line  Daily PT & OOB  Daily labs  Will start on Abx  Encourage IS    Data Review:    Recent Labs     22  0517 22  1523   WBC 19.1* 12.5* 11.4*   HGB 8.6* 9.3* 9.7*   HCT 27.5* 29.4* 29.8*    208 214     Recent Labs     22  0517 22  1523    140 140   K 3.8 3.9 3.8   * 112* 112*   CO2 26 25 23   GLU 94 113* 155*   BUN 16 12 8   CREA 1.19* 1.01 0.92   CA 8.4* 8.5 8.6   MG 2.4 2.5* 1.8   PHOS 2.7 2.7 3.6   ALB 2.4* 2.7* 2.9*   ALT 99* 58 58     No results for input(s): AML, LPSE in the last 72 hours.         ______________________  Medications:    Current Facility-Administered Medications   Medication Dose Route Frequency    piperacillin-tazobactam (ZOSYN) 3.375 g in 0.9% sodium chloride (MBP/ADV) 100 mL MBP  3.375 g IntraVENous Q8H    fentaNYL 4 mcg/ml-bupivacaine (PF) 0.0625% epidural  1-18 mL/hr Epidural TITRATE    diphenhydrAMINE (BENADRYL) injection 25 mg  25 mg IntraVENous Q4H PRN    hydroCHLOROthiazide (HYDRODIURIL) tablet 25 mg  25 mg Oral DAILY    levothyroxine (SYNTHROID) tablet 125 mcg  125 mcg Oral ACB    sodium chloride (NS) flush 5-40 mL  5-40 mL IntraVENous Q8H    sodium chloride (NS) flush 5-40 mL  5-40 mL IntraVENous PRN    lactated Ringers infusion  150 mL/hr IntraVENous CONTINUOUS    acetaminophen (TYLENOL) tablet 650 mg  650 mg Oral Q6H PRN    naloxone (NARCAN) injection 0.4 mg  0.4 mg IntraVENous PRN    ondansetron (ZOFRAN) injection 4 mg  4 mg IntraVENous Q4H PRN    heparin (porcine) injection 5,000 Units  5,000 Units SubCUTAneous Q12H    glucose chewable tablet 16 g  4 Tablet Oral PRN    glucagon (GLUCAGEN) injection 1 mg  1 mg IntraMUSCular PRN    dextrose 10 % infusion 0-250 mL  0-250 mL IntraVENous PRN    insulin lispro (HUMALOG) injection   SubCUTAneous AC&HS       Sushma Vargasma  2/25/2022

## 2022-02-25 NOTE — PERIOP NOTES
Patient is POD #2  under general with postop epidural  Patient doing relatively well. no itching. Pain under good control. none nausea and/or vomiting. Block site reveals normal exam; no erythema, swelling or tenderness. Continue current postop pain regimen through tomorrow.

## 2022-02-25 NOTE — PROGRESS NOTES
Bedside and Verbal shift change report given to [facility], RN (oncoming nurse) by Niesha Jacobson RN (offgoing nurse). Report included the following information SBAR, Kardex, Intake/Output, MAR, Recent Results and Cardiac Rhythm NSR.

## 2022-02-25 NOTE — PROGRESS NOTES
Problem: Mobility Impaired (Adult and Pediatric)  Goal: *Acute Goals and Plan of Care (Insert Text)  Description: FUNCTIONAL STATUS PRIOR TO ADMISSION: Patient was independent and active without use of DME. However, patient does have access to a cane and walker. HOME SUPPORT PRIOR TO ADMISSION: The patient lived with her , but did not require assistance. Of note, patient's  also has cancer. Physical Therapy Goals  Initiated 2/24/2022  1. Patient will move from supine to sit and sit to supine, scoot up and down, and roll side to side in bed with modified independence within 7 day(s). 2.  Patient will transfer from bed to chair and chair to bed with modified independence using the least restrictive device within 7 day(s). 3.  Patient will perform sit to stand with modified independence within 7 day(s). 4.  Patient will ambulate with modified independence for 150 feet with the least restrictive device within 7 day(s). 5.  Patient will ascend/descend 12 stairs with handrail(s) with supervision/set-up within 7 day(s). Outcome: Progressing Towards Goal     PHYSICAL THERAPY TREATMENT  Patient: Kimber Sandifer (79 y.o. female)  Date: 2/25/2022  Diagnosis: Pancreatic adenocarcinoma ([facility] [address] [C25.9] Pancreatic adenocarcinoma ([facility] [address]  Procedure(s) (LRB):  PYLORUS PRESERVING WHIPPLE PROCEDURE AND PORTAL LYPHMADENECTOMY (N/A) 2 Days Post-Op  Precautions:    Chart, physical therapy assessment, plan of care and goals were reviewed. ASSESSMENT  Patient continues with skilled PT services and is progressing towards goals. Pt tolerated increased activity today walking to the bathroom with the RW and one person assist.  Gait with the walker is steady. She continues to require assistance with bed mobility and transfers. Will need to work on supine<->sit with HOB flat to prepare for return home (was max raised today).      For the weekend, recommend with nursing, once Egress Test completed, OOB to chair 3x/day and walking 3x daily with one assist and walker. Thank you for completing as able in order to maintain patient strength, endurance and independence. Current Level of Function Impacting Discharge (mobility/balance): CGA supine (HOB max raised) to sit; Min A sit<->stand; CGA to ambulate 20 ft x2 with RW with assist for IV pole    Other factors to consider for discharge:  at home with cancer, Has access to a single point cane, walker, and shower chair           PLAN :  Patient continues to benefit from skilled intervention to address the above impairments. Continue treatment per established plan of care. to address goals. Recommendation for discharge: (in order for the patient to meet his/her long term goals)  Physical therapy at least 2 days/week in the home and caregiver assist.    This discharge recommendation:  Has not yet been discussed the attending provider and/or case management    IF patient discharges home will need the following DME: Owns equipment for discharge       SUBJECTIVE:   Patient stated I can try.   (to urinate)    OBJECTIVE DATA SUMMARY:   Critical Behavior:  Neurologic State: Alert & oriented to person, place, situation           Functional Mobility Training:  Bed Mobility:     Supine to Sit: Contact guard assistance;Bed Modified (HOB raised max height); Assist x1;Additional time; Adaptive equipment (bedrail;)     Scooting: Contact guard assistance        Transfers:  Sit to Stand: Minimum assistance; Additional time  Stand to Sit: Contact guard assistance  Tx: instructed in hand placement to push up and control descent using the chair/ bed versus hands on the walker. Instructed in gentle rocks from momentum from the bed. Balance:  Sitting: Intact; Without support  Standing: Impaired; Without support  Standing - Static: Good  Standing - Dynamic : Constant support;Occasional  Ambulation/Gait Training:  Distance (ft): 40 Feet (ft) (20 ft x2) - offered to gait train w/o AD. Pt felt she was not ready, required walker support d/t generalized weakness. Assistive Device: Walker, rolling;Gait belt  Ambulation - Level of Assistance: Contact guard assistance                 Base of Support: Widened     Speed/Ciera: Slow  Tx: gait training with RW, Instructed in walker technique, hand placement. Therapeutic Activity:   Instructed in activity progression over the weekend attempting to walk 3x/ day with RN assist and to/ from the bathroom w/ assist.  Fall prevention - RW technique, have staff assist with upright mobility  Assisted to the bathroom. Pt unable to urinate. Pain Rating:  Abdominal area    Activity Tolerance:   Fair and requires rest breaks    After treatment patient left in no apparent distress:   Sitting in chair and Call bell within reach    COMMUNICATION/COLLABORATION:   The patients plan of care was discussed with: Registered nurse.      Brandon Santana, PT   Time Calculation: 48 mins

## 2022-02-25 NOTE — DIABETES MGMT
[address] [facility]    CLINICAL NURSE SPECIALIST CONSULT     Initial Presentation   Lupis Whitman is a 77 y.o. female admitted 2/23/22 after surgical management of pancreatic adenocarcinoma. HX:   Past Medical History:   Diagnosis Date    Arthritis     ostero arthritis, rhemathoid  lower back     Autoimmune disease ([address]     Ulcerative Colitis.  Hypertension     CONTROLLED WITH MEDS    Hypothyroid 3/23/2011    Malignant neoplasm of head of pancreas ([address] 9/28/2021    Ulcerative colitis ([address] 9/16/2010    Ulcerative colitis ([address] 9/16/2010    Ulcerative colitis ([address]         INITIAL DX:   Pancreatic adenocarcinoma ([address] [C25.9]     Current Treatment     TX: Pyloris preserving whipple with portal lymphadenectomy      Consulted by Garrett Donahue MD for advanced diabetes nursing assessment and care for:   [x] Inpatient management strategy    Hospital Course   Clinical progress has been uncomplicated. Diabetes History   No hx prediabetes/diabetes  A1C 5.0%  PCP: Marius Merlin, MD    Diabetes Medication History  Key Antihyperglycemic Medications     Patient is on no antihyperglycemic meds. Subjective   I have never had diabetes but my  does so I eat healthy with him.        Lives with her - they have adult children and grandchildren  Underwent 6 rounds chemo- reported that this flaired her ulcerative colitis- lost about 60 lbs s/t food adjustments with chemo/UC. Pancreatic cancer s/p neoadjuvant chemotherapy. Post chemo imaging showed response to treatment without evidence of metastatic disease   Objective   Physical exam  General Overweight AA female in acute pain/ill-appearing.  Conversant and cooperative  Neuro  Alert, oriented   Vital Signs   Visit Vitals  /67 (BP 1 Location: Right upper arm, BP Patient Position: At rest)   Pulse (!) 108   Temp 98.5 °F (36.9 °C)   Resp 28   Ht 5' 4\" (1.626 m)   Wt 112.7 kg (248 lb 8 oz)   SpO2 91%   BMI 42.65 kg/m²     Skin  Warm and dry. Abdominal surgical incisions  Heart   Regular rate and rhythm. No murmurs, rubs or gallops  Lungs  Clear to auscultation without rales or rhonchi  Extremities No foot wounds      Laboratory  Recent Labs     02/25/22  0220 02/24/22  0517 02/23/22  1523   GLU 94 113* 155*   AGAP 4* 3* 5   WBC 19.1* 12.5* 11.4*   CREA 1.19* 1.01 0.92   GFRNA 45* 55* >60   * 103* 88*   ALT 99* 58 58       Factors impacting BG management  Factor Dose Comments   Nutrition:  Standard meals     Sips of clears    Drugs:  Steroids     Dexamethasone 4mg  x1 on IR    Pain Epidural     Whipple  Decreased insulin secretion      Blood glucose pattern      Significant diabetes-related events over the past 24-72 hours  24h BG pattern:   Sips of clears, awaiting rtn of bowel function  Working with PT/OT  LR IVF  WBC 19.1    Assessment and Plan   Nursing Diagnosis Risk for unstable blood glucose pattern   Nursing Intervention Domain 5250 Decision-making Support   Nursing Interventions Examined current inpatient diabetes/blood glucose control   Explored factors facilitating and impeding inpatient management  Explored corrective strategies with patient and responsible inpatient provider   Informed patient of rational for insulin strategy while hospitalized     Evaluation   Blair Block is a 77year old female, with no history of diabetes, who was admitted with pancreatic adenocarcinoma now s/p pyloris sparing whipple. A1C is currently 5.0% indicating a normal PTA glucose pattern. Initially, pancreatic manipulation and inflammation leads to decreased insulin production/mild hyperglycemia. If occurring, it should be reasonably controlled with correctional humalog. Therefore, following pre-prandial blood sugars will help to determine antihyperglycemic agent adjustments required to control glucose once nutrition advanced.   The best inpatient antihyperglycemic agents to use if glucose sustained over goal (140-180) will be basal/bolus insulin. A common consequence after pancreatic resection is new or worsened diabetes s/t removal of insulin producing b-cells. Given that she didn't have a history of diabetes and was able to retain a good portion of her pancreas, insulin may likely not be required long-term. Today, she remains largely NPO and glucose pattern has been WNL. Continue current therapy. Recommendations   1. POC glucose Q6, change to ACHS when oral intake advanced    2. Diet advancement per primary team, appreciate nutrition recs    3. Continue non-dextrose IVF    4. Continue Correctional Humalog at normal sensitivity Q6 (ACHS when diet advanced)    5. If BG sustained over 180mg/dl, start low dose lantus at 0.15units/kg/day     Will see again on Monday if here. Billing Code(s)   [x] 15395  Before making these care recommendations, I personally reviewed the hospitalization record, including notes, laboratory & diagnostic data and current medications, and examined the patient at the bedside (circumstances permitting) before making care recommendations. More than fifty (50) percent of the time was spent in patient counseling and/or care coordination.   Total minutes: 13    SHAY Flower  Diabetes Clinical Nurse Specialist  [facility]  Access via [employer]

## 2022-02-25 NOTE — ADVANCED PRACTICE NURSE
Patient had philip removed 9am  Still not voided  Straight cath showed 13cc  Patient not hypotensive. VSS  IVF-- LR 150cc/hr    Will replace foely  Check CMP.     Signed By: JESSICA Myers     February 25, 2022

## 2022-02-25 NOTE — PROGRESS NOTES
Pt yet to void; bladder scan performed showed 13 ml of urine in bladder. Pt sat on bedside commode for half an hour attempting to urinate. Pt also oob for 2 hours total today. Danelle LOPEZ notified of pt not voiding yet; will see patient.

## 2022-02-25 NOTE — PROGRESS NOTES
Bedside and Verbal shift change report given to Tyson Hobbs (oncoming nurse) by Jeff Phelps (offgoing nurse).  Report included the following information SBAR, Kardex, ED Summary, Procedure Summary, Intake/Output, MAR, Recent Results and Cardiac Rhythm ST.

## 2022-02-26 DIAGNOSIS — E87.6 HYPOKALEMIA: ICD-10-CM

## 2022-02-26 LAB
ALBUMIN SERPL-MCNC: 2.1 G/DL (ref 3.5–5)
ALBUMIN/GLOB SERPL: 0.6 {RATIO} (ref 1.1–2.2)
ALP SERPL-CCNC: 94 U/L (ref 45–117)
ALT SERPL-CCNC: 66 U/L (ref 12–78)
ANION GAP SERPL CALC-SCNC: 4 MMOL/L (ref 5–15)
AST SERPL-CCNC: 67 U/L (ref 15–37)
BASOPHILS # BLD: 0 K/UL (ref 0–0.1)
BASOPHILS NFR BLD: 0 % (ref 0–1)
BILIRUB SERPL-MCNC: 1.2 MG/DL (ref 0.2–1)
BUN SERPL-MCNC: 14 MG/DL (ref 6–20)
BUN/CREAT SERPL: 14 (ref 12–20)
CALCIUM SERPL-MCNC: 8 MG/DL (ref 8.5–10.1)
CHLORIDE SERPL-SCNC: 110 MMOL/L (ref 97–108)
CO2 SERPL-SCNC: 24 MMOL/L (ref 21–32)
CREAT SERPL-MCNC: 0.97 MG/DL (ref 0.55–1.02)
DIFFERENTIAL METHOD BLD: ABNORMAL
EOSINOPHIL # BLD: 0.3 K/UL (ref 0–0.4)
EOSINOPHIL NFR BLD: 2 % (ref 0–7)
ERYTHROCYTE [DISTWIDTH] IN BLOOD BY AUTOMATED COUNT: 15.3 % (ref 11.5–14.5)
GLOBULIN SER CALC-MCNC: 3.7 G/DL (ref 2–4)
GLUCOSE BLD STRIP.AUTO-MCNC: 83 MG/DL (ref 65–117)
GLUCOSE BLD STRIP.AUTO-MCNC: 90 MG/DL (ref 65–117)
GLUCOSE BLD STRIP.AUTO-MCNC: 94 MG/DL (ref 65–117)
GLUCOSE BLD STRIP.AUTO-MCNC: 95 MG/DL (ref 65–117)
GLUCOSE SERPL-MCNC: 95 MG/DL (ref 65–100)
HCT VFR BLD AUTO: 25.2 % (ref 35–47)
HGB BLD-MCNC: 8.1 G/DL (ref 11.5–16)
IMM GRANULOCYTES # BLD AUTO: 0.2 K/UL (ref 0–0.04)
IMM GRANULOCYTES NFR BLD AUTO: 1 % (ref 0–0.5)
LYMPHOCYTES # BLD: 3.2 K/UL (ref 0.8–3.5)
LYMPHOCYTES NFR BLD: 21 % (ref 12–49)
MAGNESIUM SERPL-MCNC: 2.3 MG/DL (ref 1.6–2.4)
MCH RBC QN AUTO: 35.2 PG (ref 26–34)
MCHC RBC AUTO-ENTMCNC: 32.1 G/DL (ref 30–36.5)
MCV RBC AUTO: 109.6 FL (ref 80–99)
MONOCYTES # BLD: 1.5 K/UL (ref 0–1)
MONOCYTES NFR BLD: 10 % (ref 5–13)
NEUTS SEG # BLD: 10 K/UL (ref 1.8–8)
NEUTS SEG NFR BLD: 66 % (ref 32–75)
NRBC # BLD: 0 K/UL (ref 0–0.01)
NRBC BLD-RTO: 0 PER 100 WBC
PHOSPHATE SERPL-MCNC: 2.1 MG/DL (ref 2.6–4.7)
PLATELET # BLD AUTO: 187 K/UL (ref 150–400)
PMV BLD AUTO: 10.8 FL (ref 8.9–12.9)
POTASSIUM SERPL-SCNC: 3.6 MMOL/L (ref 3.5–5.1)
PROT SERPL-MCNC: 5.8 G/DL (ref 6.4–8.2)
RBC # BLD AUTO: 2.3 M/UL (ref 3.8–5.2)
RBC MORPH BLD: ABNORMAL
RBC MORPH BLD: ABNORMAL
SERVICE CMNT-IMP: NORMAL
SODIUM SERPL-SCNC: 138 MMOL/L (ref 136–145)
WBC # BLD AUTO: 15.2 K/UL (ref 3.6–11)

## 2022-02-26 PROCEDURE — 74011250636 HC RX REV CODE- 250/636: Performed by: SURGERY

## 2022-02-26 PROCEDURE — 36415 COLL VENOUS BLD VENIPUNCTURE: CPT

## 2022-02-26 PROCEDURE — 99024 POSTOP FOLLOW-UP VISIT: CPT | Performed by: SURGERY

## 2022-02-26 PROCEDURE — 74011000250 HC RX REV CODE- 250: Performed by: SURGERY

## 2022-02-26 PROCEDURE — 82962 GLUCOSE BLOOD TEST: CPT

## 2022-02-26 PROCEDURE — 74011000258 HC RX REV CODE- 258: Performed by: SURGERY

## 2022-02-26 PROCEDURE — 84100 ASSAY OF PHOSPHORUS: CPT

## 2022-02-26 PROCEDURE — 83735 ASSAY OF MAGNESIUM: CPT

## 2022-02-26 PROCEDURE — 65660000000 HC RM CCU STEPDOWN

## 2022-02-26 PROCEDURE — 85025 COMPLETE CBC W/AUTO DIFF WBC: CPT

## 2022-02-26 PROCEDURE — 80053 COMPREHEN METABOLIC PANEL: CPT

## 2022-02-26 PROCEDURE — 74011250637 HC RX REV CODE- 250/637: Performed by: SURGERY

## 2022-02-26 RX ORDER — HYDROMORPHONE HYDROCHLORIDE 1 MG/ML
.5-1 INJECTION, SOLUTION INTRAMUSCULAR; INTRAVENOUS; SUBCUTANEOUS
Status: DISCONTINUED | OUTPATIENT
Start: 2022-02-26 | End: 2022-03-05 | Stop reason: HOSPADM

## 2022-02-26 RX ORDER — OXYCODONE HYDROCHLORIDE 5 MG/1
5-10 TABLET ORAL
Status: DISCONTINUED | OUTPATIENT
Start: 2022-02-26 | End: 2022-03-05 | Stop reason: HOSPADM

## 2022-02-26 RX ADMIN — HYDROMORPHONE HYDROCHLORIDE 0.5 MG: 1 INJECTION, SOLUTION INTRAMUSCULAR; INTRAVENOUS; SUBCUTANEOUS at 12:58

## 2022-02-26 RX ADMIN — HEPARIN SODIUM 5000 UNITS: 5000 INJECTION INTRAVENOUS; SUBCUTANEOUS at 20:41

## 2022-02-26 RX ADMIN — SODIUM CHLORIDE, POTASSIUM CHLORIDE, SODIUM LACTATE AND CALCIUM CHLORIDE 150 ML/HR: 600; 310; 30; 20 INJECTION, SOLUTION INTRAVENOUS at 08:03

## 2022-02-26 RX ADMIN — SODIUM CHLORIDE, POTASSIUM CHLORIDE, SODIUM LACTATE AND CALCIUM CHLORIDE 150 ML/HR: 600; 310; 30; 20 INJECTION, SOLUTION INTRAVENOUS at 01:12

## 2022-02-26 RX ADMIN — PIPERACILLIN AND TAZOBACTAM 3.38 G: 3; .375 INJECTION, POWDER, LYOPHILIZED, FOR SOLUTION INTRAVENOUS at 09:54

## 2022-02-26 RX ADMIN — SODIUM CHLORIDE, PRESERVATIVE FREE 10 ML: 5 INJECTION INTRAVENOUS at 06:25

## 2022-02-26 RX ADMIN — PIPERACILLIN AND TAZOBACTAM 3.38 G: 3; .375 INJECTION, POWDER, LYOPHILIZED, FOR SOLUTION INTRAVENOUS at 15:06

## 2022-02-26 RX ADMIN — LEVOTHYROXINE SODIUM 125 MCG: 0.12 TABLET ORAL at 06:31

## 2022-02-26 RX ADMIN — SODIUM CHLORIDE, POTASSIUM CHLORIDE, SODIUM LACTATE AND CALCIUM CHLORIDE 100 ML/HR: 600; 310; 30; 20 INJECTION, SOLUTION INTRAVENOUS at 15:06

## 2022-02-26 NOTE — PROGRESS NOTES
Bedside shift change report given to Willis [id number] (oncoming nurse) by Ankush Gordon RN (offgoing nurse). Report included the following information SBAR, Kardex and Cardiac Rhythm SR/ST.

## 2022-02-26 NOTE — PROGRESS NOTES
Progress Note    Patient: Carloz Catalan MRN: [id number]  SSN: [id number]    YOB: 1955  Age: 77 y.o. Sex: female      Admit Date: 2022    3 Days Post-Op    Procedure:  Procedure(s):  PYLORUS PRESERVING WHIPPLE PROCEDURE AND PORTAL LYPHMADENECTOMY    Subjective:     Patient not really passing gas yet. Has been belching. Tried clear liquids and that seems to being okay. Complains of some gas pain. Has not yet been out of bed yet today. Beal catheter initially removed and had to be replaced due to retention    Objective:     Visit Vitals  /70   Pulse (!) 103   Temp 98.7 °F (37.1 °C)   Resp 22   Ht 5' 4\" (1.626 m)   Wt 117.2 kg (258 lb 4.8 oz)   SpO2 96%   BMI 44.34 kg/m²       Temp (24hrs), Av.9 °F (37.2 °C), Min:97.7 °F (36.5 °C), Max:100.3 °F (37.9 °C)      Physical Exam:    General alert no acute distress  Lungs clear bilaterally  Heart regular rate and rhythm  Abdomen soft some distention positive bowel sounds incision intact NAYA serosanguineous    Data Review: images and reports reviewed    Lab Review: All lab results for the last 24 hours reviewed. Recent Results (from the past 24 hour(s))   METABOLIC PANEL, COMPREHENSIVE    Collection Time: 22  3:48 PM   Result Value Ref Range    Sodium 138 136 - 145 mmol/L    Potassium 3.7 3.5 - 5.1 mmol/L    Chloride 109 (H) 97 - 108 mmol/L    CO2 24 21 - 32 mmol/L    Anion gap 5 5 - 15 mmol/L    Glucose 83 65 - 100 mg/dL    BUN 17 6 - 20 MG/DL    Creatinine 1.11 (H) 0.55 - 1.02 MG/DL    BUN/Creatinine ratio 15 12 - 20      GFR est AA 60 (L) >60 ml/min/1.73m2    GFR est non-AA 49 (L) >60 ml/min/1.73m2    Calcium 8.1 (L) 8.5 - 10.1 MG/DL    Bilirubin, total 1.6 (H) 0.2 - 1.0 MG/DL    ALT (SGPT) 93 (H) 12 - 78 U/L    AST (SGOT) 106 (H) 15 - 37 U/L    Alk.  phosphatase 114 45 - 117 U/L    Protein, total 5.8 (L) 6.4 - 8.2 g/dL    Albumin 2.4 (L) 3.5 - 5.0 g/dL    Globulin 3.4 2.0 - 4.0 g/dL    A-G Ratio 0.7 (L) 1.1 - 2.2     GLUCOSE, POC Collection Time: 02/25/22  4:48 PM   Result Value Ref Range    Glucose (POC) 79 65 - 117 mg/dL    Performed by Stoney López   PCT    GLUCOSE, POC    Collection Time: 02/25/22  8:55 PM   Result Value Ref Range    Glucose (POC) 80 65 - 117 mg/dL    Performed by Carlito [facility] Blake Harris, POC    Collection Time: 02/25/22 10:23 PM   Result Value Ref Range    Glucose (POC) 105 65 - 117 mg/dL    Performed by Martine Morrell    METABOLIC PANEL, COMPREHENSIVE    Collection Time: 02/26/22  1:44 AM   Result Value Ref Range    Sodium 138 136 - 145 mmol/L    Potassium 3.6 3.5 - 5.1 mmol/L    Chloride 110 (H) 97 - 108 mmol/L    CO2 24 21 - 32 mmol/L    Anion gap 4 (L) 5 - 15 mmol/L    Glucose 95 65 - 100 mg/dL    BUN 14 6 - 20 MG/DL    Creatinine 0.97 0.55 - 1.02 MG/DL    BUN/Creatinine ratio 14 12 - 20      GFR est AA >60 >60 ml/min/1.73m2    GFR est non-AA 57 (L) >60 ml/min/1.73m2    Calcium 8.0 (L) 8.5 - 10.1 MG/DL    Bilirubin, total 1.2 (H) 0.2 - 1.0 MG/DL    ALT (SGPT) 66 12 - 78 U/L    AST (SGOT) 67 (H) 15 - 37 U/L    Alk. phosphatase 94 45 - 117 U/L    Protein, total 5.8 (L) 6.4 - 8.2 g/dL    Albumin 2.1 (L) 3.5 - 5.0 g/dL    Globulin 3.7 2.0 - 4.0 g/dL    A-G Ratio 0.6 (L) 1.1 - 2.2     CBC WITH AUTOMATED DIFF    Collection Time: 02/26/22  1:44 AM   Result Value Ref Range    WBC 15.2 (H) 3.6 - 11.0 K/uL    RBC 2.30 (L) 3.80 - 5.20 M/uL    HGB 8.1 (L) 11.5 - 16.0 g/dL    HCT 25.2 (L) 35.0 - 47.0 %    .6 (H) 80.0 - 99.0 FL    MCH 35.2 (H) 26.0 - 34.0 PG    MCHC 32.1 30.0 - 36.5 g/dL    RDW 15.3 (H) 11.5 - 14.5 %    PLATELET 404 862 - 799 K/uL    MPV 10.8 8.9 - 12.9 FL    NRBC 0.0 0  WBC    ABSOLUTE NRBC 0.00 0.00 - 0.01 K/uL    NEUTROPHILS 66 32 - 75 %    LYMPHOCYTES 21 12 - 49 %    MONOCYTES 10 5 - 13 %    EOSINOPHILS 2 0 - 7 %    BASOPHILS 0 0 - 1 %    IMMATURE GRANULOCYTES 1 (H) 0.0 - 0.5 %    ABS. NEUTROPHILS 10.0 (H) 1.8 - 8.0 K/UL    ABS. LYMPHOCYTES 3.2 0.8 - 3.5 K/UL    ABS.  MONOCYTES 1.5 (H) 0.0 - 1.0 K/UL    ABS. EOSINOPHILS 0.3 0.0 - 0.4 K/UL    ABS. BASOPHILS 0.0 0.0 - 0.1 K/UL    ABS. IMM. GRANS. 0.2 (H) 0.00 - 0.04 K/UL    DF MANUAL      RBC COMMENTS ANISOCYTOSIS  1+        RBC COMMENTS MACROCYTOSIS  1+       MAGNESIUM    Collection Time: 02/26/22  1:44 AM   Result Value Ref Range    Magnesium 2.3 1.6 - 2.4 mg/dL   PHOSPHORUS    Collection Time: 02/26/22  1:44 AM   Result Value Ref Range    Phosphorus 2.1 (L) 2.6 - 4.7 MG/DL   GLUCOSE, POC    Collection Time: 02/26/22  6:26 AM   Result Value Ref Range    Glucose (POC) 83 65 - 117 mg/dL    Performed by Ines Block    GLUCOSE, POC    Collection Time: 02/26/22 11:26 AM   Result Value Ref Range    Glucose (POC) 95 65 - 117 mg/dL    Performed by Luis Fernando Hoyos        Assessment:     Hospital Problems  Date Reviewed: 2/17/2022          Codes Class Noted POA    * (Principal) Pancreatic adenocarcinoma ([facility] ICD-10-CM: C25.9  ICD-9-CM: 157.9  2/23/2022 Unknown              Plan/Recommendations/Medical Decision Making:   Status post Whipple  Overall slowly improving. Continue small amounts of liquids until bowel function returns. Needs to be out of bed and ambulating hopefully this will allow us to remove her Beal catheter.   Epidural out now on IV and p.o. pain medications

## 2022-02-26 NOTE — PROGRESS NOTES
Problem: Pressure Injury - Risk of  Goal: *Prevention of pressure injury  Description: Document Arie Scale and appropriate interventions in the flowsheet. Outcome: Progressing Towards Goal  Note: Pressure Injury Interventions: Activity Interventions: Pressure redistribution bed/mattress(bed type)    Mobility Interventions: Assess need for specialty bed,PT/OT evaluation    Nutrition Interventions: Document food/fluid/supplement intake                     Problem: Patient Education: Go to Patient Education Activity  Goal: Patient/Family Education  Outcome: Progressing Towards Goal     Problem: Falls - Risk of  Goal: *Absence of Falls  Description: Document Clearence Skates Fall Risk and appropriate interventions in the flowsheet.   Outcome: Progressing Towards Goal  Note: Fall Risk Interventions:            Medication Interventions: Evaluate medications/consider consulting pharmacy    Elimination Interventions: Call light in reach              Problem: Patient Education: Go to Patient Education Activity  Goal: Patient/Family Education  Outcome: Progressing Towards Goal     Problem: Patient Education: Go to Patient Education Activity  Goal: Patient/Family Education  Outcome: Progressing Towards Goal     Problem: Surgical Pathway Day of Surgery  Goal: Off Pathway (Use only if patient is Off Pathway)  Outcome: Progressing Towards Goal  Goal: Activity/Safety  Outcome: Progressing Towards Goal  Goal: Consults, if ordered  Outcome: Progressing Towards Goal  Goal: Nutrition/Diet  Outcome: Progressing Towards Goal  Goal: Medications  Outcome: Progressing Towards Goal  Goal: Respiratory  Outcome: Progressing Towards Goal  Goal: Treatments/Interventions/Procedures  Outcome: Progressing Towards Goal  Goal: Psychosocial  Outcome: Progressing Towards Goal  Goal: *No signs and symptoms of infection or wound complications  Outcome: Progressing Towards Goal  Goal: *Optimal pain control at patient's stated goal  Outcome: Progressing Towards Goal  Goal: *Adequate urinary output (equal to or greater than 30 milliliters/hour)  Description: Ambulatory Surgery patients voiding without difficulty.   Outcome: Progressing Towards Goal  Goal: *Hemodynamically stable  Outcome: Progressing Towards Goal  Goal: *Tolerating diet  Outcome: Progressing Towards Goal  Goal: *Demonstrates progressive activity  Outcome: Progressing Towards Goal     Problem: Surgical Pathway Post-Op Day 1  Goal: Off Pathway (Use only if patient is Off Pathway)  Outcome: Progressing Towards Goal  Goal: Activity/Safety  Outcome: Progressing Towards Goal  Goal: Diagnostic Test/Procedures  Outcome: Progressing Towards Goal  Goal: Nutrition/Diet  Outcome: Progressing Towards Goal  Goal: Discharge Planning  Outcome: Progressing Towards Goal  Goal: Medications  Outcome: Progressing Towards Goal  Goal: Respiratory  Outcome: Progressing Towards Goal  Goal: Treatments/Interventions/Procedures  Outcome: Progressing Towards Goal  Goal: Psychosocial  Outcome: Progressing Towards Goal  Goal: *No signs and symptoms of infection or wound complications  Outcome: Progressing Towards Goal  Goal: *Optimal pain control at patient's stated goal  Outcome: Progressing Towards Goal  Goal: *Adequate urinary output (equal to or greater than 30 milliliters/hour)  Outcome: Progressing Towards Goal  Goal: *Hemodynamically stable  Outcome: Progressing Towards Goal  Goal: *Tolerating diet  Outcome: Progressing Towards Goal  Goal: *Demonstrates progressive activity  Outcome: Progressing Towards Goal  Goal: *Lungs clear or at baseline  Outcome: Progressing Towards Goal     Problem: Surgical Pathway Post-Op Day 2 through Discharge  Goal: Off Pathway (Use only if patient is Off Pathway)  Outcome: Progressing Towards Goal  Goal: Activity/Safety  Outcome: Progressing Towards Goal  Goal: Nutrition/Diet  Outcome: Progressing Towards Goal  Goal: Discharge Planning  Outcome: Progressing Towards Goal  Goal: Medications  Outcome: Progressing Towards Goal  Goal: Respiratory  Outcome: Progressing Towards Goal  Goal: Treatments/Interventions/Procedures  Outcome: Progressing Towards Goal  Goal: Psychosocial  Outcome: Progressing Towards Goal  Goal: *No signs and symptoms of infection or wound complications  Outcome: Progressing Towards Goal  Goal: *Optimal pain control at patient's stated goal  Outcome: Progressing Towards Goal  Goal: *Adequate urinary output (equal to or greater than 30 milliliters/hour)  Outcome: Progressing Towards Goal  Goal: *Hemodynamically stable  Outcome: Progressing Towards Goal  Goal: *Tolerating diet  Outcome: Progressing Towards Goal  Goal: *Demonstrates progressive activity  Outcome: Progressing Towards Goal  Goal: *Lungs clear or at baseline  Outcome: Progressing Towards Goal     Problem: Surgical Pathway: Discharge Outcomes  Goal: *Hemodynamically stable  Outcome: Progressing Towards Goal  Goal: *Lungs clear or at baseline  Outcome: Progressing Towards Goal  Goal: *Demonstrates independent activity or return to baseline  Outcome: Progressing Towards Goal  Goal: *Optimal pain control at patient's stated goal  Outcome: Progressing Towards Goal  Goal: *Verbalizes understanding and describes prescribed diet  Outcome: Progressing Towards Goal  Goal: *Tolerating diet  Outcome: Progressing Towards Goal  Goal: *Verbalizes name, dosage, time, side effects, and number of days to continue medications  Outcome: Progressing Towards Goal  Goal: *No signs and symptoms of infection or wound complications  Outcome: Progressing Towards Goal  Goal: *Anxiety reduced or absent  Outcome: Progressing Towards Goal  Goal: *Understands and describes signs and symptoms to report to providers(Stroke Metric)  Outcome: Progressing Towards Goal  Goal: *Describes follow-up/return visits to physicians  Outcome: Progressing Towards Goal  Goal: *Describes available resources and support systems  Outcome: Progressing Towards Goal     Problem: Patient Education: Go to Patient Education Activity  Goal: Patient/Family Education  Outcome: Progressing Towards Goal     Problem: Nutrition Deficit  Goal: *Optimize nutritional status  Outcome: Progressing Towards Goal

## 2022-02-26 NOTE — PROGRESS NOTES
Bedside and Verbal shift change report given to Fauzia Wang (oncoming nurse) by Jairo Kilpatrick (offgoing nurse).  Report included the following information SBAR, Kardex, ED Summary, Procedure Summary, Intake/Output, MAR, Recent Results and Cardiac Rhythm SR.

## 2022-02-27 LAB
GLUCOSE BLD STRIP.AUTO-MCNC: 106 MG/DL (ref 65–117)
GLUCOSE BLD STRIP.AUTO-MCNC: 69 MG/DL (ref 65–117)
GLUCOSE BLD STRIP.AUTO-MCNC: 70 MG/DL (ref 65–117)
GLUCOSE BLD STRIP.AUTO-MCNC: 85 MG/DL (ref 65–117)
GLUCOSE BLD STRIP.AUTO-MCNC: 94 MG/DL (ref 65–117)
SERVICE CMNT-IMP: NORMAL

## 2022-02-27 PROCEDURE — 82962 GLUCOSE BLOOD TEST: CPT

## 2022-02-27 PROCEDURE — 74011250637 HC RX REV CODE- 250/637: Performed by: SURGERY

## 2022-02-27 PROCEDURE — 99024 POSTOP FOLLOW-UP VISIT: CPT | Performed by: SURGERY

## 2022-02-27 PROCEDURE — 74011000250 HC RX REV CODE- 250: Performed by: SURGERY

## 2022-02-27 PROCEDURE — 74011000258 HC RX REV CODE- 258: Performed by: SURGERY

## 2022-02-27 PROCEDURE — 65660000000 HC RM CCU STEPDOWN

## 2022-02-27 PROCEDURE — 74011250636 HC RX REV CODE- 250/636: Performed by: SURGERY

## 2022-02-27 RX ADMIN — SODIUM CHLORIDE, POTASSIUM CHLORIDE, SODIUM LACTATE AND CALCIUM CHLORIDE 100 ML/HR: 600; 310; 30; 20 INJECTION, SOLUTION INTRAVENOUS at 02:43

## 2022-02-27 RX ADMIN — PIPERACILLIN AND TAZOBACTAM 3.38 G: 3; .375 INJECTION, POWDER, LYOPHILIZED, FOR SOLUTION INTRAVENOUS at 08:56

## 2022-02-27 RX ADMIN — PIPERACILLIN AND TAZOBACTAM 3.38 G: 3; .375 INJECTION, POWDER, LYOPHILIZED, FOR SOLUTION INTRAVENOUS at 17:09

## 2022-02-27 RX ADMIN — HYDROMORPHONE HYDROCHLORIDE 1 MG: 1 INJECTION, SOLUTION INTRAMUSCULAR; INTRAVENOUS; SUBCUTANEOUS at 00:09

## 2022-02-27 RX ADMIN — PIPERACILLIN AND TAZOBACTAM 3.38 G: 3; .375 INJECTION, POWDER, LYOPHILIZED, FOR SOLUTION INTRAVENOUS at 00:08

## 2022-02-27 RX ADMIN — SODIUM CHLORIDE, POTASSIUM CHLORIDE, SODIUM LACTATE AND CALCIUM CHLORIDE 100 ML/HR: 600; 310; 30; 20 INJECTION, SOLUTION INTRAVENOUS at 12:06

## 2022-02-27 RX ADMIN — SODIUM CHLORIDE, POTASSIUM CHLORIDE, SODIUM LACTATE AND CALCIUM CHLORIDE 100 ML/HR: 600; 310; 30; 20 INJECTION, SOLUTION INTRAVENOUS at 23:04

## 2022-02-27 RX ADMIN — SODIUM CHLORIDE, PRESERVATIVE FREE 10 ML: 5 INJECTION INTRAVENOUS at 21:11

## 2022-02-27 RX ADMIN — OXYCODONE 5 MG: 5 TABLET ORAL at 23:33

## 2022-02-27 RX ADMIN — PIPERACILLIN AND TAZOBACTAM 3.38 G: 3; .375 INJECTION, POWDER, LYOPHILIZED, FOR SOLUTION INTRAVENOUS at 23:04

## 2022-02-27 RX ADMIN — HEPARIN SODIUM 5000 UNITS: 5000 INJECTION INTRAVENOUS; SUBCUTANEOUS at 20:27

## 2022-02-27 RX ADMIN — OXYCODONE 5 MG: 5 TABLET ORAL at 14:16

## 2022-02-27 RX ADMIN — ACETAMINOPHEN 650 MG: 325 TABLET ORAL at 19:33

## 2022-02-27 RX ADMIN — HEPARIN SODIUM 5000 UNITS: 5000 INJECTION INTRAVENOUS; SUBCUTANEOUS at 08:56

## 2022-02-27 RX ADMIN — HYDROMORPHONE HYDROCHLORIDE 1 MG: 1 INJECTION, SOLUTION INTRAMUSCULAR; INTRAVENOUS; SUBCUTANEOUS at 11:37

## 2022-02-27 RX ADMIN — LEVOTHYROXINE SODIUM 125 MCG: 0.12 TABLET ORAL at 08:56

## 2022-02-27 RX ADMIN — ACETAMINOPHEN 650 MG: 325 TABLET ORAL at 00:33

## 2022-02-27 RX ADMIN — HYDROMORPHONE HYDROCHLORIDE 1 MG: 1 INJECTION, SOLUTION INTRAMUSCULAR; INTRAVENOUS; SUBCUTANEOUS at 20:27

## 2022-02-27 RX ADMIN — SODIUM CHLORIDE, PRESERVATIVE FREE 10 ML: 5 INJECTION INTRAVENOUS at 00:08

## 2022-02-27 NOTE — PROGRESS NOTES
Progress Note    Patient: Keisha Haley MRN: [id number]  SSN: [id number]    YOB: 1955  Age: 77 y.o. Sex: female      Admit Date: 2022    4 Days Post-Op    Procedure:  Procedure(s):  PYLORUS PRESERVING WHIPPLE PROCEDURE AND PORTAL LYPHMADENECTOMY    Subjective:     Patient still having some belching not really passing gas at this point. Has been up and walking. Feeling little better. Having a small amount of drainage near her drains. Objective:     Visit Vitals  BP (!) 142/83 (BP 1 Location: Right upper arm, BP Patient Position: At rest)   Pulse (!) 102   Temp 99.4 °F (37.4 °C)   Resp 28   Ht 5' 4\" (1.626 m)   Wt 117.2 kg (258 lb 4.8 oz)   SpO2 99%   BMI 44.34 kg/m²       Temp (24hrs), Av.8 °F (37.7 °C), Min:98.8 °F (37.1 °C), Max:101 °F (38.3 °C)      Physical Exam:    General alert and oriented no acute distress  Lungs clear bilaterally  Heart regular rate and rhythm  Abdomen soft mildly distended incisions intact  JPs serous    Data Review: images and reports reviewed    Lab Review: All lab results for the last 24 hours reviewed.   Recent Results (from the past 24 hour(s))   GLUCOSE, POC    Collection Time: 22 11:37 PM   Result Value Ref Range    Glucose (POC) 90 65 - 117 mg/dL    Performed by Shani Flood, POC    Collection Time: 22  8:22 AM   Result Value Ref Range    Glucose (POC) 69 65 - 117 mg/dL    Performed by [address] POC    Collection Time: 22  9:19 AM   Result Value Ref Range    Glucose (POC) 85 65 - 117 mg/dL    Performed by Dominic Cornelius, POC    Collection Time: 22 11:44 AM   Result Value Ref Range    Glucose (POC) 94 65 - 117 mg/dL    Performed by [address] POC    Collection Time: 22  3:59 PM   Result Value Ref Range    Glucose (POC) 70 65 - 117 mg/dL    Performed by Mindy Yee:     Hospital Problems  Date Reviewed: 2022          Codes Class Noted POA * (Principal) Pancreatic adenocarcinoma [facility]) ICD-10-CM: C25.9  ICD-9-CM: 157.9  2/23/2022 Unknown              Plan/Recommendations/Medical Decision Making:   Status post Whipple procedure  Overall seems to be doing well. Continue clear liquids until able to take good p.o. Continue ambulation  Has been doing okay with Beal out.

## 2022-02-27 NOTE — PROGRESS NOTES
Bedside and Verbal shift change report given to Isamar Clark RN (oncoming nurse) by Sony Savage (offgoing nurse). Report included the following information SBAR, Kardex, OR Summary, Procedure Summary, Intake/Output, MAR, Recent Results and Cardiac Rhythm NSR.

## 2022-02-27 NOTE — PROGRESS NOTES
2000-pt sat in chair for 4 hours this afternoon, pt up to the bedside commode; unable to void has until 2100 to void; Sherwin LEDESMA aware.

## 2022-02-28 ENCOUNTER — APPOINTMENT (OUTPATIENT)
Dept: INFUSION THERAPY | Age: 67
End: 2022-02-28

## 2022-02-28 LAB
ALBUMIN SERPL-MCNC: 1.9 G/DL (ref 3.5–5)
ALBUMIN/GLOB SERPL: 0.5 {RATIO} (ref 1.1–2.2)
ALP SERPL-CCNC: 117 U/L (ref 45–117)
ALT SERPL-CCNC: 34 U/L (ref 12–78)
ANION GAP SERPL CALC-SCNC: 3 MMOL/L (ref 5–15)
AST SERPL-CCNC: 25 U/L (ref 15–37)
BASOPHILS # BLD: 0 K/UL (ref 0–0.1)
BASOPHILS NFR BLD: 0 % (ref 0–1)
BILIRUB SERPL-MCNC: 0.7 MG/DL (ref 0.2–1)
BUN SERPL-MCNC: 4 MG/DL (ref 6–20)
BUN/CREAT SERPL: 5 (ref 12–20)
CALCIUM SERPL-MCNC: 8 MG/DL (ref 8.5–10.1)
CHLORIDE SERPL-SCNC: 110 MMOL/L (ref 97–108)
CO2 SERPL-SCNC: 25 MMOL/L (ref 21–32)
CREAT SERPL-MCNC: 0.88 MG/DL (ref 0.55–1.02)
DIFFERENTIAL METHOD BLD: ABNORMAL
EOSINOPHIL # BLD: 0.5 K/UL (ref 0–0.4)
EOSINOPHIL NFR BLD: 5 % (ref 0–7)
ERYTHROCYTE [DISTWIDTH] IN BLOOD BY AUTOMATED COUNT: 14.5 % (ref 11.5–14.5)
GLOBULIN SER CALC-MCNC: 3.9 G/DL (ref 2–4)
GLUCOSE BLD STRIP.AUTO-MCNC: 78 MG/DL (ref 65–117)
GLUCOSE BLD STRIP.AUTO-MCNC: 85 MG/DL (ref 65–117)
GLUCOSE BLD STRIP.AUTO-MCNC: 92 MG/DL (ref 65–117)
GLUCOSE BLD STRIP.AUTO-MCNC: 93 MG/DL (ref 65–117)
GLUCOSE SERPL-MCNC: 90 MG/DL (ref 65–100)
HCT VFR BLD AUTO: 24.3 % (ref 35–47)
HGB BLD-MCNC: 7.6 G/DL (ref 11.5–16)
IMM GRANULOCYTES # BLD AUTO: 0.1 K/UL (ref 0–0.04)
IMM GRANULOCYTES NFR BLD AUTO: 1 % (ref 0–0.5)
LIPASE FLD-CCNC: <10 U/L
LYMPHOCYTES # BLD: 2.4 K/UL (ref 0.8–3.5)
LYMPHOCYTES NFR BLD: 26 % (ref 12–49)
MAGNESIUM SERPL-MCNC: 2.2 MG/DL (ref 1.6–2.4)
MCH RBC QN AUTO: 34.4 PG (ref 26–34)
MCHC RBC AUTO-ENTMCNC: 31.3 G/DL (ref 30–36.5)
MCV RBC AUTO: 110 FL (ref 80–99)
MONOCYTES # BLD: 1.4 K/UL (ref 0–1)
MONOCYTES NFR BLD: 15 % (ref 5–13)
NEUTS SEG # BLD: 4.7 K/UL (ref 1.8–8)
NEUTS SEG NFR BLD: 53 % (ref 32–75)
NRBC # BLD: 0 K/UL (ref 0–0.01)
NRBC BLD-RTO: 0 PER 100 WBC
PHOSPHATE SERPL-MCNC: 3 MG/DL (ref 2.6–4.7)
PLATELET # BLD AUTO: 205 K/UL (ref 150–400)
PMV BLD AUTO: 10.2 FL (ref 8.9–12.9)
POTASSIUM SERPL-SCNC: 3.5 MMOL/L (ref 3.5–5.1)
PROT SERPL-MCNC: 5.8 G/DL (ref 6.4–8.2)
RBC # BLD AUTO: 2.21 M/UL (ref 3.8–5.2)
RBC MORPH BLD: ABNORMAL
RBC MORPH BLD: ABNORMAL
SERVICE CMNT-IMP: NORMAL
SODIUM SERPL-SCNC: 138 MMOL/L (ref 136–145)
SPECIMEN SOURCE FLD: NORMAL
WBC # BLD AUTO: 9.1 K/UL (ref 3.6–11)

## 2022-02-28 PROCEDURE — 97530 THERAPEUTIC ACTIVITIES: CPT

## 2022-02-28 PROCEDURE — 80053 COMPREHEN METABOLIC PANEL: CPT

## 2022-02-28 PROCEDURE — 65660000000 HC RM CCU STEPDOWN

## 2022-02-28 PROCEDURE — 85025 COMPLETE CBC W/AUTO DIFF WBC: CPT

## 2022-02-28 PROCEDURE — 82962 GLUCOSE BLOOD TEST: CPT

## 2022-02-28 PROCEDURE — 83690 ASSAY OF LIPASE: CPT

## 2022-02-28 PROCEDURE — 74011000250 HC RX REV CODE- 250: Performed by: SURGERY

## 2022-02-28 PROCEDURE — 97116 GAIT TRAINING THERAPY: CPT

## 2022-02-28 PROCEDURE — 84100 ASSAY OF PHOSPHORUS: CPT

## 2022-02-28 PROCEDURE — 99231 SBSQ HOSP IP/OBS SF/LOW 25: CPT | Performed by: CLINICAL NURSE SPECIALIST

## 2022-02-28 PROCEDURE — 74011250636 HC RX REV CODE- 250/636: Performed by: SURGERY

## 2022-02-28 PROCEDURE — 74011000258 HC RX REV CODE- 258: Performed by: SURGERY

## 2022-02-28 PROCEDURE — 74011250637 HC RX REV CODE- 250/637: Performed by: SURGERY

## 2022-02-28 PROCEDURE — 99024 POSTOP FOLLOW-UP VISIT: CPT | Performed by: PHYSICIAN ASSISTANT

## 2022-02-28 PROCEDURE — 83735 ASSAY OF MAGNESIUM: CPT

## 2022-02-28 PROCEDURE — 36415 COLL VENOUS BLD VENIPUNCTURE: CPT

## 2022-02-28 PROCEDURE — 65270000029 HC RM PRIVATE

## 2022-02-28 RX ORDER — ENOXAPARIN SODIUM 100 MG/ML
40 INJECTION SUBCUTANEOUS EVERY 24 HOURS
Status: DISCONTINUED | OUTPATIENT
Start: 2022-02-28 | End: 2022-03-05 | Stop reason: HOSPADM

## 2022-02-28 RX ADMIN — ACETAMINOPHEN 650 MG: 325 TABLET ORAL at 22:53

## 2022-02-28 RX ADMIN — HYDROMORPHONE HYDROCHLORIDE 1 MG: 1 INJECTION, SOLUTION INTRAMUSCULAR; INTRAVENOUS; SUBCUTANEOUS at 08:34

## 2022-02-28 RX ADMIN — OXYCODONE 10 MG: 5 TABLET ORAL at 16:40

## 2022-02-28 RX ADMIN — SODIUM CHLORIDE, POTASSIUM CHLORIDE, SODIUM LACTATE AND CALCIUM CHLORIDE 50 ML/HR: 600; 310; 30; 20 INJECTION, SOLUTION INTRAVENOUS at 08:19

## 2022-02-28 RX ADMIN — PIPERACILLIN AND TAZOBACTAM 3.38 G: 3; .375 INJECTION, POWDER, LYOPHILIZED, FOR SOLUTION INTRAVENOUS at 08:10

## 2022-02-28 RX ADMIN — LEVOTHYROXINE SODIUM 125 MCG: 0.12 TABLET ORAL at 06:57

## 2022-02-28 RX ADMIN — OXYCODONE 5 MG: 5 TABLET ORAL at 06:57

## 2022-02-28 RX ADMIN — HYDROMORPHONE HYDROCHLORIDE 1 MG: 1 INJECTION, SOLUTION INTRAMUSCULAR; INTRAVENOUS; SUBCUTANEOUS at 12:26

## 2022-02-28 RX ADMIN — HYDROCHLOROTHIAZIDE 25 MG: 25 TABLET ORAL at 08:11

## 2022-02-28 RX ADMIN — PIPERACILLIN AND TAZOBACTAM 3.38 G: 3; .375 INJECTION, POWDER, LYOPHILIZED, FOR SOLUTION INTRAVENOUS at 16:39

## 2022-02-28 RX ADMIN — SODIUM CHLORIDE, PRESERVATIVE FREE 10 ML: 5 INJECTION INTRAVENOUS at 06:00

## 2022-02-28 RX ADMIN — ENOXAPARIN SODIUM 40 MG: 100 INJECTION SUBCUTANEOUS at 08:10

## 2022-02-28 NOTE — PROGRESS NOTES
General Surgery Daily Progress Note    Admit Date: 2022  Post-Operative Day: 5 Days Post-Op from Procedure(s):  PYLORUS PRESERVING WHIPPLE PROCEDURE AND PORTAL LYPHMADENECTOMY     Subjective:     Last 24 hrs: Doing better this AM--reports poor nursing care over weekend. Wadley unattended. Tolerating liquids--denies NV. +belching  No BM, minimal gas  Up to bedside chair & ambulating w PT  Epidural d/c    Objective:     Blood pressure 124/76, pulse 100, temperature 99.1 °F (37.3 °C), resp. rate 20, height 5' 4\" (1.626 m), weight 258 lb 4.8 oz (117.2 kg), SpO2 100 %. Temp (24hrs), Av.1 °F (37.3 °C), Min:97.9 °F (36.6 °C), Max:100.9 °F (38.3 °C)      _____________________  Physical Exam:     Alert and Oriented, pleasant, in no acute distress. Cardiovascular: RRR, trace peripheral edema  Lungs:CTAB on RA  Abdomen: soft, min TTP  Incision c/d/i  Drains 130ml & 370ml/25 hrs clear light pink SS fluid      Assessment:   Principal Problem:    Pancreatic adenocarcinoma (HCC) (2022)            Plan:     Awaiting return of bowel function  Check drain lipase levels  Analgesics PRN  OOB, Ambulation w assistance  Daily PT  Daily Labs  Cont IV Abx  Encourage IS    Data Review:    Recent Labs     22   WBC 9.1 15.2*   HGB 7.6* 8.1*   HCT 24.3* 25.2*    187     Recent Labs     22  1548    138 138   K 3.5 3.6 3.7   * 110* 109*   CO2 25 24 24   GLU 90 95 83   BUN 4* 14 17   CREA 0.88 0.97 1.11*   CA 8.0* 8.0* 8.1*   MG 2.2 2.3  --    PHOS 3.0 2.1*  --    ALB 1.9* 2.1* 2.4*   ALT 34 66 93*     No results for input(s): AML, LPSE in the last 72 hours.         ______________________  Medications:    Current Facility-Administered Medications   Medication Dose Route Frequency    enoxaparin (LOVENOX) injection 40 mg  40 mg SubCUTAneous Q24H    HYDROmorphone (DILAUDID) injection 0.5-1 mg  0.5-1 mg IntraVENous Q3H PRN    oxyCODONE IR (ROXICODONE) tablet 5-10 mg  5-10 mg Oral Q4H PRN    piperacillin-tazobactam (ZOSYN) 3.375 g in 0.9% sodium chloride (MBP/ADV) 100 mL MBP  3.375 g IntraVENous Q8H    diphenhydrAMINE (BENADRYL) injection 25 mg  25 mg IntraVENous Q4H PRN    hydroCHLOROthiazide (HYDRODIURIL) tablet 25 mg  25 mg Oral DAILY    levothyroxine (SYNTHROID) tablet 125 mcg  125 mcg Oral ACB    sodium chloride (NS) flush 5-40 mL  5-40 mL IntraVENous Q8H    sodium chloride (NS) flush 5-40 mL  5-40 mL IntraVENous PRN    lactated Ringers infusion  50 mL/hr IntraVENous CONTINUOUS    acetaminophen (TYLENOL) tablet 650 mg  650 mg Oral Q6H PRN    naloxone (NARCAN) injection 0.4 mg  0.4 mg IntraVENous PRN    ondansetron (ZOFRAN) injection 4 mg  4 mg IntraVENous Q4H PRN    glucose chewable tablet 16 g  4 Tablet Oral PRN    glucagon (GLUCAGEN) injection 1 mg  1 mg IntraMUSCular PRN    dextrose 10 % infusion 0-250 mL  0-250 mL IntraVENous PRN    insulin lispro (HUMALOG) injection   SubCUTAneous AC&HS       Gladis Gutierrez  2/28/2022

## 2022-02-28 NOTE — PROGRESS NOTES
Physician Progress Note      PATIENTMargo Coast  [facility] #:                  [id number]  :                       1955  ADMIT DATE:       2022 5:15 AM  DISCH DATE:  RESPONDING  PROVIDER #:        HARRY CAMERON MD          QUERY TEXT:    Patient admitted with pancreatic adenocarcinoma and is s/p Whipple. The Registered Dietician is documenting severe malnutrition, noting that the pt has no appetite and has had significant weight loss in last 5 months. Pt has a BMI of 42.65. If possible, please document if you are evaluating and/or treating any of the following: The medical record reflects the following:    Risk Factors: Pancreatic cancer, ulcerative colitis, recent surgery, chronic illness    Clinical Indicators:  -- Registered Dietician documented severe malnutrition and noted: Pt states she has no appetite or desire to even sip on clear liquids. Reports chemo caused UC flare and subsequently lost   60 lb, wt hx supports 52 lb wt loss in past 5 months (18.6% BW) which is significant. Total, 60 lb wt loss in past year (20% BW). -- Registered Dietician Malnutrition Assessment:  Malnutrition Status:  Severe malnutrition  Context:  Chronic illness  Findings of the 6 clinical characteristics of malnutrition:  Energy Intake:  7 - 75% or less est energy requirements for 1 month or longer  Weight Loss:  7.00 - Greater than 10% over 6 months  Body Fat Loss:  Unable to assess,  Muscle Mass Loss:  Unable to assess,  Fluid Accumulation:  No significant fluid accumulation,   Strength:  Not performed  -- BMI = 42.65    Treatment: Registered Dietician consult, weight monitoring    Thank-you,  Jerad Gordon RN, [facility]  Clinical   [phone]  [email or web address]    ASPEN Criteria:  https://aspenjournals. onlinelibrary. hernandez. com/doi/full/10.1177/0371441769555803  Options provided:  -- Severe Malnutrition  -- Other Malnutrition, please specify, Please specify type of malnutrition. -- Malnutrition ruled out  -- Other - I will add my own diagnosis  -- Disagree - Not applicable / Not valid  -- Disagree - Clinically unable to determine / Unknown  -- Refer to Clinical Documentation Reviewer    PROVIDER RESPONSE TEXT:    This patient has severe malnutrition.     Query created by: Luca Brown on 2/25/2022 5:05 PM      Electronically signed by:  Velia Chou MD 2/28/2022 9:48 AM

## 2022-02-28 NOTE — DIABETES MGMT
[facility]    CLINICAL NURSE SPECIALIST CONSULT     Initial Presentation   Sharan Sierra is a 77 y.o. female admitted 2/23/22 after surgical management of pancreatic adenocarcinoma. HX:   Past Medical History:   Diagnosis Date    Arthritis     ostero arthritis, rhemathoid  lower back     Autoimmune disease ([address]     Ulcerative Colitis.  Hypertension     CONTROLLED WITH MEDS    Hypothyroid 3/23/2011    Malignant neoplasm of head of pancreas ([address] 9/28/2021    Ulcerative colitis ([address] 9/16/2010    Ulcerative colitis ([address] 9/16/2010    Ulcerative colitis ([address]         INITIAL DX:   Pancreatic adenocarcinoma ([address] [C25.9]     Current Treatment     TX: Pyloris preserving whipple with portal lymphadenectomy      Consulted by Julianne Lee MD for advanced diabetes nursing assessment and care for:   [x] Inpatient management strategy    Hospital Course   Clinical progress has been uncomplicated. Diabetes History   No hx prediabetes/diabetes  A1C 5.0%  PCP: Demetrius Smith MD    Diabetes Medication History  Key Antihyperglycemic Medications     Patient is on no antihyperglycemic meds. Subjective   I have never had diabetes but my  does so I eat healthy with him.        Lives with her - they have adult children and grandchildren  Underwent 6 rounds chemo- reported that this flaired her ulcerative colitis- lost about 60 lbs s/t food adjustments with chemo/UC. Pancreatic cancer s/p neoadjuvant chemotherapy. Post chemo imaging showed response to treatment without evidence of metastatic disease   Objective   Physical exam  General Overweight AA female in acute pain/ill-appearing.  Conversant and cooperative  Neuro  Alert, oriented   Vital Signs   Visit Vitals  /64 (BP 1 Location: Left upper arm, BP Patient Position: Sitting)   Pulse (!) 108   Temp 99.3 °F (37.4 °C)   Resp 21   Ht 5' 4\" (1.626 m)   Wt 117.2 kg (258 lb 4.8 oz)   SpO2 95%   BMI 44.34 kg/m²     Skin  Warm and dry. Abdominal surgical incisions  Heart   Regular rate and rhythm. No murmurs, rubs or gallops  Lungs  Clear to auscultation without rales or rhonchi  Extremities No foot wounds      Laboratory  Recent Labs     02/28/22  0427 02/26/22  0144 02/25/22  1548   GLU 90 95 83   AGAP 3* 4* 5   WBC 9.1 15.2*  --    CREA 0.88 0.97 1.11*   GFRNA >60 57* 49*   AST 25 67* 106*   ALT 34 66 93*       Factors impacting BG management  Factor Dose Comments   Nutrition:  Standard meals     Sips of clears    Drugs:  Steroids     Dexamethasone 4mg  x1 on IR    Pain Epidural     Whipple  Decreased insulin secretion      Blood glucose pattern      Significant diabetes-related events over the past 24-72 hours  24h BG pattern:   Sips of clears, awaiting rtn of bowel function  Working with PT/OT  LR IVF  WBC 9.1    Assessment and Plan   Nursing Diagnosis Risk for unstable blood glucose pattern   Nursing Intervention Domain 5259 Decision-making Support   Nursing Interventions Examined current inpatient diabetes/blood glucose control   Explored factors facilitating and impeding inpatient management  Explored corrective strategies with patient and responsible inpatient provider   Informed patient of rational for insulin strategy while hospitalized     Evaluation   Bob Cage is a 77year old female, with no history of diabetes, who was admitted with pancreatic adenocarcinoma now s/p pyloris sparing whipple. A1C is currently 5.0% indicating a normal PTA glucose pattern. Initially, pancreatic manipulation and inflammation leads to decreased insulin production/mild hyperglycemia. If occurring, it should be reasonably controlled with correctional humalog. Therefore, following pre-prandial blood sugars will help to determine antihyperglycemic agent adjustments required to control glucose once nutrition advanced.   The best inpatient antihyperglycemic agents to use if glucose sustained over goal (140-180) will be basal/bolus insulin. A common consequence after pancreatic resection is new or worsened diabetes s/t removal of insulin producing b-cells. Given that she didn't have a history of diabetes and was able to retain a good portion of her pancreas, insulin may likely not be required long-term. At this time, she is tolerating clear liq diet and glucose pattern has been WNL. Would like to monitor glucose pattern when PO advanced. Continue current therapy. Recommendations   1. POC glucose ACHS    2. Diet advancement per primary team, appreciate nutrition recs    3. Continue non-dextrose IVF    4. Continue correctional Humalog at normal sensitivity ACHS    5. If BG sustained over 180mg/dl, start low dose lantus at 0.15units/kg/day     Billing Code(s)   [x] 24063  Before making these care recommendations, I personally reviewed the hospitalization record, including notes, laboratory & diagnostic data and current medications, and examined the patient at the bedside (circumstances permitting) before making care recommendations. More than fifty (50) percent of the time was spent in patient counseling and/or care coordination.   Total minutes: 13    SHAY Garrett  Diabetes Clinical Nurse Specialist  [facility]  Access via [employer]

## 2022-02-28 NOTE — PROGRESS NOTES
Bedside and Verbal shift change report given to Isamar Clark RN (oncoming nurse) by Elizabeth Estevez (offgoing nurse). Report included the following information SBAR, Kardex, OR Summary, Procedure Summary, Intake/Output, MAR, Recent Results and Cardiac Rhythm NSR/Sinus Tachycardia.

## 2022-02-28 NOTE — PROGRESS NOTES
Problem: Mobility Impaired (Adult and Pediatric)  Goal: *Acute Goals and Plan of Care (Insert Text)  Description: FUNCTIONAL STATUS PRIOR TO ADMISSION: Patient was independent and active without use of DME. However, patient does have access to a cane and walker. HOME SUPPORT PRIOR TO ADMISSION: The patient lived with her , but did not require assistance. Of note, patient's  also has cancer. Physical Therapy Goals  Initiated 2/24/2022  1. Patient will move from supine to sit and sit to supine, scoot up and down, and roll side to side in bed with modified independence within 7 day(s). 2.  Patient will transfer from bed to chair and chair to bed with modified independence using the least restrictive device within 7 day(s). 3.  Patient will perform sit to stand with modified independence within 7 day(s). 4.  Patient will ambulate with modified independence for 150 feet with the least restrictive device within 7 day(s). 5.  Patient will ascend/descend 12 stairs with handrail(s) with supervision/set-up within 7 day(s). Outcome: Progressing Towards Goal     PHYSICAL THERAPY TREATMENT  Patient: Rafael Patel (10 y.o. female)  Date: 2/28/2022  Diagnosis: Pancreatic adenocarcinoma ([facility] [C25.9] Pancreatic adenocarcinoma ([facility]  Procedure(s) (LRB):  PYLORUS PRESERVING WHIPPLE PROCEDURE AND PORTAL LYPHMADENECTOMY (N/A) 5 Days Post-Op  Precautions:    Chart, physical therapy assessment, plan of care and goals were reviewed. ASSESSMENT  Patient continues with skilled PT services and is progressing towards goals. Ambulated 100 ft with the RW w/ slow (very slow) though steady gait, several rest breaks d/t fatigue. C/o light headedness when back in the room. VSS with no change in BP w/ activity or position changes. Educataed pt in the benefit of ambulating in the lopez outside of therapy sessions with staff assist working up to 3 walks/ day. Pt agreeable to attempt another walk this evening. NAYA drain fell out while walking. RN aware. Vitals:    02/28/22 1311 02/28/22 1323 02/28/22 1339   BP: 123/83 125/75 125/64   BP 1 Location: Right upper arm Right upper arm Left upper arm   BP Patient Position: At rest  Comment: long sitting in bed Sitting  Comment: after walking 15 ft Sitting  Comment: after walking in the lopez   Pulse:  (!) 108 (!) 108     Current Level of Function Impacting Discharge (mobility/balance): SBA to CGA    Other factors to consider for discharge: supportive family; Dtr gave pt an adjustable bed. PLAN :  Patient continues to benefit from skilled intervention to address the above impairments. Continue treatment per established plan of care. to address goals. Recommendation for discharge: (in order for the patient to meet his/her long term goals)  Physical therapy at least 2 days/week in the home AND ensure assist and/or supervision for safety with mobility    This discharge recommendation:  A follow-up discussion with the attending provider and/or case management is planned    IF patient discharges home will need the following DME: patient owns DME required for discharge       SUBJECTIVE:   Patient stated I'm okay.     OBJECTIVE DATA SUMMARY:   Critical Behavior:  Neurologic State: Alert,Eyes open spontaneously           Functional Mobility Training:  Bed Mobility:     Supine to Sit: Stand-by assistance;Bed Modified; Additional time (HOB max elevated)     Scooting: Stand-by assistance        Transfers:  Sit to Stand: Stand-by assistance; Additional time  Stand to Sit: Stand-by assistance                             Balance:  Sitting: Intact; Without support  Standing: Intact; With support  Ambulation/Gait Training:  Distance (ft): 100 Feet (ft)  Assistive Device: Gait belt;Walker, rolling  Ambulation - Level of Assistance: Stand-by assistance;Contact guard assistance        Gait Abnormalities: Other (forward lean onto walker at times)        Base of Support: Widened Speed/Ciera: Slow  Step Length: Right shortened;Left shortened  Walker veered right. Cues for upright posture, standing rest breaks as needed and to work to keep walker on straight path. Therapeutic Activity/ Education:   Educated in benefit of working up to three walks/ day in the lopez with staff. Recommended pt walk again this evening. Pain Rating:      Activity Tolerance:   Fair and requires frequent rest breaks    After treatment patient left in no apparent distress:   Sitting in chair and Call bell within reach    COMMUNICATION/COLLABORATION:   The patients plan of care was discussed with: Registered nurse.      Bee Ernandez, PT   Time Calculation: 40 mins

## 2022-03-01 LAB
ALBUMIN SERPL-MCNC: 1.9 G/DL (ref 3.5–5)
ALBUMIN/GLOB SERPL: 0.5 {RATIO} (ref 1.1–2.2)
ALP SERPL-CCNC: 121 U/L (ref 45–117)
ALT SERPL-CCNC: 28 U/L (ref 12–78)
ANION GAP SERPL CALC-SCNC: 4 MMOL/L (ref 5–15)
AST SERPL-CCNC: 24 U/L (ref 15–37)
BASOPHILS # BLD: 0 K/UL (ref 0–0.1)
BASOPHILS NFR BLD: 0 % (ref 0–1)
BILIRUB SERPL-MCNC: 0.7 MG/DL (ref 0.2–1)
BUN SERPL-MCNC: 3 MG/DL (ref 6–20)
BUN/CREAT SERPL: 4 (ref 12–20)
CALCIUM SERPL-MCNC: 8.1 MG/DL (ref 8.5–10.1)
CHLORIDE SERPL-SCNC: 109 MMOL/L (ref 97–108)
CO2 SERPL-SCNC: 26 MMOL/L (ref 21–32)
CREAT SERPL-MCNC: 0.78 MG/DL (ref 0.55–1.02)
DIFFERENTIAL METHOD BLD: ABNORMAL
EOSINOPHIL # BLD: 0.4 K/UL (ref 0–0.4)
EOSINOPHIL NFR BLD: 4 % (ref 0–7)
ERYTHROCYTE [DISTWIDTH] IN BLOOD BY AUTOMATED COUNT: 14.4 % (ref 11.5–14.5)
GLOBULIN SER CALC-MCNC: 3.7 G/DL (ref 2–4)
GLUCOSE BLD STRIP.AUTO-MCNC: 82 MG/DL (ref 65–117)
GLUCOSE BLD STRIP.AUTO-MCNC: 87 MG/DL (ref 65–117)
GLUCOSE BLD STRIP.AUTO-MCNC: 91 MG/DL (ref 65–117)
GLUCOSE BLD STRIP.AUTO-MCNC: 94 MG/DL (ref 65–117)
GLUCOSE SERPL-MCNC: 95 MG/DL (ref 65–100)
HCT VFR BLD AUTO: 23.2 % (ref 35–47)
HGB BLD-MCNC: 7.4 G/DL (ref 11.5–16)
IMM GRANULOCYTES # BLD AUTO: 0.1 K/UL (ref 0–0.04)
IMM GRANULOCYTES NFR BLD AUTO: 1 % (ref 0–0.5)
LYMPHOCYTES # BLD: 2.6 K/UL (ref 0.8–3.5)
LYMPHOCYTES NFR BLD: 27 % (ref 12–49)
MAGNESIUM SERPL-MCNC: 2 MG/DL (ref 1.6–2.4)
MCH RBC QN AUTO: 34.3 PG (ref 26–34)
MCHC RBC AUTO-ENTMCNC: 31.9 G/DL (ref 30–36.5)
MCV RBC AUTO: 107.4 FL (ref 80–99)
MONOCYTES # BLD: 1.4 K/UL (ref 0–1)
MONOCYTES NFR BLD: 14 % (ref 5–13)
NEUTS SEG # BLD: 5.2 K/UL (ref 1.8–8)
NEUTS SEG NFR BLD: 54 % (ref 32–75)
NRBC # BLD: 0 K/UL (ref 0–0.01)
NRBC BLD-RTO: 0 PER 100 WBC
PHOSPHATE SERPL-MCNC: 3.1 MG/DL (ref 2.6–4.7)
PLATELET # BLD AUTO: 206 K/UL (ref 150–400)
PMV BLD AUTO: 10.2 FL (ref 8.9–12.9)
POTASSIUM SERPL-SCNC: 3.3 MMOL/L (ref 3.5–5.1)
PROT SERPL-MCNC: 5.6 G/DL (ref 6.4–8.2)
RBC # BLD AUTO: 2.16 M/UL (ref 3.8–5.2)
SERVICE CMNT-IMP: NORMAL
SODIUM SERPL-SCNC: 139 MMOL/L (ref 136–145)
WBC # BLD AUTO: 9.6 K/UL (ref 3.6–11)

## 2022-03-01 PROCEDURE — 97116 GAIT TRAINING THERAPY: CPT

## 2022-03-01 PROCEDURE — 97530 THERAPEUTIC ACTIVITIES: CPT

## 2022-03-01 PROCEDURE — 80053 COMPREHEN METABOLIC PANEL: CPT

## 2022-03-01 PROCEDURE — 74011250637 HC RX REV CODE- 250/637: Performed by: SURGERY

## 2022-03-01 PROCEDURE — 84100 ASSAY OF PHOSPHORUS: CPT

## 2022-03-01 PROCEDURE — 82962 GLUCOSE BLOOD TEST: CPT

## 2022-03-01 PROCEDURE — 36415 COLL VENOUS BLD VENIPUNCTURE: CPT

## 2022-03-01 PROCEDURE — 74011250636 HC RX REV CODE- 250/636: Performed by: SURGERY

## 2022-03-01 PROCEDURE — 65660000000 HC RM CCU STEPDOWN

## 2022-03-01 PROCEDURE — 83735 ASSAY OF MAGNESIUM: CPT

## 2022-03-01 PROCEDURE — 74011000250 HC RX REV CODE- 250: Performed by: SURGERY

## 2022-03-01 PROCEDURE — 85025 COMPLETE CBC W/AUTO DIFF WBC: CPT

## 2022-03-01 PROCEDURE — 99231 SBSQ HOSP IP/OBS SF/LOW 25: CPT | Performed by: CLINICAL NURSE SPECIALIST

## 2022-03-01 PROCEDURE — 74011000258 HC RX REV CODE- 258: Performed by: SURGERY

## 2022-03-01 RX ADMIN — ACETAMINOPHEN 650 MG: 325 TABLET ORAL at 14:22

## 2022-03-01 RX ADMIN — SODIUM CHLORIDE, PRESERVATIVE FREE 10 ML: 5 INJECTION INTRAVENOUS at 00:04

## 2022-03-01 RX ADMIN — OXYCODONE 10 MG: 5 TABLET ORAL at 14:22

## 2022-03-01 RX ADMIN — SODIUM CHLORIDE, PRESERVATIVE FREE 10 ML: 5 INJECTION INTRAVENOUS at 06:00

## 2022-03-01 RX ADMIN — ENOXAPARIN SODIUM 40 MG: 100 INJECTION SUBCUTANEOUS at 07:23

## 2022-03-01 RX ADMIN — PIPERACILLIN AND TAZOBACTAM 3.38 G: 3; .375 INJECTION, POWDER, LYOPHILIZED, FOR SOLUTION INTRAVENOUS at 17:58

## 2022-03-01 RX ADMIN — HYDROCHLOROTHIAZIDE 25 MG: 25 TABLET ORAL at 09:15

## 2022-03-01 RX ADMIN — SODIUM CHLORIDE, POTASSIUM CHLORIDE, SODIUM LACTATE AND CALCIUM CHLORIDE 50 ML/HR: 600; 310; 30; 20 INJECTION, SOLUTION INTRAVENOUS at 00:29

## 2022-03-01 RX ADMIN — OXYCODONE 10 MG: 5 TABLET ORAL at 09:15

## 2022-03-01 RX ADMIN — HYDROMORPHONE HYDROCHLORIDE 1 MG: 1 INJECTION, SOLUTION INTRAMUSCULAR; INTRAVENOUS; SUBCUTANEOUS at 22:09

## 2022-03-01 RX ADMIN — SODIUM CHLORIDE, PRESERVATIVE FREE 10 ML: 5 INJECTION INTRAVENOUS at 22:00

## 2022-03-01 RX ADMIN — HYDROMORPHONE HYDROCHLORIDE 1 MG: 1 INJECTION, SOLUTION INTRAMUSCULAR; INTRAVENOUS; SUBCUTANEOUS at 00:37

## 2022-03-01 RX ADMIN — LEVOTHYROXINE SODIUM 125 MCG: 0.12 TABLET ORAL at 06:30

## 2022-03-01 RX ADMIN — OXYCODONE 10 MG: 5 TABLET ORAL at 19:03

## 2022-03-01 RX ADMIN — PIPERACILLIN AND TAZOBACTAM 3.38 G: 3; .375 INJECTION, POWDER, LYOPHILIZED, FOR SOLUTION INTRAVENOUS at 07:23

## 2022-03-01 RX ADMIN — PIPERACILLIN AND TAZOBACTAM 3.38 G: 3; .375 INJECTION, POWDER, LYOPHILIZED, FOR SOLUTION INTRAVENOUS at 00:29

## 2022-03-01 NOTE — PROGRESS NOTES
General Surgery Daily Progress Note    Admit Date: 2022  Post-Operative Day: 6 Days Post-Op from Procedure(s):  PYLORUS PRESERVING WHIPPLE PROCEDURE AND PORTAL LYPHMADENECTOMY     Subjective:     No acute events overnight. Started passing a lot of flatus. No n/v. Taking some PO but limited options due to needing Kosher diet. Pain well controlled. One of her drains came out yesterday. Remaining drain has low lipase level. Objective:     Blood pressure 120/74, pulse 89, temperature 99.6 °F (37.6 °C), resp. rate 18, height 5' 4\" (1.626 m), weight 258 lb 4.8 oz (117.2 kg), SpO2 98 %. Temp (24hrs), Av.3 °F (37.4 °C), Min:98.3 °F (36.8 °C), Max:100.3 °F (37.9 °C)      _____________________  Physical Exam:       GEN: Alert and Oriented, pleasant, in no acute distress. Cardiovascular: RRR  Pulm: CTAB on RA  Abdomen: soft, ND, appropriately tender. Incision c/d/i. Drain with serous output. Ext: warm, well perfused. Assessment:   Principal Problem:    Pancreatic adenocarcinoma ([address] (2022)        Plan:     - Advance to solid diet. - Plan to remove drain prior to discharge. - Analgesics PRN  - OOB, PT  - FSBS have been ok. Will follow until tolerating regular diet. - AM labs  - Continue IV Abx for 1 more day. WBC now normal.  Plan to stop tomorrow. - Encourage IS/Pulm toilet  - DVT proph       Savage Sigala MD  3/1/2022  11:45 AM      Data Review:    Recent Labs     22   WBC 9.6 9.1   HGB 7.4* 7.6*   HCT 23.2* 24.3*    205     Recent Labs     22    138   K 3.3* 3.5   * 110*   CO2 26 25   GLU 95 90   BUN 3* 4*   CREA 0.78 0.88   CA 8.1* 8.0*   MG 2.0 2.2   PHOS 3.1 3.0   ALB 1.9* 1.9*   ALT 28 34     No results for input(s): AML, LPSE in the last 72 hours.         ______________________  Medications:    Current Facility-Administered Medications   Medication Dose Route Frequency    enoxaparin (LOVENOX) injection 40 mg  40 mg SubCUTAneous Q24H    HYDROmorphone (DILAUDID) injection 0.5-1 mg  0.5-1 mg IntraVENous Q3H PRN    oxyCODONE IR (ROXICODONE) tablet 5-10 mg  5-10 mg Oral Q4H PRN    piperacillin-tazobactam (ZOSYN) 3.375 g in 0.9% sodium chloride (MBP/ADV) 100 mL MBP  3.375 g IntraVENous Q8H    diphenhydrAMINE (BENADRYL) injection 25 mg  25 mg IntraVENous Q4H PRN    hydroCHLOROthiazide (HYDRODIURIL) tablet 25 mg  25 mg Oral DAILY    levothyroxine (SYNTHROID) tablet 125 mcg  125 mcg Oral ACB    sodium chloride (NS) flush 5-40 mL  5-40 mL IntraVENous Q8H    sodium chloride (NS) flush 5-40 mL  5-40 mL IntraVENous PRN    lactated Ringers infusion  50 mL/hr IntraVENous CONTINUOUS    acetaminophen (TYLENOL) tablet 650 mg  650 mg Oral Q6H PRN    naloxone (NARCAN) injection 0.4 mg  0.4 mg IntraVENous PRN    ondansetron (ZOFRAN) injection 4 mg  4 mg IntraVENous Q4H PRN    glucose chewable tablet 16 g  4 Tablet Oral PRN    glucagon (GLUCAGEN) injection 1 mg  1 mg IntraMUSCular PRN    dextrose 10 % infusion 0-250 mL  0-250 mL IntraVENous PRN    insulin lispro (HUMALOG) injection   SubCUTAneous AC&HS       Jany Rivero MD  3/1/2022

## 2022-03-01 NOTE — ROUTINE PROCESS
Bedside shift change report given to BALTAZAR Gillespie (oncoming nurse) by [employer], RN (offgoing nurse).  Report included the following information SBAR, Kardex, ED Summary, OR Summary, Procedure Summary, Intake/Output, MAR, Recent Results and Med Rec Status.

## 2022-03-01 NOTE — PROGRESS NOTES
Problem: Mobility Impaired (Adult and Pediatric)  Goal: *Acute Goals and Plan of Care (Insert Text)  Description: FUNCTIONAL STATUS PRIOR TO ADMISSION: Patient was independent and active without use of DME. However, patient does have access to a cane and walker. HOME SUPPORT PRIOR TO ADMISSION: The patient lived with her , but did not require assistance. Of note, patient's  also has cancer. Physical Therapy Goals  Initiated 2/24/2022  1. Patient will move from supine to sit and sit to supine, scoot up and down, and roll side to side in bed with modified independence within 7 day(s). 2.  Patient will transfer from bed to chair and chair to bed with modified independence using the least restrictive device within 7 day(s). 3.  Patient will perform sit to stand with modified independence within 7 day(s). 4.  Patient will ambulate with modified independence for 150 feet with the least restrictive device within 7 day(s). 5.  Patient will ascend/descend 12 stairs with handrail(s) with supervision/set-up within 7 day(s). Outcome: Progressing Towards Goal     PHYSICAL THERAPY TREATMENT  Patient: Agustin Raphael (29 y.o. female)  Date: 3/1/2022  Diagnosis: Pancreatic adenocarcinoma ([facility] [address] [C25.9] Pancreatic adenocarcinoma ([facility] [address] [address]  Procedure(s) (LRB):  PYLORUS PRESERVING WHIPPLE PROCEDURE AND PORTAL LYPHMADENECTOMY (N/A) 6 Days Post-Op  Precautions:    Chart, physical therapy assessment, plan of care and goals were reviewed. ASSESSMENT  Patient continues with skilled PT services and is progressing towards goals. Pt was able to ambulate with rolling walker. Pt reports slight dizziness during gait but able to continue. Pt fatigue with task. Current Level of Function Impacting Discharge (mobility/balance): CGA    Other factors to consider for discharge:decrease independence          PLAN :  Patient continues to benefit from skilled intervention to address the above impairments.   Continue treatment per established plan of care. to address goals. Recommendation for discharge: (in order for the patient to meet his/her long term goals)  Physical therapy at least 2 days/week in the home     This discharge recommendation:  Has not yet been discussed the attending provider and/or case management    IF patient discharges home will need the following DME: patient owns DME required for discharge       SUBJECTIVE:   Patient stated I need to do this .     OBJECTIVE DATA SUMMARY:   Critical Behavior:  Neurologic State: Alert           Functional Mobility Training:  Bed Mobility:     Supine to Sit: Stand-by assistance; Additional time              Transfers:  Sit to Stand: Stand-by assistance  Stand to Sit: Stand-by assistance                             Balance:  Sitting: Intact  Standing: Impaired  Standing - Static: Good  Standing - Dynamic : Good  Ambulation/Gait Training:  Distance (ft): 120 Feet (ft)  Assistive Device: Walker, rolling  Ambulation - Level of Assistance: Stand-by assistance        Gait Abnormalities: Antalgic        Base of Support: Widened     Speed/Ciera: Slow  Step Length: Left shortened;Right shortened                    Stairs: Therapeutic Exercises:     Pain Rating:  abdomin    Activity Tolerance:   Limited     After treatment patient left in no apparent distress:   Sitting in chair and Call bell within reach    COMMUNICATION/COLLABORATION:   The patients plan of care was discussed with: Registered nurse.      Miguel Chavez PTA   Time Calculation: 29 mins

## 2022-03-01 NOTE — PROGRESS NOTES
Transition of Care: TBD; likely home with HH/PT; (no orders yet)    Transport Plan: TBD; likely in car with family    RUR: 17%    Main contact is - Daisha Mckeon- [phone]    Discharge pending:  -pending progress with PT  -pending return of bowel function  -patient continues on iV antibiotics  -pending medical progress and care recs    CM noted from chart. CM following  Frandy Thompson RN, CRM              NOTE: Patient is alert and oriented x4. Demographics confirmed. Patient and her  reside in a two story home with 3 steps to enter and 7 steps to the second floor. No DME. Patient is independent in ADLs and ambulation. Hx: arthritis, ulcerative colitis, HTN. Patient does drive.  PCP confirmed and pharmacy used is [facility].

## 2022-03-01 NOTE — DIABETES MGMT
[address] [facility]    CLINICAL NURSE SPECIALIST CONSULT     Initial Presentation   Fabricio Yo is a 77 y.o. female admitted 2/23/22 after surgical management of pancreatic adenocarcinoma. HX:   Past Medical History:   Diagnosis Date    Arthritis     ostero arthritis, rhemathoid  lower back     Autoimmune disease ([address]     Ulcerative Colitis.  Hypertension     CONTROLLED WITH MEDS    Hypothyroid 3/23/2011    Malignant neoplasm of head of pancreas ([address] 9/28/2021    Ulcerative colitis ([address] 9/16/2010    Ulcerative colitis ([address] 9/16/2010    Ulcerative colitis ([address]         INITIAL DX:   Pancreatic adenocarcinoma ([address] [C25.9]     Current Treatment     TX: Pyloris preserving whipple with portal lymphadenectomy      Consulted by Seda Hoffman MD for advanced diabetes nursing assessment and care for:   [x] Inpatient management strategy    Hospital Course   Clinical progress has been uncomplicated. Diabetes History   No hx prediabetes/diabetes  A1C 5.0%  PCP: Mil Alexander MD    Diabetes Medication History  Key Antihyperglycemic Medications     Patient is on no antihyperglycemic meds. Subjective   I have never had diabetes but my  does so I eat healthy with him.        Lives with her - they have adult children and grandchildren  Underwent 6 rounds chemo- reported that this flaired her ulcerative colitis- lost about 60 lbs s/t food adjustments with chemo/UC. Pancreatic cancer s/p neoadjuvant chemotherapy. Post chemo imaging showed response to treatment without evidence of metastatic disease   Objective   Physical exam  General Overweight AA female in acute pain/ill-appearing. Conversant and cooperative  Neuro  Alert, oriented   Vital Signs   Visit Vitals  /75   Pulse 75   Temp 97.6 °F (36.4 °C)   Resp 18   Ht 5' 4\" (1.626 m)   Wt 117.2 kg (258 lb 4.8 oz)   SpO2 99%   BMI 44.34 kg/m²     Skin  Warm and dry.  Abdominal surgical incisions  Heart   Regular rate and rhythm. No murmurs, rubs or gallops  Lungs  Clear to auscultation without rales or rhonchi  Extremities No foot wounds      Laboratory  Recent Labs     03/01/22  0207 02/28/22  0427   GLU 95 90   AGAP 4* 3*   WBC 9.6 9.1   CREA 0.78 0.88   GFRNA >60 >60   AST 24 25   ALT 28 34       Factors impacting BG management  Factor Dose Comments   Nutrition:  Standard meals     Diet advancing today    Drugs:  Steroids     Dexamethasone 4mg  x1 on IR    Pain Epidural     Whipple  Decreased insulin secretion      Blood glucose pattern      Significant diabetes-related events over the past 24-72 hours  24h BG pattern: 78-87  Diet advanced today  Working with PT/OT  LR IVF  WBC 9.1    Assessment and Plan   Nursing Diagnosis Risk for unstable blood glucose pattern   Nursing Intervention Domain 5254 Decision-making Support   Nursing Interventions Examined current inpatient diabetes/blood glucose control   Explored factors facilitating and impeding inpatient management  Explored corrective strategies with patient and responsible inpatient provider   Informed patient of rational for insulin strategy while hospitalized     Evaluation   Radha Montague is a 77year old female, with no history of diabetes, who was admitted with pancreatic adenocarcinoma now s/p pyloris sparing whipple. A1C is currently 5.0% indicating a normal PTA glucose pattern. Initially, pancreatic manipulation and inflammation leads to decreased insulin production/mild hyperglycemia. If occurring, it should be reasonably controlled with correctional humalog. Therefore, following pre-prandial blood sugars will help to determine antihyperglycemic agent adjustments required to control glucose once nutrition advanced. The best inpatient antihyperglycemic agents to use if glucose sustained over goal (140-180) will be basal/bolus insulin.       A common consequence after pancreatic resection is new or worsened diabetes s/t removal of insulin producing b-cells. Given that she didn't have a history of diabetes and was able to retain a good portion of her pancreas, insulin may likely not be required long-term. At this time, she is tolerating clear liq diet and glucose pattern has been WNL. Would like to monitor glucose pattern when PO advanced. Continue current therapy. Recommendations   1. POC glucose ACHS    2. Diet advancement per primary team, appreciate nutrition recs    3. Continue correctional Humalog at normal sensitivity ACHS    If BG remains WNL on regular diet, please stop routine POC glucose checks and d/c correctional humalog. Billing Code(s)   [x] 67062  Before making these care recommendations, I personally reviewed the hospitalization record, including notes, laboratory & diagnostic data and current medications, and examined the patient at the bedside (circumstances permitting) before making care recommendations. More than fifty (50) percent of the time was spent in patient counseling and/or care coordination.   Total minutes: 13    SHAY Morrow  Diabetes Clinical Nurse Specialist  [facility]  Access via [employer]

## 2022-03-02 ENCOUNTER — APPOINTMENT (OUTPATIENT)
Dept: INFUSION THERAPY | Age: 67
End: 2022-03-02

## 2022-03-02 LAB
ALBUMIN SERPL-MCNC: 2.1 G/DL (ref 3.5–5)
ALBUMIN/GLOB SERPL: 0.5 {RATIO} (ref 1.1–2.2)
ALP SERPL-CCNC: 137 U/L (ref 45–117)
ALT SERPL-CCNC: 25 U/L (ref 12–78)
ANION GAP SERPL CALC-SCNC: 3 MMOL/L (ref 5–15)
AST SERPL-CCNC: 21 U/L (ref 15–37)
BASOPHILS # BLD: 0 K/UL (ref 0–0.1)
BASOPHILS NFR BLD: 0 % (ref 0–1)
BILIRUB SERPL-MCNC: 0.5 MG/DL (ref 0.2–1)
BUN SERPL-MCNC: 3 MG/DL (ref 6–20)
BUN/CREAT SERPL: 3 (ref 12–20)
CALCIUM SERPL-MCNC: 8.6 MG/DL (ref 8.5–10.1)
CHLORIDE SERPL-SCNC: 106 MMOL/L (ref 97–108)
CO2 SERPL-SCNC: 27 MMOL/L (ref 21–32)
CREAT SERPL-MCNC: 0.9 MG/DL (ref 0.55–1.02)
DIFFERENTIAL METHOD BLD: ABNORMAL
EOSINOPHIL # BLD: 0.4 K/UL (ref 0–0.4)
EOSINOPHIL NFR BLD: 3 % (ref 0–7)
ERYTHROCYTE [DISTWIDTH] IN BLOOD BY AUTOMATED COUNT: 14.2 % (ref 11.5–14.5)
GLOBULIN SER CALC-MCNC: 4.3 G/DL (ref 2–4)
GLUCOSE BLD STRIP.AUTO-MCNC: 84 MG/DL (ref 65–117)
GLUCOSE BLD STRIP.AUTO-MCNC: 87 MG/DL (ref 65–117)
GLUCOSE BLD STRIP.AUTO-MCNC: 98 MG/DL (ref 65–117)
GLUCOSE SERPL-MCNC: 102 MG/DL (ref 65–100)
HCT VFR BLD AUTO: 27.4 % (ref 35–47)
HGB BLD-MCNC: 8.8 G/DL (ref 11.5–16)
IMM GRANULOCYTES # BLD AUTO: 0 K/UL
IMM GRANULOCYTES NFR BLD AUTO: 0 %
LYMPHOCYTES # BLD: 3.9 K/UL (ref 0.8–3.5)
LYMPHOCYTES NFR BLD: 33 % (ref 12–49)
MAGNESIUM SERPL-MCNC: 2.1 MG/DL (ref 1.6–2.4)
MCH RBC QN AUTO: 34.1 PG (ref 26–34)
MCHC RBC AUTO-ENTMCNC: 32.1 G/DL (ref 30–36.5)
MCV RBC AUTO: 106.2 FL (ref 80–99)
MONOCYTES # BLD: 1.2 K/UL (ref 0–1)
MONOCYTES NFR BLD: 10 % (ref 5–13)
NEUTS SEG # BLD: 6.2 K/UL (ref 1.8–8)
NEUTS SEG NFR BLD: 54 % (ref 32–75)
NRBC # BLD: 0 K/UL (ref 0–0.01)
NRBC BLD-RTO: 0 PER 100 WBC
PHOSPHATE SERPL-MCNC: 3.7 MG/DL (ref 2.6–4.7)
PLATELET # BLD AUTO: 281 K/UL (ref 150–400)
PLATELET COMMENTS,PCOM: ABNORMAL
PMV BLD AUTO: 10.5 FL (ref 8.9–12.9)
POTASSIUM SERPL-SCNC: 3.1 MMOL/L (ref 3.5–5.1)
PROT SERPL-MCNC: 6.4 G/DL (ref 6.4–8.2)
RBC # BLD AUTO: 2.58 M/UL (ref 3.8–5.2)
RBC MORPH BLD: ABNORMAL
RBC MORPH BLD: ABNORMAL
SERVICE CMNT-IMP: NORMAL
SODIUM SERPL-SCNC: 136 MMOL/L (ref 136–145)
WBC # BLD AUTO: 11.7 K/UL (ref 3.6–11)
WBC MORPH BLD: ABNORMAL

## 2022-03-02 PROCEDURE — 99231 SBSQ HOSP IP/OBS SF/LOW 25: CPT | Performed by: CLINICAL NURSE SPECIALIST

## 2022-03-02 PROCEDURE — 74011250637 HC RX REV CODE- 250/637: Performed by: PHYSICIAN ASSISTANT

## 2022-03-02 PROCEDURE — 74011000250 HC RX REV CODE- 250: Performed by: SURGERY

## 2022-03-02 PROCEDURE — 74011000258 HC RX REV CODE- 258: Performed by: SURGERY

## 2022-03-02 PROCEDURE — 80053 COMPREHEN METABOLIC PANEL: CPT

## 2022-03-02 PROCEDURE — 74011250636 HC RX REV CODE- 250/636: Performed by: SURGERY

## 2022-03-02 PROCEDURE — 82962 GLUCOSE BLOOD TEST: CPT

## 2022-03-02 PROCEDURE — 36415 COLL VENOUS BLD VENIPUNCTURE: CPT

## 2022-03-02 PROCEDURE — 83735 ASSAY OF MAGNESIUM: CPT

## 2022-03-02 PROCEDURE — 99024 POSTOP FOLLOW-UP VISIT: CPT | Performed by: PHYSICIAN ASSISTANT

## 2022-03-02 PROCEDURE — 84100 ASSAY OF PHOSPHORUS: CPT

## 2022-03-02 PROCEDURE — 85025 COMPLETE CBC W/AUTO DIFF WBC: CPT

## 2022-03-02 PROCEDURE — 74011250637 HC RX REV CODE- 250/637: Performed by: SURGERY

## 2022-03-02 PROCEDURE — 97116 GAIT TRAINING THERAPY: CPT

## 2022-03-02 PROCEDURE — 65660000000 HC RM CCU STEPDOWN

## 2022-03-02 RX ORDER — POTASSIUM CHLORIDE 750 MG/1
20 TABLET, FILM COATED, EXTENDED RELEASE ORAL 3 TIMES DAILY
Status: COMPLETED | OUTPATIENT
Start: 2022-03-02 | End: 2022-03-03

## 2022-03-02 RX ADMIN — PIPERACILLIN AND TAZOBACTAM 3.38 G: 3; .375 INJECTION, POWDER, LYOPHILIZED, FOR SOLUTION INTRAVENOUS at 07:08

## 2022-03-02 RX ADMIN — SODIUM CHLORIDE, PRESERVATIVE FREE 10 ML: 5 INJECTION INTRAVENOUS at 17:31

## 2022-03-02 RX ADMIN — POTASSIUM CHLORIDE 20 MEQ: 750 TABLET, EXTENDED RELEASE ORAL at 23:05

## 2022-03-02 RX ADMIN — ENOXAPARIN SODIUM 40 MG: 100 INJECTION SUBCUTANEOUS at 07:07

## 2022-03-02 RX ADMIN — LEVOTHYROXINE SODIUM 125 MCG: 0.12 TABLET ORAL at 07:10

## 2022-03-02 RX ADMIN — OXYCODONE 10 MG: 5 TABLET ORAL at 19:02

## 2022-03-02 RX ADMIN — SODIUM CHLORIDE, PRESERVATIVE FREE 10 ML: 5 INJECTION INTRAVENOUS at 22:00

## 2022-03-02 RX ADMIN — HYDROMORPHONE HYDROCHLORIDE 1 MG: 1 INJECTION, SOLUTION INTRAMUSCULAR; INTRAVENOUS; SUBCUTANEOUS at 07:08

## 2022-03-02 RX ADMIN — OXYCODONE 10 MG: 5 TABLET ORAL at 14:31

## 2022-03-02 RX ADMIN — PIPERACILLIN AND TAZOBACTAM 3.38 G: 3; .375 INJECTION, POWDER, LYOPHILIZED, FOR SOLUTION INTRAVENOUS at 17:30

## 2022-03-02 RX ADMIN — SODIUM CHLORIDE, PRESERVATIVE FREE 10 ML: 5 INJECTION INTRAVENOUS at 06:00

## 2022-03-02 RX ADMIN — PIPERACILLIN AND TAZOBACTAM 3.38 G: 3; .375 INJECTION, POWDER, LYOPHILIZED, FOR SOLUTION INTRAVENOUS at 02:15

## 2022-03-02 RX ADMIN — OXYCODONE 10 MG: 5 TABLET ORAL at 09:22

## 2022-03-02 RX ADMIN — HYDROMORPHONE HYDROCHLORIDE 1 MG: 1 INJECTION, SOLUTION INTRAMUSCULAR; INTRAVENOUS; SUBCUTANEOUS at 23:08

## 2022-03-02 RX ADMIN — POTASSIUM CHLORIDE 20 MEQ: 750 TABLET, EXTENDED RELEASE ORAL at 17:30

## 2022-03-02 NOTE — PROGRESS NOTES
Bedside and Verbal shift change report given to Rocío Wan  (oncoming nurse) by Donna Edward (offgoing nurse). Report included the following information SBAR and Kardex.

## 2022-03-02 NOTE — DIABETES MGMT
[address] [facility]    CLINICAL NURSE SPECIALIST CONSULT     Initial Presentation   Loretta Devine is a 77 y.o. female admitted 2/23/22 after surgical management of pancreatic adenocarcinoma. HX:   Past Medical History:   Diagnosis Date    Arthritis     ostero arthritis, rhemathoid  lower back     Autoimmune disease ([address]     Ulcerative Colitis.  Hypertension     CONTROLLED WITH MEDS    Hypothyroid 3/23/2011    Malignant neoplasm of head of pancreas ([address] 9/28/2021    Ulcerative colitis ([address] 9/16/2010    Ulcerative colitis ([address] 9/16/2010    Ulcerative colitis ([address]         INITIAL DX:   Pancreatic adenocarcinoma ([address] [C25.9]     Current Treatment     TX: Pyloris preserving whipple with portal lymphadenectomy      Consulted by Miguel Hendrix MD for advanced diabetes nursing assessment and care for:   [x] Inpatient management strategy    Hospital Course   Clinical progress has been uncomplicated. Diabetes History   No hx prediabetes/diabetes  A1C 5.0%  PCP: Debbie Richards MD    Diabetes Medication History  Key Antihyperglycemic Medications     Patient is on no antihyperglycemic meds. Subjective   I have never had diabetes but my  does so I eat healthy with him.        Lives with her - they have adult children and grandchildren  Underwent 6 rounds chemo- reported that this flaired her ulcerative colitis- lost about 60 lbs s/t food adjustments with chemo/UC. Pancreatic cancer s/p neoadjuvant chemotherapy. Post chemo imaging showed response to treatment without evidence of metastatic disease   Objective   Physical exam  General Overweight AA female in acute pain/ill-appearing. Conversant and cooperative  Neuro  Alert, oriented   Vital Signs   Visit Vitals  BP (!) 107/52   Pulse 97   Temp 98.9 °F (37.2 °C)   Resp 18   Ht 5' 4\" (1.626 m)   Wt 117.2 kg (258 lb 4.8 oz)   SpO2 96%   BMI 44.34 kg/m²     Skin  Warm and dry.  Abdominal surgical incisions  Heart   Regular rate and rhythm. No murmurs, rubs or gallops  Lungs  Clear to auscultation without rales or rhonchi  Extremities No foot wounds      Laboratory  Recent Labs     03/02/22  0607 03/01/22  0207 02/28/22  0427   * 95 90   AGAP 3* 4* 3*   WBC 11.7* 9.6 9.1   CREA 0.90 0.78 0.88   GFRNA >60 >60 >60   AST 21 24 25   ALT 25 28 34       Factors impacting BG management  Factor Dose Comments   Nutrition:  Standard meals     Diet advancing today    Drugs:  Steroids     Dexamethasone 4mg  x1 on IR    Pain Epidural     Whipple  Decreased insulin secretion      Blood glucose pattern      Significant diabetes-related events over the past 24-72 hours  24h BG pattern: 87-98  Diet advanced  Working with PT/OT      Assessment and Plan   Nursing Diagnosis Risk for unstable blood glucose pattern   Nursing Intervention Domain 5250 Decision-making Support   Nursing Interventions Examined current inpatient diabetes/blood glucose control   Explored factors facilitating and impeding inpatient management  Explored corrective strategies with patient and responsible inpatient provider   Informed patient of rational for insulin strategy while hospitalized     Evaluation   Azalea Carlos is a 77year old female, with no history of diabetes, who was admitted with pancreatic adenocarcinoma now s/p pyloris sparing whipple. A1C is currently 5.0% indicating a normal PTA glucose pattern. Initially, pancreatic manipulation and inflammation leads to decreased insulin production/mild hyperglycemia, however her glucose pattern has been WNL following surgery as oral intake has advanced. It is therefore reasonable to stop POC glucose monitoring. Recommendations   Please stop routine POC glucose checks and d/c correctional humalog. Trend glucose on routine labs    Diabetes Management Team to sign off at this point as patient's blood glucose remains stable. Please re-consult us if patient needs change.   Thank you for including us in their care. Billing Code(s)   [x] 92511  Before making these care recommendations, I personally reviewed the hospitalization record, including notes, laboratory & diagnostic data and current medications, and examined the patient at the bedside (circumstances permitting) before making care recommendations. More than fifty (50) percent of the time was spent in patient counseling and/or care coordination.   Total minutes: 13    Titus Cosme, CNS  Diabetes Clinical Nurse Specialist  [facility]  Access via [employer]

## 2022-03-02 NOTE — PROGRESS NOTES
General Surgery Daily Progress Note    Admit Date: 2022  Post-Operative Day: 7 Days Post-Op from Procedure(s):  PYLORUS PRESERVING WHIPPLE PROCEDURE AND PORTAL LYPHMADENECTOMY     Subjective:     Last 24 hrs: Tearful this AM, feeling overwhelmed. Tolerating diet, denies NV.    +flatus, no BM but smear of stool earlier  Pain better controlled--trying to keep to schedule   C/o dizziness with --h/o vertigo      Objective:     Blood pressure 106/71, pulse 93, temperature 98.9 °F (37.2 °C), resp. rate 18, height 5' 4\" (1.626 m), weight 258 lb 4.8 oz (117.2 kg), SpO2 97 %. Temp (24hrs), Av.8 °F (37.1 °C), Min:97.6 °F (36.4 °C), Max:99.6 °F (37.6 °C)      _____________________  Physical Exam:     Alert and Oriented, cooperative, in no acute distress. Cardiovascular: RRR, trace peripheral edema  Lungs:CTAB on RA  Abdomen: soft, appro TTP along midline incision  +BS    Drain w 200ml/24 hrs clear SS output      Assessment:   Principal Problem:    Pancreatic adenocarcinoma ([address] (2022)            Plan:     Awaiting further return of bowel function  Daily PT  OOB, ambulation as tolerated  Daily labs  Replete K  Analgesics q4hrs  Pathology reviewed with patient by Dr. Silvia Brunner covering for Dr. Silvia Bronson    Data Review:    Recent Labs     22  0607 22   WBC 11.7* 9.6 9.1   HGB 8.8* 7.4* 7.6*   HCT 27.4* 23.2* 24.3*    206 205     Recent Labs     22  0607 22    139 138   K 3.1* 3.3* 3.5    109* 110*   CO2 27 26 25   * 95 90   BUN 3* 3* 4*   CREA 0.90 0.78 0.88   CA 8.6 8.1* 8.0*   MG 2.1 2.0 2.2   PHOS 3.7 3.1 3.0   ALB 2.1* 1.9* 1.9*   ALT 25 28 34     No results for input(s): AML, LPSE in the last 72 hours.         ______________________  Medications:    Current Facility-Administered Medications   Medication Dose Route Frequency    enoxaparin (LOVENOX) injection 40 mg  40 mg SubCUTAneous Q24H    HYDROmorphone (DILAUDID) injection 0.5-1 mg  0.5-1 mg IntraVENous Q3H PRN    oxyCODONE IR (ROXICODONE) tablet 5-10 mg  5-10 mg Oral Q4H PRN    piperacillin-tazobactam (ZOSYN) 3.375 g in 0.9% sodium chloride (MBP/ADV) 100 mL MBP  3.375 g IntraVENous Q8H    diphenhydrAMINE (BENADRYL) injection 25 mg  25 mg IntraVENous Q4H PRN    hydroCHLOROthiazide (HYDRODIURIL) tablet 25 mg  25 mg Oral DAILY    levothyroxine (SYNTHROID) tablet 125 mcg  125 mcg Oral ACB    sodium chloride (NS) flush 5-40 mL  5-40 mL IntraVENous Q8H    sodium chloride (NS) flush 5-40 mL  5-40 mL IntraVENous PRN    acetaminophen (TYLENOL) tablet 650 mg  650 mg Oral Q6H PRN    naloxone (NARCAN) injection 0.4 mg  0.4 mg IntraVENous PRN    ondansetron (ZOFRAN) injection 4 mg  4 mg IntraVENous Q4H PRN    glucose chewable tablet 16 g  4 Tablet Oral PRN    glucagon (GLUCAGEN) injection 1 mg  1 mg IntraMUSCular PRN    dextrose 10 % infusion 0-250 mL  0-250 mL IntraVENous PRN    insulin lispro (HUMALOG) injection   SubCUTAneous AC&HS       JESSICA Gentile  3/2/2022    ATTENDING ADDENDUM  I supervised the APC and reviewed the note. We discussed the plan of care  Feeling somewhat better this afternoon. Incision healing well. . Drainage is clear.

## 2022-03-03 ENCOUNTER — HOME HEALTH ADMISSION (OUTPATIENT)
Dept: HOME HEALTH SERVICES | Facility: HOME HEALTH | Age: 67
End: 2022-03-03

## 2022-03-03 LAB
ALBUMIN SERPL-MCNC: 1.7 G/DL (ref 3.5–5)
ALBUMIN/GLOB SERPL: 0.5 {RATIO} (ref 1.1–2.2)
ALP SERPL-CCNC: 109 U/L (ref 45–117)
ALT SERPL-CCNC: 18 U/L (ref 12–78)
ANION GAP SERPL CALC-SCNC: 2 MMOL/L (ref 5–15)
AST SERPL-CCNC: 13 U/L (ref 15–37)
BASOPHILS # BLD: 0 K/UL (ref 0–0.1)
BASOPHILS NFR BLD: 0 % (ref 0–1)
BILIRUB SERPL-MCNC: 0.4 MG/DL (ref 0.2–1)
BUN SERPL-MCNC: 3 MG/DL (ref 6–20)
BUN/CREAT SERPL: 4 (ref 12–20)
CALCIUM SERPL-MCNC: 8.2 MG/DL (ref 8.5–10.1)
CHLORIDE SERPL-SCNC: 110 MMOL/L (ref 97–108)
CO2 SERPL-SCNC: 26 MMOL/L (ref 21–32)
CREAT SERPL-MCNC: 0.83 MG/DL (ref 0.55–1.02)
DIFFERENTIAL METHOD BLD: ABNORMAL
EOSINOPHIL # BLD: 0.3 K/UL (ref 0–0.4)
EOSINOPHIL NFR BLD: 3 % (ref 0–7)
ERYTHROCYTE [DISTWIDTH] IN BLOOD BY AUTOMATED COUNT: 14.5 % (ref 11.5–14.5)
GLOBULIN SER CALC-MCNC: 3.6 G/DL (ref 2–4)
GLUCOSE SERPL-MCNC: 91 MG/DL (ref 65–100)
HCT VFR BLD AUTO: 23.1 % (ref 35–47)
HGB BLD-MCNC: 7.4 G/DL (ref 11.5–16)
IMM GRANULOCYTES # BLD AUTO: 0 K/UL
IMM GRANULOCYTES NFR BLD AUTO: 0 %
LYMPHOCYTES # BLD: 2.8 K/UL (ref 0.8–3.5)
LYMPHOCYTES NFR BLD: 31 % (ref 12–49)
MAGNESIUM SERPL-MCNC: 2 MG/DL (ref 1.6–2.4)
MCH RBC QN AUTO: 33.5 PG (ref 26–34)
MCHC RBC AUTO-ENTMCNC: 32 G/DL (ref 30–36.5)
MCV RBC AUTO: 104.5 FL (ref 80–99)
MONOCYTES # BLD: 1.3 K/UL (ref 0–1)
MONOCYTES NFR BLD: 14 % (ref 5–13)
NEUTS SEG # BLD: 4.7 K/UL (ref 1.8–8)
NEUTS SEG NFR BLD: 52 % (ref 32–75)
NRBC # BLD: 0 K/UL (ref 0–0.01)
NRBC BLD-RTO: 0 PER 100 WBC
PHOSPHATE SERPL-MCNC: 3.6 MG/DL (ref 2.6–4.7)
PLATELET # BLD AUTO: 258 K/UL (ref 150–400)
PLATELET COMMENTS,PCOM: ABNORMAL
PMV BLD AUTO: 10 FL (ref 8.9–12.9)
POTASSIUM SERPL-SCNC: 3.6 MMOL/L (ref 3.5–5.1)
PROT SERPL-MCNC: 5.3 G/DL (ref 6.4–8.2)
RBC # BLD AUTO: 2.21 M/UL (ref 3.8–5.2)
RBC MORPH BLD: ABNORMAL
RBC MORPH BLD: ABNORMAL
SODIUM SERPL-SCNC: 138 MMOL/L (ref 136–145)
WBC # BLD AUTO: 9.1 K/UL (ref 3.6–11)
WBC MORPH BLD: ABNORMAL

## 2022-03-03 PROCEDURE — 74011250636 HC RX REV CODE- 250/636: Performed by: SURGERY

## 2022-03-03 PROCEDURE — 84100 ASSAY OF PHOSPHORUS: CPT

## 2022-03-03 PROCEDURE — 65660000000 HC RM CCU STEPDOWN

## 2022-03-03 PROCEDURE — 36415 COLL VENOUS BLD VENIPUNCTURE: CPT

## 2022-03-03 PROCEDURE — 85025 COMPLETE CBC W/AUTO DIFF WBC: CPT

## 2022-03-03 PROCEDURE — 74011250637 HC RX REV CODE- 250/637: Performed by: PHYSICIAN ASSISTANT

## 2022-03-03 PROCEDURE — 83735 ASSAY OF MAGNESIUM: CPT

## 2022-03-03 PROCEDURE — 99024 POSTOP FOLLOW-UP VISIT: CPT | Performed by: PHYSICIAN ASSISTANT

## 2022-03-03 PROCEDURE — 74011000258 HC RX REV CODE- 258: Performed by: SURGERY

## 2022-03-03 PROCEDURE — 74011250637 HC RX REV CODE- 250/637: Performed by: SURGERY

## 2022-03-03 PROCEDURE — 80053 COMPREHEN METABOLIC PANEL: CPT

## 2022-03-03 PROCEDURE — 74011000250 HC RX REV CODE- 250: Performed by: SURGERY

## 2022-03-03 RX ADMIN — SODIUM CHLORIDE, PRESERVATIVE FREE 10 ML: 5 INJECTION INTRAVENOUS at 22:00

## 2022-03-03 RX ADMIN — LEVOTHYROXINE SODIUM 125 MCG: 0.12 TABLET ORAL at 07:50

## 2022-03-03 RX ADMIN — OXYCODONE 10 MG: 5 TABLET ORAL at 07:51

## 2022-03-03 RX ADMIN — HYDROCHLOROTHIAZIDE 25 MG: 25 TABLET ORAL at 10:14

## 2022-03-03 RX ADMIN — POTASSIUM CHLORIDE 20 MEQ: 750 TABLET, EXTENDED RELEASE ORAL at 10:14

## 2022-03-03 RX ADMIN — HYDROMORPHONE HYDROCHLORIDE 1 MG: 1 INJECTION, SOLUTION INTRAMUSCULAR; INTRAVENOUS; SUBCUTANEOUS at 20:23

## 2022-03-03 RX ADMIN — SODIUM CHLORIDE, PRESERVATIVE FREE 10 ML: 5 INJECTION INTRAVENOUS at 06:00

## 2022-03-03 RX ADMIN — PIPERACILLIN AND TAZOBACTAM 3.38 G: 3; .375 INJECTION, POWDER, LYOPHILIZED, FOR SOLUTION INTRAVENOUS at 02:45

## 2022-03-03 RX ADMIN — ENOXAPARIN SODIUM 40 MG: 100 INJECTION SUBCUTANEOUS at 07:51

## 2022-03-03 RX ADMIN — OXYCODONE 10 MG: 5 TABLET ORAL at 13:39

## 2022-03-03 NOTE — CONSULTS
Nutrition Note    Consult received. Spoke with pt and diet/ preferences adjusted to help get her more protein. Protein goals discussed. Does not like/do well with milky protein shakes (diarrhea) and was previously doing the Ensure Clear pre-op, but currently the thought of currently is making her nauseated. Pt follows Kosher diet at home, but in hospital states ok to just not send any pork. Does not tolerate beef and can only do milk/ lactose in small amounts. Eggs, poultry, fish all OK. Reviewed lower fiber/ softer foods, and need for smaller/more frequent meals s/p Corrina. Diet information still at bedside from when educated previously. RD will continue to follow while admitted. Thank you.     Mary Gold RD  Available via [employer]

## 2022-03-03 NOTE — PROGRESS NOTES
General Surgery Daily Progress Note    Admit Date: 2022  Post-Operative Day: 8 Days Post-Op from Procedure(s):  PYLORUS PRESERVING WHIPPLE PROCEDURE AND PORTAL LYPHMADENECTOMY     Subjective:     Last 24 hrs: Still weepy this AM.   Had BM overnight. Pain well-controlled  Tolerating diet. No NV       Objective:     Blood pressure 106/66, pulse (!) 46, temperature 99.4 °F (37.4 °C), resp. rate 22, height 5' 4\" (1.626 m), weight 258 lb 4.8 oz (117.2 kg), SpO2 99 %. Temp (24hrs), Av.3 °F (37.4 °C), Min:98.6 °F (37 °C), Max:99.6 °F (37.6 °C)      _____________________  Physical Exam:     Alert and Oriented, cooperative, in no acute distress. Cardiovascular: RRR  Lungs:CTAB on RA  Abdomen: soft, min TTP  Incision c/d/i  Drain w 300ml/24 hrs clear, tan SS fluid  +BM      Assessment:   Principal Problem:    Pancreatic adenocarcinoma ([address] (2022)            Plan:     Progressing well & had BM   D/c Zosyn  Continue diet  OOB as tolerated  Daily PT  Daily labs  DVT proph  IVF KVO  Hopefully d/c next couple of days      Data Review:    Recent Labs     22  0316 22  0607 22  0207   WBC 9.1 11.7* 9.6   HGB 7.4* 8.8* 7.4*   HCT 23.1* 27.4* 23.2*    281 206     Recent Labs     22  0316 22  0607 22  0207    136 139   K 3.6 3.1* 3.3*   * 106 109*   CO2 26 27 26   GLU 91 102* 95   BUN 3* 3* 3*   CREA 0.83 0.90 0.78   CA 8.2* 8.6 8.1*   MG 2.0 2.1 2.0   PHOS 3.6 3.7 3.1   ALB 1.7* 2.1* 1.9*   ALT 18 25 28     No results for input(s): AML, LPSE in the last 72 hours.         ______________________  Medications:    Current Facility-Administered Medications   Medication Dose Route Frequency    potassium chloride SR (KLOR-CON 10) tablet 20 mEq  20 mEq Oral TID    enoxaparin (LOVENOX) injection 40 mg  40 mg SubCUTAneous Q24H    HYDROmorphone (DILAUDID) injection 0.5-1 mg  0.5-1 mg IntraVENous Q3H PRN    oxyCODONE IR (ROXICODONE) tablet 5-10 mg  5-10 mg Oral Q4H PRN  diphenhydrAMINE (BENADRYL) injection 25 mg  25 mg IntraVENous Q4H PRN    hydroCHLOROthiazide (HYDRODIURIL) tablet 25 mg  25 mg Oral DAILY    levothyroxine (SYNTHROID) tablet 125 mcg  125 mcg Oral ACB    sodium chloride (NS) flush 5-40 mL  5-40 mL IntraVENous Q8H    sodium chloride (NS) flush 5-40 mL  5-40 mL IntraVENous PRN    acetaminophen (TYLENOL) tablet 650 mg  650 mg Oral Q6H PRN    naloxone (NARCAN) injection 0.4 mg  0.4 mg IntraVENous PRN    ondansetron (ZOFRAN) injection 4 mg  4 mg IntraVENous Q4H PRN    glucose chewable tablet 16 g  4 Tablet Oral PRN    glucagon (GLUCAGEN) injection 1 mg  1 mg IntraMUSCular PRN    dextrose 10 % infusion 0-250 mL  0-250 mL IntraVENous PRN       JESSICA Schuster  3/3/2022  ATTENDING ADDENDUM  I supervised the APC and reviewed the note. We discussed the plan of care  Having a rough time. The incision looks fine. Disoriented, possibly complicated by meds last night for agitation. Appparently 400 ml of pleural effusion removed, which should help a little with her breathing.

## 2022-03-03 NOTE — PROGRESS NOTES
Bedside shift change report given to Jessica Cosme RN (oncoming nurse) by Yee Vidal RN (offgoing nurse). Report included the following information SBAR, Kardex, Intake/Output, MAR and Recent Results.

## 2022-03-03 NOTE — PROGRESS NOTES
Bedside and Verbal shift change report given to Tolu Sierra RN  (oncoming nurse) by Shiva Paredes (offgoing nurse). Report included the following information SBAR and Kardex.

## 2022-03-03 NOTE — PROGRESS NOTES
Problem: Mobility Impaired (Adult and Pediatric)  Goal: *Acute Goals and Plan of Care (Insert Text)  Description: FUNCTIONAL STATUS PRIOR TO ADMISSION: Patient was independent and active without use of DME. However, patient does have access to a cane and walker. HOME SUPPORT PRIOR TO ADMISSION: The patient lived with her , but did not require assistance. Of note, patient's  also has cancer. Physical Therapy Goals  Initiated 2/24/2022  1. Patient will move from supine to sit and sit to supine, scoot up and down, and roll side to side in bed with modified independence within 7 day(s). 2.  Patient will transfer from bed to chair and chair to bed with modified independence using the least restrictive device within 7 day(s). 3.  Patient will perform sit to stand with modified independence within 7 day(s). 4.  Patient will ambulate with modified independence for 150 feet with the least restrictive device within 7 day(s). 5.  Patient will ascend/descend 12 stairs with handrail(s) with supervision/set-up within 7 day(s). Outcome: Progressing Towards Goal     PHYSICAL THERAPY TREATMENT  Patient: Todd Cazares (3 y.o. female)  Date: 3/2/2022  Diagnosis: Pancreatic adenocarcinoma ([facility] [C25.9] Pancreatic adenocarcinoma ([facility]  Procedure(s) (LRB):  PYLORUS PRESERVING WHIPPLE PROCEDURE AND PORTAL LYPHMADENECTOMY (N/A) 7 Days Post-Op  Precautions:    Chart, physical therapy assessment, plan of care and goals were reviewed. ASSESSMENT  Patient continues with skilled PT services and is progressing towards goals. She demonstrates overall improvement in activity tolerance and gait quality. She ambulated 150 ft with fewer standing rest breaks (2 today), improved upright posture (no longer leaning forward onto the walker) and with no c/o dizziness during her walk. Ciera continues to be slow though this too is improved as compared with previous sessions.   Pt endorses following through with activity recommendations re: walking in the lopez outside of therapy sessions and sitting up in the chair and remained sitting up in the chair at the end of this session. Current Level of Function Impacting Discharge (mobility/balance): SBA transfers and ambulation 150 ft w/ RW    Other factors to consider for discharge:          PLAN :  Patient continues to benefit from skilled intervention to address the above impairments. Continue treatment per established plan of care. to address goals. Recommendation for discharge: (in order for the patient to meet his/her long term goals)  Physical therapy at least 2 days/week in the home     This discharge recommendation:  Has not yet been discussed the attending provider and/or case management though care management is aware of recommendations. IF patient discharges home will need the following DME: patient owns DME required for discharge       SUBJECTIVE:   Patient stated Radha Dyson had a moment earlier today. It's overwhelming. I'm better now. I just got to do this.     OBJECTIVE DATA SUMMARY:   Critical Behavior:  Neurologic State: Alert           Functional Mobility Training:  Bed Mobility:  Received/ remained sitting up in the chair                 Transfers:  Sit to Stand: Stand-by assistance (various chairs, bed, commode)  Stand to Sit: Stand-by assistance  I                       Balance:  Sitting: Intact; Without support  Standing: Intact; With support  Ambulation/Gait Training:  Distance (ft): 150 Feet (ft)  Assistive Device: Walker, rolling  Ambulation - Level of Assistance: Stand-by assistance                 Base of Support: Widened     Speed/Ciera: Slow  Step Length: Right shortened;Left shortened                 Therapeutic Exercises:     Pain Rating:      Activity Tolerance:   Fair and requires rest breaks    After treatment patient left in no apparent distress:   Sitting in chair and Call bell within reach    COMMUNICATION/COLLABORATION:   The patients plan of care was discussed with: Registered nurse.      Hawk Torres, PT   Time Calculation: 23 mins

## 2022-03-03 NOTE — PROGRESS NOTES
Transition of Care: likely home with HH/PT/SN; [facility] accepted; info on the AVS     Transport Plan: TBD; likely in car with family     RUR: 17%     Main contact is - Ewa Avila- [phone]     Discharge pending:  -patient is POD#8 of whipple procedure  -pending progress with PT  -patient continues on iV antibiotics  -pending medical progress and care recs    1330: this CM noted HH order; met with patient at bedside to discuss; she had no real preference for [place]; CM noted patients insurance as [employer]; sent referrals via [employer] to [facility], [address] [place] [address],[address], and [facility] via [employer]    Transition of Care Plan:     The Plan for Transition of Care is related to the following treatment goals: [place]    The Patient was provided with a choice of provider and agrees  with the discharge plan. Yes [x] No []    A Freedom of choice list was provided with basic dialogue that supports the patient's individualized plan of care/goals and shares the quality data associated with the providers. Yes [x] No []    0725: Replaced by [facility] accepted; info on the S          CM following  Sierra Vitale RN, CRM                    NOTE: Patient is alert and oriented x4. Demographics confirmed. Patient and her  reside in a two story home with 3 steps to enter and 7 steps to the second floor. No DME. Patient is independent in ADLs and ambulation. Hx: arthritis, ulcerative colitis, HTN. Patient does drive.  PCP confirmed and pharmacy used is [facility].

## 2022-03-04 LAB
ALBUMIN SERPL-MCNC: 1.8 G/DL (ref 3.5–5)
ALBUMIN/GLOB SERPL: 0.5 {RATIO} (ref 1.1–2.2)
ALP SERPL-CCNC: 99 U/L (ref 45–117)
ALT SERPL-CCNC: 16 U/L (ref 12–78)
ANION GAP SERPL CALC-SCNC: 5 MMOL/L (ref 5–15)
AST SERPL-CCNC: 16 U/L (ref 15–37)
BASOPHILS # BLD: 0 K/UL (ref 0–0.1)
BASOPHILS NFR BLD: 0 % (ref 0–1)
BILIRUB SERPL-MCNC: 0.3 MG/DL (ref 0.2–1)
BUN SERPL-MCNC: 3 MG/DL (ref 6–20)
BUN/CREAT SERPL: 4 (ref 12–20)
CALCIUM SERPL-MCNC: 8.1 MG/DL (ref 8.5–10.1)
CHLORIDE SERPL-SCNC: 109 MMOL/L (ref 97–108)
CO2 SERPL-SCNC: 25 MMOL/L (ref 21–32)
CREAT SERPL-MCNC: 0.69 MG/DL (ref 0.55–1.02)
DIFFERENTIAL METHOD BLD: ABNORMAL
EOSINOPHIL # BLD: 0.3 K/UL (ref 0–0.4)
EOSINOPHIL NFR BLD: 3 % (ref 0–7)
ERYTHROCYTE [DISTWIDTH] IN BLOOD BY AUTOMATED COUNT: 14.6 % (ref 11.5–14.5)
GLOBULIN SER CALC-MCNC: 3.8 G/DL (ref 2–4)
GLUCOSE SERPL-MCNC: 94 MG/DL (ref 65–100)
HCT VFR BLD AUTO: 23.8 % (ref 35–47)
HGB BLD-MCNC: 7.7 G/DL (ref 11.5–16)
IMM GRANULOCYTES # BLD AUTO: 0 K/UL
IMM GRANULOCYTES NFR BLD AUTO: 0 %
LYMPHOCYTES # BLD: 1.7 K/UL (ref 0.8–3.5)
LYMPHOCYTES NFR BLD: 21 % (ref 12–49)
MAGNESIUM SERPL-MCNC: 2.1 MG/DL (ref 1.6–2.4)
MCH RBC QN AUTO: 33.8 PG (ref 26–34)
MCHC RBC AUTO-ENTMCNC: 32.4 G/DL (ref 30–36.5)
MCV RBC AUTO: 104.4 FL (ref 80–99)
MONOCYTES # BLD: 1.4 K/UL (ref 0–1)
MONOCYTES NFR BLD: 17 % (ref 5–13)
NEUTS BAND NFR BLD MANUAL: 5 % (ref 0–6)
NEUTS SEG # BLD: 4.9 K/UL (ref 1.8–8)
NEUTS SEG NFR BLD: 54 % (ref 32–75)
NRBC # BLD: 0 K/UL (ref 0–0.01)
NRBC BLD-RTO: 0 PER 100 WBC
PHOSPHATE SERPL-MCNC: 3.4 MG/DL (ref 2.6–4.7)
PLATELET # BLD AUTO: 280 K/UL (ref 150–400)
PMV BLD AUTO: 10.2 FL (ref 8.9–12.9)
POTASSIUM SERPL-SCNC: 3.4 MMOL/L (ref 3.5–5.1)
PROT SERPL-MCNC: 5.6 G/DL (ref 6.4–8.2)
RBC # BLD AUTO: 2.28 M/UL (ref 3.8–5.2)
RBC MORPH BLD: ABNORMAL
SODIUM SERPL-SCNC: 139 MMOL/L (ref 136–145)
WBC # BLD AUTO: 8.3 K/UL (ref 3.6–11)

## 2022-03-04 PROCEDURE — 74011250637 HC RX REV CODE- 250/637: Performed by: PHYSICIAN ASSISTANT

## 2022-03-04 PROCEDURE — 85025 COMPLETE CBC W/AUTO DIFF WBC: CPT

## 2022-03-04 PROCEDURE — 36415 COLL VENOUS BLD VENIPUNCTURE: CPT

## 2022-03-04 PROCEDURE — 83735 ASSAY OF MAGNESIUM: CPT

## 2022-03-04 PROCEDURE — 84100 ASSAY OF PHOSPHORUS: CPT

## 2022-03-04 PROCEDURE — 74011250637 HC RX REV CODE- 250/637: Performed by: SURGERY

## 2022-03-04 PROCEDURE — 74011000250 HC RX REV CODE- 250: Performed by: SURGERY

## 2022-03-04 PROCEDURE — 97116 GAIT TRAINING THERAPY: CPT | Performed by: PHYSICAL THERAPIST

## 2022-03-04 PROCEDURE — 65660000000 HC RM CCU STEPDOWN

## 2022-03-04 PROCEDURE — 74011250636 HC RX REV CODE- 250/636: Performed by: SURGERY

## 2022-03-04 PROCEDURE — 97530 THERAPEUTIC ACTIVITIES: CPT | Performed by: PHYSICAL THERAPIST

## 2022-03-04 PROCEDURE — 80053 COMPREHEN METABOLIC PANEL: CPT

## 2022-03-04 PROCEDURE — 99024 POSTOP FOLLOW-UP VISIT: CPT | Performed by: PHYSICIAN ASSISTANT

## 2022-03-04 PROCEDURE — 74011250636 HC RX REV CODE- 250/636: Performed by: PHYSICIAN ASSISTANT

## 2022-03-04 RX ORDER — OXYCODONE HYDROCHLORIDE 5 MG/1
5-10 TABLET ORAL
Qty: 40 TABLET | Refills: 0 | Status: SHIPPED | OUTPATIENT
Start: 2022-03-04 | End: 2022-03-05

## 2022-03-04 RX ORDER — METFORMIN HYDROCHLORIDE 500 MG/1
500 TABLET ORAL 2 TIMES DAILY WITH MEALS
Qty: 60 TABLET | Refills: 1 | Status: SHIPPED | OUTPATIENT
Start: 2022-03-04 | End: 2022-03-05 | Stop reason: SDUPTHER

## 2022-03-04 RX ORDER — ONDANSETRON 4 MG/1
4 TABLET, ORALLY DISINTEGRATING ORAL
Status: DISCONTINUED | OUTPATIENT
Start: 2022-03-04 | End: 2022-03-05 | Stop reason: HOSPADM

## 2022-03-04 RX ORDER — CALCIUM GLUCONATE 94 MG/ML
1 INJECTION, SOLUTION INTRAVENOUS ONCE
Status: DISCONTINUED | OUTPATIENT
Start: 2022-03-04 | End: 2022-03-04 | Stop reason: CLARIF

## 2022-03-04 RX ORDER — POTASSIUM CHLORIDE 750 MG/1
10 TABLET, FILM COATED, EXTENDED RELEASE ORAL
Status: COMPLETED | OUTPATIENT
Start: 2022-03-04 | End: 2022-03-04

## 2022-03-04 RX ORDER — CALCIUM GLUCONATE 20 MG/ML
1 INJECTION, SOLUTION INTRAVENOUS ONCE
Status: COMPLETED | OUTPATIENT
Start: 2022-03-04 | End: 2022-03-04

## 2022-03-04 RX ORDER — ONDANSETRON 4 MG/1
4 TABLET, ORALLY DISINTEGRATING ORAL
Qty: 20 TABLET | Refills: 0 | Status: SHIPPED | OUTPATIENT
Start: 2022-03-04 | End: 2022-03-05

## 2022-03-04 RX ADMIN — SODIUM CHLORIDE, PRESERVATIVE FREE 10 ML: 5 INJECTION INTRAVENOUS at 13:45

## 2022-03-04 RX ADMIN — ENOXAPARIN SODIUM 40 MG: 100 INJECTION SUBCUTANEOUS at 08:23

## 2022-03-04 RX ADMIN — OXYCODONE 10 MG: 5 TABLET ORAL at 20:05

## 2022-03-04 RX ADMIN — HYDROCHLOROTHIAZIDE 25 MG: 25 TABLET ORAL at 10:07

## 2022-03-04 RX ADMIN — LEVOTHYROXINE SODIUM 125 MCG: 0.12 TABLET ORAL at 08:23

## 2022-03-04 RX ADMIN — OXYCODONE 10 MG: 5 TABLET ORAL at 10:07

## 2022-03-04 RX ADMIN — POTASSIUM CHLORIDE 10 MEQ: 750 TABLET, FILM COATED, EXTENDED RELEASE ORAL at 10:07

## 2022-03-04 RX ADMIN — CALCIUM GLUCONATE 1000 MG: 20 INJECTION, SOLUTION INTRAVENOUS at 10:07

## 2022-03-04 RX ADMIN — SODIUM CHLORIDE, PRESERVATIVE FREE 10 ML: 5 INJECTION INTRAVENOUS at 06:00

## 2022-03-04 NOTE — PROGRESS NOTES
Comprehensive Nutrition Assessment    Type and Reason for Visit: Reassess    Nutrition Recommendations/Plan:      Reinforced Whipple diet education - written literature provided with RD contact information    Continue regular, low fiber diet. No pork. No beef. No milk. Eggs, poultry, and fish OK. Nutrition Assessment:     PMHx includes arthritis, UC, HTN, hypothyroidism, and pancreatic cancer s/p neoadjuvant chemotherapy. Subsequent imaging shows response to treatment without evidence of distant metastatic disease. Presented to [facility] for planned surgical intervention. Now POD#9 s/p pylorus-preserving Whipple procedure and portal lymphadenopathy. Diet advanced to clear liquids 2/25 and finally solids on 3/1. Pt tolerating well. I met with pt at bedside yesterday. Denies oily stools (but did state she had them when 1st dx). Pt engaged and receptive to discussion. Diet/ preferences adjusted to help get her more protein. Protein goals discussed. Does not like/do well with milky protein shakes (diarrhea - aggravates her colitis) and was previously doing the Ensure Clear pre-op, but currently the thought of this is making her nauseated. Pt follows Kosher diet at home, but in hospital states ok to just not send any pork. Does not tolerate beef (again reportedly d/t colitis) and can only do milk/ lactose in small amounts. Eggs, poultry, fish all OK. Reviewed lower fiber/ softer foods, and need for smaller/more frequent meals s/p Whipple. Diet information still at bedside from when educated previously. As per initial RD assessment - pt reports chemo caused UC flare and subsequently lost ~60 lb, wt hx supports 52 lb wt loss in past 5 months (18.6% BW) which is significant. Total, 60 lb wt loss in past year (20% BW). Standing scale weight on admission ~229 lb, wt currently up to 258 lb d/t fluid and/or bed scale discrepancies.     Pt states Dr. Camargo told her he would let her know about need for enzymes. I discussed s/s of steatorrhea and to keep MD informed. BG look good, not requiring any insulin. Malnutrition Assessment:  Malnutrition Status:  Severe malnutrition    Context:  Chronic illness     Findings of the 6 clinical characteristics of malnutrition:   Energy Intake:  7 - 75% or less est energy requirements for 1 month or longer  Weight Loss:  7.00 - Greater than 10% over 6 months     Body Fat Loss:  Unable to assess,     Muscle Mass Loss:  Unable to assess,    Fluid Accumulation:  No significant fluid accumulation,     Strength:  Not performed           Nutritionally Significant Medications:   Fentanyl epidural, SSI, LR @ 150 mL/hr, Synthroid,  PRN: toradol    Estimated Daily Nutrient Needs:   Energy (kcal): 2295-4375 (MSJ x 1-1.2 x 1.3)  Wt used: Admission (103.8 kg)  Protein (gm): 100-135 (1.5-2 gm/kg adj BW)  Wt used: Adjusted (~67 kg)   Fluid (mL/day): 1 mL/kcal       Nutrition Related Findings:   Edema:  No data recorded    Last BM: 3/3 - loose      Wounds:    Surgical incision       Current Nutrition Therapies:  Diet: regular, low fiber  Supplements: none at this time  Meal intake: No data found. Supplement intake: No data found.       Anthropometric Measures:  · Height:  5' 4\" (162.6 cm)  · Current Body Wt:  103.9 kg (228 lb 15.9 oz) (as of 2/15, pre-admit)   · Usual Body Wt:  129.3 kg (285 lb)     · Ideal Body Wt:  120 lbs:  190.8 %   · BMI Category:  Obese class 3 (BMI 40.0 or greater)       Wt Readings from Last 30 Encounters:   02/26/22 117.2 kg (258 lb 4.8 oz)   02/15/22 103.9 kg (228 lb 15.9 oz)   01/24/22 103.1 kg (227 lb 6.4 oz)   01/24/22 103 kg (227 lb)   01/10/22 108.5 kg (239 lb 3.2 oz)   01/10/22 108.4 kg (239 lb)   01/06/22 105.5 kg (232 lb 9.6 oz)   12/27/21 113.2 kg (249 lb 8 oz)   12/27/21 112.9 kg (249 lb)   12/20/21 106.1 kg (234 lb)   12/20/21 108.6 kg (239 lb 8 oz)   12/20/21 108.6 kg (239 lb 8 oz)   12/06/21 112.4 kg (247 lb 14.4 oz)   12/06/21 112.4 kg (247 lb 14.4 oz)   11/29/21 112 kg (247 lb)   11/22/21 116.1 kg (256 lb)   11/22/21 116.6 kg (257 lb)   11/08/21 120 kg (264 lb 9.6 oz)   11/08/21 120.7 kg (266 lb)   11/01/21 116.2 kg (256 lb 3.2 oz)   11/01/21 116.2 kg (256 lb 3.2 oz)   10/29/21 116.3 kg (256 lb 6.4 oz)   10/18/21 119.7 kg (264 lb)   10/18/21 119.7 kg (264 lb)   10/15/21 119.5 kg (263 lb 7.2 oz)   10/08/21 121.7 kg (268 lb 3.2 oz)   10/05/21 121.1 kg (267 lb)   10/04/21 122.7 kg (270 lb 9.6 oz)   09/27/21 126.6 kg (279 lb 1.6 oz)   03/04/21 129.6 kg (285 lb 12.8 oz)     Nutrition Diagnosis:   · Inadequate oral intake related to altered GI function as evidenced by intake 26-50%    · Increased nutrient needs related to increased demand for energy/nutrients as evidenced by GI abnormality (s/p Whipple)      Nutrition Interventions:   Food and/or Nutrient Delivery: Continue current diet  Nutrition Education and Counseling: Education completed  Coordination of Nutrition Care: Continue to monitor while inpatient    Goals:  continued PO improvements to at least 3-4 small meals daily with high protein choice at each meal within 7 days       Nutrition Monitoring and Evaluation:   Behavioral-Environmental Outcomes: None identified  Food/Nutrient Intake Outcomes: Food and nutrient intake  Physical Signs/Symptoms Outcomes: Biochemical data,GI status,Meal time behavior,Weight    Discharge Planning:    Continue current diet     Recent Labs     03/04/22  0432 03/03/22  0316 03/02/22  0607   GLU 94 91 102*   BUN 3* 3* 3*   CREA 0.69 0.83 0.90    138 136   K 3.4* 3.6 3.1*   * 110* 106   CO2 25 26 27   CA 8.1* 8.2* 8.6   PHOS 3.4 3.6 3.7   MG 2.1 2.0 2.1         Recent Labs     03/02/22  1551 03/02/22  1138 03/02/22  0631 03/01/22  2134 03/01/22  1630 03/01/22  1125   GLUCPOC 84 98 87 91 94 87       Lab Results   Component Value Date/Time    Hemoglobin A1c 5.0 02/24/2022 05:17 AM         Giselle Vasquez RD  Available via [employer]

## 2022-03-04 NOTE — PROGRESS NOTES
Bedside shift change report given to Alia Arrington RN and Stacie Montoya RN (oncoming nurse) by Dwayne Garcia RN (offgoing nurse). Report included the following information SBAR, Kardex, Intake/Output, MAR, Recent Results and Med Rec Status.

## 2022-03-04 NOTE — PROGRESS NOTES
Transition of Care:    Disposition- home with HH/PT/SN; [facility] accepted; info on the AVS     Transport Plan: TBD; likely in car with family     RUR: 17%      4:10 pm. CM met with patient to discuss discharge planning. Patient agreed with discharge and signed the 2nd IMM Letter. Letter placed in patient's chart. [facility] will see patient on Sunday.     Marge Welch MSA,RN,CRM

## 2022-03-04 NOTE — PROGRESS NOTES
General Surgery Daily Progress Note    Admit Date: 2022  Post-Operative Day: 9 Days Post-Op from Procedure(s):  PYLORUS PRESERVING WHIPPLE PROCEDURE AND PORTAL LYPHMADENECTOMY     Subjective:     Last 24 hrs: Doing well this AM--in good spirits  2 BMs overnight. Tolerating diet--no NV  Appreciate RD consult & recs  Working with PT -- Home PT recs noted  Pain well-controlled  Feels ready to go home this weekend      Objective:     Blood pressure 105/62, pulse 60, temperature 98.9 °F (37.2 °C), resp. rate 16, height 5' 4\" (1.626 m), weight 258 lb 4.8 oz (117.2 kg), SpO2 96 %. Temp (24hrs), Av.8 °F (37.1 °C), Min:98.8 °F (37.1 °C), Max:98.9 °F (37.2 °C)      _____________________  Physical Exam:     Alert and Oriented, pleasant, in no acute distress. Cardiovascular: RRR  Lungs:CTAB on RA  Abdomen: soft, min TTP  +BS  Midline incision c/d/i  Drain w 195ml/24 hrs tan fluid      Assessment:   Principal Problem:    Pancreatic adenocarcinoma ([address] (2022)            Plan:     Encourage oral analgesics PRN  OOB, ambulation w assistance  Daily PT  Replete lytes  Daily labs  Will follow-up in office 2 weeks post-op    Future Appointments   Date Time Provider Sally Sofie   3/10/2022  9:20 AM JESSICA [facility] BS AMB   3/15/2022  8:30 AM Layla Adrian MD [facility] BS AMB           Data Review:    Recent Labs     22  0432 22  0316 22  0607   WBC 8.3 9.1 11.7*   HGB 7.7* 7.4* 8.8*   HCT 23.8* 23.1* 27.4*    258 281     Recent Labs     22  0432 22  0316 22  0607    138 136   K 3.4* 3.6 3.1*   * 110* 106   CO2 25 26 27   GLU 94 91 102*   BUN 3* 3* 3*   CREA 0.69 0.83 0.90   CA 8.1* 8.2* 8.6   MG 2.1 2.0 2.1   PHOS 3.4 3.6 3.7   ALB 1.8* 1.7* 2.1*   ALT 16 18 25     No results for input(s): AML, LPSE in the last 72 hours.         ______________________  Medications:    Current Facility-Administered Medications   Medication Dose Route Frequency    enoxaparin (LOVENOX) injection 40 mg  40 mg SubCUTAneous Q24H    HYDROmorphone (DILAUDID) injection 0.5-1 mg  0.5-1 mg IntraVENous Q3H PRN    oxyCODONE IR (ROXICODONE) tablet 5-10 mg  5-10 mg Oral Q4H PRN    diphenhydrAMINE (BENADRYL) injection 25 mg  25 mg IntraVENous Q4H PRN    hydroCHLOROthiazide (HYDRODIURIL) tablet 25 mg  25 mg Oral DAILY    levothyroxine (SYNTHROID) tablet 125 mcg  125 mcg Oral ACB    sodium chloride (NS) flush 5-40 mL  5-40 mL IntraVENous Q8H    sodium chloride (NS) flush 5-40 mL  5-40 mL IntraVENous PRN    acetaminophen (TYLENOL) tablet 650 mg  650 mg Oral Q6H PRN    naloxone (NARCAN) injection 0.4 mg  0.4 mg IntraVENous PRN    ondansetron (ZOFRAN) injection 4 mg  4 mg IntraVENous Q4H PRN    glucose chewable tablet 16 g  4 Tablet Oral PRN    glucagon (GLUCAGEN) injection 1 mg  1 mg IntraMUSCular PRN    dextrose 10 % infusion 0-250 mL  0-250 mL IntraVENous PRN       JESSICA Lo  3/4/2022  ATTENDING ADDENDUM  I supervised the APC and reviewed the note. We discussed the plan of care  Has really turned the corner. REady for discharge.

## 2022-03-04 NOTE — ADT AUTH CERT NOTES
- Care Day 8 (3/2/2022) by Roseanna Le RN       Review Status Review Entered   Completed 3/2/2022 15:57      Criteria Review      Care Day: 8 Care Date: 3/2/2022 Level of Care: Inpatient Floor    Guideline Day 3    Level Of Care    ( ) Intermediate care    3/2/2022 15:57:07 EST by Emily, [id number] BED. Clinical Status    (X) * Tachycardia absent    3/2/2022 15:57:07 EST by Roseanna Le      RR 97    Activity    (X) * Ambulatory    3/2/2022 15:57:07 EST by Deisy Diaz      AMBULATE WITH ASSIST. PT CONSULT. Routes    (X) * Introduce sips of water and ice chips    3/2/2022 15:57:07 EST by Deisy Diaz      REGULAR DIET. LOW FIBER. (X) IV medications    3/2/2022 15:57:07 EST by Roseanna Le      DILAUDID 1MG IV Q3H PRN X 1 DOSES.  ZOSYN 3.375G IV Q8H. Interventions    (X) DVT prophylaxis    3/2/2022 15:57:07 EST by Roseanna Le      LOVENOX 40MG SC Q24H. SCDS. * Milestone   Additional Notes   3/2/22   IP MEDICAL      98.9 P 97 RR 18 /52. SPO2 96% ROOM AIR. POC GLUCOSE 87-98. WBC: 11.7 (H)   RBC: 2.58 (L)   HGB: 8.8 (L)   HCT: 27.4 (L)   MCV: 106.2 (H)   MCH: 34.1 (H)   PLATELET: 687   K 3.1. GLUCOSE 102. BUN 3. ALB 2.1. GLOB 4.3. ALK PHOS 137. MEDS/ORDERS>   SYNTHROID 125MCG PO QD.   ROXICODONE 10MG PO Q4H PRN X 2. KCL 20 MEQ PO TID. I+O. NAYA TO BULB SUCTION. IS CONTINUOUS. CONTINUOUS VS.         Post-Operative Day: 7 Days Post-Op from Procedure(s):   PYLORUS PRESERVING WHIPPLE PROCEDURE AND PORTAL LYPHMADENECTOMY        Subjective:       Last 24 hrs: Tearful this AM, feeling overwhelmed.    Tolerating diet, denies NV.     +flatus, no BM but smear of stool earlier   Pain better controlled--trying to keep to schedule    C/o dizziness with --h/o vertigo         Assessment:   Principal Problem:     Pancreatic adenocarcinoma ([address] (2/23/2022)            Plan:       Awaiting further return of bowel function   Daily PT   OOB, ambulation as tolerated   Daily labs   Replete K Analgesics q4hrs           Pancreatectomy - Care Day 5 (2/27/2022) by Brant Garcia RN       Review Status Review Entered   Completed 2/28/2022 13:16      Criteria Review      Care Day: 5 Care Date: 2/27/2022 Level of Care: Telemetry    Guideline Day 3    Level Of Care    ( ) Intermediate care    2/28/2022 13:16:03 EST by Brant NOVOA BED. Clinical Status    ( ) * Tachycardia absent    2/28/2022 13:16:03 EST by Brant Garcia      P 105-107    Activity    (X) * Ambulatory    2/28/2022 13:16:03 EST by Deisy Diaz      AMBULATE WITH ASSIST. Routes    (X) * Introduce sips of water and ice chips    2/28/2022 13:16:03 EST by Brant Garcia      CLEAR LIQUID DIET. LOW FIBER. (X) IV fluids    2/28/2022 13:16:03 EST by Baldomero Do @ 50ML/HOUR. (X) IV medications    2/28/2022 13:16:03 EST by Brant Garcia      DILAUDID 1MG IV Q3H PRN X 3.  ZOSYN 3.375G IV Q8H. Interventions    (X) DVT prophylaxis    2/28/2022 13:16:03 EST by Brant Garcia      HEPARIN 5000 U SC Q12H. SCDS. * Milestone   Additional Notes   2/27/22   IP TELE      POC GLUCOSE 51-82-79-      97.9 RR 18 /71 SPO2 98% ROOM AIR. MEDS/ORDERS>   TYLENOL 650MG PO Q6H PRN X 2.    LISPRO SSI SC QACHS. SYNTHROID 125MCG PO QD.   ROXICODONE 5MG PO Q4H PRN X 2.      PHAM CATH CARE. HOURLY IS.   I+O.    CONTINUOUS N/V ASSESSMENTS AND VS.         4 Days Post-Op       Procedure:  Procedure(s):   PYLORUS PRESERVING WHIPPLE PROCEDURE AND PORTAL LYPHMADENECTOMY       Subjective:       Patient still having some belching not really passing gas at this point.  Has been up and walking.  Feeling little better.  Having a small amount of drainage near her drains.           Physical Exam:     General alert and oriented no acute distress   Lungs clear bilaterally   Heart regular rate and rhythm   Abdomen soft mildly distended incisions intact   JPs serous           Assessment:       Hospital Problems Date Reviewed: 2/17/2022     Codes Class Noted POA     * (Principal) Pancreatic adenocarcinoma ([facility] ICD-10-CM: C25.9   ICD-9-CM: 157.9   2/23/2022 Unknown                     Plan/Recommendations/Medical Decision Making:   Status post Whipple procedure   Overall seems to be doing well. Continue clear liquids until able to take good p.o.

## 2022-03-04 NOTE — PROGRESS NOTES
Problem: Mobility Impaired (Adult and Pediatric)  Goal: *Acute Goals and Plan of Care (Insert Text)  Description: FUNCTIONAL STATUS PRIOR TO ADMISSION: Patient was independent and active without use of DME. However, patient does have access to a cane and walker. HOME SUPPORT PRIOR TO ADMISSION: The patient lived with her , but did not require assistance. Of note, patient's  also has cancer. Physical Therapy Goals  Initiated 2/24/2022, continue goals x 7 days  1. Patient will move from supine to sit and sit to supine, scoot up and down, and roll side to side in bed with modified independence within 7 day(s). 2.  Patient will transfer from bed to chair and chair to bed with modified independence using the least restrictive device within 7 day(s). 3.  Patient will perform sit to stand with modified independence within 7 day(s). 4.  Patient will ambulate with modified independence for 150 feet with the least restrictive device within 7 day(s). 5.  Patient will ascend/descend 12 stairs with handrail(s) with supervision/set-up within 7 day(s). 3/4/2022 1439 by Dimple Garcias, PT, DPT  Outcome: Progressing Towards Goal  3/4/2022 1439 by Dimple Garcias, PT, DPT  Outcome: Progressing Towards Goal   PHYSICAL THERAPY TREATMENT: WEEKLY REASSESSMENT  Patient: Ac Lyon (58 y.o. female)  Date: 3/4/2022  Primary Diagnosis: Pancreatic adenocarcinoma ([facility] [C25.9]  Procedure(s) (LRB):  PYLORUS PRESERVING WHIPPLE PROCEDURE AND PORTAL LYPHMADENECTOMY (N/A) 9 Days Post-Op   Precautions: fall         ASSESSMENT  Patient continues with skilled PT services and is progressing towards goals. Patient overall limited by generalized weakness, pain and decreased activity tolerance. Currently supine and needing some encouragement as she is fatigued today. Overall supervision for bed mobility and CGA for transfers. Amb approx 125 feet with RW and CGA with slow davis but no overt LOB.   Does report some mild \"dizziness\" but resolved with increased distance. [facility] to progress to more independent level of function. Patient's progression toward goals since last assessment: continue goals x 7 days      Other factors to consider for discharge: at risk for falls, below baseline         PLAN :  Goals have been updated based on progression since last assessment. Patient continues to benefit from skilled intervention to address the above impairments. Recommendations and Planned Interventions: bed mobility training, transfer training, gait training, therapeutic exercises, neuromuscular re-education, patient and family training/education, and therapeutic activities      Frequency/Duration: Patient will be followed by physical therapy:  5 times a week to address goals. Recommendation for discharge: (in order for the patient to meet his/her long term goals)  Physical therapy at least 2 days/week in the home         IF patient discharges home will need the following DME: patient owns DME required for discharge         SUBJECTIVE:   Patient stated I didn't want to walk but you were like a ray of sunshine.     OBJECTIVE DATA SUMMARY:   HISTORY:    Past Medical History:   Diagnosis Date    Arthritis     ostero arthritis, rhemathoid  lower back     Autoimmune disease ([address]     Ulcerative Colitis. Hypertension     CONTROLLED WITH MEDS    Hypothyroid 3/23/2011    Malignant neoplasm of head of pancreas ([address] 2021    Ulcerative colitis ([address] 2010    Ulcerative colitis ([facility] 2010    Ulcerative colitis [facility])      Past Surgical History:   Procedure Laterality Date    COLONOSCOPY N/A 2017    COLONOSCOPY performed by Jama Russell MD at [facility] ENDOSCOPY    ENDOSCOPY, COLON, DIAGNOSTIC      HX  SECTION      HX UROLOGICAL      URETHERAL STENT.     HX VASCULAR ACCESS      PLACEMENT OF PORT-A-CATH RIGHT SIDE    WI ABDOMEN SURGERY PROC UNLISTED      ENDOSCOPIC, PLACEMENT OF BILIARY STENT    MI BREAST SURGERY PROCEDURE UNLISTED      cyst removed from left breast       Personal factors and/or comorbidities impacting plan of care:     Home Situation  Home Environment: Private residence  # Steps to Enter: 1  Rails to Enter: No  One/Two Story Residence: Two story  # of Interior Steps: 12  Interior Rails: Both  Living Alone: No  Support Systems: Spouse/Significant Other,Other Family Member(s)  Patient Expects to be Discharged to[de-identified] Home with home health  Current DME Used/Available at Home: [employer]uf[employer] Salines, [id number] chair  Tub or Shower Type: Tub/Shower combination    EXAMINATION/PRESENTATION/DECISION MAKING:   Critical Behavior:  Neurologic State: Alert,Appropriate for age         Functional Mobility:  Bed Mobility:  Rolling: Supervision  Supine to Sit: Supervision     Scooting: Supervision  Transfers:  Sit to Stand: Stand-by assistance  Stand to Sit: Stand-by assistance                       Balance:   Sitting: Intact  Standing: Intact; With support  Ambulation/Gait Training:  Distance (ft): 120 Feet (ft)  Assistive Device: Gait belt;Walker, rolling  Ambulation - Level of Assistance: Stand-by assistance        Gait Abnormalities: Antalgic;Decreased step clearance        Base of Support: Widened     Speed/Ciera: Pace decreased (<100 feet/min); Slow  Step Length: Left shortened;Right shortened          Pain Rating:  Reports pain but does not rate    Activity Tolerance:   Fair and requires rest breaks    After treatment patient left in no apparent distress:   Supine in bed and Call bell within reach    COMMUNICATION/EDUCATION:   The patients plan of care was discussed with: Physical therapist, Occupational therapist, and Registered nurse. Fall prevention education was provided and the patient/caregiver indicated understanding., Patient/family have participated as able in goal setting and plan of care. , and Patient/family agree to work toward stated goals and plan of care.     Thank you for this referral.  Reyes Krebs, PT, DPT   Time Calculation: 25 mins

## 2022-03-05 VITALS
DIASTOLIC BLOOD PRESSURE: 80 MMHG | HEART RATE: 60 BPM | BODY MASS INDEX: 44.1 KG/M2 | WEIGHT: 258.3 LBS | SYSTOLIC BLOOD PRESSURE: 112 MMHG | OXYGEN SATURATION: 98 % | HEIGHT: 64 IN | TEMPERATURE: 98.3 F | RESPIRATION RATE: 20 BRPM

## 2022-03-05 LAB
ALBUMIN SERPL-MCNC: 1.8 G/DL (ref 3.5–5)
ALBUMIN/GLOB SERPL: 0.5 {RATIO} (ref 1.1–2.2)
ALP SERPL-CCNC: 91 U/L (ref 45–117)
ALT SERPL-CCNC: 13 U/L (ref 12–78)
ANION GAP SERPL CALC-SCNC: 3 MMOL/L (ref 5–15)
AST SERPL-CCNC: 14 U/L (ref 15–37)
BASOPHILS # BLD: 0 K/UL (ref 0–0.1)
BASOPHILS NFR BLD: 0 % (ref 0–1)
BILIRUB SERPL-MCNC: 0.3 MG/DL (ref 0.2–1)
BUN SERPL-MCNC: 4 MG/DL (ref 6–20)
BUN/CREAT SERPL: 5 (ref 12–20)
CALCIUM SERPL-MCNC: 8.2 MG/DL (ref 8.5–10.1)
CHLORIDE SERPL-SCNC: 107 MMOL/L (ref 97–108)
CO2 SERPL-SCNC: 28 MMOL/L (ref 21–32)
CREAT SERPL-MCNC: 0.73 MG/DL (ref 0.55–1.02)
DIFFERENTIAL METHOD BLD: ABNORMAL
EOSINOPHIL # BLD: 0.3 K/UL (ref 0–0.4)
EOSINOPHIL NFR BLD: 4 % (ref 0–7)
ERYTHROCYTE [DISTWIDTH] IN BLOOD BY AUTOMATED COUNT: 14.7 % (ref 11.5–14.5)
GLOBULIN SER CALC-MCNC: 3.8 G/DL (ref 2–4)
GLUCOSE SERPL-MCNC: 91 MG/DL (ref 65–100)
HCT VFR BLD AUTO: 23.9 % (ref 35–47)
HGB BLD-MCNC: 7.7 G/DL (ref 11.5–16)
IMM GRANULOCYTES # BLD AUTO: 0.1 K/UL (ref 0–0.04)
IMM GRANULOCYTES NFR BLD AUTO: 1 % (ref 0–0.5)
LYMPHOCYTES # BLD: 2.8 K/UL (ref 0.8–3.5)
LYMPHOCYTES NFR BLD: 36 % (ref 12–49)
MAGNESIUM SERPL-MCNC: 2.1 MG/DL (ref 1.6–2.4)
MCH RBC QN AUTO: 33.6 PG (ref 26–34)
MCHC RBC AUTO-ENTMCNC: 32.2 G/DL (ref 30–36.5)
MCV RBC AUTO: 104.4 FL (ref 80–99)
MONOCYTES # BLD: 1 K/UL (ref 0–1)
MONOCYTES NFR BLD: 13 % (ref 5–13)
NEUTS SEG # BLD: 3.5 K/UL (ref 1.8–8)
NEUTS SEG NFR BLD: 46 % (ref 32–75)
NRBC # BLD: 0 K/UL (ref 0–0.01)
NRBC BLD-RTO: 0 PER 100 WBC
PHOSPHATE SERPL-MCNC: 3.6 MG/DL (ref 2.6–4.7)
PLATELET # BLD AUTO: 284 K/UL (ref 150–400)
PMV BLD AUTO: 10 FL (ref 8.9–12.9)
POTASSIUM SERPL-SCNC: 3.4 MMOL/L (ref 3.5–5.1)
PROT SERPL-MCNC: 5.6 G/DL (ref 6.4–8.2)
RBC # BLD AUTO: 2.29 M/UL (ref 3.8–5.2)
SODIUM SERPL-SCNC: 138 MMOL/L (ref 136–145)
WBC # BLD AUTO: 7.7 K/UL (ref 3.6–11)

## 2022-03-05 PROCEDURE — 85025 COMPLETE CBC W/AUTO DIFF WBC: CPT

## 2022-03-05 PROCEDURE — 83735 ASSAY OF MAGNESIUM: CPT

## 2022-03-05 PROCEDURE — 80053 COMPREHEN METABOLIC PANEL: CPT

## 2022-03-05 PROCEDURE — 74011250637 HC RX REV CODE- 250/637: Performed by: SURGERY

## 2022-03-05 PROCEDURE — 74011250636 HC RX REV CODE- 250/636: Performed by: SURGERY

## 2022-03-05 PROCEDURE — 36415 COLL VENOUS BLD VENIPUNCTURE: CPT

## 2022-03-05 PROCEDURE — 99024 POSTOP FOLLOW-UP VISIT: CPT | Performed by: PHYSICIAN ASSISTANT

## 2022-03-05 PROCEDURE — 84100 ASSAY OF PHOSPHORUS: CPT

## 2022-03-05 RX ORDER — OXYCODONE HYDROCHLORIDE 5 MG/1
5-10 TABLET ORAL
Qty: 40 TABLET | Refills: 0 | Status: SHIPPED | OUTPATIENT
Start: 2022-03-05 | End: 2022-03-19

## 2022-03-05 RX ORDER — METFORMIN HYDROCHLORIDE 500 MG/1
500 TABLET ORAL 2 TIMES DAILY WITH MEALS
Qty: 60 TABLET | Refills: 1 | Status: SHIPPED | OUTPATIENT
Start: 2022-03-05 | End: 2022-03-21 | Stop reason: ALTCHOICE

## 2022-03-05 RX ORDER — POTASSIUM CHLORIDE 750 MG/1
TABLET, EXTENDED RELEASE ORAL
Qty: 60 TABLET | Refills: 1 | Status: SHIPPED | OUTPATIENT
Start: 2022-03-05 | End: 2022-04-06

## 2022-03-05 RX ORDER — ONDANSETRON 4 MG/1
4 TABLET, ORALLY DISINTEGRATING ORAL
Qty: 20 TABLET | Refills: 0 | Status: SHIPPED | OUTPATIENT
Start: 2022-03-05 | End: 2022-03-21 | Stop reason: ALTCHOICE

## 2022-03-05 RX ADMIN — ENOXAPARIN SODIUM 40 MG: 100 INJECTION SUBCUTANEOUS at 07:06

## 2022-03-05 RX ADMIN — HYDROCHLOROTHIAZIDE 25 MG: 25 TABLET ORAL at 08:33

## 2022-03-05 RX ADMIN — OXYCODONE 5 MG: 5 TABLET ORAL at 13:47

## 2022-03-05 RX ADMIN — LEVOTHYROXINE SODIUM 125 MCG: 0.12 TABLET ORAL at 07:06

## 2022-03-05 NOTE — PROGRESS NOTES
Progress Note    Patient: Loretta Devine MRN: [id number]  SSN: [id number]    YOB: 1955  Age: 77 y.o. Sex: female      Admit Date: 2022    10 Days Post-Op    Procedure:  Procedure(s):  PYLORUS PRESERVING WHIPPLE PROCEDURE AND PORTAL LYPHMADENECTOMY    Subjective:     No acute surgical issues. Pt is doing well. Tolerating diet without nausea or vomiting. Pain is under control. NAYA with serous drainage    Objective:     Visit Vitals  /80   Pulse 60   Temp 98.3 °F (36.8 °C)   Resp 20   Ht 5' 4\" (1.626 m)   Wt 258 lb 4.8 oz (117.2 kg)   SpO2 98%   BMI 44.34 kg/m²       Temp (24hrs), Av.8 °F (37.1 °C), Min:98.2 °F (36.8 °C), Max:99.4 °F (37.4 °C)        Physical Exam:    Gen:  NAD  Pulm:  Unlabored  Abd:  S/ND/appropriate TTP  Wound:  C/D/I    Recent Results (from the past 24 hour(s))   METABOLIC PANEL, COMPREHENSIVE    Collection Time: 22  4:05 AM   Result Value Ref Range    Sodium 138 136 - 145 mmol/L    Potassium 3.4 (L) 3.5 - 5.1 mmol/L    Chloride 107 97 - 108 mmol/L    CO2 28 21 - 32 mmol/L    Anion gap 3 (L) 5 - 15 mmol/L    Glucose 91 65 - 100 mg/dL    BUN 4 (L) 6 - 20 MG/DL    Creatinine 0.73 0.55 - 1.02 MG/DL    BUN/Creatinine ratio 5 (L) 12 - 20      GFR est AA >60 >60 ml/min/1.73m2    GFR est non-AA >60 >60 ml/min/1.73m2    Calcium 8.2 (L) 8.5 - 10.1 MG/DL    Bilirubin, total 0.3 0.2 - 1.0 MG/DL    ALT (SGPT) 13 12 - 78 U/L    AST (SGOT) 14 (L) 15 - 37 U/L    Alk.  phosphatase 91 45 - 117 U/L    Protein, total 5.6 (L) 6.4 - 8.2 g/dL    Albumin 1.8 (L) 3.5 - 5.0 g/dL    Globulin 3.8 2.0 - 4.0 g/dL    A-G Ratio 0.5 (L) 1.1 - 2.2     CBC WITH AUTOMATED DIFF    Collection Time: 22  4:05 AM   Result Value Ref Range    WBC 7.7 3.6 - 11.0 K/uL    RBC 2.29 (L) 3.80 - 5.20 M/uL    HGB 7.7 (L) 11.5 - 16.0 g/dL    HCT 23.9 (L) 35.0 - 47.0 %    .4 (H) 80.0 - 99.0 FL    MCH 33.6 26.0 - 34.0 PG    MCHC 32.2 30.0 - 36.5 g/dL    RDW 14.7 (H) 11.5 - 14.5 %    PLATELET 000 772 - 400 K/uL    MPV 10.0 8.9 - 12.9 FL    NRBC 0.0 0  WBC    ABSOLUTE NRBC 0.00 0.00 - 0.01 K/uL    NEUTROPHILS 46 32 - 75 %    LYMPHOCYTES 36 12 - 49 %    MONOCYTES 13 5 - 13 %    EOSINOPHILS 4 0 - 7 %    BASOPHILS 0 0 - 1 %    IMMATURE GRANULOCYTES 1 (H) 0.0 - 0.5 %    ABS. NEUTROPHILS 3.5 1.8 - 8.0 K/UL    ABS. LYMPHOCYTES 2.8 0.8 - 3.5 K/UL    ABS. MONOCYTES 1.0 0.0 - 1.0 K/UL    ABS. EOSINOPHILS 0.3 0.0 - 0.4 K/UL    ABS. BASOPHILS 0.0 0.0 - 0.1 K/UL    ABS. IMM.  GRANS. 0.1 (H) 0.00 - 0.04 K/UL    DF AUTOMATED     MAGNESIUM    Collection Time: 03/05/22  4:05 AM   Result Value Ref Range    Magnesium 2.1 1.6 - 2.4 mg/dL   PHOSPHORUS    Collection Time: 03/05/22  4:05 AM   Result Value Ref Range    Phosphorus 3.6 2.6 - 4.7 MG/DL       Assessment:     Hospital Problems  Date Reviewed: 2/17/2022          Codes Class Noted POA    * (Principal) Pancreatic adenocarcinoma ([facility] ICD-10-CM: C25.9  ICD-9-CM: 157.9  2/23/2022 Unknown              Plan/Recommendations/Medical Decision Making:     - Diet as tolerated  - Pain control  - Encourage ambulation  - DC NAYA drain  - Plan DC home today

## 2022-03-05 NOTE — DISCHARGE INSTRUCTIONS
1.  Do not drive while on pain medication. You should take a stool softener while on pain medication to prevent constipation. 2.  Do not lift anything greater than 10 pounds for 2-3 weeks. 3.  You may resume your home medications. 4.  You may shower but do not swim or soak in tub for 2-3 weeks. 5.  Please call [phone] to schedule a follow-up with Dr. Tr Sol within 2 weeks. Patient Education        Pancreatic Cancer Surgery: What to Expect at Home  Your Recovery  Surgery for pancreatic cancer removes part or all of the pancreas. Other organs might also have been removed. Once you are home, be sure to stay in contact with your care team. They can help you manage your pain or other symptoms that you may have. If you have pain, you will have medicine you can take. You will probably feel very tired and weak. Even simple tasks may tire you. You will probably be able to return to work or your normal routine in about 1 month. It will probably take about 3 months for your strength to come back fully. You may need more treatment for the cancer, such as chemotherapy or radiation. Most people regain their normal appetite in about 8 weeks. You will probably lose some weight. This is normal.  This care sheet gives you a general idea about how long it will take for you to recover. But each person recovers at a different pace. Follow the steps below to get better as quickly as possible. How can you care for yourself at home? Activity    · Rest when you feel tired. Getting enough sleep will help you recover. You will probably want to nap often.     · Try to walk each day. Start by walking a little more than you did the day before. Bit by bit, increase the amount you walk. Walking boosts blood flow and helps prevent pneumonia and constipation.     · For about 4 to 6 weeks after surgery, avoid lifting anything that would make you strain.  This may include a child, heavy grocery bags and milk containers, a heavy briefcase or backpack, cat litter or dog food bags, or a vacuum .     · Avoid strenuous activities, such as biking, jogging, weight lifting, or aerobic exercise, until your doctor says it is okay.     · You may shower, but avoid taking baths until your doctor okays it. Pat the cut (incision) dry. If you still have a drain in place, follow your doctor's instructions about showering with your drain and how to empty and care for it. After showering, replace the dressing if you have one.     · Ask your doctor when you can drive again.     · You will probably be able to return to work about 4 weeks after you leave the hospital.     · Your doctor will tell you when you can have sex again. Diet    · Sometimes the stomach empties food into the small intestine too quickly. This is called dumping syndrome. It can cause diarrhea and make you feel faint, shaky, and nauseated. It also can make it hard for your body to get enough nutrition. ? Avoid high-sugar foods--such as desserts, soda pop, and fruit juices--are most likely to cause dumping syndrome. ? Do not drink liquids within a half hour before eating and up to an hour after eating. Liquids move food even more quickly into the small intestine. Quick emptying of the stomach increases the chance of diarrhea. ? Eat slowly. Try to chew each bite about 20 times. Allow 20 to 30 minutes for each meal.  ? Eat 5 or 6 small meals or snacks a day. This may keep you from feeling too full after eating and may reduce problems with diarrhea and dumping syndrome.     · If the surgeon did not remove any part of your stomach, you can eat your normal diet. But the surgery affects everyone's digestion differently. You may need to eat more smaller meals instead of fewer larger meals. You may have to try several foods to see what tastes good to you.     · Eat healthy food.  If you do not feel like eating, try to eat food that has protein and extra calories to keep up your strength and prevent weight loss. Drink liquid meal replacements for extra calories and protein. If your stomach is upset, try bland, low-fat foods like plain rice, broiled chicken, toast, and yogurt.     · Whenever you eat, you may have to take enzyme pills to replace those the pancreas makes. These help you digest your food, especially fat.     · You may notice that your bowel movements are not regular right after your surgery. This is common. Try to avoid constipation and straining with bowel movements. You may want to take a fiber supplement every day. If you have not had a bowel movement after a couple of days, ask your doctor about taking a mild laxative. Medicines    · Your doctor will tell you if and when you can restart your medicines. You will also get instructions about taking any new medicines.     · If you take aspirin or some other blood thinner, ask your doctor if and when to start taking it again. Make sure that you understand exactly what your doctor wants you to do.     · You may have to take anti-ulcer medicine for stomach ulcers.     · You may have diabetes. If this is the case, you may have to check your blood sugar and give yourself insulin shots every day.     · You may need to take enzyme supplements to replace enzymes the pancreas makes.     · Take pain medicines exactly as directed. ? If the doctor gave you a prescription medicine for pain, take it as prescribed. ? If you are not taking a prescription pain medicine, ask your doctor if you can take an over-the-counter medicine.     · If you think your pain medicine is making you sick to your stomach:  ? Take your medicine after meals (unless your doctor has told you not to). ? Ask your doctor for a different pain medicine.     · If your doctor prescribed antibiotics, take them as directed. Do not stop taking them just because you feel better. You need to take the full course of antibiotics.    Incision care    · You may feel a ridge along the incision, or cut. This is normal, and it will go away in a few weeks.     · Wash the area daily with warm water and pat it dry.     · If you have strips of tape on the cut, leave the tape on for a week or until it falls off.     · You may see a small amount of clear or light red fluid staining your dressing. This is normal.   Exercise    · Regular exercise will help you regain strength. Start with walking every day. Your doctor will tell you when you can do more. Follow-up care is a key part of your treatment and safety. Be sure to make and go to all appointments, and call your doctor if you are having problems. It's also a good idea to know your test results and keep a list of the medicines you take. When should you call for help? Call 911 anytime you think you may need emergency care. For example, call if:    · You passed out (lost consciousness).     · You are short of breath. Call your doctor now or seek immediate medical care if:    · You have pain that does not get better after you take pain medicine.     · You have loose stitches, or your incision comes open.     · Bright red blood has soaked through the bandage over your incision.     · You cannot pass stools or gas.     · You are sick to your stomach or cannot drink fluids.     · You have signs of a blood clot in your leg (called a deep vein thrombosis), such as:  ? Pain in your calf, back of the knee, thigh, or groin. ? Redness or swelling in your leg.     · You have signs of infection, such as:  ? Increased pain, swelling, warmth, or redness. ? Red streaks leading from the incision. ? Pus draining from the incision. ? A fever. Watch closely for changes in your health, and be sure to contact your doctor if you have any problems. Where can you learn more? Go to http://www.gray.com/  Enter P848 in the search box to learn more about \"Pancreatic Cancer Surgery: What to Expect at Home. \"  Current as of: December 17, 2020               Content Version: 13.0  © 5995-5144 [employer], [employer]. Care instructions adapted under license by [employer] [facility] (which disclaims liability or warranty for this information). If you have questions about a medical condition or this instruction, always ask your healthcare professional. [address] any warranty or liability for your use of this information.

## 2022-03-05 NOTE — PROGRESS NOTES
Discharge instructions reviewed. Patient had 1 question as to why she was going home on Metformin. Dr. Sims Oar paged with question. .   Patient does not have to take. Patient will follow up with PCP and Dr. Amirah Barrow after discharge.

## 2022-03-05 NOTE — ROUTINE PROCESS
Bedside shift change report given to [facility] (oncoming nurse) by Kristian Collazo RN (offgoing nurse). Report included the following information SBAR, Kardex, Intake/Output, MAR and Recent Results.

## 2022-03-07 ENCOUNTER — PATIENT OUTREACH (OUTPATIENT)
Dept: CASE MANAGEMENT | Age: 67
End: 2022-03-07

## 2022-03-07 ENCOUNTER — TELEPHONE (OUTPATIENT)
Dept: SURGERY | Age: 67
End: 2022-03-07

## 2022-03-07 ENCOUNTER — APPOINTMENT (OUTPATIENT)
Dept: INFUSION THERAPY | Age: 67
End: 2022-03-07

## 2022-03-07 NOTE — PROGRESS NOTES
Physician Discharge Summary     Patient ID:  Eric Beaulieu  [id number]  72 y.o.  1955    Allergies: Sulfa (sulfonamide antibiotics)    Admit Date: 2/23/2022    Discharge Date: 3/5/2022    * Admission Diagnoses: Pancreatic adenocarcinoma ([facility] [C25.9]    * Discharge Diagnoses:    Hospital Problems as of 3/5/2022 Date Reviewed: 2/17/2022          Codes Class Noted - Resolved POA    * (Principal) Pancreatic adenocarcinoma ([facility] ICD-10-CM: C25.9  ICD-9-CM: 157.9  2/23/2022 - Present Unknown               Admission Condition: Good    * Discharge Condition: good    * Procedures: Procedure(s):  [facility] Course:   Normal hospital course for this procedure. Patient with epidural for pain mgmt in the immediate post-op period. This was d/c and patient transitioned to IV, then oral analgesics. Patient worked with PT daily and ambulated in room and OOB as tolerated. Diet slowly advanced to low-fiber, tolerated, and +BM  Drain fluid lipase levels checked and low. Worked with PT and pain well controlled. Patient had NAYA drain removed & DC to home  POD #10   She is doing well with follow up appointments in place. Consults: Registered Dietitian/ Nutrition    Significant Diagnostic Studies: labs: Daily labs & Surgical pathology  FINAL PATHOLOGIC DIAGNOSIS* * *   1. Left node, pyloric lymph node, excision:        One lymph node negative for metastatic carcinoma.    2. PANCREAS    SPECIMEN       Procedure: Pancreaticoduodenectomy (Whipple resection), partial   pancreatectomy    TUMOR       Tumor Site: Pancreatic head       Histologic Type: Ductal adenocarcinoma (NOS)       Histologic Grade: G2: Moderately differentiated       Tumor Size: Estimated 1.2 cm (three consecutive tissue blocks   involved)       Tumor Extension: Tumor invades peripancreatic soft tissues       Treatment Effect: Present - Rare small groups of cancer cells (near   complete response, score 1)       Lymphovascular Invasion: Not identified       Perineural Invasion: Present    MARGINS       Margins: All margins are uninvolved by invasive carcinoma and   high-grade intraepithelial neoplasia           Margins Examined: Pancreatic neck / parenchymal, Bile duct, small   bowel           Distance of Invasive Carcinoma from Closest Margin: 4 mm,   estimated.           Closest Margin: Pancreatic neck /parenchymal. Tumor extends to   within < 1 mm of peripancreatic soft tissue surface adjacent to SMV.  LYMPH NODES       Number of Lymph Nodes Involved: 5       Number of Lymph Nodes Examined: 29       * Disposition: Home with Home health PT    Discharge Medications:   Discharge Medication List as of 3/5/2022 12:35 PM      START taking these medications    Details   ondansetron (ZOFRAN ODT) 4 mg disintegrating tablet Take 1 Tablet by mouth every six (6) hours as needed for Nausea or Vomiting., Normal, Disp-20 Tablet, R-0      metFORMIN (GLUCOPHAGE) 500 mg tablet Take 1 Tablet by mouth two (2) times daily (with meals). , Normal, Disp-60 Tablet, R-1      oxyCODONE IR (ROXICODONE) 5 mg immediate release tablet Take 1-2 Tablets by mouth every four (4) hours as needed for Pain for up to 14 days. Max Daily Amount: 60 mg., Normal, Disp-40 Tablet, R-0         CONTINUE these medications which have NOT CHANGED    Details   hydroCHLOROthiazide (HYDRODIURIL) 25 mg tablet TAKE 1 TABLET BY MOUTH EVERY DAY, Normal, Disp-90 Tablet, R-1      levothyroxine (SYNTHROID) 125 mcg tablet TAKE 1 TABLET BY MOUTH EVERY DAY BEFORE BREAKFAST, Normal, Disp-90 Tablet, R-1      potassium chloride (Klor-Con M10) 10 mEq tablet Take 2 Tablets by mouth daily. , Normal, Disp-60 Tablet, R-1      docusate sodium (Stool Softener) 100 mg tab Take  by mouth daily. , Historical Med      polyethylene glycol (Miralax) 17 gram packet Take 1 Packet by mouth as needed for Constipation. , Normal, Disp-14 Each, R-2      OTHER Alive multivitamin, Historical Med      ondansetron hcl (ZOFRAN) 8 mg tablet Take 1 Tablet by mouth every eight (8) hours as needed for Nausea or Vomiting., Normal, Disp-30 Tablet, R-2      prochlorperazine (Compazine) 10 mg tablet Take 1 Tablet by mouth every six (6) hours as needed for Nausea or Vomiting., Normal, Disp-30 Tablet, R-2      lidocaine-prilocaine (EMLA) topical cream Apply a dime size amount to port site 30 minutes before treatment to prevent pain., Normal, Disp-30 g, R-0      dexAMETHasone (DECADRON) 4 mg tablet Take 8mg (2 x tabs) on days 2 and 3 after treatment to prevent nausea. Take in the AM with food., Normal, Disp-24 Tablet, R-3             * Follow-up Care/Patient Instructions: Activity: No lifting >5#, Driving, or Strenuous exercise for 2 weeks.  No driving while on analgesics, PT/OT per Home Health and See surgical instructions  Diet: Low-fiber diet  Wound Care: Keep wound clean and dry    Follow-up Information     Follow up With Specialties Details Why [address] [facility]  Call for home health/physical therapy and skilled nursing [phone]    Luis E Neri MD General Surgery, Surgical Oncology, Colon and Rectal Surgery In 2 weeks  [address] [place] [id number] [phone]      Kedar Worley MD Internal Medicine   [address] [facility]  [address]  [address] [place] [address]  [phone]          Follow-up appts:  Future Appointments   Date Time Provider Sally Carrizales   3/10/2022  9:20 AM JESSICA Allen [facility] BS AMB   3/15/2022  8:30 AM Tara Lester MD [facility] BS AMB         Signed:  JESSICA Ardon  3/7/2022  7:09 AM

## 2022-03-07 NOTE — TELEPHONE ENCOUNTER
Returned call to Yoanna Schaefer regarding patient. Patients wound site looks good. Stated patient would benefit from wound care for 1x/week for 2 weeks. Advised while patient looks really good she noticed patient was holding the wall as she walked. Yoanna Schaefer will fax over orders for wound care and PT for Dr. Graciela Alcaraz to sign.

## 2022-03-07 NOTE — PROGRESS NOTES
Care Transitions Initial Call    Call within 2 business days of discharge: Yes     Patient: Shweta Stringer Patient : 1955 MRN: [id number]    Last Discharge [address]       Complaint Diagnosis Description Type Department Provider    22  Pancreatic adenocarcinoma ([facility] . .. Admission (Discharged) Nury Perez MD          Was this an external facility discharge? No     Challenges to be reviewed by the provider   Additional needs identified to be addressed with provider:   Not taking Metformin       Method of communication with provider : none    Discussed COVID-19 related testing which was not done at this time. Test performed prior to hospitalization. Advance Care Planning:   Does patient have an Advance Directive:  currently not on file; education provided Reports she received an AMD form from [facility]. Patient plans to complete. Requested copy to be scanned into chart. Pt verbalized her understanding. Inpatient Readmission Risk score: Unplanned Readmit Risk Score: 16.3 ( )    Was this a readmission? no   Patient stated reason for the admission: planned surgery    Patients top risk factors for readmission: medical condition-pancreatic adenocarcinoma   Interventions to address risk factors: Scheduled appointment with Specialist-3/10 and Obtained and reviewed discharge summary and/or continuity of care documents    Care Transition Nurse (CTN) contacted the patient by telephone to perform post hospital discharge assessment. Verified name and  with patient as identifiers. Provided introduction to self, and explanation of the CTN role. Reports feeling well. Denies chest pain, sob, fever. Reports constipation. States procedure site \"draining a little bit\" dressing changed. CTN reviewed discharge instructions, medical action plan and red flags with patient who verbalized understanding. Were discharge instructions available to patient? yes.  Reviewed appropriate site of care based on symptoms and resources available to patient including: Specialist and [address] [place] [address] Sherwin Chery. Patient given an opportunity to ask questions and does not have any further questions or concerns at this time. The patient agrees to contact the PCP office for questions related to their healthcare. Medication reconciliation was performed with patient, who verbalizes understanding of administration of home medications. Referral to Pharm D needed: no     Home Health/Outpatient orders at discharge: home health care, PT and [address]  Date of initial visit: 3/6/2022    Durable Medical Equipment ordered at discharge: None    Covid Risk Education    Educated patient about risk for severe COVID-19 due to risk factors according to CDC guidelines. CTN reviewed discharge instructions, medical action plan and red flag symptoms with the patient who verbalized understanding. Discussed COVID vaccination status: yes. Education provided on COVID-19 vaccination as appropriate. Discussed exposure protocols and quarantine with CDC Guidelines. Patient was given an opportunity to verbalize any questions and concerns and agrees to contact CTN or health care provider for questions related to their healthcare. Was patient discharged with a pulse oximeter? no.    Discussed follow-up appointments. If no appointment was previously scheduled, appointment scheduling offered: no. Is follow up appointment scheduled within 7 days of discharge? yes. [facility] follow up appointment(s):   Future Appointments   Date Time Provider Sally Carrizales   3/10/2022  9:20 AM JESSICA Kiran [facility] BS AMB   3/15/2022  8:30 AM Nelida WEST MD [facility] BS AMB     Non-[facility] follow up appointment(s): NA    Plan for follow-up call in 7-10 days based on severity of symptoms and risk factors. Plan for next call: self management-procedure site draining  CTN provided contact information for future needs.     Goals Addressed                 This Visit's Progress     Prevent complications post hospitalization.           03/07/22   Will monitor procedure site for evidence of infection   Will participate in [facility] PT to improve strength and mobility   Pt agrees to no lifting > 5#s, driving, or strenuous exercise for 2 weeks   Will attend follow up appt with surgeon scheduled 3/10   Pt will remain out of the hospital or ER for remainder of LONA period

## 2022-03-07 NOTE — DISCHARGE SUMMARY
Physician Discharge Summary     Patient ID:  Carloz Catalan  [id number]  50 y.o.  1955    Allergies: Sulfa (sulfonamide antibiotics)    Admit Date: 2/23/2022    Discharge Date: 3/5/2022    * Admission Diagnoses: Pancreatic adenocarcinoma ([facility] [C25.9]    * Discharge Diagnoses:    Hospital Problems as of 3/5/2022 Date Reviewed: 2/17/2022          Codes Class Noted - Resolved POA    * (Principal) Pancreatic adenocarcinoma ([facility] ICD-10-CM: C25.9  ICD-9-CM: 157.9  2/23/2022 - Present Unknown               Admission Condition: Good    * Discharge Condition: good    * Procedures: Procedure(s):  [address] [facility] Course:   Normal hospital course for this procedure. Patient with epidural for pain mgmt in the immediate post-op period. This was d/c and patient transitioned to IV, then oral analgesics. Patient worked with PT daily and ambulated in room and OOB as tolerated. Diet slowly advanced to low-fiber, tolerated, and +BM  Drain fluid lipase levels checked and low. Worked with PT and pain well controlled. Patient had NAYA drain removed & DC to home  POD #10   She is doing well with follow up appointments in place. Consults: Registered Dietitian/ Nutrition    Significant Diagnostic Studies: labs: Daily labs & Surgical pathology  FINAL PATHOLOGIC DIAGNOSIS* * *   1. Left node, pyloric lymph node, excision:        One lymph node negative for metastatic carcinoma.    2. PANCREAS    SPECIMEN       Procedure: Pancreaticoduodenectomy (Whipple resection), partial   pancreatectomy    TUMOR       Tumor Site: Pancreatic head       Histologic Type: Ductal adenocarcinoma (NOS)       Histologic Grade: G2: Moderately differentiated       Tumor Size: Estimated 1.2 cm (three consecutive tissue blocks   involved)       Tumor Extension: Tumor invades peripancreatic soft tissues       Treatment Effect: Present - Rare small groups of cancer cells (near   complete response, score 1)       Lymphovascular Invasion: Not identified       Perineural Invasion: Present    MARGINS       Margins: All margins are uninvolved by invasive carcinoma and   high-grade intraepithelial neoplasia           Margins Examined: Pancreatic neck / parenchymal, Bile duct, small   bowel           Distance of Invasive Carcinoma from Closest Margin: 4 mm,   estimated.           Closest Margin: Pancreatic neck /parenchymal. Tumor extends to   within < 1 mm of peripancreatic soft tissue surface adjacent to SMV.  LYMPH NODES       Number of Lymph Nodes Involved: 5       Number of Lymph Nodes Examined: 29       * Disposition: Home with Home health PT    Discharge Medications:   Discharge Medication List as of 3/5/2022 12:35 PM      START taking these medications    Details   ondansetron (ZOFRAN ODT) 4 mg disintegrating tablet Take 1 Tablet by mouth every six (6) hours as needed for Nausea or Vomiting., Normal, Disp-20 Tablet, R-0      metFORMIN (GLUCOPHAGE) 500 mg tablet Take 1 Tablet by mouth two (2) times daily (with meals). , Normal, Disp-60 Tablet, R-1      oxyCODONE IR (ROXICODONE) 5 mg immediate release tablet Take 1-2 Tablets by mouth every four (4) hours as needed for Pain for up to 14 days. Max Daily Amount: 60 mg., Normal, Disp-40 Tablet, R-0         CONTINUE these medications which have NOT CHANGED    Details   hydroCHLOROthiazide (HYDRODIURIL) 25 mg tablet TAKE 1 TABLET BY MOUTH EVERY DAY, Normal, Disp-90 Tablet, R-1      levothyroxine (SYNTHROID) 125 mcg tablet TAKE 1 TABLET BY MOUTH EVERY DAY BEFORE BREAKFAST, Normal, Disp-90 Tablet, R-1      potassium chloride (Klor-Con M10) 10 mEq tablet Take 2 Tablets by mouth daily. , Normal, Disp-60 Tablet, R-1      docusate sodium (Stool Softener) 100 mg tab Take  by mouth daily. , Historical Med      polyethylene glycol (Miralax) 17 gram packet Take 1 Packet by mouth as needed for Constipation. , Normal, Disp-14 Each, R-2      OTHER Alive multivitamin, Historical Med      ondansetron hcl (ZOFRAN) 8 mg tablet Take 1 Tablet by mouth every eight (8) hours as needed for Nausea or Vomiting., Normal, Disp-30 Tablet, R-2      prochlorperazine (Compazine) 10 mg tablet Take 1 Tablet by mouth every six (6) hours as needed for Nausea or Vomiting., Normal, Disp-30 Tablet, R-2      lidocaine-prilocaine (EMLA) topical cream Apply a dime size amount to port site 30 minutes before treatment to prevent pain., Normal, Disp-30 g, R-0      dexAMETHasone (DECADRON) 4 mg tablet Take 8mg (2 x tabs) on days 2 and 3 after treatment to prevent nausea. Take in the AM with food., Normal, Disp-24 Tablet, R-3             * Follow-up Care/Patient Instructions: Activity: No lifting >5#, Driving, or Strenuous exercise for 2 weeks.  No driving while on analgesics, PT/OT per Home Health and See surgical instructions  Diet: Low-fiber diet  Wound Care: Keep wound clean and dry    Follow-up Information     Follow up With Specialties Details Why [facility]  Call for home health/physical therapy and skilled nursing [phone]    Corinda Eisenmenger, MD General Surgery, Surgical Oncology, Colon and Rectal Surgery In 2 weeks  [address] [facility] [address] [phone]      Favian Aguilera MD Internal Medicine   [address] [facility] [address]  [address] [place] [address]  [phone]          Follow-up appts:  Future Appointments   Date Time Provider Sally Sofie   3/10/2022  9:20 AM JESSICA [facility] BS AMB   3/15/2022  8:30 AM Fausto Lester MD [facility] BS AMB         Signed:  JESSICA Gentile  3/7/2022  7:09 AM

## 2022-03-07 NOTE — TELEPHONE ENCOUNTER
Papito from [facility] called wanting to confirm plan of care for the patient. Papito stated that the patient does not need much nurse care, but only for the drainage from their surgical site. Patient does, however, need physical therapy for they are weak. Please advise.      CB: [phone]

## 2022-03-09 ENCOUNTER — APPOINTMENT (OUTPATIENT)
Dept: INFUSION THERAPY | Age: 67
End: 2022-03-09

## 2022-03-10 ENCOUNTER — OFFICE VISIT (OUTPATIENT)
Dept: SURGERY | Age: 67
End: 2022-03-10
Payer: MEDICARE

## 2022-03-10 VITALS
HEIGHT: 64 IN | HEART RATE: 91 BPM | DIASTOLIC BLOOD PRESSURE: 81 MMHG | SYSTOLIC BLOOD PRESSURE: 117 MMHG | RESPIRATION RATE: 18 BRPM | WEIGHT: 227.2 LBS | TEMPERATURE: 98.1 F | BODY MASS INDEX: 38.79 KG/M2 | OXYGEN SATURATION: 98 %

## 2022-03-10 DIAGNOSIS — E66.01 SEVERE OBESITY (BMI 35.0-39.9) WITH COMORBIDITY (HCC): ICD-10-CM

## 2022-03-10 DIAGNOSIS — C25.9 PANCREATIC ADENOCARCINOMA (HCC): Primary | ICD-10-CM

## 2022-03-10 PROCEDURE — 99024 POSTOP FOLLOW-UP VISIT: CPT | Performed by: PHYSICIAN ASSISTANT

## 2022-03-10 NOTE — PROGRESS NOTES
1. Have you been to the ER, urgent care clinic since your last visit? Hospitalized since your last visit? No    2. Have you seen or consulted any other health care providers outside of the [address] [place] [address] since your last visit? Include any pap smears or colon screening.  No

## 2022-03-10 NOTE — PROGRESS NOTES
[facility] at [address], [address]  W: [phone]  F: [phone]      Reason for Visit:   Ang Dahl is a 77 y.o. female who is seen for evaluation of new bilary or pancreatic head mass    Hematology/Oncology Treatment History:     Diagnosis: Pancreatic adenocarcinoma    Stage: clinical Stage Ib [T2N0] at diagnosis; Stage III [doT2wN8] at surgery    Pathology:   -9/26/21 Cytology - brushings from common bile duct: Reactive glandular epithelial cells and bile   -9/28/21 pancreatic head mass biopsy: Adenocarcinoma.   -10/22/21 [facility] mult-cancer panel -negative   -2/23/22 Pancreaticoduodenectomy (Whipple resection): Ductal adenocarcinoma, G2, 1.2cm. Tumor invades peripancreatic soft tissues. LVI negative. PNI present. Margins uninvolved. 5/29 LNs involved with cancer.   y pT1cN2     Prior Treatment:   1. Neoadjuvant FOLFIRNOX x 6 cycles, 10/18/21-1/10/22  2. PYLORUS PRESERVING WHIPPLE PROCEDURE AND PORTAL LYPHMADENECTOMY, 2/23/22    Current Treatment: adjuvant FOLFIRNOX x 6 cycles, date TBD. Added neulasta with C2. History of Present Illness:   Ang Dahl is a pleasant 77 y.o. female who comes in for evaluation of pancreatic cancer. She presented in 9/2021 with obstructive jaundice, transaminitis. ERCP on 9/26/21 notable for distal CBD stricture; possible tumor/mass located in the CBD; underwent biliary stent placement to relieve biliary obstruction. CT A/P revealed mild/mod intrahepatic biliary ductal dilatation; prominence of CBD and hydropic gallbladder, suggestive of stricture/stenosis/mass of distal CBD. MRI of abdomen showed narrowing of CBD pancreatic head suggestive of extrinisic compression concerning for periampullary mass vs eccentric intraluminal lesion. EUS showed 3.2cm hypoechoic mass in pancreas head. Biopsy revealed adenocarcinoma.    Pt met with Dr. Elyse Crawford in [place] and plans for neoadjuvant chemotherapy prior to surgery. Interval history:  Patient here for follow up of pancreas cancer after Whipple procedure. Reports she is recovering well. Reports abdominal discomfort and back pain at night - only requiring oxycodone at bedtime. No bowel movement for several days. Took a stool softener today. Prior was having formed, soft stools. Incision site is healing well - scabbed over with no drainage. Reports dysgeusia, so eating is difficult - lost 20 lbs. She is supplement once ensure with 30 grams of protein. Eating bites of food throughout the day (yesterday had 2 bites of chicken, 3 small chunks of cantaloupe, peanut butter on crackers, ensure). Reports mild nausea. No fevers, chills, SOB or CP. PMHx: OA and Rheumatoid arthritis in lower back, Ulcerative colitis, HTN, Hypothyroidism  PSurgHx: , Cyst removed from left breast  SHx: , has 3 children (1 daughter and 2 sons). Works as  in [employer]. Nonsmoker, no EtOH.  in rehab recovering from [facility]. FHx: Mother  of pancreatic cancer age 80. Brother and son had colon cancer. Review of Systems: A complete review of systems was obtained, reviewed. Pertinent findings reviewed above. Physical Exam:     Visit Vitals  /74   Pulse 91   Temp 98.1 °F (36.7 °C)   Resp 16   Ht 5' 4\" (1.626 m)   Wt 220 lb (99.8 kg)   SpO2 98%   BMI 37.76 kg/m²     ECOG PS: 1  General: no distress  Eyes: anicteric sclerae  HENT: oropharynx clear  Neck: supple  Lymphatic: no cervical, supraclavicular adenopathy  Respiratory: normal respiratory effort  CV: no peripheral edema  GI: soft, nontender, nondistended, no masses; Midline surgical scar appears to be healing well with dry skin and scar/scabbing.   Skin: no rashes; no ecchymoses; no petechiae      Results:     Lab Results   Component Value Date/Time    WBC 7.7 2022 04:05 AM    HGB 7.7 (L) 2022 04:05 AM    HCT 23.9 (L) 2022 04:05 AM    PLATELET 580  04:05 AM .4 (H) 03/05/2022 04:05 AM    ABS. NEUTROPHILS 3.5 03/05/2022 04:05 AM    Hemoglobin (POC) 12.6 10/06/2013 08:45 AM    Hematocrit (POC) 37 10/06/2013 08:45 AM     Lab Results   Component Value Date/Time    Sodium 138 03/05/2022 04:05 AM    Potassium 3.4 (L) 03/05/2022 04:05 AM    Chloride 107 03/05/2022 04:05 AM    CO2 28 03/05/2022 04:05 AM    Glucose 91 03/05/2022 04:05 AM    BUN 4 (L) 03/05/2022 04:05 AM    Creatinine 0.73 03/05/2022 04:05 AM    GFR est AA >60 03/05/2022 04:05 AM    GFR est non-AA >60 03/05/2022 04:05 AM    Calcium 8.2 (L) 03/05/2022 04:05 AM    Sodium (POC) 143 10/06/2013 08:45 AM    Potassium (POC) 3.4 (L) 10/06/2013 08:45 AM    Chloride (POC) 104 10/06/2013 08:45 AM    Glucose (POC) 84 03/02/2022 03:51 PM    BUN (POC) 11 10/06/2013 08:45 AM    Creatinine (POC) 1.0 10/06/2013 08:45 AM    Calcium, ionized (POC) 1.27 10/06/2013 08:45 AM     Lab Results   Component Value Date/Time    Bilirubin, total 0.3 03/05/2022 04:05 AM    ALT (SGPT) 13 03/05/2022 04:05 AM    Alk. phosphatase 91 03/05/2022 04:05 AM    Protein, total 5.6 (L) 03/05/2022 04:05 AM    Albumin 1.8 (L) 03/05/2022 04:05 AM    Globulin 3.8 03/05/2022 04:05 AM     Lab Results   Component Value Date/Time    Iron % saturation 27 12/27/2021 09:21 AM    TIBC 327 12/27/2021 09:21 AM    Ferritin 244 12/27/2021 09:22 AM    Sed rate (ESR) 17 09/16/2010 12:04 PM    C-Reactive protein <0.29 01/18/2017 08:45 AM    TSH 3.46 03/04/2021 08:57 AM    ALBA, Direct Detected (A) 09/16/2010 12:04 PM    Lipase 1,214 (H) 09/29/2021 04:10 AM    Hep C virus Ab Interp. NONREACTIVE 09/24/2021 06:55 PM     Lab Results   Component Value Date/Time    CEA 3.5 09/25/2021 11:46 AM    Carbohydrate Antigen 19-9, (CA 19-9) 130 (H) 01/10/2022 10:08 AM       10/18/21 CA 19-9:  138      Imaging / Procedures:     9/24/21 US ABD   IMPRESSION  Cholelithiasis. Gallbladder sludge. Prominent CBD with mild intrahepatic ductal dilatation as well.   Nonemergent MRI with MRCP to delineate etiology for CBD distention. 9/24/21 CT ABD PELV W CONT  There is mild/moderate intrahepatic biliary ductal dilatation, prominence of the CBD and a hydropic gallbladder. No pancreatic duct dilatation. No clearly  delineated pancreatic head mass. Imaging findings suggestive of stricture/stenosis/mass of the distal CBD, no extrinsic pancreatic head mass is  identified. Gastroenterology consult  Correlation with MRI/MRCP on a nonemergent basis. There is severe degenerative change of the lumbar spine. 9/25/21 MRI ABD WO CONT  IMPRESSION  1. Intrahepatic and extra hepatic biliary dilatation with abrupt narrowing of  the common bile duct pancreatic head just proximal to the ampulla. Suggestion of  extrinsic compression on the duct. This raises the possibility of a  periampullary mass. Alternatively, this may be an eccentric intraluminal lesion. Evaluation is limited. Recommend GI consultation for possible ERCP and EUS. 2.  Questionable small lesion in the left hepatic lobe with mild increased T2  signal and T1 hypointensity. Recommend follow-up imaging a contrast-enhanced  study preferably MRI. 3.  Cholelithiasis. Distended gallbladder with mild gallbladder wall thickening. Correlate for acute cholecystitis    9/26/21 XR ERCP/ERCB / Dr. Jose C Fierro  Impression: Biliary ductal dilation, with a maximum common duct diameter of 15 mm; Severe stenosis, involving distal CBD /pancreas head area concerning for neoplasia  Interventions: Endoscopic ampullary sphincterotomy and biliary stent placement; brushings from the CBD    9/27/21 CT CHEST W CONT:   IMPRESSION  1. There is a minimal right pleural effusion. 2. There are small pulmonary nodules present. There is a nodule in the plane of  the right major fissure and posteriorly in left lower lobe. These were not  present on examination of 1/18/2017. These could be on the basis of metastatic  disease. Close follow-up is suggested.  There are also 2 small subpleural nodules  anteriorly in the right upper lobe as described above which can also be  evaluated on follow-up. 9/28/21 EUS:  Endoscopic: Esophagus:normal; Stomach: normal; Duodenum/jejunum: normal and protruding biliary stent  Ultrasound: Esophagus: normal findings;     Stomach: normal findings:     Pancreas: Areas examined: the entire gland                          Parenchyma: -Evidence of a hypoechoic mass with poorly defined borders seen in the head of the pancreas. It appeared involving the CBD where a stent is seen, however it did not involve the major vessels. It measured about 3.2 cm in widest diameter on one view. Pancreatic Duct: normal findings, not dilated               Liver: Parenchyma: normal                          Gallbladder: normal                          Bile Duct: the common bile duct is dilated in the proximal and mid portion and a plastic stent could be seen                          Lymph Node: no adenopathy  Impression:   Pancreas head mass with fine needle biopsy showing adenocarcinoma on preliminary cytology  Recommendations: Follow-up on pathology  Follow-up with oncology, will need further evaluation of pulmonary nodule  Surgical oncology consultation  Resume GI lite diet today and can discharge in am from GI standpoint    12/20/2021:  MRI abd w wo cont:  IMPRESSION  1. Positive response to therapy. Decreased size of the pancreatic head mass, which now has a maximum AP diameter of 2.3 cm. No obstruction of the biliary tree or pancreatic duct. No involvement of the SMA. Based on imaging, patient is a candidate for resection. 2. Cholelithiasis. No acute cholecystitis or biliary obstruction. MRI Pelv w wo cont:  IMPRESSION  No acute process or evidence of malignant neoplasm/ metastatic disease. CT chest w cont:  IMPRESSION  1.   Interval apparent resolution of previously seen scattered subcentimeter bilateral pulmonary nodules and right pleural effusion. 2.  Please see separately dictated reports for the MRI abdomen and pelvis obtained the same day for subdiaphragmatic findings. Assessment/Recommendations:   77 y.o. female with Ulcerative colitis, Hyopthyroidism, admitted with obstructive jaundice concerning for underlying malignant obstructing mass. 1. Pancreatic adenocarcinoma:  Clinically stage Ib [T2N0], but need further evaluation in future regarding pulmonary nodules and liver lesion. Presented with obstructive jaundice and transaminitis, CA 19-9 was 198 in 9/2021 at diagnosis. Underwent biliary stent placement with improvement in biliary obstruction. 3.2cm mass seen in pancreatic head on EUS, not involving vasculature, possibly resectable. Dr. Tramaine Bauman in [place] has evaluated patient and recommends consideration of neoadjuvant chemotherapy with close attention to lung nodules and liver lesion. I agree with this recommendation for earlier treatment of micrometastatic disease, ability to determine whether pt has chemosensitive disease. We discussed the risks and benefits of FOLFIRINOX chemotherapy, including potential side effects. These include but are not limited to fatigue, nausea vomiting, diarrhea, neuropathy, taste changes, cold intolerance, esophageal spasm, allergic reactions, alopecia, mucositis, myelosuppression, risk for infection, infertility, and rarely, death. Rarely, a patient may have a condition where they do not metabolize fluorouracil appropriately (called DPD deficiency), and they may have excessive toxicity. A Port-A-Cath will be required in order to deliver the continuous infusion. BRCA 1/2 negative. The patient has consented to beginning therapy. Pt completed 6 cycles of mFOLFIRINOX in neoadjuvant setting, followed by pancreaticoduodenectomy (Whipple resection) on 2/23/22. Now planning for 6 cycles of adjuvant chemotherapy.  Supportive care meds include: Emla cream, Zofran, Compazine, Dexamethasone  -- Completed C6 of neoadjuvant mFOLFIRINOX with neulasta support  on 1/10/22. -- Checking CA 19-9 on date of C1, C6, C7, C12.   -- Return on 4/12/22 for C7 mFOLFIRINOX (10% DR blair), Neulasta support, MD/NP visit. -- Follow up with Dr. Dayanna Vaughn on 3/31/22. -- Managing pain with prn oxycodone. 2. H/o Diarrhea / hypokalemia:  Reports several days of constipation - took stool softener today. Has history of UC. Not taking Cholestyramine (Questran) due to intolerance. Taking KCl 20meq daily. -- Stool softeners prn constipation. 3. Pulmonary nodules:   Repeat imaging after C4 of chemo showed resolution of pulmonary nodules. 4. HTN:    Managed on HCTZ. Advised checking BP at home and if systolic <897, then hold HCTZ. Advised holding today and for a few days. 5. H/o Ulcerative procto-sigmoiditis:  Past due for colonoscopy with one adenoma polyp removed at last colonoscopy in April 2017. Was due for repeat in 2019 but she has not followed up with the GI practice since her last colonoscopy. -- Established with GI and plans to have repeat colonoscopy soon. 6. Morbid obesity:  Discussed healthy food options and ways to incorporate more protein into diet. 7. H/o Chemotherapy induced neutropenia:   Received Neulasta on pump disconnect days. 8. Macrocytic anemia:  Due to prior chemotherapy and recent Whipple surgery. -- Recheck iron profile, ferritin, retic, cbc, folate and b12 today - call or send message with results. 9. Dysguesia / Poor appetite:   Occurred after whipple surgery with associated 20lb weight-loss. Only eating bites of food and one high protein (30gram) supplement drink daily. Discussed in detail with patient that she is not getting enough calories. Discussed Zofran, increased supplement shakes and increase in food overall. -- Consultation with Hamida Candelaria RD placed to discuss protein intake and if another high protein supplement drink should be added.    -- Trial taking zofran in the mornings to see if this helps increase PO intake. I saw and evaluated the patient on Joe Valelper and discussed care with collaborating provider, Dr. Abhay Felix. This signature serves as Serena Paez's attestation. I personally saw and evaluated the patient and performed the key components of medical decision making. The history, physical exam, and documentation were performed by Lennox Hook, NP. I reviewed and verified the above documentation and modified it as needed. Discussed appetite and nutrition a great deal. Also discussed pathology which shows Stage III disease.      Signed By: Osmani Cummins MD

## 2022-03-10 NOTE — PROGRESS NOTES
Subjective:      Satira Peabody is a 77 y.o. female presents for postop care 2 weeks following [address] by Dr. Michelle Valdez on 2/23/2022. FINAL PATHOLOGIC DIAGNOSIS* * *   1. Left node, pyloric lymph node, excision:        One lymph node negative for metastatic carcinoma. 2. PANCREAS    SPECIMEN       Procedure: Pancreaticoduodenectomy (Whipple resection), partial   pancreatectomy    TUMOR       Tumor Site: Pancreatic head       Histologic Type: Ductal adenocarcinoma (NOS)       Histologic Grade: G2: Moderately differentiated       Tumor Size: Estimated 1.2 cm (three consecutive tissue blocks   involved)       Tumor Extension: Tumor invades peripancreatic soft tissues       Treatment Effect: Present - Rare small groups of cancer cells (near   complete response, score 1)       Lymphovascular Invasion: Not identified       Perineural Invasion: Present    MARGINS       Margins: All margins are uninvolved by invasive carcinoma and   high-grade intraepithelial neoplasia           Margins Examined: Pancreatic neck / parenchymal, Bile duct, small   bowel           Distance of Invasive Carcinoma from Closest Margin: 4 mm,   estimated.           Closest Margin: Pancreatic neck /parenchymal. Tumor extends to   within < 1 mm of peripancreatic soft tissue surface adjacent to SMV.  LYMPH NODES       Number of Lymph Nodes Involved: 5       Number of Lymph Nodes Examined: Kaycee Ng is doing well at home. She is eating small meals, without NV. Appetite is fair. Eating a regular diet without difficulty. Bowel movements are alternating constipation and regularity. The patient is voiding without difficulty. The patient is not having any pain, but reports abdominal pressure that she takes an occasional analgesic PRN. Home health visited once, then signed off. Home PT x1 and signed off as well.   She is ambulating well in the home with a cane and uses a walker when she goes out. Patient has an advanced directive: NO  Ms. Any Sandhu has a reminder for a \"due or due soon\" health maintenance. I have asked that she contact her primary care provider for follow-up on this health maintenance. Objective:     Visit Vitals  /81 (BP 1 Location: Left arm, BP Patient Position: Sitting, BP Cuff Size: Adult)   Pulse 91   Temp 98.1 °F (36.7 °C) (Oral)   Resp 18   Ht 5' 4\" (1.626 m)   Wt 227 lb 3.2 oz (103.1 kg)   SpO2 98%   BMI 39.00 kg/m²       General:  alert, cooperative, no distress, appears stated age   [de-identified]: No resp distress, CTA bilat and normal respiratory effort   Abdomen:   soft, bowel sounds active, non-tender   Incisions: healing well, no drainage, no erythema, no seroma, no swelling, no dehiscence, incision well approximated     Assessment:     Doing well postoperatively. Plan/Recommendations/Medical Decision Makin. Continue activities as tolerated, use walker or cane as needed  2. Discussed small, frequent meals & supplement drinks. 3. RTC 2-3 weeks, sooner as needed. 4. Has an appt with Dr. Capo Walker on 3/15/2022.  5. Encouraged to f/up with PCP for DM mgmt & she verbalizes understanding. Per the NCCN guidelines: Agustin Raphael will be followed routinely with a history and physical and imaging (CT Chest) every 6 months for the first 2 years then yearly. Mrs. Any Sandhu & spouse verbalized understanding and questions were answered to the best of my knowledge and ability. We discussed the importance of proper diet and 30 minutes/day of proper exercise. Healthy diet educational materials were provided. Thank you for allowing us to participate in the care of your patient.     > 40 minutes were spent with patient with greater than 50% of that time spent face to face counseling    Franco Deshpande [facility]  3/10/2022

## 2022-03-10 NOTE — PATIENT INSTRUCTIONS

## 2022-03-11 ENCOUNTER — TELEPHONE (OUTPATIENT)
Dept: SURGERY | Age: 67
End: 2022-03-11

## 2022-03-11 NOTE — TELEPHONE ENCOUNTER
Called patient to get her scheduled for a follow up with  on 3/24 per Laurita. No answer, left voicemail. PO - 20 minutes.

## 2022-03-14 ENCOUNTER — APPOINTMENT (OUTPATIENT)
Dept: INFUSION THERAPY | Age: 67
End: 2022-03-14

## 2022-03-15 ENCOUNTER — OFFICE VISIT (OUTPATIENT)
Dept: ONCOLOGY | Age: 67
End: 2022-03-15
Payer: MEDICARE

## 2022-03-15 VITALS
HEIGHT: 64 IN | RESPIRATION RATE: 16 BRPM | SYSTOLIC BLOOD PRESSURE: 106 MMHG | TEMPERATURE: 98.1 F | BODY MASS INDEX: 37.56 KG/M2 | DIASTOLIC BLOOD PRESSURE: 74 MMHG | WEIGHT: 220 LBS | HEART RATE: 91 BPM | OXYGEN SATURATION: 98 %

## 2022-03-15 DIAGNOSIS — C25.9 PANCREATIC ADENOCARCINOMA (HCC): ICD-10-CM

## 2022-03-15 DIAGNOSIS — R43.2 DYSGEUSIA: ICD-10-CM

## 2022-03-15 DIAGNOSIS — R63.4 RAPID WEIGHT LOSS: ICD-10-CM

## 2022-03-15 DIAGNOSIS — D53.9 MACROCYTIC ANEMIA: ICD-10-CM

## 2022-03-15 DIAGNOSIS — R63.0 POOR APPETITE: ICD-10-CM

## 2022-03-15 DIAGNOSIS — C25.9 PANCREATIC ADENOCARCINOMA (HCC): Primary | ICD-10-CM

## 2022-03-15 PROBLEM — E11.9 TYPE 2 DIABETES MELLITUS (HCC): Status: ACTIVE | Noted: 2022-03-15

## 2022-03-15 LAB
ALBUMIN SERPL-MCNC: 3.5 G/DL (ref 3.5–5)
ALBUMIN/GLOB SERPL: 0.8 {RATIO} (ref 1.1–2.2)
ALP SERPL-CCNC: 102 U/L (ref 45–117)
ALT SERPL-CCNC: 21 U/L (ref 12–78)
ANION GAP SERPL CALC-SCNC: 10 MMOL/L (ref 5–15)
AST SERPL-CCNC: 27 U/L (ref 15–37)
BASOPHILS # BLD: 0 K/UL (ref 0–0.1)
BASOPHILS NFR BLD: 1 % (ref 0–1)
BILIRUB SERPL-MCNC: 0.4 MG/DL (ref 0.2–1)
BUN SERPL-MCNC: 12 MG/DL (ref 6–20)
BUN/CREAT SERPL: 14 (ref 12–20)
CALCIUM SERPL-MCNC: 10.2 MG/DL (ref 8.5–10.1)
CHLORIDE SERPL-SCNC: 98 MMOL/L (ref 97–108)
CO2 SERPL-SCNC: 26 MMOL/L (ref 21–32)
CREAT SERPL-MCNC: 0.88 MG/DL (ref 0.55–1.02)
DIFFERENTIAL METHOD BLD: ABNORMAL
EOSINOPHIL # BLD: 0.1 K/UL (ref 0–0.4)
EOSINOPHIL NFR BLD: 3 % (ref 0–7)
ERYTHROCYTE [DISTWIDTH] IN BLOOD BY AUTOMATED COUNT: 14.7 % (ref 11.5–14.5)
FERRITIN SERPL-MCNC: 177 NG/ML (ref 8–252)
FOLATE SERPL-MCNC: 11.8 NG/ML (ref 5–21)
GLOBULIN SER CALC-MCNC: 4.5 G/DL (ref 2–4)
GLUCOSE SERPL-MCNC: 101 MG/DL (ref 65–100)
HCT VFR BLD AUTO: 33.3 % (ref 35–47)
HGB BLD-MCNC: 10.7 G/DL (ref 11.5–16)
IMM GRANULOCYTES # BLD AUTO: 0 K/UL (ref 0–0.04)
IMM GRANULOCYTES NFR BLD AUTO: 1 % (ref 0–0.5)
IRON SATN MFR SERPL: 13 % (ref 20–50)
IRON SERPL-MCNC: 46 UG/DL (ref 35–150)
LYMPHOCYTES # BLD: 2.2 K/UL (ref 0.8–3.5)
LYMPHOCYTES NFR BLD: 43 % (ref 12–49)
MCH RBC QN AUTO: 33.1 PG (ref 26–34)
MCHC RBC AUTO-ENTMCNC: 32.1 G/DL (ref 30–36.5)
MCV RBC AUTO: 103.1 FL (ref 80–99)
MONOCYTES # BLD: 0.7 K/UL (ref 0–1)
MONOCYTES NFR BLD: 14 % (ref 5–13)
NEUTS SEG # BLD: 1.9 K/UL (ref 1.8–8)
NEUTS SEG NFR BLD: 38 % (ref 32–75)
NRBC # BLD: 0 K/UL (ref 0–0.01)
NRBC BLD-RTO: 0 PER 100 WBC
PLATELET # BLD AUTO: 417 K/UL (ref 150–400)
PMV BLD AUTO: 10.9 FL (ref 8.9–12.9)
POTASSIUM SERPL-SCNC: 4.4 MMOL/L (ref 3.5–5.1)
PROT SERPL-MCNC: 8 G/DL (ref 6.4–8.2)
RBC # BLD AUTO: 3.23 M/UL (ref 3.8–5.2)
RETICS # AUTO: 0.06 M/UL (ref 0.02–0.08)
RETICS/RBC NFR AUTO: 2 % (ref 0.7–2.1)
SODIUM SERPL-SCNC: 134 MMOL/L (ref 136–145)
TIBC SERPL-MCNC: 351 UG/DL (ref 250–450)
VIT B12 SERPL-MCNC: 496 PG/ML (ref 193–986)
WBC # BLD AUTO: 4.9 K/UL (ref 3.6–11)

## 2022-03-15 PROCEDURE — G8417 CALC BMI ABV UP PARAM F/U: HCPCS | Performed by: INTERNAL MEDICINE

## 2022-03-15 PROCEDURE — G8536 NO DOC ELDER MAL SCRN: HCPCS | Performed by: INTERNAL MEDICINE

## 2022-03-15 PROCEDURE — G8754 DIAS BP LESS 90: HCPCS | Performed by: INTERNAL MEDICINE

## 2022-03-15 PROCEDURE — 1101F PT FALLS ASSESS-DOCD LE1/YR: CPT | Performed by: INTERNAL MEDICINE

## 2022-03-15 PROCEDURE — G8432 DEP SCR NOT DOC, RNG: HCPCS | Performed by: INTERNAL MEDICINE

## 2022-03-15 PROCEDURE — G8400 PT W/DXA NO RESULTS DOC: HCPCS | Performed by: INTERNAL MEDICINE

## 2022-03-15 PROCEDURE — 99214 OFFICE O/P EST MOD 30 MIN: CPT | Performed by: INTERNAL MEDICINE

## 2022-03-15 PROCEDURE — G8752 SYS BP LESS 140: HCPCS | Performed by: INTERNAL MEDICINE

## 2022-03-15 PROCEDURE — 3017F COLORECTAL CA SCREEN DOC REV: CPT | Performed by: INTERNAL MEDICINE

## 2022-03-15 PROCEDURE — G8427 DOCREV CUR MEDS BY ELIG CLIN: HCPCS | Performed by: INTERNAL MEDICINE

## 2022-03-15 PROCEDURE — 1111F DSCHRG MED/CURRENT MED MERGE: CPT | Performed by: INTERNAL MEDICINE

## 2022-03-15 PROCEDURE — 1090F PRES/ABSN URINE INCON ASSESS: CPT | Performed by: INTERNAL MEDICINE

## 2022-03-15 NOTE — PROGRESS NOTES
1. Have you been to the ER, urgent care clinic since your last visit? Hospitalized since your last visit? Yes Patient had surgery at [facility], 2/23/22    2. Have you seen or consulted any other health care providers outside of the [address] [place] [address] since your last visit? Include any pap smears or colon screening. No        Vitals:    03/15/22 0832   BP: 106/74   Pulse: 91   Resp: 16   Temp: 98.1 °F (36.7 °C)   SpO2: 98%   Weight: 220 lb (99.8 kg)   Height: 5' 4\" (1.626 m)           Chief Complaint   Patient presents with    Follow-up     Patient presents as a follow-up. She denies pain.

## 2022-03-16 ENCOUNTER — TELEPHONE (OUTPATIENT)
Dept: ONCOLOGY | Age: 67
End: 2022-03-16

## 2022-03-16 ENCOUNTER — APPOINTMENT (OUTPATIENT)
Dept: INFUSION THERAPY | Age: 67
End: 2022-03-16

## 2022-03-16 DIAGNOSIS — K59.03 DRUG-INDUCED CONSTIPATION: ICD-10-CM

## 2022-03-16 RX ORDER — POLYETHYLENE GLYCOL 3350 17 G/17G
17 POWDER, FOR SOLUTION ORAL AS NEEDED
Qty: 14 EACH | Refills: 2 | Status: SHIPPED | OUTPATIENT
Start: 2022-03-16 | End: 2022-10-10

## 2022-03-16 NOTE — PROGRESS NOTES
[address] at [facility]  [phone]    03/16/22 2:38 PM - Called and spoke with the patient. Patient's ID verified x 2. Advised patient of NP's recommendations. Advised patient if she still does not have any improvement with the stool softeners or Miralax to give our office a call. The patient voiced understanding and gratitude for the call. No further questions or concerns.

## 2022-03-16 NOTE — PROGRESS NOTES
Your anemia is much better with Hgb at 10.7. Also, you calcium is slightly high. If you are taking any Calcium supplements, please discontinue. We will recheck this in a few weeks.

## 2022-03-16 NOTE — PROGRESS NOTES
[address] at [facility]  [phone]    03/16/22 1:48 PM - Called and spoke with the patient. Patient's ID verified x 2. Advised patient of the doctor's recommendations. The patient states she is not currently taking any calcium supplements. The patient reports having a low appetite, as nothing seems to taste good. She has been drinking fluids and chicken broth. She states she has not had a bowel movement in two days. She reports discomfort in her stomach but no pain. She has no pain in her rectum. She is still passing gas. Advised this nurse would relay her concerns to the team and give her a call back with recommendations as soon as possible. The patient voiced understanding and gratitude for the call. No further questions or concerns.

## 2022-03-17 ENCOUNTER — PATIENT OUTREACH (OUTPATIENT)
Dept: CASE MANAGEMENT | Age: 67
End: 2022-03-17

## 2022-03-17 NOTE — TELEPHONE ENCOUNTER
[facility] at [address], [address] [place] [id number]  W: [phone]  F: [phone]    Medical Nutrition Therapy  Nutrition Referral:    Called and spoke with patient. She is s/p whipple procedure and still working on diet advancement. She was educated on whipple post op diet while in the hospital on 3/4/22. She avoids pork, beef, milk. She reports having more issues with taste and smells. She may start to eat something and it taste good then the next bite it doesn't. Little appetite/desire to eat. She has been able to get in 1 Ensure per day. She is drinking about 2 cups of Bone Broth a diet. She is eating very small amounts. A few bites and then full. We discussed small more frequent meals. Eating a few bites and assessing tolerance. Explained that what she does not tolerate now she may tolerate in a few weeks or months. We reviewed strategies to get additional calories and protein. She is appreciative of information. Encouraged her to reach out for any additional questions or concerns. Patient with pancreatic cancer. Does not tolerate milk products. Called patient, no answer. Left a message. Contact information provided. 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  Adjuvant FOLFIRNOX x 6 cycles, start date TBD.      Wt Readings from Last 5 Encounters:   03/15/22 220 lb (99.8 kg)   03/10/22 227 lb 3.2 oz (103.1 kg)   02/26/22 258 lb 4.8 oz (117.2 kg)   02/15/22 228 lb 15.9 oz (103.9 kg)   01/24/22 227 lb 6.4 oz (103.1 kg)     Malnutrition Assessment:  Malnutrition Status:  Severe malnutrition    Context:  Chronic illness     Findings of the 6 clinical characteristics of malnutrition:   Energy Intake:  7 - 75% or less est energy requirements for 1 month or longer  Weight Loss:  7.00 - Greater than 10% over 6 months     Body Fat Loss:  Unable to assess,     Muscle Mass Loss:  Unable to assess,    Fluid Accumulation:  No significant fluid accumulation,     Strength:  Not performed     Energy (kcal): 2231-1291 (MSJ x 1-1.2 x 1.3)           Wt used: Admission (103.8 kg)  Protein (gm): 100-135 (1.5-2 gm/kg adj BW)             Wt used: Adjusted (~67 kg)   Fluid (mL/day): 1 mL/kcal     Plan:   - Continue with Ensure daily. - Continue with Bone broth as a way to get additional protein. - Suggested small meals at set times verses hunger cues. - Limit liquids at meal times to prevent filling up  - Continue to be available as a resource.      Signed By: Marli Shin, [employer]

## 2022-03-18 ENCOUNTER — TELEPHONE (OUTPATIENT)
Dept: ONCOLOGY | Age: 67
End: 2022-03-18

## 2022-03-18 PROBLEM — Z76.89 PREVENTION OF CHEMOTHERAPY-INDUCED NEUTROPENIA: Status: ACTIVE | Noted: 2021-11-01

## 2022-03-18 NOTE — TELEPHONE ENCOUNTER
Patient called and stated she still had not had a bowel movement. She is requesting a call back. Please advise.  #[phone]

## 2022-03-18 NOTE — TELEPHONE ENCOUNTER
[address] at [facility]  [phone]        03/18/22 4:31 PM Return call placed to patient. Patient has complaints of constipation. Last bowel movement was about 3 days ago--describes that stool was soft but solid, also a normal amount. Patient has been taking stool softener every other day. She was unsure if she should increase frequency since she has decreased PO intake. Also took dose of Miralax yesterday. Patient states that she sometimes feels the urge to have bowel movement but this subsides. Patient passing gas and belching. She states that at times she feels sluggish and feels like she may vomit when eating. Denies any vomiting episodes. Patient also with complaints of intermittent abdominal and lower back pain. Also states she has a little abdominal bloating. Advised this nurse would forward above to MUSA Anderson, for further recommendations and clinical team would call patient back. She voiced understanding.     03/23/22 4:12 PM Return call placed to patient. Patient stated she wanted to update provider that she still has not had a bowel movement. Last bowel movement estimated to be 03/15. Patient states she is passing gas and sometimes feels the urge to have a bowel movement. She stated that sometimes there is stool on toilet paper when wiping. Denies abdominal pain/discomfort. Patient also shared she is still not eating much--yesterday ate 3 tbsp of solid foods (mashed potatoes) and an egg today. Otherwise has been drinking Ensure, broth, and water. Patient currently taking stool softener, 2 pills once daily, and Miralax once daily. Advised this nurse would forward above to Dr. Jessica Verde and Monica and call patient back with further recommendations. She voiced understanding.

## 2022-03-19 PROBLEM — C25.9 PANCREATIC ADENOCARCINOMA (HCC): Status: ACTIVE | Noted: 2022-02-23

## 2022-03-19 PROBLEM — E86.0 DEHYDRATION: Status: ACTIVE | Noted: 2021-11-29

## 2022-03-19 PROBLEM — R79.89 ELEVATED LFTS: Status: ACTIVE | Noted: 2021-09-24

## 2022-03-19 PROBLEM — E66.01 OBESITY, MORBID (HCC): Status: ACTIVE | Noted: 2018-01-29

## 2022-03-19 PROBLEM — K80.21 CALCULUS OF GALLBLADDER WITH BILIARY OBSTRUCTION BUT WITHOUT CHOLECYSTITIS: Status: ACTIVE | Noted: 2021-09-24

## 2022-03-19 PROBLEM — K83.8 COMMON BILE DUCT DILATION: Status: ACTIVE | Noted: 2021-09-24

## 2022-03-20 PROBLEM — C25.0 MALIGNANT NEOPLASM OF HEAD OF PANCREAS (HCC): Status: ACTIVE | Noted: 2021-09-28

## 2022-03-20 PROBLEM — E87.6 HYPOKALEMIA DUE TO LOSS OF POTASSIUM: Status: ACTIVE | Noted: 2021-09-25

## 2022-03-21 ENCOUNTER — OFFICE VISIT (OUTPATIENT)
Dept: INTERNAL MEDICINE CLINIC | Age: 67
End: 2022-03-21
Payer: MEDICARE

## 2022-03-21 ENCOUNTER — TELEPHONE (OUTPATIENT)
Dept: INTERNAL MEDICINE CLINIC | Age: 67
End: 2022-03-21

## 2022-03-21 VITALS
SYSTOLIC BLOOD PRESSURE: 101 MMHG | HEART RATE: 85 BPM | RESPIRATION RATE: 16 BRPM | TEMPERATURE: 98.5 F | HEIGHT: 64 IN | BODY MASS INDEX: 36.54 KG/M2 | WEIGHT: 214 LBS | OXYGEN SATURATION: 99 % | DIASTOLIC BLOOD PRESSURE: 69 MMHG

## 2022-03-21 DIAGNOSIS — C25.0 MALIGNANT NEOPLASM OF HEAD OF PANCREAS (HCC): Primary | ICD-10-CM

## 2022-03-21 DIAGNOSIS — E03.4 HYPOTHYROIDISM DUE TO ACQUIRED ATROPHY OF THYROID: ICD-10-CM

## 2022-03-21 DIAGNOSIS — I10 ESSENTIAL HYPERTENSION, BENIGN: ICD-10-CM

## 2022-03-21 DIAGNOSIS — E11.9 TYPE 2 DIABETES MELLITUS WITHOUT COMPLICATION, WITHOUT LONG-TERM CURRENT USE OF INSULIN (HCC): ICD-10-CM

## 2022-03-21 PROCEDURE — 3044F HG A1C LEVEL LT 7.0%: CPT | Performed by: INTERNAL MEDICINE

## 2022-03-21 PROCEDURE — 99214 OFFICE O/P EST MOD 30 MIN: CPT | Performed by: INTERNAL MEDICINE

## 2022-03-21 PROCEDURE — 1111F DSCHRG MED/CURRENT MED MERGE: CPT | Performed by: INTERNAL MEDICINE

## 2022-03-21 PROCEDURE — G8754 DIAS BP LESS 90: HCPCS | Performed by: INTERNAL MEDICINE

## 2022-03-21 PROCEDURE — 1090F PRES/ABSN URINE INCON ASSESS: CPT | Performed by: INTERNAL MEDICINE

## 2022-03-21 PROCEDURE — G8432 DEP SCR NOT DOC, RNG: HCPCS | Performed by: INTERNAL MEDICINE

## 2022-03-21 PROCEDURE — 1101F PT FALLS ASSESS-DOCD LE1/YR: CPT | Performed by: INTERNAL MEDICINE

## 2022-03-21 PROCEDURE — G8536 NO DOC ELDER MAL SCRN: HCPCS | Performed by: INTERNAL MEDICINE

## 2022-03-21 PROCEDURE — G8752 SYS BP LESS 140: HCPCS | Performed by: INTERNAL MEDICINE

## 2022-03-21 PROCEDURE — G8427 DOCREV CUR MEDS BY ELIG CLIN: HCPCS | Performed by: INTERNAL MEDICINE

## 2022-03-21 PROCEDURE — 3017F COLORECTAL CA SCREEN DOC REV: CPT | Performed by: INTERNAL MEDICINE

## 2022-03-21 PROCEDURE — 2022F DILAT RTA XM EVC RTNOPTHY: CPT | Performed by: INTERNAL MEDICINE

## 2022-03-21 PROCEDURE — G8417 CALC BMI ABV UP PARAM F/U: HCPCS | Performed by: INTERNAL MEDICINE

## 2022-03-21 PROCEDURE — G8400 PT W/DXA NO RESULTS DOC: HCPCS | Performed by: INTERNAL MEDICINE

## 2022-03-21 NOTE — PROGRESS NOTES
Carloz Catalan (: 1955) is a 77 y.o. female, established patient, here for evaluation of the following chief complaint(s):  Follow-up (follow up from surgery)       ASSESSMENT/PLAN:  Below is the assessment and plan developed based on review of pertinent history, physical exam, labs, studies, and medications. 1. Malignant neoplasm of head of pancreas ([address]  Followed by Dr. Noah Parker (general surgeon) and Dr. Gini Castillo (oncology). I am pleased with her progress and how well she is handling treatment. 2. Type 2 diabetes mellitus without complication, without long-term current use of insulin (HCC)  BG stable without medications. 3. Essential hypertension, benign  BP is slightly below goal. Given her weight loss, I recommended that she hold her HCTZ. If her pressure elevates when she resumes treatment, re-add HCTZ. 4. Hypothyroidism due to acquired atrophy of thyroid  I requested that Dr. Gini Castillo check her thyroid at the next blood draw, continue with ongoing regimen (Synthroid). No follow-ups on file. SUBJECTIVE/OBJECTIVE:  HPI    Pancreatic cancer: Pt presents today 1 month s/p whipple (22). She notes difficulty eating since her procedure, which her surgeons attributed to prolonged anesthesia. Pt endorses an appetite and taste for food, but has difficulty swallowing it. She reports frustration and physical weakness because of her inability to eat food. Pt states that she hasn't moved her bowels in ~5 days. Once she has recovered from surgery, pt will resume chemotherapy. She is no longer taking a pain medication. Hypertension ROS: taking medications as instructed, no medication side effects noted, no TIA's, no chest pain on exertion, no dyspnea on exertion, no swelling of ankles. BP in office today is 101/69.     hypothyroidism.   Lab Results   Component Value Date/Time    TSH 3.46 2021 08:57 AM   Thyroid ROS: denies fatigue, weight changes, heat/cold intolerance, bowel/skin changes or CVS symptoms. Review of Systems   Constitutional: Positive for appetite change. Gastrointestinal: Positive for constipation. Neurological: Positive for weakness. All other systems reviewed and are negative. Physical Exam  Constitutional:       Appearance: Normal appearance. HENT:      Right Ear: Tympanic membrane and external ear normal.      Left Ear: Tympanic membrane and external ear normal.      Mouth/Throat:      Mouth: Mucous membranes are moist.      Pharynx: Oropharynx is clear. Cardiovascular:      Rate and Rhythm: Normal rate and regular rhythm. Pulses: Normal pulses. Heart sounds: Normal heart sounds. Pulmonary:      Effort: Pulmonary effort is normal.      Breath sounds: Normal breath sounds. Musculoskeletal:         General: Normal range of motion. Skin:     General: Skin is warm and dry. Neurological:      General: No focal deficit present. Mental Status: She is alert and oriented to person, place, and time. Psychiatric:         Mood and Affect: Mood normal.         Behavior: Behavior normal.       On this date 03/21/2022 I have spent 35 minutes reviewing previous notes, test results and face to face with the patient discussing the diagnosis and importance of compliance with the treatment plan as well as documenting on the day of the visit. An electronic signature was used to authenticate this note. Written by Tien Kinesy as dictated by Dr. Gillian Oliveira.    -- Tien Kinsey

## 2022-03-23 ENCOUNTER — TELEPHONE (OUTPATIENT)
Dept: ONCOLOGY | Age: 67
End: 2022-03-23

## 2022-03-23 DIAGNOSIS — K59.03 DRUG-INDUCED CONSTIPATION: ICD-10-CM

## 2022-03-23 DIAGNOSIS — C25.9 PANCREATIC ADENOCARCINOMA (HCC): Primary | ICD-10-CM

## 2022-03-23 NOTE — TELEPHONE ENCOUNTER
03/23/22 4:59 PM Patient returned call. No nausea, abd pain, passing gas. Advised abd xray in AM, if it just shows constipation, proceed with 1/2 bottle mag citrate. If no BM in 1-2 hours, take rest of bottle. Will call once I get KUB results. She verbalized understanding.

## 2022-03-23 NOTE — TELEPHONE ENCOUNTER
03/23/22 4:50 PM Called patient x 2 times to follow up week history of constipation with NA x 2. Left VM to return call. Patient needs to go to main hospital tomorrow to get abdominal xray to rule out GI obstruction.

## 2022-03-23 NOTE — TELEPHONE ENCOUNTER
Patient called and stated that she is still having problems with constipation. Please advise.       CB# [phone]

## 2022-03-24 ENCOUNTER — HOSPITAL ENCOUNTER (OUTPATIENT)
Dept: GENERAL RADIOLOGY | Age: 67
Discharge: HOME | End: 2022-03-24
Payer: MEDICARE

## 2022-03-24 ENCOUNTER — TELEPHONE (OUTPATIENT)
Dept: ONCOLOGY | Age: 67
End: 2022-03-24

## 2022-03-24 DIAGNOSIS — K59.03 DRUG-INDUCED CONSTIPATION: ICD-10-CM

## 2022-03-24 DIAGNOSIS — C25.9 PANCREATIC ADENOCARCINOMA (HCC): ICD-10-CM

## 2022-03-24 PROBLEM — E11.9 TYPE 2 DIABETES MELLITUS (HCC): Status: ACTIVE | Noted: 2022-03-15

## 2022-03-24 PROCEDURE — 74018 RADEX ABDOMEN 1 VIEW: CPT

## 2022-03-24 NOTE — PROGRESS NOTES
Called patient and advised no bowel obstruction. Constipation present. Reviewed dosing of magnesium citrate and side effects. Patient will proceed with treatment plan and call with an update.

## 2022-03-25 ENCOUNTER — TELEPHONE (OUTPATIENT)
Dept: ONCOLOGY | Age: 67
End: 2022-03-25

## 2022-03-25 NOTE — TELEPHONE ENCOUNTER
03/25/22 11:56 AM Called patient to follow up on her symptoms. She had a large bowel movement after mag citrate and one loose stool. Discussed adding 60 oz water daily, prune juice and fiber supplement. If stool starts to become hard, titrate lax/stool softeners, can try stool softener first, then add miralax. She verbalized understanding.

## 2022-03-26 DIAGNOSIS — R73.9 HYPERGLYCEMIA: ICD-10-CM

## 2022-03-26 DIAGNOSIS — C25.9 PANCREATIC ADENOCARCINOMA (HCC): ICD-10-CM

## 2022-03-28 RX ORDER — METFORMIN HYDROCHLORIDE 500 MG/1
TABLET ORAL
Qty: 60 TABLET | Refills: 1 | Status: SHIPPED | OUTPATIENT
Start: 2022-03-28 | End: 2022-10-10

## 2022-03-31 ENCOUNTER — OFFICE VISIT (OUTPATIENT)
Dept: SURGERY | Age: 67
End: 2022-03-31
Payer: MEDICARE

## 2022-03-31 VITALS
HEIGHT: 64 IN | DIASTOLIC BLOOD PRESSURE: 85 MMHG | RESPIRATION RATE: 18 BRPM | SYSTOLIC BLOOD PRESSURE: 126 MMHG | HEART RATE: 87 BPM | BODY MASS INDEX: 36.5 KG/M2 | OXYGEN SATURATION: 97 % | WEIGHT: 213.8 LBS | TEMPERATURE: 98.2 F

## 2022-03-31 DIAGNOSIS — C25.9 PANCREATIC ADENOCARCINOMA (HCC): Primary | ICD-10-CM

## 2022-03-31 PROCEDURE — 99024 POSTOP FOLLOW-UP VISIT: CPT | Performed by: SURGERY

## 2022-03-31 NOTE — LETTER
4/4/2022    Patient: Calista Aquino   YOB: 1955   Date of Visit: 3/31/2022     Long Bobo MD  [address] [facility] [address]  [address] [place]    Dear Long Bobo MD,      Thank you for referring Ms. Issac Sandhu to [facility] for evaluation. My notes for this consultation are attached. If you have questions, please do not hesitate to call me. I look forward to following your patient along with you.       Sincerely,    Chavo Vera MD

## 2022-03-31 NOTE — PROGRESS NOTES
1. Have you been to the ER, urgent care clinic since your last visit? Hospitalized since your last visit? No    2. Have you seen or consulted any other health care providers outside of the [address] [place] [address] since your last visit? Include any pap smears or colon screening.  No

## 2022-04-04 ENCOUNTER — OFFICE VISIT (OUTPATIENT)
Dept: INTERNAL MEDICINE CLINIC | Age: 67
End: 2022-04-04
Payer: MEDICARE

## 2022-04-04 VITALS
OXYGEN SATURATION: 99 % | DIASTOLIC BLOOD PRESSURE: 69 MMHG | BODY MASS INDEX: 36.91 KG/M2 | RESPIRATION RATE: 16 BRPM | TEMPERATURE: 98.6 F | HEART RATE: 85 BPM | SYSTOLIC BLOOD PRESSURE: 105 MMHG | HEIGHT: 64 IN | WEIGHT: 216.2 LBS

## 2022-04-04 DIAGNOSIS — E11.9 TYPE 2 DIABETES MELLITUS WITHOUT COMPLICATION, WITHOUT LONG-TERM CURRENT USE OF INSULIN (HCC): ICD-10-CM

## 2022-04-04 DIAGNOSIS — L50.9 HIVES: ICD-10-CM

## 2022-04-04 DIAGNOSIS — I10 ESSENTIAL HYPERTENSION, BENIGN: ICD-10-CM

## 2022-04-04 DIAGNOSIS — Z00.00 MEDICARE ANNUAL WELLNESS VISIT, SUBSEQUENT: Primary | ICD-10-CM

## 2022-04-04 PROCEDURE — 2022F DILAT RTA XM EVC RTNOPTHY: CPT | Performed by: INTERNAL MEDICINE

## 2022-04-04 PROCEDURE — G8754 DIAS BP LESS 90: HCPCS | Performed by: INTERNAL MEDICINE

## 2022-04-04 PROCEDURE — G8417 CALC BMI ABV UP PARAM F/U: HCPCS | Performed by: INTERNAL MEDICINE

## 2022-04-04 PROCEDURE — 1090F PRES/ABSN URINE INCON ASSESS: CPT | Performed by: INTERNAL MEDICINE

## 2022-04-04 PROCEDURE — 3017F COLORECTAL CA SCREEN DOC REV: CPT | Performed by: INTERNAL MEDICINE

## 2022-04-04 PROCEDURE — 1101F PT FALLS ASSESS-DOCD LE1/YR: CPT | Performed by: INTERNAL MEDICINE

## 2022-04-04 PROCEDURE — G8536 NO DOC ELDER MAL SCRN: HCPCS | Performed by: INTERNAL MEDICINE

## 2022-04-04 PROCEDURE — 99214 OFFICE O/P EST MOD 30 MIN: CPT | Performed by: INTERNAL MEDICINE

## 2022-04-04 PROCEDURE — G8510 SCR DEP NEG, NO PLAN REQD: HCPCS | Performed by: INTERNAL MEDICINE

## 2022-04-04 PROCEDURE — G8752 SYS BP LESS 140: HCPCS | Performed by: INTERNAL MEDICINE

## 2022-04-04 PROCEDURE — 1111F DSCHRG MED/CURRENT MED MERGE: CPT | Performed by: INTERNAL MEDICINE

## 2022-04-04 PROCEDURE — 3044F HG A1C LEVEL LT 7.0%: CPT | Performed by: INTERNAL MEDICINE

## 2022-04-04 PROCEDURE — G8427 DOCREV CUR MEDS BY ELIG CLIN: HCPCS | Performed by: INTERNAL MEDICINE

## 2022-04-04 PROCEDURE — G8400 PT W/DXA NO RESULTS DOC: HCPCS | Performed by: INTERNAL MEDICINE

## 2022-04-04 RX ORDER — TRIAMCINOLONE ACETONIDE 1 MG/G
OINTMENT TOPICAL 2 TIMES DAILY
Qty: 30 G | Refills: 2 | Status: SHIPPED | OUTPATIENT
Start: 2022-04-04 | End: 2022-10-10

## 2022-04-04 RX ORDER — ONDANSETRON 2 MG/ML
8 INJECTION INTRAMUSCULAR; INTRAVENOUS AS NEEDED
Status: CANCELLED | OUTPATIENT
Start: 2022-04-12

## 2022-04-04 RX ORDER — DEXTROSE MONOHYDRATE 50 MG/ML
25 INJECTION, SOLUTION INTRAVENOUS CONTINUOUS
Status: CANCELLED
Start: 2022-04-12

## 2022-04-04 RX ORDER — HEPARIN 100 UNIT/ML
300-500 SYRINGE INTRAVENOUS AS NEEDED
Status: CANCELLED
Start: 2022-04-12

## 2022-04-04 RX ORDER — ATROPINE SULFATE 0.4 MG/ML
0.4 INJECTION, SOLUTION ENDOTRACHEAL; INTRAMEDULLARY; INTRAMUSCULAR; INTRAVENOUS; SUBCUTANEOUS
Status: CANCELLED | OUTPATIENT
Start: 2022-04-12

## 2022-04-04 RX ORDER — SODIUM CHLORIDE 9 MG/ML
10 INJECTION INTRAMUSCULAR; INTRAVENOUS; SUBCUTANEOUS AS NEEDED
Status: CANCELLED | OUTPATIENT
Start: 2022-04-12

## 2022-04-04 RX ORDER — HEPARIN 100 UNIT/ML
300-500 SYRINGE INTRAVENOUS AS NEEDED
Status: CANCELLED
Start: 2022-04-14

## 2022-04-04 RX ORDER — SODIUM CHLORIDE 0.9 % (FLUSH) 0.9 %
10 SYRINGE (ML) INJECTION AS NEEDED
Status: CANCELLED | OUTPATIENT
Start: 2022-04-12

## 2022-04-04 RX ORDER — SODIUM CHLORIDE 9 MG/ML
10 INJECTION INTRAMUSCULAR; INTRAVENOUS; SUBCUTANEOUS AS NEEDED
Status: CANCELLED | OUTPATIENT
Start: 2022-04-14

## 2022-04-04 RX ORDER — SODIUM CHLORIDE 0.9 % (FLUSH) 0.9 %
10 SYRINGE (ML) INJECTION AS NEEDED
Status: CANCELLED | OUTPATIENT
Start: 2022-04-14

## 2022-04-04 RX ORDER — EPINEPHRINE 1 MG/ML
0.3 INJECTION, SOLUTION, CONCENTRATE INTRAVENOUS AS NEEDED
Status: CANCELLED | OUTPATIENT
Start: 2022-04-12

## 2022-04-04 RX ORDER — ALBUTEROL SULFATE 0.83 MG/ML
2.5 SOLUTION RESPIRATORY (INHALATION) AS NEEDED
Status: CANCELLED
Start: 2022-04-12

## 2022-04-04 RX ORDER — ACETAMINOPHEN 325 MG/1
650 TABLET ORAL AS NEEDED
Status: CANCELLED
Start: 2022-04-12

## 2022-04-04 RX ORDER — PALONOSETRON 0.05 MG/ML
0.25 INJECTION, SOLUTION INTRAVENOUS ONCE
Status: CANCELLED | OUTPATIENT
Start: 2022-04-12 | End: 2022-04-12

## 2022-04-04 RX ORDER — HYDROCORTISONE SODIUM SUCCINATE 100 MG/2ML
100 INJECTION, POWDER, FOR SOLUTION INTRAMUSCULAR; INTRAVENOUS AS NEEDED
Status: CANCELLED | OUTPATIENT
Start: 2022-04-12

## 2022-04-04 RX ORDER — DIPHENHYDRAMINE HYDROCHLORIDE 50 MG/ML
25 INJECTION, SOLUTION INTRAMUSCULAR; INTRAVENOUS AS NEEDED
Status: CANCELLED
Start: 2022-04-12

## 2022-04-04 RX ORDER — DIPHENHYDRAMINE HYDROCHLORIDE 50 MG/ML
50 INJECTION, SOLUTION INTRAMUSCULAR; INTRAVENOUS AS NEEDED
Status: CANCELLED
Start: 2022-04-12

## 2022-04-04 NOTE — PATIENT INSTRUCTIONS
Medicare Wellness Visit, Female     The best way to live healthy is to have a lifestyle where you eat a well-balanced diet, exercise regularly, limit alcohol use, and quit all forms of tobacco/nicotine, if applicable. Regular preventive services are another way to keep healthy. Preventive services (vaccines, screening tests, monitoring & exams) can help personalize your care plan, which helps you manage your own care. Screening tests can find health problems at the earliest stages, when they are easiest to treat. [facility] follows the current, evidence-based guidelines published by the [facility]n ([facility]) when recommending preventive services for our patients. Because we follow these guidelines, sometimes recommendations change over time as research supports it. (For example, mammograms used to be recommended annually. Even though Medicare will still pay for an annual mammogram, the newer guidelines recommend a mammogram every two years for women of average risk). Of course, you and your doctor may decide to screen more often for some diseases, based on your risk and your co-morbidities (chronic disease you are already diagnosed with). Preventive services for you include:  - Medicare offers their members a free annual wellness visit, which is time for you and your primary care provider to discuss and plan for your preventive service needs. Take advantage of this benefit every year!  -All adults over the age of 72 should receive the recommended pneumonia vaccines. Current USPSTF guidelines recommend a series of two vaccines for the best pneumonia protection.   -All adults should have a flu vaccine yearly and a tetanus vaccine every 10 years.   -All adults age 48 and older should receive the shingles vaccines (series of two vaccines).       -All adults age 38-68 who are overweight should have a diabetes screening test once every three years.   -All adults born between [birth year] and 1965 should be screened once for Hepatitis C.  -Other screening tests and preventive services for persons with diabetes include: an eye exam to screen for diabetic retinopathy, a kidney function test, a foot exam, and stricter control over your cholesterol.   -Cardiovascular screening for adults with routine risk involves an electrocardiogram (ECG) at intervals determined by your doctor.   -Colorectal cancer screenings should be done for adults age 54-65 with no increased risk factors for colorectal cancer. There are a number of acceptable methods of screening for this type of cancer. Each test has its own benefits and drawbacks. Discuss with your doctor what is most appropriate for you during your annual wellness visit. The different tests include: colonoscopy (considered the best screening method), a fecal occult blood test, a fecal DNA test, and sigmoidoscopy.    -A bone mass density test is recommended when a woman turns 65 to screen for osteoporosis. This test is only recommended one time, as a screening. Some providers will use this same test as a disease monitoring tool if you already have osteoporosis. -Breast cancer screenings are recommended every other year for women of normal risk, age 54-69.  -Cervical cancer screenings for women over age 72 are only recommended with certain risk factors.      Here is a list of your current Health Maintenance items (your personalized list of preventive services) with a due date:  Health Maintenance Due   Topic Date Due    Albumin Urine Test  Never done    Eye Exam  Never done    DTaP/Tdap/Td  (1 - Tdap) Never done    Shingles Vaccine (1 of 2) Never done    Mammogram  03/17/2017    Bone Mineral Density   Never done    Pneumococcal Vaccine (1 of 1 - PPSV23) Never done    Annual Well Visit  Never done    COVID-19 Vaccine (3 - Booster for [employer] series) 09/02/2021    Cholesterol Test   03/04/2022

## 2022-04-04 NOTE — PROGRESS NOTES
Ross Alvarez (: 1955) is a 77 y.o. female, established patient, here for evaluation of the following chief complaint(s):  Skin Problem (itchy bumps on the arms and neck)       ASSESSMENT/PLAN:  Below is the assessment and plan developed based on review of pertinent history, physical exam, labs, studies, and medications. 1. Medicare annual wellness visit, subsequent  2. Hives  -     triamcinolone acetonide (KENALOG) 0.1 % ointment; Apply  to affected area two (2) times a day. use thin layer, Normal, Disp-30 g, R-2  I believe that she is suffering from Hives, which was triggered by one of the changes in the hospital and exacerbated by environmental factors. I recommended Pepcid BID and Zyrtec daily. I rx steroid cream to be applied if her sx don't improve. 3. Type 2 diabetes mellitus without complication, without long-term current use of insulin (HCC)  BG stable, continue with ongoing regimen of Metformin. 4. Essential hypertension, benign  BP is at goal. I do not recommend any change in medications (HCTZ). SUBJECTIVE/OBJECTIVE:  HPI    Rash: Pt notes dry skin on her arms, forehead, and neck since leaving the hospital. She reports worsening of the itching despite applying Cerave and hydrocortisone, as well as taking a bendaryl. Pt denies major changes in detergents, soaps, or diet. She endorses that she began itching before she started drinking Ensure. Constipation: Pt reports improvement in her bowel patterns and is no longer taking stool softeners. Colon cancer: Pt begins chemotherapy on 22. She notes an improvement in her appetite and diet. Back pain: Pt c/o intermittent aching in her back since leaving the hospital, which Dr. Brittni Dawson recommended tylenol for. Review of Systems   Musculoskeletal: Positive for back pain. Skin: Positive for rash. All other systems reviewed and are negative. Physical Exam  Constitutional:       Appearance: Normal appearance.    HENT: Right Ear: Tympanic membrane and external ear normal.      Left Ear: Tympanic membrane and external ear normal.      Mouth/Throat:      Mouth: Mucous membranes are moist.      Pharynx: Oropharynx is clear. Cardiovascular:      Rate and Rhythm: Normal rate and regular rhythm. Pulses: Normal pulses. Heart sounds: Normal heart sounds. Pulmonary:      Effort: Pulmonary effort is normal.      Breath sounds: Normal breath sounds. Musculoskeletal:         General: Normal range of motion. Skin:     General: Skin is warm and dry. Comments: Macular, papular rash on forearms, forehead, and back of neck. Neurological:      General: No focal deficit present. Mental Status: She is alert and oriented to person, place, and time. Psychiatric:         Mood and Affect: Mood normal.         Behavior: Behavior normal.       On this date 04/04/2022 I have spent 30 minutes reviewing previous notes, test results and face to face with the patient discussing the diagnosis and importance of compliance with the treatment plan as well as documenting on the day of the visit. An electronic signature was used to authenticate this note. Written by Tiff Jimenez as dictated by Dr. Laura Cochran.    -- Tiff Jimenez

## 2022-04-04 NOTE — PROGRESS NOTES
[facility]      Clinic Note - Follow up    [address] Branham returns for scheduled follow up today. She is known to me for history of pancreatic adenocarcinoma in the head of the pancreas. After completing 6 cycles of neoadjuvant chemotherapy, she underwent a pylorus-preserving Whipple procedure with portal lymphadenectomy on 2/23/2022. Her final pathology showed moderately differentiated pancreatic adenocarcinoma, [id number], margins negative. The patient has had a fairly unremarkable postoperative course. She is feeling much better since her last appointment. She denies any nausea or vomiting and has an improving appetite. Her energy levels and activity have also increased. She is not on any pain medication at this time. She denies any fevers and is having regular bowel function. No complaints with her incision. She continues on Metformin but has not shown any signs of exocrine pancreatic insufficiency. Objective     Visit Vitals  /85 (BP 1 Location: Left upper arm, BP Patient Position: Sitting, BP Cuff Size: Adult long)   Pulse 87   Temp 98.2 °F (36.8 °C) (Oral)   Resp 18   Ht 5' 4\" (1.626 m)   Wt 213 lb 12.8 oz (97 kg)   SpO2 97%   BMI 36.70 kg/m²         PE  GEN - Awake, alert, communicating appropriately. NAD  Pulm - CTAB  CV - RRR  Abd - soft, NT, ND. Upper midline incision healed nicely without signs of infection. Ext - warm, well perfused, no edema. All other systems negative unless indicated above. Labs  None      Imaging  None      Assessment     Real Pandey is a 77 y. o.yr old female known to me for history of pancreatic adenocarcinoma in the head of the pancreas. After completing 6 cycles of neoadjuvant chemotherapy, she underwent a pylorus-preserving Whipple procedure with portal lymphadenectomy on 2/23/2022. Her final pathology showed moderately differentiated pancreatic adenocarcinoma, [id number], margins negative.     Plan     The patient is doing well and okay to start postoperative chemotherapy. I will plan to see her back in 3 months.       Brandon Vaughan MD  3/31/2022    CC: MD Dr. Fallon Louis

## 2022-04-04 NOTE — PROGRESS NOTES
This is the Subsequent Medicare Annual Wellness Exam, performed 12 months or more after the Initial AWV or the last Subsequent AWV    I have reviewed the patient's medical history in detail and updated the computerized patient record. Assessment/Plan   Education and counseling provided:  Are appropriate based on today's review and evaluation    1. Hives  -     triamcinolone acetonide (KENALOG) 0.1 % ointment; Apply  to affected area two (2) times a day. use thin layer, Normal, Disp-30 g, R-2  2. Type 2 diabetes mellitus without complication, without long-term current use of insulin ([facility])       Depression Risk Factor Screening     3 most recent PHQ Screens 4/4/2022   Little interest or pleasure in doing things Not at all   Feeling down, depressed, irritable, or hopeless Not at all   Total Score PHQ 2 0       Alcohol & Drug Abuse Risk Screen    Do you average more than 1 drink per night or more than 7 drinks a week:  No    On any one occasion in the past three months have you have had more than 3 drinks containing alcohol:  No          Functional Ability and Level of Safety    Hearing: Hearing is good. Activities of Daily Living: The home contains: handrails and grab bars  Patient does total self care      Ambulation: with difficulty, uses a cane     Fall Risk:  Fall Risk Assessment, last 12 mths 4/4/2022   Able to walk? Yes   Fall in past 12 months? 0   Do you feel unsteady? 0   Are you worried about falling 0   Is TUG test greater than 12 seconds?  -   Is the gait abnormal? -      Abuse Screen:  Patient is not abused       Cognitive Screening    Has your family/caregiver stated any concerns about your memory: no     Cognitive Screening: Normal - MMSE (Mini Mental Status Exam)    Health Maintenance Due     Health Maintenance Due   Topic Date Due    MICROALBUMIN Q1  Never done    Eye Exam Retinal or Dilated  Never done    DTaP/Tdap/Td series (1 - Tdap) Never done    Shingrix Vaccine Age 50> (1 of 2) Never done    Breast Cancer Screen Mammogram  2017    Bone Densitometry (Dexa) Screening  Never done    Pneumococcal 65+ years (1 of 1 - PPSV23) Never done    Medicare Yearly Exam  Never done    COVID-19 Vaccine (3 - Booster for Pfizer series) 2021    Lipid Screen  2022       Patient Care Team   Patient Care Team:  Ciera Suarez MD as PCP - General  Ciera Suarez MD as PCP - [address] [place] [address]  [facility] Provider  Lacie Lake MD as Physician (Internal Medicine)  Herbert Vicente RN as Care Transitions Nurse    History     Patient Active Problem List   Diagnosis Code    Ulcerative colitis ([address] K51.90    Hypothyroidism E03.9    Essential hypertension, benign I10    Obesity, morbid ([address] E66.01    Common bile duct dilation K83.8    Calculus of gallbladder with biliary obstruction but without cholecystitis K80.21    Elevated LFTs R79.89    Hypokalemia due to loss of potassium E87.6    Malignant neoplasm of head of pancreas ([address] C25.0    Prevention of chemotherapy-induced neutropenia Z76.89    Dehydration E86.0    Pancreatic adenocarcinoma ([address] C25.9    Type 2 diabetes mellitus E11.9     Past Medical History:   Diagnosis Date    Arthritis     ostero arthritis, rhemathoid  lower back     Autoimmune disease ([address]     Ulcerative Colitis.  Hypertension     CONTROLLED WITH MEDS    Hypothyroid 3/23/2011    Malignant neoplasm of head of pancreas ([address] 2021    Ulcerative colitis ([address] 2010    Ulcerative colitis ([address] 2010    Ulcerative colitis ([address]       Past Surgical History:   Procedure Laterality Date    COLONOSCOPY N/A 2017    COLON[facility]per [facility] at [facility] ENDOSCOPY    ENDOSCOPY, COLON, DIAGNOSTIC      HX  SECTION      HX OTHER SURGICAL  2022    pancreatic cancer surgery; Whipple procedure    HX UROLOGICAL      URETHERAL STENT.     HX VASCULAR ACCESS      PLACEMENT OF PORT-A-CATH RIGHT SIDE    RI ABDOMEN SURGERY PROC UNLISTED      ENDOSCOPIC, PLACEMENT OF BILIARY STENT    TX BREAST SURGERY PROCEDURE UNLISTED      cyst removed from left breast     Current Outpatient Medications   Medication Sig Dispense Refill    triamcinolone acetonide (KENALOG) 0.1 % ointment Apply  to affected area two (2) times a day. use thin layer 30 g 2    metFORMIN (GLUCOPHAGE) 500 mg tablet TAKE 1 TABLET BY MOUTH TWICE A DAY WITH MEALS 60 Tablet 1    polyethylene glycol (Miralax) 17 gram packet Take 1 Packet by mouth as needed for Constipation. 14 Each 2    Klor-Con M10 10 mEq tablet TAKE 2 TABLETS BY MOUTH DAILY 60 Tablet 1    hydroCHLOROthiazide (HYDRODIURIL) 25 mg tablet TAKE 1 TABLET BY MOUTH EVERY DAY 90 Tablet 1    levothyroxine (SYNTHROID) 125 mcg tablet TAKE 1 TABLET BY MOUTH EVERY DAY BEFORE BREAKFAST 90 Tablet 1    docusate sodium (Stool Softener) 100 mg tab Take  by mouth as needed.  prochlorperazine (Compazine) 10 mg tablet Take 1 Tablet by mouth every six (6) hours as needed for Nausea or Vomiting.  30 Tablet 2     Allergies   Allergen Reactions    Sulfa (Sulfonamide Antibiotics) Swelling       Family History   Problem Relation Age of Onset    Cancer Mother         pancreatic    Cancer Brother         colon    Anesth Problems Neg Hx      Social History     Tobacco Use    Smoking status: Former Smoker     Types: Cigarettes     Quit date: 1980     Years since quittin.2    Smokeless tobacco: Never Used   Substance Use Topics    Alcohol use: No         Yoly Oconnell MD

## 2022-04-06 DIAGNOSIS — E87.6 HYPOKALEMIA: ICD-10-CM

## 2022-04-06 RX ORDER — POTASSIUM CHLORIDE 750 MG/1
TABLET, EXTENDED RELEASE ORAL
Qty: 60 TABLET | Refills: 1 | Status: SHIPPED | OUTPATIENT
Start: 2022-04-06 | End: 2022-05-04

## 2022-04-07 ENCOUNTER — TELEPHONE (OUTPATIENT)
Dept: ONCOLOGY | Age: 67
End: 2022-04-07

## 2022-04-07 NOTE — PROGRESS NOTES
[facility] [place] at [place] [address]  [address] [facility], [facility]  Radha Elizondone: [phone]  F: [phone]      Reason for Visit:   Real Pandey is a 77 y.o. female who is seen for follow up of pancreatic adenocarcinoma. Hematology/Oncology Treatment History:     Diagnosis: Pancreatic adenocarcinoma    Stage: clinical Stage Ib [T2N0] at diagnosis; Stage III [fuC5dP2] at surgery    Pathology:   -9/26/21 Cytology - brushings from common bile duct: Reactive glandular epithelial cells and bile   -9/28/21 pancreatic head mass biopsy: Adenocarcinoma.   -10/22/21 [facility] mult-cancer panel -negative   -2/23/22 Pancreaticoduodenectomy (Whipple resection): Ductal adenocarcinoma, G2, 1.2cm. Tumor invades peripancreatic soft tissues. LVI negative. PNI present. Margins uninvolved. 5/29 LNs involved with cancer.   y pT1cN2     Prior Treatment:   1. Neoadjuvant FOLFIRNOX x 6 cycles, 10/18/21-1/10/22  2. PYLORUS PRESERVING WHIPPLE PROCEDURE AND PORTAL LYPHMADENECTOMY, 2/23/22    Current Treatment: adjuvant FOLFIRNOX x 6 cycles, started 4/12/22 - current. Added neulasta with C2. History of Present Illness:   Real Pandey is a pleasant 77 y.o. female who comes in for evaluation of pancreatic cancer. She presented in 9/2021 with obstructive jaundice, transaminitis. ERCP on 9/26/21 notable for distal CBD stricture; possible tumor/mass located in the CBD; underwent biliary stent placement to relieve biliary obstruction. CT A/P revealed mild/mod intrahepatic biliary ductal dilatation; prominence of CBD and hydropic gallbladder, suggestive of stricture/stenosis/mass of distal CBD. MRI of abdomen showed narrowing of CBD pancreatic head suggestive of extrinisic compression concerning for periampullary mass vs eccentric intraluminal lesion. EUS showed 3.2cm hypoechoic mass in pancreas head. Biopsy revealed adenocarcinoma.    Pt met with Dr. Loreto Mackenzie in [place] and plans for neoadjuvant chemotherapy prior to surgery. Interval history:  Patient here for follow up of pancreas cancer and start of [address]. Reports intermittent insomnia due to stress of starting treatment. Reports taste and appetite has improved - now eating 2 full meals daily. No drinking supplement drinks due to possible allergic reaction due to dairy in ensure (rash on neck, arms, forehead). Having daily formed soft bowel movements with stools softeners. Had a loose stool this morning with scant blood - reports this is due to anxiety. Reports energy level and strength continue to improve. Denies nausea, CP, SOB, rashes. PMHx: OA and Rheumatoid arthritis in lower back, Ulcerative colitis, HTN, Hypothyroidism  PSurgHx: , Cyst removed from left breast  SHx: , has 3 children (1 daughter and 2 sons). Works as  in [employer]. Nonsmoker, no EtOH.  in rehab recovering from [facility]. FHx: Mother  of pancreatic cancer age 80. Brother and son had colon cancer. Review of Systems: A complete review of systems was obtained, reviewed. Pertinent findings reviewed above. Physical Exam:     Visit Vitals  /86   Pulse 81   Temp 97.4 °F (36.3 °C)   Ht 5' 4\" (1.626 m)   Wt 218 lb (98.9 kg)   SpO2 98%   BMI 37.42 kg/m²     ECOG PS: 1  General: no distress  Eyes: anicteric sclerae  HENT: oropharynx clear  Neck: supple  Lymphatic: no cervical, supraclavicular adenopathy  Respiratory: normal respiratory effort  CV: no peripheral edema  GI: soft, nontender, nondistended, no masses; Midline surgical scar appears to be healing well with dry skin and scar/scabbing. Skin: no rashes; no ecchymoses; no petechiae      Results:     Lab Results   Component Value Date/Time    WBC 5.3 2022 07:50 AM    HGB 9.9 (L) 2022 07:50 AM    HCT 30.6 (L) 2022 07:50 AM    PLATELET 812  07:50 AM    MCV 98.1 2022 07:50 AM    ABS.  NEUTROPHILS 1.2 (L) 2022 07:50 AM Hemoglobin (POC) 12.6 10/06/2013 08:45 AM    Hematocrit (POC) 37 10/06/2013 08:45 AM     Lab Results   Component Value Date/Time    Sodium 143 04/12/2022 07:50 AM    Potassium 3.0 (L) 04/12/2022 07:50 AM    Chloride 111 (H) 04/12/2022 07:50 AM    CO2 26 04/12/2022 07:50 AM    Glucose 91 04/12/2022 07:50 AM    BUN 10 04/12/2022 07:50 AM    Creatinine 0.72 04/12/2022 07:50 AM    GFR est AA >60 04/12/2022 07:50 AM    GFR est non-AA >60 04/12/2022 07:50 AM    Calcium 8.7 04/12/2022 07:50 AM    Sodium (POC) 143 10/06/2013 08:45 AM    Potassium (POC) 3.4 (L) 10/06/2013 08:45 AM    Chloride (POC) 104 10/06/2013 08:45 AM    Glucose (POC) 84 03/02/2022 03:51 PM    BUN (POC) 11 10/06/2013 08:45 AM    Creatinine (POC) 1.0 10/06/2013 08:45 AM    Calcium, ionized (POC) 1.27 10/06/2013 08:45 AM     Lab Results   Component Value Date/Time    Bilirubin, total 0.4 04/12/2022 07:50 AM    ALT (SGPT) 36 04/12/2022 07:50 AM    Alk. phosphatase 105 04/12/2022 07:50 AM    Protein, total 6.3 (L) 04/12/2022 07:50 AM    Albumin 3.1 (L) 04/12/2022 07:50 AM    Globulin 3.2 04/12/2022 07:50 AM     Lab Results   Component Value Date/Time    Reticulocyte count 2.0 03/15/2022 09:38 AM    Iron % saturation 13 (L) 03/15/2022 09:38 AM    TIBC 351 03/15/2022 09:38 AM    Ferritin 177 03/15/2022 09:38 AM    Vitamin B12 496 03/15/2022 09:38 AM    Folate 11.8 03/15/2022 09:38 AM    Sed rate (ESR) 17 09/16/2010 12:04 PM    C-Reactive protein <0.29 01/18/2017 08:45 AM    TSH 3.46 03/04/2021 08:57 AM    ALBA, Direct Detected (A) 09/16/2010 12:04 PM    Lipase 1,214 (H) 09/29/2021 04:10 AM    Hep C virus Ab Interp. NONREACTIVE 09/24/2021 06:55 PM     Lab Results   Component Value Date/Time    CEA 3.5 09/25/2021 11:46 AM    Carbohydrate Antigen 19-9, (CA 19-9) 130 (H) 01/10/2022 10:08 AM       10/18/21 CA 19-9:  138      Imaging / Procedures:     9/24/21 US ABD   IMPRESSION  Cholelithiasis. Gallbladder sludge.   Prominent CBD with mild intrahepatic ductal dilatation as well. Nonemergent MRI with MRCP to delineate etiology for CBD distention. 9/24/21 CT ABD PELV W CONT  There is mild/moderate intrahepatic biliary ductal dilatation, prominence of the CBD and a hydropic gallbladder. No pancreatic duct dilatation. No clearly  delineated pancreatic head mass. Imaging findings suggestive of stricture/stenosis/mass of the distal CBD, no extrinsic pancreatic head mass is  identified. Gastroenterology consult  Correlation with MRI/MRCP on a nonemergent basis. There is severe degenerative change of the lumbar spine. 9/25/21 MRI ABD WO CONT  IMPRESSION  1. Intrahepatic and extra hepatic biliary dilatation with abrupt narrowing of  the common bile duct pancreatic head just proximal to the ampulla. Suggestion of  extrinsic compression on the duct. This raises the possibility of a  periampullary mass. Alternatively, this may be an eccentric intraluminal lesion. Evaluation is limited. Recommend GI consultation for possible ERCP and EUS. 2.  Questionable small lesion in the left hepatic lobe with mild increased T2  signal and T1 hypointensity. Recommend follow-up imaging a contrast-enhanced  study preferably MRI. 3.  Cholelithiasis. Distended gallbladder with mild gallbladder wall thickening. Correlate for acute cholecystitis    9/26/21 XR ERCP/ERCB / Dr. Asia Edmondson  Impression: Biliary ductal dilation, with a maximum common duct diameter of 15 mm; Severe stenosis, involving distal CBD /pancreas head area concerning for neoplasia  Interventions: Endoscopic ampullary sphincterotomy and biliary stent placement; brushings from the CBD    9/27/21 CT CHEST W CONT:   IMPRESSION  1. There is a minimal right pleural effusion. 2. There are small pulmonary nodules present. There is a nodule in the plane of  the right major fissure and posteriorly in left lower lobe. These were not  present on examination of 1/18/2017. These could be on the basis of metastatic  disease.  Close follow-up is suggested. There are also 2 small subpleural nodules  anteriorly in the right upper lobe as described above which can also be  evaluated on follow-up. 9/28/21 EUS:  Endoscopic: Esophagus:normal; Stomach: normal; Duodenum/jejunum: normal and protruding biliary stent  Ultrasound: Esophagus: normal findings;     Stomach: normal findings:     Pancreas: Areas examined: the entire gland                          Parenchyma: -Evidence of a hypoechoic mass with poorly defined borders seen in the head of the pancreas. It appeared involving the CBD where a stent is seen, however it did not involve the major vessels. It measured about 3.2 cm in widest diameter on one view. Pancreatic Duct: normal findings, not dilated               Liver: Parenchyma: normal                          Gallbladder: normal                          Bile Duct: the common bile duct is dilated in the proximal and mid portion and a plastic stent could be seen                          Lymph Node: no adenopathy  Impression:   Pancreas head mass with fine needle biopsy showing adenocarcinoma on preliminary cytology  Recommendations: Follow-up on pathology  Follow-up with oncology, will need further evaluation of pulmonary nodule  Surgical oncology consultation  Resume GI lite diet today and can discharge in am from GI standpoint    12/20/2021:  MRI abd w wo cont:  IMPRESSION  1. Positive response to therapy. Decreased size of the pancreatic head mass, which now has a maximum AP diameter of 2.3 cm. No obstruction of the biliary tree or pancreatic duct. No involvement of the SMA. Based on imaging, patient is a candidate for resection. 2. Cholelithiasis. No acute cholecystitis or biliary obstruction. MRI Pelv w wo cont:  IMPRESSION  No acute process or evidence of malignant neoplasm/ metastatic disease. CT chest w cont:  IMPRESSION  1.   Interval apparent resolution of previously seen scattered subcentimeter bilateral pulmonary nodules and right pleural effusion. 2.  Please see separately dictated reports for the MRI abdomen and pelvis obtained the same day for subdiaphragmatic findings. Assessment/Recommendations:   77 y.o. female with Ulcerative colitis, Hyopthyroidism, admitted with obstructive jaundice concerning for underlying malignant obstructing mass. 1. Pancreatic adenocarcinoma:  Clinically stage Ib [T2N0], but need further evaluation in future regarding pulmonary nodules and liver lesion. Presented with obstructive jaundice and transaminitis, CA 19-9 was 198 in 9/2021 at diagnosis. Underwent biliary stent placement with improvement in biliary obstruction. 3.2cm mass seen in pancreatic head on EUS, not involving vasculature, possibly resectable. Dr. Francine Chambers in [place] has evaluated patient and recommends consideration of neoadjuvant chemotherapy with close attention to lung nodules and liver lesion. I agree with this recommendation for earlier treatment of micrometastatic disease, ability to determine whether pt has chemosensitive disease. We discussed the risks and benefits of FOLFIRINOX chemotherapy, including potential side effects. These include but are not limited to fatigue, nausea vomiting, diarrhea, neuropathy, taste changes, cold intolerance, esophageal spasm, allergic reactions, alopecia, mucositis, myelosuppression, risk for infection, infertility, and rarely, death. Rarely, a patient may have a condition where they do not metabolize fluorouracil appropriately (called DPD deficiency), and they may have excessive toxicity. A Port-A-Cath will be required in order to deliver the continuous infusion. BRCA 1/2 negative. The patient has consented to beginning therapy. Pt completed 6 cycles of mFOLFIRINOX in neoadjuvant setting, followed by pancreaticoduodenectomy (Whipple resection) on 2/23/22. Now planning for 6 cycles of adjuvant chemotherapy.  Supportive care meds include: Emla cream, Zofran, Compazine, Dexamethasone  -- Proceed with C7 of neoadjuvant mFOLFIRINOX with neulasta support. -- Checking CA 19-9 on date of C1, C6, C7, C12.   -- Return in 2 weeks for C8 mFOLFIRINOX (10% DR blair), Neulasta support, MD/NP visit. Patient prefers Monday appointments. 2. Alternating constipation/diarrhea:  Has history of UC. Not taking Cholestyramine (Questran) due to intolerance. Stool softeners prn constipation. 3. Pulmonary nodules:   Repeat imaging after C4 of chemo showed resolution of pulmonary nodules. 4. HTN:   Managed on HCTZ. Advised checking BP at home and if systolic <137, then hold HCTZ. Advised holding today and for a few days. 5. H/o Ulcerative procto-sigmoiditis:  Past due for colonoscopy with one adenoma polyp removed at last colonoscopy in April 2017. Was due for repeat in 2019 but she has not followed up with the GI practice since her last colonoscopy. -- Established with GI and plans to have repeat colonoscopy soon. 6. Morbid obesity:  Discussed healthy food options and ways to incorporate more protein into diet. 7. Neutropenia:   Receiving Neulasta on pump disconnect days. 8. Normocytic anemia:  Due to prior chemotherapy, decreased PO intake and recent Whipple surgery. Recent B12/folate/ferritin normal. Mildly low iron sat. Monitor. 9. Dysguesia / Poor appetite / Hypokalemia:   Intake and appetite improving with recent weight gain. Occurred after whipple surgery with associated 20lb weight-loss. -- Consultation with Mandie Zambrano RD placed to discuss supplements without dairy. -- Repleting with KCl 40 mEQ today in [facility]. I personally saw and evaluated the patient and performed the key components of medical decision making. The history, physical exam, and documentation were performed by Loki Winters NP. I reviewed and verified the above documentation and modified it as needed. Proceed with C7 of adjuvant FOLFIRINOX at 10%   Counseled on continued caloric intake.      Signed By: Silvia Hatch MD

## 2022-04-07 NOTE — TELEPHONE ENCOUNTER
[address] at [facility]  [phone]        04/07/22 11:26 AM Received incoming call from patient with update. She states she is now having normal, formed daily bowel movements without the use of stool softeners. Patient has found that eating an apple at night has helped to regulate this. Patient also stated she had developed rash with \"little red bumps\" on hands, arms, and back of neck. Patient stated she saw PCP who prescribed steroid cream and benadryl at bedtime. She stated they were unable to determine cause but rash is much improved. Per chart review, patient prescribed triamcinolone ointment. Reviewed upcoming appointments for 04/12. Patient voiced understanding but requested if future appointments could be on a Monday/Wednesday schedule as this is more convenient for the person who is assisting patient to/from appointments. Patient also shared she received COVID booster today. Updated immunization record. No further questions or concerns at this time.

## 2022-04-12 ENCOUNTER — HOSPITAL ENCOUNTER (OUTPATIENT)
Dept: INFUSION THERAPY | Age: 67
Discharge: HOME | End: 2022-04-12
Payer: MEDICARE

## 2022-04-12 ENCOUNTER — OFFICE VISIT (OUTPATIENT)
Dept: ONCOLOGY | Age: 67
End: 2022-04-12
Payer: MEDICARE

## 2022-04-12 ENCOUNTER — PATIENT OUTREACH (OUTPATIENT)
Dept: CASE MANAGEMENT | Age: 67
End: 2022-04-12

## 2022-04-12 VITALS
BODY MASS INDEX: 37.22 KG/M2 | WEIGHT: 218 LBS | HEIGHT: 64 IN | TEMPERATURE: 97.4 F | HEART RATE: 81 BPM | SYSTOLIC BLOOD PRESSURE: 123 MMHG | DIASTOLIC BLOOD PRESSURE: 86 MMHG | OXYGEN SATURATION: 98 %

## 2022-04-12 VITALS
RESPIRATION RATE: 16 BRPM | SYSTOLIC BLOOD PRESSURE: 129 MMHG | BODY MASS INDEX: 37.22 KG/M2 | DIASTOLIC BLOOD PRESSURE: 81 MMHG | TEMPERATURE: 98 F | WEIGHT: 218 LBS | HEIGHT: 64 IN | OXYGEN SATURATION: 95 % | HEART RATE: 75 BPM

## 2022-04-12 DIAGNOSIS — C25.9 PANCREATIC ADENOCARCINOMA (HCC): Primary | ICD-10-CM

## 2022-04-12 DIAGNOSIS — E86.0 DEHYDRATION: Primary | ICD-10-CM

## 2022-04-12 DIAGNOSIS — Z76.89 PREVENTION OF CHEMOTHERAPY-INDUCED NEUTROPENIA: ICD-10-CM

## 2022-04-12 DIAGNOSIS — R63.0 POOR APPETITE: ICD-10-CM

## 2022-04-12 DIAGNOSIS — Z51.11 CHEMOTHERAPY MANAGEMENT, ENCOUNTER FOR: ICD-10-CM

## 2022-04-12 DIAGNOSIS — K51.20 ULCERATIVE PROCTITIS WITHOUT COMPLICATION (HCC): ICD-10-CM

## 2022-04-12 DIAGNOSIS — R43.2 DYSGEUSIA: ICD-10-CM

## 2022-04-12 DIAGNOSIS — C25.0 MALIGNANT NEOPLASM OF HEAD OF PANCREAS (HCC): ICD-10-CM

## 2022-04-12 LAB
ALBUMIN SERPL-MCNC: 3.1 G/DL (ref 3.5–5)
ALBUMIN/GLOB SERPL: 1 {RATIO} (ref 1.1–2.2)
ALP SERPL-CCNC: 105 U/L (ref 45–117)
ALT SERPL-CCNC: 36 U/L (ref 12–78)
ANION GAP SERPL CALC-SCNC: 6 MMOL/L (ref 5–15)
AST SERPL-CCNC: 38 U/L (ref 15–37)
BASOPHILS # BLD: 0 K/UL (ref 0–0.1)
BASOPHILS NFR BLD: 0 % (ref 0–1)
BILIRUB SERPL-MCNC: 0.4 MG/DL (ref 0.2–1)
BUN SERPL-MCNC: 10 MG/DL (ref 6–20)
BUN/CREAT SERPL: 14 (ref 12–20)
CALCIUM SERPL-MCNC: 8.7 MG/DL (ref 8.5–10.1)
CHLORIDE SERPL-SCNC: 111 MMOL/L (ref 97–108)
CO2 SERPL-SCNC: 26 MMOL/L (ref 21–32)
CREAT SERPL-MCNC: 0.72 MG/DL (ref 0.55–1.02)
DIFFERENTIAL METHOD BLD: ABNORMAL
EOSINOPHIL # BLD: 0.3 K/UL (ref 0–0.4)
EOSINOPHIL NFR BLD: 5 % (ref 0–7)
ERYTHROCYTE [DISTWIDTH] IN BLOOD BY AUTOMATED COUNT: 15 % (ref 11.5–14.5)
GLOBULIN SER CALC-MCNC: 3.2 G/DL (ref 2–4)
GLUCOSE SERPL-MCNC: 91 MG/DL (ref 65–100)
HCT VFR BLD AUTO: 30.6 % (ref 35–47)
HGB BLD-MCNC: 9.9 G/DL (ref 11.5–16)
IMM GRANULOCYTES # BLD AUTO: 0 K/UL (ref 0–0.04)
IMM GRANULOCYTES NFR BLD AUTO: 0 % (ref 0–0.5)
LYMPHOCYTES # BLD: 3.3 K/UL (ref 0.8–3.5)
LYMPHOCYTES NFR BLD: 61 % (ref 12–49)
MCH RBC QN AUTO: 31.7 PG (ref 26–34)
MCHC RBC AUTO-ENTMCNC: 32.4 G/DL (ref 30–36.5)
MCV RBC AUTO: 98.1 FL (ref 80–99)
MONOCYTES # BLD: 0.6 K/UL (ref 0–1)
MONOCYTES NFR BLD: 11 % (ref 5–13)
NEUTS SEG # BLD: 1.2 K/UL (ref 1.8–8)
NEUTS SEG NFR BLD: 23 % (ref 32–75)
NRBC # BLD: 0 K/UL (ref 0–0.01)
NRBC BLD-RTO: 0 PER 100 WBC
PLATELET # BLD AUTO: 185 K/UL (ref 150–400)
PMV BLD AUTO: 10.7 FL (ref 8.9–12.9)
POTASSIUM SERPL-SCNC: 3 MMOL/L (ref 3.5–5.1)
PROT SERPL-MCNC: 6.3 G/DL (ref 6.4–8.2)
RBC # BLD AUTO: 3.12 M/UL (ref 3.8–5.2)
SODIUM SERPL-SCNC: 143 MMOL/L (ref 136–145)
T4 FREE SERPL-MCNC: 1.2 NG/DL (ref 0.8–1.5)
TSH SERPL DL<=0.05 MIU/L-ACNC: 2.84 UIU/ML (ref 0.36–3.74)
WBC # BLD AUTO: 5.3 K/UL (ref 3.6–11)

## 2022-04-12 PROCEDURE — 96417 CHEMO IV INFUS EACH ADDL SEQ: CPT

## 2022-04-12 PROCEDURE — G8432 DEP SCR NOT DOC, RNG: HCPCS | Performed by: INTERNAL MEDICINE

## 2022-04-12 PROCEDURE — 99215 OFFICE O/P EST HI 40 MIN: CPT | Performed by: INTERNAL MEDICINE

## 2022-04-12 PROCEDURE — 86301 IMMUNOASSAY TUMOR CA 19-9: CPT

## 2022-04-12 PROCEDURE — 80053 COMPREHEN METABOLIC PANEL: CPT

## 2022-04-12 PROCEDURE — 96366 THER/PROPH/DIAG IV INF ADDON: CPT

## 2022-04-12 PROCEDURE — 74011000258 HC RX REV CODE- 258: Performed by: NURSE PRACTITIONER

## 2022-04-12 PROCEDURE — 1090F PRES/ABSN URINE INCON ASSESS: CPT | Performed by: INTERNAL MEDICINE

## 2022-04-12 PROCEDURE — 96416 CHEMO PROLONG INFUSE W/PUMP: CPT

## 2022-04-12 PROCEDURE — 96415 CHEMO IV INFUSION ADDL HR: CPT

## 2022-04-12 PROCEDURE — 77030012965 HC NDL HUBR BBMI -A

## 2022-04-12 PROCEDURE — G8754 DIAS BP LESS 90: HCPCS | Performed by: INTERNAL MEDICINE

## 2022-04-12 PROCEDURE — 84439 ASSAY OF FREE THYROXINE: CPT

## 2022-04-12 PROCEDURE — 96372 THER/PROPH/DIAG INJ SC/IM: CPT

## 2022-04-12 PROCEDURE — G8536 NO DOC ELDER MAL SCRN: HCPCS | Performed by: INTERNAL MEDICINE

## 2022-04-12 PROCEDURE — G8427 DOCREV CUR MEDS BY ELIG CLIN: HCPCS | Performed by: INTERNAL MEDICINE

## 2022-04-12 PROCEDURE — 96375 TX/PRO/DX INJ NEW DRUG ADDON: CPT

## 2022-04-12 PROCEDURE — 74011250636 HC RX REV CODE- 250/636: Performed by: NURSE PRACTITIONER

## 2022-04-12 PROCEDURE — G8417 CALC BMI ABV UP PARAM F/U: HCPCS | Performed by: INTERNAL MEDICINE

## 2022-04-12 PROCEDURE — 1101F PT FALLS ASSESS-DOCD LE1/YR: CPT | Performed by: INTERNAL MEDICINE

## 2022-04-12 PROCEDURE — 36415 COLL VENOUS BLD VENIPUNCTURE: CPT

## 2022-04-12 PROCEDURE — 74011000250 HC RX REV CODE- 250: Performed by: NURSE PRACTITIONER

## 2022-04-12 PROCEDURE — 96413 CHEMO IV INFUSION 1 HR: CPT

## 2022-04-12 PROCEDURE — 84443 ASSAY THYROID STIM HORMONE: CPT

## 2022-04-12 PROCEDURE — G8752 SYS BP LESS 140: HCPCS | Performed by: INTERNAL MEDICINE

## 2022-04-12 PROCEDURE — G8400 PT W/DXA NO RESULTS DOC: HCPCS | Performed by: INTERNAL MEDICINE

## 2022-04-12 PROCEDURE — 96368 THER/DIAG CONCURRENT INF: CPT

## 2022-04-12 PROCEDURE — 85025 COMPLETE CBC W/AUTO DIFF WBC: CPT

## 2022-04-12 PROCEDURE — 74011250637 HC RX REV CODE- 250/637: Performed by: NURSE PRACTITIONER

## 2022-04-12 PROCEDURE — 3017F COLORECTAL CA SCREEN DOC REV: CPT | Performed by: INTERNAL MEDICINE

## 2022-04-12 RX ORDER — SODIUM CHLORIDE 0.9 % (FLUSH) 0.9 %
10 SYRINGE (ML) INJECTION AS NEEDED
Status: DISPENSED | OUTPATIENT
Start: 2022-04-12 | End: 2022-04-12

## 2022-04-12 RX ORDER — POTASSIUM CHLORIDE 750 MG/1
40 TABLET, FILM COATED, EXTENDED RELEASE ORAL
Status: COMPLETED | OUTPATIENT
Start: 2022-04-12 | End: 2022-04-12

## 2022-04-12 RX ORDER — SODIUM CHLORIDE 9 MG/ML
10 INJECTION INTRAMUSCULAR; INTRAVENOUS; SUBCUTANEOUS AS NEEDED
Status: ACTIVE | OUTPATIENT
Start: 2022-04-12 | End: 2022-04-12

## 2022-04-12 RX ORDER — DEXTROSE MONOHYDRATE 50 MG/ML
25 INJECTION, SOLUTION INTRAVENOUS CONTINUOUS
Status: DISPENSED | OUTPATIENT
Start: 2022-04-12 | End: 2022-04-12

## 2022-04-12 RX ORDER — PALONOSETRON 0.05 MG/ML
0.25 INJECTION, SOLUTION INTRAVENOUS ONCE
Status: COMPLETED | OUTPATIENT
Start: 2022-04-12 | End: 2022-04-12

## 2022-04-12 RX ORDER — ATROPINE SULFATE 0.4 MG/ML
0.4 INJECTION, SOLUTION ENDOTRACHEAL; INTRAMEDULLARY; INTRAMUSCULAR; INTRAVENOUS; SUBCUTANEOUS
Status: DISCONTINUED | OUTPATIENT
Start: 2022-04-12 | End: 2022-04-14 | Stop reason: HOSPADM

## 2022-04-12 RX ORDER — DEXAMETHASONE 4 MG/1
TABLET ORAL
Qty: 24 TABLET | Refills: 0 | Status: SHIPPED | OUTPATIENT
Start: 2022-04-12 | End: 2022-05-19

## 2022-04-12 RX ORDER — HEPARIN 100 UNIT/ML
300-500 SYRINGE INTRAVENOUS AS NEEDED
Status: ACTIVE | OUTPATIENT
Start: 2022-04-12 | End: 2022-04-12

## 2022-04-12 RX ADMIN — PALONOSETRON 0.25 MG: 0.05 INJECTION, SOLUTION INTRAVENOUS at 09:42

## 2022-04-12 RX ADMIN — OXALIPLATIN 160.5 MG: 5 INJECTION, SOLUTION INTRAVENOUS at 10:49

## 2022-04-12 RX ADMIN — SODIUM CHLORIDE, PRESERVATIVE FREE 10 ML: 5 INJECTION INTRAVENOUS at 14:55

## 2022-04-12 RX ADMIN — DEXTROSE MONOHYDRATE 25 ML/HR: 50 INJECTION, SOLUTION INTRAVENOUS at 10:07

## 2022-04-12 RX ADMIN — LEUCOVORIN CALCIUM 756 MG: 500 INJECTION, POWDER, LYOPHILIZED, FOR SOLUTION INTRAMUSCULAR; INTRAVENOUS at 13:03

## 2022-04-12 RX ADMIN — POTASSIUM CHLORIDE 40 MEQ: 750 TABLET, FILM COATED, EXTENDED RELEASE ORAL at 10:02

## 2022-04-12 RX ADMIN — DEXTROSE MONOHYDRATE 284 MG: 5 INJECTION, SOLUTION INTRAVENOUS at 13:03

## 2022-04-12 RX ADMIN — FLUOROURACIL 4536 MG: 50 INJECTION, SOLUTION INTRAVENOUS at 15:04

## 2022-04-12 RX ADMIN — ATROPINE SULFATE 0.4 MG: 0.4 INJECTION, SOLUTION INTRAMUSCULAR; INTRAVENOUS; SUBCUTANEOUS at 12:49

## 2022-04-12 RX ADMIN — DEXAMETHASONE SODIUM PHOSPHATE 12 MG: 4 INJECTION, SOLUTION INTRA-ARTICULAR; INTRALESIONAL; INTRAMUSCULAR; INTRAVENOUS; SOFT TISSUE at 09:45

## 2022-04-12 NOTE — PROGRESS NOTES
[facility] VISIT NOTE  Date: 2022    Name: Lorelei Conner    MRN: [id number]         : 1955    Ms. Kal Alonzo Arrived ambulatory and in no distress for C7D1 of Regimen. Assessment was completed by Darrius Eldridge RN, no acute issues at this time, no new complaints voiced. Chest wall port accessed by assessment nurse without difficulty, labs drawn & sent for processing. Pt stated that she recovered well from the surgery. 1. Do you have any symptoms of COVID-19? SOB, coughing, fever, or generally not feeling well NO    2. Have you been exposed to COVID-19 recently? NO    3. Have you had any recent contact with family/friend that has a pending COVID test? NO       Chemotherapy Flowsheet 2022   Cycle C7D1   Date 2022   Drug / Regimen Folfirinox   Pre Hydration -   Pre Meds given   Notes oral K+ given           Ms. Bhatti's vitals were reviewed. Patient Vitals for the past 12 hrs:   Temp Pulse Resp BP SpO2   22 1459 98 °F (36.7 °C) 75 16 129/81 95 %   22 0741 97.4 °F (36.3 °C) 81 17 123/86 98 %         Lab results were obtained and reviewed. Recent Results (from the past 12 hour(s))   CBC WITH AUTOMATED DIFF    Collection Time: 22  7:50 AM   Result Value Ref Range    WBC 5.3 3.6 - 11.0 K/uL    RBC 3.12 (L) 3.80 - 5.20 M/uL    HGB 9.9 (L) 11.5 - 16.0 g/dL    HCT 30.6 (L) 35.0 - 47.0 %    MCV 98.1 80.0 - 99.0 FL    MCH 31.7 26.0 - 34.0 PG    MCHC 32.4 30.0 - 36.5 g/dL    RDW 15.0 (H) 11.5 - 14.5 %    PLATELET 284 436 - 757 K/uL    MPV 10.7 8.9 - 12.9 FL    NRBC 0.0 0  WBC    ABSOLUTE NRBC 0.00 0.00 - 0.01 K/uL    NEUTROPHILS 23 (L) 32 - 75 %    LYMPHOCYTES 61 (H) 12 - 49 %    MONOCYTES 11 5 - 13 %    EOSINOPHILS 5 0 - 7 %    BASOPHILS 0 0 - 1 %    IMMATURE GRANULOCYTES 0 0.0 - 0.5 %    ABS. NEUTROPHILS 1.2 (L) 1.8 - 8.0 K/UL    ABS. LYMPHOCYTES 3.3 0.8 - 3.5 K/UL    ABS. MONOCYTES 0.6 0.0 - 1.0 K/UL    ABS. EOSINOPHILS 0.3 0.0 - 0.4 K/UL    ABS.  BASOPHILS 0.0 0.0 - 0.1 K/UL ABS. IMM. GRANS. 0.0 0.00 - 0.04 K/UL    DF AUTOMATED     METABOLIC PANEL, COMPREHENSIVE    Collection Time: 04/12/22  7:50 AM   Result Value Ref Range    Sodium 143 136 - 145 mmol/L    Potassium 3.0 (L) 3.5 - 5.1 mmol/L    Chloride 111 (H) 97 - 108 mmol/L    CO2 26 21 - 32 mmol/L    Anion gap 6 5 - 15 mmol/L    Glucose 91 65 - 100 mg/dL    BUN 10 6 - 20 MG/DL    Creatinine 0.72 0.55 - 1.02 MG/DL    BUN/Creatinine ratio 14 12 - 20      GFR est AA >60 >60 ml/min/1.73m2    GFR est non-AA >60 >60 ml/min/1.73m2    Calcium 8.7 8.5 - 10.1 MG/DL    Bilirubin, total 0.4 0.2 - 1.0 MG/DL    ALT (SGPT) 36 12 - 78 U/L    AST (SGOT) 38 (H) 15 - 37 U/L    Alk.  phosphatase 105 45 - 117 U/L    Protein, total 6.3 (L) 6.4 - 8.2 g/dL    Albumin 3.1 (L) 3.5 - 5.0 g/dL    Globulin 3.2 2.0 - 4.0 g/dL    A-G Ratio 1.0 (L) 1.1 - 2.2     TSH 3RD GENERATION    Collection Time: 04/12/22  7:50 AM   Result Value Ref Range    TSH 2.84 0.36 - 3.74 uIU/mL   T4, FREE    Collection Time: 04/12/22  7:50 AM   Result Value Ref Range    T4, Free 1.2 0.8 - 1.5 NG/DL       Medications received:  Medications Administered     0.9% sodium chloride injection 10 mL     Admin Date  04/12/2022 Action  Given Dose  10 mL Route  IntraVENous Administered By  Chan Samson RN          atropine 0.4 mg/mL injection 0.4 mg     Admin Date  04/12/2022 Action  Given Dose  0.4 mg Route  SubCUTAneous Administered By  Chan Samson RN          dexamethasone (DECADRON) 12 mg in 0.9% sodium chloride 50 mL IVPB     Admin Date  04/12/2022 Action  New Bag Dose  12 mg Route  IntraVENous Administered By  Chan Samson RN          dextrose 5% infusion     Admin Date  04/12/2022 Action  New Bag Dose  25 mL/hr Rate  25 mL/hr Route  IntraVENous Administered By  Chan Samson, BALTAZAR          fluorouraciL (ADRUCIL) 4,536 mg in 0.9% sodium chloride 100 mL CADD Cassette     Admin Date  04/12/2022 Action  New Bag Dose  4,536 mg Rate  2.2 mL/hr Route  IntraVENous Administered By  Chan Samson, RN irinotecan (CAMPTOSAR) 284 mg in dextrose 5% 250 mL, overfill volume 25 mL chemo infusion     Admin Date  04/12/2022 Action  New Bag Dose  284 mg Rate  192.8 mL/hr Route  IntraVENous Administered By  Natalee Vargas RN          leucovorin (WELLCOVORIN) 756 mg in dextrose 5% 250 mL, overfill volume 25 mL IVPB     Admin Date  04/12/2022 Action  New Bag Dose  756 mg Rate  208.5 mL/hr Route  IntraVENous Administered By  Natalee Vargas RN          oxaliplatin (ELOXATIN) 160.5 mg in dextrose 5% 250 mL, overfill volume 25 mL chemo infusion     Admin Date  04/12/2022 Action  New Bag Dose  160.5 mg Rate  153.6 mL/hr Route  IntraVENous Administered By  Natalee Vargas RN          palonosetron HCl (ALOXI) injection 0.25 mg     Admin Date  04/12/2022 Action  Given Dose  0.25 mg Route  IntraVENous Administered By  Ntaalee Vargas RN          potassium chloride SR (KLOR-CON 10) tablet 40 mEq     Admin Date  04/12/2022 Action  Given Dose  40 mEq Route  Oral Administered By  Natalee Vargas RN                 Ms. Navya Andersen tolerated treatment well and was discharged from [place] [address] in stable condition at 1510. Port flushed & and connected to infusing CADD pump. She is to return on  April 14, 2022 at 1330 for her next appointment.     Soumya Vinson RN  April 12, 2022    Future Appointments:  Future Appointments   Date Time Provider Sally Santosi   4/14/2022  1:30 PM [facility]   4/25/2022  7:30 AM SS [facility]   4/25/2022  8:15 AM Omega Paez, MUSA [facility]   4/27/2022  1:30 PM SS INF7 [facility]   5/9/2022  7:30 AM SS INF1 CH2 >7H [facility]   5/11/2022  1:30 PM SS INF7 [facility]   5/23/2022  7:30 AM SS [facility]   5/25/2022  1:30 PM SS INF7 CH3 <1H [facility]   6/6/2022  7:30 AM SS INF1 CH2 >7H [facility]   6/8/2022  1:30 PM SS INF5 CH4 <1H [facility]

## 2022-04-12 NOTE — PROGRESS NOTES
Patient has graduated from the Transitions of Care Coordination  program on 4/12/2022. Patient/family has the ability to self-manage at this time Care management goals have been completed. Patient was not referred to the [facility] team for further management. Goals Addressed                 This Visit's Progress     COMPLETED: Prevent complications post hospitalization. 03/07/22   Will monitor procedure site for evidence of infection   Will participate in [place] PT to improve strength and mobility   Pt agrees to no lifting > 5#s, driving, or strenuous exercise for 2 weeks   Will attend follow up appt with surgeon scheduled 3/10   Pt will remain out of the hospital or ER for remainder of LONA period            Patient has Care Transition Nurse's contact information for any further questions, concerns, or needs.   Patients upcoming visits:    Future Appointments   Date Time Provider Sally Carrizales   4/14/2022  1:30 PM SS INF4 CH4 <1H [facility]   4/25/2022  7:30 AM SS INF1 CH2 >7H [facility]   4/25/2022  8:15 AM Lester Paez NP [facility] AMB   4/27/2022  1:30 PM SS INF7 CH3 <1H [facility]   5/9/2022  7:30 AM SS INF1 CH2 >7H [facility]   5/11/2022  1:30 PM SS INF7 CH3 <1H [facility]   5/23/2022  7:30 AM SS INF1 CH2 >7H [facility]   5/25/2022  1:30 PM SS INF7 CH3 <1H [facility]   6/6/2022  7:30 AM SS INF1 CH2 >7H [facility]   6/8/2022  1:30 PM [facility]5 [facility]

## 2022-04-12 NOTE — PROGRESS NOTES
Petros Ridley is a 77 y.o. female here for follow up of pancreatic cancer. Patient with no complaints of pain at this time.

## 2022-04-13 LAB — CANCER AG19-9 SERPL-ACNC: 16 U/ML (ref 0–35)

## 2022-04-14 ENCOUNTER — HOSPITAL ENCOUNTER (OUTPATIENT)
Dept: INFUSION THERAPY | Age: 67
Discharge: HOME | End: 2022-04-14
Payer: MEDICARE

## 2022-04-14 ENCOUNTER — TELEPHONE (OUTPATIENT)
Dept: ONCOLOGY | Age: 67
End: 2022-04-14

## 2022-04-14 VITALS
RESPIRATION RATE: 18 BRPM | TEMPERATURE: 97.3 F | SYSTOLIC BLOOD PRESSURE: 121 MMHG | HEART RATE: 73 BPM | OXYGEN SATURATION: 99 % | DIASTOLIC BLOOD PRESSURE: 62 MMHG

## 2022-04-14 DIAGNOSIS — C25.0 MALIGNANT NEOPLASM OF HEAD OF PANCREAS (HCC): ICD-10-CM

## 2022-04-14 DIAGNOSIS — Z76.89 PREVENTION OF CHEMOTHERAPY-INDUCED NEUTROPENIA: Primary | ICD-10-CM

## 2022-04-14 DIAGNOSIS — K52.1 DIARRHEA DUE TO DRUG: Primary | ICD-10-CM

## 2022-04-14 PROCEDURE — 96374 THER/PROPH/DIAG INJ IV PUSH: CPT

## 2022-04-14 PROCEDURE — 96375 TX/PRO/DX INJ NEW DRUG ADDON: CPT

## 2022-04-14 PROCEDURE — 96361 HYDRATE IV INFUSION ADD-ON: CPT

## 2022-04-14 PROCEDURE — 74011000250 HC RX REV CODE- 250: Performed by: NURSE PRACTITIONER

## 2022-04-14 PROCEDURE — 96360 HYDRATION IV INFUSION INIT: CPT

## 2022-04-14 PROCEDURE — 96377 APPLICATON ON-BODY INJECTOR: CPT

## 2022-04-14 PROCEDURE — 74011250636 HC RX REV CODE- 250/636: Performed by: NURSE PRACTITIONER

## 2022-04-14 RX ORDER — ONDANSETRON 2 MG/ML
4 INJECTION INTRAMUSCULAR; INTRAVENOUS ONCE
Status: COMPLETED | OUTPATIENT
Start: 2022-04-14 | End: 2022-04-14

## 2022-04-14 RX ORDER — SODIUM CHLORIDE 0.9 % (FLUSH) 0.9 %
10 SYRINGE (ML) INJECTION AS NEEDED
Status: DISCONTINUED | OUTPATIENT
Start: 2022-04-14 | End: 2022-04-15 | Stop reason: HOSPADM

## 2022-04-14 RX ORDER — HEPARIN 100 UNIT/ML
300-500 SYRINGE INTRAVENOUS AS NEEDED
Status: DISCONTINUED | OUTPATIENT
Start: 2022-04-14 | End: 2022-04-15 | Stop reason: HOSPADM

## 2022-04-14 RX ORDER — SODIUM CHLORIDE 9 MG/ML
10 INJECTION INTRAMUSCULAR; INTRAVENOUS; SUBCUTANEOUS AS NEEDED
Status: DISCONTINUED | OUTPATIENT
Start: 2022-04-14 | End: 2022-04-15 | Stop reason: HOSPADM

## 2022-04-14 RX ORDER — SODIUM CHLORIDE 9 MG/ML
1000 INJECTION, SOLUTION INTRAVENOUS ONCE
Status: COMPLETED | OUTPATIENT
Start: 2022-04-14 | End: 2022-04-14

## 2022-04-14 RX ORDER — DIPHENOXYLATE HYDROCHLORIDE AND ATROPINE SULFATE 2.5; .025 MG/1; MG/1
1 TABLET ORAL
Qty: 20 TABLET | Refills: 0 | Status: SHIPPED | OUTPATIENT
Start: 2022-04-14 | End: 2022-05-24 | Stop reason: SDUPTHER

## 2022-04-14 RX ADMIN — PEGFILGRASTIM 6 MG: KIT SUBCUTANEOUS at 14:55

## 2022-04-14 RX ADMIN — SODIUM CHLORIDE, PRESERVATIVE FREE 10 ML: 5 INJECTION INTRAVENOUS at 15:00

## 2022-04-14 RX ADMIN — SODIUM CHLORIDE 1000 ML: 9 INJECTION, SOLUTION INTRAVENOUS at 13:47

## 2022-04-14 RX ADMIN — Medication 500 UNITS: at 15:00

## 2022-04-14 RX ADMIN — ONDANSETRON 4 MG: 2 INJECTION INTRAMUSCULAR; INTRAVENOUS at 14:52

## 2022-04-14 NOTE — PROGRESS NOTES
[facility] Progress Note    Date: 2022    Name: Eleni Hassan    MRN: [id number]         : 1955:            Ms. Madalyn Corona arrived ambulatory and in no distress for Pump Removal.  Assessment was completed, patient reports nausea. Patient also reports having 2 episodes of diarrhea- patient requesting lomotil prescription in case her diarrhea gets worse. Sol Catalan NP notified & lomotil called in to patient's pharmacy. Wrote down instructions and verbalized to patient that patient is only to take lomotil if she is having 3-4+ watery stools in one day. Patient verbalized understanding. Orders for hydration & zofran in [facility] received & administered. CADDD completed- 100 ml infused per order. Ms. Marek Quiñones vitals were reviewed. Patient Vitals for the past 12 hrs:   Temp Pulse Resp BP SpO2   22 1453  73  121/62    22 1343 97.3 °F (36.3 °C) 64 18 132/69 99 %         Medications Administered     0.9% sodium chloride infusion 1,000 mL     Admin Date  2022 Action  New Bag Dose  1,000 mL Rate  1,000 mL/hr Route  IntraVENous Administered By  Anastacio Snyder RN          heparin (porcine) pf 300-500 Units     Admin Date  2022 Action  Given Dose  500 Units Route  InterCATHeter Administered By  Anastacio Snyder RN          ondansetron [facility]) injection 4 mg     Admin Date  2022 Action  Given Dose  4 mg Route  IntraVENous Administered By  Anastacio Snyder RN          pegfilgrastim (NEULASTA) wearable SQ injector 6 mg     Admin Date  2022 Action  Given Dose  6 mg Route  SubCUTAneous Administered By  Anastacio Snyder RN          sodium chloride (NS) flush 10 mL     Admin Date  2022 Action  Given Dose  10 mL Route  IntraVENous Administered By  Anastacio Snyder RN              Education provided to patient about Neulasta On Body Injector including: side effects, how/when to remove the device, as well as what to do in the event of device malfunction. Opportunity for questions was provided and all questions were answered. Patient verbalized understanding. .      Ms. Bhatti tolerated treatment well and was discharged from [place] [address] in stable condition. Port de-accessed, flushed & heparinized per protocol. She is to return on 4/25/22 for her next appointment.     Marck Subramanian RN  April 14, 2022

## 2022-04-14 NOTE — PROGRESS NOTES
04/14/22 2:50 PM Called in lomotil per patient request. Had RN, Wang Cheema, update patient to only take if >3-4 loose stools in one day.

## 2022-04-14 NOTE — TELEPHONE ENCOUNTER
[facility] at [address], [address] [facility] [address] [id number]  W: [phone]  F: [phone]    Medical Nutrition Therapy  Nutrition Referral:    Called patient. No answer. Left her message with examples of nutrition shakes that are dairy free: Ensure plant based, Orgain, Muscle milk. Chart reviewed, appetite improving. Regaining some of the 20# weight loss that occurred after surgery. 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  Adjuvant FOLFIRNOX x 6 cycles,   Chemotherapy Flowsheet 4/12/2022   Cycle C7D1   Date 4/12/2022   Drug / Regimen Folfirinox   Pre Hydration -   Pre Meds given   Notes oral K+ given         Wt Readings from Last 5 Encounters:   04/12/22 218 lb (98.9 kg)   04/12/22 218 lb (98.9 kg)   04/04/22 216 lb 3.2 oz (98.1 kg)   03/31/22 213 lb 12.8 oz (97 kg)   03/21/22 214 lb (97.1 kg)     Malnutrition Assessment:  Malnutrition Status:  Severe malnutrition    Context:  Chronic illness     Findings of the 6 clinical characteristics of malnutrition:   Energy Intake:  7 - 75% or less est energy requirements for 1 month or longer  Weight Loss:  7.00 - Greater than 10% over 6 months     Body Fat Loss:  Unable to assess,     Muscle Mass Loss:  Unable to assess,    Fluid Accumulation:  No significant fluid accumulation,     Strength:  Not performed     Energy (kcal): 1381-0240 (MSJ x 1-1.2 x 1.3)           Wt used: Admission (103.8 kg)  Protein (gm): 100-135 (1.5-2 gm/kg adj BW)             Wt used: Adjusted (~67 kg)   Fluid (mL/day): 1 mL/kcal     Plan:   - Continue with Ensure daily. - Continue with Bone broth as a way to get additional protein. - Suggested small meals at set times verses hunger cues. - Limit liquids at meal times to prevent filling up  - Continue to be available as a resource.      Signed By: Alban Kunz, [address] [employer]

## 2022-04-14 NOTE — PROGRESS NOTES
[facility] at [address], [address] [facility] [address] [facility] [place]: [phone]  F: [phone]      Reason for Visit:   Lalo Brady is a 77 y.o. female who is seen for follow up of pancreatic adenocarcinoma. Hematology/Oncology Treatment History:     Diagnosis: Pancreatic adenocarcinoma    Stage: clinical Stage Ib [T2N0] at diagnosis; Stage III [[id number]] at surgery    Pathology:   -9/26/21 Cytology - brushings from common bile duct: Reactive glandular epithelial cells and bile   -9/28/21 pancreatic head mass biopsy: Adenocarcinoma.   -10/22/21 [facility] mult-cancer panel -negative   -2/23/22 Pancreaticoduodenectomy (Whipple resection): Ductal adenocarcinoma, G2, 1.2cm. Tumor invades peripancreatic soft tissues. LVI negative. PNI present. Margins uninvolved. 5/29 LNs involved with cancer.   y pT1cN2     Prior Treatment:   1. Neoadjuvant FOLFIRNOX x 6 cycles, 10/18/21-1/10/22  2. PYLORUS PRESERVING WHIPPLE PROCEDURE AND PORTAL LYPHMADENECTOMY, 2/23/22    Current Treatment: adjuvant FOLFIRNOX x 6 cycles, started 4/12/22 - current. Added neulasta with C2. History of Present Illness:   Lalo Brady is a pleasant 77 y.o. female who comes in for evaluation of pancreatic cancer. She presented in 9/2021 with obstructive jaundice, transaminitis. ERCP on 9/26/21 notable for distal CBD stricture; possible tumor/mass located in the CBD; underwent biliary stent placement to relieve biliary obstruction. CT A/P revealed mild/mod intrahepatic biliary ductal dilatation; prominence of CBD and hydropic gallbladder, suggestive of stricture/stenosis/mass of distal CBD. MRI of abdomen showed narrowing of CBD pancreatic head suggestive of extrinisic compression concerning for periampullary mass vs eccentric intraluminal lesion. EUS showed 3.2cm hypoechoic mass in pancreas head. Biopsy revealed adenocarcinoma.    Pt met with Dr. Veena Gipson in [place] and plans for neoadjuvant chemotherapy prior to surgery. Interval history:  Patient here for follow up of pancreas cancer and C8 adjuvant FOLFIRNOX. Reports appetite improved over the weekend, but lost a few lbs. Hydrating well. Reports several episodes of diarrhea for two days after treatment that was managed with imodium and lomotil as needed. Currently having soft formed stools. Had nausea for a week after treatment managed with zofran and compazine. Good energy level. Having lower/mid back pain. Taking tylenol as needed which manages pain. Reports the potassium makes her vomit due to the coating. No constipation, SOB, CP, fevers, chills or rashes. 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 PMHx: OA and Rheumatoid arthritis in lower back, Ulcerative colitis, HTN, Hypothyroidism  PSurgHx: , Cyst removed from left breast  SHx: , has 3 children (1 daughter and 2 sons). Works as  in [employer]. Nonsmoker, no EtOH.  in rehab recovering from [facility]. FHx: Mother  of pancreatic cancer age 80. Brother and son had colon cancer. Review of Systems: A complete review of systems was obtained, reviewed. Pertinent findings reviewed above. Physical Exam:     Visit Vitals  /65   Pulse 76   Temp 97.3 °F (36.3 °C)   Resp 20   Ht 5' 4\" (1.626 m)   Wt 212 lb 11.9 oz (96.5 kg)   SpO2 98%   BMI 36.52 kg/m²     ECOG PS: 1  General: no distress  Eyes: anicteric sclerae  HENT: oropharynx clear  Neck: supple  Lymphatic: no cervical, supraclavicular adenopathy  Respiratory: normal respiratory effort  CV: no peripheral edema  GI: soft, nontender, nondistended, no masses; Midline surgical scar appears to be healing well with dry skin and scar/scabbing.   Skin: no rashes; no ecchymoses; no petechiae      Results:     Lab Results   Component Value Date/Time    WBC 15.0 (H) 2022 08:06 AM    HGB 9.4 (L) 2022 08:06 AM    HCT 28.3 (L) 2022 08:06 AM    PLATELET 433  08:06 AM    MCV 96.6 04/25/2022 08:06 AM    ABS. NEUTROPHILS PENDING 04/25/2022 08:06 AM    Hemoglobin (POC) 12.6 10/06/2013 08:45 AM    Hematocrit (POC) 37 10/06/2013 08:45 AM     Lab Results   Component Value Date/Time    Sodium 143 04/25/2022 08:06 AM    Potassium 3.1 (L) 04/25/2022 08:06 AM    Chloride 111 (H) 04/25/2022 08:06 AM    CO2 27 04/25/2022 08:06 AM    Glucose 88 04/25/2022 08:06 AM    BUN 6 04/25/2022 08:06 AM    Creatinine 0.73 04/25/2022 08:06 AM    GFR est AA >60 04/25/2022 08:06 AM    GFR est non-AA >60 04/25/2022 08:06 AM    Calcium 8.7 04/25/2022 08:06 AM    Sodium (POC) 143 10/06/2013 08:45 AM    Potassium (POC) 3.4 (L) 10/06/2013 08:45 AM    Chloride (POC) 104 10/06/2013 08:45 AM    Glucose (POC) 84 03/02/2022 03:51 PM    BUN (POC) 11 10/06/2013 08:45 AM    Creatinine (POC) 1.0 10/06/2013 08:45 AM    Calcium, ionized (POC) 1.27 10/06/2013 08:45 AM     Lab Results   Component Value Date/Time    Bilirubin, total 0.4 04/12/2022 07:50 AM    ALT (SGPT) 36 04/12/2022 07:50 AM    Alk. phosphatase 105 04/12/2022 07:50 AM    Protein, total 6.3 (L) 04/12/2022 07:50 AM    Albumin 3.1 (L) 04/12/2022 07:50 AM    Globulin 3.2 04/12/2022 07:50 AM     Lab Results   Component Value Date/Time    Reticulocyte count 2.0 03/15/2022 09:38 AM    Iron % saturation 13 (L) 03/15/2022 09:38 AM    TIBC 351 03/15/2022 09:38 AM    Ferritin 177 03/15/2022 09:38 AM    Vitamin B12 496 03/15/2022 09:38 AM    Folate 11.8 03/15/2022 09:38 AM    Sed rate (ESR) 17 09/16/2010 12:04 PM    C-Reactive protein <0.29 01/18/2017 08:45 AM    TSH 2.84 04/12/2022 07:50 AM    ALBA, Direct Detected (A) 09/16/2010 12:04 PM    Lipase 1,214 (H) 09/29/2021 04:10 AM    Hep C virus Ab Interp.  NONREACTIVE 09/24/2021 06:55 PM     Lab Results   Component Value Date/Time    CEA 3.5 09/25/2021 11:46 AM    Carbohydrate Antigen 19-9, (CA 19-9) 16 04/12/2022 07:50 AM       10/18/21 CA 19-9:  138  1/10/22 CA 19-9: 130   4/12/22 CA 19-9: 16       Imaging / Procedures:     9/24/21 US ABD   IMPRESSION  Cholelithiasis. Gallbladder sludge. Prominent CBD with mild intrahepatic ductal dilatation as well. Nonemergent MRI with MRCP to delineate etiology for CBD distention. 9/24/21 CT ABD PELV W CONT  There is mild/moderate intrahepatic biliary ductal dilatation, prominence of the CBD and a hydropic gallbladder. No pancreatic duct dilatation. No clearly  delineated pancreatic head mass. Imaging findings suggestive of stricture/stenosis/mass of the distal CBD, no extrinsic pancreatic head mass is  identified. Gastroenterology consult  Correlation with MRI/MRCP on a nonemergent basis. There is severe degenerative change of the lumbar spine. 9/25/21 MRI ABD WO CONT  IMPRESSION  1. Intrahepatic and extra hepatic biliary dilatation with abrupt narrowing of  the common bile duct pancreatic head just proximal to the ampulla. Suggestion of  extrinsic compression on the duct. This raises the possibility of a  periampullary mass. Alternatively, this may be an eccentric intraluminal lesion. Evaluation is limited. Recommend GI consultation for possible ERCP and EUS. 2.  Questionable small lesion in the left hepatic lobe with mild increased T2  signal and T1 hypointensity. Recommend follow-up imaging a contrast-enhanced  study preferably MRI. 3.  Cholelithiasis. Distended gallbladder with mild gallbladder wall thickening. Correlate for acute cholecystitis    9/26/21 XR ERCP/ERCB / Dr. Amina Stinson  Impression: Biliary ductal dilation, with a maximum common duct diameter of 15 mm; Severe stenosis, involving distal CBD /pancreas head area concerning for neoplasia  Interventions: Endoscopic ampullary sphincterotomy and biliary stent placement; brushings from the CBD    9/27/21 CT CHEST W CONT:   IMPRESSION  1. There is a minimal right pleural effusion. 2. There are small pulmonary nodules present.  There is a nodule in the plane of  the right major fissure and posteriorly in left lower lobe. These were not  present on examination of 1/18/2017. These could be on the basis of metastatic  disease. Close follow-up is suggested. There are also 2 small subpleural nodules  anteriorly in the right upper lobe as described above which can also be  evaluated on follow-up. 9/28/21 EUS:  Endoscopic: Esophagus:normal; Stomach: normal; Duodenum/jejunum: normal and protruding biliary stent  Ultrasound: Esophagus: normal findings;     Stomach: normal findings:     Pancreas: Areas examined: the entire gland                          Parenchyma: -Evidence of a hypoechoic mass with poorly defined borders seen in the head of the pancreas. It appeared involving the CBD where a stent is seen, however it did not involve the major vessels. It measured about 3.2 cm in widest diameter on one view. Pancreatic Duct: normal findings, not dilated               Liver: Parenchyma: normal                          Gallbladder: normal                          Bile Duct: the common bile duct is dilated in the proximal and mid portion and a plastic stent could be seen                          Lymph Node: no adenopathy  Impression:   Pancreas head mass with fine needle biopsy showing adenocarcinoma on preliminary cytology  Recommendations: Follow-up on pathology  Follow-up with oncology, will need further evaluation of pulmonary nodule  Surgical oncology consultation  Resume GI lite diet today and can discharge in am from GI standpoint    12/20/2021:  MRI abd w wo cont:  IMPRESSION  1. Positive response to therapy. Decreased size of the pancreatic head mass, which now has a maximum AP diameter of 2.3 cm. No obstruction of the biliary tree or pancreatic duct. No involvement of the SMA. Based on imaging, patient is a candidate for resection. 2. Cholelithiasis. No acute cholecystitis or biliary obstruction.   MRI Pelv w wo cont:  IMPRESSION  No acute process or evidence of malignant neoplasm/ metastatic disease. CT chest w cont:  IMPRESSION  1. Interval apparent resolution of previously seen scattered subcentimeter bilateral pulmonary nodules and right pleural effusion. 2.  Please see separately dictated reports for the MRI abdomen and pelvis obtained the same day for subdiaphragmatic findings. Assessment/Recommendations:   77 y.o. female with Ulcerative colitis, Hyopthyroidism, admitted with obstructive jaundice concerning for underlying malignant obstructing mass. 1. Pancreatic adenocarcinoma:  Clinically stage Ib [T2N0], but need further evaluation in future regarding pulmonary nodules and liver lesion. Presented with obstructive jaundice and transaminitis, CA 19-9 was 198 in 9/2021 at diagnosis. Underwent biliary stent placement with improvement in biliary obstruction. 3.2cm mass seen in pancreatic head on EUS, not involving vasculature, possibly resectable. Dr. Pari Willson in [place] has evaluated patient and recommends consideration of neoadjuvant chemotherapy with close attention to lung nodules and liver lesion. I agree with this recommendation for earlier treatment of micrometastatic disease, ability to determine whether pt has chemosensitive disease. We discussed the risks and benefits of FOLFIRINOX chemotherapy, including potential side effects. These include but are not limited to fatigue, nausea vomiting, diarrhea, neuropathy, taste changes, cold intolerance, esophageal spasm, allergic reactions, alopecia, mucositis, myelosuppression, risk for infection, infertility, and rarely, death. Rarely, a patient may have a condition where they do not metabolize fluorouracil appropriately (called DPD deficiency), and they may have excessive toxicity. A Port-A-Cath will be required in order to deliver the continuous infusion. BRCA 1/2 negative. The patient has consented to beginning therapy.  Pt completed 6 cycles of mFOLFIRINOX in neoadjuvant setting, followed by pancreaticoduodenectomy (Whipple resection) on 2/23/22. Now planning for 6 cycles of adjuvant chemotherapy. Supportive care meds include: Emla cream, Zofran, Compazine, Dexamethasone  -- Proceed with C8 of neoadjuvant mFOLFIRINOX with neulasta support. Fluids with pump removal.   -- Checking CA 19-9 on date of C1, C6, C7, C12.   -- Return in 2 weeks for C9 mFOLFIRINOX (10% DR continued), Neulasta support, MD/NP visit. Patient prefers Monday appointments. 2. Alternating constipation/diarrhea:  Has history of UC. Not taking Cholestyramine (Questran) due to intolerance. Stool softeners prn constipation and imodium/lomotil prn diarrhea. 3. Pulmonary nodules:   Repeat imaging after C4 of chemo showed resolution of pulmonary nodules. 4. HTN:   Managed on HCTZ. Advised checking BP at home and if systolic <057, then hold HCTZ. 5. H/o Ulcerative procto-sigmoiditis:  Past due for colonoscopy with one adenoma polyp removed at last colonoscopy in April 2017. Was due for repeat in 2019 but she has not followed up with the GI practice since her last colonoscopy. -- Established with GI and can pursue repeat colonoscopy once chemo completed. 6. Morbid obesity:  Discussed healthy food options and ways to incorporate more protein into diet. 7. Leukocytosis:   2/2 Neulasta. No fevers or chills. 8. Normocytic anemia:  Due to chemotherapy, decreased PO intake and recent Whipple surgery. Recent B12/folate/ferritin normal. Mildly low iron sat. Monitor. 9. Dysguesia / Poor appetite / Hypokalemia:   Intake and appetite improving. Occurred after whipple surgery with associated 20lb weight-loss. Avoiding supplement drinks with dairy given they cause diarrhea. 20 meq KCL at home. -- 40 meq KCL in [facility] today.        Signed By: Emilie Leon MD

## 2022-04-18 RX ORDER — EPINEPHRINE 1 MG/ML
0.3 INJECTION, SOLUTION, CONCENTRATE INTRAVENOUS AS NEEDED
Status: CANCELLED | OUTPATIENT
Start: 2022-04-25

## 2022-04-18 RX ORDER — ALBUTEROL SULFATE 0.83 MG/ML
2.5 SOLUTION RESPIRATORY (INHALATION) AS NEEDED
Status: CANCELLED
Start: 2022-04-25

## 2022-04-18 RX ORDER — HEPARIN 100 UNIT/ML
300-500 SYRINGE INTRAVENOUS AS NEEDED
Status: CANCELLED
Start: 2022-04-27

## 2022-04-18 RX ORDER — DIPHENHYDRAMINE HYDROCHLORIDE 50 MG/ML
50 INJECTION, SOLUTION INTRAMUSCULAR; INTRAVENOUS AS NEEDED
Status: CANCELLED
Start: 2022-04-25

## 2022-04-18 RX ORDER — HYDROCORTISONE SODIUM SUCCINATE 100 MG/2ML
100 INJECTION, POWDER, FOR SOLUTION INTRAMUSCULAR; INTRAVENOUS AS NEEDED
Status: CANCELLED | OUTPATIENT
Start: 2022-04-25

## 2022-04-18 RX ORDER — SODIUM CHLORIDE 0.9 % (FLUSH) 0.9 %
10 SYRINGE (ML) INJECTION AS NEEDED
Status: CANCELLED | OUTPATIENT
Start: 2022-04-27

## 2022-04-18 RX ORDER — ONDANSETRON 2 MG/ML
8 INJECTION INTRAMUSCULAR; INTRAVENOUS AS NEEDED
Status: CANCELLED | OUTPATIENT
Start: 2022-04-25

## 2022-04-18 RX ORDER — SODIUM CHLORIDE 9 MG/ML
10 INJECTION INTRAMUSCULAR; INTRAVENOUS; SUBCUTANEOUS AS NEEDED
Status: CANCELLED | OUTPATIENT
Start: 2022-04-27

## 2022-04-18 RX ORDER — ACETAMINOPHEN 325 MG/1
650 TABLET ORAL AS NEEDED
Status: CANCELLED
Start: 2022-04-25

## 2022-04-18 RX ORDER — DIPHENHYDRAMINE HYDROCHLORIDE 50 MG/ML
25 INJECTION, SOLUTION INTRAMUSCULAR; INTRAVENOUS AS NEEDED
Status: CANCELLED
Start: 2022-04-25

## 2022-04-19 ENCOUNTER — TELEPHONE (OUTPATIENT)
Dept: ONCOLOGY | Age: 67
End: 2022-04-19

## 2022-04-19 NOTE — TELEPHONE ENCOUNTER
Patient called and wanted to let the providers know she is having really really bad diarrhea. Please advise.      #[phone]

## 2022-04-19 NOTE — TELEPHONE ENCOUNTER
[address] at [facility]  [phone]        04/19/22 3:22 PM Return call placed to patient. Patient with complaints of watery diarrhea that started yesterday, stool later transitioned to smaller balls of stool. Patient took Imodium and symptoms improved overnight. Today, patient attempted Ensure clear again (had also attempted yesterday), and watery diarrhea started thereafter. Patient took Lomotil when she placed initial call and has not had additional episodes since taking medication. Denies blood in stool and fevers. Also denies weakness/fatigue and dizziness. Patient notes headache today, but feels like she has been hydrating well with water. Patient trying to eat as well--yesterday had an egg, cheese, bread, and chicken broth. She had also felt nauseous yesterday, so took Zofran x 1 in the morning. Today, patient has eaten fish. Advised this nurse would update Dr. Emigdio Conner and Sol Catalan of above and call back if any additional recommendations/changes. Also encouraged patient to contact office should symptoms recur or if she develops any dizziness/lightheadedness and weakness. Patient voiced understanding. 04/20/22 12:27 PM Attempted to call patient via contact number provided. No answer, left message requesting return call. Provided office phone number as well.    04/21/22 11:00 AM Called patient and advised of NP response and recommendations. Patient voiced understanding and clarified she does not suspect she is allergic, but that the Boost/Ensure triggers her colitis. Patient reports feeling well with increased energy. Had bowel movement x 1 today, stool soft/solid in consistency. No further questions or concerns at this time.

## 2022-04-25 ENCOUNTER — OFFICE VISIT (OUTPATIENT)
Dept: ONCOLOGY | Age: 67
End: 2022-04-25
Payer: MEDICARE

## 2022-04-25 ENCOUNTER — HOSPITAL ENCOUNTER (OUTPATIENT)
Dept: INFUSION THERAPY | Age: 67
Discharge: HOME | End: 2022-04-25
Payer: MEDICARE

## 2022-04-25 VITALS
RESPIRATION RATE: 20 BRPM | HEART RATE: 64 BPM | DIASTOLIC BLOOD PRESSURE: 73 MMHG | WEIGHT: 212.7 LBS | TEMPERATURE: 97.5 F | HEIGHT: 64 IN | OXYGEN SATURATION: 98 % | SYSTOLIC BLOOD PRESSURE: 125 MMHG | BODY MASS INDEX: 36.31 KG/M2

## 2022-04-25 VITALS
HEART RATE: 76 BPM | HEIGHT: 64 IN | OXYGEN SATURATION: 98 % | WEIGHT: 212.74 LBS | DIASTOLIC BLOOD PRESSURE: 65 MMHG | RESPIRATION RATE: 20 BRPM | BODY MASS INDEX: 36.32 KG/M2 | TEMPERATURE: 97.3 F | SYSTOLIC BLOOD PRESSURE: 128 MMHG

## 2022-04-25 DIAGNOSIS — Z51.11 CHEMOTHERAPY MANAGEMENT, ENCOUNTER FOR: ICD-10-CM

## 2022-04-25 DIAGNOSIS — C25.9 PANCREATIC ADENOCARCINOMA (HCC): Primary | ICD-10-CM

## 2022-04-25 DIAGNOSIS — E86.0 DEHYDRATION: Primary | ICD-10-CM

## 2022-04-25 DIAGNOSIS — R63.0 POOR APPETITE: ICD-10-CM

## 2022-04-25 DIAGNOSIS — D64.9 NORMOCYTIC ANEMIA: ICD-10-CM

## 2022-04-25 DIAGNOSIS — C25.0 MALIGNANT NEOPLASM OF HEAD OF PANCREAS (HCC): ICD-10-CM

## 2022-04-25 DIAGNOSIS — R43.2 DYSGEUSIA: ICD-10-CM

## 2022-04-25 DIAGNOSIS — Z76.89 PREVENTION OF CHEMOTHERAPY-INDUCED NEUTROPENIA: ICD-10-CM

## 2022-04-25 DIAGNOSIS — K52.1 DIARRHEA DUE TO DRUG: ICD-10-CM

## 2022-04-25 LAB
ALBUMIN SERPL-MCNC: 3.1 G/DL (ref 3.5–5)
ALBUMIN/GLOB SERPL: 1.1 {RATIO} (ref 1.1–2.2)
ALP SERPL-CCNC: 111 U/L (ref 45–117)
ALT SERPL-CCNC: 19 U/L (ref 12–78)
ANION GAP SERPL CALC-SCNC: 5 MMOL/L (ref 5–15)
AST SERPL-CCNC: 17 U/L (ref 15–37)
BASOPHILS # BLD: 0 K/UL (ref 0–0.1)
BASOPHILS NFR BLD: 0 % (ref 0–1)
BILIRUB SERPL-MCNC: 0.4 MG/DL (ref 0.2–1)
BUN SERPL-MCNC: 6 MG/DL (ref 6–20)
BUN/CREAT SERPL: 8 (ref 12–20)
CALCIUM SERPL-MCNC: 8.7 MG/DL (ref 8.5–10.1)
CHLORIDE SERPL-SCNC: 111 MMOL/L (ref 97–108)
CO2 SERPL-SCNC: 27 MMOL/L (ref 21–32)
CREAT SERPL-MCNC: 0.73 MG/DL (ref 0.55–1.02)
DIFFERENTIAL METHOD BLD: ABNORMAL
EOSINOPHIL # BLD: 0 K/UL (ref 0–0.4)
EOSINOPHIL NFR BLD: 0 % (ref 0–7)
ERYTHROCYTE [DISTWIDTH] IN BLOOD BY AUTOMATED COUNT: 15.8 % (ref 11.5–14.5)
GLOBULIN SER CALC-MCNC: 2.9 G/DL (ref 2–4)
GLUCOSE SERPL-MCNC: 88 MG/DL (ref 65–100)
HCT VFR BLD AUTO: 28.3 % (ref 35–47)
HGB BLD-MCNC: 9.4 G/DL (ref 11.5–16)
IMM GRANULOCYTES # BLD AUTO: 0 K/UL
IMM GRANULOCYTES NFR BLD AUTO: 0 %
LYMPHOCYTES # BLD: 5 K/UL (ref 0.8–3.5)
LYMPHOCYTES NFR BLD: 33 % (ref 12–49)
MCH RBC QN AUTO: 32.1 PG (ref 26–34)
MCHC RBC AUTO-ENTMCNC: 33.2 G/DL (ref 30–36.5)
MCV RBC AUTO: 96.6 FL (ref 80–99)
METAMYELOCYTES NFR BLD MANUAL: 2 %
MONOCYTES # BLD: 0.5 K/UL (ref 0–1)
MONOCYTES NFR BLD: 3 % (ref 5–13)
MYELOCYTES NFR BLD MANUAL: 2 %
NEUTS BAND NFR BLD MANUAL: 4 % (ref 0–6)
NEUTS SEG # BLD: 9 K/UL (ref 1.8–8)
NEUTS SEG NFR BLD: 56 % (ref 32–75)
NRBC # BLD: 0.02 K/UL (ref 0–0.01)
NRBC BLD-RTO: 0.1 PER 100 WBC
PLATELET # BLD AUTO: 183 K/UL (ref 150–400)
PMV BLD AUTO: 10.5 FL (ref 8.9–12.9)
POTASSIUM SERPL-SCNC: 3.1 MMOL/L (ref 3.5–5.1)
PROT SERPL-MCNC: 6 G/DL (ref 6.4–8.2)
RBC # BLD AUTO: 2.93 M/UL (ref 3.8–5.2)
RBC MORPH BLD: ABNORMAL
RBC MORPH BLD: ABNORMAL
SODIUM SERPL-SCNC: 143 MMOL/L (ref 136–145)
WBC # BLD AUTO: 15 K/UL (ref 3.6–11)

## 2022-04-25 PROCEDURE — G8754 DIAS BP LESS 90: HCPCS | Performed by: NURSE PRACTITIONER

## 2022-04-25 PROCEDURE — 74011000258 HC RX REV CODE- 258: Performed by: INTERNAL MEDICINE

## 2022-04-25 PROCEDURE — 36415 COLL VENOUS BLD VENIPUNCTURE: CPT

## 2022-04-25 PROCEDURE — 96415 CHEMO IV INFUSION ADDL HR: CPT

## 2022-04-25 PROCEDURE — 1101F PT FALLS ASSESS-DOCD LE1/YR: CPT | Performed by: NURSE PRACTITIONER

## 2022-04-25 PROCEDURE — G8510 SCR DEP NEG, NO PLAN REQD: HCPCS | Performed by: NURSE PRACTITIONER

## 2022-04-25 PROCEDURE — G8536 NO DOC ELDER MAL SCRN: HCPCS | Performed by: NURSE PRACTITIONER

## 2022-04-25 PROCEDURE — 96413 CHEMO IV INFUSION 1 HR: CPT

## 2022-04-25 PROCEDURE — 80053 COMPREHEN METABOLIC PANEL: CPT

## 2022-04-25 PROCEDURE — 96417 CHEMO IV INFUS EACH ADDL SEQ: CPT

## 2022-04-25 PROCEDURE — 74011250636 HC RX REV CODE- 250/636: Performed by: INTERNAL MEDICINE

## 2022-04-25 PROCEDURE — G8427 DOCREV CUR MEDS BY ELIG CLIN: HCPCS | Performed by: NURSE PRACTITIONER

## 2022-04-25 PROCEDURE — 99215 OFFICE O/P EST HI 40 MIN: CPT | Performed by: NURSE PRACTITIONER

## 2022-04-25 PROCEDURE — 1090F PRES/ABSN URINE INCON ASSESS: CPT | Performed by: NURSE PRACTITIONER

## 2022-04-25 PROCEDURE — G8400 PT W/DXA NO RESULTS DOC: HCPCS | Performed by: NURSE PRACTITIONER

## 2022-04-25 PROCEDURE — 77030012965 HC NDL HUBR BBMI -A

## 2022-04-25 PROCEDURE — 96366 THER/PROPH/DIAG IV INF ADDON: CPT

## 2022-04-25 PROCEDURE — 3017F COLORECTAL CA SCREEN DOC REV: CPT | Performed by: NURSE PRACTITIONER

## 2022-04-25 PROCEDURE — 96375 TX/PRO/DX INJ NEW DRUG ADDON: CPT

## 2022-04-25 PROCEDURE — 96416 CHEMO PROLONG INFUSE W/PUMP: CPT

## 2022-04-25 PROCEDURE — 85025 COMPLETE CBC W/AUTO DIFF WBC: CPT

## 2022-04-25 PROCEDURE — 96368 THER/DIAG CONCURRENT INF: CPT

## 2022-04-25 PROCEDURE — G8417 CALC BMI ABV UP PARAM F/U: HCPCS | Performed by: NURSE PRACTITIONER

## 2022-04-25 PROCEDURE — G8752 SYS BP LESS 140: HCPCS | Performed by: NURSE PRACTITIONER

## 2022-04-25 PROCEDURE — 74011250637 HC RX REV CODE- 250/637: Performed by: NURSE PRACTITIONER

## 2022-04-25 RX ORDER — ATROPINE SULFATE 0.4 MG/ML
0.4 INJECTION, SOLUTION ENDOTRACHEAL; INTRAMEDULLARY; INTRAMUSCULAR; INTRAVENOUS; SUBCUTANEOUS
Status: DISCONTINUED | OUTPATIENT
Start: 2022-04-25 | End: 2022-04-27 | Stop reason: HOSPADM

## 2022-04-25 RX ORDER — DEXTROSE MONOHYDRATE 50 MG/ML
25 INJECTION, SOLUTION INTRAVENOUS CONTINUOUS
Status: DISPENSED | OUTPATIENT
Start: 2022-04-25 | End: 2022-04-25

## 2022-04-25 RX ORDER — POTASSIUM CHLORIDE 750 MG/1
40 TABLET, FILM COATED, EXTENDED RELEASE ORAL
Status: COMPLETED | OUTPATIENT
Start: 2022-04-25 | End: 2022-04-25

## 2022-04-25 RX ORDER — HEPARIN 100 UNIT/ML
300-500 SYRINGE INTRAVENOUS AS NEEDED
Status: ACTIVE | OUTPATIENT
Start: 2022-04-25 | End: 2022-04-25

## 2022-04-25 RX ORDER — SODIUM CHLORIDE 0.9 % (FLUSH) 0.9 %
10 SYRINGE (ML) INJECTION AS NEEDED
Status: DISPENSED | OUTPATIENT
Start: 2022-04-25 | End: 2022-04-25

## 2022-04-25 RX ORDER — PALONOSETRON 0.05 MG/ML
0.25 INJECTION, SOLUTION INTRAVENOUS ONCE
Status: COMPLETED | OUTPATIENT
Start: 2022-04-25 | End: 2022-04-25

## 2022-04-25 RX ORDER — SODIUM CHLORIDE 9 MG/ML
10 INJECTION INTRAMUSCULAR; INTRAVENOUS; SUBCUTANEOUS AS NEEDED
Status: ACTIVE | OUTPATIENT
Start: 2022-04-25 | End: 2022-04-25

## 2022-04-25 RX ORDER — SODIUM CHLORIDE 9 MG/ML
1000 INJECTION, SOLUTION INTRAVENOUS ONCE
Status: CANCELLED
Start: 2022-04-27 | End: 2022-04-27

## 2022-04-25 RX ADMIN — DEXTROSE MONOHYDRATE 25 ML/HR: 50 INJECTION, SOLUTION INTRAVENOUS at 09:42

## 2022-04-25 RX ADMIN — FLUOROURACIL 4536 MG: 50 INJECTION, SOLUTION INTRAVENOUS at 15:23

## 2022-04-25 RX ADMIN — DEXAMETHASONE SODIUM PHOSPHATE 12 MG: 4 INJECTION, SOLUTION INTRA-ARTICULAR; INTRALESIONAL; INTRAMUSCULAR; INTRAVENOUS; SOFT TISSUE at 09:51

## 2022-04-25 RX ADMIN — PALONOSETRON HYDROCHLORIDE 0.25 MG: 0.25 INJECTION INTRAVENOUS at 09:46

## 2022-04-25 RX ADMIN — DEXTROSE MONOHYDRATE 284 MG: 5 INJECTION, SOLUTION INTRAVENOUS at 13:31

## 2022-04-25 RX ADMIN — ATROPINE SULFATE 0.4 MG: 0.4 INJECTION, SOLUTION INTRAMUSCULAR; INTRAVENOUS; SUBCUTANEOUS at 13:16

## 2022-04-25 RX ADMIN — LEUCOVORIN CALCIUM 756 MG: 200 INJECTION, POWDER, LYOPHILIZED, FOR SOLUTION INTRAMUSCULAR; INTRAVENOUS at 13:31

## 2022-04-25 RX ADMIN — POTASSIUM CHLORIDE 40 MEQ: 750 TABLET, FILM COATED, EXTENDED RELEASE ORAL at 09:41

## 2022-04-25 RX ADMIN — OXALIPLATIN 160.5 MG: 5 INJECTION, SOLUTION INTRAVENOUS at 10:55

## 2022-04-25 NOTE — PROGRESS NOTES
Chief Complaint   Patient presents with    Follow-up     Chana Arrington is a pleasant 77year old woman who presents as a follow up.  She denies pain

## 2022-04-25 NOTE — PROGRESS NOTES
[facility] VISIT NOTE  Date: 2022    Name: Rosaroi Finney    MRN: [id number]         : 1955    0750  Ms. Blas Porter Arrived ambulatory and in no distress for C8D1 of Folirinox Regimen. Assessment was completed by Colton Castaneda RN, no acute issues at this time, no new complaints voiced. Chest wall port accessed without difficulty, labs drawn & sent for processing. 1. Do you have any symptoms of COVID-19? SOB, coughing, fever, or generally not feeling well NO    2. Have you been exposed to COVID-19 recently? NO    3. Have you had any recent contact with family/friend that has a pending COVID test? NO       Chemotherapy Flowsheet 2022   Cycle C8D1   Date 2022   Drug / Regimen Folfirinox   Pre Hydration -   Pre Meds given   Notes given + PO Potasstium           Ms. Bhatti's vitals were reviewed. Patient Vitals for the past 12 hrs:   Temp Pulse Resp BP SpO2   22 1526 97.5 °F (36.4 °C) 64  125/73    22 0756 97.3 °F (36.3 °C) 76 20 128/65 98 %         Lab results were obtained and reviewed. Recent Results (from the past 12 hour(s))   CBC WITH AUTOMATED DIFF    Collection Time: 22  8:06 AM   Result Value Ref Range    WBC 15.0 (H) 3.6 - 11.0 K/uL    RBC 2.93 (L) 3.80 - 5.20 M/uL    HGB 9.4 (L) 11.5 - 16.0 g/dL    HCT 28.3 (L) 35.0 - 47.0 %    MCV 96.6 80.0 - 99.0 FL    MCH 32.1 26.0 - 34.0 PG    MCHC 33.2 30.0 - 36.5 g/dL    RDW 15.8 (H) 11.5 - 14.5 %    PLATELET 681 318 - 141 K/uL    MPV 10.5 8.9 - 12.9 FL    NRBC 0.1 (H) 0  WBC    ABSOLUTE NRBC 0.02 (H) 0.00 - 0.01 K/uL    NEUTROPHILS 56 32 - 75 %    BAND NEUTROPHILS 4 0 - 6 %    LYMPHOCYTES 33 12 - 49 %    MONOCYTES 3 (L) 5 - 13 %    EOSINOPHILS 0 0 - 7 %    BASOPHILS 0 0 - 1 %    METAMYELOCYTES 2 (H) 0 %    MYELOCYTES 2 (H) 0 %    IMMATURE GRANULOCYTES 0 %    ABS. NEUTROPHILS 9.0 (H) 1.8 - 8.0 K/UL    ABS. LYMPHOCYTES 5.0 (H) 0.8 - 3.5 K/UL    ABS. MONOCYTES 0.5 0.0 - 1.0 K/UL    ABS.  EOSINOPHILS 0.0 0.0 - 0.4 K/UL ABS. BASOPHILS 0.0 0.0 - 0.1 K/UL    ABS. IMM. GRANS. 0.0 K/UL    DF MANUAL      RBC COMMENTS ANISOCYTOSIS  1+        RBC COMMENTS MACROCYTOSIS  1+       METABOLIC PANEL, COMPREHENSIVE    Collection Time: 04/25/22  8:06 AM   Result Value Ref Range    Sodium 143 136 - 145 mmol/L    Potassium 3.1 (L) 3.5 - 5.1 mmol/L    Chloride 111 (H) 97 - 108 mmol/L    CO2 27 21 - 32 mmol/L    Anion gap 5 5 - 15 mmol/L    Glucose 88 65 - 100 mg/dL    BUN 6 6 - 20 MG/DL    Creatinine 0.73 0.55 - 1.02 MG/DL    BUN/Creatinine ratio 8 (L) 12 - 20      GFR est AA >60 >60 ml/min/1.73m2    GFR est non-AA >60 >60 ml/min/1.73m2    Calcium 8.7 8.5 - 10.1 MG/DL    Bilirubin, total 0.4 0.2 - 1.0 MG/DL    ALT (SGPT) 19 12 - 78 U/L    AST (SGOT) 17 15 - 37 U/L    Alk.  phosphatase 111 45 - 117 U/L    Protein, total 6.0 (L) 6.4 - 8.2 g/dL    Albumin 3.1 (L) 3.5 - 5.0 g/dL    Globulin 2.9 2.0 - 4.0 g/dL    A-G Ratio 1.1 1.1 - 2.2         Medications received:  Medications Administered     atropine 0.4 mg/mL injection 0.4 mg     Admin Date  04/25/2022 Action  Given Dose  0.4 mg Route  SubCUTAneous Administered By  David Isbell RN          dexamethasone (DECADRON) 12 mg in 0.9% sodium chloride 50 mL IVPB     Admin Date  04/25/2022 Action  New Bag Dose  12 mg Route  IntraVENous Administered By  David Isbell RN          dextrose 5% infusion     Admin Date  04/25/2022 Action  New Bag Dose  25 mL/hr Rate  25 mL/hr Route  IntraVENous Administered By  David Isbell RN          fluorouraciL (ADRUCIL) 4,536 mg in 0.9% sodium chloride 100 mL CADD Cassette     Admin Date  04/25/2022 Action  New Bag Dose  4,536 mg Rate  2.2 mL/hr Route  IntraVENous Administered By  David Isbell RN          irinotecan (CAMPTOSAR) 284 mg in dextrose 5% 250 mL, overfill volume 25 mL chemo infusion     Admin Date  04/25/2022 Action  New Bag Dose  284 mg Rate  192.8 mL/hr Route  IntraVENous Administered By  David Isbell RN          leucovorin (WELLCOVORIN) 756 mg in dextrose 5% 250 mL, overfill volume 25 mL IVPB     Admin Date  04/25/2022 Action  New Bag Dose  756 mg Rate  208.5 mL/hr Route  IntraVENous Administered By  Miko Bautista RN          oxaliplatin (ELOXATIN) 160.5 mg in dextrose 5% 250 mL, overfill volume 25 mL chemo infusion     Admin Date  04/25/2022 Action  New Bag Dose  160.5 mg Rate  153.6 mL/hr Route  IntraVENous Administered By  Miko Bautista RN          palonosetron HCl (ALOXI) injection 0.25 mg     Admin Date  04/25/2022 Action  Given Dose  0.25 mg Route  IntraVENous Administered By  Miko Bautista RN          potassium chloride SR (KLOR-CON 10) tablet 40 mEq     Admin Date  04/25/2022 Action  Given Dose  40 mEq Route  Oral Administered By  Miko Bautista RN                 Ms. Mable Brock tolerated treatment well and was discharged from [place] [address] in stable condition at 1530. Port de-accessed, flushed & heparinized per protocol. She is to return on  April 27, 2022 at 1330 for her next appointment.     Bony Shelton RN  April 25, 2022    Future Appointments:  Future Appointments   Date Time Provider Sally Carrizales   4/27/2022  1:30 PM SS INF7 CH3 <1H [facility]   5/9/2022  7:30 AM [facility]   5/9/2022  8:45 AM Quinn Paez NP [facility] BS AMB   5/11/2022  1:30 PM SS INF7 CH3 <1H [facility]   5/23/2022  7:30 AM SS INF1 CH2 >7H [facility]   5/23/2022  8:45 AM Quinn Paez NP [facility] BS AMB   5/25/2022  1:30 PM SS INF7 CH3 <1H [facility]   6/6/2022  7:30 AM [facility]   6/8/2022  1:30 PM SS INF5 CH4 <1H [facility]   6/20/2022  7:30 AM SS INF1 CH2 >7H [facility]   6/22/2022  1:30 PM SS INF5 CH4 <1H [facility]   7/5/2022  7:30 AM SS INF1 CH2 >7H [facility]   7/7/2022  1:30 PM [facility]

## 2022-04-26 ENCOUNTER — APPOINTMENT (OUTPATIENT)
Dept: INFUSION THERAPY | Age: 67
End: 2022-04-26

## 2022-04-27 ENCOUNTER — HOSPITAL ENCOUNTER (OUTPATIENT)
Dept: INFUSION THERAPY | Age: 67
Discharge: HOME | End: 2022-04-27
Payer: MEDICARE

## 2022-04-27 VITALS
TEMPERATURE: 97.9 F | DIASTOLIC BLOOD PRESSURE: 72 MMHG | HEART RATE: 63 BPM | OXYGEN SATURATION: 96 % | RESPIRATION RATE: 16 BRPM | SYSTOLIC BLOOD PRESSURE: 148 MMHG

## 2022-04-27 DIAGNOSIS — C25.0 MALIGNANT NEOPLASM OF HEAD OF PANCREAS (HCC): ICD-10-CM

## 2022-04-27 DIAGNOSIS — Z76.89 PREVENTION OF CHEMOTHERAPY-INDUCED NEUTROPENIA: Primary | ICD-10-CM

## 2022-04-27 PROCEDURE — 96377 APPLICATON ON-BODY INJECTOR: CPT

## 2022-04-27 PROCEDURE — 96361 HYDRATE IV INFUSION ADD-ON: CPT

## 2022-04-27 PROCEDURE — 74011250636 HC RX REV CODE- 250/636: Performed by: INTERNAL MEDICINE

## 2022-04-27 PROCEDURE — 74011000250 HC RX REV CODE- 250: Performed by: INTERNAL MEDICINE

## 2022-04-27 PROCEDURE — 96374 THER/PROPH/DIAG INJ IV PUSH: CPT

## 2022-04-27 PROCEDURE — 96375 TX/PRO/DX INJ NEW DRUG ADDON: CPT

## 2022-04-27 PROCEDURE — 74011250636 HC RX REV CODE- 250/636: Performed by: NURSE PRACTITIONER

## 2022-04-27 PROCEDURE — 96360 HYDRATION IV INFUSION INIT: CPT

## 2022-04-27 RX ORDER — SODIUM CHLORIDE 9 MG/ML
10 INJECTION INTRAMUSCULAR; INTRAVENOUS; SUBCUTANEOUS AS NEEDED
Status: DISCONTINUED | OUTPATIENT
Start: 2022-04-27 | End: 2022-04-28 | Stop reason: HOSPADM

## 2022-04-27 RX ORDER — ONDANSETRON 2 MG/ML
8 INJECTION INTRAMUSCULAR; INTRAVENOUS ONCE
Status: COMPLETED | OUTPATIENT
Start: 2022-04-27 | End: 2022-04-27

## 2022-04-27 RX ORDER — HEPARIN 100 UNIT/ML
300-500 SYRINGE INTRAVENOUS AS NEEDED
Status: DISCONTINUED | OUTPATIENT
Start: 2022-04-27 | End: 2022-04-28 | Stop reason: HOSPADM

## 2022-04-27 RX ORDER — SODIUM CHLORIDE 9 MG/ML
1000 INJECTION, SOLUTION INTRAVENOUS ONCE
Status: COMPLETED | OUTPATIENT
Start: 2022-04-27 | End: 2022-04-27

## 2022-04-27 RX ORDER — SODIUM CHLORIDE 0.9 % (FLUSH) 0.9 %
10 SYRINGE (ML) INJECTION AS NEEDED
Status: DISCONTINUED | OUTPATIENT
Start: 2022-04-27 | End: 2022-04-28 | Stop reason: HOSPADM

## 2022-04-27 RX ADMIN — ONDANSETRON 8 MG: 2 INJECTION INTRAMUSCULAR; INTRAVENOUS at 13:42

## 2022-04-27 RX ADMIN — SODIUM CHLORIDE 1000 ML: 9 INJECTION, SOLUTION INTRAVENOUS at 13:34

## 2022-04-27 RX ADMIN — Medication 10 ML: at 14:50

## 2022-04-27 RX ADMIN — PEGFILGRASTIM 6 MG: KIT SUBCUTANEOUS at 14:46

## 2022-04-27 RX ADMIN — HEPARIN 500 UNITS: 100 SYRINGE at 14:50

## 2022-04-27 NOTE — PROGRESS NOTES
[facility] Progress Note    Date: 2022    Name: Yue Hector    MRN: [id number]         : 1955    Ms. Claudia Banda arrived ambulatory and in no distress for Pump removal, Hydration, and Neulasta OBI. Assessment was completed, c/o nausea today. Patient denies taking zofran at home. Order received to give while here. CADD pump completed-100cc infused. Ms. Joshua Kimble vitals were reviewed. Patient Vitals for the past 24 hrs:   Temp Pulse Resp BP SpO2   22 1453  63 16 (!) 148/72    22 1327 97.9 °F (36.6 °C) 71 20 (!) 144/74 96 %         Medications:  Medications Administered     0.9% sodium chloride infusion 1,000 mL     Admin Date  2022 Action  New Bag Dose  1,000 mL Rate  1,000 mL/hr Route  IntraVENous Administered By  Nena Mchugh, RN          0.9% sodium chloride injection 10 mL     Admin Date  2022 Action  Given Dose  10 mL Route  IntraVENous Administered By  Nena Mchugh, RN          heparin (porcine) pf 300-500 Units     Admin Date  2022 Action  Given Dose  500 Units Route  InterCATHeter Administered By  Nena Mchugh, RN          ondansetron [facility]) injection 8 mg     Admin Date  2022 Action  Given Dose  8 mg Route  IntraVENous Administered By  Nena Mchugh, RN          pegfilgrastim (NEULASTA) wearable SQ injector 6 mg     Admin Date  2022 Action  Given Dose  6 mg Route  SubCUTAneous Administered By  Nena Mchugh, RN                Education provided to patient about Neulasta On Body Injector including: side effects, how/when to remove the device, as well as what to do in the event of device malfunction. Opportunity for questions was provided and all questions were answered. Patient verbalized understanding. Ms. Claudia Banda tolerated treatment well and was discharged from [place] [address] in stable condition at 1455. Port de-accessed, flushed & heparinized per protocol.  She is to return on May 9 at 0730 for her next appointment.     Serena Crawford RN  April 27, 2022

## 2022-04-28 ENCOUNTER — APPOINTMENT (OUTPATIENT)
Dept: INFUSION THERAPY | Age: 67
End: 2022-04-28

## 2022-04-29 RX ORDER — HYDROCORTISONE SODIUM SUCCINATE 100 MG/2ML
100 INJECTION, POWDER, FOR SOLUTION INTRAMUSCULAR; INTRAVENOUS AS NEEDED
Status: CANCELLED | OUTPATIENT
Start: 2022-05-09

## 2022-04-29 RX ORDER — SODIUM CHLORIDE 9 MG/ML
1000 INJECTION, SOLUTION INTRAVENOUS ONCE
Status: CANCELLED
Start: 2022-05-11 | End: 2022-05-11

## 2022-04-29 RX ORDER — SODIUM CHLORIDE 9 MG/ML
10 INJECTION INTRAMUSCULAR; INTRAVENOUS; SUBCUTANEOUS AS NEEDED
Status: CANCELLED | OUTPATIENT
Start: 2022-05-11

## 2022-04-29 RX ORDER — EPINEPHRINE 1 MG/ML
0.3 INJECTION, SOLUTION, CONCENTRATE INTRAVENOUS AS NEEDED
Status: CANCELLED | OUTPATIENT
Start: 2022-05-09

## 2022-04-29 RX ORDER — DIPHENHYDRAMINE HYDROCHLORIDE 50 MG/ML
50 INJECTION, SOLUTION INTRAMUSCULAR; INTRAVENOUS AS NEEDED
Status: CANCELLED
Start: 2022-05-09

## 2022-04-29 RX ORDER — DIPHENHYDRAMINE HYDROCHLORIDE 50 MG/ML
25 INJECTION, SOLUTION INTRAMUSCULAR; INTRAVENOUS AS NEEDED
Status: CANCELLED
Start: 2022-05-09

## 2022-04-29 RX ORDER — HEPARIN 100 UNIT/ML
300-500 SYRINGE INTRAVENOUS AS NEEDED
Status: CANCELLED
Start: 2022-05-11

## 2022-04-29 RX ORDER — ACETAMINOPHEN 325 MG/1
650 TABLET ORAL AS NEEDED
Status: CANCELLED
Start: 2022-05-09

## 2022-04-29 RX ORDER — SODIUM CHLORIDE 0.9 % (FLUSH) 0.9 %
10 SYRINGE (ML) INJECTION AS NEEDED
Status: CANCELLED | OUTPATIENT
Start: 2022-05-11

## 2022-04-29 RX ORDER — ONDANSETRON 2 MG/ML
8 INJECTION INTRAMUSCULAR; INTRAVENOUS AS NEEDED
Status: CANCELLED | OUTPATIENT
Start: 2022-05-09

## 2022-04-29 RX ORDER — ALBUTEROL SULFATE 0.83 MG/ML
2.5 SOLUTION RESPIRATORY (INHALATION) AS NEEDED
Status: CANCELLED
Start: 2022-05-09

## 2022-05-04 DIAGNOSIS — E87.6 HYPOKALEMIA: ICD-10-CM

## 2022-05-04 RX ORDER — POTASSIUM CHLORIDE 750 MG/1
TABLET, EXTENDED RELEASE ORAL
Qty: 60 TABLET | Refills: 1 | Status: SHIPPED | OUTPATIENT
Start: 2022-05-04 | End: 2022-06-01

## 2022-05-06 NOTE — PROGRESS NOTES
[facility] at [address], [address] [facility] [address] [facility] Sessions: [phone]  F: [phone]      Reason for Visit:   Kun Maciel is a 77 y.o. female who is seen for follow up of pancreatic adenocarcinoma. Hematology/Oncology Treatment History:     Diagnosis: Pancreatic adenocarcinoma    Stage: clinical Stage Ib [T2N0] at diagnosis; Stage III [[id number]] at surgery    Pathology:   -9/26/21 Cytology - brushings from common bile duct: Reactive glandular epithelial cells and bile   -9/28/21 pancreatic head mass biopsy: Adenocarcinoma.   -10/22/21 [facility] mult-cancer panel -negative   -2/23/22 Pancreaticoduodenectomy (Whipple resection): Ductal adenocarcinoma, G2, 1.2cm. Tumor invades peripancreatic soft tissues. LVI negative. PNI present. Margins uninvolved. 5/29 LNs involved with cancer.   y pT1cN2     Prior Treatment:   1. Neoadjuvant FOLFIRNOX x 6 cycles, 10/18/21-1/10/22  2. PYLORUS PRESERVING WHIPPLE PROCEDURE AND PORTAL LYPHMADENECTOMY, 2/23/22    Current Treatment: adjuvant FOLFIRNOX x 6 cycles, started 4/12/22 - current. Added neulasta with C2. History of Present Illness:   Kun Maciel is a pleasant 77 y.o. female who comes in for evaluation of pancreatic cancer. She presented in 9/2021 with obstructive jaundice, transaminitis. ERCP on 9/26/21 notable for distal CBD stricture; possible tumor/mass located in the CBD; underwent biliary stent placement to relieve biliary obstruction. CT A/P revealed mild/mod intrahepatic biliary ductal dilatation; prominence of CBD and hydropic gallbladder, suggestive of stricture/stenosis/mass of distal CBD. MRI of abdomen showed narrowing of CBD pancreatic head suggestive of extrinisic compression concerning for periampullary mass vs eccentric intraluminal lesion. EUS showed 3.2cm hypoechoic mass in pancreas head. Biopsy revealed adenocarcinoma.    Pt met with Dr. Kasie Alicea in [place] and plans for neoadjuvant chemotherapy prior to surgery. Interval history:  Patient here for follow up of pancreas cancer and [id number] [facility] [address]. She is tearful today. Reports she feels weak  due to dehydration and diarrhea. Reports averaging 3 liquid stools per day. Only took lomotil twice during the past 2 weeks. Taking imodium AD twice daily. Reports anything she eats causes diarrhea - isra when sticking to a bland diet of rice. Eating very little - yesterday had rice/mash potatoes. Lost 17 lbs. Hydrating well with at least 60 oz water daily. Taking zofran and compazine one per day. Reports she feels sick for 30 minutes after taking these medications. Reports moderate lower back pain. Worse in her right hip with some radiation down right leg. Using ice and tylenol with some relief of pain. Has neuropathy in feet, not worse since the last. Denies dysuria, hematuria. Denies constipation, fevers, chills or rashes. PMHx: OA and Rheumatoid arthritis in lower back, Ulcerative colitis, HTN, Hypothyroidism  PSurgHx: , Cyst removed from left breast  SHx: , has 3 children (1 daughter and 2 sons). Works as  in [employer]. Nonsmoker, no EtOH.  in rehab recovering from [facility]. FHx: Mother  of pancreatic cancer age 80. Brother and son had colon cancer. Review of Systems: A complete review of systems was obtained, reviewed. Pertinent findings reviewed above. Physical Exam:     Visit Vitals  /66   Pulse (!) 101   Temp 97.9 °F (36.6 °C)   Resp 18   Ht 5' 4\" (1.626 m)   Wt 195 lb (88.5 kg)   SpO2 100%   BMI 33.47 kg/m²     ECOG PS: 1  General: no distress, ambulating with cane  Eyes: anicteric sclerae  HENT: oropharynx clear  Neck: supple  Lymphatic: no cervical, supraclavicular adenopathy  Respiratory: CTAB, normal respiratory effort  CV: no peripheral edema  GI: soft, nontender, nondistended, no masses;  Midline surgical scar appears to be healing well with dry skin and scar/scabbing. MSK: TTP of lower back   Skin: no rashes; no ecchymoses; no petechiae  Neuro: alert and oriented, tearful       Results:     Lab Results   Component Value Date/Time    WBC 12.5 (H) 05/09/2022 07:52 AM    HGB 10.6 (L) 05/09/2022 07:52 AM    HCT 32.0 (L) 05/09/2022 07:52 AM    PLATELET 953 78/32/9707 07:52 AM    MCV 93.3 05/09/2022 07:52 AM    ABS. NEUTROPHILS 5.8 05/09/2022 07:52 AM    Hemoglobin (POC) 12.6 10/06/2013 08:45 AM    Hematocrit (POC) 37 10/06/2013 08:45 AM     Lab Results   Component Value Date/Time    Sodium 138 05/09/2022 07:52 AM    Potassium 2.6 (LL) 05/09/2022 07:52 AM    Chloride 103 05/09/2022 07:52 AM    CO2 22 05/09/2022 07:52 AM    Glucose 88 05/09/2022 07:52 AM    BUN 3 (L) 05/09/2022 07:52 AM    Creatinine 0.72 05/09/2022 07:52 AM    GFR est AA >60 05/09/2022 07:52 AM    GFR est non-AA >60 05/09/2022 07:52 AM    Calcium 9.0 05/09/2022 07:52 AM    Sodium (POC) 143 10/06/2013 08:45 AM    Potassium (POC) 3.4 (L) 10/06/2013 08:45 AM    Chloride (POC) 104 10/06/2013 08:45 AM    Glucose (POC) 84 03/02/2022 03:51 PM    BUN (POC) 11 10/06/2013 08:45 AM    Creatinine (POC) 1.0 10/06/2013 08:45 AM    Calcium, ionized (POC) 1.27 10/06/2013 08:45 AM     Lab Results   Component Value Date/Time    Bilirubin, total 0.7 05/09/2022 07:52 AM    ALT (SGPT) 16 05/09/2022 07:52 AM    Alk.  phosphatase 97 05/09/2022 07:52 AM    Protein, total 7.1 05/09/2022 07:52 AM    Albumin 3.1 (L) 05/09/2022 07:52 AM    Globulin 4.0 05/09/2022 07:52 AM     Lab Results   Component Value Date/Time    Reticulocyte count 2.0 03/15/2022 09:38 AM    Iron % saturation 13 (L) 03/15/2022 09:38 AM    TIBC 351 03/15/2022 09:38 AM    Ferritin 177 03/15/2022 09:38 AM    Vitamin B12 496 03/15/2022 09:38 AM    Folate 11.8 03/15/2022 09:38 AM    Sed rate (ESR) 17 09/16/2010 12:04 PM    C-Reactive protein <0.29 01/18/2017 08:45 AM    TSH 2.84 04/12/2022 07:50 AM    ALBA, Direct Detected (A) 09/16/2010 12:04 PM    Lipase 1,214 (H) 09/29/2021 04:10 AM    Hep C virus Ab Interp. NONREACTIVE 09/24/2021 06:55 PM     Lab Results   Component Value Date/Time    CEA 3.5 09/25/2021 11:46 AM    Carbohydrate Antigen 19-9, (CA 19-9) 16 04/12/2022 07:50 AM       10/18/21 CA 19-9:  138  1/10/22 CA 19-9: 130   4/12/22 CA 19-9: 16       Imaging / Procedures:     9/24/21 US ABD   IMPRESSION  Cholelithiasis. Gallbladder sludge. Prominent CBD with mild intrahepatic ductal dilatation as well. Nonemergent MRI with MRCP to delineate etiology for CBD distention. 9/24/21 CT ABD PELV W CONT  There is mild/moderate intrahepatic biliary ductal dilatation, prominence of the CBD and a hydropic gallbladder. No pancreatic duct dilatation. No clearly  delineated pancreatic head mass. Imaging findings suggestive of stricture/stenosis/mass of the distal CBD, no extrinsic pancreatic head mass is  identified. Gastroenterology consult  Correlation with MRI/MRCP on a nonemergent basis. There is severe degenerative change of the lumbar spine. 9/25/21 MRI ABD WO CONT  IMPRESSION  1. Intrahepatic and extra hepatic biliary dilatation with abrupt narrowing of  the common bile duct pancreatic head just proximal to the ampulla. Suggestion of  extrinsic compression on the duct. This raises the possibility of a  periampullary mass. Alternatively, this may be an eccentric intraluminal lesion. Evaluation is limited. Recommend GI consultation for possible ERCP and EUS. 2.  Questionable small lesion in the left hepatic lobe with mild increased T2  signal and T1 hypointensity. Recommend follow-up imaging a contrast-enhanced  study preferably MRI. 3.  Cholelithiasis. Distended gallbladder with mild gallbladder wall thickening.   Correlate for acute cholecystitis    9/26/21 XR ERCP/ERCB / Dr. Cheyenne Xiong  Impression: Biliary ductal dilation, with a maximum common duct diameter of 15 mm; Severe stenosis, involving distal CBD /pancreas head area concerning for neoplasia  Interventions: Endoscopic ampullary sphincterotomy and biliary stent placement; brushings from the CBD    9/27/21 CT CHEST W CONT:   IMPRESSION  1. There is a minimal right pleural effusion. 2. There are small pulmonary nodules present. There is a nodule in the plane of  the right major fissure and posteriorly in left lower lobe. These were not  present on examination of 1/18/2017. These could be on the basis of metastatic  disease. Close follow-up is suggested. There are also 2 small subpleural nodules  anteriorly in the right upper lobe as described above which can also be  evaluated on follow-up. 9/28/21 EUS:  Endoscopic: Esophagus:normal; Stomach: normal; Duodenum/jejunum: normal and protruding biliary stent  Ultrasound: Esophagus: normal findings;     Stomach: normal findings:     Pancreas: Areas examined: the entire gland                          Parenchyma: -Evidence of a hypoechoic mass with poorly defined borders seen in the head of the pancreas. It appeared involving the CBD where a stent is seen, however it did not involve the major vessels. It measured about 3.2 cm in widest diameter on one view. Pancreatic Duct: normal findings, not dilated               Liver: Parenchyma: normal                          Gallbladder: normal                          Bile Duct: the common bile duct is dilated in the proximal and mid portion and a plastic stent could be seen                          Lymph Node: no adenopathy  Impression:   Pancreas head mass with fine needle biopsy showing adenocarcinoma on preliminary cytology  Recommendations: Follow-up on pathology  Follow-up with oncology, will need further evaluation of pulmonary nodule  Surgical oncology consultation  Resume GI lite diet today and can discharge in am from GI standpoint    12/20/2021:  MRI abd w wo cont:  IMPRESSION  1. Positive response to therapy.  Decreased size of the pancreatic head mass, which now has a maximum AP diameter of 2.3 cm. No obstruction of the biliary tree or pancreatic duct. No involvement of the SMA. Based on imaging, patient is a candidate for resection. 2. Cholelithiasis. No acute cholecystitis or biliary obstruction. MRI Pelv w wo cont:  IMPRESSION  No acute process or evidence of malignant neoplasm/ metastatic disease. CT chest w cont:  IMPRESSION  1. Interval apparent resolution of previously seen scattered subcentimeter bilateral pulmonary nodules and right pleural effusion. 2.  Please see separately dictated reports for the MRI abdomen and pelvis obtained the same day for subdiaphragmatic findings. Assessment/Recommendations:   77 y.o. female with Ulcerative colitis, Hyopthyroidism, admitted with obstructive jaundice concerning for underlying malignant obstructing mass. 1. Pancreatic adenocarcinoma:  Clinically stage Ib [T2N0], but need further evaluation in future regarding pulmonary nodules and liver lesion. Presented with obstructive jaundice and transaminitis, CA 19-9 was 198 in 9/2021 at diagnosis. Underwent biliary stent placement with improvement in biliary obstruction. 3.2cm mass seen in pancreatic head on EUS, not involving vasculature, possibly resectable. Dr. Maria G Hutchison in [place] has evaluated patient and recommends consideration of neoadjuvant chemotherapy with close attention to lung nodules and liver lesion. I agree with this recommendation for earlier treatment of micrometastatic disease, ability to determine whether pt has chemosensitive disease. We discussed the risks and benefits of FOLFIRINOX chemotherapy, including potential side effects. These include but are not limited to fatigue, nausea vomiting, diarrhea, neuropathy, taste changes, cold intolerance, esophageal spasm, allergic reactions, alopecia, mucositis, myelosuppression, risk for infection, infertility, and rarely, death.  Rarely, a patient may have a condition where they do not metabolize fluorouracil appropriately (called DPD deficiency), and they may have excessive toxicity. A Port-A-Cath will be required in order to deliver the continuous infusion. BRCA 1/2 negative. The patient has consented to beginning therapy. Pt completed 6 cycles of mFOLFIRINOX in neoadjuvant setting, followed by pancreaticoduodenectomy (Whipple resection) on 2/23/22. Now planning for 6 cycles of adjuvant chemotherapy. Supportive care meds include: Emla cream, Zofran, Compazine, Dexamethasone  -- Proceed with C9 of neoadjuvant mFOLFIRINOX with neulasta support. Fluids with pump removal.   -- Checking CA 19-9 on date of C1, C6, C7, C12.   -- Return in 2 weeks for C10 mFOLFIRINOX (10% DR johnnie), Neulasta support, MD/NP visit. Patient prefers Monday appointments. 2. Alternating constipation/diarrhea:  Now with worsening diarrhea. Has history of UC. Not taking Cholestyramine (Questran) due to intolerance. Stool softeners prn constipation and imodium/lomotil prn diarrhea. -- Advised she can take lomotil up to TID as needed for diarrhea. Continue taking imodium BID. -- Atropine before irinotecan today. -- Encouraged drinking gatorade/water to prevent dehydration. 3. Pulmonary nodules:   Repeat imaging after C4 of chemo showed resolution of pulmonary nodules. 4. HTN:   Managed on HCTZ. Advised checking BP at home and if systolic <462, then hold HCTZ. 5. H/o Ulcerative procto-sigmoiditis:  Past due for colonoscopy with one adenoma polyp removed at last colonoscopy in April 2017. Was due for repeat in 2019 but she has not followed up with the GI practice since her last colonoscopy. -- Established with GI and can pursue repeat colonoscopy once chemo completed. 6. Morbid obesity:  Discussed healthy food options and ways to incorporate more protein into diet. 7. Leukocytosis:   2/2 Neulasta. No fevers or chills. 8. Normocytic anemia:  Due to chemotherapy, decreased PO intake and recent Whipple surgery.  Recent B12/folate/ferritin normal. Mildly low iron sat. Monitor. 9. Dysguesia / Poor appetite / Hypokalemia / tachycardia:   Intake, appetite and diarrhea worse with 17 lb weight loss in 2 weeks. Avoiding supplement drinks with dairy given they cause diarrhea. 20 meq KCL at home. -- Advised following BRAT diet. Discussed examples. -- 1 L NS saline today, NS with pump removal and on Tuesday in off week. -- 60mEQ of KCL today in [facility]    10. Back pain:  Lower back and worse. Right side with radiation of pain down right thigh. -- Continue supportive care with ice, heat, tylenol prn  -- Xray back and right hip this week. -- Urinalysis to r/o infection. I personally saw and evaluated the patient and performed the key components of medical decision making. The history, physical exam, and documentation were performed by Loki Winters NP. I reviewed and verified the above documentation and modified it as needed. Proceed with C9 of mFOLFIRINOX today. Advised on increasing frequency of Lomotil, adding food at breakfast time. Will ask dietician to reach out to patient as well.  CA 19-9 is going down, now normal.    Signed By: Nidia Howe MD

## 2022-05-09 ENCOUNTER — HOSPITAL ENCOUNTER (OUTPATIENT)
Dept: INFUSION THERAPY | Age: 67
Discharge: HOME | End: 2022-05-09
Payer: MEDICARE

## 2022-05-09 ENCOUNTER — OFFICE VISIT (OUTPATIENT)
Dept: ONCOLOGY | Age: 67
End: 2022-05-09
Payer: MEDICARE

## 2022-05-09 VITALS
DIASTOLIC BLOOD PRESSURE: 66 MMHG | HEIGHT: 64 IN | HEART RATE: 101 BPM | OXYGEN SATURATION: 100 % | BODY MASS INDEX: 33.29 KG/M2 | TEMPERATURE: 97.9 F | RESPIRATION RATE: 18 BRPM | SYSTOLIC BLOOD PRESSURE: 131 MMHG | WEIGHT: 195 LBS

## 2022-05-09 VITALS
HEIGHT: 64 IN | BODY MASS INDEX: 33.29 KG/M2 | WEIGHT: 195 LBS | OXYGEN SATURATION: 100 % | TEMPERATURE: 97.9 F | DIASTOLIC BLOOD PRESSURE: 80 MMHG | SYSTOLIC BLOOD PRESSURE: 137 MMHG | RESPIRATION RATE: 16 BRPM | HEART RATE: 71 BPM

## 2022-05-09 DIAGNOSIS — M54.50 LUMBAR BACK PAIN: ICD-10-CM

## 2022-05-09 DIAGNOSIS — R00.0 TACHYCARDIA: ICD-10-CM

## 2022-05-09 DIAGNOSIS — M25.551 RIGHT HIP PAIN: ICD-10-CM

## 2022-05-09 DIAGNOSIS — Z51.11 CHEMOTHERAPY MANAGEMENT, ENCOUNTER FOR: ICD-10-CM

## 2022-05-09 DIAGNOSIS — K52.1 DIARRHEA DUE TO DRUG: ICD-10-CM

## 2022-05-09 DIAGNOSIS — E87.6 HYPOKALEMIA: ICD-10-CM

## 2022-05-09 DIAGNOSIS — C25.0 MALIGNANT NEOPLASM OF HEAD OF PANCREAS (HCC): ICD-10-CM

## 2022-05-09 DIAGNOSIS — Z76.89 PREVENTION OF CHEMOTHERAPY-INDUCED NEUTROPENIA: Primary | ICD-10-CM

## 2022-05-09 DIAGNOSIS — C25.9 PANCREATIC ADENOCARCINOMA (HCC): Primary | ICD-10-CM

## 2022-05-09 DIAGNOSIS — R63.4 EXCESSIVE WEIGHT LOSS: ICD-10-CM

## 2022-05-09 LAB
ALBUMIN SERPL-MCNC: 3.1 G/DL (ref 3.5–5)
ALBUMIN/GLOB SERPL: 0.8 {RATIO} (ref 1.1–2.2)
ALP SERPL-CCNC: 97 U/L (ref 45–117)
ALT SERPL-CCNC: 16 U/L (ref 12–78)
ANION GAP SERPL CALC-SCNC: 13 MMOL/L (ref 5–15)
APPEARANCE UR: ABNORMAL
AST SERPL-CCNC: 17 U/L (ref 15–37)
BACTERIA URNS QL MICRO: ABNORMAL /HPF
BASOPHILS # BLD: 0 K/UL (ref 0–0.1)
BASOPHILS NFR BLD: 0 % (ref 0–1)
BILIRUB SERPL-MCNC: 0.7 MG/DL (ref 0.2–1)
BILIRUB UR QL: NEGATIVE
BUN SERPL-MCNC: 3 MG/DL (ref 6–20)
BUN/CREAT SERPL: 4 (ref 12–20)
CALCIUM SERPL-MCNC: 9 MG/DL (ref 8.5–10.1)
CHLORIDE SERPL-SCNC: 103 MMOL/L (ref 97–108)
CO2 SERPL-SCNC: 22 MMOL/L (ref 21–32)
COLOR UR: ABNORMAL
CREAT SERPL-MCNC: 0.72 MG/DL (ref 0.55–1.02)
DIFFERENTIAL METHOD BLD: ABNORMAL
EOSINOPHIL # BLD: 0 K/UL (ref 0–0.4)
EOSINOPHIL NFR BLD: 0 % (ref 0–7)
EPITH CASTS URNS QL MICRO: ABNORMAL /LPF
ERYTHROCYTE [DISTWIDTH] IN BLOOD BY AUTOMATED COUNT: 15.9 % (ref 11.5–14.5)
GLOBULIN SER CALC-MCNC: 4 G/DL (ref 2–4)
GLUCOSE SERPL-MCNC: 88 MG/DL (ref 65–100)
GLUCOSE UR STRIP.AUTO-MCNC: NEGATIVE MG/DL
HCT VFR BLD AUTO: 32 % (ref 35–47)
HGB BLD-MCNC: 10.6 G/DL (ref 11.5–16)
HGB UR QL STRIP: NEGATIVE
HYALINE CASTS URNS QL MICRO: ABNORMAL /LPF (ref 0–5)
IMM GRANULOCYTES # BLD AUTO: 0 K/UL
IMM GRANULOCYTES NFR BLD AUTO: 0 %
KETONES UR QL STRIP.AUTO: 80 MG/DL
LEUKOCYTE ESTERASE UR QL STRIP.AUTO: NEGATIVE
LYMPHOCYTES # BLD: 5.4 K/UL (ref 0.8–3.5)
LYMPHOCYTES NFR BLD: 43 % (ref 12–49)
MCH RBC QN AUTO: 30.9 PG (ref 26–34)
MCHC RBC AUTO-ENTMCNC: 33.1 G/DL (ref 30–36.5)
MCV RBC AUTO: 93.3 FL (ref 80–99)
METAMYELOCYTES NFR BLD MANUAL: 2 %
MONOCYTES # BLD: 1.1 K/UL (ref 0–1)
MONOCYTES NFR BLD: 9 % (ref 5–13)
NEUTS BAND NFR BLD MANUAL: 13 % (ref 0–6)
NEUTS SEG # BLD: 5.8 K/UL (ref 1.8–8)
NEUTS SEG NFR BLD: 33 % (ref 32–75)
NITRITE UR QL STRIP.AUTO: NEGATIVE
NRBC # BLD: 0 K/UL (ref 0–0.01)
NRBC BLD-RTO: 0 PER 100 WBC
PH UR STRIP: 6 [PH] (ref 5–8)
PLATELET # BLD AUTO: 196 K/UL (ref 150–400)
PMV BLD AUTO: 10.7 FL (ref 8.9–12.9)
POTASSIUM SERPL-SCNC: 2.6 MMOL/L (ref 3.5–5.1)
PROT SERPL-MCNC: 7.1 G/DL (ref 6.4–8.2)
PROT UR STRIP-MCNC: ABNORMAL MG/DL
RBC # BLD AUTO: 3.43 M/UL (ref 3.8–5.2)
RBC #/AREA URNS HPF: ABNORMAL /HPF (ref 0–5)
RBC MORPH BLD: ABNORMAL
SODIUM SERPL-SCNC: 138 MMOL/L (ref 136–145)
SP GR UR REFRACTOMETRY: 1.01 (ref 1–1.03)
UA: UC IF INDICATED,UAUC: ABNORMAL
UROBILINOGEN UR QL STRIP.AUTO: 0.2 EU/DL (ref 0.2–1)
WBC # BLD AUTO: 12.5 K/UL (ref 3.6–11)
WBC URNS QL MICRO: ABNORMAL /HPF (ref 0–4)

## 2022-05-09 PROCEDURE — 96417 CHEMO IV INFUS EACH ADDL SEQ: CPT

## 2022-05-09 PROCEDURE — 1101F PT FALLS ASSESS-DOCD LE1/YR: CPT | Performed by: INTERNAL MEDICINE

## 2022-05-09 PROCEDURE — G8752 SYS BP LESS 140: HCPCS | Performed by: INTERNAL MEDICINE

## 2022-05-09 PROCEDURE — 96367 TX/PROPH/DG ADDL SEQ IV INF: CPT

## 2022-05-09 PROCEDURE — 81001 URINALYSIS AUTO W/SCOPE: CPT

## 2022-05-09 PROCEDURE — 96415 CHEMO IV INFUSION ADDL HR: CPT

## 2022-05-09 PROCEDURE — G8536 NO DOC ELDER MAL SCRN: HCPCS | Performed by: INTERNAL MEDICINE

## 2022-05-09 PROCEDURE — G8432 DEP SCR NOT DOC, RNG: HCPCS | Performed by: INTERNAL MEDICINE

## 2022-05-09 PROCEDURE — 74011250637 HC RX REV CODE- 250/637: Performed by: NURSE PRACTITIONER

## 2022-05-09 PROCEDURE — 1090F PRES/ABSN URINE INCON ASSESS: CPT | Performed by: INTERNAL MEDICINE

## 2022-05-09 PROCEDURE — 36415 COLL VENOUS BLD VENIPUNCTURE: CPT

## 2022-05-09 PROCEDURE — 74011250636 HC RX REV CODE- 250/636: Performed by: INTERNAL MEDICINE

## 2022-05-09 PROCEDURE — 96411 CHEMO IV PUSH ADDL DRUG: CPT

## 2022-05-09 PROCEDURE — 80053 COMPREHEN METABOLIC PANEL: CPT

## 2022-05-09 PROCEDURE — 96375 TX/PRO/DX INJ NEW DRUG ADDON: CPT

## 2022-05-09 PROCEDURE — 74011000258 HC RX REV CODE- 258: Performed by: INTERNAL MEDICINE

## 2022-05-09 PROCEDURE — G8400 PT W/DXA NO RESULTS DOC: HCPCS | Performed by: INTERNAL MEDICINE

## 2022-05-09 PROCEDURE — 74011250636 HC RX REV CODE- 250/636: Performed by: NURSE PRACTITIONER

## 2022-05-09 PROCEDURE — 99215 OFFICE O/P EST HI 40 MIN: CPT | Performed by: INTERNAL MEDICINE

## 2022-05-09 PROCEDURE — 96368 THER/DIAG CONCURRENT INF: CPT

## 2022-05-09 PROCEDURE — 3017F COLORECTAL CA SCREEN DOC REV: CPT | Performed by: INTERNAL MEDICINE

## 2022-05-09 PROCEDURE — G8417 CALC BMI ABV UP PARAM F/U: HCPCS | Performed by: INTERNAL MEDICINE

## 2022-05-09 PROCEDURE — 96416 CHEMO PROLONG INFUSE W/PUMP: CPT

## 2022-05-09 PROCEDURE — 74011000258 HC RX REV CODE- 258: Performed by: NURSE PRACTITIONER

## 2022-05-09 PROCEDURE — 96372 THER/PROPH/DIAG INJ SC/IM: CPT

## 2022-05-09 PROCEDURE — G8754 DIAS BP LESS 90: HCPCS | Performed by: INTERNAL MEDICINE

## 2022-05-09 PROCEDURE — 96413 CHEMO IV INFUSION 1 HR: CPT

## 2022-05-09 PROCEDURE — 96361 HYDRATE IV INFUSION ADD-ON: CPT

## 2022-05-09 PROCEDURE — 85025 COMPLETE CBC W/AUTO DIFF WBC: CPT

## 2022-05-09 PROCEDURE — G8427 DOCREV CUR MEDS BY ELIG CLIN: HCPCS | Performed by: INTERNAL MEDICINE

## 2022-05-09 RX ORDER — SODIUM CHLORIDE 9 MG/ML
1000 INJECTION, SOLUTION INTRAVENOUS ONCE
Status: COMPLETED | OUTPATIENT
Start: 2022-05-09 | End: 2022-05-09

## 2022-05-09 RX ORDER — HEPARIN 100 UNIT/ML
300-500 SYRINGE INTRAVENOUS AS NEEDED
Status: ACTIVE | OUTPATIENT
Start: 2022-05-09 | End: 2022-05-09

## 2022-05-09 RX ORDER — PALONOSETRON 0.05 MG/ML
0.25 INJECTION, SOLUTION INTRAVENOUS ONCE
Status: COMPLETED | OUTPATIENT
Start: 2022-05-09 | End: 2022-05-09

## 2022-05-09 RX ORDER — SODIUM CHLORIDE 0.9 % (FLUSH) 0.9 %
10 SYRINGE (ML) INJECTION AS NEEDED
Status: DISPENSED | OUTPATIENT
Start: 2022-05-09 | End: 2022-05-09

## 2022-05-09 RX ORDER — SODIUM CHLORIDE 9 MG/ML
10 INJECTION INTRAMUSCULAR; INTRAVENOUS; SUBCUTANEOUS AS NEEDED
Status: ACTIVE | OUTPATIENT
Start: 2022-05-09 | End: 2022-05-09

## 2022-05-09 RX ORDER — POTASSIUM CHLORIDE 750 MG/1
60 TABLET, FILM COATED, EXTENDED RELEASE ORAL
Status: COMPLETED | OUTPATIENT
Start: 2022-05-09 | End: 2022-05-09

## 2022-05-09 RX ORDER — ATROPINE SULFATE 0.4 MG/ML
0.4 INJECTION, SOLUTION ENDOTRACHEAL; INTRAMEDULLARY; INTRAMUSCULAR; INTRAVENOUS; SUBCUTANEOUS
Status: DISCONTINUED | OUTPATIENT
Start: 2022-05-09 | End: 2022-05-11 | Stop reason: HOSPADM

## 2022-05-09 RX ORDER — DEXTROSE MONOHYDRATE 50 MG/ML
25 INJECTION, SOLUTION INTRAVENOUS CONTINUOUS
Status: DISPENSED | OUTPATIENT
Start: 2022-05-09 | End: 2022-05-09

## 2022-05-09 RX ADMIN — FLUOROURACIL 4320 MG: 50 INJECTION, SOLUTION INTRAVENOUS at 15:11

## 2022-05-09 RX ADMIN — ATROPINE SULFATE 0.4 MG: 0.4 INJECTION, SOLUTION INTRAMUSCULAR; INTRAVENOUS; SUBCUTANEOUS at 13:26

## 2022-05-09 RX ADMIN — SODIUM CHLORIDE 1000 ML: 9 INJECTION, SOLUTION INTRAVENOUS at 10:00

## 2022-05-09 RX ADMIN — DEXTROSE MONOHYDRATE 25 ML/HR: 50 INJECTION, SOLUTION INTRAVENOUS at 11:14

## 2022-05-09 RX ADMIN — IRINOTECAN HYDROCHLORIDE 270 MG: 20 INJECTION, SOLUTION INTRAVENOUS at 13:33

## 2022-05-09 RX ADMIN — OXALIPLATIN 153 MG: 5 INJECTION, SOLUTION INTRAVENOUS at 11:18

## 2022-05-09 RX ADMIN — PALONOSETRON HYDROCHLORIDE 0.25 MG: 0.25 INJECTION, SOLUTION INTRAVENOUS at 09:55

## 2022-05-09 RX ADMIN — LEUCOVORIN CALCIUM 720 MG: 500 INJECTION, POWDER, LYOPHILIZED, FOR SOLUTION INTRAMUSCULAR; INTRAVENOUS at 13:33

## 2022-05-09 RX ADMIN — POTASSIUM CHLORIDE 60 MEQ: 750 TABLET, FILM COATED, EXTENDED RELEASE ORAL at 09:58

## 2022-05-09 RX ADMIN — DEXAMETHASONE SODIUM PHOSPHATE 12 MG: 4 INJECTION, SOLUTION INTRA-ARTICULAR; INTRALESIONAL; INTRAMUSCULAR; INTRAVENOUS; SOFT TISSUE at 09:56

## 2022-05-09 NOTE — PATIENT INSTRUCTIONS
1. Please get xray of your back this week. Continue using tylenol/heat/ice for pain management. 2. Please drink gatorade/water when having diarrhea    3. You can take lomotil up to three times a day in addition to imodium    4. Options for bland diet, crackers, rice, mash potatoes, chicken broth, beef broth, noodles, applesauce     5.  Adding you on for fluids with pump removal and Tuesday during off week

## 2022-05-09 NOTE — PROGRESS NOTES
[facility] Progress Note    Date: May 9, 2022    Name: Calista Aquino    MRN: [id number]         : 1955    Ms. Jennifer Marquez Arrived ambulatory and in no distress for cycle 9 day 1 of Folfirinox regimen. Assessment was completed, no acute issues at this time, patient has been having fatigue, diarrhea and nausea. R chest port accessed without difficulty, labs drawn and in process by Gigi Roldan RN. Chemo dose reduced today. Chemotherapy Flowsheet 2022   Cycle C9D1   Date 2022   Drug / Regimen Folfirinox   Pre Hydration given   Pre Meds given   Notes given + po potassium         Proceeded to appt with Dr. Howard Laguerre    Ms. Bhatti's vitals were reviewed. Visit Vitals  /80   Pulse 71   Temp 97.9 °F (36.6 °C)   Resp 16   Ht 5' 4\" (1.626 m)   Wt 88.5 kg (195 lb)   SpO2 100%   Breastfeeding No   BMI 33.47 kg/m²       Lab results were obtained and reviewed. Recent Results (from the past 12 hour(s))   CBC WITH AUTOMATED DIFF    Collection Time: 22  7:52 AM   Result Value Ref Range    WBC 12.5 (H) 3.6 - 11.0 K/uL    RBC 3.43 (L) 3.80 - 5.20 M/uL    HGB 10.6 (L) 11.5 - 16.0 g/dL    HCT 32.0 (L) 35.0 - 47.0 %    MCV 93.3 80.0 - 99.0 FL    MCH 30.9 26.0 - 34.0 PG    MCHC 33.1 30.0 - 36.5 g/dL    RDW 15.9 (H) 11.5 - 14.5 %    PLATELET 501 667 - 347 K/uL    MPV 10.7 8.9 - 12.9 FL    NRBC 0.0 0  WBC    ABSOLUTE NRBC 0.00 0.00 - 0.01 K/uL    NEUTROPHILS 33 32 - 75 %    BAND NEUTROPHILS 13 (H) 0 - 6 %    LYMPHOCYTES 43 12 - 49 %    MONOCYTES 9 5 - 13 %    EOSINOPHILS 0 0 - 7 %    BASOPHILS 0 0 - 1 %    METAMYELOCYTES 2 (H) 0 %    IMMATURE GRANULOCYTES 0 %    ABS. NEUTROPHILS 5.8 1.8 - 8.0 K/UL    ABS. LYMPHOCYTES 5.4 (H) 0.8 - 3.5 K/UL    ABS. MONOCYTES 1.1 (H) 0.0 - 1.0 K/UL    ABS. EOSINOPHILS 0.0 0.0 - 0.4 K/UL    ABS. BASOPHILS 0.0 0.0 - 0.1 K/UL    ABS. IMM.  GRANS. 0.0 K/UL    DF MANUAL      RBC COMMENTS NORMOCYTIC, NORMOCHROMIC     METABOLIC PANEL, COMPREHENSIVE    Collection Time: 22  7:52 AM   Result Value Ref Range    Sodium 138 136 - 145 mmol/L    Potassium 2.6 (LL) 3.5 - 5.1 mmol/L    Chloride 103 97 - 108 mmol/L    CO2 22 21 - 32 mmol/L    Anion gap 13 5 - 15 mmol/L    Glucose 88 65 - 100 mg/dL    BUN 3 (L) 6 - 20 MG/DL    Creatinine 0.72 0.55 - 1.02 MG/DL    BUN/Creatinine ratio 4 (L) 12 - 20      GFR est AA >60 >60 ml/min/1.73m2    GFR est non-AA >60 >60 ml/min/1.73m2    Calcium 9.0 8.5 - 10.1 MG/DL    Bilirubin, total 0.7 0.2 - 1.0 MG/DL    ALT (SGPT) 16 12 - 78 U/L    AST (SGOT) 17 15 - 37 U/L    Alk. phosphatase 97 45 - 117 U/L    Protein, total 7.1 6.4 - 8.2 g/dL    Albumin 3.1 (L) 3.5 - 5.0 g/dL    Globulin 4.0 2.0 - 4.0 g/dL    A-G Ratio 0.8 (L) 1.1 - 2.2     URINALYSIS W/ REFLEX CULTURE    Collection Time: 05/09/22  1:28 PM    Specimen: Urine   Result Value Ref Range    Color YELLOW/STRAW      Appearance CLOUDY (A) CLEAR      Specific gravity 1.010 1.003 - 1.030      pH (UA) 6.0 5.0 - 8.0      Protein TRACE (A) NEG mg/dL    Glucose Negative NEG mg/dL    Ketone 80 (A) NEG mg/dL    Bilirubin Negative NEG      Blood Negative NEG      Urobilinogen 0.2 0.2 - 1.0 EU/dL    Nitrites Negative NEG      Leukocyte Esterase Negative NEG      WBC 0-4 0 - 4 /hpf    RBC 0-5 0 - 5 /hpf    Epithelial cells MODERATE (A) FEW /lpf    Bacteria 2+ (A) NEG /hpf    UA:UC IF INDICATED CULTURE NOT INDICATED BY UA RESULT CNI      Hyaline cast 0-2 0 - 5 /lpf       Pre-medications  were administered as ordered and chemotherapy was initiated.      Medications Administered     0.9% sodium chloride infusion 1,000 mL     Admin Date  05/09/2022 Action  New Bag Dose  1,000 mL Rate  1,000 mL/hr Route  IntraVENous Administered By  Triston Watertown          atropine 0.4 mg/mL injection 0.4 mg     Admin Date  05/09/2022 Action  Given Dose  0.4 mg Route  SubCUTAneous Administered By  Triston Watertown          dexamethasone (DECADRON) 12 mg in 0.9% sodium chloride 50 mL IVPB     Admin Date  05/09/2022 Action  New Bag Dose  12 mg Route  IntraVENous Administered By  Washington Honey          dextrose 5% infusion     Admin Date  05/09/2022 Action  New Bag Dose  25 mL/hr Rate  25 mL/hr Route  IntraVENous Administered By  Washington Honey          fluorouraciL (ADRUCIL) 4,320 mg in 0.9% sodium chloride 100 mL CADD Cassette     Admin Date  05/09/2022 Action  New Bag Dose  4,320 mg Rate  2.2 mL/hr Route  IntraVENous Administered By  Washington Honey          irinotecan (CAMPTOSAR) 270 mg in dextrose 5% 250 mL, overfill volume 25 mL chemo infusion     Admin Date  05/09/2022 Action  New Bag Dose  270 mg Rate  192.3 mL/hr Route  IntraVENous Administered By  Washington Honey          leucovorin (WELLCOVORIN) 720 mg in dextrose 5% 250 mL, overfill volume 25 mL IVPB     Admin Date  05/09/2022 Action  New Bag Dose  720 mg Rate  207.3 mL/hr Route  IntraVENous Administered By  Washington Honey          oxaliplatin (ELOXATIN) 153 mg in dextrose 5% 250 mL, overfill volume 25 mL chemo infusion     Admin Date  05/09/2022 Action  New Bag Dose  153 mg Rate  152.8 mL/hr Route  IntraVENous Administered By  Washington Honey          palonosetron HCl (ALOXI) injection 0.25 mg     Admin Date  05/09/2022 Action  Given Dose  0.25 mg Route  IntraVENous Administered By  Washington Honey          potassium chloride SR (KLOR-CON 10) tablet 60 mEq     Admin Date  05/09/2022 Action  Given Dose  60 mEq Route  Oral Administered By  Washington Honey                  Ms. Emilia Montalvo tolerated treatment well and was discharged from [place] [address] in stable condition. She is to return on 5/11/22 for her next appointment.     Cece Lopez  May 9, 2022

## 2022-05-09 NOTE — PROGRESS NOTES
Rosario Sharmin is a 77 y.o. female follow up for pancreatic adenocarcinoma.     Vitals 5/9/2022   Blood Pressure 131/66   Pulse 101   Temp 97.9   Resp 18   Height 5' 4\"   Weight 195 lb   SpO2 100   BSA 2 m2   BMI 33.47 kg/m2

## 2022-05-10 ENCOUNTER — APPOINTMENT (OUTPATIENT)
Dept: INFUSION THERAPY | Age: 67
End: 2022-05-10
Payer: MEDICARE

## 2022-05-10 ENCOUNTER — TELEPHONE (OUTPATIENT)
Dept: ONCOLOGY | Age: 67
End: 2022-05-10

## 2022-05-10 RX ORDER — SODIUM CHLORIDE 0.9 % (FLUSH) 0.9 %
10 SYRINGE (ML) INJECTION AS NEEDED
Status: CANCELLED | OUTPATIENT
Start: 2022-05-20

## 2022-05-10 RX ORDER — SODIUM CHLORIDE 9 MG/ML
1000 INJECTION, SOLUTION INTRAVENOUS ONCE
Status: CANCELLED
Start: 2022-05-17 | End: 2022-06-02

## 2022-05-10 RX ORDER — SODIUM CHLORIDE 9 MG/ML
1000 INJECTION, SOLUTION INTRAVENOUS ONCE
Status: CANCELLED
Start: 2022-09-03 | End: 2022-05-10

## 2022-05-10 RX ORDER — SODIUM CHLORIDE 0.9 % (FLUSH) 0.9 %
10 SYRINGE (ML) INJECTION AS NEEDED
Status: CANCELLED | OUTPATIENT
Start: 2022-05-17

## 2022-05-10 RX ORDER — SODIUM CHLORIDE 9 MG/ML
1000 INJECTION, SOLUTION INTRAVENOUS ONCE
Status: CANCELLED
Start: 2022-05-20 | End: 2022-07-14

## 2022-05-10 RX ORDER — HEPARIN 100 UNIT/ML
300-500 SYRINGE INTRAVENOUS AS NEEDED
Status: CANCELLED | OUTPATIENT
Start: 2022-05-17

## 2022-05-10 RX ORDER — SODIUM CHLORIDE 9 MG/ML
10 INJECTION INTRAMUSCULAR; INTRAVENOUS; SUBCUTANEOUS AS NEEDED
Status: CANCELLED | OUTPATIENT
Start: 2022-05-20

## 2022-05-10 RX ORDER — SODIUM CHLORIDE 9 MG/ML
10 INJECTION INTRAMUSCULAR; INTRAVENOUS; SUBCUTANEOUS AS NEEDED
Status: CANCELLED | OUTPATIENT
Start: 2022-05-17

## 2022-05-10 RX ORDER — HEPARIN 100 UNIT/ML
300-500 SYRINGE INTRAVENOUS AS NEEDED
Status: CANCELLED | OUTPATIENT
Start: 2022-05-20

## 2022-05-10 NOTE — TELEPHONE ENCOUNTER
[facility] at [address], [address] [place] [address] Morelia Mcdonalds [id number]  W: [phone]  F: [phone]    Medical Nutrition Therapy  Nutrition Referral:    Called patient. No answer. Left her message. She continues to struggle with eating. Anything she is eating is resulting in diarrhea. It sounds like she may be experiencing dumping syndrome. Would recommend she separate liquids from solids. She could also try laying down after eating. Patient called back at 2:45pm.  She reports she is doing the best she can. She is dealing with the ulcerative colitis. She wakes up early in the morning but often doesn't feel like eating. Today she had some broth when she woke up and feels less weak. She is going to look for a plant based protein powder, suggested trying Orgain.        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  Adjuvant FOLFIRNOX x 6 cycles,   Chemotherapy Flowsheet 5/9/2022   Cycle C9D1   Date 5/9/2022   Drug / Regimen Folfirinox   Pre Hydration given   Pre Meds given   Notes given + po potassium         Wt Readings from Last 5 Encounters:   05/09/22 195 lb (88.5 kg)   05/09/22 195 lb (88.5 kg)   04/25/22 212 lb 11.2 oz (96.5 kg)   04/25/22 212 lb 11.9 oz (96.5 kg)   04/12/22 218 lb (98.9 kg)     Malnutrition Assessment:  Malnutrition Status:  Severe malnutrition    Context:  Chronic illness     Findings of the 6 clinical characteristics of malnutrition:   Energy Intake:  7 - 75% or less est energy requirements for 1 month or longer  Weight Loss:  7.00 - Greater than 10% over 6 months     Body Fat Loss:  Unable to assess,     Muscle Mass Loss:  Unable to assess,    Fluid Accumulation:  No significant fluid accumulation,     Strength:  Not performed     Energy (kcal): 7732-3759 (MSJ x 1-1.2 x 1.3)           Wt used: Admission (103.8 kg)  Protein (gm): 100-135 (1.5-2 gm/kg adj BW)             Wt used: Adjusted (~67 kg)   Fluid (mL/day): 1 mL/kcal     Plan:   - Continue to try to eat something first thing in the morning.  - Continue with antiemetics scheduled to help with the nausea  - Limit use of straws if it causes gas pains.   - Continue with Bone broth as a way to get additional protein. - Suggested small meals at set times verses hunger cues. - Limit liquids at meal times to prevent filling up  - Continue to be available as a resource.      Signed By: Zita Sandhu, [employer]

## 2022-05-11 ENCOUNTER — TELEPHONE (OUTPATIENT)
Dept: ONCOLOGY | Age: 67
End: 2022-05-11

## 2022-05-11 ENCOUNTER — HOSPITAL ENCOUNTER (OUTPATIENT)
Dept: INFUSION THERAPY | Age: 67
Discharge: HOME | End: 2022-05-11
Payer: MEDICARE

## 2022-05-11 ENCOUNTER — HOSPITAL ENCOUNTER (OUTPATIENT)
Dept: GENERAL RADIOLOGY | Age: 67
Discharge: HOME | End: 2022-05-11
Payer: MEDICARE

## 2022-05-11 ENCOUNTER — HOSPITAL ENCOUNTER (OUTPATIENT)
Dept: INFUSION THERAPY | Age: 67
End: 2022-05-11

## 2022-05-11 VITALS
SYSTOLIC BLOOD PRESSURE: 134 MMHG | RESPIRATION RATE: 16 BRPM | TEMPERATURE: 97.6 F | HEART RATE: 69 BPM | DIASTOLIC BLOOD PRESSURE: 79 MMHG | OXYGEN SATURATION: 95 %

## 2022-05-11 DIAGNOSIS — Z76.89 PREVENTION OF CHEMOTHERAPY-INDUCED NEUTROPENIA: Primary | ICD-10-CM

## 2022-05-11 DIAGNOSIS — M54.50 LUMBAR BACK PAIN: ICD-10-CM

## 2022-05-11 DIAGNOSIS — M25.551 RIGHT HIP PAIN: ICD-10-CM

## 2022-05-11 DIAGNOSIS — C25.0 MALIGNANT NEOPLASM OF HEAD OF PANCREAS (HCC): ICD-10-CM

## 2022-05-11 PROCEDURE — 72100 X-RAY EXAM L-S SPINE 2/3 VWS: CPT

## 2022-05-11 PROCEDURE — 73502 X-RAY EXAM HIP UNI 2-3 VIEWS: CPT

## 2022-05-11 PROCEDURE — 96361 HYDRATE IV INFUSION ADD-ON: CPT

## 2022-05-11 PROCEDURE — 96360 HYDRATION IV INFUSION INIT: CPT

## 2022-05-11 PROCEDURE — 96374 THER/PROPH/DIAG INJ IV PUSH: CPT

## 2022-05-11 PROCEDURE — 74011250636 HC RX REV CODE- 250/636: Performed by: NURSE PRACTITIONER

## 2022-05-11 PROCEDURE — 96377 APPLICATON ON-BODY INJECTOR: CPT

## 2022-05-11 PROCEDURE — 74011250636 HC RX REV CODE- 250/636: Performed by: INTERNAL MEDICINE

## 2022-05-11 PROCEDURE — 96375 TX/PRO/DX INJ NEW DRUG ADDON: CPT

## 2022-05-11 PROCEDURE — 74011000250 HC RX REV CODE- 250: Performed by: INTERNAL MEDICINE

## 2022-05-11 RX ORDER — PROCHLORPERAZINE EDISYLATE 5 MG/ML
10 INJECTION INTRAMUSCULAR; INTRAVENOUS ONCE
Status: CANCELLED
Start: 2022-05-11 | End: 2022-05-11

## 2022-05-11 RX ORDER — PROCHLORPERAZINE EDISYLATE 5 MG/ML
10 INJECTION INTRAMUSCULAR; INTRAVENOUS ONCE
Status: COMPLETED | OUTPATIENT
Start: 2022-05-11 | End: 2022-05-11

## 2022-05-11 RX ORDER — HEPARIN 100 UNIT/ML
300-500 SYRINGE INTRAVENOUS AS NEEDED
Status: DISCONTINUED | OUTPATIENT
Start: 2022-05-11 | End: 2022-05-12 | Stop reason: HOSPADM

## 2022-05-11 RX ORDER — SODIUM CHLORIDE 0.9 % (FLUSH) 0.9 %
10 SYRINGE (ML) INJECTION AS NEEDED
Status: DISCONTINUED | OUTPATIENT
Start: 2022-05-11 | End: 2022-05-12 | Stop reason: HOSPADM

## 2022-05-11 RX ORDER — SODIUM CHLORIDE 9 MG/ML
10 INJECTION INTRAMUSCULAR; INTRAVENOUS; SUBCUTANEOUS AS NEEDED
Status: DISCONTINUED | OUTPATIENT
Start: 2022-05-11 | End: 2022-05-12 | Stop reason: HOSPADM

## 2022-05-11 RX ORDER — SODIUM CHLORIDE 9 MG/ML
1000 INJECTION, SOLUTION INTRAVENOUS ONCE
Status: COMPLETED | OUTPATIENT
Start: 2022-05-11 | End: 2022-05-11

## 2022-05-11 RX ADMIN — Medication 10 ML: at 16:15

## 2022-05-11 RX ADMIN — PROCHLORPERAZINE EDISYLATE 10 MG: 5 INJECTION INTRAMUSCULAR; INTRAVENOUS at 14:55

## 2022-05-11 RX ADMIN — HEPARIN 500 UNITS: 100 SYRINGE at 16:15

## 2022-05-11 RX ADMIN — SODIUM CHLORIDE 1000 ML: 9 INJECTION, SOLUTION INTRAVENOUS at 14:45

## 2022-05-11 RX ADMIN — PEGFILGRASTIM 6 MG: KIT SUBCUTANEOUS at 16:13

## 2022-05-11 NOTE — TELEPHONE ENCOUNTER
Patient left a voicemail stating that she would like to know if she can take zofran for her nausea. Please advise.     # [phone]

## 2022-05-11 NOTE — TELEPHONE ENCOUNTER
05/11/22 12:53 PM Called pt. Advised it is ok to take zofran/dex together. She reports a few episodes of diarrhea, managing with imodium. Tried a plant based protein drink that caused diarrhea. I advised she try grits, cream of wheat, baby food. Also, advised I will add compazine with fluids today. She verbalized understanding.

## 2022-05-11 NOTE — PROGRESS NOTES
hospitals Progress Note    Date: May 11, 2022    Name: Rasta Edwards    MRN: [id number]         : 1955:            Ms. Rhianna Dan arrived ambulatory and in no distress for Pump Removal and hydration. Assessment was completed, patient c/o nausea. Compazine ordered. CADD completed in infusion center- 100 ml infused per order. Ms. Niesha Ontiveros vitals were reviewed. Visit Vitals  /79   Pulse 69   Temp 97.6 °F (36.4 °C)   Resp 16   SpO2 95%       Medications Administered     0.9% sodium chloride infusion 1,000 mL     Admin Date  2022 Action  New Bag Dose  1,000 mL Rate  1,000 mL/hr Route  IntraVENous Administered By  Murali Dolan RN          0.9% sodium chloride injection 10 mL     Admin Date  2022 Action  Given Dose  10 mL Route  IntraVENous Administered By  Murali Dolan RN          heparin (porcine) pf 300-500 Units     Admin Date  2022 Action  Given Dose  500 Units Route  InterCATHeter Administered By  Murali Dolan RN          pegfilgrastim (NEULASTA) wearable SQ injector 6 mg     Admin Date  2022 Action  Given Dose  6 mg Route  SubCUTAneous Administered By  Murali Dolan RN          prochlorperazine (COMPAZINE) injection 10 mg     Admin Date  2022 Action  Given Dose  10 mg Route  IntraVENous Administered By  Murali Dolan RN                Education provided to patient about Neulasta On Body Injector including: side effects, how/when to remove the device, as well as what to do in the event of device malfunction. Opportunity for questions was provided and all questions were answered. Patient verbalized understanding. Ms. Rhianna Dan tolerated treatment well and was discharged from [place] [address] in stable condition at 1620. Port de-accessed, flushed & heparinized per protocol. She is to return on May 17 at 36 for her next appointment.     Yahaira Adkins RN  May 11, 2022

## 2022-05-12 ENCOUNTER — TELEPHONE (OUTPATIENT)
Dept: ONCOLOGY | Age: 67
End: 2022-05-12

## 2022-05-12 ENCOUNTER — APPOINTMENT (OUTPATIENT)
Dept: INFUSION THERAPY | Age: 67
End: 2022-05-12

## 2022-05-12 NOTE — TELEPHONE ENCOUNTER
05/12/22 3:01 PM Called pt and discussed xray of hips shows osteoarthritis and xray of spine shows scoliosis. She said she is established with Dr. Maddie Moya and has required steroid injections in her back in the past. Reports back pain improved since Monday. Advised I can place a referral to ortho spine if needed. She wants to hold off for now. No further questions or concerns at this time.

## 2022-05-13 ENCOUNTER — HOSPITAL ENCOUNTER (OUTPATIENT)
Dept: INFUSION THERAPY | Age: 67
Discharge: HOME | End: 2022-05-13
Payer: MEDICARE

## 2022-05-13 ENCOUNTER — TELEPHONE (OUTPATIENT)
Dept: ONCOLOGY | Age: 67
End: 2022-05-13

## 2022-05-13 VITALS
HEIGHT: 64 IN | DIASTOLIC BLOOD PRESSURE: 86 MMHG | SYSTOLIC BLOOD PRESSURE: 138 MMHG | RESPIRATION RATE: 18 BRPM | OXYGEN SATURATION: 98 % | TEMPERATURE: 97.3 F | WEIGHT: 198 LBS | BODY MASS INDEX: 33.8 KG/M2 | HEART RATE: 71 BPM

## 2022-05-13 DIAGNOSIS — C25.0 MALIGNANT NEOPLASM OF HEAD OF PANCREAS (HCC): ICD-10-CM

## 2022-05-13 DIAGNOSIS — E86.0 DEHYDRATION: Primary | ICD-10-CM

## 2022-05-13 DIAGNOSIS — R11.2 CINV (CHEMOTHERAPY-INDUCED NAUSEA AND VOMITING): ICD-10-CM

## 2022-05-13 DIAGNOSIS — C25.9 PANCREATIC ADENOCARCINOMA (HCC): ICD-10-CM

## 2022-05-13 DIAGNOSIS — T45.1X5A CINV (CHEMOTHERAPY-INDUCED NAUSEA AND VOMITING): ICD-10-CM

## 2022-05-13 LAB
ANION GAP SERPL CALC-SCNC: 14 MMOL/L (ref 5–15)
BUN SERPL-MCNC: 11 MG/DL (ref 6–20)
BUN/CREAT SERPL: 16 (ref 12–20)
CALCIUM SERPL-MCNC: 8.7 MG/DL (ref 8.5–10.1)
CHLORIDE SERPL-SCNC: 101 MMOL/L (ref 97–108)
CO2 SERPL-SCNC: 24 MMOL/L (ref 21–32)
CREAT SERPL-MCNC: 0.67 MG/DL (ref 0.55–1.02)
GLUCOSE SERPL-MCNC: 62 MG/DL (ref 65–100)
POTASSIUM SERPL-SCNC: 2.5 MMOL/L (ref 3.5–5.1)
SODIUM SERPL-SCNC: 139 MMOL/L (ref 136–145)

## 2022-05-13 PROCEDURE — 96375 TX/PRO/DX INJ NEW DRUG ADDON: CPT

## 2022-05-13 PROCEDURE — 77030012965 HC NDL HUBR BBMI -A

## 2022-05-13 PROCEDURE — 74011250636 HC RX REV CODE- 250/636: Performed by: NURSE PRACTITIONER

## 2022-05-13 PROCEDURE — 96361 HYDRATE IV INFUSION ADD-ON: CPT

## 2022-05-13 PROCEDURE — 96374 THER/PROPH/DIAG INJ IV PUSH: CPT

## 2022-05-13 PROCEDURE — 36415 COLL VENOUS BLD VENIPUNCTURE: CPT

## 2022-05-13 PROCEDURE — 96360 HYDRATION IV INFUSION INIT: CPT

## 2022-05-13 PROCEDURE — 80048 BASIC METABOLIC PNL TOTAL CA: CPT

## 2022-05-13 RX ORDER — ACETAMINOPHEN 325 MG/1
650 TABLET ORAL AS NEEDED
Status: CANCELLED
Start: 2022-05-23

## 2022-05-13 RX ORDER — PALONOSETRON 0.05 MG/ML
0.25 INJECTION, SOLUTION INTRAVENOUS ONCE
Status: CANCELLED | OUTPATIENT
Start: 2022-05-23 | End: 2022-05-23

## 2022-05-13 RX ORDER — ONDANSETRON HYDROCHLORIDE 8 MG/1
8 TABLET, FILM COATED ORAL
Qty: 30 TABLET | Refills: 0 | Status: SHIPPED | OUTPATIENT
Start: 2022-05-13 | End: 2022-10-10

## 2022-05-13 RX ORDER — SODIUM CHLORIDE 0.9 % (FLUSH) 0.9 %
10 SYRINGE (ML) INJECTION AS NEEDED
Status: CANCELLED | OUTPATIENT
Start: 2022-06-08

## 2022-05-13 RX ORDER — PROCHLORPERAZINE MALEATE 10 MG
10 TABLET ORAL
Qty: 30 TABLET | Refills: 2 | Status: SHIPPED | OUTPATIENT
Start: 2022-05-13 | End: 2022-10-10

## 2022-05-13 RX ORDER — DEXTROSE MONOHYDRATE 50 MG/ML
25 INJECTION, SOLUTION INTRAVENOUS CONTINUOUS
Status: CANCELLED
Start: 2022-05-23

## 2022-05-13 RX ORDER — SODIUM CHLORIDE 0.9 % (FLUSH) 0.9 %
10 SYRINGE (ML) INJECTION AS NEEDED
Status: CANCELLED | OUTPATIENT
Start: 2022-05-23

## 2022-05-13 RX ORDER — ALBUTEROL SULFATE 0.83 MG/ML
2.5 SOLUTION RESPIRATORY (INHALATION) AS NEEDED
Status: CANCELLED
Start: 2022-05-23

## 2022-05-13 RX ORDER — DIPHENHYDRAMINE HYDROCHLORIDE 50 MG/ML
25 INJECTION, SOLUTION INTRAMUSCULAR; INTRAVENOUS AS NEEDED
Status: CANCELLED
Start: 2022-05-23

## 2022-05-13 RX ORDER — SODIUM CHLORIDE 9 MG/ML
1000 INJECTION, SOLUTION INTRAVENOUS ONCE
Status: CANCELLED
Start: 2022-05-13 | End: 2022-05-13

## 2022-05-13 RX ORDER — SODIUM CHLORIDE 9 MG/ML
10 INJECTION INTRAMUSCULAR; INTRAVENOUS; SUBCUTANEOUS AS NEEDED
Status: CANCELLED | OUTPATIENT
Start: 2022-05-23

## 2022-05-13 RX ORDER — ONDANSETRON 2 MG/ML
8 INJECTION INTRAMUSCULAR; INTRAVENOUS AS NEEDED
Status: CANCELLED | OUTPATIENT
Start: 2022-05-23

## 2022-05-13 RX ORDER — SODIUM CHLORIDE 9 MG/ML
10 INJECTION INTRAMUSCULAR; INTRAVENOUS; SUBCUTANEOUS AS NEEDED
Status: CANCELLED | OUTPATIENT
Start: 2022-06-08

## 2022-05-13 RX ORDER — DEXAMETHASONE SODIUM PHOSPHATE 4 MG/ML
4 INJECTION, SOLUTION INTRA-ARTICULAR; INTRALESIONAL; INTRAMUSCULAR; INTRAVENOUS; SOFT TISSUE ONCE
Status: COMPLETED | OUTPATIENT
Start: 2022-05-13 | End: 2022-05-13

## 2022-05-13 RX ORDER — EPINEPHRINE 1 MG/ML
0.3 INJECTION, SOLUTION, CONCENTRATE INTRAVENOUS AS NEEDED
Status: CANCELLED | OUTPATIENT
Start: 2022-05-23

## 2022-05-13 RX ORDER — ATROPINE SULFATE 0.4 MG/ML
0.4 INJECTION, SOLUTION ENDOTRACHEAL; INTRAMEDULLARY; INTRAMUSCULAR; INTRAVENOUS; SUBCUTANEOUS
Status: CANCELLED | OUTPATIENT
Start: 2022-05-23

## 2022-05-13 RX ORDER — HYDROCORTISONE SODIUM SUCCINATE 100 MG/2ML
100 INJECTION, POWDER, FOR SOLUTION INTRAMUSCULAR; INTRAVENOUS AS NEEDED
Status: CANCELLED | OUTPATIENT
Start: 2022-05-23

## 2022-05-13 RX ORDER — HEPARIN 100 UNIT/ML
300-500 SYRINGE INTRAVENOUS AS NEEDED
Status: CANCELLED
Start: 2022-06-08

## 2022-05-13 RX ORDER — HEPARIN 100 UNIT/ML
300-500 SYRINGE INTRAVENOUS AS NEEDED
Status: CANCELLED
Start: 2022-05-23

## 2022-05-13 RX ORDER — DEXAMETHASONE SODIUM PHOSPHATE 4 MG/ML
4 INJECTION, SOLUTION INTRA-ARTICULAR; INTRALESIONAL; INTRAMUSCULAR; INTRAVENOUS; SOFT TISSUE ONCE
Status: CANCELLED
Start: 2022-05-13 | End: 2022-05-13

## 2022-05-13 RX ORDER — DIPHENHYDRAMINE HYDROCHLORIDE 50 MG/ML
50 INJECTION, SOLUTION INTRAMUSCULAR; INTRAVENOUS AS NEEDED
Status: CANCELLED
Start: 2022-05-23

## 2022-05-13 RX ORDER — SODIUM CHLORIDE 9 MG/ML
1000 INJECTION, SOLUTION INTRAVENOUS ONCE
Status: CANCELLED
Start: 2022-06-08 | End: 2022-05-25

## 2022-05-13 RX ORDER — SODIUM CHLORIDE 9 MG/ML
1000 INJECTION, SOLUTION INTRAVENOUS ONCE
Status: COMPLETED | OUTPATIENT
Start: 2022-05-13 | End: 2022-05-13

## 2022-05-13 RX ADMIN — DEXAMETHASONE SODIUM PHOSPHATE 4 MG: 4 INJECTION, SOLUTION INTRA-ARTICULAR; INTRALESIONAL; INTRAMUSCULAR; INTRAVENOUS; SOFT TISSUE at 15:26

## 2022-05-13 RX ADMIN — SODIUM CHLORIDE 1000 ML: 9 INJECTION, SOLUTION INTRAVENOUS at 15:22

## 2022-05-13 NOTE — TELEPHONE ENCOUNTER
[address] at [facility]  [phone]        05/13/22 11:40 AM Received incoming call from patient. Patient with complaints of vomiting x 1 and watery diarrhea x 1. Patient inquired what she should take to help alleviate above symptoms. Patient also shared she feels very weak, as if she needs more food/increased nutrition. Per Monica, advised that patient take both Zofran and Lomotil. Also advised that patient take Dexamethasone 4 mg Saturday and 4 mg on Sunday. Explained that this can potentially stimulate patient's appetite. Offered to schedule patient for IV fluids today, with IV steroids. Patient voiced understanding of above and agreeable to [facility] appointment. Patient prefers to come in around 3 PM, if possible. This nurse left message for [facility] charge nurse. Will call patient back with update. She voiced understanding. 12:48 PM Called patient and advised that [facility] charge nurse approved 3 PM appointment. Patient voiced understanding and still agreeable to plan. Message sent to [facility]  requesting assistance in adding patient to schedule. Patient stated she feels a little better at the moment, denies any additional episodes of vomiting or diarrhea at this time.

## 2022-05-13 NOTE — PROGRESS NOTES
[facility] Progress Note    Date: May 13, 2022    Name: Bee Roberts    MRN: [id number]         : 1955    Ms. Tracy Mandujano Arrived ambulatory and in no distress for Hydration. Assessment was completed, patient complains of nausea, vomiting, and diarrhea. Right chest wall port accessed without difficulty, labs drawn & sent for processing. Ms. bJ Webber vitals were reviewed. Visit Vitals  /86   Pulse 71   Temp 97.3 °F (36.3 °C)   Resp 18   Ht 5' 4\" (1.626 m)   Wt 89.8 kg (198 lb)   SpO2 98%   BMI 33.99 kg/m²       Lab results were obtained and reviewed. Lab results pending in [facility]. Medications:  Medications Administered     0.9% sodium chloride infusion 1,000 mL     Admin Date  2022 Action  New Bag Dose  1,000 mL Rate  1,000 mL/hr Route  IntraVENous Administered By  Scott Holsurya          dexamethasone (DECADRON) 4 mg/mL injection 4 mg     Admin Date  2022 Action  Given Dose  4 mg Route  IntraVENous Administered By  Scott Holts                  Ms. Tracy Mandujano tolerated treatment well and was discharged from [place] [address] in stable condition at 1640. Port de-accessed, flushed & heparinized per protocol. She is to return on May 17, 2022 at 1130 for her next appointment.     Sheryl Yu  May 13, 2022

## 2022-05-13 NOTE — TELEPHONE ENCOUNTER
05/13/22 6:02 PM Called patient to review labs that resulted after she left. Left VM to return phone call. Provided my cell phone contact #.     05/13/22 6:19 PM Called pt emergency contact, patrick, was able to get in touch with pt. Advised I would like her to take 40 meq potassium now and 20 meq potassium in 1-2 hours to equal 60 meq due to low potassium level. Advised tomorrow I would like her to start potassium 20 meq BID. She requested refills on zofran/compazine, which I filled. I advised she also carry candies if she starts to get shaky given her BG was mildly low. She reports feeling better after receiving fluids. She repeated back instructions.

## 2022-05-16 NOTE — PROGRESS NOTES
X-ray showed osteoarthritis, which is related to wear and tear. No new injuries or fractures. I recommend combination of rest and activity, heating pad at times, Tylenol as needed.

## 2022-05-17 ENCOUNTER — TELEPHONE (OUTPATIENT)
Dept: ONCOLOGY | Age: 67
End: 2022-05-17

## 2022-05-17 ENCOUNTER — HOSPITAL ENCOUNTER (OUTPATIENT)
Dept: INFUSION THERAPY | Age: 67
Discharge: HOME | End: 2022-05-17
Payer: MEDICARE

## 2022-05-17 VITALS
DIASTOLIC BLOOD PRESSURE: 87 MMHG | HEART RATE: 91 BPM | OXYGEN SATURATION: 96 % | SYSTOLIC BLOOD PRESSURE: 112 MMHG | TEMPERATURE: 98 F | RESPIRATION RATE: 18 BRPM

## 2022-05-17 DIAGNOSIS — Z76.89 PREVENTION OF CHEMOTHERAPY-INDUCED NEUTROPENIA: Primary | ICD-10-CM

## 2022-05-17 DIAGNOSIS — K52.1 DIARRHEA DUE TO DRUG: Primary | ICD-10-CM

## 2022-05-17 DIAGNOSIS — E86.0 DEHYDRATION: ICD-10-CM

## 2022-05-17 DIAGNOSIS — C25.9 PANCREATIC ADENOCARCINOMA (HCC): ICD-10-CM

## 2022-05-17 DIAGNOSIS — C25.0 MALIGNANT NEOPLASM OF HEAD OF PANCREAS (HCC): ICD-10-CM

## 2022-05-17 LAB
ANION GAP SERPL CALC-SCNC: 9 MMOL/L (ref 5–15)
BUN SERPL-MCNC: 8 MG/DL (ref 6–20)
BUN/CREAT SERPL: 13 (ref 12–20)
CALCIUM SERPL-MCNC: 8.7 MG/DL (ref 8.5–10.1)
CHLORIDE SERPL-SCNC: 103 MMOL/L (ref 97–108)
CO2 SERPL-SCNC: 25 MMOL/L (ref 21–32)
CREAT SERPL-MCNC: 0.63 MG/DL (ref 0.55–1.02)
GLUCOSE SERPL-MCNC: 93 MG/DL (ref 65–100)
POTASSIUM SERPL-SCNC: 2.8 MMOL/L (ref 3.5–5.1)
SODIUM SERPL-SCNC: 137 MMOL/L (ref 136–145)

## 2022-05-17 PROCEDURE — 36415 COLL VENOUS BLD VENIPUNCTURE: CPT

## 2022-05-17 PROCEDURE — 74011000250 HC RX REV CODE- 250: Performed by: NURSE PRACTITIONER

## 2022-05-17 PROCEDURE — 96360 HYDRATION IV INFUSION INIT: CPT

## 2022-05-17 PROCEDURE — 77030016057 HC NDL HUBR APOL -B

## 2022-05-17 PROCEDURE — 80048 BASIC METABOLIC PNL TOTAL CA: CPT

## 2022-05-17 PROCEDURE — 74011250636 HC RX REV CODE- 250/636: Performed by: NURSE PRACTITIONER

## 2022-05-17 RX ORDER — SODIUM CHLORIDE 0.9 % (FLUSH) 0.9 %
10 SYRINGE (ML) INJECTION AS NEEDED
Status: DISCONTINUED | OUTPATIENT
Start: 2022-05-17 | End: 2022-05-18 | Stop reason: HOSPADM

## 2022-05-17 RX ORDER — ONDANSETRON 2 MG/ML
8 INJECTION INTRAMUSCULAR; INTRAVENOUS ONCE
Status: CANCELLED
Start: 2022-05-20 | End: 2022-05-20

## 2022-05-17 RX ORDER — HEPARIN 100 UNIT/ML
300-500 SYRINGE INTRAVENOUS AS NEEDED
Status: DISCONTINUED | OUTPATIENT
Start: 2022-05-17 | End: 2022-05-18 | Stop reason: HOSPADM

## 2022-05-17 RX ORDER — SODIUM CHLORIDE 9 MG/ML
1000 INJECTION, SOLUTION INTRAVENOUS ONCE
Status: COMPLETED | OUTPATIENT
Start: 2022-05-17 | End: 2022-05-17

## 2022-05-17 RX ORDER — SODIUM CHLORIDE 9 MG/ML
10 INJECTION INTRAMUSCULAR; INTRAVENOUS; SUBCUTANEOUS AS NEEDED
Status: DISCONTINUED | OUTPATIENT
Start: 2022-05-17 | End: 2022-05-18 | Stop reason: HOSPADM

## 2022-05-17 RX ADMIN — HEPARIN 500 UNITS: 100 SYRINGE at 13:40

## 2022-05-17 RX ADMIN — SODIUM CHLORIDE 1000 ML: 900 INJECTION, SOLUTION INTRAVENOUS at 12:30

## 2022-05-17 RX ADMIN — Medication 10 ML: at 13:40

## 2022-05-17 NOTE — PROGRESS NOTES
Spoke with Jessica Fraga NP, who stated she already discussed x-ray results with patient. See telephone encounter. No further questions or  concerns at this time.

## 2022-05-17 NOTE — TELEPHONE ENCOUNTER
05/17/22 2:53 PM   Called pt to follow up on low potassium. Reports she is averaging 2-4 loose stools per day. Reports with everything she eats, she has diarrhea. She is alternating lomotil and imodium. Reports she is taking potassium 20 meq BID. I advised she take 60 meq (30 meq now, then 30 meq in 3-4 hours) for potassium 2.8. Advised I would like her to increase potassium to 20 meq TID and will check BMP on Friday. Will also schedule her for fluids on Friday. Advised I would like her to collect a stool sample on Friday to r/o cdiff. Discussed making follow up with GI, but she is hesitant given she has an outstanding bill.     05/17/22 3:26 PM Returned call to pt and advised Dr. Felipe Jerez would like ct abd/pelv to r/o active colitis infection. Pt is agreeable. Also, advised I am going to refer her to [facility] provider. She is agreeable to this as well.

## 2022-05-17 NOTE — PROGRESS NOTES
[facility] Progress Note    Date: May 17, 2022    Name: Eleni Hassan    MRN: [id number]         : 1955    Ms. Madalyn Corona Arrived ambulatory and in no distress for Hydration. Assessment was completed, no acute issues at this time, patient reports continued nausea and diarrhea. Right chest wall port accessed without difficulty, labs drawn & sent for processing. Ms. Marek Quiñones vitals were reviewed. Visit Vitals  /87   Pulse 91   Temp 98 °F (36.7 °C)   Resp 18   SpO2 96%       Lab results were obtained and reviewed. Recent Results (from the past 12 hour(s))   METABOLIC PANEL, BASIC    Collection Time: 22 12:43 PM   Result Value Ref Range    Sodium 137 136 - 145 mmol/L    Potassium 2.8 (L) 3.5 - 5.1 mmol/L    Chloride 103 97 - 108 mmol/L    CO2 25 21 - 32 mmol/L    Anion gap 9 5 - 15 mmol/L    Glucose 93 65 - 100 mg/dL    BUN 8 6 - 20 MG/DL    Creatinine 0.63 0.55 - 1.02 MG/DL    BUN/Creatinine ratio 13 12 - 20      GFR est AA >60 >60 ml/min/1.73m2    GFR est non-AA >60 >60 ml/min/1.73m2    Calcium 8.7 8.5 - 10.1 MG/DL       Medications:  Medications Administered     0.9% sodium chloride infusion 1,000 mL     Admin Date  2022 Action  New Bag Dose  1,000 mL Rate  1,000 mL/hr Route  IntraVENous Administered By  [email or web address], [place]          heparin (porcine) pf 300-500 Units     Admin Date  2022 Action  Given Dose  500 Units Route  InterCATHeter Administered By  [facility], [place]          sodium chloride (NS) flush 10 mL     Admin Date  2022 Action  Given Dose  10 mL Route  IntraVENous Administered By  [email or web address], [place]                Ms. Madalyn Corona tolerated treatment well and was discharged from [place] [address] in stable condition at 1350. Port de-accessed, flushed & heparinized per protocol. She is to return on May 23 at 0730 for her next appointment.     EVAN Barcenas  May 17, 2022

## 2022-05-17 NOTE — PROGRESS NOTES
Attempted to call patient via home/cell number listed. No answer, left message requesting a return call. Provided office phone number as well.

## 2022-05-18 NOTE — PROGRESS NOTES
[facility] at [place] [address] [address] [facility]  Rian Rashid: [phone]  F: [phone]      Reason for Visit:   Lonnie Howard is a 77 y.o. female who is seen for follow up of pancreatic adenocarcinoma. Hematology/Oncology Treatment History:     Diagnosis: Pancreatic adenocarcinoma    Stage: clinical Stage Ib [T2N0] at diagnosis; Stage III [[id number]] at surgery    Pathology:   -9/26/21 Cytology - brushings from common bile duct: Reactive glandular epithelial cells and bile   -9/28/21 pancreatic head mass biopsy: Adenocarcinoma.   -10/22/21 [facility] mult-cancer panel -negative   -2/23/22 Pancreaticoduodenectomy (Whipple resection): Ductal adenocarcinoma, G2, 1.2cm. Tumor invades peripancreatic soft tissues. LVI negative. PNI present. Margins uninvolved. 5/29 LNs involved with cancer.   y pT1cN2     Prior Treatment:   1. Neoadjuvant FOLFIRNOX x 6 cycles, 10/18/21-1/10/22  2. PYLORUS PRESERVING WHIPPLE PROCEDURE AND PORTAL LYPHMADENECTOMY, 2/23/22    Current Treatment: adjuvant FOLFIRNOX x 6 cycles, started 4/12/22 - current. Added neulasta with C2. History of Present Illness:   Lonnie Howard is a pleasant 77 y.o. female who comes in for evaluation of pancreatic cancer. She presented in 9/2021 with obstructive jaundice, transaminitis. ERCP on 9/26/21 notable for distal CBD stricture; possible tumor/mass located in the CBD; underwent biliary stent placement to relieve biliary obstruction. CT A/P revealed mild/mod intrahepatic biliary ductal dilatation; prominence of CBD and hydropic gallbladder, suggestive of stricture/stenosis/mass of distal CBD. MRI of abdomen showed narrowing of CBD pancreatic head suggestive of extrinisic compression concerning for periampullary mass vs eccentric intraluminal lesion. EUS showed 3.2cm hypoechoic mass in pancreas head. Biopsy revealed adenocarcinoma.    Pt met with Dr. Dora Anaya in [place] and plans for neoadjuvant chemotherapy prior to surgery. Interval history:  Patient here for follow up of pancreas cancer and C10 adjuvant FOLFIRNOX. Reports diarrhea despite taking lomotil 3 times daily. Had 3-4 water stools yesterday. Imodium does not help. Having watery stools with everything she eats. She is even having several watery stools during the night. This weekend ate cabbage, lentil soup, 3-4 teaspoons of mac/cheese - lost 15 lbs in 2 weeks (29 lbs in 4 weeks). Hydrating well with at least 60 oz water daily, drinking Gatorade. Reports nausea managed with zofran/compazine. Has neuropathy in feet and legs. Denies dysuria, hematuria. Denies constipation, fevers, chills or rashes. Dropped off stool sample this morning. PMHx: OA and Rheumatoid arthritis in lower back, Ulcerative colitis, HTN, Hypothyroidism  PSurgHx: , Cyst removed from left breast  SHx: , has 3 children (1 daughter and 2 sons). Works as  in [employer]. Nonsmoker, no EtOH.  in rehab recovering from [employer]. FHx: Mother  of pancreatic cancer age 80. Brother and son had colon cancer. Review of Systems: A complete review of systems was obtained, reviewed. Pertinent findings reviewed above. Physical Exam:     Visit Vitals  /75   Pulse 88   Temp 98 °F (36.7 °C)   Resp 18   Ht 5' 4\" (1.626 m)   Wt 183 lb (83 kg)   SpO2 100%   BMI 31.41 kg/m²     ECOG PS: 1  General: no distress, ambulating with cane  Eyes: anicteric sclerae  HENT: oropharynx clear  Neck: supple  Lymphatic: no cervical, supraclavicular adenopathy  Respiratory: CTAB, normal respiratory effort  CV: no peripheral edema, port upper chest.   GI: soft, nontender, nondistended, no masses; Midline surgical scar appears to be healing well with dry skin and scar/scabbing.   Skin: no rashes; no ecchymoses; no petechiae  Neuro: alert and oriented      Results:     Lab Results   Component Value Date/Time    WBC 10.2 2022 08:02 AM    HGB 9.8 (L) 05/23/2022 08:02 AM    HCT 29.7 (L) 05/23/2022 08:02 AM    PLATELET 464 60/12/0335 08:02 AM    MCV 94.3 05/23/2022 08:02 AM    ABS. NEUTROPHILS 5.1 05/23/2022 08:02 AM    Hemoglobin (POC) 12.6 10/06/2013 08:45 AM    Hematocrit (POC) 37 10/06/2013 08:45 AM     Lab Results   Component Value Date/Time    Sodium 139 05/23/2022 08:02 AM    Potassium 3.0 (L) 05/23/2022 08:02 AM    Chloride 107 05/23/2022 08:02 AM    CO2 19 (L) 05/23/2022 08:02 AM    Glucose 75 05/23/2022 08:02 AM    BUN 2 (L) 05/23/2022 08:02 AM    Creatinine 0.62 05/23/2022 08:02 AM    GFR est AA >60 05/23/2022 08:02 AM    GFR est non-AA >60 05/23/2022 08:02 AM    Calcium 8.9 05/23/2022 08:02 AM    Sodium (POC) 143 10/06/2013 08:45 AM    Potassium (POC) 3.4 (L) 10/06/2013 08:45 AM    Chloride (POC) 104 10/06/2013 08:45 AM    Glucose (POC) 84 03/02/2022 03:51 PM    BUN (POC) 11 10/06/2013 08:45 AM    Creatinine (POC) 1.0 10/06/2013 08:45 AM    Calcium, ionized (POC) 1.27 10/06/2013 08:45 AM     Lab Results   Component Value Date/Time    Bilirubin, total 0.7 05/23/2022 08:02 AM    ALT (SGPT) 20 05/23/2022 08:02 AM    Alk. phosphatase 86 05/23/2022 08:02 AM    Protein, total 5.9 (L) 05/23/2022 08:02 AM    Albumin 2.9 (L) 05/23/2022 08:02 AM    Globulin 3.0 05/23/2022 08:02 AM     Lab Results   Component Value Date/Time    Reticulocyte count 2.0 03/15/2022 09:38 AM    Iron % saturation 13 (L) 03/15/2022 09:38 AM    TIBC 351 03/15/2022 09:38 AM    Ferritin 177 03/15/2022 09:38 AM    Vitamin B12 496 03/15/2022 09:38 AM    Folate 11.8 03/15/2022 09:38 AM    Sed rate (ESR) 17 09/16/2010 12:04 PM    C-Reactive protein <0.29 01/18/2017 08:45 AM    TSH 2.84 04/12/2022 07:50 AM    ALBA, Direct Detected (A) 09/16/2010 12:04 PM    Lipase 1,214 (H) 09/29/2021 04:10 AM    Hep C virus Ab Interp.  NONREACTIVE 09/24/2021 06:55 PM     Lab Results   Component Value Date/Time    CEA 3.5 09/25/2021 11:46 AM    Carbohydrate Antigen 19-9, (CA 19-9) 16 04/12/2022 07:50 AM 10/18/21 CA 19-9:  138  1/10/22 CA 19-9: 130   4/12/22 CA 19-9: 16       Imaging / Procedures:     9/24/21 US ABD   IMPRESSION  Cholelithiasis. Gallbladder sludge. Prominent CBD with mild intrahepatic ductal dilatation as well. Nonemergent MRI with MRCP to delineate etiology for CBD distention. 9/24/21 CT ABD PELV W CONT  There is mild/moderate intrahepatic biliary ductal dilatation, prominence of the CBD and a hydropic gallbladder. No pancreatic duct dilatation. No clearly  delineated pancreatic head mass. Imaging findings suggestive of stricture/stenosis/mass of the distal CBD, no extrinsic pancreatic head mass is  identified. Gastroenterology consult  Correlation with MRI/MRCP on a nonemergent basis. There is severe degenerative change of the lumbar spine. 9/25/21 MRI ABD WO CONT  IMPRESSION  1. Intrahepatic and extra hepatic biliary dilatation with abrupt narrowing of  the common bile duct pancreatic head just proximal to the ampulla. Suggestion of  extrinsic compression on the duct. This raises the possibility of a  periampullary mass. Alternatively, this may be an eccentric intraluminal lesion. Evaluation is limited. Recommend GI consultation for possible ERCP and EUS. 2.  Questionable small lesion in the left hepatic lobe with mild increased T2  signal and T1 hypointensity. Recommend follow-up imaging a contrast-enhanced  study preferably MRI. 3.  Cholelithiasis. Distended gallbladder with mild gallbladder wall thickening. Correlate for acute cholecystitis    9/26/21 XR ERCP/ERCB / Dr. Jenaro Young  Impression: Biliary ductal dilation, with a maximum common duct diameter of 15 mm; Severe stenosis, involving distal CBD /pancreas head area concerning for neoplasia  Interventions: Endoscopic ampullary sphincterotomy and biliary stent placement; brushings from the CBD    9/27/21 CT CHEST W CONT:   IMPRESSION  1. There is a minimal right pleural effusion.   2. There are small pulmonary nodules present. There is a nodule in the plane of  the right major fissure and posteriorly in left lower lobe. These were not  present on examination of 1/18/2017. These could be on the basis of metastatic  disease. Close follow-up is suggested. There are also 2 small subpleural nodules  anteriorly in the right upper lobe as described above which can also be  evaluated on follow-up. 9/28/21 EUS:  Endoscopic: Esophagus:normal; Stomach: normal; Duodenum/jejunum: normal and protruding biliary stent  Ultrasound: Esophagus: normal findings;     Stomach: normal findings:     Pancreas: Areas examined: the entire gland                          Parenchyma: -Evidence of a hypoechoic mass with poorly defined borders seen in the head of the pancreas. It appeared involving the CBD where a stent is seen, however it did not involve the major vessels. It measured about 3.2 cm in widest diameter on one view. Pancreatic Duct: normal findings, not dilated               Liver: Parenchyma: normal                          Gallbladder: normal                          Bile Duct: the common bile duct is dilated in the proximal and mid portion and a plastic stent could be seen                          Lymph Node: no adenopathy  Impression:   Pancreas head mass with fine needle biopsy showing adenocarcinoma on preliminary cytology  Recommendations: Follow-up on pathology  Follow-up with oncology, will need further evaluation of pulmonary nodule  Surgical oncology consultation  Resume GI lite diet today and can discharge in am from GI standpoint    12/20/2021:  MRI abd w wo cont:  IMPRESSION  1. Positive response to therapy. Decreased size of the pancreatic head mass, which now has a maximum AP diameter of 2.3 cm. No obstruction of the biliary tree or pancreatic duct. No involvement of the SMA. Based on imaging, patient is a candidate for resection. 2. Cholelithiasis. No acute cholecystitis or biliary obstruction.   MRI Pelv w wo cont:  IMPRESSION  No acute process or evidence of malignant neoplasm/ metastatic disease. CT chest w cont:  IMPRESSION  1. Interval apparent resolution of previously seen scattered subcentimeter bilateral pulmonary nodules and right pleural effusion. 2.  Please see separately dictated reports for the MRI abdomen and pelvis obtained the same day for subdiaphragmatic findings. Assessment/Recommendations:   77 y.o. female with Ulcerative colitis, Hyopthyroidism, admitted with obstructive jaundice concerning for underlying malignant obstructing mass. 1. Pancreatic adenocarcinoma:  Clinically stage Ib [T2N0], but need further evaluation in future regarding pulmonary nodules and liver lesion. Presented with obstructive jaundice and transaminitis, CA 19-9 was 198 in 9/2021 at diagnosis. Underwent biliary stent placement with improvement in biliary obstruction. 3.2cm mass seen in pancreatic head on EUS, not involving vasculature, possibly resectable. Dr. Celeste Vasquez in [place] has evaluated patient and recommends consideration of neoadjuvant chemotherapy with close attention to lung nodules and liver lesion. I agree with this recommendation for earlier treatment of micrometastatic disease, ability to determine whether pt has chemosensitive disease. We discussed the risks and benefits of FOLFIRINOX chemotherapy, including potential side effects. These include but are not limited to fatigue, nausea vomiting, diarrhea, neuropathy, taste changes, cold intolerance, esophageal spasm, allergic reactions, alopecia, mucositis, myelosuppression, risk for infection, infertility, and rarely, death. Rarely, a patient may have a condition where they do not metabolize fluorouracil appropriately (called DPD deficiency), and they may have excessive toxicity. A Port-A-Cath will be required in order to deliver the continuous infusion. BRCA 1/2 negative. The patient has consented to beginning therapy.  Pt completed 6 cycles of mFOLFIRINOX in neoadjuvant setting, followed by pancreaticoduodenectomy (Whipple resection) on 2/23/22. Now planning for 6 cycles of adjuvant chemotherapy. Supportive care meds include: Emla cream, Zofran, Compazine, Dexamethasone  -- HOLD C10 of neoadjuvant mFOLFIRINOX with neulasta support due to diarrhea, weight-loss. Fluids with pump removal.   -- Checking CA 19-9 on date of C1, C6, C7, C12.   -- Return in 2 weeks for retry C10 mFOLFIRINOX (10% DR blair), Neulasta support, MD/NP visit. Patient prefers Monday appointments. 2. Diarrhea:  Now with worsening diarrhea. Has history of UC. Not taking Cholestyramine (Questran) due to intolerance. Taking lomotil TID. Imodium prn - does not work well. Hydrating with Gatorade/water. -- Atropine before irinotecan. -- CT abd/pelv to r/o active colitis infection  -- Cdiff stool sample pending. -- Referral to Dr. Pam Navarro placed. 3. Pulmonary nodules:   Repeat imaging after C4 of chemo showed resolution of pulmonary nodules. 4. HTN:   Managed on HCTZ. Advised checking BP at home and if systolic <643, then hold HCTZ. 5. H/o Ulcerative procto-sigmoiditis:  Past due for colonoscopy with one adenoma polyp removed at last colonoscopy in April 2017. Was due for repeat in 2019 but she has not followed up with the GI practice since her last colonoscopy. -- Referral to Dr. Pam Navarro placed. 6. Morbid obesity:  Discussed healthy food options and ways to incorporate more protein into diet. 7. Normocytic anemia:  Due to chemotherapy, decreased PO intake and recent Whipple surgery. Recent B12/folate/ferritin normal. Mildly low iron sat. Monitor. 9. Dysguesia / Poor appetite / Hypokalemia / tachycardia:   Intake, appetite and diarrhea worse with 30 lb weight loss in 4 weeks. Avoiding supplement drinks with dairy given they cause diarrhea. -- Advised following BRAT diet. Discussed examples. -- On KCL 60 meq daily with diarrhea.  Advised decrease to 40meq if diarrhea improves. 10. Back pain:  Improved today. Urinalysis negative. Xray showed degenerative changes. Continue supportive care with ice, heat, tylenol prn      I personally saw and evaluated the patient and performed the key components of medical decision making. The history, physical exam, and documentation were performed by Nan Reynolds NP. I reviewed and verified the above documentation and modified it as needed. Hold chemotherapy today given significant weight loss related to diarrhea. Investigating with CT of abd/pelvis and GI evaluation.      Signed By: Viola Vick MD

## 2022-05-19 DIAGNOSIS — C25.9 PANCREATIC ADENOCARCINOMA (HCC): ICD-10-CM

## 2022-05-19 RX ORDER — DEXAMETHASONE 4 MG/1
TABLET ORAL
Qty: 24 TABLET | Refills: 0 | Status: SHIPPED | OUTPATIENT
Start: 2022-05-19 | End: 2022-06-20 | Stop reason: SDUPTHER

## 2022-05-20 ENCOUNTER — TELEPHONE (OUTPATIENT)
Dept: ONCOLOGY | Age: 67
End: 2022-05-20

## 2022-05-20 ENCOUNTER — HOSPITAL ENCOUNTER (OUTPATIENT)
Dept: INFUSION THERAPY | Age: 67
Discharge: HOME | End: 2022-05-20
Payer: MEDICARE

## 2022-05-20 VITALS
SYSTOLIC BLOOD PRESSURE: 127 MMHG | OXYGEN SATURATION: 97 % | DIASTOLIC BLOOD PRESSURE: 79 MMHG | TEMPERATURE: 97.8 F | HEART RATE: 78 BPM | RESPIRATION RATE: 16 BRPM

## 2022-05-20 DIAGNOSIS — C25.0 MALIGNANT NEOPLASM OF HEAD OF PANCREAS (HCC): ICD-10-CM

## 2022-05-20 DIAGNOSIS — E86.0 DEHYDRATION: Primary | ICD-10-CM

## 2022-05-20 LAB
ANION GAP SERPL CALC-SCNC: 9 MMOL/L (ref 5–15)
BUN SERPL-MCNC: 6 MG/DL (ref 6–20)
BUN/CREAT SERPL: 10 (ref 12–20)
CALCIUM SERPL-MCNC: 8.6 MG/DL (ref 8.5–10.1)
CHLORIDE SERPL-SCNC: 105 MMOL/L (ref 97–108)
CO2 SERPL-SCNC: 23 MMOL/L (ref 21–32)
CREAT SERPL-MCNC: 0.62 MG/DL (ref 0.55–1.02)
GLUCOSE SERPL-MCNC: 78 MG/DL (ref 65–100)
POTASSIUM SERPL-SCNC: 3.1 MMOL/L (ref 3.5–5.1)
SODIUM SERPL-SCNC: 137 MMOL/L (ref 136–145)

## 2022-05-20 PROCEDURE — 96375 TX/PRO/DX INJ NEW DRUG ADDON: CPT

## 2022-05-20 PROCEDURE — 96374 THER/PROPH/DIAG INJ IV PUSH: CPT

## 2022-05-20 PROCEDURE — 74011000250 HC RX REV CODE- 250: Performed by: NURSE PRACTITIONER

## 2022-05-20 PROCEDURE — 96361 HYDRATE IV INFUSION ADD-ON: CPT

## 2022-05-20 PROCEDURE — 96360 HYDRATION IV INFUSION INIT: CPT

## 2022-05-20 PROCEDURE — 36415 COLL VENOUS BLD VENIPUNCTURE: CPT

## 2022-05-20 PROCEDURE — 80048 BASIC METABOLIC PNL TOTAL CA: CPT

## 2022-05-20 PROCEDURE — 77030016057 HC NDL HUBR APOL -B

## 2022-05-20 PROCEDURE — 74011250636 HC RX REV CODE- 250/636: Performed by: NURSE PRACTITIONER

## 2022-05-20 RX ORDER — HEPARIN 100 UNIT/ML
300-500 SYRINGE INTRAVENOUS AS NEEDED
Status: DISCONTINUED | OUTPATIENT
Start: 2022-05-20 | End: 2022-05-21 | Stop reason: HOSPADM

## 2022-05-20 RX ORDER — ONDANSETRON 2 MG/ML
8 INJECTION INTRAMUSCULAR; INTRAVENOUS ONCE
Status: COMPLETED | OUTPATIENT
Start: 2022-05-20 | End: 2022-05-20

## 2022-05-20 RX ORDER — SODIUM CHLORIDE 9 MG/ML
1000 INJECTION, SOLUTION INTRAVENOUS ONCE
Status: COMPLETED | OUTPATIENT
Start: 2022-05-20 | End: 2022-05-20

## 2022-05-20 RX ORDER — SODIUM CHLORIDE 0.9 % (FLUSH) 0.9 %
10 SYRINGE (ML) INJECTION AS NEEDED
Status: DISCONTINUED | OUTPATIENT
Start: 2022-05-20 | End: 2022-05-21 | Stop reason: HOSPADM

## 2022-05-20 RX ORDER — SODIUM CHLORIDE 9 MG/ML
10 INJECTION INTRAMUSCULAR; INTRAVENOUS; SUBCUTANEOUS AS NEEDED
Status: DISCONTINUED | OUTPATIENT
Start: 2022-05-20 | End: 2022-05-21 | Stop reason: HOSPADM

## 2022-05-20 RX ADMIN — SODIUM CHLORIDE 1000 ML: 9 INJECTION, SOLUTION INTRAVENOUS at 12:25

## 2022-05-20 RX ADMIN — Medication 10 ML: at 13:25

## 2022-05-20 RX ADMIN — ONDANSETRON 8 MG: 2 INJECTION INTRAMUSCULAR; INTRAVENOUS at 12:23

## 2022-05-20 RX ADMIN — HEPARIN 500 UNITS: 100 SYRINGE at 13:25

## 2022-05-20 NOTE — TELEPHONE ENCOUNTER
05/20/22 3:50 PM Called pt to check on her. Advised potassium improved from prior. She reports persistent diarrhea. Each time she eats, she has diarrhea. Yesterday she averaged 4 watery stools. Taking lomotil TID. Imodium inb/t does not seem to help. She is taking KCL 30 meq BID (60 meq daily). She will take 30 meq this evening. I discussed she can trial increasing lomotil to QID on days with > 5 watery stools. She verbalized understanding. She will drop off cdiff sample on Monday.

## 2022-05-20 NOTE — PROGRESS NOTES
\[facility] [place] [facility]\"" Progress Note    Date: May 20, 2022    Name: Lorelei Conner    MRN: [id number]         : 1955    Ms. Ruiz crespo Arrived ambulatory and in no distress for Hydration. Assessment was completed, no acute issues at this time, patient c/o diarrhea and nausea. R chest wall port accessed without difficulty, labs drawn & sent for processing. Ms. Armen Alvarez vitals were reviewed. Patient Vitals for the past 12 hrs:   Temp Pulse Resp BP SpO2   22 1328 97.8 °F (36.6 °C)  16 127/79    22 1216 97.9 °F (36.6 °C) 78 16 110/80 97 %     BMP sent for processing. Medications:  Medications Administered     0.9% sodium chloride infusion 1,000 mL     Admin Date  2022 Action  New Bag Dose  1,000 mL Rate  1,000 mL/hr Route  IntraVENous Administered By  Pavel Carr RN          heparin (porcine) pf 300-500 Units     Admin Date  2022 Action  Given Dose  500 Units Route  InterCATHeter Administered By  Pavel Carr RN          ondansetron [facility]) injection 8 mg     Admin Date  2022 Action  Given Dose  8 mg Route  IntraVENous Administered By  Pavel Carr RN          sodium chloride (NS) flush 10 mL     Admin Date  2022 Action  Given Dose  10 mL Route  IntraVENous Administered By  Pavel Carr RN              Ms. Ruiz crespo tolerated treatment well and was discharged from [place] [address] in stable condition at 1330. Port de-accessed, flushed & heparinized per protocol. She is to return on May 23 for her next appointment.     Roma Waite RN  May 20, 2022

## 2022-05-23 ENCOUNTER — TELEPHONE (OUTPATIENT)
Dept: ONCOLOGY | Age: 67
End: 2022-05-23

## 2022-05-23 ENCOUNTER — HOSPITAL ENCOUNTER (OUTPATIENT)
Dept: INFUSION THERAPY | Age: 67
Discharge: HOME | End: 2022-05-23
Payer: MEDICARE

## 2022-05-23 ENCOUNTER — OFFICE VISIT (OUTPATIENT)
Dept: ONCOLOGY | Age: 67
End: 2022-05-23
Payer: MEDICARE

## 2022-05-23 VITALS
DIASTOLIC BLOOD PRESSURE: 69 MMHG | HEART RATE: 88 BPM | HEIGHT: 64 IN | OXYGEN SATURATION: 100 % | BODY MASS INDEX: 31.26 KG/M2 | WEIGHT: 183.1 LBS | RESPIRATION RATE: 18 BRPM | TEMPERATURE: 98 F | SYSTOLIC BLOOD PRESSURE: 115 MMHG

## 2022-05-23 VITALS
OXYGEN SATURATION: 100 % | BODY MASS INDEX: 31.24 KG/M2 | WEIGHT: 183 LBS | HEART RATE: 88 BPM | DIASTOLIC BLOOD PRESSURE: 75 MMHG | HEIGHT: 64 IN | TEMPERATURE: 98 F | SYSTOLIC BLOOD PRESSURE: 107 MMHG | RESPIRATION RATE: 18 BRPM

## 2022-05-23 DIAGNOSIS — C25.9 PANCREATIC ADENOCARCINOMA (HCC): Primary | ICD-10-CM

## 2022-05-23 DIAGNOSIS — Z51.11 CHEMOTHERAPY MANAGEMENT, ENCOUNTER FOR: ICD-10-CM

## 2022-05-23 DIAGNOSIS — D64.9 NORMOCYTIC ANEMIA: ICD-10-CM

## 2022-05-23 DIAGNOSIS — Z76.89 PREVENTION OF CHEMOTHERAPY-INDUCED NEUTROPENIA: Primary | ICD-10-CM

## 2022-05-23 DIAGNOSIS — K52.1 DIARRHEA DUE TO DRUG: ICD-10-CM

## 2022-05-23 DIAGNOSIS — C25.0 MALIGNANT NEOPLASM OF HEAD OF PANCREAS (HCC): ICD-10-CM

## 2022-05-23 DIAGNOSIS — E87.6 HYPOKALEMIA: ICD-10-CM

## 2022-05-23 DIAGNOSIS — R63.4 EXCESSIVE WEIGHT LOSS: ICD-10-CM

## 2022-05-23 LAB
ALBUMIN SERPL-MCNC: 2.9 G/DL (ref 3.5–5)
ALBUMIN/GLOB SERPL: 1 {RATIO} (ref 1.1–2.2)
ALP SERPL-CCNC: 86 U/L (ref 45–117)
ALT SERPL-CCNC: 20 U/L (ref 12–78)
ANION GAP SERPL CALC-SCNC: 13 MMOL/L (ref 5–15)
AST SERPL-CCNC: 18 U/L (ref 15–37)
BASOPHILS # BLD: 0 K/UL (ref 0–0.1)
BASOPHILS NFR BLD: 0 % (ref 0–1)
BILIRUB SERPL-MCNC: 0.7 MG/DL (ref 0.2–1)
BUN SERPL-MCNC: 2 MG/DL (ref 6–20)
BUN/CREAT SERPL: 3 (ref 12–20)
CALCIUM SERPL-MCNC: 8.9 MG/DL (ref 8.5–10.1)
CHLORIDE SERPL-SCNC: 107 MMOL/L (ref 97–108)
CO2 SERPL-SCNC: 19 MMOL/L (ref 21–32)
CREAT SERPL-MCNC: 0.62 MG/DL (ref 0.55–1.02)
DIFFERENTIAL METHOD BLD: ABNORMAL
EOSINOPHIL # BLD: 0 K/UL (ref 0–0.4)
EOSINOPHIL NFR BLD: 0 % (ref 0–7)
ERYTHROCYTE [DISTWIDTH] IN BLOOD BY AUTOMATED COUNT: 17.3 % (ref 11.5–14.5)
GLOBULIN SER CALC-MCNC: 3 G/DL (ref 2–4)
GLUCOSE SERPL-MCNC: 75 MG/DL (ref 65–100)
HCT VFR BLD AUTO: 29.7 % (ref 35–47)
HGB BLD-MCNC: 9.8 G/DL (ref 11.5–16)
IMM GRANULOCYTES # BLD AUTO: 0 K/UL
IMM GRANULOCYTES NFR BLD AUTO: 0 %
LYMPHOCYTES # BLD: 3.8 K/UL (ref 0.8–3.5)
LYMPHOCYTES NFR BLD: 37 % (ref 12–49)
MCH RBC QN AUTO: 31.1 PG (ref 26–34)
MCHC RBC AUTO-ENTMCNC: 33 G/DL (ref 30–36.5)
MCV RBC AUTO: 94.3 FL (ref 80–99)
METAMYELOCYTES NFR BLD MANUAL: 1 %
MONOCYTES # BLD: 1.1 K/UL (ref 0–1)
MONOCYTES NFR BLD: 11 % (ref 5–13)
MYELOCYTES NFR BLD MANUAL: 1 %
NEUTS BAND NFR BLD MANUAL: 3 % (ref 0–6)
NEUTS SEG # BLD: 5.1 K/UL (ref 1.8–8)
NEUTS SEG NFR BLD: 47 % (ref 32–75)
NRBC # BLD: 0.02 K/UL (ref 0–0.01)
NRBC BLD-RTO: 0.2 PER 100 WBC
PLATELET # BLD AUTO: 166 K/UL (ref 150–400)
PMV BLD AUTO: 10.2 FL (ref 8.9–12.9)
POTASSIUM SERPL-SCNC: 3 MMOL/L (ref 3.5–5.1)
PROT SERPL-MCNC: 5.9 G/DL (ref 6.4–8.2)
RBC # BLD AUTO: 3.15 M/UL (ref 3.8–5.2)
RBC MORPH BLD: ABNORMAL
SODIUM SERPL-SCNC: 139 MMOL/L (ref 136–145)
WBC # BLD AUTO: 10.2 K/UL (ref 3.6–11)
WBC MORPH BLD: ABNORMAL

## 2022-05-23 PROCEDURE — 85025 COMPLETE CBC W/AUTO DIFF WBC: CPT

## 2022-05-23 PROCEDURE — 80053 COMPREHEN METABOLIC PANEL: CPT

## 2022-05-23 PROCEDURE — G8536 NO DOC ELDER MAL SCRN: HCPCS | Performed by: INTERNAL MEDICINE

## 2022-05-23 PROCEDURE — 96360 HYDRATION IV INFUSION INIT: CPT

## 2022-05-23 PROCEDURE — 77030016057 HC NDL HUBR APOL -B

## 2022-05-23 PROCEDURE — G8754 DIAS BP LESS 90: HCPCS | Performed by: INTERNAL MEDICINE

## 2022-05-23 PROCEDURE — G8400 PT W/DXA NO RESULTS DOC: HCPCS | Performed by: INTERNAL MEDICINE

## 2022-05-23 PROCEDURE — G8432 DEP SCR NOT DOC, RNG: HCPCS | Performed by: INTERNAL MEDICINE

## 2022-05-23 PROCEDURE — 1101F PT FALLS ASSESS-DOCD LE1/YR: CPT | Performed by: INTERNAL MEDICINE

## 2022-05-23 PROCEDURE — 99215 OFFICE O/P EST HI 40 MIN: CPT | Performed by: INTERNAL MEDICINE

## 2022-05-23 PROCEDURE — G8752 SYS BP LESS 140: HCPCS | Performed by: INTERNAL MEDICINE

## 2022-05-23 PROCEDURE — G8427 DOCREV CUR MEDS BY ELIG CLIN: HCPCS | Performed by: INTERNAL MEDICINE

## 2022-05-23 PROCEDURE — 3017F COLORECTAL CA SCREEN DOC REV: CPT | Performed by: INTERNAL MEDICINE

## 2022-05-23 PROCEDURE — 74011250636 HC RX REV CODE- 250/636: Performed by: NURSE PRACTITIONER

## 2022-05-23 PROCEDURE — G8417 CALC BMI ABV UP PARAM F/U: HCPCS | Performed by: INTERNAL MEDICINE

## 2022-05-23 PROCEDURE — 36415 COLL VENOUS BLD VENIPUNCTURE: CPT

## 2022-05-23 PROCEDURE — 1090F PRES/ABSN URINE INCON ASSESS: CPT | Performed by: INTERNAL MEDICINE

## 2022-05-23 RX ORDER — SODIUM CHLORIDE 9 MG/ML
1000 INJECTION, SOLUTION INTRAVENOUS ONCE
Status: CANCELLED
Start: 2022-06-14 | End: 2022-06-01

## 2022-05-23 RX ORDER — SODIUM CHLORIDE 0.9 % (FLUSH) 0.9 %
10 SYRINGE (ML) INJECTION AS NEEDED
Status: CANCELLED | OUTPATIENT
Start: 2022-06-14

## 2022-05-23 RX ORDER — HEPARIN 100 UNIT/ML
300-500 SYRINGE INTRAVENOUS AS NEEDED
Status: CANCELLED | OUTPATIENT
Start: 2022-06-14

## 2022-05-23 RX ORDER — POTASSIUM CHLORIDE 750 MG/1
20 TABLET, FILM COATED, EXTENDED RELEASE ORAL
Status: ACTIVE | OUTPATIENT
Start: 2022-05-23 | End: 2022-05-23

## 2022-05-23 RX ORDER — SODIUM CHLORIDE 9 MG/ML
10 INJECTION INTRAMUSCULAR; INTRAVENOUS; SUBCUTANEOUS AS NEEDED
Status: CANCELLED | OUTPATIENT
Start: 2022-06-14

## 2022-05-23 RX ORDER — SODIUM CHLORIDE 9 MG/ML
1000 INJECTION, SOLUTION INTRAVENOUS CONTINUOUS
Status: DISPENSED | OUTPATIENT
Start: 2022-05-23 | End: 2022-05-23

## 2022-05-23 RX ADMIN — SODIUM CHLORIDE 1000 ML: 9 INJECTION, SOLUTION INTRAVENOUS at 09:20

## 2022-05-23 NOTE — PROGRESS NOTES
[facility] [place] Progress Note    Date: May 23, 2022    Name: Petros Ridley    MRN: [id number]         : 1955    Ms. Michell López Arrived ambulatory and in no distress for C10D1 of Folfirinox Regimen(HELD) + Hydration added. Assessment was completed, Patient having increased diarrhea and fatigue. Right chest wall port accessed without difficulty, labs drawn & sent for processing. Chemotherapy Flowsheet 2022   Cycle C10D1   Date 2022   Drug / Regimen Folfirinox   Pre Hydration given   Pre Meds -   Notes HELD       Patient proceed to appointment with Dr. Isma Suarez. Ms. Usha Andres vitals were reviewed. Visit Vitals  /69   Pulse 88   Temp 98 °F (36.7 °C)   Resp 18   Ht 5' 4\" (1.626 m)   Wt 83.1 kg (183 lb 1.6 oz)   SpO2 100%   BMI 31.43 kg/m²       Lab results were obtained and reviewed. Recent Results (from the past 12 hour(s))   CBC WITH AUTOMATED DIFF    Collection Time: 22  8:02 AM   Result Value Ref Range    WBC 10.2 3.6 - 11.0 K/uL    RBC 3.15 (L) 3.80 - 5.20 M/uL    HGB 9.8 (L) 11.5 - 16.0 g/dL    HCT 29.7 (L) 35.0 - 47.0 %    MCV 94.3 80.0 - 99.0 FL    MCH 31.1 26.0 - 34.0 PG    MCHC 33.0 30.0 - 36.5 g/dL    RDW 17.3 (H) 11.5 - 14.5 %    PLATELET 426 871 - 643 K/uL    MPV 10.2 8.9 - 12.9 FL    NRBC 0.2 (H) 0  WBC    ABSOLUTE NRBC 0.02 (H) 0.00 - 0.01 K/uL    NEUTROPHILS 47 32 - 75 %    BAND NEUTROPHILS 3 0 - 6 %    LYMPHOCYTES 37 12 - 49 %    MONOCYTES 11 5 - 13 %    EOSINOPHILS 0 0 - 7 %    BASOPHILS 0 0 - 1 %    METAMYELOCYTES 1 (H) 0 %    MYELOCYTES 1 (H) 0 %    IMMATURE GRANULOCYTES 0 %    ABS. NEUTROPHILS 5.1 1.8 - 8.0 K/UL    ABS. LYMPHOCYTES 3.8 (H) 0.8 - 3.5 K/UL    ABS. MONOCYTES 1.1 (H) 0.0 - 1.0 K/UL    ABS. EOSINOPHILS 0.0 0.0 - 0.4 K/UL    ABS. BASOPHILS 0.0 0.0 - 0.1 K/UL    ABS. IMM.  GRANS. 0.0 K/UL    DF MANUAL      RBC COMMENTS ANISOCYTOSIS  1+        WBC COMMENTS TOXIC GRANULATION     METABOLIC PANEL, COMPREHENSIVE    Collection Time: 22  8:02 AM   Result Value Ref Range    Sodium 139 136 - 145 mmol/L    Potassium 3.0 (L) 3.5 - 5.1 mmol/L    Chloride 107 97 - 108 mmol/L    CO2 19 (L) 21 - 32 mmol/L    Anion gap 13 5 - 15 mmol/L    Glucose 75 65 - 100 mg/dL    BUN 2 (L) 6 - 20 MG/DL    Creatinine 0.62 0.55 - 1.02 MG/DL    BUN/Creatinine ratio 3 (L) 12 - 20      GFR est AA >60 >60 ml/min/1.73m2    GFR est non-AA >60 >60 ml/min/1.73m2    Calcium 8.9 8.5 - 10.1 MG/DL    Bilirubin, total 0.7 0.2 - 1.0 MG/DL    ALT (SGPT) 20 12 - 78 U/L    AST (SGOT) 18 15 - 37 U/L    Alk. phosphatase 86 45 - 117 U/L    Protein, total 5.9 (L) 6.4 - 8.2 g/dL    Albumin 2.9 (L) 3.5 - 5.0 g/dL    Globulin 3.0 2.0 - 4.0 g/dL    A-G Ratio 1.0 (L) 1.1 - 2.2         Medications:  Medications Administered     0.9% sodium chloride infusion 1,000 mL     Admin Date  05/23/2022 Action  New Bag Dose  1,000 mL Rate  1,000 mL/hr Route  IntraVENous Administered By  Renan Pollard RN                Ms. Tosha Swanson tolerated treatment well and was discharged from [place] [address] in stable condition at 1045am.   Port de-accessed, flushed & heparinized per protocol. She is to return on June 6 at 0730am for her next appointment.     Bob Myers RN  May 23, 2022

## 2022-05-23 NOTE — TELEPHONE ENCOUNTER
[address] at [facility]  [phone]        05/23/22 2:13 PM Called patient. Advised, per Sergo Fall, that patient take Potassium 40 mEQ tonight rather than usual dose of 30 mEQ. Patient voiced understanding. While on phone, she wanted to relay that she rescheduled her CT abd/pelv for 05/26. Patient also inquired if GI office had accepted her as a patient. Advised, per referral note, that referral coordinator had contacted GI office on Friday. They had not yet received records, so she re-faxed records. Advised this nurse would notify referral coordinator of patient's inquiry and ask that she keep patient updated of status. Patient voiced understanding. 05/24/22 3:49 PM Called patient and advised of message per Sergo Fall NP. Patient voiced understanding but stated she is going to proceed with both appointments on 05/26. While on phone, patient reported that since holding treatment she has had one loose stool today. No complaints of persistent diarrhea at present time. Has increased energy, but is working on increased PO intake. Will update NP of above. Encouraged patient to contact office with an questions or needs that arise. She voiced understanding. 05/25/22 1:06 PM Called patient and advised of recommendations per Sergo Fall NP. Patient voiced understanding. She stated she wound up having two loose stools yesterday. Overall feeling well today. Patient with complaints of fatigue and nausea. Patient notes that nausea is improved from prior, though still present. Patient feels that nausea is triggered after taking potassium pills. Patient takes potassium after eating. Has not taken anti-emetic today, but did take Zofran last night. Patient feels that medication takes a while to take effect, but does eventually help alleviate symptoms. Advised this nurse would update Dr. Augie Cordero and Sergo Fall and call back if any other recommendations. Patient voiced understanding.

## 2022-05-23 NOTE — PROGRESS NOTES
Alfredo Schaefer is a 77 y.o. female follow up for pancreatic adenocarcinoma. 1. Have you been to the ER, urgent care clinic since your last visit? Hospitalized since your last visit?no     2. Have you seen or consulted any other health care providers outside of the [address] [place] [address] since your last visit? Include any pap smears or colon screening.  No    Vitals 5/23/2022   Blood Pressure 107/75   Pulse 88   Temp 98   Resp 18   Height 5' 4\"   Weight 183 lb 1.6 oz   SpO2 100   BSA 1.94 m2   BMI 31.43 kg/m2

## 2022-05-24 ENCOUNTER — APPOINTMENT (OUTPATIENT)
Dept: INFUSION THERAPY | Age: 67
End: 2022-05-24

## 2022-05-24 ENCOUNTER — APPOINTMENT (OUTPATIENT)
Dept: INFUSION THERAPY | Age: 67
End: 2022-05-24
Payer: MEDICARE

## 2022-05-24 DIAGNOSIS — K52.1 DIARRHEA DUE TO DRUG: ICD-10-CM

## 2022-05-24 NOTE — PROGRESS NOTES
[facility] at [place] [address] [facility] [address] [facility] [place]: [phone]  F: [phone]      Reason for Visit:   Walker Shaw is a 77 y.o. female who is seen for follow up of pancreatic adenocarcinoma. Hematology/Oncology Treatment History:     Diagnosis: Pancreatic adenocarcinoma    Stage: clinical Stage Ib [T2N0] at diagnosis; Stage III [[id number]] at surgery    Pathology:   -9/26/21 Cytology - brushings from common bile duct: Reactive glandular epithelial cells and bile   -9/28/21 pancreatic head mass biopsy: Adenocarcinoma.   -10/22/21 [facility] mult-cancer panel -negative   -2/23/22 Pancreaticoduodenectomy (Whipple resection): Ductal adenocarcinoma, G2, 1.2cm. Tumor invades peripancreatic soft tissues. LVI negative. PNI present. Margins uninvolved. 5/29 LNs involved with cancer.   y pT1cN2     Prior Treatment:   1. Neoadjuvant FOLFIRNOX x 6 cycles, 10/18/21-1/10/22  2. PYLORUS PRESERVING WHIPPLE PROCEDURE AND PORTAL LYPHMADENECTOMY, 2/23/22    Current Treatment: adjuvant FOLFIRNOX x 6 cycles, started 4/12/22 - current. Added neulasta with C2. History of Present Illness:   Walker Shaw is a pleasant 77 y.o. female who comes in for evaluation of pancreatic cancer. She presented in 9/2021 with obstructive jaundice, transaminitis. ERCP on 9/26/21 notable for distal CBD stricture; possible tumor/mass located in the CBD; underwent biliary stent placement to relieve biliary obstruction. CT A/P revealed mild/mod intrahepatic biliary ductal dilatation; prominence of CBD and hydropic gallbladder, suggestive of stricture/stenosis/mass of distal CBD. MRI of abdomen showed narrowing of CBD pancreatic head suggestive of extrinisic compression concerning for periampullary mass vs eccentric intraluminal lesion. EUS showed 3.2cm hypoechoic mass in pancreas head. Biopsy revealed adenocarcinoma.    Pt met with Dr. Jadiel Powell in [place] and plans for neoadjuvant chemotherapy prior to surgery. Interval history:  Patient here for follow up of pancreas cancer and C10 adjuvant FOLFIRNOX. She was evaluated by GI and completed colonoscopy on 22 which was completely normal, no evidence of colitis. GI suggested trial of Sandostatin for chemotherapy induced diarrhea. Today, reports diarrhea has resolved. Have daily solid stool, not requiring anti-diarrheals. Appetite improved, gained weight. Reports numbness from feet to thigh - feet will feel ice cold at times. No nausea, vomiting, chest pain, SOB, fevers or chills. Reports she is fearful of restarting chemo due to past weakness, diarrhea. PMHx: OA and Rheumatoid arthritis in lower back, Ulcerative colitis, HTN, Hypothyroidism  PSurgHx: , Cyst removed from left breast  SHx: , has 3 children (1 daughter and 2 sons). Works as  in [employer]. Nonsmoker, no EtOH.  in rehab recovering from [facility]. FHx: Mother  of pancreatic cancer age 80. Brother and son had colon cancer. Review of Systems: A complete review of systems was obtained, reviewed. Pertinent findings reviewed above. Physical Exam:     Visit Vitals  /80   Pulse 78   Temp 97.5 °F (36.4 °C)   Ht 5' 4\" (1.626 m)   Wt 197 lb 12.8 oz (89.7 kg)   SpO2 97%   BMI 33.95 kg/m²     ECOG PS: 1  General: no distress, ambulating with cane  Eyes: anicteric sclerae  HENT: oropharynx clear  Neck: supple  Lymphatic: no cervical, supraclavicular adenopathy  Respiratory: CTAB, normal respiratory effort  CV: no peripheral edema, port upper chest.   GI: soft, nontender, nondistended, no masses; Midline surgical scar appears to be healing well with dry skin and scar/scabbing.   Skin: no rashes; no ecchymoses; no petechiae  Neuro: alert and oriented      Results:     Lab Results   Component Value Date/Time    WBC 5.5 2022 08:14 AM    HGB 8.5 (L) 2022 08:14 AM    HCT 26.1 (L) 2022 08:14 AM    PLATELET 645  08:14 AM    MCV 95.3 06/06/2022 08:14 AM    ABS. NEUTROPHILS 1.0 (L) 06/06/2022 08:14 AM    Hemoglobin (POC) 12.6 10/06/2013 08:45 AM    Hematocrit (POC) 37 10/06/2013 08:45 AM     Lab Results   Component Value Date/Time    Sodium 142 06/06/2022 08:14 AM    Potassium 3.2 (L) 06/06/2022 08:14 AM    Chloride 109 (H) 06/06/2022 08:14 AM    CO2 26 06/06/2022 08:14 AM    Glucose 91 06/06/2022 08:14 AM    BUN 7 06/06/2022 08:14 AM    Creatinine 0.61 06/06/2022 08:14 AM    GFR est AA >60 06/06/2022 08:14 AM    GFR est non-AA >60 06/06/2022 08:14 AM    Calcium 8.5 06/06/2022 08:14 AM    Sodium (POC) 143 10/06/2013 08:45 AM    Potassium (POC) 3.4 (L) 10/06/2013 08:45 AM    Chloride (POC) 104 10/06/2013 08:45 AM    Glucose (POC) 84 03/02/2022 03:51 PM    BUN (POC) 11 10/06/2013 08:45 AM    Creatinine (POC) 1.0 10/06/2013 08:45 AM    Calcium, ionized (POC) 1.27 10/06/2013 08:45 AM     Lab Results   Component Value Date/Time    Bilirubin, total 0.7 05/23/2022 08:02 AM    ALT (SGPT) 20 05/23/2022 08:02 AM    Alk. phosphatase 86 05/23/2022 08:02 AM    Protein, total 5.9 (L) 05/23/2022 08:02 AM    Albumin 2.9 (L) 05/23/2022 08:02 AM    Globulin 3.0 05/23/2022 08:02 AM     Lab Results   Component Value Date/Time    Reticulocyte count 2.0 03/15/2022 09:38 AM    Iron % saturation 13 (L) 03/15/2022 09:38 AM    TIBC 351 03/15/2022 09:38 AM    Ferritin 177 03/15/2022 09:38 AM    Vitamin B12 496 03/15/2022 09:38 AM    Folate 11.8 03/15/2022 09:38 AM    Sed rate (ESR) 17 09/16/2010 12:04 PM    C-Reactive protein <0.29 01/18/2017 08:45 AM    TSH 2.84 04/12/2022 07:50 AM    ALBA, Direct Detected (A) 09/16/2010 12:04 PM    Lipase 1,214 (H) 09/29/2021 04:10 AM    Hep C virus Ab Interp.  NONREACTIVE 09/24/2021 06:55 PM     Lab Results   Component Value Date/Time    CEA 3.5 09/25/2021 11:46 AM    Carbohydrate Antigen 19-9, (CA 19-9) 16 04/12/2022 07:50 AM       10/18/21 CA 19-9:  138  1/10/22 CA 19-9: 130   4/12/22 CA 19-9: 16       Imaging / Procedures:     9/24/21 US ABD   IMPRESSION  Cholelithiasis. Gallbladder sludge. Prominent CBD with mild intrahepatic ductal dilatation as well. Nonemergent MRI with MRCP to delineate etiology for CBD distention. 9/24/21 CT ABD PELV W CONT  There is mild/moderate intrahepatic biliary ductal dilatation, prominence of the CBD and a hydropic gallbladder. No pancreatic duct dilatation. No clearly  delineated pancreatic head mass. Imaging findings suggestive of stricture/stenosis/mass of the distal CBD, no extrinsic pancreatic head mass is  identified. Gastroenterology consult  Correlation with MRI/MRCP on a nonemergent basis. There is severe degenerative change of the lumbar spine. 9/25/21 MRI ABD WO CONT  IMPRESSION  1. Intrahepatic and extra hepatic biliary dilatation with abrupt narrowing of  the common bile duct pancreatic head just proximal to the ampulla. Suggestion of  extrinsic compression on the duct. This raises the possibility of a  periampullary mass. Alternatively, this may be an eccentric intraluminal lesion. Evaluation is limited. Recommend GI consultation for possible ERCP and EUS. 2.  Questionable small lesion in the left hepatic lobe with mild increased T2  signal and T1 hypointensity. Recommend follow-up imaging a contrast-enhanced  study preferably MRI. 3.  Cholelithiasis. Distended gallbladder with mild gallbladder wall thickening. Correlate for acute cholecystitis    9/26/21 XR ERCP/ERCB / Dr. Radha Barragan  Impression: Biliary ductal dilation, with a maximum common duct diameter of 15 mm; Severe stenosis, involving distal CBD /pancreas head area concerning for neoplasia  Interventions: Endoscopic ampullary sphincterotomy and biliary stent placement; brushings from the CBD    9/27/21 CT CHEST W CONT:   IMPRESSION  1. There is a minimal right pleural effusion. 2. There are small pulmonary nodules present.  There is a nodule in the plane of  the right major fissure and posteriorly in left lower lobe. These were not  present on examination of 1/18/2017. These could be on the basis of metastatic  disease. Close follow-up is suggested. There are also 2 small subpleural nodules  anteriorly in the right upper lobe as described above which can also be  evaluated on follow-up. 9/28/21 EUS:  Endoscopic: Esophagus:normal; Stomach: normal; Duodenum/jejunum: normal and protruding biliary stent  Ultrasound: Esophagus: normal findings;     Stomach: normal findings:     Pancreas: Areas examined: the entire gland                          Parenchyma: -Evidence of a hypoechoic mass with poorly defined borders seen in the head of the pancreas. It appeared involving the CBD where a stent is seen, however it did not involve the major vessels. It measured about 3.2 cm in widest diameter on one view. Pancreatic Duct: normal findings, not dilated               Liver: Parenchyma: normal                          Gallbladder: normal                          Bile Duct: the common bile duct is dilated in the proximal and mid portion and a plastic stent could be seen                          Lymph Node: no adenopathy  Impression:   Pancreas head mass with fine needle biopsy showing adenocarcinoma on preliminary cytology  Recommendations: Follow-up on pathology  Follow-up with oncology, will need further evaluation of pulmonary nodule  Surgical oncology consultation  Resume GI lite diet today and can discharge in am from GI standpoint    12/20/2021:  MRI abd w wo cont:  IMPRESSION  1. Positive response to therapy. Decreased size of the pancreatic head mass, which now has a maximum AP diameter of 2.3 cm. No obstruction of the biliary tree or pancreatic duct. No involvement of the SMA. Based on imaging, patient is a candidate for resection. 2. Cholelithiasis. No acute cholecystitis or biliary obstruction.   MRI Pelv w wo cont:  IMPRESSION  No acute process or evidence of malignant neoplasm/ metastatic disease. CT chest w cont:  IMPRESSION  1. Interval apparent resolution of previously seen scattered subcentimeter bilateral pulmonary nodules and right pleural effusion. 2.  Please see separately dictated reports for the MRI abdomen and pelvis obtained the same day for subdiaphragmatic findings. 6/1/22 colonoscopy: The perianal and digital rectal examinations were normal. The terminal ileum appeared normal. Biopsies were taken. The colon (entire examined portion) appeared normal. Small non-bleeding internal hemorrhoids were found during retroflexion (ileium biopsy small intestinal mucosa with lymphoid aggregates, negative for active inflammation; colon bx-colonic mucosa with lymphoid aggregates)        Assessment/Recommendations:   77 y.o. female with Ulcerative colitis, Hyopthyroidism, admitted with obstructive jaundice concerning for underlying malignant obstructing mass. 1. Pancreatic adenocarcinoma:  Clinically stage Ib [T2N0], but need further evaluation in future regarding pulmonary nodules and liver lesion. Presented with obstructive jaundice and transaminitis, CA 19-9 was 198 in 9/2021 at diagnosis. Underwent biliary stent placement with improvement in biliary obstruction. 3.2cm mass seen in pancreatic head on EUS, not involving vasculature, possibly resectable. Dr. Ronna Muhammad in [place] has evaluated patient and recommends consideration of neoadjuvant chemotherapy with close attention to lung nodules and liver lesion. I agree with this recommendation for earlier treatment of micrometastatic disease, ability to determine whether pt has chemosensitive disease. We discussed the risks and benefits of FOLFIRINOX chemotherapy, including potential side effects. These include but are not limited to fatigue, nausea vomiting, diarrhea, neuropathy, taste changes, cold intolerance, esophageal spasm, allergic reactions, alopecia, mucositis, myelosuppression, risk for infection, infertility, and rarely, death. Rarely, a patient may have a condition where they do not metabolize fluorouracil appropriately (called DPD deficiency), and they may have excessive toxicity. A Port-A-Cath will be required in order to deliver the continuous infusion. BRCA 1/2 negative. The patient has consented to beginning therapy. Pt completed 6 cycles of mFOLFIRINOX in neoadjuvant setting, followed by pancreaticoduodenectomy (Whipple resection) on 2/23/22. Now planning for 6 cycles of adjuvant chemotherapy. Supportive care meds include: Emla cream, Zofran, Compazine, Dexamethasone  -- Proceed with C10 of adjuvant mFOLFIRINOX with neulasta support (10% DR in 5-FU, Oxali, and 20% DR in Irinotecan.)  -- Fluids with pump removal and Fridays. -- Checking CA 19-9 on date of C1, C6, C7, C12.   -- Return in 2 weeks for C11 mFOLFIRINOX, Neulasta support, MD/NP visit. 2. Diarrhea:  Has history of UC. Not taking Cholestyramine (Questran) due to intolerance. Taking lomotil TID. Imodium prn - does not work well. Hydrating with Gatorade/water. Cdiff negative. Evaluated by JESSICA Robles discussed if colon is normal, consider trial of sandostatin if this is chemo induced diarrhea. Colonoscopy 6/1/22 normal indicating chemotherapy induced diarrhea rather than UC flare. -- Atropine before irinotecan. -- Consider Octreotide 150mcg tid for chemo induced diarrhea  -- Increase lomotil to QID daily for prolonged diarrhea. 3. Pulmonary nodules:   Repeat imaging after C4 of chemo showed resolution of pulmonary nodules. 4. HTN:   Managed on HCTZ. Advised checking BP at home and if systolic <560, then hold HCTZ. 5. H/o Ulcerative procto-sigmoiditis:  April 2017 colonoscopy with one adenoma polyp removed. Repeat colonoscopy was normal on 6/1/22. Repeat 10 years for screening. 6. Morbid obesity:  Discussed healthy food options and ways to incorporate more protein into diet.      7. Normocytic anemia:  Due to chemotherapy, decreased PO intake and recent Whipple surgery. Recent B12/folate/ferritin normal. Mildly low iron sat. Monitor. 9. Dysguesia / Poor appetite / Hypokalemia:   Intake, appetite and diarrhea improved during treatment break with recent weight gain. Avoiding supplement drinks with dairy given they cause diarrhea. -- On KCL 20 meq BID. Advised increase to 30meq BID if diarrhea returns. -- KCL 40 meq in [facility] today. 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 Present in bilateral legs. Monitor. I personally saw and evaluated the patient and performed the key components of medical decision making. The history, physical exam, and documentation were performed by Tino Mccloud NP. I reviewed and verified the above documentation and modified it as needed. Proceed with C10 of FOLFIRINOX without 5-FU bolus, with 10% DR in 5-FU, Oxali, 20% DR in Irinotecan. Discussed supportive care with Lomotil, oral hydration, potassium repletion, nutrition.     Signed By: Marcelino Turk MD

## 2022-05-24 NOTE — TELEPHONE ENCOUNTER
Spoke with [facility] to schedule patients appointment. May 26th @ [address] location      [place] with patient regarding the above information. Patient stated that this day may be to much with having a radiology apt the same day. Gave patient their phone number and informed her that the next available is August 2nd with Dr. Dionne Ge.

## 2022-05-25 ENCOUNTER — APPOINTMENT (OUTPATIENT)
Dept: INFUSION THERAPY | Age: 67
End: 2022-05-25

## 2022-05-25 ENCOUNTER — HOSPITAL ENCOUNTER (OUTPATIENT)
Dept: INFUSION THERAPY | Age: 67
Discharge: HOME | End: 2022-05-25

## 2022-05-25 DIAGNOSIS — Z76.89 PREVENTION OF CHEMOTHERAPY-INDUCED NEUTROPENIA: ICD-10-CM

## 2022-05-25 DIAGNOSIS — C25.0 MALIGNANT NEOPLASM OF HEAD OF PANCREAS (HCC): ICD-10-CM

## 2022-05-25 DIAGNOSIS — E86.0 DEHYDRATION: ICD-10-CM

## 2022-05-25 RX ORDER — DIPHENOXYLATE HYDROCHLORIDE AND ATROPINE SULFATE 2.5; .025 MG/1; MG/1
1 TABLET ORAL
Qty: 30 TABLET | Refills: 1 | Status: SHIPPED | OUTPATIENT
Start: 2022-05-25 | End: 2022-10-10

## 2022-05-26 ENCOUNTER — APPOINTMENT (OUTPATIENT)
Dept: INFUSION THERAPY | Age: 67
End: 2022-05-26

## 2022-05-26 ENCOUNTER — TELEPHONE (OUTPATIENT)
Dept: ONCOLOGY | Age: 67
End: 2022-05-26

## 2022-05-26 RX ORDER — EPINEPHRINE 1 MG/ML
0.3 INJECTION, SOLUTION, CONCENTRATE INTRAVENOUS AS NEEDED
Status: CANCELLED | OUTPATIENT
Start: 2022-06-06

## 2022-05-26 RX ORDER — HYDROCORTISONE SODIUM SUCCINATE 100 MG/2ML
100 INJECTION, POWDER, FOR SOLUTION INTRAMUSCULAR; INTRAVENOUS AS NEEDED
Status: CANCELLED | OUTPATIENT
Start: 2022-06-06

## 2022-05-26 RX ORDER — DIPHENHYDRAMINE HYDROCHLORIDE 50 MG/ML
50 INJECTION, SOLUTION INTRAMUSCULAR; INTRAVENOUS AS NEEDED
Status: CANCELLED
Start: 2022-06-06

## 2022-05-26 RX ORDER — DIPHENHYDRAMINE HYDROCHLORIDE 50 MG/ML
25 INJECTION, SOLUTION INTRAMUSCULAR; INTRAVENOUS AS NEEDED
Status: CANCELLED
Start: 2022-06-06

## 2022-05-26 RX ORDER — ALBUTEROL SULFATE 0.83 MG/ML
2.5 SOLUTION RESPIRATORY (INHALATION) AS NEEDED
Status: CANCELLED
Start: 2022-06-06

## 2022-05-26 RX ORDER — ONDANSETRON 2 MG/ML
8 INJECTION INTRAMUSCULAR; INTRAVENOUS AS NEEDED
Status: CANCELLED | OUTPATIENT
Start: 2022-06-06

## 2022-05-26 RX ORDER — ACETAMINOPHEN 325 MG/1
650 TABLET ORAL AS NEEDED
Status: CANCELLED
Start: 2022-06-06

## 2022-05-26 NOTE — TELEPHONE ENCOUNTER
[address] at [place]  [phone]        05/26/22 2:31 PM Return call placed to patient. See other telephone encounter.

## 2022-05-26 NOTE — TELEPHONE ENCOUNTER
[address] at [facility]  [phone]        05/26/22 11:45 AM Received incoming call from patient. She is currently at GI office and does not believe she will arrive to CT appointment in time. Per patient, GI is recommending colonoscopy ASAP. Patient scheduled for this at [facility] on 06/[facility]. Patient inquiring if CT is still needed. She states GI provider indicated that colonoscopy is of more priority from their standpoint. Advised this nurse would update Dr. Lee Ann Sanchez and Rome Gilbert of above. Will call patient back with response. Patient will also cancel CT scan today, will call back to reschedule pending Dr. Marcella Keller response. 1:48 PM Attempted to call patient via home/cell number listed. No answer, left message requesting return call. Provided office phone number as well.      2:32 PM Spoke with patient and advised of response per Rome Gilbert NP. Patient voiced understanding. No further questions or concerns at this time.

## 2022-05-27 DIAGNOSIS — E87.6 HYPOKALEMIA: Primary | ICD-10-CM

## 2022-05-27 LAB
C DIFF TOX B STL QL CT TISS CULT: NORMAL
Lab: NORMAL
SPECIMEN SOURCE: NORMAL

## 2022-05-27 NOTE — PROGRESS NOTES
Called patient and advised of lab results per Africa Doan NP. Patient voiced understanding. No further questions or concerns at this time.

## 2022-05-31 DIAGNOSIS — E87.6 HYPOKALEMIA: ICD-10-CM

## 2022-06-01 ENCOUNTER — TELEPHONE (OUTPATIENT)
Dept: ONCOLOGY | Age: 67
End: 2022-06-01

## 2022-06-01 ENCOUNTER — HOSPITAL ENCOUNTER (OUTPATIENT)
Dept: INFUSION THERAPY | Age: 67
Discharge: HOME | End: 2022-06-01

## 2022-06-01 RX ORDER — POTASSIUM CHLORIDE 750 MG/1
TABLET, EXTENDED RELEASE ORAL
Qty: 60 TABLET | Refills: 1 | Status: SHIPPED | OUTPATIENT
Start: 2022-06-01 | End: 2022-06-27

## 2022-06-01 NOTE — TELEPHONE ENCOUNTER
[address] at [facility]  [phone]        06/01/22 12:52 PM Received incoming call from patient inquiring if she needed to keep [facility] appointment tomorrow for IV fluids. Patient states she is eating and drinking. Underwent colonoscopy today and stated she tolerated this well. Per Naman Bender, okay to cancel [facility] appointment. However, requested that patient have labs drawn tomorrow either at [facility] lab or a [facility] location convenient to her. Patient voiced understanding and agreeable to plan. No further questions or concerns at this time.

## 2022-06-02 ENCOUNTER — HOSPITAL ENCOUNTER (OUTPATIENT)
Dept: INFUSION THERAPY | Age: 67
End: 2022-06-02

## 2022-06-03 LAB
BUN SERPL-MCNC: 5 MG/DL (ref 8–27)
BUN/CREAT SERPL: 9 (ref 12–28)
CALCIUM SERPL-MCNC: 9.2 MG/DL (ref 8.7–10.3)
CHLORIDE SERPL-SCNC: 103 MMOL/L (ref 96–106)
CO2 SERPL-SCNC: 22 MMOL/L (ref 20–29)
CREAT SERPL-MCNC: 0.57 MG/DL (ref 0.57–1)
EGFR: 100 ML/MIN/1.73
GLUCOSE SERPL-MCNC: 118 MG/DL (ref 65–99)
POTASSIUM SERPL-SCNC: 3.5 MMOL/L (ref 3.5–5.2)
SODIUM SERPL-SCNC: 140 MMOL/L (ref 134–144)

## 2022-06-06 ENCOUNTER — HOSPITAL ENCOUNTER (OUTPATIENT)
Dept: INFUSION THERAPY | Age: 67
Discharge: HOME | End: 2022-06-06
Payer: MEDICARE

## 2022-06-06 ENCOUNTER — OFFICE VISIT (OUTPATIENT)
Dept: ONCOLOGY | Age: 67
End: 2022-06-06
Payer: MEDICARE

## 2022-06-06 VITALS
RESPIRATION RATE: 16 BRPM | DIASTOLIC BLOOD PRESSURE: 86 MMHG | HEIGHT: 64 IN | WEIGHT: 197.8 LBS | SYSTOLIC BLOOD PRESSURE: 136 MMHG | HEART RATE: 78 BPM | TEMPERATURE: 97.5 F | OXYGEN SATURATION: 97 % | BODY MASS INDEX: 33.77 KG/M2

## 2022-06-06 VITALS
DIASTOLIC BLOOD PRESSURE: 80 MMHG | TEMPERATURE: 97.5 F | OXYGEN SATURATION: 97 % | HEIGHT: 64 IN | HEART RATE: 78 BPM | BODY MASS INDEX: 33.77 KG/M2 | WEIGHT: 197.8 LBS | SYSTOLIC BLOOD PRESSURE: 126 MMHG

## 2022-06-06 DIAGNOSIS — C25.9 PANCREATIC ADENOCARCINOMA (HCC): Primary | ICD-10-CM

## 2022-06-06 DIAGNOSIS — Z76.89 PREVENTION OF CHEMOTHERAPY-INDUCED NEUTROPENIA: Primary | ICD-10-CM

## 2022-06-06 DIAGNOSIS — K52.1 DIARRHEA DUE TO DRUG: ICD-10-CM

## 2022-06-06 DIAGNOSIS — D64.9 NORMOCYTIC ANEMIA: ICD-10-CM

## 2022-06-06 DIAGNOSIS — C25.0 MALIGNANT NEOPLASM OF HEAD OF PANCREAS (HCC): ICD-10-CM

## 2022-06-06 DIAGNOSIS — R63.0 POOR APPETITE: ICD-10-CM

## 2022-06-06 DIAGNOSIS — Z51.11 CHEMOTHERAPY MANAGEMENT, ENCOUNTER FOR: ICD-10-CM

## 2022-06-06 DIAGNOSIS — E87.6 HYPOKALEMIA: ICD-10-CM

## 2022-06-06 LAB
ALBUMIN SERPL-MCNC: 2.5 G/DL (ref 3.5–5)
ALBUMIN/GLOB SERPL: 0.8 {RATIO} (ref 1.1–2.2)
ALP SERPL-CCNC: 93 U/L (ref 45–117)
ALT SERPL-CCNC: 43 U/L (ref 12–78)
ANION GAP SERPL CALC-SCNC: 7 MMOL/L (ref 5–15)
AST SERPL-CCNC: 51 U/L (ref 15–37)
BASOPHILS # BLD: 0 K/UL (ref 0–0.1)
BASOPHILS NFR BLD: 0 % (ref 0–1)
BILIRUB SERPL-MCNC: 0.6 MG/DL (ref 0.2–1)
BUN SERPL-MCNC: 7 MG/DL (ref 6–20)
BUN/CREAT SERPL: 11 (ref 12–20)
CALCIUM SERPL-MCNC: 8.5 MG/DL (ref 8.5–10.1)
CHLORIDE SERPL-SCNC: 109 MMOL/L (ref 97–108)
CO2 SERPL-SCNC: 26 MMOL/L (ref 21–32)
CREAT SERPL-MCNC: 0.61 MG/DL (ref 0.55–1.02)
DIFFERENTIAL METHOD BLD: ABNORMAL
EOSINOPHIL # BLD: 0.1 K/UL (ref 0–0.4)
EOSINOPHIL NFR BLD: 2 % (ref 0–7)
ERYTHROCYTE [DISTWIDTH] IN BLOOD BY AUTOMATED COUNT: 19.8 % (ref 11.5–14.5)
GLOBULIN SER CALC-MCNC: 3.2 G/DL (ref 2–4)
GLUCOSE SERPL-MCNC: 91 MG/DL (ref 65–100)
HCT VFR BLD AUTO: 26.1 % (ref 35–47)
HGB BLD-MCNC: 8.5 G/DL (ref 11.5–16)
IMM GRANULOCYTES # BLD AUTO: 0 K/UL (ref 0–0.04)
IMM GRANULOCYTES NFR BLD AUTO: 0 % (ref 0–0.5)
LYMPHOCYTES # BLD: 3.4 K/UL (ref 0.8–3.5)
LYMPHOCYTES NFR BLD: 61 % (ref 12–49)
MCH RBC QN AUTO: 31 PG (ref 26–34)
MCHC RBC AUTO-ENTMCNC: 32.6 G/DL (ref 30–36.5)
MCV RBC AUTO: 95.3 FL (ref 80–99)
MONOCYTES # BLD: 1 K/UL (ref 0–1)
MONOCYTES NFR BLD: 19 % (ref 5–13)
NEUTS SEG # BLD: 1 K/UL (ref 1.8–8)
NEUTS SEG NFR BLD: 18 % (ref 32–75)
NRBC # BLD: 0 K/UL (ref 0–0.01)
NRBC BLD-RTO: 0 PER 100 WBC
PLATELET # BLD AUTO: 251 K/UL (ref 150–400)
PMV BLD AUTO: 9.8 FL (ref 8.9–12.9)
POTASSIUM SERPL-SCNC: 3.2 MMOL/L (ref 3.5–5.1)
PROT SERPL-MCNC: 5.7 G/DL (ref 6.4–8.2)
RBC # BLD AUTO: 2.74 M/UL (ref 3.8–5.2)
RBC MORPH BLD: ABNORMAL
SODIUM SERPL-SCNC: 142 MMOL/L (ref 136–145)
VIT B12 SERPL-MCNC: 1155 PG/ML (ref 193–986)
WBC # BLD AUTO: 5.5 K/UL (ref 3.6–11)

## 2022-06-06 PROCEDURE — 96415 CHEMO IV INFUSION ADDL HR: CPT

## 2022-06-06 PROCEDURE — 74011000258 HC RX REV CODE- 258: Performed by: INTERNAL MEDICINE

## 2022-06-06 PROCEDURE — G8427 DOCREV CUR MEDS BY ELIG CLIN: HCPCS | Performed by: INTERNAL MEDICINE

## 2022-06-06 PROCEDURE — 96372 THER/PROPH/DIAG INJ SC/IM: CPT

## 2022-06-06 PROCEDURE — 74011000258 HC RX REV CODE- 258: Performed by: NURSE PRACTITIONER

## 2022-06-06 PROCEDURE — 77030012965 HC NDL HUBR BBMI -A

## 2022-06-06 PROCEDURE — 99215 OFFICE O/P EST HI 40 MIN: CPT | Performed by: INTERNAL MEDICINE

## 2022-06-06 PROCEDURE — 36415 COLL VENOUS BLD VENIPUNCTURE: CPT

## 2022-06-06 PROCEDURE — G8754 DIAS BP LESS 90: HCPCS | Performed by: INTERNAL MEDICINE

## 2022-06-06 PROCEDURE — 74011250636 HC RX REV CODE- 250/636: Performed by: NURSE PRACTITIONER

## 2022-06-06 PROCEDURE — G8400 PT W/DXA NO RESULTS DOC: HCPCS | Performed by: INTERNAL MEDICINE

## 2022-06-06 PROCEDURE — 96417 CHEMO IV INFUS EACH ADDL SEQ: CPT

## 2022-06-06 PROCEDURE — G8536 NO DOC ELDER MAL SCRN: HCPCS | Performed by: INTERNAL MEDICINE

## 2022-06-06 PROCEDURE — 74011250636 HC RX REV CODE- 250/636: Performed by: INTERNAL MEDICINE

## 2022-06-06 PROCEDURE — 96375 TX/PRO/DX INJ NEW DRUG ADDON: CPT

## 2022-06-06 PROCEDURE — 85025 COMPLETE CBC W/AUTO DIFF WBC: CPT

## 2022-06-06 PROCEDURE — G8752 SYS BP LESS 140: HCPCS | Performed by: INTERNAL MEDICINE

## 2022-06-06 PROCEDURE — G8417 CALC BMI ABV UP PARAM F/U: HCPCS | Performed by: INTERNAL MEDICINE

## 2022-06-06 PROCEDURE — 80053 COMPREHEN METABOLIC PANEL: CPT

## 2022-06-06 PROCEDURE — 82607 VITAMIN B-12: CPT

## 2022-06-06 PROCEDURE — 96416 CHEMO PROLONG INFUSE W/PUMP: CPT

## 2022-06-06 PROCEDURE — 82746 ASSAY OF FOLIC ACID SERUM: CPT

## 2022-06-06 PROCEDURE — 3017F COLORECTAL CA SCREEN DOC REV: CPT | Performed by: INTERNAL MEDICINE

## 2022-06-06 PROCEDURE — 96368 THER/DIAG CONCURRENT INF: CPT

## 2022-06-06 PROCEDURE — G8432 DEP SCR NOT DOC, RNG: HCPCS | Performed by: INTERNAL MEDICINE

## 2022-06-06 PROCEDURE — 1123F ACP DISCUSS/DSCN MKR DOCD: CPT | Performed by: INTERNAL MEDICINE

## 2022-06-06 PROCEDURE — 74011250637 HC RX REV CODE- 250/637: Performed by: NURSE PRACTITIONER

## 2022-06-06 PROCEDURE — 96413 CHEMO IV INFUSION 1 HR: CPT

## 2022-06-06 PROCEDURE — 1101F PT FALLS ASSESS-DOCD LE1/YR: CPT | Performed by: INTERNAL MEDICINE

## 2022-06-06 PROCEDURE — 1090F PRES/ABSN URINE INCON ASSESS: CPT | Performed by: INTERNAL MEDICINE

## 2022-06-06 RX ORDER — ATROPINE SULFATE 0.4 MG/ML
0.4 INJECTION, SOLUTION ENDOTRACHEAL; INTRAMEDULLARY; INTRAMUSCULAR; INTRAVENOUS; SUBCUTANEOUS
Status: DISCONTINUED | OUTPATIENT
Start: 2022-06-06 | End: 2022-06-08 | Stop reason: HOSPADM

## 2022-06-06 RX ORDER — SODIUM CHLORIDE 0.9 % (FLUSH) 0.9 %
10 SYRINGE (ML) INJECTION AS NEEDED
Status: DISPENSED | OUTPATIENT
Start: 2022-06-06 | End: 2022-06-06

## 2022-06-06 RX ORDER — DEXTROSE MONOHYDRATE 50 MG/ML
25 INJECTION, SOLUTION INTRAVENOUS CONTINUOUS
Status: DISPENSED | OUTPATIENT
Start: 2022-06-06 | End: 2022-06-06

## 2022-06-06 RX ORDER — HEPARIN 100 UNIT/ML
300-500 SYRINGE INTRAVENOUS AS NEEDED
Status: ACTIVE | OUTPATIENT
Start: 2022-06-06 | End: 2022-06-06

## 2022-06-06 RX ORDER — POTASSIUM CHLORIDE 750 MG/1
40 TABLET, FILM COATED, EXTENDED RELEASE ORAL
Status: COMPLETED | OUTPATIENT
Start: 2022-06-06 | End: 2022-06-06

## 2022-06-06 RX ORDER — SODIUM CHLORIDE 9 MG/ML
10 INJECTION INTRAMUSCULAR; INTRAVENOUS; SUBCUTANEOUS AS NEEDED
Status: ACTIVE | OUTPATIENT
Start: 2022-06-06 | End: 2022-06-06

## 2022-06-06 RX ORDER — PALONOSETRON 0.05 MG/ML
0.25 INJECTION, SOLUTION INTRAVENOUS ONCE
Status: COMPLETED | OUTPATIENT
Start: 2022-06-06 | End: 2022-06-06

## 2022-06-06 RX ADMIN — LEUCOVORIN CALCIUM 720 MG: 500 INJECTION, POWDER, LYOPHILIZED, FOR SOLUTION INTRAMUSCULAR; INTRAVENOUS at 13:14

## 2022-06-06 RX ADMIN — ATROPINE SULFATE 0.4 MG: 0.4 INJECTION, SOLUTION INTRAMUSCULAR; INTRAVENOUS; SUBCUTANEOUS at 13:05

## 2022-06-06 RX ADMIN — DEXTROSE MONOHYDRATE 25 ML/HR: 50 INJECTION, SOLUTION INTRAVENOUS at 09:39

## 2022-06-06 RX ADMIN — IRINOTECAN HYDROCHLORIDE 240 MG: 20 INJECTION, SOLUTION INTRAVENOUS at 13:14

## 2022-06-06 RX ADMIN — OXALIPLATIN 153 MG: 5 INJECTION, SOLUTION INTRAVENOUS at 10:55

## 2022-06-06 RX ADMIN — POTASSIUM CHLORIDE 40 MEQ: 750 TABLET, FILM COATED, EXTENDED RELEASE ORAL at 09:38

## 2022-06-06 RX ADMIN — DEXAMETHASONE SODIUM PHOSPHATE 12 MG: 4 INJECTION, SOLUTION INTRA-ARTICULAR; INTRALESIONAL; INTRAMUSCULAR; INTRAVENOUS; SOFT TISSUE at 09:42

## 2022-06-06 RX ADMIN — FLUOROURACIL 4320 MG: 50 INJECTION, SOLUTION INTRAVENOUS at 14:55

## 2022-06-06 RX ADMIN — PALONOSETRON 0.25 MG: 0.25 INJECTION, SOLUTION INTRAVENOUS at 09:40

## 2022-06-06 NOTE — PROGRESS NOTES
Kun Maciel is a 77 y.o. female here for follow up of pancreatic cancer. Patient with no complaints of pain at this time.

## 2022-06-06 NOTE — PROGRESS NOTES
[facility] Progress Note    Date: 2022    Name: Alec Tran    MRN: [id number]         : 1955    Ms. Jorge Pina Arrived ambulatory and in no distress for C10 D1 of Folfirinox Regimen. Assessment was completed by Nargis Pedro RN, no acute issues at this time, no new complaints voiced. Right chest wall port accessed by Nargis Pedro RN without difficulty, labs drawn & sent for processing. Chemotherapy Flowsheet 2022   Cycle C10D1   Date 2022   Drug / Regimen Folfirinox   Pre Hydration -   Pre Meds -   Notes -     Patient proceed to appointment with Dr. Génesis Robles. Ms. Jonh Centeno vitals were reviewed. Visit Vitals  /80   Pulse 78   Temp 97.5 °F (36.4 °C)   Resp 16   Ht 5' 4\" (1.626 m)   Wt 89.7 kg (197 lb 12.8 oz)   SpO2 97%   Breastfeeding No   BMI 33.95 kg/m²       Lab results were obtained and reviewed. Recent Results (from the past 12 hour(s))   CBC WITH AUTOMATED DIFF    Collection Time: 22  8:14 AM   Result Value Ref Range    WBC 5.5 3.6 - 11.0 K/uL    RBC 2.74 (L) 3.80 - 5.20 M/uL    HGB 8.5 (L) 11.5 - 16.0 g/dL    HCT 26.1 (L) 35.0 - 47.0 %    MCV 95.3 80.0 - 99.0 FL    MCH 31.0 26.0 - 34.0 PG    MCHC 32.6 30.0 - 36.5 g/dL    RDW 19.8 (H) 11.5 - 14.5 %    PLATELET 466 697 - 855 K/uL    MPV 9.8 8.9 - 12.9 FL    NRBC 0.0 0  WBC    ABSOLUTE NRBC 0.00 0.00 - 0.01 K/uL    NEUTROPHILS 18 (L) 32 - 75 %    LYMPHOCYTES 61 (H) 12 - 49 %    MONOCYTES 19 (H) 5 - 13 %    EOSINOPHILS 2 0 - 7 %    BASOPHILS 0 0 - 1 %    IMMATURE GRANULOCYTES 0 0.0 - 0.5 %    ABS. NEUTROPHILS 1.0 (L) 1.8 - 8.0 K/UL    ABS. LYMPHOCYTES 3.4 0.8 - 3.5 K/UL    ABS. MONOCYTES 1.0 0.0 - 1.0 K/UL    ABS. EOSINOPHILS 0.1 0.0 - 0.4 K/UL    ABS. BASOPHILS 0.0 0.0 - 0.1 K/UL    ABS. IMM.  GRANS. 0.0 0.00 - 0.04 K/UL    DF SMEAR SCANNED      RBC COMMENTS ANISOCYTOSIS  1+       METABOLIC PANEL, COMPREHENSIVE    Collection Time: 22  8:14 AM   Result Value Ref Range    Sodium 142 136 - 145 mmol/L    Potassium 3.2 (L) 3.5 - 5.1 mmol/L    Chloride 109 (H) 97 - 108 mmol/L    CO2 26 21 - 32 mmol/L    Anion gap 7 5 - 15 mmol/L    Glucose 91 65 - 100 mg/dL    BUN 7 6 - 20 MG/DL    Creatinine 0.61 0.55 - 1.02 MG/DL    BUN/Creatinine ratio 11 (L) 12 - 20      GFR est AA >60 >60 ml/min/1.73m2    GFR est non-AA >60 >60 ml/min/1.73m2    Calcium 8.5 8.5 - 10.1 MG/DL    Bilirubin, total 0.6 0.2 - 1.0 MG/DL    ALT (SGPT) 43 12 - 78 U/L    AST (SGOT) 51 (H) 15 - 37 U/L    Alk.  phosphatase 93 45 - 117 U/L    Protein, total 5.7 (L) 6.4 - 8.2 g/dL    Albumin 2.5 (L) 3.5 - 5.0 g/dL    Globulin 3.2 2.0 - 4.0 g/dL    A-G Ratio 0.8 (L) 1.1 - 2.2         Medications:  Medications Administered     atropine 0.4 mg/mL injection 0.4 mg     Admin Date  06/06/2022 Action  Given Dose  0.4 mg Route  SubCUTAneous Administered By  Lizet Jones RN          dexamethasone (DECADRON) 12 mg in 0.9% sodium chloride 50 mL IVPB     Admin Date  06/06/2022 Action  New Bag Dose  12 mg Route  IntraVENous Administered By  Lizet Jones RN          dextrose 5% infusion     Admin Date  06/06/2022 Action  New Bag Dose  25 mL/hr Rate  25 mL/hr Route  IntraVENous Administered By  Lizet Jones RN          fluorouraciL (ADRUCIL) 4,320 mg in 0.9% sodium chloride 100 mL CADD Cassette     Admin Date  06/06/2022 Action  New Bag Dose  4,320 mg Rate  2.2 mL/hr Route  IntraVENous Administered By  Candy Pisano RN          irinotecan (CAMPTOSAR) 240 mg in dextrose 5% 250 mL, overfill volume 25 mL chemo infusion     Admin Date  06/06/2022 Action  New Bag Dose  240 mg Rate  191.3 mL/hr Route  IntraVENous Administered By  Lizet Jones RN          leucovorin (WELLCOVORIN) 720 mg in dextrose 5% 250 mL, overfill volume 25 mL IVPB     Admin Date  06/06/2022 Action  New Bag Dose  720 mg Rate  207.3 mL/hr Route  IntraVENous Administered By  Lizet Jones RN          oxaliplatin (ELOXATIN) 153 mg in dextrose 5% 250 mL, overfill volume 25 mL chemo infusion     Admin Date  06/06/2022 Action  New Bag Dose  153 mg Rate  152.8 mL/hr Route  IntraVENous Administered By  Marla Albarran RN          palonosetron HCl (ALOXI) injection 0.25 mg     Admin Date  06/06/2022 Action  Given Dose  0.25 mg Route  IntraVENous Administered By  Marla Albarran RN          potassium chloride SR (KLOR-CON 10) tablet 40 mEq     Admin Date  06/06/2022 Action  Given Dose  40 mEq Route  Oral Administered By  Marla Albarran RN                Ms. Tosha Swanson tolerated treatment well and was discharged from [place] [address] in stable condition at 1505. 5FU continuous infusion via CADD pump. She is to return on 06/08/2022 for her next appointment.     Lan Moseley RN  June 6, 2022

## 2022-06-07 ENCOUNTER — APPOINTMENT (OUTPATIENT)
Dept: INFUSION THERAPY | Age: 67
End: 2022-06-07
Payer: MEDICARE

## 2022-06-07 ENCOUNTER — TELEPHONE (OUTPATIENT)
Dept: ONCOLOGY | Age: 67
End: 2022-06-07

## 2022-06-07 NOTE — TELEPHONE ENCOUNTER
[facility] [place] [facility] at [address], [address] [place] [id number]  W: [phone]  F: [phone]    Medical Nutrition Therapy  Nutrition Referral:    Called patient. She was seen by GI and had colonoscopy on 6/1/22 and it was normal, no evidence of colitis. Her diarrhea has resolved. Appetite has improved. She received chemo yesterday and had nausea when leaving. She took zofran and compazine last evening and ended up having vomiting overnight. She is still struggling with nausea but has been able to eat some. She was able to get down some chicken. And plans for fish and mashed potatoes later. 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  Adjuvant FOLFIRNOX x 6 cycles,   Chemotherapy Flowsheet 6/6/2022   Cycle C10D1   Date 6/6/2022   Drug / Regimen Folfirinox   Pre Hydration -   Pre Meds given   Notes given         Wt Readings from Last 5 Encounters:   06/06/22 197 lb 12.8 oz (89.7 kg)   06/06/22 197 lb 12.8 oz (89.7 kg)   05/23/22 183 lb 1.6 oz (83.1 kg)   05/23/22 183 lb (83 kg)   05/13/22 198 lb (89.8 kg)     Malnutrition Assessment:  Malnutrition Status:  Severe malnutrition    Context:  Chronic illness     Findings of the 6 clinical characteristics of malnutrition:   Energy Intake:  7 - 75% or less est energy requirements for 1 month or longer  Weight Loss:  7.00 - Greater than 10% over 6 months     Body Fat Loss:  Unable to assess,     Muscle Mass Loss:  Unable to assess,    Fluid Accumulation:  No significant fluid accumulation,     Strength:  Not performed     Energy (kcal): 3975-4543 (MSJ x 1-1.2 x 1.3)           Wt used: Admission (103.8 kg)  Protein (gm): 100-135 (1.5-2 gm/kg adj BW)             Wt used:  Adjusted (~67 kg)   Fluid (mL/day): 1 mL/kcal     Plan:   - Continue to try to eat something first thing in the morning.  - Continue with antiemetics scheduled to help with the nausea  - Limit use of straws if it causes gas pains.   - Continue with Bone broth as a way to get additional protein. - Suggested small meals at set times verses hunger cues. - Limit liquids at meal times to prevent filling up  - Continue to be available as a resource.      Signed By: Reji Zavala, Shots

## 2022-06-08 ENCOUNTER — HOSPITAL ENCOUNTER (OUTPATIENT)
Dept: INFUSION THERAPY | Age: 67
Discharge: HOME | End: 2022-06-08
Payer: MEDICARE

## 2022-06-08 ENCOUNTER — APPOINTMENT (OUTPATIENT)
Dept: INFUSION THERAPY | Age: 67
End: 2022-06-08

## 2022-06-08 VITALS
DIASTOLIC BLOOD PRESSURE: 77 MMHG | WEIGHT: 197.8 LBS | SYSTOLIC BLOOD PRESSURE: 134 MMHG | OXYGEN SATURATION: 98 % | HEART RATE: 65 BPM | TEMPERATURE: 98 F | BODY MASS INDEX: 33.77 KG/M2 | HEIGHT: 64 IN | RESPIRATION RATE: 16 BRPM

## 2022-06-08 DIAGNOSIS — C25.0 MALIGNANT NEOPLASM OF HEAD OF PANCREAS (HCC): ICD-10-CM

## 2022-06-08 DIAGNOSIS — Z76.89 PREVENTION OF CHEMOTHERAPY-INDUCED NEUTROPENIA: Primary | ICD-10-CM

## 2022-06-08 PROCEDURE — 74011250636 HC RX REV CODE- 250/636: Performed by: INTERNAL MEDICINE

## 2022-06-08 PROCEDURE — 74011000250 HC RX REV CODE- 250: Performed by: INTERNAL MEDICINE

## 2022-06-08 PROCEDURE — 96377 APPLICATON ON-BODY INJECTOR: CPT

## 2022-06-08 PROCEDURE — 96360 HYDRATION IV INFUSION INIT: CPT

## 2022-06-08 RX ORDER — SODIUM CHLORIDE 0.9 % (FLUSH) 0.9 %
10 SYRINGE (ML) INJECTION AS NEEDED
Status: DISCONTINUED | OUTPATIENT
Start: 2022-06-08 | End: 2022-06-09 | Stop reason: HOSPADM

## 2022-06-08 RX ORDER — SODIUM CHLORIDE 9 MG/ML
10 INJECTION INTRAMUSCULAR; INTRAVENOUS; SUBCUTANEOUS AS NEEDED
Status: DISCONTINUED | OUTPATIENT
Start: 2022-06-08 | End: 2022-06-09 | Stop reason: HOSPADM

## 2022-06-08 RX ORDER — HEPARIN 100 UNIT/ML
300-500 SYRINGE INTRAVENOUS AS NEEDED
Status: DISCONTINUED | OUTPATIENT
Start: 2022-06-08 | End: 2022-06-09 | Stop reason: HOSPADM

## 2022-06-08 RX ORDER — SODIUM CHLORIDE 9 MG/ML
1000 INJECTION, SOLUTION INTRAVENOUS ONCE
Status: COMPLETED | OUTPATIENT
Start: 2022-06-08 | End: 2022-06-08

## 2022-06-08 RX ADMIN — PEGFILGRASTIM 6 MG: KIT SUBCUTANEOUS at 14:02

## 2022-06-08 RX ADMIN — SODIUM CHLORIDE 1000 ML: 9 INJECTION, SOLUTION INTRAVENOUS at 13:45

## 2022-06-08 RX ADMIN — SODIUM CHLORIDE, PRESERVATIVE FREE 10 ML: 5 INJECTION INTRAVENOUS at 14:49

## 2022-06-08 RX ADMIN — Medication 500 UNITS: at 14:50

## 2022-06-08 NOTE — PROGRESS NOTES
[facility] Short Visit Progress Note    Patient admitted to [address] [place] [address] for pump removal+ Hydration + Neulasta ambulatory with cane in stable condition. Assessment completed. Pt complained of Nausea and vomiting after chemo. Covid Screening      1. Do you have any symptoms of COVID-19? SOB, coughing, fever, or generally not feeling well ? NO  2. Have you been exposed to COVID-19 recently? NO  3. Have you had any recent contact with family/friend that has a pending COVID test? NO    Vital Signs:  Visit Vitals  /77   Pulse 65   Temp 98 °F (36.7 °C)   Resp 16   Ht 5' 4\" (1.626 m)   Wt 89.7 kg (197 lb 12.8 oz)   SpO2 98%   BMI 33.95 kg/m²         Chest port flushed with positive blood return. No lab order for this treatment. Lab Results:  N/A        Medications:  Medications Administered     0.9% sodium chloride infusion 1,000 mL     Admin Date  06/08/2022 Action  New Bag Dose  1,000 mL Rate  1,000 mL/hr Route  IntraVENous Administered By  Lucy Ortega RN          0.9% sodium chloride injection 10 mL     Admin Date  06/08/2022 Action  Given Dose  10 mL Route  IntraVENous Administered By  Lucy Ortega RN          heparin (porcine) pf 300-500 Units     Admin Date  06/08/2022 Action  Given Dose  500 Units Route  InterCATHeter Administered By  Lucy Ortega RN          pegfilgrastim (NEULASTA) wearable SQ injector 6 mg     Admin Date  06/08/2022 Action  Given Dose  6 mg Route  SubCUTAneous Administered By  Lucy Ortega RN                Education provided to patient about Neulasta On Body Injector including: side effects, how/when to remove the device, as well as what to do in the event of device malfunction. Opportunity for questions was provided and all questions were answered. Patient verbalized understanding. Patient tolerated treatment well. Chest Port flushed, heparinized and de-accessed per protocol. Patient discharged from [place] [address] ambulatory in no distress at 1455.  Patient aware of next appointment.     Future Appointments   Date Time Provider Sally Santosi   6/14/2022 10:30 AM SS INF7 CH3 <1H [facility]   6/20/2022  7:30 AM [facility]   6/20/2022  8:15 AM Juan Francisco WEST [facility]   6/22/2022  1:30 [facility]   7/5/2022  7:30 AM [facility]   7/7/2022  1:30 PM [facility]   7/18/2022  7:30 AM SS [facility]   7/20/2022  1:00 PM [facility]   8/1/2022  7:30 AM [facility]   8/3/2022  2:00 PM [facility]

## 2022-06-09 ENCOUNTER — APPOINTMENT (OUTPATIENT)
Dept: INFUSION THERAPY | Age: 67
End: 2022-06-09

## 2022-06-13 RX ORDER — HEPARIN 100 UNIT/ML
300-500 SYRINGE INTRAVENOUS AS NEEDED
Status: CANCELLED
Start: 2022-06-22

## 2022-06-13 RX ORDER — ALBUTEROL SULFATE 0.83 MG/ML
2.5 SOLUTION RESPIRATORY (INHALATION) AS NEEDED
Status: CANCELLED
Start: 2022-06-20

## 2022-06-13 RX ORDER — SODIUM CHLORIDE 0.9 % (FLUSH) 0.9 %
10 SYRINGE (ML) INJECTION AS NEEDED
Status: CANCELLED | OUTPATIENT
Start: 2022-06-22

## 2022-06-13 RX ORDER — EPINEPHRINE 1 MG/ML
0.3 INJECTION, SOLUTION, CONCENTRATE INTRAVENOUS AS NEEDED
Status: CANCELLED | OUTPATIENT
Start: 2022-06-20

## 2022-06-13 RX ORDER — SODIUM CHLORIDE 9 MG/ML
10 INJECTION INTRAMUSCULAR; INTRAVENOUS; SUBCUTANEOUS AS NEEDED
Status: CANCELLED | OUTPATIENT
Start: 2022-06-22

## 2022-06-13 RX ORDER — DIPHENHYDRAMINE HYDROCHLORIDE 50 MG/ML
25 INJECTION, SOLUTION INTRAMUSCULAR; INTRAVENOUS AS NEEDED
Status: CANCELLED
Start: 2022-06-20

## 2022-06-13 RX ORDER — HYDROCORTISONE SODIUM SUCCINATE 100 MG/2ML
100 INJECTION, POWDER, FOR SOLUTION INTRAMUSCULAR; INTRAVENOUS AS NEEDED
Status: CANCELLED | OUTPATIENT
Start: 2022-06-20

## 2022-06-13 RX ORDER — SODIUM CHLORIDE 9 MG/ML
1000 INJECTION, SOLUTION INTRAVENOUS ONCE
Status: CANCELLED
Start: 2022-06-22 | End: 2022-06-22

## 2022-06-13 RX ORDER — ONDANSETRON 2 MG/ML
8 INJECTION INTRAMUSCULAR; INTRAVENOUS AS NEEDED
Status: CANCELLED | OUTPATIENT
Start: 2022-06-20

## 2022-06-13 RX ORDER — DIPHENHYDRAMINE HYDROCHLORIDE 50 MG/ML
50 INJECTION, SOLUTION INTRAMUSCULAR; INTRAVENOUS AS NEEDED
Status: CANCELLED
Start: 2022-06-20

## 2022-06-13 RX ORDER — ACETAMINOPHEN 325 MG/1
650 TABLET ORAL AS NEEDED
Status: CANCELLED
Start: 2022-06-20

## 2022-06-14 ENCOUNTER — HOSPITAL ENCOUNTER (OUTPATIENT)
Dept: INFUSION THERAPY | Age: 67
Discharge: HOME | End: 2022-06-14
Payer: MEDICARE

## 2022-06-14 ENCOUNTER — TELEPHONE (OUTPATIENT)
Dept: ONCOLOGY | Age: 67
End: 2022-06-14

## 2022-06-14 VITALS
TEMPERATURE: 98 F | DIASTOLIC BLOOD PRESSURE: 83 MMHG | HEIGHT: 64 IN | RESPIRATION RATE: 16 BRPM | OXYGEN SATURATION: 98 % | HEART RATE: 106 BPM | SYSTOLIC BLOOD PRESSURE: 140 MMHG | BODY MASS INDEX: 33.95 KG/M2

## 2022-06-14 DIAGNOSIS — E86.0 DEHYDRATION: Primary | ICD-10-CM

## 2022-06-14 DIAGNOSIS — Z76.89 PREVENTION OF CHEMOTHERAPY-INDUCED NEUTROPENIA: ICD-10-CM

## 2022-06-14 DIAGNOSIS — C25.0 MALIGNANT NEOPLASM OF HEAD OF PANCREAS (HCC): ICD-10-CM

## 2022-06-14 LAB
ANION GAP SERPL CALC-SCNC: 8 MMOL/L (ref 5–15)
BUN SERPL-MCNC: 5 MG/DL (ref 6–20)
BUN/CREAT SERPL: 8 (ref 12–20)
CALCIUM SERPL-MCNC: 8.9 MG/DL (ref 8.5–10.1)
CHLORIDE SERPL-SCNC: 108 MMOL/L (ref 97–108)
CO2 SERPL-SCNC: 23 MMOL/L (ref 21–32)
CREAT SERPL-MCNC: 0.63 MG/DL (ref 0.55–1.02)
GLUCOSE SERPL-MCNC: 98 MG/DL (ref 65–100)
POTASSIUM SERPL-SCNC: 3.9 MMOL/L (ref 3.5–5.1)
SODIUM SERPL-SCNC: 139 MMOL/L (ref 136–145)

## 2022-06-14 PROCEDURE — 77030016057 HC NDL HUBR APOL -B

## 2022-06-14 PROCEDURE — 74011000250 HC RX REV CODE- 250: Performed by: NURSE PRACTITIONER

## 2022-06-14 PROCEDURE — 36415 COLL VENOUS BLD VENIPUNCTURE: CPT

## 2022-06-14 PROCEDURE — 74011250636 HC RX REV CODE- 250/636: Performed by: NURSE PRACTITIONER

## 2022-06-14 PROCEDURE — 80048 BASIC METABOLIC PNL TOTAL CA: CPT

## 2022-06-14 PROCEDURE — 96360 HYDRATION IV INFUSION INIT: CPT

## 2022-06-14 RX ORDER — HEPARIN 100 UNIT/ML
300-500 SYRINGE INTRAVENOUS AS NEEDED
Status: ACTIVE | OUTPATIENT
Start: 2022-06-14 | End: 2022-06-14

## 2022-06-14 RX ORDER — SODIUM CHLORIDE 9 MG/ML
10 INJECTION INTRAMUSCULAR; INTRAVENOUS; SUBCUTANEOUS AS NEEDED
Status: ACTIVE | OUTPATIENT
Start: 2022-06-14 | End: 2022-06-14

## 2022-06-14 RX ORDER — SODIUM CHLORIDE 0.9 % (FLUSH) 0.9 %
10 SYRINGE (ML) INJECTION AS NEEDED
Status: DISPENSED | OUTPATIENT
Start: 2022-06-14 | End: 2022-06-14

## 2022-06-14 RX ORDER — SODIUM CHLORIDE 9 MG/ML
1000 INJECTION, SOLUTION INTRAVENOUS ONCE
Status: COMPLETED | OUTPATIENT
Start: 2022-06-14 | End: 2022-06-14

## 2022-06-14 RX ADMIN — HEPARIN 500 UNITS: 100 SYRINGE at 11:46

## 2022-06-14 RX ADMIN — SODIUM CHLORIDE 1000 ML: 9 INJECTION, SOLUTION INTRAVENOUS at 10:42

## 2022-06-14 RX ADMIN — Medication 10 ML: at 10:42

## 2022-06-14 RX ADMIN — Medication 10 ML: at 11:46

## 2022-06-14 NOTE — PROGRESS NOTES
[facility] Progress Note    Date: 2022    Name: Lorelei Conner    MRN: [id number]         : 1955    Ms. Kal Alonzo Arrived ambulatory and in no distress for Hydration. Assessment was completed, no acute issues at this time, no new complaints voiced. R chest wall port accessed without difficulty, labs drawn & sent for processing. Ms. Armen Alvarez vitals were reviewed. Patient Vitals for the past 12 hrs:   Temp Pulse Resp BP SpO2   22 1148 98 °F (36.7 °C) -- 16 (!) 140/83 --   22 1036 -- (!) 106 16 127/84 98 %       Lab results were obtained and reviewed. Recent Results (from the past 12 hour(s))   METABOLIC PANEL, BASIC    Collection Time: 22 10:42 AM   Result Value Ref Range    Sodium 139 136 - 145 mmol/L    Potassium 3.9 3.5 - 5.1 mmol/L    Chloride 108 97 - 108 mmol/L    CO2 23 21 - 32 mmol/L    Anion gap 8 5 - 15 mmol/L    Glucose 98 65 - 100 mg/dL    BUN 5 (L) 6 - 20 MG/DL    Creatinine 0.63 0.55 - 1.02 MG/DL    BUN/Creatinine ratio 8 (L) 12 - 20      GFR est AA >60 >60 ml/min/1.73m2    GFR est non-AA >60 >60 ml/min/1.73m2    Calcium 8.9 8.5 - 10.1 MG/DL       Medications:  Medications Administered     0.9% sodium chloride infusion 1,000 mL     Admin Date  2022 Action  New Bag Dose  1,000 mL Rate  1,000 mL/hr Route  IntraVENous Administered By  Pavel Carr RN          0.9% sodium chloride injection 10 mL     Admin Date  2022 Action  Given Dose  10 mL Route  IntraVENous Administered By  Pavel Carr RN          heparin (porcine) pf 300-500 Units     Admin Date  2022 Action  Given Dose  500 Units Route  InterCATHeter Administered By  Pavel Carr RN          sodium chloride (NS) flush 10 mL     Admin Date  2022 Action  Given Dose  10 mL Route  IntraVENous Administered By  Pavel Carr RN              Ms. Kal Alonzo tolerated treatment well and was discharged from [place] [address] in stable condition.    Port de-accessed, flushed & heparinized per protocol. She is to return on June 20 at 0730 for her next appointment.     Magdy Clarke RN  June 14, 2022

## 2022-06-14 NOTE — TELEPHONE ENCOUNTER
[address] at [facility]  [phone]        06/14/22 4:19 PM Attempted to place return call to patient via contact number provided. No answer, left message requesting call back. Provided office phone number as well.     06/15/22 9:09 AM Received incoming call from patient. Advised of lab results per Katharina Milligan NP. Patient reports that she feels great. She notes some side effects--including fatigue and decreased taste, but states this is not as extreme as in the past. Patient has been having daily bowel movements, occasionally watery in consistency but attributes this to her diet. Patient stated she is eating more than usual when on treatment and has been able to go out the past few days. Patient also mentioned that weight measured in [facility] yesterday was 17 lbs less than the previous week, around 180 lbs per patient. She did weigh herself somewhere else after [facility] appointment and stated she weighed 186.7lbs. Patient suspecting scale might have needed to be calibrated. She stated she does not feel as if she weighs less and her clothes are not fitting differently. However, she requested that Katharina Milligan NP, be updated of this. No further questions or concerns at this time.

## 2022-06-15 ENCOUNTER — TELEPHONE (OUTPATIENT)
Dept: ONCOLOGY | Age: 67
End: 2022-06-15

## 2022-06-15 NOTE — TELEPHONE ENCOUNTER
06/15/22 10:22 AM Spoke with patient and reviewed potassium is stable. Continue on current dose of potassium. Patient reports loose stools are less frequent after this cycle and she is eating/drinking well. She reports a weight-loss per infusion center scales. I advised we will re-weigh her on our scale upstairs on Monday because infusion scales may be off. She verbalized understanding.

## 2022-06-15 NOTE — TELEPHONE ENCOUNTER
Patient left a voicemail stating that she would like for a call back from [employer]. Please advise.     CB# [phone]

## 2022-06-17 NOTE — PROGRESS NOTES
[facility] [place] [facility]  [address] [facility], [address]  Tee Sitter: [phone]  F: [phone]      Reason for Visit:   Leann Keller is a 77 y.o. female who is seen for follow up of pancreatic adenocarcinoma. Hematology/Oncology Treatment History:     Diagnosis: Pancreatic adenocarcinoma    Stage: clinical Stage Ib [T2N0] at diagnosis; Stage III [[id number]] at surgery    Pathology:   -9/26/21 Cytology - brushings from common bile duct: Reactive glandular epithelial cells and bile   -9/28/21 pancreatic head mass biopsy: Adenocarcinoma.   -10/22/21 [facility] mult-cancer panel -negative   -2/23/22 Pancreaticoduodenectomy (Whipple resection): Ductal adenocarcinoma, G2, 1.2cm. Tumor invades peripancreatic soft tissues. LVI negative. PNI present. Margins uninvolved. 5/29 LNs involved with cancer.   y pT1cN2     Prior Treatment:   1. Neoadjuvant FOLFIRNOX x 6 cycles, 10/18/21-1/10/22  2. PYLORUS PRESERVING WHIPPLE PROCEDURE AND PORTAL LYPHMADENECTOMY, 2/23/22    Current Treatment: adjuvant FOLFIRNOX x 6 cycles, started 4/12/22 - current. Added neulasta with C2. History of Present Illness:   Leann Keller is a pleasant 77 y.o. female who comes in for evaluation of pancreatic cancer. She presented in 9/2021 with obstructive jaundice, transaminitis. ERCP on 9/26/21 notable for distal CBD stricture; possible tumor/mass located in the CBD; underwent biliary stent placement to relieve biliary obstruction. CT A/P revealed mild/mod intrahepatic biliary ductal dilatation; prominence of CBD and hydropic gallbladder, suggestive of stricture/stenosis/mass of distal CBD. MRI of abdomen showed narrowing of CBD pancreatic head suggestive of extrinisic compression concerning for periampullary mass vs eccentric intraluminal lesion. EUS showed 3.2cm hypoechoic mass in pancreas head. Biopsy revealed adenocarcinoma.    Pt met with  [facility] in [place] and plans for neoadjuvant chemotherapy prior to surgery. Interval history:  Patient here for follow up of pancreas cancer and [address]. Tolerated C10 better than prior cycles with minimal nausea and diarrhea. Had one episode of diarrhea, but otherwise having daily soft stool. Only required lomotil once. Eating and hydrating much better - weight stable. No fevers, chills, abdominal pain, CP or edema. PMHx: OA and Rheumatoid arthritis in lower back, Ulcerative colitis, HTN, Hypothyroidism  PSurgHx: , Cyst removed from left breast  SHx: , has 3 children (1 daughter and 2 sons). Works as  in [employer]. Nonsmoker, no EtOH.  in rehab recovering from [facility]. FHx: Mother  of pancreatic cancer age 80. Brother and son had colon cancer. Review of Systems: A complete review of systems was obtained, reviewed. Pertinent findings reviewed above. Physical Exam:     Visit Vitals  /75   Pulse 85   Temp 98 °F (36.7 °C)   Ht 5' 4\" (1.626 m)   Wt 193 lb 6.4 oz (87.7 kg)   SpO2 98%   BMI 33.20 kg/m²     ECOG PS: 1  General: no distress  Eyes: anicteric sclerae  HENT: oropharynx clear  Neck: supple  Lymphatic: no cervical, supraclavicular adenopathy  Respiratory: CTAB, normal respiratory effort  CV: no peripheral edema, port upper chest.   GI: soft, nontender, nondistended, no masses; Midline surgical scar appears to be healing well with dry skin and scar/scabbing. Skin: no rashes; no ecchymoses; no petechiae  Neuro: alert and oriented      Results:     Lab Results   Component Value Date/Time    WBC 10.2 2022 07:46 AM    HGB 8.4 (L) 2022 07:46 AM    HCT 26.5 (L) 2022 07:46 AM    PLATELET 316  07:46 AM    MCV 99.3 (H) 2022 07:46 AM    ABS.  NEUTROPHILS PENDING 2022 07:46 AM    Hemoglobin (POC) 12.6 10/06/2013 08:45 AM    Hematocrit (POC) 37 10/06/2013 08:45 AM     Lab Results   Component Value Date/Time    Sodium 139 2022 10:42 AM    Potassium 3.9 06/14/2022 10:42 AM    Chloride 108 06/14/2022 10:42 AM    CO2 23 06/14/2022 10:42 AM    Glucose 98 06/14/2022 10:42 AM    BUN 5 (L) 06/14/2022 10:42 AM    Creatinine 0.63 06/14/2022 10:42 AM    GFR est AA >60 06/14/2022 10:42 AM    GFR est non-AA >60 06/14/2022 10:42 AM    Calcium 8.9 06/14/2022 10:42 AM    Sodium (POC) 143 10/06/2013 08:45 AM    Potassium (POC) 3.4 (L) 10/06/2013 08:45 AM    Chloride (POC) 104 10/06/2013 08:45 AM    Glucose (POC) 84 03/02/2022 03:51 PM    BUN (POC) 11 10/06/2013 08:45 AM    Creatinine (POC) 1.0 10/06/2013 08:45 AM    Calcium, ionized (POC) 1.27 10/06/2013 08:45 AM     Lab Results   Component Value Date/Time    Bilirubin, total 0.6 06/06/2022 08:14 AM    ALT (SGPT) 43 06/06/2022 08:14 AM    Alk. phosphatase 93 06/06/2022 08:14 AM    Protein, total 5.7 (L) 06/06/2022 08:14 AM    Albumin 2.5 (L) 06/06/2022 08:14 AM    Globulin 3.2 06/06/2022 08:14 AM     Lab Results   Component Value Date/Time    Reticulocyte count 2.0 03/15/2022 09:38 AM    Iron % saturation 13 (L) 03/15/2022 09:38 AM    TIBC 351 03/15/2022 09:38 AM    Ferritin 177 03/15/2022 09:38 AM    Vitamin B12 1,155 (H) 06/06/2022 08:14 AM    Folate 11.8 03/15/2022 09:38 AM    Sed rate (ESR) 17 09/16/2010 12:04 PM    C-Reactive protein <0.29 01/18/2017 08:45 AM    TSH 2.84 04/12/2022 07:50 AM    ALBA, Direct Detected (A) 09/16/2010 12:04 PM    Lipase 1,214 (H) 09/29/2021 04:10 AM    Hep C virus Ab Interp. NONREACTIVE 09/24/2021 06:55 PM     Lab Results   Component Value Date/Time    CEA 3.5 09/25/2021 11:46 AM    Carbohydrate Antigen 19-9, (CA 19-9) 16 04/12/2022 07:50 AM       10/18/21 CA 19-9:  138  1/10/22 CA 19-9: 130   4/12/22 CA 19-9: 16       Imaging / Procedures:     9/24/21 US ABD   IMPRESSION  Cholelithiasis. Gallbladder sludge. Prominent CBD with mild intrahepatic ductal dilatation as well. Nonemergent MRI with MRCP to delineate etiology for CBD distention.     9/24/21 CT ABD PELV W CONT  There is mild/moderate intrahepatic biliary ductal dilatation, prominence of the CBD and a hydropic gallbladder. No pancreatic duct dilatation. No clearly  delineated pancreatic head mass. Imaging findings suggestive of stricture/stenosis/mass of the distal CBD, no extrinsic pancreatic head mass is  identified. Gastroenterology consult  Correlation with MRI/MRCP on a nonemergent basis. There is severe degenerative change of the lumbar spine. 9/25/21 MRI ABD WO CONT  IMPRESSION  1. Intrahepatic and extra hepatic biliary dilatation with abrupt narrowing of  the common bile duct pancreatic head just proximal to the ampulla. Suggestion of  extrinsic compression on the duct. This raises the possibility of a  periampullary mass. Alternatively, this may be an eccentric intraluminal lesion. Evaluation is limited. Recommend GI consultation for possible ERCP and EUS. 2.  Questionable small lesion in the left hepatic lobe with mild increased T2  signal and T1 hypointensity. Recommend follow-up imaging a contrast-enhanced  study preferably MRI. 3.  Cholelithiasis. Distended gallbladder with mild gallbladder wall thickening. Correlate for acute cholecystitis    9/26/21 XR ERCP/ERCB / Dr. Cara Nelson  Impression: Biliary ductal dilation, with a maximum common duct diameter of 15 mm; Severe stenosis, involving distal CBD /pancreas head area concerning for neoplasia  Interventions: Endoscopic ampullary sphincterotomy and biliary stent placement; brushings from the CBD    9/27/21 CT CHEST W CONT:   IMPRESSION  1. There is a minimal right pleural effusion. 2. There are small pulmonary nodules present. There is a nodule in the plane of  the right major fissure and posteriorly in left lower lobe. These were not  present on examination of 1/18/2017. These could be on the basis of metastatic  disease. Close follow-up is suggested.  There are also 2 small subpleural nodules  anteriorly in the right upper lobe as described above which can also be  evaluated on follow-up. 9/28/21 EUS:  Endoscopic: Esophagus:normal; Stomach: normal; Duodenum/jejunum: normal and protruding biliary stent  Ultrasound: Esophagus: normal findings;     Stomach: normal findings:     Pancreas: Areas examined: the entire gland                          Parenchyma: -Evidence of a hypoechoic mass with poorly defined borders seen in the head of the pancreas. It appeared involving the CBD where a stent is seen, however it did not involve the major vessels. It measured about 3.2 cm in widest diameter on one view. Pancreatic Duct: normal findings, not dilated               Liver: Parenchyma: normal                          Gallbladder: normal                          Bile Duct: the common bile duct is dilated in the proximal and mid portion and a plastic stent could be seen                          Lymph Node: no adenopathy  Impression:   Pancreas head mass with fine needle biopsy showing adenocarcinoma on preliminary cytology  Recommendations: Follow-up on pathology  Follow-up with oncology, will need further evaluation of pulmonary nodule  Surgical oncology consultation  Resume GI lite diet today and can discharge in am from GI standpoint    12/20/2021:  MRI abd w wo cont:  IMPRESSION  1. Positive response to therapy. Decreased size of the pancreatic head mass, which now has a maximum AP diameter of 2.3 cm. No obstruction of the biliary tree or pancreatic duct. No involvement of the SMA. Based on imaging, patient is a candidate for resection. 2. Cholelithiasis. No acute cholecystitis or biliary obstruction. MRI Pelv w wo cont:  IMPRESSION  No acute process or evidence of malignant neoplasm/ metastatic disease. CT chest w cont:  IMPRESSION  1. Interval apparent resolution of previously seen scattered subcentimeter bilateral pulmonary nodules and right pleural effusion.   2.  Please see separately dictated reports for the MRI abdomen and pelvis obtained the same day for subdiaphragmatic findings. 6/1/22 colonoscopy: The perianal and digital rectal examinations were normal. The terminal ileum appeared normal. Biopsies were taken. The colon (entire examined portion) appeared normal. Small non-bleeding internal hemorrhoids were found during retroflexion (ileium biopsy small intestinal mucosa with lymphoid aggregates, negative for active inflammation; colon bx-colonic mucosa with lymphoid aggregates)        Assessment/Recommendations:   77 y.o. female with Ulcerative colitis, Hyopthyroidism, admitted with obstructive jaundice concerning for underlying malignant obstructing mass. 1. Pancreatic adenocarcinoma:  Clinically stage Ib [T2N0], but need further evaluation in future regarding pulmonary nodules and liver lesion. Presented with obstructive jaundice and transaminitis, CA 19-9 was 198 in 9/2021 at diagnosis. Underwent biliary stent placement with improvement in biliary obstruction. 3.2cm mass seen in pancreatic head on EUS, not involving vasculature, possibly resectable. Dr. Melanie Leon in [place] has evaluated patient and recommends consideration of neoadjuvant chemotherapy with close attention to lung nodules and liver lesion. I agree with this recommendation for earlier treatment of micrometastatic disease, ability to determine whether pt has chemosensitive disease. We discussed the risks and benefits of FOLFIRINOX chemotherapy, including potential side effects. These include but are not limited to fatigue, nausea vomiting, diarrhea, neuropathy, taste changes, cold intolerance, esophageal spasm, allergic reactions, alopecia, mucositis, myelosuppression, risk for infection, infertility, and rarely, death. Rarely, a patient may have a condition where they do not metabolize fluorouracil appropriately (called DPD deficiency), and they may have excessive toxicity. A Port-A-Cath will be required in order to deliver the continuous infusion. BRCA 1/2 negative.  The patient has consented to beginning therapy. Pt completed 6 cycles of mFOLFIRINOX in neoadjuvant setting, followed by pancreaticoduodenectomy (Whipple resection) on 2/23/22. Now planning for 6 cycles of adjuvant chemotherapy. Supportive care meds include: Emla cream, Zofran, Compazine, Dexamethasone  NCCN surveillance: H&P, CA 19-9, chest CT and CT or MRI abd/pelv with contrast every 3-6 mo 2 y, then every 6-12 mo as clinically indicated. -- Proceed with C11 of adjuvant mFOLFIRINOX with neulasta support (10% DR in 5-FU, Oxali, and 20% DR in Irinotecan.)  -- Fluids with pump removal.   -- Checking CA 19-9 on date of C1, C6, C7, C12.   -- Return in 2 weeks for C12 mFOLFIRINOX, Neulasta support, MD/NP visit. 2. Diarrhea:  Has history of UC. Not taking Cholestyramine (Questran) due to intolerance. Taking lomotil TID. Imodium prn - does not work well. Hydrating with Gatorade/water. Cdiff negative. Evaluated by JESSICA Serrato discussed if colon is normal, consider trial of sandostatin if this is chemo induced diarrhea. Colonoscopy 6/1/22 normal indicating chemotherapy induced diarrhea rather than UC flare. -- Atropine before irinotecan. Lomotil QID prn.   -- Consider Octreotide 150mcg tid for chemo induced diarrhea. 3. Pulmonary nodules:   Repeat imaging after C4 of chemo showed resolution of pulmonary nodules. 4. HTN:   Managed on HCTZ. Advised checking BP at home and if systolic <975, then hold HCTZ. 5. H/o Ulcerative procto-sigmoiditis:  April 2017 colonoscopy with one adenoma polyp removed. Repeat colonoscopy was normal on 6/1/22. Repeat 10 years for screening. 6. Morbid obesity:  Discussed healthy food options and ways to incorporate more protein into diet. 7. Normocytic anemia:  Due to chemotherapy, decreased PO intake and recent Whipple surgery. B12/folate/ferritin normal. Mildly low iron sat. Monitor.      9. Dysguesia / Poor appetite / Hypokalemia:   Intake, appetite and diarrhea improved with dose reduction. -- On KCL 20 meq BID. Advised increase to 30meq BID if diarrhea returns. 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 Present in bilateral legs. Monitor. I personally saw and evaluated the patient and performed the key components of medical decision making. The history, physical exam, and documentation were performed by Viviana Finn NP. I reviewed and verified the above documentation and modified it as needed. Proceed with C11 FOLFIRINOX today with same dose-reduction as C10. Tolerated treatment much better this time with only 1 day of loose stools requiring Lomotil.      Signed By:  Date:  Estuardo Espinoza MD   06/20/22

## 2022-06-20 ENCOUNTER — OFFICE VISIT (OUTPATIENT)
Dept: ONCOLOGY | Age: 67
End: 2022-06-20
Payer: MEDICARE

## 2022-06-20 ENCOUNTER — HOSPITAL ENCOUNTER (OUTPATIENT)
Dept: INFUSION THERAPY | Age: 67
Discharge: HOME | End: 2022-06-20
Payer: MEDICARE

## 2022-06-20 VITALS
DIASTOLIC BLOOD PRESSURE: 80 MMHG | RESPIRATION RATE: 16 BRPM | HEIGHT: 64 IN | WEIGHT: 193 LBS | SYSTOLIC BLOOD PRESSURE: 122 MMHG | HEART RATE: 80 BPM | OXYGEN SATURATION: 98 % | BODY MASS INDEX: 32.95 KG/M2 | TEMPERATURE: 97.2 F

## 2022-06-20 VITALS
HEART RATE: 85 BPM | DIASTOLIC BLOOD PRESSURE: 75 MMHG | WEIGHT: 193.4 LBS | SYSTOLIC BLOOD PRESSURE: 126 MMHG | TEMPERATURE: 98 F | HEIGHT: 64 IN | OXYGEN SATURATION: 98 % | BODY MASS INDEX: 33.02 KG/M2

## 2022-06-20 DIAGNOSIS — D64.9 NORMOCYTIC ANEMIA: ICD-10-CM

## 2022-06-20 DIAGNOSIS — C25.0 MALIGNANT NEOPLASM OF HEAD OF PANCREAS (HCC): ICD-10-CM

## 2022-06-20 DIAGNOSIS — C25.9 PANCREATIC ADENOCARCINOMA (HCC): Primary | ICD-10-CM

## 2022-06-20 DIAGNOSIS — Z51.11 CHEMOTHERAPY MANAGEMENT, ENCOUNTER FOR: ICD-10-CM

## 2022-06-20 DIAGNOSIS — G62.0 CHEMOTHERAPY-INDUCED NEUROPATHY (HCC): ICD-10-CM

## 2022-06-20 DIAGNOSIS — R63.0 POOR APPETITE: ICD-10-CM

## 2022-06-20 DIAGNOSIS — K52.1 DIARRHEA DUE TO DRUG: ICD-10-CM

## 2022-06-20 DIAGNOSIS — Z76.89 PREVENTION OF CHEMOTHERAPY-INDUCED NEUTROPENIA: Primary | ICD-10-CM

## 2022-06-20 DIAGNOSIS — T45.1X5A CHEMOTHERAPY-INDUCED NEUROPATHY (HCC): ICD-10-CM

## 2022-06-20 LAB
ALBUMIN SERPL-MCNC: 2.8 G/DL (ref 3.5–5)
ALBUMIN/GLOB SERPL: 0.9 {RATIO} (ref 1.1–2.2)
ALP SERPL-CCNC: 107 U/L (ref 45–117)
ALT SERPL-CCNC: 23 U/L (ref 12–78)
ANION GAP SERPL CALC-SCNC: 9 MMOL/L (ref 5–15)
AST SERPL-CCNC: 21 U/L (ref 15–37)
BASOPHILS # BLD: 0 K/UL (ref 0–0.1)
BASOPHILS NFR BLD: 0 % (ref 0–1)
BILIRUB SERPL-MCNC: 0.7 MG/DL (ref 0.2–1)
BUN SERPL-MCNC: 6 MG/DL (ref 6–20)
BUN/CREAT SERPL: 9 (ref 12–20)
CALCIUM SERPL-MCNC: 8.7 MG/DL (ref 8.5–10.1)
CHLORIDE SERPL-SCNC: 111 MMOL/L (ref 97–108)
CO2 SERPL-SCNC: 22 MMOL/L (ref 21–32)
CREAT SERPL-MCNC: 0.69 MG/DL (ref 0.55–1.02)
DIFFERENTIAL METHOD BLD: ABNORMAL
EOSINOPHIL # BLD: 0 K/UL (ref 0–0.4)
EOSINOPHIL NFR BLD: 0 % (ref 0–7)
ERYTHROCYTE [DISTWIDTH] IN BLOOD BY AUTOMATED COUNT: 21.6 % (ref 11.5–14.5)
GLOBULIN SER CALC-MCNC: 3.2 G/DL (ref 2–4)
GLUCOSE SERPL-MCNC: 91 MG/DL (ref 65–100)
HCT VFR BLD AUTO: 26.5 % (ref 35–47)
HGB BLD-MCNC: 8.4 G/DL (ref 11.5–16)
IMM GRANULOCYTES # BLD AUTO: 0 K/UL
IMM GRANULOCYTES NFR BLD AUTO: 0 %
LYMPHOCYTES # BLD: 3.8 K/UL (ref 0.8–3.5)
LYMPHOCYTES NFR BLD: 37 % (ref 12–49)
MCH RBC QN AUTO: 31.5 PG (ref 26–34)
MCHC RBC AUTO-ENTMCNC: 31.7 G/DL (ref 30–36.5)
MCV RBC AUTO: 99.3 FL (ref 80–99)
MONOCYTES # BLD: 0.8 K/UL (ref 0–1)
MONOCYTES NFR BLD: 8 % (ref 5–13)
NEUTS BAND NFR BLD MANUAL: 6 % (ref 0–6)
NEUTS SEG # BLD: 5.6 K/UL (ref 1.8–8)
NEUTS SEG NFR BLD: 49 % (ref 32–75)
NRBC # BLD: 0 K/UL (ref 0–0.01)
NRBC BLD-RTO: 0 PER 100 WBC
PLATELET # BLD AUTO: 185 K/UL (ref 150–400)
PLATELET COMMENTS,PCOM: ABNORMAL
PMV BLD AUTO: 10.4 FL (ref 8.9–12.9)
POTASSIUM SERPL-SCNC: 3.5 MMOL/L (ref 3.5–5.1)
PROT SERPL-MCNC: 6 G/DL (ref 6.4–8.2)
RBC # BLD AUTO: 2.67 M/UL (ref 3.8–5.2)
RBC MORPH BLD: ABNORMAL
SODIUM SERPL-SCNC: 142 MMOL/L (ref 136–145)
WBC # BLD AUTO: 10.2 K/UL (ref 3.6–11)
WBC MORPH BLD: ABNORMAL

## 2022-06-20 PROCEDURE — 74011250636 HC RX REV CODE- 250/636: Performed by: INTERNAL MEDICINE

## 2022-06-20 PROCEDURE — 96415 CHEMO IV INFUSION ADDL HR: CPT

## 2022-06-20 PROCEDURE — G8536 NO DOC ELDER MAL SCRN: HCPCS | Performed by: INTERNAL MEDICINE

## 2022-06-20 PROCEDURE — 36415 COLL VENOUS BLD VENIPUNCTURE: CPT

## 2022-06-20 PROCEDURE — 74011000258 HC RX REV CODE- 258: Performed by: INTERNAL MEDICINE

## 2022-06-20 PROCEDURE — G8417 CALC BMI ABV UP PARAM F/U: HCPCS | Performed by: INTERNAL MEDICINE

## 2022-06-20 PROCEDURE — 85025 COMPLETE CBC W/AUTO DIFF WBC: CPT

## 2022-06-20 PROCEDURE — 74011000250 HC RX REV CODE- 250: Performed by: INTERNAL MEDICINE

## 2022-06-20 PROCEDURE — G8427 DOCREV CUR MEDS BY ELIG CLIN: HCPCS | Performed by: INTERNAL MEDICINE

## 2022-06-20 PROCEDURE — G8400 PT W/DXA NO RESULTS DOC: HCPCS | Performed by: INTERNAL MEDICINE

## 2022-06-20 PROCEDURE — 80053 COMPREHEN METABOLIC PANEL: CPT

## 2022-06-20 PROCEDURE — 96375 TX/PRO/DX INJ NEW DRUG ADDON: CPT

## 2022-06-20 PROCEDURE — 96416 CHEMO PROLONG INFUSE W/PUMP: CPT

## 2022-06-20 PROCEDURE — 74011250636 HC RX REV CODE- 250/636: Performed by: NURSE PRACTITIONER

## 2022-06-20 PROCEDURE — 96413 CHEMO IV INFUSION 1 HR: CPT

## 2022-06-20 PROCEDURE — 1101F PT FALLS ASSESS-DOCD LE1/YR: CPT | Performed by: INTERNAL MEDICINE

## 2022-06-20 PROCEDURE — 96368 THER/DIAG CONCURRENT INF: CPT

## 2022-06-20 PROCEDURE — 77030012965 HC NDL HUBR BBMI -A

## 2022-06-20 PROCEDURE — 3017F COLORECTAL CA SCREEN DOC REV: CPT | Performed by: INTERNAL MEDICINE

## 2022-06-20 PROCEDURE — G8510 SCR DEP NEG, NO PLAN REQD: HCPCS | Performed by: INTERNAL MEDICINE

## 2022-06-20 PROCEDURE — 1090F PRES/ABSN URINE INCON ASSESS: CPT | Performed by: INTERNAL MEDICINE

## 2022-06-20 PROCEDURE — 99215 OFFICE O/P EST HI 40 MIN: CPT | Performed by: INTERNAL MEDICINE

## 2022-06-20 PROCEDURE — G8754 DIAS BP LESS 90: HCPCS | Performed by: INTERNAL MEDICINE

## 2022-06-20 PROCEDURE — G8752 SYS BP LESS 140: HCPCS | Performed by: INTERNAL MEDICINE

## 2022-06-20 PROCEDURE — 96417 CHEMO IV INFUS EACH ADDL SEQ: CPT

## 2022-06-20 PROCEDURE — 96366 THER/PROPH/DIAG IV INF ADDON: CPT

## 2022-06-20 PROCEDURE — 1123F ACP DISCUSS/DSCN MKR DOCD: CPT | Performed by: INTERNAL MEDICINE

## 2022-06-20 RX ORDER — HEPARIN 100 UNIT/ML
300-500 SYRINGE INTRAVENOUS AS NEEDED
Status: ACTIVE | OUTPATIENT
Start: 2022-06-20 | End: 2022-06-20

## 2022-06-20 RX ORDER — SODIUM CHLORIDE 0.9 % (FLUSH) 0.9 %
10 SYRINGE (ML) INJECTION AS NEEDED
Status: DISPENSED | OUTPATIENT
Start: 2022-06-20 | End: 2022-06-20

## 2022-06-20 RX ORDER — PROCHLORPERAZINE EDISYLATE 5 MG/ML
5 INJECTION INTRAMUSCULAR; INTRAVENOUS ONCE
Status: DISCONTINUED | OUTPATIENT
Start: 2022-06-20 | End: 2022-06-21 | Stop reason: HOSPADM

## 2022-06-20 RX ORDER — PALONOSETRON 0.05 MG/ML
0.25 INJECTION, SOLUTION INTRAVENOUS ONCE
Status: COMPLETED | OUTPATIENT
Start: 2022-06-20 | End: 2022-06-20

## 2022-06-20 RX ORDER — PROCHLORPERAZINE EDISYLATE 5 MG/ML
5 INJECTION INTRAMUSCULAR; INTRAVENOUS
Status: DISCONTINUED | OUTPATIENT
Start: 2022-06-20 | End: 2022-06-22 | Stop reason: HOSPADM

## 2022-06-20 RX ORDER — DEXAMETHASONE 4 MG/1
TABLET ORAL
Qty: 4 TABLET | Refills: 0 | Status: SHIPPED | OUTPATIENT
Start: 2022-06-20 | End: 2022-10-10

## 2022-06-20 RX ORDER — DEXTROSE MONOHYDRATE 50 MG/ML
25 INJECTION, SOLUTION INTRAVENOUS CONTINUOUS
Status: DISPENSED | OUTPATIENT
Start: 2022-06-20 | End: 2022-06-20

## 2022-06-20 RX ORDER — ATROPINE SULFATE 0.4 MG/ML
0.4 INJECTION, SOLUTION ENDOTRACHEAL; INTRAMEDULLARY; INTRAMUSCULAR; INTRAVENOUS; SUBCUTANEOUS
Status: DISCONTINUED | OUTPATIENT
Start: 2022-06-20 | End: 2022-06-22 | Stop reason: HOSPADM

## 2022-06-20 RX ORDER — SODIUM CHLORIDE 9 MG/ML
10 INJECTION INTRAMUSCULAR; INTRAVENOUS; SUBCUTANEOUS AS NEEDED
Status: ACTIVE | OUTPATIENT
Start: 2022-06-20 | End: 2022-06-20

## 2022-06-20 RX ADMIN — DEXAMETHASONE SODIUM PHOSPHATE 12 MG: 4 INJECTION, SOLUTION INTRA-ARTICULAR; INTRALESIONAL; INTRAMUSCULAR; INTRAVENOUS; SOFT TISSUE at 09:56

## 2022-06-20 RX ADMIN — SODIUM CHLORIDE, PRESERVATIVE FREE 10 ML: 5 INJECTION INTRAVENOUS at 09:58

## 2022-06-20 RX ADMIN — FLUOROURACIL 4320 MG: 50 INJECTION, SOLUTION INTRAVENOUS at 15:21

## 2022-06-20 RX ADMIN — IRINOTECAN HYDROCHLORIDE 240 MG: 20 INJECTION, SOLUTION INTRAVENOUS at 13:03

## 2022-06-20 RX ADMIN — PALONOSETRON 0.25 MG: 0.25 INJECTION, SOLUTION INTRAVENOUS at 09:58

## 2022-06-20 RX ADMIN — DEXTROSE MONOHYDRATE 25 ML/HR: 50 INJECTION, SOLUTION INTRAVENOUS at 09:54

## 2022-06-20 RX ADMIN — ATROPINE SULFATE 0.4 MG: 0.4 INJECTION, SOLUTION INTRAMUSCULAR; INTRAVENOUS; SUBCUTANEOUS at 12:23

## 2022-06-20 RX ADMIN — SODIUM CHLORIDE, PRESERVATIVE FREE 10 ML: 5 INJECTION INTRAVENOUS at 09:55

## 2022-06-20 RX ADMIN — OXALIPLATIN 153 MG: 5 INJECTION, SOLUTION INTRAVENOUS at 10:45

## 2022-06-20 RX ADMIN — LEUCOVORIN CALCIUM 720 MG: 200 INJECTION, POWDER, LYOPHILIZED, FOR SUSPENSION INTRAMUSCULAR; INTRAVENOUS at 13:00

## 2022-06-20 RX ADMIN — SODIUM CHLORIDE, PRESERVATIVE FREE 10 ML: 5 INJECTION INTRAVENOUS at 09:56

## 2022-06-20 RX ADMIN — PROCHLORPERAZINE EDISYLATE 5 MG: 5 INJECTION INTRAMUSCULAR; INTRAVENOUS at 15:16

## 2022-06-20 NOTE — PROGRESS NOTES
[facility] - Chemotherapy Progress Note    0740 Pt admit to [facility] for C 11 D 1 Folfirinox ambulatory in stable condition. Assessment completed. No new concerns voiced. PAC with positive blood return. Chemotherapy Flowsheet 6/20/2022   Cycle C11D1   Date 6/20/2022   Drug / Regimen Folfirinox   Pre Hydration -   Pre Meds -   Notes -       Visit Vitals  BP (P) 126/75   Pulse (P) 85   Temp (P) 98 °F (36.7 °C)   Resp (P) 16   Ht 5' 4\" (1.626 m)   Wt 87.5 kg (193 lb)   SpO2 (P) 98%   BMI 33.13 kg/m²     Pt accessed and assessed by Praveena Willoughby. Labs obtained and patient proceeded to scheduled MD appointment. Pt returned- no change to treatment.   Two nurses verified prior to administering:    Drug name    Drug dose    Infusion volume or drug volume when prepared in a syringe    Rate of administration    Route of administration    Expiration dates and/or times    Appearance and physical integrity of the drugs    Rate set on infusion pump, when used    Sequencing of drug administration    Medications:  Medications Administered     0.9% sodium chloride injection 10 mL     Admin Date  06/20/2022 Action  Given Dose  10 mL Route  IntraVENous Administered By  Ashlee Miranda[address] Date  06/20/2022 Action  Given Dose  10 mL Route  IntraVENous Administered By  Ashlee Sheppard[address] Date  06/20/2022 Action  Given Dose  10 mL Route  IntraVENous Administered By  Ashlee Miranda RN          atropine 0.4 mg/mL injection 0.4 mg     Admin Date  06/20/2022 Action  Given Dose  0.4 mg Route  SubCUTAneous Administered By  Ashlee Miranda RN          dexamethasone (DECADRON) 12 mg in 0.9% sodium chloride 50 mL IVPB     Admin Date  06/20/2022 Action  New Bag Dose  12 mg Route  IntraVENous Administered By  Ashlee Miranda RN          dextrose 5% infusion     Admin Date  06/20/2022 Action  New Bag Dose  25 mL/hr Rate  25 mL/hr Route  IntraVENous Administered By  Ashlee Miranda RN          fluorouraciL (ADRUCIL) 4,320 mg in 0.9% sodium chloride 100 mL CADD Cassette     Admin Date  06/20/2022 Action  New Bag Dose  4,320 mg Rate  2.2 mL/hr Route  IntraVENous Administered By  Indira Carter RN          irinotecan (CAMPTOSAR) 240 mg in dextrose 5% 250 mL, overfill volume 25 mL chemo infusion     Admin Date  06/20/2022 Action  New Bag Dose  240 mg Rate  191.3 mL/hr Route  IntraVENous Administered By  Indira Carter RN          leucovorin (WELLCOVORIN) 720 mg in dextrose 5% 250 mL, overfill volume 25 mL IVPB     Admin Date  06/20/2022 Action  New Bag Dose  720 mg Rate  207.3 mL/hr Route  IntraVENous Administered By  Indira Carter RN          oxaliplatin (ELOXATIN) 153 mg in dextrose 5% 250 mL, overfill volume 25 mL chemo infusion     Admin Date  06/20/2022 Action  New Bag Dose  153 mg Rate  152.8 mL/hr Route  IntraVENous Administered By  Indira Carter RN          palonosetron HCl (ALOXI) injection 0.25 mg     Admin Date  06/20/2022 Action  Given Dose  0.25 mg Route  IntraVENous Administered By  Indira Carter RN          prochlorperazine (COMPAZINE) injection 5 mg     Admin Date  06/20/2022 Action  Given Dose  5 mg Route  IntraVENous Administered By  Indira Carter RN                1600 Pt tolerated treatment well. PAC maintained positive blood return throughout treatment, flushed with positive blood return at conclusion . PAC remains intact for home CADDD/c home ambulatory in no distress. Pt aware of next appointment scheduled for 6/22/22 @ 1330.       Recent Results (from the past 12 hour(s))   CBC WITH AUTOMATED DIFF    Collection Time: 06/20/22  7:46 AM   Result Value Ref Range    WBC 10.2 3.6 - 11.0 K/uL    RBC 2.67 (L) 3.80 - 5.20 M/uL    HGB 8.4 (L) 11.5 - 16.0 g/dL    HCT 26.5 (L) 35.0 - 47.0 %    MCV 99.3 (H) 80.0 - 99.0 FL    MCH 31.5 26.0 - 34.0 PG    MCHC 31.7 30.0 - 36.5 g/dL    RDW 21.6 (H) 11.5 - 14.5 %    PLATELET 922 667 - 082 K/uL    MPV 10.4 8.9 - 12.9 FL    NRBC 0.0 0  WBC    ABSOLUTE NRBC 0.00 0.00 - 0.01 K/uL    NEUTROPHILS 49 32 - 75 %    BAND NEUTROPHILS 6 0 - 6 %    LYMPHOCYTES 37 12 - 49 %    MONOCYTES 8 5 - 13 %    EOSINOPHILS 0 0 - 7 %    BASOPHILS 0 0 - 1 %    IMMATURE GRANULOCYTES 0 %    ABS. NEUTROPHILS 5.6 1.8 - 8.0 K/UL    ABS. LYMPHOCYTES 3.8 (H) 0.8 - 3.5 K/UL    ABS. MONOCYTES 0.8 0.0 - 1.0 K/UL    ABS. EOSINOPHILS 0.0 0.0 - 0.4 K/UL    ABS. BASOPHILS 0.0 0.0 - 0.1 K/UL    ABS. IMM. GRANS. 0.0 K/UL    DF MANUAL      PLATELET COMMENTS Large Platelets      RBC COMMENTS ANISOCYTOSIS  2+        WBC COMMENTS ATYPICAL LYMPHOCYTES PRESENT     METABOLIC PANEL, COMPREHENSIVE    Collection Time: 06/20/22  7:46 AM   Result Value Ref Range    Sodium 142 136 - 145 mmol/L    Potassium 3.5 3.5 - 5.1 mmol/L    Chloride 111 (H) 97 - 108 mmol/L    CO2 22 21 - 32 mmol/L    Anion gap 9 5 - 15 mmol/L    Glucose 91 65 - 100 mg/dL    BUN 6 6 - 20 MG/DL    Creatinine 0.69 0.55 - 1.02 MG/DL    BUN/Creatinine ratio 9 (L) 12 - 20      GFR est AA >60 >60 ml/min/1.73m2    GFR est non-AA >60 >60 ml/min/1.73m2    Calcium 8.7 8.5 - 10.1 MG/DL    Bilirubin, total 0.7 0.2 - 1.0 MG/DL    ALT (SGPT) 23 12 - 78 U/L    AST (SGOT) 21 15 - 37 U/L    Alk.  phosphatase 107 45 - 117 U/L    Protein, total 6.0 (L) 6.4 - 8.2 g/dL    Albumin 2.8 (L) 3.5 - 5.0 g/dL    Globulin 3.2 2.0 - 4.0 g/dL    A-G Ratio 0.9 (L) 1.1 - 2.2

## 2022-06-20 NOTE — PROGRESS NOTES
Identified pt with two pt identifiers(name and ). Reviewed record in preparation for visit and have obtained necessary documentation. Chief Complaint   Patient presents with    Follow-up     2 wk, labs, pancreatic ca        Health Maintenance Due   Topic    Pneumococcal 65+ years (1 - PCV)    MICROALBUMIN Q1     Eye Exam Retinal or Dilated     DTaP/Tdap/Td series (1 - Tdap)    Shingrix Vaccine Age 49> (1 of 2)    Breast Cancer Screen Mammogram     Bone Densitometry (Dexa) Screening     Medicare Yearly Exam     Lipid Screen      Vitals:    22 0819   Weight: 193 lb 6.4 oz (87.7 kg)       Pain Scale: /10        Coordination of Care Questionnaire:  :     1. Have you been to the ER, urgent care clinic since your last visit? Hospitalized since your last visit? No    2. Have you seen or consulted any other health care providers outside of the [address] [place] [address] since your last visit? Include any pap smears or colon screening.  No

## 2022-06-22 ENCOUNTER — APPOINTMENT (OUTPATIENT)
Dept: INFUSION THERAPY | Age: 67
End: 2022-06-22

## 2022-06-22 ENCOUNTER — HOSPITAL ENCOUNTER (OUTPATIENT)
Dept: INFUSION THERAPY | Age: 67
Discharge: HOME | End: 2022-06-22
Payer: MEDICARE

## 2022-06-22 VITALS
RESPIRATION RATE: 16 BRPM | TEMPERATURE: 97.8 F | OXYGEN SATURATION: 97 % | HEART RATE: 67 BPM | DIASTOLIC BLOOD PRESSURE: 78 MMHG | SYSTOLIC BLOOD PRESSURE: 143 MMHG

## 2022-06-22 DIAGNOSIS — C25.0 MALIGNANT NEOPLASM OF HEAD OF PANCREAS (HCC): ICD-10-CM

## 2022-06-22 DIAGNOSIS — Z76.89 PREVENTION OF CHEMOTHERAPY-INDUCED NEUTROPENIA: Primary | ICD-10-CM

## 2022-06-22 PROCEDURE — 74011250636 HC RX REV CODE- 250/636: Performed by: INTERNAL MEDICINE

## 2022-06-22 PROCEDURE — 96360 HYDRATION IV INFUSION INIT: CPT

## 2022-06-22 PROCEDURE — 74011000250 HC RX REV CODE- 250: Performed by: INTERNAL MEDICINE

## 2022-06-22 PROCEDURE — 96377 APPLICATON ON-BODY INJECTOR: CPT

## 2022-06-22 RX ORDER — SODIUM CHLORIDE 9 MG/ML
1000 INJECTION, SOLUTION INTRAVENOUS ONCE
Status: COMPLETED | OUTPATIENT
Start: 2022-06-22 | End: 2022-06-22

## 2022-06-22 RX ORDER — HEPARIN 100 UNIT/ML
300-500 SYRINGE INTRAVENOUS AS NEEDED
Status: DISCONTINUED | OUTPATIENT
Start: 2022-06-22 | End: 2022-06-23 | Stop reason: HOSPADM

## 2022-06-22 RX ORDER — SODIUM CHLORIDE 9 MG/ML
10 INJECTION INTRAMUSCULAR; INTRAVENOUS; SUBCUTANEOUS AS NEEDED
Status: DISCONTINUED | OUTPATIENT
Start: 2022-06-22 | End: 2022-06-23 | Stop reason: HOSPADM

## 2022-06-22 RX ORDER — SODIUM CHLORIDE 0.9 % (FLUSH) 0.9 %
10 SYRINGE (ML) INJECTION AS NEEDED
Status: DISCONTINUED | OUTPATIENT
Start: 2022-06-22 | End: 2022-06-23 | Stop reason: HOSPADM

## 2022-06-22 RX ADMIN — Medication 10 ML: at 13:35

## 2022-06-22 RX ADMIN — Medication 10 ML: at 14:50

## 2022-06-22 RX ADMIN — SODIUM CHLORIDE 1000 ML: 9 INJECTION, SOLUTION INTRAVENOUS at 13:36

## 2022-06-22 RX ADMIN — PEGFILGRASTIM 6 MG: KIT SUBCUTANEOUS at 13:41

## 2022-06-22 RX ADMIN — HEPARIN 500 UNITS: 100 SYRINGE at 14:50

## 2022-06-22 NOTE — PROGRESS NOTES
[facility] Progress Note    Date: 2022    Name: Real Pandey    MRN: [id number]         : 1955:            Ms. Ruiz crespo arrived ambulatory and in no distress for Pump Removal, Neulasta,  and hydration. Assessment was completed, patient states she has been nausea, but has not vomited, fatigue noted. CADD completed in infusion center- 100 ml infused per order. Ms. Malinda Rodriguez vitals were reviewed. Patient Vitals for the past 24 hrs:   Temp Pulse Resp BP SpO2   22 1446 -- 67 16 (!) 143/78 --   22 1329 97.8 °F (36.6 °C) 83 16 134/81 97 %       Medications Administered     0.9% sodium chloride infusion 1,000 mL     Admin Date  2022 Action  New Bag Dose  1,000 mL Rate  1,000 mL/hr Route  IntraVENous Administered By  Surya Key RN          0.9% sodium chloride injection 10 mL     Admin Date  2022 Action  Given Dose  10 mL Route  IntraVENous Administered By  Surya Key RN           Admin Date  2022 Action  Given Dose  10 mL Route  IntraVENous Administered By  Surya Key RN          heparin (porcine) pf 300-500 Units     Admin Date  2022 Action  Given Dose  500 Units Route  InterCATHeter Administered By  Surya Key RN          pegfilgrastim (NEULASTA) wearable SQ injector 6 mg     Admin Date  2022 Action  Given Dose  6 mg Route  SubCUTAneous Administered By  Surya Key RN                Education provided to patient about Neulasta On Body Injector including: side effects, how/when to remove the device, as well as what to do in the event of device malfunction. Opportunity for questions was provided and all questions were answered. Patient verbalized understanding. Ms. Ruiz crespo tolerated treatment well and was discharged from [place] [address] in stable condition at 1455. Port de-accessed, flushed & heparinized per protocol. She is to return on  at 0730 for her next appointment.     Estella Bermudez RN   2022

## 2022-06-24 RX ORDER — DIPHENHYDRAMINE HYDROCHLORIDE 50 MG/ML
50 INJECTION, SOLUTION INTRAMUSCULAR; INTRAVENOUS AS NEEDED
Status: CANCELLED
Start: 2022-07-05

## 2022-06-24 RX ORDER — SODIUM CHLORIDE 9 MG/ML
10 INJECTION INTRAMUSCULAR; INTRAVENOUS; SUBCUTANEOUS AS NEEDED
Status: CANCELLED | OUTPATIENT
Start: 2022-07-07

## 2022-06-24 RX ORDER — HYDROCORTISONE SODIUM SUCCINATE 100 MG/2ML
100 INJECTION, POWDER, FOR SOLUTION INTRAMUSCULAR; INTRAVENOUS AS NEEDED
Status: CANCELLED | OUTPATIENT
Start: 2022-07-05

## 2022-06-24 RX ORDER — EPINEPHRINE 1 MG/ML
0.3 INJECTION, SOLUTION, CONCENTRATE INTRAVENOUS AS NEEDED
Status: CANCELLED | OUTPATIENT
Start: 2022-07-05

## 2022-06-24 RX ORDER — ACETAMINOPHEN 325 MG/1
650 TABLET ORAL AS NEEDED
Status: CANCELLED
Start: 2022-07-05

## 2022-06-24 RX ORDER — SODIUM CHLORIDE 0.9 % (FLUSH) 0.9 %
10 SYRINGE (ML) INJECTION AS NEEDED
Status: CANCELLED | OUTPATIENT
Start: 2022-07-07

## 2022-06-24 RX ORDER — ONDANSETRON 2 MG/ML
8 INJECTION INTRAMUSCULAR; INTRAVENOUS AS NEEDED
Status: CANCELLED | OUTPATIENT
Start: 2022-07-05

## 2022-06-24 RX ORDER — DIPHENHYDRAMINE HYDROCHLORIDE 50 MG/ML
25 INJECTION, SOLUTION INTRAMUSCULAR; INTRAVENOUS AS NEEDED
Status: CANCELLED
Start: 2022-07-05

## 2022-06-24 RX ORDER — SODIUM CHLORIDE 9 MG/ML
1000 INJECTION, SOLUTION INTRAVENOUS ONCE
Status: CANCELLED
Start: 2022-07-07 | End: 2022-07-07

## 2022-06-24 RX ORDER — HEPARIN 100 UNIT/ML
300-500 SYRINGE INTRAVENOUS AS NEEDED
Status: CANCELLED
Start: 2022-07-07

## 2022-06-24 RX ORDER — ALBUTEROL SULFATE 0.83 MG/ML
2.5 SOLUTION RESPIRATORY (INHALATION) AS NEEDED
Status: CANCELLED
Start: 2022-07-05

## 2022-06-26 NOTE — PERIOP NOTES
Dominik Leon  1955  [id number]    Situation:    Scheduled Procedure: Procedure(s):  ENDOSCOPIC ULTRASOUND (EUS)  Verbal report received from:  Tg Saini RN  Preoperative diagnosis: abnormal CT    Background:    Procedure: Procedure(s):  ENDOSCOPIC ULTRASOUND (EUS)  Physician performing procedure; Dr. Maria Eugenia Bills RN    NPO Status/Last PO Intake: midnight    Pregnancy Test:Not applicable If yes, result: none    Is the patient taking Blood Thinners: NO   Is the patient diabetic:no                Does the patient have a Pacemaker/Defibrillator in place?: no   Does the patient need antibiotics before/during/after procedure: no   If the patient is having a colon, How much prep was drank? What were the Colon prep results? Does the patient have SCD in place:no   Is patient on CONTACT precautions:no        If yes, what kind of CONTACT precautions:     Assessment:  Are the vital signs stable prior to patient coming to ENDO?  yes  Is the patient alert/oriented and able to sign consent for the procedures:yes    Does the patient have a patient IV in place?  yes     Recommendation:  Family or Friend present no     Permission to share finding with Family or Friend n/a Other

## 2022-06-27 ENCOUNTER — TELEPHONE (OUTPATIENT)
Dept: ONCOLOGY | Age: 67
End: 2022-06-27

## 2022-06-27 DIAGNOSIS — E87.6 HYPOKALEMIA: ICD-10-CM

## 2022-06-27 RX ORDER — SODIUM CHLORIDE 0.9 % (FLUSH) 0.9 %
10 SYRINGE (ML) INJECTION AS NEEDED
Status: CANCELLED | OUTPATIENT
Start: 2022-06-28

## 2022-06-27 RX ORDER — HEPARIN 100 UNIT/ML
300-500 SYRINGE INTRAVENOUS AS NEEDED
Status: CANCELLED | OUTPATIENT
Start: 2022-06-28

## 2022-06-27 RX ORDER — SODIUM CHLORIDE 9 MG/ML
10 INJECTION INTRAMUSCULAR; INTRAVENOUS; SUBCUTANEOUS AS NEEDED
Status: CANCELLED | OUTPATIENT
Start: 2022-06-28

## 2022-06-27 RX ORDER — SODIUM CHLORIDE 9 MG/ML
1000 INJECTION, SOLUTION INTRAVENOUS ONCE
Status: CANCELLED | OUTPATIENT
Start: 2022-06-28 | End: 2022-07-22

## 2022-06-27 RX ORDER — POTASSIUM CHLORIDE 750 MG/1
TABLET, EXTENDED RELEASE ORAL
Qty: 60 TABLET | Refills: 0 | Status: SHIPPED | OUTPATIENT
Start: 2022-06-27 | End: 2022-07-28

## 2022-06-27 NOTE — TELEPHONE ENCOUNTER
[facility]  [phone]    06/27/22 11:13 AM - Attempted to call the patient. There was no answer. Left a voicemail for the patient to call back at their earliest convenience along with the phone number to our office. [facility]  [phone]    06/27/22 2:38 PM - Attempted to call the patient. There was no answer. Left a voicemail for the patient to call back at their earliest convenience along with the phone number to our office. [facility]  [phone]    06/28/22 8:39 AM -  Attempted to call the patient. There was no answer. Left a voicemail for the patient to call back at their earliest convenience along with the phone number to our office.

## 2022-06-28 ENCOUNTER — HOSPITAL ENCOUNTER (OUTPATIENT)
Dept: INFUSION THERAPY | Age: 67
Discharge: HOME | End: 2022-06-28
Payer: MEDICARE

## 2022-06-28 VITALS
DIASTOLIC BLOOD PRESSURE: 63 MMHG | RESPIRATION RATE: 16 BRPM | SYSTOLIC BLOOD PRESSURE: 112 MMHG | TEMPERATURE: 97.2 F | HEART RATE: 73 BPM

## 2022-06-28 DIAGNOSIS — Z76.89 PREVENTION OF CHEMOTHERAPY-INDUCED NEUTROPENIA: ICD-10-CM

## 2022-06-28 DIAGNOSIS — C25.0 MALIGNANT NEOPLASM OF HEAD OF PANCREAS (HCC): ICD-10-CM

## 2022-06-28 DIAGNOSIS — E86.0 DEHYDRATION: Primary | ICD-10-CM

## 2022-06-28 LAB
ANION GAP SERPL CALC-SCNC: 9 MMOL/L (ref 5–15)
BUN SERPL-MCNC: 4 MG/DL (ref 6–20)
BUN/CREAT SERPL: 7 (ref 12–20)
CALCIUM SERPL-MCNC: 8.8 MG/DL (ref 8.5–10.1)
CHLORIDE SERPL-SCNC: 108 MMOL/L (ref 97–108)
CO2 SERPL-SCNC: 24 MMOL/L (ref 21–32)
CREAT SERPL-MCNC: 0.56 MG/DL (ref 0.55–1.02)
GLUCOSE SERPL-MCNC: 92 MG/DL (ref 65–100)
POTASSIUM SERPL-SCNC: 3.6 MMOL/L (ref 3.5–5.1)
SODIUM SERPL-SCNC: 141 MMOL/L (ref 136–145)

## 2022-06-28 PROCEDURE — 74011000250 HC RX REV CODE- 250: Performed by: NURSE PRACTITIONER

## 2022-06-28 PROCEDURE — 96360 HYDRATION IV INFUSION INIT: CPT

## 2022-06-28 PROCEDURE — 77030016057 HC NDL HUBR APOL -B

## 2022-06-28 PROCEDURE — 74011250636 HC RX REV CODE- 250/636: Performed by: NURSE PRACTITIONER

## 2022-06-28 PROCEDURE — 80048 BASIC METABOLIC PNL TOTAL CA: CPT

## 2022-06-28 PROCEDURE — 36415 COLL VENOUS BLD VENIPUNCTURE: CPT

## 2022-06-28 RX ORDER — SODIUM CHLORIDE 9 MG/ML
10 INJECTION INTRAMUSCULAR; INTRAVENOUS; SUBCUTANEOUS AS NEEDED
Status: DISCONTINUED | OUTPATIENT
Start: 2022-06-28 | End: 2022-06-29 | Stop reason: HOSPADM

## 2022-06-28 RX ORDER — SODIUM CHLORIDE 9 MG/ML
1000 INJECTION, SOLUTION INTRAVENOUS ONCE
Status: COMPLETED | OUTPATIENT
Start: 2022-06-28 | End: 2022-06-28

## 2022-06-28 RX ORDER — SODIUM CHLORIDE 0.9 % (FLUSH) 0.9 %
10 SYRINGE (ML) INJECTION AS NEEDED
Status: DISCONTINUED | OUTPATIENT
Start: 2022-06-28 | End: 2022-06-29 | Stop reason: HOSPADM

## 2022-06-28 RX ORDER — HEPARIN 100 UNIT/ML
300-500 SYRINGE INTRAVENOUS AS NEEDED
Status: DISCONTINUED | OUTPATIENT
Start: 2022-06-28 | End: 2022-06-29 | Stop reason: HOSPADM

## 2022-06-28 RX ADMIN — SODIUM CHLORIDE 1000 ML: 9 INJECTION, SOLUTION INTRAVENOUS at 14:45

## 2022-06-28 RX ADMIN — SODIUM CHLORIDE, PRESERVATIVE FREE 10 ML: 5 INJECTION INTRAVENOUS at 15:50

## 2022-06-28 RX ADMIN — SODIUM CHLORIDE, PRESERVATIVE FREE 10 ML: 5 INJECTION INTRAVENOUS at 14:45

## 2022-06-28 RX ADMIN — Medication 500 UNITS: at 15:50

## 2022-06-28 NOTE — PROGRESS NOTES
[facility] Progress Note    Date: 2022    Name: Gael De Jesus    MRN: [id number]         : 1955    Ms. Myla Melendez Arrived ambulatory and in no distress for hydration Regimen. Assessment was completed, no acute issues at this time, no new complaints voiced. Right chest wall port accessed without difficulty, labs drawn & sent for processing. Please see [facility]. Covid questionnaire completed. 1. Do you have any symptoms of COVID-19? SOB, coughing, fever, or generally not feeling well - no    2. Have you been exposed to COVID-19 recently? - no    3. Have you had any recent contact with family/friend that has a pending COVID test? - no      Ms. Bhatti's vitals were reviewed. Visit Vitals  /66   Pulse 92   Temp 97.2 °F (36.2 °C)   Resp 17         Medications:  Medications Administered     0.9% sodium chloride infusion 1,000 mL     Admin Date  2022 Action  New Bag Dose  1,000 mL Rate  1,000 mL/hr Route  IntraVENous Administered By  Hilary Robles RN          0.9% sodium chloride injection 10 mL     Admin Date  2022 Action  Given Dose  10 mL Route  IntraVENous Administered By  Hilary Robles RN          heparin (porcine) pf 300-500 Units     Admin Date  2022 Action  Given Dose  500 Units Route  InterCATHeter Administered By  Hilary Robles RN          sodium chloride (NS) flush 10 mL     Admin Date  2022 Action  Given Dose  10 mL Route  IntraVENous Administered By  Hilary Robles RN                  Ms. Myla Melendez tolerated treatment well and was discharged from [place] [address] in stable condition. Port de-accessed, flushed & heparinized per protocol. She is to return on 22 for her next appointment.     Leslie Iraheta RN  2022

## 2022-06-28 NOTE — TELEPHONE ENCOUNTER
[address] at [place]  [phone]        06/28/22 12:08 PM Return call placed to patient. Patient requesting to come in this week for fluids. Patient states she has noticed increased fatigue and thirst. She states this is usually an indicator that she needs more fluids. Patient denies vomiting. Having daily bowel movement, sometimes more if she has increased vegetables in diet. Appetite also improved the past couple days. Advised this nurse would discuss above with Dr. Titus Bingham and contact [facility] regarding availability. Will call patient back. She voiced understanding. 12:19 PM Called patient. Scheduled for IV fluids today at 2:30 PM. Per Dr. Titus Bingham, discussed that if patient is having similar symptoms in the future, to attempt pushing fluids at home first. Explained that patient is not currently having any excess loss of fluids, such as diarrhea or vomiting, so should be able to hydrate adequately at home. Encouraged patient to still keep office updated of symptoms as clinical team still wants to be supportive of patient's needs. Patient voiced understanding. No further questions or concerns at this time.

## 2022-07-05 ENCOUNTER — HOSPITAL ENCOUNTER (OUTPATIENT)
Dept: INFUSION THERAPY | Age: 67
Discharge: HOME | End: 2022-07-05
Payer: MEDICARE

## 2022-07-05 ENCOUNTER — OFFICE VISIT (OUTPATIENT)
Dept: ONCOLOGY | Age: 67
End: 2022-07-05
Payer: MEDICARE

## 2022-07-05 VITALS
HEART RATE: 82 BPM | HEIGHT: 64 IN | OXYGEN SATURATION: 99 % | DIASTOLIC BLOOD PRESSURE: 77 MMHG | WEIGHT: 190.2 LBS | BODY MASS INDEX: 32.47 KG/M2 | TEMPERATURE: 97.6 F | SYSTOLIC BLOOD PRESSURE: 151 MMHG

## 2022-07-05 VITALS
OXYGEN SATURATION: 99 % | BODY MASS INDEX: 32.42 KG/M2 | DIASTOLIC BLOOD PRESSURE: 90 MMHG | TEMPERATURE: 97.6 F | HEART RATE: 72 BPM | SYSTOLIC BLOOD PRESSURE: 122 MMHG | HEIGHT: 64 IN | RESPIRATION RATE: 16 BRPM | WEIGHT: 189.9 LBS

## 2022-07-05 DIAGNOSIS — C25.9 PANCREATIC ADENOCARCINOMA (HCC): Primary | ICD-10-CM

## 2022-07-05 DIAGNOSIS — K52.1 DIARRHEA DUE TO DRUG: ICD-10-CM

## 2022-07-05 DIAGNOSIS — Z76.89 PREVENTION OF CHEMOTHERAPY-INDUCED NEUTROPENIA: Primary | ICD-10-CM

## 2022-07-05 DIAGNOSIS — I10 HYPERTENSION, UNSPECIFIED TYPE: ICD-10-CM

## 2022-07-05 DIAGNOSIS — T45.1X5A CHEMOTHERAPY-INDUCED NEUROPATHY (HCC): ICD-10-CM

## 2022-07-05 DIAGNOSIS — D64.9 NORMOCYTIC ANEMIA: ICD-10-CM

## 2022-07-05 DIAGNOSIS — Z51.11 CHEMOTHERAPY MANAGEMENT, ENCOUNTER FOR: ICD-10-CM

## 2022-07-05 DIAGNOSIS — G62.0 CHEMOTHERAPY-INDUCED NEUROPATHY (HCC): ICD-10-CM

## 2022-07-05 DIAGNOSIS — C25.0 MALIGNANT NEOPLASM OF HEAD OF PANCREAS (HCC): ICD-10-CM

## 2022-07-05 LAB
ALBUMIN SERPL-MCNC: 2.9 G/DL (ref 3.5–5)
ALBUMIN/GLOB SERPL: 0.9 {RATIO} (ref 1.1–2.2)
ALP SERPL-CCNC: 102 U/L (ref 45–117)
ALT SERPL-CCNC: 22 U/L (ref 12–78)
ANION GAP SERPL CALC-SCNC: 8 MMOL/L (ref 5–15)
AST SERPL-CCNC: 28 U/L (ref 15–37)
BASOPHILS # BLD: 0 K/UL (ref 0–0.1)
BASOPHILS NFR BLD: 0 % (ref 0–1)
BILIRUB SERPL-MCNC: 0.5 MG/DL (ref 0.2–1)
BUN SERPL-MCNC: 6 MG/DL (ref 6–20)
BUN/CREAT SERPL: 10 (ref 12–20)
CALCIUM SERPL-MCNC: 8.5 MG/DL (ref 8.5–10.1)
CHLORIDE SERPL-SCNC: 110 MMOL/L (ref 97–108)
CO2 SERPL-SCNC: 24 MMOL/L (ref 21–32)
CREAT SERPL-MCNC: 0.59 MG/DL (ref 0.55–1.02)
DIFFERENTIAL METHOD BLD: ABNORMAL
EOSINOPHIL # BLD: 0 K/UL (ref 0–0.4)
EOSINOPHIL NFR BLD: 0 % (ref 0–7)
ERYTHROCYTE [DISTWIDTH] IN BLOOD BY AUTOMATED COUNT: 22.4 % (ref 11.5–14.5)
GLOBULIN SER CALC-MCNC: 3.2 G/DL (ref 2–4)
GLUCOSE SERPL-MCNC: 85 MG/DL (ref 65–100)
HCT VFR BLD AUTO: 24.7 % (ref 35–47)
HGB BLD-MCNC: 7.9 G/DL (ref 11.5–16)
IMM GRANULOCYTES # BLD AUTO: 0 K/UL
IMM GRANULOCYTES NFR BLD AUTO: 0 %
LYMPHOCYTES # BLD: 2.9 K/UL (ref 0.8–3.5)
LYMPHOCYTES NFR BLD: 35 % (ref 12–49)
MCH RBC QN AUTO: 32.2 PG (ref 26–34)
MCHC RBC AUTO-ENTMCNC: 32 G/DL (ref 30–36.5)
MCV RBC AUTO: 100.8 FL (ref 80–99)
METAMYELOCYTES NFR BLD MANUAL: 3 %
MONOCYTES # BLD: 1.1 K/UL (ref 0–1)
MONOCYTES NFR BLD: 13 % (ref 5–13)
NEUTS BAND NFR BLD MANUAL: 1 % (ref 0–6)
NEUTS SEG # BLD: 4 K/UL (ref 1.8–8)
NEUTS SEG NFR BLD: 48 % (ref 32–75)
NRBC # BLD: 0.02 K/UL (ref 0–0.01)
NRBC BLD-RTO: 0.2 PER 100 WBC
PLATELET # BLD AUTO: 124 K/UL (ref 150–400)
PMV BLD AUTO: 10.3 FL (ref 8.9–12.9)
POTASSIUM SERPL-SCNC: 3 MMOL/L (ref 3.5–5.1)
PROT SERPL-MCNC: 6.1 G/DL (ref 6.4–8.2)
RBC # BLD AUTO: 2.45 M/UL (ref 3.8–5.2)
RBC MORPH BLD: ABNORMAL
SODIUM SERPL-SCNC: 142 MMOL/L (ref 136–145)
WBC # BLD AUTO: 8.2 K/UL (ref 3.6–11)

## 2022-07-05 PROCEDURE — G8536 NO DOC ELDER MAL SCRN: HCPCS | Performed by: INTERNAL MEDICINE

## 2022-07-05 PROCEDURE — 80053 COMPREHEN METABOLIC PANEL: CPT

## 2022-07-05 PROCEDURE — 74011250637 HC RX REV CODE- 250/637: Performed by: NURSE PRACTITIONER

## 2022-07-05 PROCEDURE — 74011000258 HC RX REV CODE- 258: Performed by: INTERNAL MEDICINE

## 2022-07-05 PROCEDURE — 1090F PRES/ABSN URINE INCON ASSESS: CPT | Performed by: INTERNAL MEDICINE

## 2022-07-05 PROCEDURE — 3017F COLORECTAL CA SCREEN DOC REV: CPT | Performed by: INTERNAL MEDICINE

## 2022-07-05 PROCEDURE — 96413 CHEMO IV INFUSION 1 HR: CPT

## 2022-07-05 PROCEDURE — 74011250636 HC RX REV CODE- 250/636: Performed by: INTERNAL MEDICINE

## 2022-07-05 PROCEDURE — 96367 TX/PROPH/DG ADDL SEQ IV INF: CPT

## 2022-07-05 PROCEDURE — 85025 COMPLETE CBC W/AUTO DIFF WBC: CPT

## 2022-07-05 PROCEDURE — G8753 SYS BP > OR = 140: HCPCS | Performed by: INTERNAL MEDICINE

## 2022-07-05 PROCEDURE — 77030012965 HC NDL HUBR BBMI -A

## 2022-07-05 PROCEDURE — 96368 THER/DIAG CONCURRENT INF: CPT

## 2022-07-05 PROCEDURE — G8432 DEP SCR NOT DOC, RNG: HCPCS | Performed by: INTERNAL MEDICINE

## 2022-07-05 PROCEDURE — 96415 CHEMO IV INFUSION ADDL HR: CPT

## 2022-07-05 PROCEDURE — G8417 CALC BMI ABV UP PARAM F/U: HCPCS | Performed by: INTERNAL MEDICINE

## 2022-07-05 PROCEDURE — 1101F PT FALLS ASSESS-DOCD LE1/YR: CPT | Performed by: INTERNAL MEDICINE

## 2022-07-05 PROCEDURE — 86301 IMMUNOASSAY TUMOR CA 19-9: CPT

## 2022-07-05 PROCEDURE — 1123F ACP DISCUSS/DSCN MKR DOCD: CPT | Performed by: INTERNAL MEDICINE

## 2022-07-05 PROCEDURE — 36415 COLL VENOUS BLD VENIPUNCTURE: CPT

## 2022-07-05 PROCEDURE — G8427 DOCREV CUR MEDS BY ELIG CLIN: HCPCS | Performed by: INTERNAL MEDICINE

## 2022-07-05 PROCEDURE — G8754 DIAS BP LESS 90: HCPCS | Performed by: INTERNAL MEDICINE

## 2022-07-05 PROCEDURE — 99215 OFFICE O/P EST HI 40 MIN: CPT | Performed by: INTERNAL MEDICINE

## 2022-07-05 PROCEDURE — G8400 PT W/DXA NO RESULTS DOC: HCPCS | Performed by: INTERNAL MEDICINE

## 2022-07-05 PROCEDURE — 96417 CHEMO IV INFUS EACH ADDL SEQ: CPT

## 2022-07-05 RX ORDER — PALONOSETRON 0.05 MG/ML
0.25 INJECTION, SOLUTION INTRAVENOUS ONCE
Status: COMPLETED | OUTPATIENT
Start: 2022-07-05 | End: 2022-07-05

## 2022-07-05 RX ORDER — POTASSIUM CHLORIDE 750 MG/1
40 TABLET, FILM COATED, EXTENDED RELEASE ORAL
Status: COMPLETED | OUTPATIENT
Start: 2022-07-05 | End: 2022-07-05

## 2022-07-05 RX ORDER — ATROPINE SULFATE 0.4 MG/ML
0.4 INJECTION, SOLUTION ENDOTRACHEAL; INTRAMEDULLARY; INTRAMUSCULAR; INTRAVENOUS; SUBCUTANEOUS
Status: DISCONTINUED | OUTPATIENT
Start: 2022-07-05 | End: 2022-07-07 | Stop reason: HOSPADM

## 2022-07-05 RX ORDER — SODIUM CHLORIDE 9 MG/ML
10 INJECTION INTRAMUSCULAR; INTRAVENOUS; SUBCUTANEOUS AS NEEDED
Status: ACTIVE | OUTPATIENT
Start: 2022-07-05 | End: 2022-07-05

## 2022-07-05 RX ORDER — HEPARIN 100 UNIT/ML
300-500 SYRINGE INTRAVENOUS AS NEEDED
Status: ACTIVE | OUTPATIENT
Start: 2022-07-05 | End: 2022-07-05

## 2022-07-05 RX ORDER — DEXTROSE MONOHYDRATE 50 MG/ML
25 INJECTION, SOLUTION INTRAVENOUS CONTINUOUS
Status: DISPENSED | OUTPATIENT
Start: 2022-07-05 | End: 2022-07-05

## 2022-07-05 RX ORDER — SODIUM CHLORIDE 0.9 % (FLUSH) 0.9 %
10 SYRINGE (ML) INJECTION AS NEEDED
Status: DISPENSED | OUTPATIENT
Start: 2022-07-05 | End: 2022-07-05

## 2022-07-05 RX ORDER — HYDROCHLOROTHIAZIDE 12.5 MG/1
12.5 TABLET ORAL DAILY
Qty: 30 TABLET | Refills: 1 | Status: SHIPPED | OUTPATIENT
Start: 2022-07-05 | End: 2022-07-31

## 2022-07-05 RX ADMIN — DEXTROSE MONOHYDRATE 25 ML/HR: 50 INJECTION, SOLUTION INTRAVENOUS at 10:43

## 2022-07-05 RX ADMIN — PALONOSETRON 0.25 MG: 0.05 INJECTION, SOLUTION INTRAVENOUS at 10:45

## 2022-07-05 RX ADMIN — FLUOROURACIL 4320 MG: 50 INJECTION, SOLUTION INTRAVENOUS at 15:48

## 2022-07-05 RX ADMIN — LEUCOVORIN CALCIUM 720 MG: 350 INJECTION, POWDER, LYOPHILIZED, FOR SUSPENSION INTRAMUSCULAR; INTRAVENOUS at 14:12

## 2022-07-05 RX ADMIN — DEXAMETHASONE SODIUM PHOSPHATE 12 MG: 4 INJECTION, SOLUTION INTRA-ARTICULAR; INTRALESIONAL; INTRAMUSCULAR; INTRAVENOUS; SOFT TISSUE at 10:46

## 2022-07-05 RX ADMIN — POTASSIUM CHLORIDE 40 MEQ: 750 TABLET, EXTENDED RELEASE ORAL at 11:15

## 2022-07-05 RX ADMIN — OXALIPLATIN 153 MG: 5 INJECTION, SOLUTION INTRAVENOUS at 11:55

## 2022-07-05 RX ADMIN — IRINOTECAN HYDROCHLORIDE 240 MG: 20 INJECTION, SOLUTION INTRAVENOUS at 14:12

## 2022-07-05 RX ADMIN — ATROPINE SULFATE 0.4 MG: 0.4 INJECTION, SOLUTION INTRAMUSCULAR; INTRAVENOUS; SUBCUTANEOUS at 14:05

## 2022-07-05 NOTE — PROGRESS NOTES
[facility] at [address], [address] [place] [address] [id number]  W: [phone]  F: [phone]    Medical Nutrition Therapy  Nutrition Referral:    Met with patient. Reports diarrhea is better, resolved. Having solid stools. Did have a little bit of nausea and was alleviated with anti-emetics. She is eating well. Doesn't have much taste. Flavio Phelan is good. Still struggles with bread. Tossed salads work well. Weight loss x 3# in 2 weeks. 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  Adjuvant FOLFIRNOX x 6 cycles,  Last cycle on 7/5/22. Chemotherapy Flowsheet 7/5/2022   Cycle C12D1   Date 7/5/2022   Drug / Regimen Folfirinox   Pre Hydration -   Pre Meds -   Notes -         Wt Readings from Last 5 Encounters:   07/05/22 190 lb 3.2 oz (86.3 kg)   07/05/22 189 lb 14.4 oz (86.1 kg)   06/20/22 193 lb (87.5 kg)   06/20/22 193 lb 6.4 oz (87.7 kg)   06/08/22 197 lb 12.8 oz (89.7 kg)       Energy (kcal): 7687-4380 (MSJ x 1-1.2 x 1.3)           Wt used: Admission (103.8 kg)  Protein (gm): 100-135 (1.5-2 gm/kg adj BW)             Wt used: Adjusted (~67 kg)   Fluid (mL/day): 1 mL/kcal     Plan:   - Continue to try to eat something first thing in the morning.  - Limit use of straws if it causes gas pains.   - Continue with Bone broth as a way to get additional protein. - Suggested small meals at set times verses hunger cues. - Limit liquids at meal times to prevent filling up  - Continue to be available as a resource.      Signed By: Pascual Gaucher, [employer]

## 2022-07-05 NOTE — PROGRESS NOTES
Radha Bocanegra is a 77 y.o. female here for follow up of pancreatic adenocarcinoma. Patient with no complaints of pain at this time.

## 2022-07-05 NOTE — PROGRESS NOTES
[facility] Progress Note    Date: 2022    Name: Robert De    MRN: [id number]         : 1955    Ms. Mikal Eng Arrived ambulatory and in no distress for C12 D1 of Folfirinox Regimen. Assessment was completed by Michael Mathews RN, no acute issues at this time, no new complaints voiced. Right chest wall port accessed without difficulty, labs drawn & sent for processing. Chemotherapy Flowsheet 2022   Cycle C12D1   Date 2022   Drug / Regimen Folfirinox   Pre Hydration -   Pre Meds given   Notes given atropine     Patient proceed to appointment with Dr. Cintia Gutierrez. Patient Vitals for the past 12 hrs:   Temp Pulse Resp BP SpO2   22 1538 -- 72 16 (!) 122/90 --   22 0745 97.6 °F (36.4 °C) 82 16 (!) 151/77 99 %     Lab results were obtained and reviewed. Recent Results (from the past 12 hour(s))   CBC WITH AUTOMATED DIFF    Collection Time: 22  7:49 AM   Result Value Ref Range    WBC 8.2 3.6 - 11.0 K/uL    RBC 2.45 (L) 3.80 - 5.20 M/uL    HGB 7.9 (L) 11.5 - 16.0 g/dL    HCT 24.7 (L) 35.0 - 47.0 %    .8 (H) 80.0 - 99.0 FL    MCH 32.2 26.0 - 34.0 PG    MCHC 32.0 30.0 - 36.5 g/dL    RDW 22.4 (H) 11.5 - 14.5 %    PLATELET 644 (L) 010 - 400 K/uL    MPV 10.3 8.9 - 12.9 FL    NRBC 0.2 (H) 0  WBC    ABSOLUTE NRBC 0.02 (H) 0.00 - 0.01 K/uL    NEUTROPHILS 48 32 - 75 %    BAND NEUTROPHILS 1 0 - 6 %    LYMPHOCYTES 35 12 - 49 %    MONOCYTES 13 5 - 13 %    EOSINOPHILS 0 0 - 7 %    BASOPHILS 0 0 - 1 %    METAMYELOCYTES 3 (H) 0 %    IMMATURE GRANULOCYTES 0 %    ABS. NEUTROPHILS 4.0 1.8 - 8.0 K/UL    ABS. LYMPHOCYTES 2.9 0.8 - 3.5 K/UL    ABS. MONOCYTES 1.1 (H) 0.0 - 1.0 K/UL    ABS. EOSINOPHILS 0.0 0.0 - 0.4 K/UL    ABS. BASOPHILS 0.0 0.0 - 0.1 K/UL    ABS. IMM.  GRANS. 0.0 K/UL    DF MANUAL      RBC COMMENTS ANISOCYTOSIS  PRESENT       METABOLIC PANEL, COMPREHENSIVE    Collection Time: 22  7:49 AM   Result Value Ref Range    Sodium 142 136 - 145 mmol/L    Potassium 3.0 (L) 3.5 - 5.1 mmol/L Chloride 110 (H) 97 - 108 mmol/L    CO2 24 21 - 32 mmol/L    Anion gap 8 5 - 15 mmol/L    Glucose 85 65 - 100 mg/dL    BUN 6 6 - 20 MG/DL    Creatinine 0.59 0.55 - 1.02 MG/DL    BUN/Creatinine ratio 10 (L) 12 - 20      GFR est AA >60 >60 ml/min/1.73m2    GFR est non-AA >60 >60 ml/min/1.73m2    Calcium 8.5 8.5 - 10.1 MG/DL    Bilirubin, total 0.5 0.2 - 1.0 MG/DL    ALT (SGPT) 22 12 - 78 U/L    AST (SGOT) 28 15 - 37 U/L    Alk.  phosphatase 102 45 - 117 U/L    Protein, total 6.1 (L) 6.4 - 8.2 g/dL    Albumin 2.9 (L) 3.5 - 5.0 g/dL    Globulin 3.2 2.0 - 4.0 g/dL    A-G Ratio 0.9 (L) 1.1 - 2.2         Medications:  Medications Administered     atropine 0.4 mg/mL injection 0.4 mg     Admin Date  07/05/2022 Action  Given Dose  0.4 mg Route  SubCUTAneous Administered By  Bruna Rand RN          dexamethasone (DECADRON) 12 mg in 0.9% sodium chloride 50 mL IVPB     Admin Date  07/05/2022 Action  New Bag Dose  12 mg Route  IntraVENous Administered By  Bruna Rand RN          dextrose 5% infusion     Admin Date  07/05/2022 Action  New Bag Dose  25 mL/hr Rate  25 mL/hr Route  IntraVENous Administered By  Bruna Rand RN          fluorouraciL (ADRUCIL) 4,320 mg in 0.9% sodium chloride 100 mL CADD Cassette     Admin Date  07/05/2022 Action  New Bag Dose  4,320 mg Rate  2.2 mL/hr Route  IntraVENous Administered By  Bruna Rand RN          irinotecan (CAMPTOSAR) 240 mg in dextrose 5% 250 mL, overfill volume 25 mL chemo infusion     Admin Date  07/05/2022 Action  New Bag Dose  240 mg Rate  191.3 mL/hr Route  IntraVENous Administered By  Bruna Rand RN          leucovorin (WELLCOVORIN) 720 mg in dextrose 5% 250 mL, overfill volume 25 mL IVPB     Admin Date  07/05/2022 Action  New Bag Dose  720 mg Rate  207.3 mL/hr Route  IntraVENous Administered By  Bruna Rand RN          oxaliplatin (ELOXATIN) 153 mg in dextrose 5% 250 mL, overfill volume 25 mL chemo infusion     Admin Date  07/05/2022 Action  New Bag Dose  153 mg Rate  152.8 mL/hr Route  IntraVENous Administered By  Abiola Otto RN          palonosetron HCl (ALOXI) injection 0.25 mg     Admin Date  07/05/2022 Action  Given Dose  0.25 mg Route  IntraVENous Administered By  Abiola Otto RN          potassium chloride SR (KLOR-CON 10) tablet 40 mEq     Admin Date  07/05/2022 Action  Given Dose  40 mEq Route  Oral Administered By  Abiola Otto RN              Two nurses verified prior to administering:  Drug name  Drug dose  Infusion volume or drug volume when prepared in a syringe  Rate of administration  Route of administration  Expiration dates and/or times  Appearance and physical integrity of the drugs  Rate set on infusion pump, when used  Sequencing of drug administration    Ms. Bhatti tolerated treatment well and was discharged from [place] [address] in stable condition at 1550. 5FU continuous infusion via CADD pump. She is to return on 07/07/2022 for her next appointment.     Dennis Orozco RN  July 5, 2022

## 2022-07-05 NOTE — PROGRESS NOTES
[facility] at [address] [facility] [place]: [phone]  F: [phone]      Reason for Visit:   Dominik Quintero is a 77 y.o. female who is seen for follow up of pancreatic adenocarcinoma. Hematology/Oncology Treatment History:     Diagnosis: Pancreatic adenocarcinoma    Stage: clinical Stage Ib [T2N0] at diagnosis; Stage III [brQ4qM2] at surgery    Pathology:   -9/26/21 Cytology - brushings from common bile duct: Reactive glandular epithelial cells and bile   -9/28/21 pancreatic head mass biopsy: Adenocarcinoma.   -10/22/21 [facility] mult-cancer panel -negative   -2/23/22 Pancreaticoduodenectomy (Whipple resection): Ductal adenocarcinoma, G2, 1.2cm. Tumor invades peripancreatic soft tissues. LVI negative. PNI present. Margins uninvolved. 5/29 LNs involved with cancer.   y pT1cN2     Prior Treatment:   1. Neoadjuvant FOLFIRNOX x 6 cycles, 10/18/21-1/10/22  2. PYLORUS PRESERVING WHIPPLE PROCEDURE AND PORTAL LYPHMADENECTOMY, 2/23/22    Current Treatment: adjuvant FOLFIRNOX x 6 cycles, started 4/12/22 - current. Added neulasta with C2. History of Present Illness:   Dominik Quintero is a pleasant 77 y.o. female who comes in for evaluation of pancreatic cancer. She presented in 9/2021 with obstructive jaundice, transaminitis. ERCP on 9/26/21 notable for distal CBD stricture; possible tumor/mass located in the CBD; underwent biliary stent placement to relieve biliary obstruction. CT A/P revealed mild/mod intrahepatic biliary ductal dilatation; prominence of CBD and hydropic gallbladder, suggestive of stricture/stenosis/mass of distal CBD. MRI of abdomen showed narrowing of CBD pancreatic head suggestive of extrinisic compression concerning for periampullary mass vs eccentric intraluminal lesion. EUS showed 3.2cm hypoechoic mass in pancreas head. Biopsy revealed adenocarcinoma.    Pt met with  [facility] in [place] and plans for neoadjuvant chemotherapy prior to surgery. Interval history:  Patient here for follow up of pancreas cancer and C12 adjuvant FOLFIRNOX. Tolerated C11 better than prior cycles with minimal nausea and diarrhea. Had several loose stools at the beginning of the cycle, but otherwise having daily soft stool. Only required lomotil once. Eating and hydrating much better-weight stable. Nausea slightly worse during first week of treatment, managed with antiemetics. Reports neuropathy in feet that radiates up to stomach. No fevers, chills, abdominal pain, CP, SOB. Reports trace edema in ankles. Systolic BP elevated today - reports mild HA, anxiety about treatment. PMHx: OA and Rheumatoid arthritis in lower back, Ulcerative colitis, HTN, Hypothyroidism  PSurgHx: , Cyst removed from left breast  SHx: , has 3 children (1 daughter and 2 sons). Works as  in [employer]. Nonsmoker, no EtOH.  in rehab recovering from [facility]. FHx: Mother  of pancreatic cancer age 80. Brother and son had colon cancer. Review of Systems: A complete review of systems was obtained, reviewed. Pertinent findings reviewed above. Physical Exam:     Visit Vitals  BP (!) 151/77   Pulse 82   Temp 97.6 °F (36.4 °C)   Ht 5' 4\" (1.626 m)   Wt 190 lb 3.2 oz (86.3 kg)   SpO2 99%   BMI 32.65 kg/m²     ECOG PS: 1  General: no distress  Eyes: anicteric sclerae  HENT: oropharynx clear  Neck: supple  Lymphatic: no cervical, supraclavicular adenopathy  Respiratory: CTAB, normal respiratory effort  CV: no peripheral edema, port upper chest.   GI: soft, nontender, nondistended, no masses; Midline surgical scar appears to be healing well with dry skin and scar/scabbing.   Skin: no rashes; no ecchymoses; no petechiae  Neuro: alert and oriented      Results:     Lab Results   Component Value Date/Time    WBC 10.2 2022 07:46 AM    HGB 8.4 (L) 2022 07:46 AM    HCT 26.5 (L) 2022 07:46 AM    PLATELET 284  07:46 AM MCV 99.3 (H) 06/20/2022 07:46 AM    ABS. NEUTROPHILS 5.6 06/20/2022 07:46 AM    Hemoglobin (POC) 12.6 10/06/2013 08:45 AM    Hematocrit (POC) 37 10/06/2013 08:45 AM     Lab Results   Component Value Date/Time    Sodium 141 06/28/2022 02:49 PM    Potassium 3.6 06/28/2022 02:49 PM    Chloride 108 06/28/2022 02:49 PM    CO2 24 06/28/2022 02:49 PM    Glucose 92 06/28/2022 02:49 PM    BUN 4 (L) 06/28/2022 02:49 PM    Creatinine 0.56 06/28/2022 02:49 PM    GFR est AA >60 06/28/2022 02:49 PM    GFR est non-AA >60 06/28/2022 02:49 PM    Calcium 8.8 06/28/2022 02:49 PM    Sodium (POC) 143 10/06/2013 08:45 AM    Potassium (POC) 3.4 (L) 10/06/2013 08:45 AM    Chloride (POC) 104 10/06/2013 08:45 AM    Glucose (POC) 84 03/02/2022 03:51 PM    BUN (POC) 11 10/06/2013 08:45 AM    Creatinine (POC) 1.0 10/06/2013 08:45 AM    Calcium, ionized (POC) 1.27 10/06/2013 08:45 AM     Lab Results   Component Value Date/Time    Bilirubin, total 0.7 06/20/2022 07:46 AM    ALT (SGPT) 23 06/20/2022 07:46 AM    Alk. phosphatase 107 06/20/2022 07:46 AM    Protein, total 6.0 (L) 06/20/2022 07:46 AM    Albumin 2.8 (L) 06/20/2022 07:46 AM    Globulin 3.2 06/20/2022 07:46 AM     Lab Results   Component Value Date/Time    Reticulocyte count 2.0 03/15/2022 09:38 AM    Iron % saturation 13 (L) 03/15/2022 09:38 AM    TIBC 351 03/15/2022 09:38 AM    Ferritin 177 03/15/2022 09:38 AM    Vitamin B12 1,155 (H) 06/06/2022 08:14 AM    Folate 11.8 03/15/2022 09:38 AM    Sed rate (ESR) 17 09/16/2010 12:04 PM    C-Reactive protein <0.29 01/18/2017 08:45 AM    TSH 2.84 04/12/2022 07:50 AM    ALBA, Direct Detected (A) 09/16/2010 12:04 PM    Lipase 1,214 (H) 09/29/2021 04:10 AM    Hep C virus Ab Interp.  NONREACTIVE 09/24/2021 06:55 PM     Lab Results   Component Value Date/Time    CEA 3.5 09/25/2021 11:46 AM    Carbohydrate Antigen 19-9, (CA 19-9) 16 04/12/2022 07:50 AM       10/18/21 CA 19-9:  138  1/10/22 CA 19-9: 130   4/12/22 CA 19-9: 16       Imaging / Procedures: 9/24/21 US ABD   IMPRESSION  Cholelithiasis. Gallbladder sludge. Prominent CBD with mild intrahepatic ductal dilatation as well. Nonemergent MRI with MRCP to delineate etiology for CBD distention. 9/24/21 CT ABD PELV W CONT  There is mild/moderate intrahepatic biliary ductal dilatation, prominence of the CBD and a hydropic gallbladder. No pancreatic duct dilatation. No clearly  delineated pancreatic head mass. Imaging findings suggestive of stricture/stenosis/mass of the distal CBD, no extrinsic pancreatic head mass is  identified. Gastroenterology consult  Correlation with MRI/MRCP on a nonemergent basis. There is severe degenerative change of the lumbar spine. 9/25/21 MRI ABD WO CONT  IMPRESSION  1. Intrahepatic and extra hepatic biliary dilatation with abrupt narrowing of  the common bile duct pancreatic head just proximal to the ampulla. Suggestion of  extrinsic compression on the duct. This raises the possibility of a  periampullary mass. Alternatively, this may be an eccentric intraluminal lesion. Evaluation is limited. Recommend GI consultation for possible ERCP and EUS. 2.  Questionable small lesion in the left hepatic lobe with mild increased T2  signal and T1 hypointensity. Recommend follow-up imaging a contrast-enhanced  study preferably MRI. 3.  Cholelithiasis. Distended gallbladder with mild gallbladder wall thickening. Correlate for acute cholecystitis    9/26/21 XR ERCP/ERCB / Dr. Espinoza Aaron  Impression: Biliary ductal dilation, with a maximum common duct diameter of 15 mm; Severe stenosis, involving distal CBD /pancreas head area concerning for neoplasia  Interventions: Endoscopic ampullary sphincterotomy and biliary stent placement; brushings from the CBD    9/27/21 CT CHEST W CONT:   IMPRESSION  1. There is a minimal right pleural effusion. 2. There are small pulmonary nodules present. There is a nodule in the plane of  the right major fissure and posteriorly in left lower lobe. These were not  present on examination of 1/18/2017. These could be on the basis of metastatic  disease. Close follow-up is suggested. There are also 2 small subpleural nodules  anteriorly in the right upper lobe as described above which can also be  evaluated on follow-up. 9/28/21 EUS:  Endoscopic: Esophagus:normal; Stomach: normal; Duodenum/jejunum: normal and protruding biliary stent  Ultrasound: Esophagus: normal findings;     Stomach: normal findings:     Pancreas: Areas examined: the entire gland                          Parenchyma: -Evidence of a hypoechoic mass with poorly defined borders seen in the head of the pancreas. It appeared involving the CBD where a stent is seen, however it did not involve the major vessels. It measured about 3.2 cm in widest diameter on one view. Pancreatic Duct: normal findings, not dilated               Liver: Parenchyma: normal                          Gallbladder: normal                          Bile Duct: the common bile duct is dilated in the proximal and mid portion and a plastic stent could be seen                          Lymph Node: no adenopathy  Impression:   Pancreas head mass with fine needle biopsy showing adenocarcinoma on preliminary cytology  Recommendations: Follow-up on pathology  Follow-up with oncology, will need further evaluation of pulmonary nodule  Surgical oncology consultation  Resume GI lite diet today and can discharge in am from GI standpoint    12/20/2021:  MRI abd w wo cont:  IMPRESSION  1. Positive response to therapy. Decreased size of the pancreatic head mass, which now has a maximum AP diameter of 2.3 cm. No obstruction of the biliary tree or pancreatic duct. No involvement of the SMA. Based on imaging, patient is a candidate for resection. 2. Cholelithiasis. No acute cholecystitis or biliary obstruction. MRI Pelv w wo cont:  IMPRESSION  No acute process or evidence of malignant neoplasm/ metastatic disease.   CT chest w cont:  IMPRESSION  1. Interval apparent resolution of previously seen scattered subcentimeter bilateral pulmonary nodules and right pleural effusion. 2.  Please see separately dictated reports for the MRI abdomen and pelvis obtained the same day for subdiaphragmatic findings. 6/1/22 colonoscopy: The perianal and digital rectal examinations were normal. The terminal ileum appeared normal. Biopsies were taken. The colon (entire examined portion) appeared normal. Small non-bleeding internal hemorrhoids were found during retroflexion (ileium biopsy small intestinal mucosa with lymphoid aggregates, negative for active inflammation; colon bx-colonic mucosa with lymphoid aggregates)        Assessment/Recommendations:   77 y.o. female with Ulcerative colitis, Hyopthyroidism, admitted with obstructive jaundice concerning for underlying malignant obstructing mass. 1. Pancreatic adenocarcinoma:  Clinically stage Ib [T2N0], but need further evaluation in future regarding pulmonary nodules and liver lesion. Presented with obstructive jaundice and transaminitis, CA 19-9 was 198 in 9/2021 at diagnosis. Underwent biliary stent placement with improvement in biliary obstruction. 3.2cm mass seen in pancreatic head on EUS, not involving vasculature, possibly resectable. Dr. Boris Florentino in [place] has evaluated patient and recommends consideration of neoadjuvant chemotherapy with close attention to lung nodules and liver lesion. I agree with this recommendation for earlier treatment of micrometastatic disease, ability to determine whether pt has chemosensitive disease. We discussed the risks and benefits of FOLFIRINOX chemotherapy, including potential side effects. These include but are not limited to fatigue, nausea vomiting, diarrhea, neuropathy, taste changes, cold intolerance, esophageal spasm, allergic reactions, alopecia, mucositis, myelosuppression, risk for infection, infertility, and rarely, death.  Rarely, a patient may have a condition where they do not metabolize fluorouracil appropriately (called DPD deficiency), and they may have excessive toxicity. A Port-A-Cath will be required in order to deliver the continuous infusion. BRCA 1/2 negative. The patient has consented to beginning therapy. Pt completed 6 cycles of mFOLFIRINOX in neoadjuvant setting, followed by pancreaticoduodenectomy (Whipple resection) on 2/23/22. Now planning for 6 cycles of adjuvant chemotherapy. Supportive care meds include: Emla cream, Zofran, Compazine, Dexamethasone  NCCN surveillance: H&P, CA 19-9, chest CT and CT or MRI abd/pelv with contrast every 3-6 mo 2 y, then every 6-12 mo as clinically indicated. -- Proceed with C12 of adjuvant mFOLFIRINOX with neulasta support (10% DR in 5-FU, Oxali, and 20% DR in Irinotecan.)  -- Fluids with pump removal.   -- Checking CA 19-9 on date of C1, C6, C7, C12.   -- Return in 2 weeks for labs, port flush, MD/NP visit. 2. Diarrhea:  Has history of UC. Not taking Cholestyramine (Questran) due to intolerance. Taking lomotil TID. Imodium prn - does not work well. Hydrating with Gatorade/water. Cdiff negative. Evaluated by JESSICA Carreon discussed if colon is normal, consider trial of sandostatin if this is chemo induced diarrhea. Colonoscopy 6/1/22 normal indicating chemotherapy induced diarrhea rather than UC flare. -- Atropine before irinotecan. Lomotil QID prn.     3. Pulmonary nodules:   Repeat imaging after C4 of chemo showed resolution of pulmonary nodules. 4. HTN:   Has been holding HCTZ. Systolic elevated today. -- Advised restarting HCTZ 12.5mg. Rx sent. Advised checking BP at home and if systolic <562, then hold HCTZ. 5. H/o Ulcerative procto-sigmoiditis:  April 2017 colonoscopy with one adenoma polyp removed. Repeat colonoscopy was normal on 6/1/22. Repeat 10 years for screening.      6. Morbid obesity:  Discussed healthy food options and ways to incorporate more protein into diet. 7. Normocytic anemia:  Due to chemotherapy, decreased PO intake and recent Whipple surgery. B12/folate/ferritin normal. Mildly low iron sat. Monitor. 9. Dysguesia / Poor appetite / Hypokalemia:   Intake, appetite and diarrhea improved with dose reduction. Prn dietitian visits. -- On KCL 20 meq BID. Advised increase to 30meq BID if diarrhea returns. 10. Chemotherapy induced neuropathy:  Increased to Grade 2. Present in bilateral legs. Declined cymbalta but may consider at next visit. Monitor. I personally saw and evaluated the patient and performed the key components of medical decision making. The history, physical exam, and documentation were performed by Lucinda Raza NP. I reviewed and verified the above documentation and modified it as needed. Proceed with C12 of adjuvant FOLFIRNOX. Neuropathy is stable. Diarrhea is well controlled currently.       Signed By:  Date:  Jero Ramirez MD   07/05/22

## 2022-07-06 LAB — CANCER AG19-9 SERPL-ACNC: 19 U/ML (ref 0–35)

## 2022-07-07 ENCOUNTER — HOSPITAL ENCOUNTER (OUTPATIENT)
Dept: INFUSION THERAPY | Age: 67
Discharge: HOME | End: 2022-07-07
Payer: MEDICARE

## 2022-07-07 ENCOUNTER — APPOINTMENT (OUTPATIENT)
Dept: INFUSION THERAPY | Age: 67
End: 2022-07-07

## 2022-07-07 VITALS
OXYGEN SATURATION: 95 % | HEART RATE: 74 BPM | RESPIRATION RATE: 17 BRPM | DIASTOLIC BLOOD PRESSURE: 79 MMHG | TEMPERATURE: 98.5 F | SYSTOLIC BLOOD PRESSURE: 134 MMHG

## 2022-07-07 DIAGNOSIS — Z76.89 PREVENTION OF CHEMOTHERAPY-INDUCED NEUTROPENIA: Primary | ICD-10-CM

## 2022-07-07 DIAGNOSIS — C25.0 MALIGNANT NEOPLASM OF HEAD OF PANCREAS (HCC): ICD-10-CM

## 2022-07-07 PROCEDURE — 96360 HYDRATION IV INFUSION INIT: CPT

## 2022-07-07 PROCEDURE — 96377 APPLICATON ON-BODY INJECTOR: CPT

## 2022-07-07 PROCEDURE — 74011250636 HC RX REV CODE- 250/636: Performed by: INTERNAL MEDICINE

## 2022-07-07 RX ORDER — SODIUM CHLORIDE 9 MG/ML
1000 INJECTION, SOLUTION INTRAVENOUS ONCE
Status: COMPLETED | OUTPATIENT
Start: 2022-07-07 | End: 2022-07-07

## 2022-07-07 RX ORDER — SODIUM CHLORIDE 9 MG/ML
10 INJECTION INTRAMUSCULAR; INTRAVENOUS; SUBCUTANEOUS AS NEEDED
Status: DISCONTINUED | OUTPATIENT
Start: 2022-07-07 | End: 2022-07-08 | Stop reason: HOSPADM

## 2022-07-07 RX ORDER — SODIUM CHLORIDE 0.9 % (FLUSH) 0.9 %
10 SYRINGE (ML) INJECTION AS NEEDED
Status: DISCONTINUED | OUTPATIENT
Start: 2022-07-07 | End: 2022-07-08 | Stop reason: HOSPADM

## 2022-07-07 RX ORDER — HEPARIN 100 UNIT/ML
300-500 SYRINGE INTRAVENOUS AS NEEDED
Status: DISCONTINUED | OUTPATIENT
Start: 2022-07-07 | End: 2022-07-08 | Stop reason: HOSPADM

## 2022-07-07 RX ADMIN — PEGFILGRASTIM 6 MG: KIT SUBCUTANEOUS at 13:50

## 2022-07-07 RX ADMIN — HEPARIN 500 UNITS: 100 SYRINGE at 14:40

## 2022-07-07 RX ADMIN — SODIUM CHLORIDE 1000 ML: 9 INJECTION, SOLUTION INTRAVENOUS at 13:38

## 2022-07-07 NOTE — PROGRESS NOTES
[facility] Progress Note    Date: 2022    Name: Kaylyn Schlatter    MRN: [id number]         : 1955    Ms. Jamel Marti Arrived ambulatory and in no distress for Pump Removal/ Neulasta on body/ Hydration Regimen. Assessment was completed, no acute issues at this time, no new complaints voiced. Port already attached to 5FU, 0 ml left to infuse. Covid questionnaire completed. 1. Do you have any symptoms of COVID-19? SOB, coughing, fever, or generally not feeling well - no    2. Have you been exposed to COVID-19 recently? - no    3. Have you had any recent contact with family/friend that has a pending COVID test? - no      Ms. Bhatti's vitals were reviewed. Visit Vitals  /79   Pulse 74   Temp 98.5 °F (36.9 °C)   Resp 17   SpO2 95%       Medications:  Medications Administered     0.9% sodium chloride infusion 1,000 mL     Admin Date  2022 Action  New Bag Dose  1,000 mL Rate  1,000 mL/hr Route  IntraVENous Administered By  Grant Maki RN          heparin (porcine) pf 300-500 Units     Admin Date  2022 Action  Given Dose  500 Units Route  InterCATHeter Administered By  Grant Maki RN          pegfilgrastim (NEULASTA) wearable SQ injector 6 mg     Admin Date  2022 Action  Given Dose  6 mg Route  SubCUTAneous Administered By  Grant Maki RN                  Ms. Jamel Marti tolerated treatment well and was discharged from [place] [address] in stable condition. Port de-accessed, flushed & heparinized per protocol. She is to return on 22 for her next appointment.     Oziel Truong RN  2022

## 2022-07-11 RX ORDER — SODIUM CHLORIDE 0.9 % (FLUSH) 0.9 %
10 SYRINGE (ML) INJECTION AS NEEDED
Status: CANCELLED | OUTPATIENT
Start: 2022-07-18

## 2022-07-11 RX ORDER — HEPARIN 100 UNIT/ML
300-500 SYRINGE INTRAVENOUS AS NEEDED
Status: CANCELLED | OUTPATIENT
Start: 2022-07-18

## 2022-07-11 RX ORDER — SODIUM CHLORIDE 9 MG/ML
10 INJECTION INTRAMUSCULAR; INTRAVENOUS; SUBCUTANEOUS AS NEEDED
Status: CANCELLED | OUTPATIENT
Start: 2022-07-18

## 2022-07-13 RX ORDER — SODIUM CHLORIDE 0.9 % (FLUSH) 0.9 %
10 SYRINGE (ML) INJECTION AS NEEDED
Status: CANCELLED | OUTPATIENT
Start: 2022-10-10

## 2022-07-13 RX ORDER — HEPARIN 100 UNIT/ML
300-500 SYRINGE INTRAVENOUS AS NEEDED
Status: CANCELLED | OUTPATIENT
Start: 2022-10-10

## 2022-07-13 RX ORDER — SODIUM CHLORIDE 9 MG/ML
10 INJECTION INTRAMUSCULAR; INTRAVENOUS; SUBCUTANEOUS AS NEEDED
Status: CANCELLED | OUTPATIENT
Start: 2022-10-10

## 2022-07-13 NOTE — PROGRESS NOTES
[facility] [place] [facility] [place] [address] [facility] Petros: [phone]  F: [phone]      Reason for Visit:   Luz Garcia is a 77 y.o. female who is seen for follow up of pancreatic adenocarcinoma. Hematology/Oncology Treatment History:     Diagnosis: Pancreatic adenocarcinoma    Stage: clinical Stage Ib [T2N0] at diagnosis; Stage III [saT2rB7] at surgery    Pathology:   -9/26/21 Cytology - brushings from common bile duct: Reactive glandular epithelial cells and bile   -9/28/21 pancreatic head mass biopsy: Adenocarcinoma.   -10/22/21 [facility] mult-cancer panel -negative   -2/23/22 Pancreaticoduodenectomy (Whipple resection): Ductal adenocarcinoma, G2, 1.2cm. Tumor invades peripancreatic soft tissues. LVI negative. PNI present. Margins uninvolved. 5/29 LNs involved with cancer.   y pT1cN2     Prior Treatment:   1. Neoadjuvant FOLFIRNOX x 6 cycles, 10/18/21-1/10/22  2. PYLORUS PRESERVING WHIPPLE PROCEDURE AND PORTAL LYPHMADENECTOMY, 2/23/22  3. Adjuvant FOLFIRNOX x 6 cycles, 4/12/22 - 7/5/22. Current Treatment: Surveillance     History of Present Illness:   Luz Garcia is a pleasant 77 y.o. female who comes in for evaluation of pancreatic cancer. She presented in 9/2021 with obstructive jaundice, transaminitis. ERCP on 9/26/21 notable for distal CBD stricture; possible tumor/mass located in the CBD; underwent biliary stent placement to relieve biliary obstruction. CT A/P revealed mild/mod intrahepatic biliary ductal dilatation; prominence of CBD and hydropic gallbladder, suggestive of stricture/stenosis/mass of distal CBD. MRI of abdomen showed narrowing of CBD pancreatic head suggestive of extrinisic compression concerning for periampullary mass vs eccentric intraluminal lesion. EUS showed 3.2cm hypoechoic mass in pancreas head. Biopsy revealed adenocarcinoma. Pt met with Dr. Argenis Land in [place] and plans for neoadjuvant chemotherapy prior to surgery. Interval history:  Patient here for follow up and surveillance of pancreas cancer. Reports she feels really good. Reports with each meal, she will have a semi-formed to solid stool after she eats. Reports altered taste, but notes slow improvement. Good appetite - did lose some weight. Reports good energy level. No melena/hematochezia. Vomited once after last treatment, otherwise n/v resolved. Reports neuropathy from feet to knees is bothersome - movement helps with symptoms. Worse at night. No fevers, chills, CP, SOB. PMHx: OA and Rheumatoid arthritis in lower back, Ulcerative colitis, HTN, Hypothyroidism  PSurgHx: , Cyst removed from left breast  SHx: , has 3 children (1 daughter and 2 sons). Works as  in [employer]. Nonsmoker, no EtOH.  in rehab recovering from [facility]. FHx: Mother  of pancreatic cancer age 80. Brother and son had colon cancer. Review of Systems: A complete review of systems was obtained, reviewed. Pertinent findings reviewed above. Physical Exam:     Visit Vitals  /82 (BP 1 Location: Left upper arm, BP Patient Position: Sitting, BP Cuff Size: Adult)   Pulse 78   Temp 98 °F (36.7 °C) (Oral)   Resp 19   Ht 5' 4\" (1.626 m)   Wt 182 lb (82.6 kg)   SpO2 98%   BMI 31.24 kg/m²     ECOG PS: 1  General: no distress  Eyes: anicteric sclerae  HENT: oropharynx clear  Neck: supple  Lymphatic: no cervical, supraclavicular adenopathy  Respiratory: CTAB, normal respiratory effort  CV: no peripheral edema, port upper chest.   GI: soft, nontender, nondistended, no masses; Midline surgical scar. Skin: no rashes; no ecchymoses; no petechiae  Neuro: alert and oriented      Results:     Lab Results   Component Value Date/Time    WBC 8.8 2022 07:43 AM    HGB 7.9 (L) 2022 07:43 AM    HCT 24.3 (L) 2022 07:43 AM    PLATELET 94 (L)  07:43 AM    .7 (H) 2022 07:43 AM    ABS.  NEUTROPHILS PENDING 2022 07:43 AM    Hemoglobin (POC) 12.6 10/06/2013 08:45 AM    Hematocrit (POC) 37 10/06/2013 08:45 AM     Lab Results   Component Value Date/Time    Sodium 140 07/18/2022 07:43 AM    Potassium 3.5 07/18/2022 07:43 AM    Chloride 111 (H) 07/18/2022 07:43 AM    CO2 24 07/18/2022 07:43 AM    Glucose 87 07/18/2022 07:43 AM    BUN 4 (L) 07/18/2022 07:43 AM    Creatinine 0.59 07/18/2022 07:43 AM    GFR est AA >60 07/18/2022 07:43 AM    GFR est non-AA >60 07/18/2022 07:43 AM    Calcium 8.7 07/18/2022 07:43 AM    Sodium (POC) 143 10/06/2013 08:45 AM    Potassium (POC) 3.4 (L) 10/06/2013 08:45 AM    Chloride (POC) 104 10/06/2013 08:45 AM    Glucose (POC) 84 03/02/2022 03:51 PM    BUN (POC) 11 10/06/2013 08:45 AM    Creatinine (POC) 1.0 10/06/2013 08:45 AM    Calcium, ionized (POC) 1.27 10/06/2013 08:45 AM     Lab Results   Component Value Date/Time    Bilirubin, total 0.5 07/05/2022 07:49 AM    ALT (SGPT) 22 07/05/2022 07:49 AM    Alk. phosphatase 102 07/05/2022 07:49 AM    Protein, total 6.1 (L) 07/05/2022 07:49 AM    Albumin 2.9 (L) 07/05/2022 07:49 AM    Globulin 3.2 07/05/2022 07:49 AM     Lab Results   Component Value Date/Time    Reticulocyte count 2.0 03/15/2022 09:38 AM    Iron % saturation 13 (L) 03/15/2022 09:38 AM    TIBC 351 03/15/2022 09:38 AM    Ferritin 177 03/15/2022 09:38 AM    Vitamin B12 1,155 (H) 06/06/2022 08:14 AM    Folate 11.8 03/15/2022 09:38 AM    Sed rate (ESR) 17 09/16/2010 12:04 PM    C-Reactive protein <0.29 01/18/2017 08:45 AM    TSH 2.84 04/12/2022 07:50 AM    ALBA, Direct Detected (A) 09/16/2010 12:04 PM    Lipase 1,214 (H) 09/29/2021 04:10 AM    Hep C virus Ab Interp.  NONREACTIVE 09/24/2021 06:55 PM     Lab Results   Component Value Date/Time    CEA 3.5 09/25/2021 11:46 AM    Carbohydrate Antigen 19-9, (CA 19-9) 19 07/05/2022 07:49 AM       10/18/21 CA 19-9:  138  1/10/22 CA 19-9: 130   4/12/22 CA 19-9: 16   7/5/22 CA 19-9: 19    Imaging / Procedures:     9/24/21 US ABD IMPRESSION  Cholelithiasis. Gallbladder sludge. Prominent CBD with mild intrahepatic ductal dilatation as well. Nonemergent MRI with MRCP to delineate etiology for CBD distention. 9/24/21 CT ABD PELV W CONT  There is mild/moderate intrahepatic biliary ductal dilatation, prominence of the CBD and a hydropic gallbladder. No pancreatic duct dilatation. No clearly  delineated pancreatic head mass. Imaging findings suggestive of stricture/stenosis/mass of the distal CBD, no extrinsic pancreatic head mass is  identified. Gastroenterology consult  Correlation with MRI/MRCP on a nonemergent basis. There is severe degenerative change of the lumbar spine. 9/25/21 MRI ABD WO CONT  IMPRESSION  1. Intrahepatic and extra hepatic biliary dilatation with abrupt narrowing of  the common bile duct pancreatic head just proximal to the ampulla. Suggestion of  extrinsic compression on the duct. This raises the possibility of a  periampullary mass. Alternatively, this may be an eccentric intraluminal lesion. Evaluation is limited. Recommend GI consultation for possible ERCP and EUS. 2.  Questionable small lesion in the left hepatic lobe with mild increased T2  signal and T1 hypointensity. Recommend follow-up imaging a contrast-enhanced  study preferably MRI. 3.  Cholelithiasis. Distended gallbladder with mild gallbladder wall thickening. Correlate for acute cholecystitis    9/26/21 XR ERCP/ERCB / Dr. Laurel Fitzpatrick  Impression: Biliary ductal dilation, with a maximum common duct diameter of 15 mm; Severe stenosis, involving distal CBD /pancreas head area concerning for neoplasia  Interventions: Endoscopic ampullary sphincterotomy and biliary stent placement; brushings from the CBD    9/27/21 CT CHEST W CONT:   IMPRESSION  1. There is a minimal right pleural effusion. 2. There are small pulmonary nodules present. There is a nodule in the plane of  the right major fissure and posteriorly in left lower lobe.  These were not  present on examination of 1/18/2017. These could be on the basis of metastatic  disease. Close follow-up is suggested. There are also 2 small subpleural nodules  anteriorly in the right upper lobe as described above which can also be  evaluated on follow-up. 9/28/21 EUS:  Endoscopic: Esophagus:normal; Stomach: normal; Duodenum/jejunum: normal and protruding biliary stent  Ultrasound: Esophagus: normal findings;     Stomach: normal findings:     Pancreas: Areas examined: the entire gland                          Parenchyma: -Evidence of a hypoechoic mass with poorly defined borders seen in the head of the pancreas. It appeared involving the CBD where a stent is seen, however it did not involve the major vessels. It measured about 3.2 cm in widest diameter on one view. Pancreatic Duct: normal findings, not dilated               Liver: Parenchyma: normal                          Gallbladder: normal                          Bile Duct: the common bile duct is dilated in the proximal and mid portion and a plastic stent could be seen                          Lymph Node: no adenopathy  Impression:   Pancreas head mass with fine needle biopsy showing adenocarcinoma on preliminary cytology  Recommendations: Follow-up on pathology  Follow-up with oncology, will need further evaluation of pulmonary nodule  Surgical oncology consultation  Resume GI lite diet today and can discharge in am from GI standpoint    12/20/2021:  MRI abd w wo cont:  IMPRESSION  1. Positive response to therapy. Decreased size of the pancreatic head mass, which now has a maximum AP diameter of 2.3 cm. No obstruction of the biliary tree or pancreatic duct. No involvement of the SMA. Based on imaging, patient is a candidate for resection. 2. Cholelithiasis. No acute cholecystitis or biliary obstruction. MRI Pelv w wo cont:  IMPRESSION  No acute process or evidence of malignant neoplasm/ metastatic disease.   CT chest w cont:  IMPRESSION  1. Interval apparent resolution of previously seen scattered subcentimeter bilateral pulmonary nodules and right pleural effusion. 2.  Please see separately dictated reports for the MRI abdomen and pelvis obtained the same day for subdiaphragmatic findings. 6/1/22 colonoscopy: The perianal and digital rectal examinations were normal. The terminal ileum appeared normal. Biopsies were taken. The colon (entire examined portion) appeared normal. Small non-bleeding internal hemorrhoids were found during retroflexion (ileium biopsy small intestinal mucosa with lymphoid aggregates, negative for active inflammation; colon bx-colonic mucosa with lymphoid aggregates)        Assessment/Recommendations:   77 y.o. female with Ulcerative colitis, Hyopthyroidism, admitted with obstructive jaundice concerning for underlying malignant obstructing mass. 1. Pancreatic adenocarcinoma:  Clinically stage Ib [T2N0], but need further evaluation in future regarding pulmonary nodules and liver lesion. Presented with obstructive jaundice and transaminitis, CA 19-9 was 198 in 9/2021 at diagnosis. Underwent biliary stent placement with improvement in biliary obstruction. 3.2cm mass seen in pancreatic head on EUS, not involving vasculature, possibly resectable. Dr. Ted Haynes in [place] has evaluated patient and recommends consideration of neoadjuvant chemotherapy with close attention to lung nodules and liver lesion. I agree with this recommendation for earlier treatment of micrometastatic disease, ability to determine whether pt has chemosensitive disease. We discussed the risks and benefits of FOLFIRINOX chemotherapy, including potential side effects. These include but are not limited to fatigue, nausea vomiting, diarrhea, neuropathy, taste changes, cold intolerance, esophageal spasm, allergic reactions, alopecia, mucositis, myelosuppression, risk for infection, infertility, and rarely, death.  Rarely, a patient may have a condition where they do not metabolize fluorouracil appropriately (called DPD deficiency), and they may have excessive toxicity. A Port-A-Cath will be required in order to deliver the continuous infusion. BRCA 1/2 negative. The patient has consented to beginning therapy. Pt completed 6 cycles of mFOLFIRINOX in neoadjuvant setting, followed by pancreaticoduodenectomy (Whipple resection) on 2/23/22. Completed 6 cycles of adjuvant chemotherapy. NCCN surveillance: H&P, CA 19-9, chest CT and CT or MRI abd/pelv with contrast every 3-6 mo 2 y, then every 6-12 mo as clinically indicated. -- Port flush every 12 weeks. -- Repeat CT in 3 months, due 10/2022  -- Repeat CBC, BMP in 4 weeks - call with results. -- Return in 12 weeks for labs (CMP, CBC, CA 19-9), port flush, MD/NP visit, review imaging. 2. Diarrhea:  Improving with completion of treatment. Has history of UC. Not taking Cholestyramine (Questran) due to intolerance. Taking lomotil TID. Imodium prn - does not work well. Hydrating with Gatorade/water. Cdiff negative. Evaluated by JESSICA Greer discussed if colon is normal, consider trial of sandostatin if this is chemo induced diarrhea. Colonoscopy 6/1/22 normal indicating chemotherapy induced diarrhea rather than UC flare. 3. Pulmonary nodules:   Repeat imaging after C4 of chemo showed resolution of pulmonary nodules. 4. HTN:   Has restarted HCTZ. Advised checking BP at home and if systolic <776, then hold HCTZ. 5. H/o Ulcerative procto-sigmoiditis:  April 2017 colonoscopy with one adenoma polyp removed. Repeat colonoscopy was normal on 6/1/22. Repeat 10 years for screening. 6. Morbid obesity:  Discussed healthy food options and ways to incorporate more protein into diet. 7. Normocytic anemia:  Due to chemotherapy. B12/folate/ferritin normal. Mildly low iron sat. Monitor.      9. Dysguesia / Poor appetite / Hypokalemia:   Intake, appetite and diarrhea improved with dose reduction. Prn dietitian visits. -- On KCL 20 meq BID. Advised decreasing to 20meq daily if no diarrhea. Discontinue in 4 weeks if potassium is normal.     10. Chemotherapy induced neuropathy:  Increased to Grade 2. Present in bilateral legs. Declined cymbalta but may consider at next visit. Monitor. -- Advised supportive therapy, such as acupuncture. Provided contact for [facility]. I personally saw and evaluated the patient and performed the key components of medical decision making. The history, physical exam, and documentation were performed by Pradip Casiano NP. I reviewed and verified the above documentation and modified it as needed. Pt has completed adjuvant FOLFIRINOX and will now move to surveillance. Discussed BP, potassium supplements, diarrhea and neuropathy as stated above. CT in 3 months with follow up afterwards.     Signed By:  Date:  Haresh Lozoya MD   07/18/22

## 2022-07-18 ENCOUNTER — OFFICE VISIT (OUTPATIENT)
Dept: ONCOLOGY | Age: 67
End: 2022-07-18
Payer: MEDICARE

## 2022-07-18 ENCOUNTER — HOSPITAL ENCOUNTER (OUTPATIENT)
Dept: INFUSION THERAPY | Age: 67
Discharge: HOME | End: 2022-07-18
Payer: MEDICARE

## 2022-07-18 VITALS
OXYGEN SATURATION: 99 % | HEIGHT: 64 IN | DIASTOLIC BLOOD PRESSURE: 83 MMHG | WEIGHT: 181 LBS | RESPIRATION RATE: 16 BRPM | BODY MASS INDEX: 30.9 KG/M2 | HEART RATE: 90 BPM | SYSTOLIC BLOOD PRESSURE: 120 MMHG | TEMPERATURE: 97.7 F

## 2022-07-18 VITALS
HEART RATE: 78 BPM | TEMPERATURE: 98 F | OXYGEN SATURATION: 98 % | DIASTOLIC BLOOD PRESSURE: 82 MMHG | BODY MASS INDEX: 31.07 KG/M2 | WEIGHT: 182 LBS | HEIGHT: 64 IN | SYSTOLIC BLOOD PRESSURE: 119 MMHG | RESPIRATION RATE: 19 BRPM

## 2022-07-18 DIAGNOSIS — D53.9 MACROCYTIC ANEMIA: ICD-10-CM

## 2022-07-18 DIAGNOSIS — G62.0 CHEMOTHERAPY-INDUCED NEUROPATHY (HCC): ICD-10-CM

## 2022-07-18 DIAGNOSIS — I10 HYPERTENSION, UNSPECIFIED TYPE: ICD-10-CM

## 2022-07-18 DIAGNOSIS — K52.1 DIARRHEA DUE TO DRUG: ICD-10-CM

## 2022-07-18 DIAGNOSIS — C25.9 PANCREATIC ADENOCARCINOMA (HCC): Primary | ICD-10-CM

## 2022-07-18 DIAGNOSIS — T45.1X5A CHEMOTHERAPY-INDUCED NEUROPATHY (HCC): ICD-10-CM

## 2022-07-18 DIAGNOSIS — E86.0 DEHYDRATION: Primary | ICD-10-CM

## 2022-07-18 DIAGNOSIS — D69.6 THROMBOCYTOPENIA (HCC): ICD-10-CM

## 2022-07-18 DIAGNOSIS — C25.0 MALIGNANT NEOPLASM OF HEAD OF PANCREAS (HCC): ICD-10-CM

## 2022-07-18 LAB
ALBUMIN SERPL-MCNC: 3 G/DL (ref 3.5–5)
ALBUMIN/GLOB SERPL: 1 {RATIO} (ref 1.1–2.2)
ALP SERPL-CCNC: 103 U/L (ref 45–117)
ALT SERPL-CCNC: 18 U/L (ref 12–78)
ANION GAP SERPL CALC-SCNC: 5 MMOL/L (ref 5–15)
AST SERPL-CCNC: 23 U/L (ref 15–37)
BASOPHILS # BLD: 0.1 K/UL (ref 0–0.1)
BASOPHILS NFR BLD: 1 % (ref 0–1)
BILIRUB SERPL-MCNC: 0.6 MG/DL (ref 0.2–1)
BUN SERPL-MCNC: 4 MG/DL (ref 6–20)
BUN/CREAT SERPL: 7 (ref 12–20)
CALCIUM SERPL-MCNC: 8.7 MG/DL (ref 8.5–10.1)
CHLORIDE SERPL-SCNC: 111 MMOL/L (ref 97–108)
CO2 SERPL-SCNC: 24 MMOL/L (ref 21–32)
CREAT SERPL-MCNC: 0.59 MG/DL (ref 0.55–1.02)
DIFFERENTIAL METHOD BLD: ABNORMAL
EOSINOPHIL # BLD: 0 K/UL (ref 0–0.4)
EOSINOPHIL NFR BLD: 0 % (ref 0–7)
ERYTHROCYTE [DISTWIDTH] IN BLOOD BY AUTOMATED COUNT: 22.4 % (ref 11.5–14.5)
GLOBULIN SER CALC-MCNC: 3 G/DL (ref 2–4)
GLUCOSE SERPL-MCNC: 87 MG/DL (ref 65–100)
HCT VFR BLD AUTO: 24.3 % (ref 35–47)
HGB BLD-MCNC: 7.9 G/DL (ref 11.5–16)
IMM GRANULOCYTES # BLD AUTO: 0 K/UL
IMM GRANULOCYTES NFR BLD AUTO: 0 %
LYMPHOCYTES # BLD: 3.5 K/UL (ref 0.8–3.5)
LYMPHOCYTES NFR BLD: 40 % (ref 12–49)
MCH RBC QN AUTO: 34.1 PG (ref 26–34)
MCHC RBC AUTO-ENTMCNC: 32.5 G/DL (ref 30–36.5)
MCV RBC AUTO: 104.7 FL (ref 80–99)
METAMYELOCYTES NFR BLD MANUAL: 1 %
MONOCYTES # BLD: 0.9 K/UL (ref 0–1)
MONOCYTES NFR BLD: 10 % (ref 5–13)
NEUTS SEG # BLD: 4.2 K/UL (ref 1.8–8)
NEUTS SEG NFR BLD: 48 % (ref 32–75)
NRBC # BLD: 0.03 K/UL (ref 0–0.01)
NRBC BLD-RTO: 0.3 PER 100 WBC
PLATELET # BLD AUTO: 94 K/UL (ref 150–400)
PMV BLD AUTO: 11 FL (ref 8.9–12.9)
POTASSIUM SERPL-SCNC: 3.5 MMOL/L (ref 3.5–5.1)
PROT SERPL-MCNC: 6 G/DL (ref 6.4–8.2)
RBC # BLD AUTO: 2.32 M/UL (ref 3.8–5.2)
RBC MORPH BLD: ABNORMAL
RBC MORPH BLD: ABNORMAL
SODIUM SERPL-SCNC: 140 MMOL/L (ref 136–145)
WBC # BLD AUTO: 8.8 K/UL (ref 3.6–11)

## 2022-07-18 PROCEDURE — 1101F PT FALLS ASSESS-DOCD LE1/YR: CPT | Performed by: INTERNAL MEDICINE

## 2022-07-18 PROCEDURE — 1123F ACP DISCUSS/DSCN MKR DOCD: CPT | Performed by: INTERNAL MEDICINE

## 2022-07-18 PROCEDURE — 3017F COLORECTAL CA SCREEN DOC REV: CPT | Performed by: INTERNAL MEDICINE

## 2022-07-18 PROCEDURE — G8754 DIAS BP LESS 90: HCPCS | Performed by: INTERNAL MEDICINE

## 2022-07-18 PROCEDURE — 74011000250 HC RX REV CODE- 250: Performed by: NURSE PRACTITIONER

## 2022-07-18 PROCEDURE — 36591 DRAW BLOOD OFF VENOUS DEVICE: CPT

## 2022-07-18 PROCEDURE — G8417 CALC BMI ABV UP PARAM F/U: HCPCS | Performed by: INTERNAL MEDICINE

## 2022-07-18 PROCEDURE — 85025 COMPLETE CBC W/AUTO DIFF WBC: CPT

## 2022-07-18 PROCEDURE — 99214 OFFICE O/P EST MOD 30 MIN: CPT | Performed by: INTERNAL MEDICINE

## 2022-07-18 PROCEDURE — 80053 COMPREHEN METABOLIC PANEL: CPT

## 2022-07-18 PROCEDURE — 74011250636 HC RX REV CODE- 250/636: Performed by: NURSE PRACTITIONER

## 2022-07-18 PROCEDURE — 1090F PRES/ABSN URINE INCON ASSESS: CPT | Performed by: INTERNAL MEDICINE

## 2022-07-18 PROCEDURE — G8427 DOCREV CUR MEDS BY ELIG CLIN: HCPCS | Performed by: INTERNAL MEDICINE

## 2022-07-18 PROCEDURE — G8432 DEP SCR NOT DOC, RNG: HCPCS | Performed by: INTERNAL MEDICINE

## 2022-07-18 PROCEDURE — G8752 SYS BP LESS 140: HCPCS | Performed by: INTERNAL MEDICINE

## 2022-07-18 PROCEDURE — G8400 PT W/DXA NO RESULTS DOC: HCPCS | Performed by: INTERNAL MEDICINE

## 2022-07-18 PROCEDURE — 77030016057 HC NDL HUBR APOL -B

## 2022-07-18 PROCEDURE — G8536 NO DOC ELDER MAL SCRN: HCPCS | Performed by: INTERNAL MEDICINE

## 2022-07-18 RX ORDER — HEPARIN 100 UNIT/ML
300-500 SYRINGE INTRAVENOUS AS NEEDED
Status: ACTIVE | OUTPATIENT
Start: 2022-07-18 | End: 2022-07-18

## 2022-07-18 RX ORDER — SODIUM CHLORIDE 9 MG/ML
10 INJECTION INTRAMUSCULAR; INTRAVENOUS; SUBCUTANEOUS AS NEEDED
Status: ACTIVE | OUTPATIENT
Start: 2022-07-18 | End: 2022-07-18

## 2022-07-18 RX ORDER — SODIUM CHLORIDE 0.9 % (FLUSH) 0.9 %
10 SYRINGE (ML) INJECTION AS NEEDED
Status: DISPENSED | OUTPATIENT
Start: 2022-07-18 | End: 2022-07-18

## 2022-07-18 RX ADMIN — HEPARIN 500 UNITS: 100 SYRINGE at 07:43

## 2022-07-18 RX ADMIN — Medication 10 ML: at 07:43

## 2022-07-18 NOTE — PROGRESS NOTES
[facility] Progress Note    Date: 2022    Name: Alfreda Alcantar    MRN: [id number]         : 1955    Ms. Fang Stahl Arrived ambulatory and in no distress for Rebecca Hipps Flush/Labs. R chest wall port accessed without difficulty, labs drawn & sent for processing. 0800 Patient proceed to appointment with Dr. Felipa Umanzor. Ms. Ash Owens vitals were reviewed. Patient Vitals for the past 12 hrs:   Temp Pulse Resp BP SpO2   22 0741 97.7 °F (36.5 °C) 90 16 120/83 99 %       Labs pending in CC. Medications:  Medications Administered     0.9% sodium chloride injection 10 mL     Admin Date  2022 Action  Given Dose  10 mL Route  IntraVENous Administered By  Todd Meza RN          heparin (porcine) pf 300-500 Units     Admin Date  2022 Action  Given Dose  500 Units Route  InterCATHeter Administered By  Todd Meza RN                Ms. [facility] discharged from [place] [address] in stable condition at [id number]. Port de-accessed, flushed & heparinized per protocol. She is to followup with MD regarding future appointments.      Leticia Hawk RN  2022

## 2022-07-18 NOTE — PATIENT INSTRUCTIONS
Learning About Foods That Are Good Sources of Fiber  What foods are high in fiber? The foods you eat contain nutrients, such as vitamins and minerals. Fiber is a nutrient. Your body needs the right amount to stay healthy and work as it should. You can use the list below to help you make choices about which foods to eat. Here are some examples of foods that are good sources of fiber. Fruits  · Apple  · Apricot  · Avocado  · Banana  · Blackberries  · Cherries  · Melon  · Pear  · Raspberries  Grains  · Amaranth  · Barley  · Bran cereal  · Farro  · Oat bran  · Oatmeal  · Quinoa  · Rice (brown or wild)  · Whole-grain bread  · Whole-grain English muffin  Protein foods  · Almonds  · Beans (black, kidney, navy, barrzaa)  · Frank seeds  · Garbanzo beans  · Lentils  · Pumpkin seeds  · Split peas  · Sunflower seeds  Vegetables  · Artichoke  · Broccoli  · [place]lin sprouts  · Cabbage  · Carrots  · Cauliflower  · Eggplant  · Green peas  · Kale  · Pumpkin  · Sweet potato  · White potato  Work with your doctor to find out how much of this nutrient you need. Depending on your health, you may need more or less of it in your diet. Where can you learn more? Go to http://www.gray.com/  Enter F355 in the search box to learn more about \"Learning About Foods That Are Good Sources of Fiber. \"  Current as of: September 8, 2021               Content Version: 13.2  © 2006-2022 Healthwise, Inc[employer]. Care instructions adapted under license by [employer] (which disclaims liability or warranty for this information). If you have questions about a medical condition or this instruction, always ask your healthcare professional. [place] [address] any warranty or liability for your use of this information.
The patient is a 2y6m Male complaining of see chief complaint quote.

## 2022-07-18 NOTE — PROGRESS NOTES
Identified pt with two pt identifiers(name and ). Reviewed record in preparation for visit and have obtained necessary documentation. Chief Complaint   Patient presents with    Follow-up      Vitals:    22 0758   BP: 119/82   Pulse: 78   Resp: 19   Temp: 98 °F (36.7 °C)   TempSrc: Oral   SpO2: 98%   Weight: 182 lb (82.6 kg)   Height: 5' 4\" (1.626 m)   PainSc:   0 - No pain       Allergies   Allergen Reactions    Sulfa (Sulfonamide Antibiotics) Swelling       Current Outpatient Medications   Medication Instructions    dexAMETHasone (DECADRON) 4 mg tablet TAKE 8MG (2 TABS) ON DAYS 2 AND 3 IN THE MORNING AFTER CHEMOTHERAPY TO PREVENT NAUSEA.  diphenoxylate-atropine (LomotiL) 2.5-0.025 mg per tablet 1 Tablet, Oral, 3 TIMES DAILY AS NEEDED    docusate sodium (Stool Softener) 100 mg tab AS NEEDED    hydroCHLOROthiazide (HYDRODIURIL) 12.5 mg, Oral, DAILY    Klor-Con M10 10 mEq tablet TAKE 2 TABLETS BY MOUTH DAILY    levothyroxine (SYNTHROID) 125 mcg tablet TAKE 1 TABLET BY MOUTH EVERY DAY BEFORE BREAKFAST    metFORMIN (GLUCOPHAGE) 500 mg tablet TAKE 1 TABLET BY MOUTH TWICE A DAY WITH MEALS    ondansetron hcl (ZOFRAN) 8 mg, Oral, EVERY 8 HOURS AS NEEDED    polyethylene glycol (MIRALAX) 17 g, Oral, AS NEEDED    prochlorperazine (COMPAZINE) 10 mg, Oral, EVERY 6 HOURS AS NEEDED    triamcinolone acetonide (KENALOG) 0.1 % ointment Topical, 2 TIMES DAILY, use thin layer       Health Maintenance Review: Patient reminded of \"due or due soon\" health maintenance. I have asked the patient to contact his/her primary care provider (PCP) for follow-up on his/her health maintenance.       Immunization History   Administered Date(s) Administered    COVID-19, PFIZER PURPLE top, DILUTE for use, (age 15 y+), IM, 30mcg/0.3mL 2021, 2021, 2022    Influenza Vaccine 2018    Influenza Vaccine ([address] [place] [address]) PF (>6 Mo Flulaval, Fluarix, and >3 Yrs [place], Fluzone [address]) 10/01/2018, 10/18/2021    PPD 10/12/2010           Coordination of Care Questionnaire:  :   1) Have you been to an emergency room, urgent care, or hospitalized since your last visit? If yes, where when, and reason for visit? no       2. Have seen or consulted any other health care provider since your last visit? If yes, where when, and reason for visit? NO      Patient is accompanied by self I have received verbal consent from Ion Joseph to discuss any/all medical information while they are present in the room.

## 2022-07-20 ENCOUNTER — HOSPITAL ENCOUNTER (OUTPATIENT)
Dept: INFUSION THERAPY | Age: 67
End: 2022-07-20

## 2022-07-20 ENCOUNTER — APPOINTMENT (OUTPATIENT)
Dept: INFUSION THERAPY | Age: 67
End: 2022-07-20

## 2022-07-25 ENCOUNTER — TELEPHONE (OUTPATIENT)
Dept: ONCOLOGY | Age: 67
End: 2022-07-25

## 2022-07-25 DIAGNOSIS — R60.0 LOCALIZED EDEMA: Primary | ICD-10-CM

## 2022-07-25 DIAGNOSIS — M79.605 PAIN IN BOTH LOWER EXTREMITIES: ICD-10-CM

## 2022-07-25 DIAGNOSIS — M79.604 PAIN IN BOTH LOWER EXTREMITIES: ICD-10-CM

## 2022-07-25 DIAGNOSIS — C25.9 PANCREATIC ADENOCARCINOMA (HCC): ICD-10-CM

## 2022-07-25 NOTE — TELEPHONE ENCOUNTER
Patient called and stated that her left ankle and foot are beginning to swell. The swelling goes up and down and the patient has a red spot on the inside of her ankle. Patient wants to know is this normal after her chemo or not. Please advise.    #[phone]

## 2022-07-25 NOTE — TELEPHONE ENCOUNTER
[address] at [facility] [address]  [phone]    07/25/22- Patient reports left foot/ankle swelling for about 1 week. Better in the morning after elevating feet at night. She also reports a \"red bruise\" on her inner left ankle. Sore when pressing, otherwise denies pain. Denies injury. Slight swelling to her right foot, but not as noticeable. Patient reports both swelling and redness have improved some today, compared to yesterday. She denies any other symptoms including SOB or tachycardia. Will discuss the above with Elizabeth Dawson and call patient back with recommendations. 4:44 PM- Informed patient that due to her history of cancer and risk for blood clots, NP recommends a doppler of bilateral lower extremities. Discussed supportive care with elevation and compression stockings. Confirmed patient has the number to call central scheduling. She denies further questions or concerns at this time.

## 2022-07-28 DIAGNOSIS — I10 HYPERTENSION, UNSPECIFIED TYPE: ICD-10-CM

## 2022-07-28 DIAGNOSIS — E87.6 HYPOKALEMIA: ICD-10-CM

## 2022-07-28 RX ORDER — POTASSIUM CHLORIDE 750 MG/1
TABLET, EXTENDED RELEASE ORAL
Qty: 60 TABLET | Refills: 0 | Status: SHIPPED | OUTPATIENT
Start: 2022-07-28 | End: 2022-08-24

## 2022-07-29 ENCOUNTER — HOSPITAL ENCOUNTER (OUTPATIENT)
Dept: VASCULAR SURGERY | Age: 67
Discharge: HOME | End: 2022-07-29
Attending: NURSE PRACTITIONER
Payer: MEDICARE

## 2022-07-29 DIAGNOSIS — R60.0 LOCALIZED EDEMA: ICD-10-CM

## 2022-07-29 DIAGNOSIS — C25.9 PANCREATIC ADENOCARCINOMA (HCC): ICD-10-CM

## 2022-07-29 DIAGNOSIS — M79.605 PAIN IN BOTH LOWER EXTREMITIES: ICD-10-CM

## 2022-07-29 DIAGNOSIS — M79.604 PAIN IN BOTH LOWER EXTREMITIES: ICD-10-CM

## 2022-07-29 PROCEDURE — 93970 EXTREMITY STUDY: CPT

## 2022-07-31 RX ORDER — HYDROCHLOROTHIAZIDE 12.5 MG/1
TABLET ORAL
Qty: 30 TABLET | Refills: 1 | Status: SHIPPED | OUTPATIENT
Start: 2022-07-31 | End: 2022-08-24

## 2022-08-01 ENCOUNTER — APPOINTMENT (OUTPATIENT)
Dept: INFUSION THERAPY | Age: 67
End: 2022-08-01

## 2022-08-01 NOTE — PROGRESS NOTES
Called patient. Advised of doppler results per Jessica Solis NP. Patient voiced understanding. Patient stated she does experience occasional pain in knees--describes as a little pain. Patient relates this to her osteo and rheumatoid arthritis. Patient denies any swelling. Patient also mentioned continued neuropathy, making it more challenging to differentiate symptoms. Offered referral to ortho per Jessica Solis. Patient stated she was previously established with ortho and will contact that office. If patient unable to schedule appointment, due to prior bill, then she will call this office back to obtain referral. No further questions or concerns at this time.

## 2022-08-02 ENCOUNTER — OFFICE VISIT (OUTPATIENT)
Dept: SURGERY | Age: 67
End: 2022-08-02
Payer: MEDICARE

## 2022-08-02 VITALS
BODY MASS INDEX: 30.48 KG/M2 | HEART RATE: 80 BPM | TEMPERATURE: 98.6 F | WEIGHT: 178.5 LBS | DIASTOLIC BLOOD PRESSURE: 83 MMHG | RESPIRATION RATE: 16 BRPM | HEIGHT: 64 IN | OXYGEN SATURATION: 98 % | SYSTOLIC BLOOD PRESSURE: 132 MMHG

## 2022-08-02 DIAGNOSIS — C25.9 PANCREATIC ADENOCARCINOMA (HCC): Primary | ICD-10-CM

## 2022-08-02 PROCEDURE — G8432 DEP SCR NOT DOC, RNG: HCPCS | Performed by: SURGERY

## 2022-08-02 PROCEDURE — G8400 PT W/DXA NO RESULTS DOC: HCPCS | Performed by: SURGERY

## 2022-08-02 PROCEDURE — G8754 DIAS BP LESS 90: HCPCS | Performed by: SURGERY

## 2022-08-02 PROCEDURE — 1090F PRES/ABSN URINE INCON ASSESS: CPT | Performed by: SURGERY

## 2022-08-02 PROCEDURE — 1101F PT FALLS ASSESS-DOCD LE1/YR: CPT | Performed by: SURGERY

## 2022-08-02 PROCEDURE — G8417 CALC BMI ABV UP PARAM F/U: HCPCS | Performed by: SURGERY

## 2022-08-02 PROCEDURE — G8536 NO DOC ELDER MAL SCRN: HCPCS | Performed by: SURGERY

## 2022-08-02 PROCEDURE — 3017F COLORECTAL CA SCREEN DOC REV: CPT | Performed by: SURGERY

## 2022-08-02 PROCEDURE — 99213 OFFICE O/P EST LOW 20 MIN: CPT | Performed by: SURGERY

## 2022-08-02 PROCEDURE — 1123F ACP DISCUSS/DSCN MKR DOCD: CPT | Performed by: SURGERY

## 2022-08-02 PROCEDURE — G8752 SYS BP LESS 140: HCPCS | Performed by: SURGERY

## 2022-08-02 PROCEDURE — G8427 DOCREV CUR MEDS BY ELIG CLIN: HCPCS | Performed by: SURGERY

## 2022-08-02 NOTE — LETTER
8/3/2022    Patient: Alfreda Alcantar   YOB: 1955   Date of Visit: 8/2/2022     Eduardo Chowdary MD  [facility] [address] [facility] [address]  [address] [facility]    Dear Eduardo Chowdary MD,      Thank you for referring Ms. Sara Andrade to [facility] for evaluation. My notes for this consultation are attached. If you have questions, please do not hesitate to call me. I look forward to following your patient along with you.       Sincerely,    Ira Gibbs MD

## 2022-08-03 ENCOUNTER — APPOINTMENT (OUTPATIENT)
Dept: INFUSION THERAPY | Age: 67
End: 2022-08-03

## 2022-08-03 NOTE — PROGRESS NOTES
[facility]      Clinic Note - Follow up    [address] Branham returns for scheduled follow up today. She is known to me for history of pancreatic adenocarcinoma in the head of the pancreas. After completing 6 cycles of neoadjuvant chemotherapy, she underwent a pylorus-preserving Whipple procedure with portal lymphadenectomy on 2/23/2022. Her final pathology showed moderately differentiated pancreatic adenocarcinoma, [id number], margins negative. She has now completed postoperative chemotherapy. The patient overall states that she is doing well. She completed chemotherapy about 4 weeks ago. She complains of a metallic or altered taste for food as well as complaints related to neuropathy. Both of these she associates with her chemotherapy. She is otherwise doing well and plans to go back to work this year. She has no new complaints today. The patient denies any changes to the [facility], PSx, SHx or FHx since their last visit except as mentioned above. ROS is negative except as mentioned in the HPI. Objective     Visit Vitals  /83 (BP 1 Location: Left upper arm, BP Patient Position: Sitting, BP Cuff Size: Large adult)   Pulse 80   Temp 98.6 °F (37 °C) (Oral)   Resp 16   Ht 5' 4\" (1.626 m)   Wt 178 lb 8 oz (81 kg)   SpO2 98%   BMI 30.64 kg/m²         PE  GEN - Awake, alert, communicating appropriately. NAD  Pulm - CTAB  CV - RRR  Abd - soft, NT, ND. Midline incision well-healed. No signs of hernia. Ext - warm, well perfused, no edema. All other systems negative unless indicated above.       Labs  Lab Results   Component Value Date/Time    WBC 8.8 07/18/2022 07:43 AM    Hemoglobin (POC) 12.6 10/06/2013 08:45 AM    HGB 7.9 (L) 07/18/2022 07:43 AM    Hematocrit (POC) 37 10/06/2013 08:45 AM    HCT 24.3 (L) 07/18/2022 07:43 AM    PLATELET 94 (L) 74/72/0384 07:43 AM    .7 (H) 07/18/2022 07:43 AM     Lab Results   Component Value Date/Time    Sodium 140 07/18/2022 07:43 AM    Potassium 3.5 07/18/2022 07:43 AM    Chloride 111 (H) 07/18/2022 07:43 AM    CO2 24 07/18/2022 07:43 AM    Anion gap 5 07/18/2022 07:43 AM    Glucose 87 07/18/2022 07:43 AM    BUN 4 (L) 07/18/2022 07:43 AM    Creatinine 0.59 07/18/2022 07:43 AM    BUN/Creatinine ratio 7 (L) 07/18/2022 07:43 AM    GFR est AA >60 07/18/2022 07:43 AM    GFR est non-AA >60 07/18/2022 07:43 AM    Calcium 8.7 07/18/2022 07:43 AM    Bilirubin, total 0.6 07/18/2022 07:43 AM    Alk. phosphatase 103 07/18/2022 07:43 AM    Protein, total 6.0 (L) 07/18/2022 07:43 AM    Albumin 3.0 (L) 07/18/2022 07:43 AM    Globulin 3.0 07/18/2022 07:43 AM    A-G Ratio 1.0 (L) 07/18/2022 07:43 AM    ALT (SGPT) 18 07/18/2022 07:43 AM    AST (SGOT) 23 07/18/2022 07:43 AM     CA 19-9: 19    Imaging  None      Assessment     Mandie Heano is a 77 y. o.yr old female nown to me for history of pancreatic adenocarcinoma in the head of the pancreas. After completing 6 cycles of neoadjuvant chemotherapy, she underwent a pylorus-preserving Whipple procedure with portal lymphadenectomy on 2/23/2022. Her final pathology showed moderately differentiated pancreatic adenocarcinoma, [id number], margins negative. She has now completed postoperative chemotherapy. Plan     The patient is doing well. She has surveillance lab work and CT scheduled in October with follow-up with Dr. Nura Fuentes at that time. I will plan to see her back in 6 months. 20 mins of time was spent with the patient of which > 50% of the time involved face-to-face counseling of the patient regarding the proposed treatment plan.       Margaret Paz MD  8/2/2022    CC: MD Dr. Miko Falk

## 2022-08-15 ENCOUNTER — APPOINTMENT (OUTPATIENT)
Dept: INFUSION THERAPY | Age: 67
End: 2022-08-15

## 2022-08-17 RX ORDER — LEVOTHYROXINE SODIUM 125 UG/1
TABLET ORAL
Qty: 90 TABLET | Refills: 1 | Status: SHIPPED | OUTPATIENT
Start: 2022-08-17

## 2022-08-24 DIAGNOSIS — I10 HYPERTENSION, UNSPECIFIED TYPE: ICD-10-CM

## 2022-08-24 RX ORDER — HYDROCHLOROTHIAZIDE 12.5 MG/1
TABLET ORAL
Qty: 30 TABLET | Refills: 1 | Status: SHIPPED | OUTPATIENT
Start: 2022-08-24 | End: 2022-09-23

## 2022-08-25 NOTE — PROGRESS NOTES
[facility] at [address], [address] [facility] [address] [facility]  [place]: [phone]  F: [phone]      Reason for Visit:   Junior Lang is a 77 y.o. female who is seen for follow up of pancreatic adenocarcinoma. Hematology/Oncology Treatment History:     Diagnosis: Pancreatic adenocarcinoma    Stage: clinical Stage Ib [T2N0] at diagnosis; Stage III [[id number]] at surgery    Pathology:   -9/26/21 Cytology - brushings from common bile duct: Reactive glandular epithelial cells and bile   -9/28/21 pancreatic head mass biopsy: Adenocarcinoma.   [facility]   -2/23/22 Pancreaticoduodenectomy (Whipple resection): Ductal adenocarcinoma, G2, 1.2cm. Tumor invades peripancreatic soft tissues. LVI negative. PNI present. Margins uninvolved. 5/29 LNs involved with cancer.   y pT1cN2     Prior Treatment:   1. Neoadjuvant FOLFIRNOX x 6 cycles, 10/18/21-1/10/22  2. PYLORUS PRESERVING WHIPPLE PROCEDURE AND PORTAL LYPHMADENECTOMY, 2/23/22  3. Adjuvant FOLFIRNOX x 6 cycles, 4/12/22 - 7/5/22. Current Treatment: Surveillance     History of Present Illness:   Junior Lang is a pleasant 77 y.o. female who comes in for evaluation of pancreatic cancer. She presented in 9/2021 with obstructive jaundice, transaminitis. ERCP on 9/26/21 notable for distal CBD stricture; possible tumor/mass located in the CBD; underwent biliary stent placement to relieve biliary obstruction. CT A/P revealed mild/mod intrahepatic biliary ductal dilatation; prominence of CBD and hydropic gallbladder, suggestive of stricture/stenosis/mass of distal CBD. MRI of abdomen showed narrowing of CBD pancreatic head suggestive of extrinisic compression concerning for periampullary mass vs eccentric intraluminal lesion. EUS showed 3.2cm hypoechoic mass in pancreas head. Biopsy revealed adenocarcinoma. Pt met with Dr. Shayy Morales in [place] and plans for neoadjuvant chemotherapy prior to surgery. Interval history:  Patient here for follow up and surveillance of pancreas cancer. 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 She reports having two episodes of diarrhea a week later, and then back pain resolved. Now having regular BM. Reports decreased appetite. Eating two meals a day, has had 10 lb weight loss since August. Reports appetite is improving, trying to eat small meals throughout the day. Reports chronic neuropathy in bilateral feet. Has not tried acupuncture yet, but is interested. Denies fevers, chills, chest pain, SOB. Reports mild swelling to bilateral ankles. PMHx: OA and Rheumatoid arthritis in lower back, Ulcerative colitis, HTN, Hypothyroidism  PSurgHx: , Cyst removed from left breast  SHx: , has 3 children (1 daughter and 2 sons). Works as  in [place]. Nonsmoker, no EtOH.  in rehab recovering from [facility]. FHx: Mother  of pancreatic cancer age 80. Brother and son had colon cancer. Review of Systems: A complete review of systems was obtained, reviewed. Pertinent findings reviewed above. Physical Exam:     Visit Vitals  /80   Pulse 83   Temp 98 °F (36.7 °C)   Resp 16   Wt 169 lb (76.7 kg)   SpO2 98%   BMI 29.01 kg/m²       ECOG PS: 1  General: no distress  Eyes: anicteric sclerae  HENT: oropharynx clear  Neck: supple  Lymphatic: no cervical, supraclavicular adenopathy  Respiratory: CTAB, normal respiratory effort  CV: trace peripheral edema R>L, port upper chest.   GI: soft, nontender, nondistended, no masses; Midline surgical scar. Skin: no rashes; no ecchymoses; no petechiae  Neuro: alert and oriented      Results:     Lab Results   Component Value Date/Time    WBC 5.8 10/10/2022 01:41 PM    HGB 9.3 (L) 10/10/2022 01:41 PM    HCT 27.5 (L) 10/10/2022 01:41 PM    PLATELET 192  01:41 PM    MCV 98.2 10/10/2022 01:41 PM    ABS.  NEUTROPHILS 1.8 10/10/2022 01:41 PM    Hemoglobin (POC) 12.6 10/06/2013 08:45 AM    Hematocrit (POC) 37 10/06/2013 08:45 AM     Lab Results   Component Value Date/Time    Sodium 140 07/18/2022 07:43 AM    Potassium 3.5 07/18/2022 07:43 AM    Chloride 111 (H) 07/18/2022 07:43 AM    CO2 24 07/18/2022 07:43 AM    Glucose 87 07/18/2022 07:43 AM    BUN 4 (L) 07/18/2022 07:43 AM    Creatinine 0.59 07/18/2022 07:43 AM    GFR est AA >60 07/18/2022 07:43 AM    GFR est non-AA >60 07/18/2022 07:43 AM    Calcium 8.7 07/18/2022 07:43 AM    Sodium (POC) 143 10/06/2013 08:45 AM    Potassium (POC) 3.4 (L) 10/06/2013 08:45 AM    Chloride (POC) 104 10/06/2013 08:45 AM    Glucose (POC) 84 03/02/2022 03:51 PM    BUN (POC) 11 10/06/2013 08:45 AM    Creatinine (POC) 1.0 10/06/2013 08:45 AM    Calcium, ionized (POC) 1.27 10/06/2013 08:45 AM     Lab Results   Component Value Date/Time    Bilirubin, total 0.6 07/18/2022 07:43 AM    ALT (SGPT) 18 07/18/2022 07:43 AM    Alk. phosphatase 103 07/18/2022 07:43 AM    Protein, total 6.0 (L) 07/18/2022 07:43 AM    Albumin 3.0 (L) 07/18/2022 07:43 AM    Globulin 3.0 07/18/2022 07:43 AM     Lab Results   Component Value Date/Time    Reticulocyte count 2.0 03/15/2022 09:38 AM    Iron % saturation 13 (L) 03/15/2022 09:38 AM    TIBC 351 03/15/2022 09:38 AM    Ferritin 177 03/15/2022 09:38 AM    Vitamin B12 1,155 (H) 06/06/2022 08:14 AM    Folate 11.8 03/15/2022 09:38 AM    Sed rate (ESR) 17 09/16/2010 12:04 PM    C-Reactive protein <0.29 01/18/2017 08:45 AM    TSH 2.84 04/12/2022 07:50 AM    ALBA, Direct Detected (A) 09/16/2010 12:04 PM    Lipase 1,214 (H) 09/29/2021 04:10 AM    Hep C virus Ab Interp.  NONREACTIVE 09/24/2021 06:55 PM     Lab Results   Component Value Date/Time    CEA 3.5 09/25/2021 11:46 AM    Carbohydrate Antigen 19-9, (CA 19-9) 19 07/05/2022 07:49 AM       10/18/21 CA 19-9:  138  1/10/22 CA 19-9: 130   4/12/22 CA 19-9: 16   7/5/22 CA 19-9: 19  10/10/22: CA 19-9: pending     Imaging / Procedures: 9/24/21 US ABD   IMPRESSION  Cholelithiasis. Gallbladder sludge. Prominent CBD with mild intrahepatic ductal dilatation as well. Nonemergent MRI with MRCP to delineate etiology for CBD distention. 9/24/21 CT ABD PELV W CONT  There is mild/moderate intrahepatic biliary ductal dilatation, prominence of the CBD and a hydropic gallbladder. No pancreatic duct dilatation. No clearly  delineated pancreatic head mass. Imaging findings suggestive of stricture/stenosis/mass of the distal CBD, no extrinsic pancreatic head mass is  identified. Gastroenterology consult  Correlation with MRI/MRCP on a nonemergent basis. There is severe degenerative change of the lumbar spine. 9/25/21 MRI ABD WO CONT  IMPRESSION  1. Intrahepatic and extra hepatic biliary dilatation with abrupt narrowing of  the common bile duct pancreatic head just proximal to the ampulla. Suggestion of  extrinsic compression on the duct. This raises the possibility of a  periampullary mass. Alternatively, this may be an eccentric intraluminal lesion. Evaluation is limited. Recommend GI consultation for possible ERCP and EUS. 2.  Questionable small lesion in the left hepatic lobe with mild increased T2  signal and T1 hypointensity. Recommend follow-up imaging a contrast-enhanced  study preferably MRI. 3.  Cholelithiasis. Distended gallbladder with mild gallbladder wall thickening. Correlate for acute cholecystitis    9/26/21 XR ERCP/ERCB / Dr. Nikolai Suarez  Impression: Biliary ductal dilation, with a maximum common duct diameter of 15 mm; Severe stenosis, involving distal CBD /pancreas head area concerning for neoplasia  Interventions: Endoscopic ampullary sphincterotomy and biliary stent placement; brushings from the CBD    9/27/21 CT CHEST W CONT:   IMPRESSION  1. There is a minimal right pleural effusion. 2. There are small pulmonary nodules present. There is a nodule in the plane of  the right major fissure and posteriorly in left lower lobe. These were not  present on examination of 1/18/2017. These could be on the basis of metastatic  disease. Close follow-up is suggested. There are also 2 small subpleural nodules  anteriorly in the right upper lobe as described above which can also be  evaluated on follow-up. 9/28/21 EUS:  Endoscopic: Esophagus:normal; Stomach: normal; Duodenum/jejunum: normal and protruding biliary stent  Ultrasound: Esophagus: normal findings;     Stomach: normal findings:     Pancreas: Areas examined: the entire gland                          Parenchyma: -Evidence of a hypoechoic mass with poorly defined borders seen in the head of the pancreas. It appeared involving the CBD where a stent is seen, however it did not involve the major vessels. It measured about 3.2 cm in widest diameter on one view. Pancreatic Duct: normal findings, not dilated               Liver: Parenchyma: normal                          Gallbladder: normal                          Bile Duct: the common bile duct is dilated in the proximal and mid portion and a plastic stent could be seen                          Lymph Node: no adenopathy  Impression:   Pancreas head mass with fine needle biopsy showing adenocarcinoma on preliminary cytology  Recommendations: Follow-up on pathology  Follow-up with oncology, will need further evaluation of pulmonary nodule  Surgical oncology consultation  Resume GI lite diet today and can discharge in am from GI standpoint    12/20/2021:  MRI abd w wo cont:  IMPRESSION  1. Positive response to therapy. Decreased size of the pancreatic head mass, which now has a maximum AP diameter of 2.3 cm. No obstruction of the biliary tree or pancreatic duct. No involvement of the SMA. Based on imaging, patient is a candidate for resection. 2. Cholelithiasis. No acute cholecystitis or biliary obstruction. MRI Pelv w wo cont:  IMPRESSION  No acute process or evidence of malignant neoplasm/ metastatic disease.   CT chest w cont:  IMPRESSION  1. Interval apparent resolution of previously seen scattered subcentimeter bilateral pulmonary nodules and right pleural effusion. 2.  Please see separately dictated reports for the MRI abdomen and pelvis obtained the same day for subdiaphragmatic findings. 6/1/22 colonoscopy: The perianal and digital rectal examinations were normal. The terminal ileum appeared normal. Biopsies were taken. The colon (entire examined portion) appeared normal. Small non-bleeding internal hemorrhoids were found during retroflexion (ileium biopsy small intestinal mucosa with lymphoid aggregates, negative for active inflammation; colon bx-colonic mucosa with lymphoid aggregates)    10/13/22 CT ch/abd/pelv: pending. Assessment/Recommendations:   77 y.o. female with Ulcerative colitis, Hyopthyroidism, admitted with obstructive jaundice concerning for underlying malignant obstructing mass. 1. Pancreatic adenocarcinoma:  Clinically stage Ib [T2N0], but need further evaluation in future regarding pulmonary nodules and liver lesion. Presented with obstructive jaundice and transaminitis, CA 19-9 was 198 in 9/2021 at diagnosis. Underwent biliary stent placement with improvement in biliary obstruction. 3.2cm mass seen in pancreatic head on EUS, not involving vasculature, possibly resectable. Dr. Yazmin Houston in [place] has evaluated patient and recommends consideration of neoadjuvant chemotherapy with close attention to lung nodules and liver lesion. I agree with this recommendation for earlier treatment of micrometastatic disease, ability to determine whether pt has chemosensitive disease. We discussed the risks and benefits of FOLFIRINOX chemotherapy, including potential side effects.  These include but are not limited to fatigue, nausea vomiting, diarrhea, neuropathy, taste changes, cold intolerance, esophageal spasm, allergic reactions, alopecia, mucositis, myelosuppression, risk for infection, infertility, and rarely, death. Rarely, a patient may have a condition where they do not metabolize fluorouracil appropriately (called DPD deficiency), and they may have excessive toxicity. A Port-A-Cath will be required in order to deliver the continuous infusion. BRCA 1/2 negative. The patient has consented to beginning therapy. Pt completed 6 cycles of mFOLFIRINOX in neoadjuvant setting, followed by pancreaticoduodenectomy (Whipple resection) on 2/23/22. Completed 6 cycles of adjuvant chemotherapy. NCCN surveillance: H&P, CA 19-9, chest CT and CT or MRI abd/pelv with contrast every 3-6 mo 2 y, then every 6-12 mo as clinically indicated. -- Port flush every 12 weeks. -- Repeat CT scheduled 10/13/22 (call with results) and due again in 3 months on 1/2023. -- Return in 12 weeks for labs (CMP, CBC, CA 19-9), port flush, MD/NP visit, review imaging. 2. HTN:   Well managed on HCTZ. 3. H/o Ulcerative procto-sigmoiditis:  April 2017 colonoscopy with one adenoma polyp removed. Repeat colonoscopy was normal on 6/1/22. Repeat 10 years for screening. 4. Normocytic anemia:  Improving. B12/folate/ferritin normal. Mildly low iron sat. Monitor. 5. Weight-loss / poor appetite / hypokalemia:   Intake, appetite slowly improving since completing treatment but lost 10 lbs. -- If potassium stable today, stop potassium supplements. -- Advise eating 5 small meals throughout the day. Encouraged adding high protein snacks. 6. Chemotherapy induced neuropathy:  Grade 1. Present in feet. Declines Cymbalta. -- Advised supportive therapy, such as acupuncture. Provided contact for [facility]. I personally saw and evaluated the patient and performed the key components of medical decision making. The history, physical exam, and documentation were performed by Lennox Hook, NP. I reviewed and verified the above documentation and modified it as needed.  Pt is doing fairly well on surveillance for pancreatic cancer. She is still struggling with side effects of peripheral neuropathy, altered taste, low appetite, although these effects are improving.        Signed By:  Date:  Nannette Fuller MD   10/10/22

## 2022-09-23 DIAGNOSIS — I10 HYPERTENSION, UNSPECIFIED TYPE: ICD-10-CM

## 2022-09-23 DIAGNOSIS — E87.6 HYPOKALEMIA: ICD-10-CM

## 2022-09-23 RX ORDER — HYDROCHLOROTHIAZIDE 12.5 MG/1
TABLET ORAL
Qty: 30 TABLET | Refills: 1 | Status: SHIPPED | OUTPATIENT
Start: 2022-09-23 | End: 2022-10-24

## 2022-09-23 RX ORDER — POTASSIUM CHLORIDE 750 MG/1
TABLET, EXTENDED RELEASE ORAL
Qty: 30 TABLET | Refills: 0 | Status: SHIPPED | OUTPATIENT
Start: 2022-09-23 | End: 2022-10-24

## 2022-10-10 ENCOUNTER — TELEPHONE (OUTPATIENT)
Dept: ONCOLOGY | Age: 67
End: 2022-10-10

## 2022-10-10 ENCOUNTER — HOSPITAL ENCOUNTER (OUTPATIENT)
Dept: INFUSION THERAPY | Age: 67
Discharge: HOME | End: 2022-10-10
Payer: MEDICARE

## 2022-10-10 ENCOUNTER — OFFICE VISIT (OUTPATIENT)
Dept: ONCOLOGY | Age: 67
End: 2022-10-10
Payer: MEDICARE

## 2022-10-10 VITALS
SYSTOLIC BLOOD PRESSURE: 125 MMHG | HEART RATE: 83 BPM | DIASTOLIC BLOOD PRESSURE: 80 MMHG | OXYGEN SATURATION: 99 % | HEIGHT: 64 IN | RESPIRATION RATE: 18 BRPM | BODY MASS INDEX: 28.68 KG/M2 | TEMPERATURE: 98 F | WEIGHT: 168 LBS

## 2022-10-10 VITALS
OXYGEN SATURATION: 98 % | HEART RATE: 83 BPM | WEIGHT: 169 LBS | TEMPERATURE: 98 F | DIASTOLIC BLOOD PRESSURE: 80 MMHG | BODY MASS INDEX: 29.01 KG/M2 | RESPIRATION RATE: 16 BRPM | SYSTOLIC BLOOD PRESSURE: 125 MMHG

## 2022-10-10 DIAGNOSIS — D64.9 NORMOCYTIC ANEMIA: ICD-10-CM

## 2022-10-10 DIAGNOSIS — T45.1X5A CHEMOTHERAPY-INDUCED NEUROPATHY (HCC): ICD-10-CM

## 2022-10-10 DIAGNOSIS — R63.4 RECENT WEIGHT LOSS: ICD-10-CM

## 2022-10-10 DIAGNOSIS — G62.0 CHEMOTHERAPY-INDUCED NEUROPATHY (HCC): ICD-10-CM

## 2022-10-10 DIAGNOSIS — E86.0 DEHYDRATION: Primary | ICD-10-CM

## 2022-10-10 DIAGNOSIS — C25.0 MALIGNANT NEOPLASM OF HEAD OF PANCREAS (HCC): ICD-10-CM

## 2022-10-10 DIAGNOSIS — C25.9 PANCREATIC ADENOCARCINOMA (HCC): Primary | ICD-10-CM

## 2022-10-10 LAB
ALBUMIN SERPL-MCNC: 2.9 G/DL (ref 3.5–5)
ALBUMIN/GLOB SERPL: 0.7 {RATIO} (ref 1.1–2.2)
ALP SERPL-CCNC: 182 U/L (ref 45–117)
ALT SERPL-CCNC: 29 U/L (ref 12–78)
ANION GAP SERPL CALC-SCNC: 7 MMOL/L (ref 5–15)
AST SERPL-CCNC: 54 U/L (ref 15–37)
BASOPHILS # BLD: 0 K/UL (ref 0–0.1)
BASOPHILS NFR BLD: 0 % (ref 0–1)
BILIRUB SERPL-MCNC: 0.6 MG/DL (ref 0.2–1)
BUN SERPL-MCNC: 10 MG/DL (ref 6–20)
BUN/CREAT SERPL: 14 (ref 12–20)
CALCIUM SERPL-MCNC: 9.1 MG/DL (ref 8.5–10.1)
CHLORIDE SERPL-SCNC: 104 MMOL/L (ref 97–108)
CO2 SERPL-SCNC: 28 MMOL/L (ref 21–32)
CREAT SERPL-MCNC: 0.71 MG/DL (ref 0.55–1.02)
DIFFERENTIAL METHOD BLD: ABNORMAL
EOSINOPHIL # BLD: 0 K/UL (ref 0–0.4)
EOSINOPHIL NFR BLD: 1 % (ref 0–7)
ERYTHROCYTE [DISTWIDTH] IN BLOOD BY AUTOMATED COUNT: 14 % (ref 11.5–14.5)
GLOBULIN SER CALC-MCNC: 3.9 G/DL (ref 2–4)
GLUCOSE SERPL-MCNC: 84 MG/DL (ref 65–100)
HCT VFR BLD AUTO: 27.5 % (ref 35–47)
HGB BLD-MCNC: 9.3 G/DL (ref 11.5–16)
IMM GRANULOCYTES # BLD AUTO: 0 K/UL (ref 0–0.04)
IMM GRANULOCYTES NFR BLD AUTO: 0 % (ref 0–0.5)
LYMPHOCYTES # BLD: 3.2 K/UL (ref 0.8–3.5)
LYMPHOCYTES NFR BLD: 55 % (ref 12–49)
MCH RBC QN AUTO: 33.2 PG (ref 26–34)
MCHC RBC AUTO-ENTMCNC: 33.8 G/DL (ref 30–36.5)
MCV RBC AUTO: 98.2 FL (ref 80–99)
MONOCYTES # BLD: 0.7 K/UL (ref 0–1)
MONOCYTES NFR BLD: 13 % (ref 5–13)
NEUTS SEG # BLD: 1.8 K/UL (ref 1.8–8)
NEUTS SEG NFR BLD: 31 % (ref 32–75)
NRBC # BLD: 0 K/UL (ref 0–0.01)
NRBC BLD-RTO: 0 PER 100 WBC
PLATELET # BLD AUTO: 262 K/UL (ref 150–400)
PMV BLD AUTO: 10.8 FL (ref 8.9–12.9)
POTASSIUM SERPL-SCNC: 3 MMOL/L (ref 3.5–5.1)
PROT SERPL-MCNC: 6.8 G/DL (ref 6.4–8.2)
RBC # BLD AUTO: 2.8 M/UL (ref 3.8–5.2)
SODIUM SERPL-SCNC: 139 MMOL/L (ref 136–145)
WBC # BLD AUTO: 5.8 K/UL (ref 3.6–11)

## 2022-10-10 PROCEDURE — 86301 IMMUNOASSAY TUMOR CA 19-9: CPT

## 2022-10-10 PROCEDURE — 3017F COLORECTAL CA SCREEN DOC REV: CPT | Performed by: INTERNAL MEDICINE

## 2022-10-10 PROCEDURE — 99214 OFFICE O/P EST MOD 30 MIN: CPT | Performed by: INTERNAL MEDICINE

## 2022-10-10 PROCEDURE — 36591 DRAW BLOOD OFF VENOUS DEVICE: CPT

## 2022-10-10 PROCEDURE — 85025 COMPLETE CBC W/AUTO DIFF WBC: CPT

## 2022-10-10 PROCEDURE — 80053 COMPREHEN METABOLIC PANEL: CPT

## 2022-10-10 PROCEDURE — 1123F ACP DISCUSS/DSCN MKR DOCD: CPT | Performed by: INTERNAL MEDICINE

## 2022-10-10 PROCEDURE — G8400 PT W/DXA NO RESULTS DOC: HCPCS | Performed by: INTERNAL MEDICINE

## 2022-10-10 PROCEDURE — G8752 SYS BP LESS 140: HCPCS | Performed by: INTERNAL MEDICINE

## 2022-10-10 PROCEDURE — G8754 DIAS BP LESS 90: HCPCS | Performed by: INTERNAL MEDICINE

## 2022-10-10 PROCEDURE — 1090F PRES/ABSN URINE INCON ASSESS: CPT | Performed by: INTERNAL MEDICINE

## 2022-10-10 PROCEDURE — G8432 DEP SCR NOT DOC, RNG: HCPCS | Performed by: INTERNAL MEDICINE

## 2022-10-10 PROCEDURE — 74011250636 HC RX REV CODE- 250/636: Performed by: NURSE PRACTITIONER

## 2022-10-10 PROCEDURE — 1101F PT FALLS ASSESS-DOCD LE1/YR: CPT | Performed by: INTERNAL MEDICINE

## 2022-10-10 PROCEDURE — G8427 DOCREV CUR MEDS BY ELIG CLIN: HCPCS | Performed by: INTERNAL MEDICINE

## 2022-10-10 PROCEDURE — 74011000250 HC RX REV CODE- 250: Performed by: NURSE PRACTITIONER

## 2022-10-10 PROCEDURE — G8536 NO DOC ELDER MAL SCRN: HCPCS | Performed by: INTERNAL MEDICINE

## 2022-10-10 PROCEDURE — 77030016057 HC NDL HUBR APOL -B

## 2022-10-10 PROCEDURE — G8417 CALC BMI ABV UP PARAM F/U: HCPCS | Performed by: INTERNAL MEDICINE

## 2022-10-10 RX ORDER — SODIUM CHLORIDE 9 MG/ML
10 INJECTION INTRAMUSCULAR; INTRAVENOUS; SUBCUTANEOUS AS NEEDED
Status: DISCONTINUED | OUTPATIENT
Start: 2022-10-10 | End: 2022-10-11 | Stop reason: HOSPADM

## 2022-10-10 RX ORDER — SODIUM CHLORIDE 0.9 % (FLUSH) 0.9 %
10 SYRINGE (ML) INJECTION AS NEEDED
Status: DISCONTINUED | OUTPATIENT
Start: 2022-10-10 | End: 2022-10-11 | Stop reason: HOSPADM

## 2022-10-10 RX ORDER — HEPARIN 100 UNIT/ML
300-500 SYRINGE INTRAVENOUS AS NEEDED
Status: DISCONTINUED | OUTPATIENT
Start: 2022-10-10 | End: 2022-10-11 | Stop reason: HOSPADM

## 2022-10-10 RX ADMIN — Medication 500 UNITS: at 13:35

## 2022-10-10 RX ADMIN — SODIUM CHLORIDE 10 ML: 9 INJECTION, SOLUTION INTRAMUSCULAR; INTRAVENOUS; SUBCUTANEOUS at 13:35

## 2022-10-10 NOTE — PROGRESS NOTES
[facility] Progress Note    Date: October 10, 2022    Name: Ganesh Mathews    MRN: [id number]         : 1955    Ms. Deepak Whitt Arrived ambulatory and in no distress for labs via port Regimen. Assessment was completed, no acute issues at this time, no new complaints voiced. Right chest wall port accessed without difficulty, labs drawn & sent for processing. Please see [facility]. Ms. Oleksandr Frias vitals were reviewed. Visit Vitals  /80   Pulse 83   Temp 98 °F (36.7 °C)   Resp 18   Ht 5' 4\" (1.626 m)   Wt 76.2 kg (168 lb)   SpO2 99%   BMI 28.84 kg/m²           Ms. Deepak Whitt was discharged from [place] [address] in stable condition. Port de-accessed, flushed & heparinized per protocol.    Chencho Mc RN  October 10, 2022

## 2022-10-10 NOTE — TELEPHONE ENCOUNTER
10/10/22 4:16 PM Called pt and advised her potassium is low. Advised I would like her to take 40 meq potassium today, then 10 meq daily. Advised she can increase to 20meq daily if she has diarrhea. She verbalized understanding.

## 2022-10-12 LAB — CANCER AG19-9 SERPL-ACNC: 40 U/ML (ref 0–35)

## 2022-10-13 ENCOUNTER — HOSPITAL ENCOUNTER (OUTPATIENT)
Dept: CT IMAGING | Age: 67
Discharge: HOME | End: 2022-10-13
Attending: NURSE PRACTITIONER
Payer: MEDICARE

## 2022-10-13 DIAGNOSIS — C25.9 PANCREATIC ADENOCARCINOMA (HCC): ICD-10-CM

## 2022-10-13 PROCEDURE — 74011000636 HC RX REV CODE- 636: Performed by: NURSE PRACTITIONER

## 2022-10-13 PROCEDURE — 74177 CT ABD & PELVIS W/CONTRAST: CPT

## 2022-10-13 RX ADMIN — IOPAMIDOL 100 ML: 755 INJECTION, SOLUTION INTRAVENOUS at 13:11

## 2022-10-14 ENCOUNTER — TELEPHONE (OUTPATIENT)
Dept: ONCOLOGY | Age: 67
End: 2022-10-14

## 2022-10-14 NOTE — TELEPHONE ENCOUNTER
10/14/22 11:30 AM Called pt and reviewed results of CT. No evidence of local recurrence or metastatic disease. Incidental finding of fatty liver. Will repeat imaging in 3 months.

## 2022-10-14 NOTE — PROGRESS NOTES
[facility] [place] [address] [facility] [address] [facility] Del: [phone]  F: [phone]      Reason for Visit:   Vinny Harden is a 77 y.o. female who is seen for follow up of pancreatic adenocarcinoma. Hematology/Oncology Treatment History:     Diagnosis: Pancreatic adenocarcinoma    Stage: clinical Stage Ib [T2N0] at diagnosis; Stage III [[id number]] at surgery    Pathology:   -9/26/21 Cytology - brushings from common bile duct: Reactive glandular epithelial cells and bile   -9/28/21 pancreatic head mass biopsy: Adenocarcinoma.   -10/22/21 [facility] mult-cancer panel -negative   -2/23/22 Pancreaticoduodenectomy (Whipple resection): Ductal adenocarcinoma, G2, 1.2cm. Tumor invades peripancreatic soft tissues. LVI negative. PNI present. Margins uninvolved. 5/29 LNs involved with cancer.   y pT1cN2     Prior Treatment:   1. Neoadjuvant FOLFIRNOX x 6 cycles, 10/18/21-1/10/22  2. PYLORUS PRESERVING WHIPPLE PROCEDURE AND PORTAL LYPHMADENECTOMY, 2/23/22  3. Adjuvant FOLFIRNOX x 6 cycles, 4/12/22 - 7/5/22. Current Treatment: Surveillance     History of Present Illness:   Vinny Haredn is a pleasant 77 y.o. female who comes in for evaluation of pancreatic cancer. She presented in 9/2021 with obstructive jaundice, transaminitis. ERCP on 9/26/21 notable for distal CBD stricture; possible tumor/mass located in the CBD; underwent biliary stent placement to relieve biliary obstruction. CT A/P revealed mild/mod intrahepatic biliary ductal dilatation; prominence of CBD and hydropic gallbladder, suggestive of stricture/stenosis/mass of distal CBD. MRI of abdomen showed narrowing of CBD pancreatic head suggestive of extrinisic compression concerning for periampullary mass vs eccentric intraluminal lesion. EUS showed 3.2cm hypoechoic mass in pancreas head. Biopsy revealed adenocarcinoma. Pt met with Dr. Judy Harmon in [place] and plans for neoadjuvant chemotherapy prior to surgery. Interval history:  Patient here for follow up and surveillance of pancreas cancer. Completed CT imaging. Reports overall she has been feeling good. Reports great appetite and weight is stable. Able to tolerate all foods. One episode of nausea. Reports flare up of diarrhea due to colitis, depends on what she eats. Especially severe after oral contrast. Having some formed stools in between intermittent diarrhea. Reports she had flu like symptoms in November, which included cough, fever. No cough, SOB, or fevers. She had abdominal hernia surgery in November with Dr. Jayro Rothman. PMHx: OA and Rheumatoid arthritis in lower back, Ulcerative colitis, HTN, Hypothyroidism  PSurgHx: , Cyst removed from left breast  SHx: , has 3 children (1 daughter and 2 sons). Works as  in [employer]. Nonsmoker, no EtOH.  in rehab recovering from [facility]. FHx: Mother  of pancreatic cancer age 80. Brother and son had colon cancer. Review of Systems: A complete review of systems was obtained, reviewed. Pertinent findings reviewed above. Physical Exam:     Visit Vitals  /79   Pulse 80   Temp 98.1 °F (36.7 °C)   Resp 18   Ht 5' 4\" (1.626 m)   Wt 167 lb (75.8 kg)   SpO2 99%   BMI 28.67 kg/m²         ECOG PS: 1  General: no distress  Eyes: anicteric sclerae  HENT: oropharynx clear  Neck: supple  Lymphatic: deferred today   Respiratory: CTAB, normal respiratory effort  CV: no peripheral edema, port upper chest.   GI: soft, nontender, nondistended, no masses; Midline surgical scar. Skin: no rashes; no ecchymoses; no petechiae  Neuro: alert and oriented      Results:     Lab Results   Component Value Date/Time    WBC 5.4 2023 01:00 PM    HGB 10.0 (L) 2023 01:00 PM    HCT 30.9 (L) 2023 01:00 PM    PLATELET 806  01:00 PM    MCV 98.7 2023 01:00 PM    ABS.  NEUTROPHILS 1.0 (L) 2023 01:00 PM    Hemoglobin (POC) 12.6 10/06/2013 08:45 AM    Hematocrit (POC) 37 10/06/2013 08:45 AM     Lab Results   Component Value Date/Time    Sodium 138 01/16/2023 01:00 PM    Potassium 3.8 01/16/2023 01:00 PM    Chloride 109 (H) 01/16/2023 01:00 PM    CO2 26 01/16/2023 01:00 PM    Glucose 88 01/16/2023 01:00 PM    BUN 13 01/16/2023 01:00 PM    Creatinine 0.77 01/16/2023 01:00 PM    GFR est AA >60 07/18/2022 07:43 AM    GFR est non-AA >60 07/18/2022 07:43 AM    Calcium 8.6 01/16/2023 01:00 PM    Sodium (POC) 143 10/06/2013 08:45 AM    Potassium (POC) 3.4 (L) 10/06/2013 08:45 AM    Chloride (POC) 104 10/06/2013 08:45 AM    Glucose (POC) 84 03/02/2022 03:51 PM    BUN (POC) 11 10/06/2013 08:45 AM    Creatinine (POC) 1.0 10/06/2013 08:45 AM    Calcium, ionized (POC) 1.27 10/06/2013 08:45 AM     Lab Results   Component Value Date/Time    Bilirubin, total 0.5 01/16/2023 01:00 PM    ALT (SGPT) 47 01/16/2023 01:00 PM    Alk. phosphatase 163 (H) 01/16/2023 01:00 PM    Protein, total 7.0 01/16/2023 01:00 PM    Albumin 3.1 (L) 01/16/2023 01:00 PM    Globulin 3.9 01/16/2023 01:00 PM     Lab Results   Component Value Date/Time    Reticulocyte count 2.0 03/15/2022 09:38 AM    Iron % saturation 13 (L) 03/15/2022 09:38 AM    TIBC 351 03/15/2022 09:38 AM    Ferritin 177 03/15/2022 09:38 AM    Vitamin B12 1,155 (H) 06/06/2022 08:14 AM    Folate 11.8 03/15/2022 09:38 AM    Sed rate (ESR) 17 09/16/2010 12:04 PM    C-Reactive protein <0.29 01/18/2017 08:45 AM    TSH 2.84 04/12/2022 07:50 AM    ALBA, Direct Detected (A) 09/16/2010 12:04 PM    Lipase 1,214 (H) 09/29/2021 04:10 AM    Hep C virus Ab Interp. NONREACTIVE 09/24/2021 06:55 PM     Lab Results   Component Value Date/Time    CEA 3.5 09/25/2021 11:46 AM    Carbohydrate Antigen 19-9, (CA 19-9) 40 (H) 10/10/2022 01:41 PM       10/18/21 CA 19-9:  138  1/10/22 CA 19-9: 130   4/12/22 CA 19-9: 16   7/5/22 CA 19-9: 19  10/10/22: CA 19-9: 40     Imaging / Procedures:     9/24/21 US ABD   IMPRESSION  Cholelithiasis. Gallbladder sludge.   Prominent CBD with mild intrahepatic ductal dilatation as well. Nonemergent MRI with MRCP to delineate etiology for CBD distention. 9/24/21 CT ABD PELV W CONT  There is mild/moderate intrahepatic biliary ductal dilatation, prominence of the CBD and a hydropic gallbladder. No pancreatic duct dilatation. No clearly  delineated pancreatic head mass. Imaging findings suggestive of stricture/stenosis/mass of the distal CBD, no extrinsic pancreatic head mass is  identified. Gastroenterology consult  Correlation with MRI/MRCP on a nonemergent basis. There is severe degenerative change of the lumbar spine. 9/25/21 MRI ABD WO CONT  IMPRESSION  1. Intrahepatic and extra hepatic biliary dilatation with abrupt narrowing of  the common bile duct pancreatic head just proximal to the ampulla. Suggestion of  extrinsic compression on the duct. This raises the possibility of a  periampullary mass. Alternatively, this may be an eccentric intraluminal lesion. Evaluation is limited. Recommend GI consultation for possible ERCP and EUS. 2.  Questionable small lesion in the left hepatic lobe with mild increased T2  signal and T1 hypointensity. Recommend follow-up imaging a contrast-enhanced  study preferably MRI. 3.  Cholelithiasis. Distended gallbladder with mild gallbladder wall thickening. Correlate for acute cholecystitis    9/26/21 XR ERCP/ERCB / Dr. You Trevino  Impression: Biliary ductal dilation, with a maximum common duct diameter of 15 mm; Severe stenosis, involving distal CBD /pancreas head area concerning for neoplasia  Interventions: Endoscopic ampullary sphincterotomy and biliary stent placement; brushings from the CBD    9/27/21 CT CHEST W CONT:   IMPRESSION  1. There is a minimal right pleural effusion. 2. There are small pulmonary nodules present. There is a nodule in the plane of  the right major fissure and posteriorly in left lower lobe. These were not  present on examination of 1/18/2017.  These could be on the basis of metastatic  disease. Close follow-up is suggested. There are also 2 small subpleural nodules  anteriorly in the right upper lobe as described above which can also be  evaluated on follow-up. 9/28/21 EUS:  Endoscopic: Esophagus:normal; Stomach: normal; Duodenum/jejunum: normal and protruding biliary stent  Ultrasound: Esophagus: normal findings;     Stomach: normal findings:     Pancreas: Areas examined: the entire gland                          Parenchyma: -Evidence of a hypoechoic mass with poorly defined borders seen in the head of the pancreas. It appeared involving the CBD where a stent is seen, however it did not involve the major vessels. It measured about 3.2 cm in widest diameter on one view. Pancreatic Duct: normal findings, not dilated               Liver: Parenchyma: normal                          Gallbladder: normal                          Bile Duct: the common bile duct is dilated in the proximal and mid portion and a plastic stent could be seen                          Lymph Node: no adenopathy  Impression:   Pancreas head mass with fine needle biopsy showing adenocarcinoma on preliminary cytology  Recommendations: Follow-up on pathology  Follow-up with oncology, will need further evaluation of pulmonary nodule  Surgical oncology consultation  Resume GI lite diet today and can discharge in am from GI standpoint    12/20/2021:  MRI abd w wo cont:  IMPRESSION  1. Positive response to therapy. Decreased size of the pancreatic head mass, which now has a maximum AP diameter of 2.3 cm. No obstruction of the biliary tree or pancreatic duct. No involvement of the SMA. Based on imaging, patient is a candidate for resection. 2. Cholelithiasis. No acute cholecystitis or biliary obstruction. MRI Pelv w wo cont:  IMPRESSION  No acute process or evidence of malignant neoplasm/ metastatic disease. CT chest w cont:  IMPRESSION  1.   Interval apparent resolution of previously seen scattered subcentimeter bilateral pulmonary nodules and right pleural effusion. 2.  Please see separately dictated reports for the MRI abdomen and pelvis obtained the same day for subdiaphragmatic findings. 6/1/22 colonoscopy: The perianal and digital rectal examinations were normal. The terminal ileum appeared normal. Biopsies were taken. The colon (entire examined portion) appeared normal. Small non-bleeding internal hemorrhoids were found during retroflexion (ileium biopsy small intestinal mucosa with lymphoid aggregates, negative for active inflammation; colon bx-colonic mucosa with lymphoid aggregates)    10/13/22 CT ch/abd/pelv:   IMPRESSION  Postsurgical changes from Whipple procedure. No evidence of local recurrence or  metastatic disease. Hepatic steatosis. 1/9/2023 CT ch/abd/pelv:  LUNGS: Numerous new pulmonary nodules are seen bilaterally measuring up to 6 mm,  such as 5 mm right upper lobe (series 3 image 16), right lower lobe (image 40),  left upper lobe (image 20). No focal consolidations. LIVER: Diffuse hepatic steatosis. Unremarkable hepaticojejunostomy. No biliary  dilatation. PANCREAS: Status post Whipple. Residual pancreas is within normal limits. IMPRESSION  1. Status post Whipple procedure. 2.  New subcentimeter pulmonary nodules bilaterally, concerning for metastasis. 3.  No intra-abdominal recurrence or metastasis. Assessment/Recommendations:   77 y.o. female with Ulcerative colitis, Hyopthyroidism, admitted with obstructive jaundice concerning for underlying malignant obstructing mass. 1. Pancreatic adenocarcinoma:  Clinically stage Ib [T2N0], but need further evaluation in future regarding pulmonary nodules and liver lesion. Presented with obstructive jaundice and transaminitis, CA 19-9 was 198 in 9/2021 at diagnosis. Underwent biliary stent placement with improvement in biliary obstruction.  3.2cm mass seen in pancreatic head on EUS, not involving vasculature, possibly resectable. Dr. Chaparro Rivera in [place] has evaluated patient and recommends consideration of neoadjuvant chemotherapy with close attention to lung nodules and liver lesion. I agree with this recommendation for earlier treatment of micrometastatic disease, ability to determine whether pt has chemosensitive disease. We discussed the risks and benefits of FOLFIRINOX chemotherapy, including potential side effects. These include but are not limited to fatigue, nausea vomiting, diarrhea, neuropathy, taste changes, cold intolerance, esophageal spasm, allergic reactions, alopecia, mucositis, myelosuppression, risk for infection, infertility, and rarely, death. Rarely, a patient may have a condition where they do not metabolize fluorouracil appropriately (called DPD deficiency), and they may have excessive toxicity. A Port-A-Cath will be required in order to deliver the continuous infusion. BRCA 1/2 negative. The patient has consented to beginning therapy. Pt completed 6 cycles of mFOLFIRINOX in neoadjuvant setting, followed by pancreaticoduodenectomy (Whipple resection) on 2/23/22. Completed 6 cycles of adjuvant chemotherapy. Surveillance CT imaging showed new pulmonary nodules concerning for mets on 1/9/23. NCCN surveillance: H&P, CA 19-9, chest CT and CT or MRI abd/pelv with contrast every 3-6 mo 2 y, then every 6-12 mo as clinically indicated. -- Port flush every 12 weeks. -- Sending her surgical specimen from the Whipple surgery and liquid biopsy out for additional testing through [facility] today, 1/16/23.   -- Short interval CT to follow up lung nodules due in 6 weeks. -- Return in 7 weeks for labs (CMP, CBC, CA 19-9), port flush, MD/NP visit, review imaging. 2. Pulmonary nodules:  New on recent imaging and concerning for metastatic disease. Reviewed with IR and too small to biopsy. -- Repeat CT in 6 weeks, will require IV contrast. Avoid oral contrast due to diarrhea. 3. HTN:   Well managed on HCTZ.      4. H/o Ulcerative procto-sigmoiditis:  April 2017 colonoscopy with one adenoma polyp removed. Repeat colonoscopy was normal on 6/1/22. Repeat 10 years for screening. 5. Normocytic anemia:  Improving. B12/folate/ferritin normal. Mildly low iron sat. Monitor. 6. Weight-loss / hypokalemia:   Weight stable with improved appetite. On KCL 10 meq daily for hypokalemia likely due to intermittent diarrhea, HCTZ. 7. Chemotherapy induced neuropathy:  Grade 1. Present in feet. Monitor. 8. Neutropenia:   Intermittent. No fevers or recent infections. Monitor. I personally saw and evaluated the patient and performed the key components of medical decision making. The history, physical exam, and documentation were performed by An Carmichael NP. I reviewed and verified the above documentation and modified it as needed. Pt is feeling well overall with good appetite. Discussed that the pulmonary nodules seen on CT are too small to biopsy but need further evaluation on repeat CT imaging in 6 weeks - could be malignant or infectious in etiology.      Signed By:  Date:  Nazia Brady MD   01/16/23

## 2022-10-14 NOTE — TELEPHONE ENCOUNTER
Patient called and stated that whenever the results come back from her scans she would like a call back to get those results.      # [phone]

## 2022-10-21 ENCOUNTER — TELEPHONE (OUTPATIENT)
Dept: SURGERY | Age: 67
End: 2022-10-21

## 2022-10-21 NOTE — TELEPHONE ENCOUNTER
Patient called stating she had WHIPPLE surgery and patient now has a swollen area on one side of the incision. Patient states she is not in pain.

## 2022-10-21 NOTE — TELEPHONE ENCOUNTER
Returned call to patient. Two patient identifiers used. Pt stated area to the right of incision has gotten bigger from the last time she was in the office to see Dr. Jesús Johnson. Pt stated it is the size of an egg. Denies pain to the area. Per pt she spoke to Dr. Deonna Bella who thinks it could be a hernia. Pt offered appt to see Dr. Jesús Johnson on 10/27/2022 @ 3:40pm Pt in agreement and transferred to DEANNA Hinkle to schedule.

## 2022-10-23 DIAGNOSIS — I10 HYPERTENSION, UNSPECIFIED TYPE: ICD-10-CM

## 2022-10-23 DIAGNOSIS — E87.6 HYPOKALEMIA: ICD-10-CM

## 2022-10-24 RX ORDER — HYDROCHLOROTHIAZIDE 12.5 MG/1
TABLET ORAL
Qty: 30 TABLET | Refills: 1 | Status: SHIPPED | OUTPATIENT
Start: 2022-10-24 | End: 2022-11-21 | Stop reason: SDUPTHER

## 2022-10-24 RX ORDER — POTASSIUM CHLORIDE 750 MG/1
TABLET, EXTENDED RELEASE ORAL
Qty: 30 TABLET | Refills: 0 | Status: SHIPPED | OUTPATIENT
Start: 2022-10-24 | End: 2022-11-21 | Stop reason: SDUPTHER

## 2022-10-27 ENCOUNTER — OFFICE VISIT (OUTPATIENT)
Dept: SURGERY | Age: 67
End: 2022-10-27
Payer: MEDICARE

## 2022-10-27 VITALS
BODY MASS INDEX: 28.95 KG/M2 | WEIGHT: 169.6 LBS | HEIGHT: 64 IN | HEART RATE: 87 BPM | DIASTOLIC BLOOD PRESSURE: 83 MMHG | OXYGEN SATURATION: 97 % | SYSTOLIC BLOOD PRESSURE: 132 MMHG | RESPIRATION RATE: 16 BRPM | TEMPERATURE: 99 F

## 2022-10-27 DIAGNOSIS — K43.2 INCISIONAL HERNIA, WITHOUT OBSTRUCTION OR GANGRENE: Primary | ICD-10-CM

## 2022-10-27 PROCEDURE — G8427 DOCREV CUR MEDS BY ELIG CLIN: HCPCS | Performed by: SURGERY

## 2022-10-27 PROCEDURE — G8754 DIAS BP LESS 90: HCPCS | Performed by: SURGERY

## 2022-10-27 PROCEDURE — 1123F ACP DISCUSS/DSCN MKR DOCD: CPT | Performed by: SURGERY

## 2022-10-27 PROCEDURE — 3074F SYST BP LT 130 MM HG: CPT | Performed by: SURGERY

## 2022-10-27 PROCEDURE — 1090F PRES/ABSN URINE INCON ASSESS: CPT | Performed by: SURGERY

## 2022-10-27 PROCEDURE — G8536 NO DOC ELDER MAL SCRN: HCPCS | Performed by: SURGERY

## 2022-10-27 PROCEDURE — G8752 SYS BP LESS 140: HCPCS | Performed by: SURGERY

## 2022-10-27 PROCEDURE — 3017F COLORECTAL CA SCREEN DOC REV: CPT | Performed by: SURGERY

## 2022-10-27 PROCEDURE — 3078F DIAST BP <80 MM HG: CPT | Performed by: SURGERY

## 2022-10-27 PROCEDURE — G8400 PT W/DXA NO RESULTS DOC: HCPCS | Performed by: SURGERY

## 2022-10-27 PROCEDURE — 1101F PT FALLS ASSESS-DOCD LE1/YR: CPT | Performed by: SURGERY

## 2022-10-27 PROCEDURE — G8510 SCR DEP NEG, NO PLAN REQD: HCPCS | Performed by: SURGERY

## 2022-10-27 PROCEDURE — G8417 CALC BMI ABV UP PARAM F/U: HCPCS | Performed by: SURGERY

## 2022-10-27 PROCEDURE — 99214 OFFICE O/P EST MOD 30 MIN: CPT | Performed by: SURGERY

## 2022-10-27 NOTE — PROGRESS NOTES
Identified pt with two pt identifiers (name and ). Reviewed chart in preparation for visit and have obtained necessary documentation. Ac Lyon is a 77 y.o. female  Chief Complaint   Patient presents with    Bloated     Pt is 8 months s/p  pylorus-preserving Whipple procedure and portal lymphadenectomy. Visit Vitals  /83 (BP 1 Location: Left upper arm, BP Patient Position: Sitting, BP Cuff Size: Adult)   Pulse 87   Temp 99 °F (37.2 °C) (Oral)   Resp 16   Ht 5' 4\" (1.626 m)   Wt 169 lb 9.6 oz (76.9 kg)   SpO2 97%   BMI 29.11 kg/m²       1. Have you been to the ER, urgent care clinic since your last visit? Hospitalized since your last visit? No    2. Have you seen or consulted any other health care providers outside of the [address] [place] [address] since your last visit? Include any pap smears or colon screening. No        Pt reports bloating was noted after surgery but appears to be more now and some discomfort if she coughs and worse when lying down and coughing.

## 2022-10-27 NOTE — LETTER
10/27/2022    Patient: Alban Quintana   YOB: 1955   Date of Visit: 10/27/2022     Collins Dyer MD  [address] [facility]  [address]  [facility]    Dear Collins Dyer MD,      Thank you for referring Ms. Peri Maldonado to [facility] for evaluation. My notes for this consultation are attached. If you have questions, please do not hesitate to call me. I look forward to following your patient along with you.       Sincerely,    Lucita Clancy MD

## 2022-10-27 NOTE — PROGRESS NOTES
[address] [place] [address] [facility] History and Physical    Chief Complaint: Incisional hernia    History of Present Illness:      Gill Segura is a 77 y.o. female who is well-known to me for history of pancreatic adenocarcinoma in the head of the pancreas. After completing 6 cycles of neoadjuvant chemotherapy, she underwent a pylorus-preserving Whipple procedure with portal lymphadenectomy on 2022. Her final pathology showed moderately differentiated pancreatic adenocarcinoma, [id number], margins negative. She completed postoperative chemotherapy and is currently JAUN. She returns with complaints of a bulge at the lower portion of her incision. The patient states that she has noted an increasing bulge at the lower portion of her incision. She denies any significant pain and has not had obstructive symptoms but does have increased gas complaints since her CT. She is having 1-2 bowel movements per day and her weight has been stable. She is eating well. Past Medical History:   Diagnosis Date    Arthritis     ostero arthritis, rhemathoid  lower back     Autoimmune disease ([address]     Ulcerative Colitis. Hypertension     CONTROLLED WITH MEDS    Hypothyroid 3/23/2011    Malignant neoplasm of head of pancreas ([address] 2021    Ulcerative colitis ([address] 2010    Ulcerative colitis ([address] 2010    Ulcerative colitis [facility])        Past Surgical History:   Procedure Laterality Date    COLONOSCOPY N/A 2017    COLONOSCOPY performed by Manfred Lambert MD at [facility] ENDOSCOPY    ENDOSCOPY, COLON, DIAGNOSTIC      HX  SECTION      HX OTHER SURGICAL  2022    pancreatic cancer surgery; Whipple procedure    HX UROLOGICAL      URETHERAL STENT.     HX VASCULAR ACCESS      PLACEMENT OF PORT-A-CATH RIGHT SIDE    ID ABDOMEN SURGERY PROC UNLISTED      ENDOSCOPIC, PLACEMENT OF BILIARY STENT    ID BREAST SURGERY PROCEDURE UNLISTED      cyst removed from left breast       Social History     Socioeconomic History Marital status:      Spouse name: Not on file    Number of children: Not on file    Years of education: Not on file    Highest education level: Not on file   Occupational History    Not on file   Tobacco Use    Smoking status: Former     Packs/day: 0.30     Years: 2.00     Pack years: 0.60     Types: Cigarettes     Quit date: 1980     Years since quittin.8    Smokeless tobacco: Never   Vaping Use    Vaping Use: Never used   Substance and Sexual Activity    Alcohol use: No    Drug use: No    Sexual activity: Yes     Partners: Male   Other Topics Concern    Not on file   Social History Narrative    Not on file     Social Determinants of Health     Financial Resource Strain: Not on file   Food Insecurity: Not on file   Transportation Needs: Not on file   Physical Activity: Not on file   Stress: Not on file   Social Connections: Not on file   Intimate Partner Violence: Not on file   Housing Stability: Not on file       Family History   Problem Relation Age of Onset    Cancer Mother         pancreatic    Cancer Brother         colon    Anesth Problems Neg Hx          Current Outpatient Medications:     Klor-Con M10 10 mEq tablet, TAKE 1 TABLET BY MOUTH EVERY DAY, Disp: 30 Tablet, Rfl: 0    hydroCHLOROthiazide (HYDRODIURIL) 12.5 mg tablet, TAKE 1 TABLET BY MOUTH EVERY DAY, Disp: 30 Tablet, Rfl: 1    levothyroxine (SYNTHROID) 125 mcg tablet, TAKE 1 TABLET BY MOUTH EVERY DAY BEFORE BREAKFAST, Disp: 90 Tablet, Rfl: 1    Allergies   Allergen Reactions    Sulfa (Sulfonamide Antibiotics) Swelling       ROS   Constitutional: Negative  Ears, Nose, Mouth, Throat, and Face: Negative  Respiratory: Negative  Cardiovascular: Negative  Gastrointestinal: Increased gas, as in HPI  Genitourinary: Negative  Integument/Breast: Negative  Hematologic/Lymphatic: Negative  Behavioral/Psychiatric: Negative  Allergic/Immunologic: Negative      Physical Exam:     Visit Vitals  /83 (BP 1 Location: Left upper arm, BP Patient Position: Sitting, BP Cuff Size: Adult)   Pulse 87   Temp 99 °F (37.2 °C) (Oral)   Resp 16   Ht 5' 4\" (1.626 m)   Wt 169 lb 9.6 oz (76.9 kg)   SpO2 97%   BMI 29.11 kg/m²       General - alert and oriented, no apparent distress  HEENT - NC/AT. No scleral icterus  Pulm - CTAB, normal inspiratory effort  CV - RRR, no M/R/G  Abd - soft, NT, ND. There is a 2 x 2 centimeter incisional hernia at the inferior aspect of her upper midline incision just above the umbilicus. This contained loops of intestine that were easily reduced. Ext - warm, well perfused, no edema  Lymphatics - no cervical, supraclavicular or inguinal adenopathy noted. Skin - supple, no rashes  Psychiatric - normal affect, good mood    Labs  Lab Results   Component Value Date/Time    WBC 5.8 10/10/2022 01:41 PM    Hemoglobin (POC) 12.6 10/06/2013 08:45 AM    HGB 9.3 (L) 10/10/2022 01:41 PM    Hematocrit (POC) 37 10/06/2013 08:45 AM    HCT 27.5 (L) 10/10/2022 01:41 PM    PLATELET 318 48/59/2349 01:41 PM    MCV 98.2 10/10/2022 01:41 PM     Lab Results   Component Value Date/Time    Sodium 139 10/10/2022 01:41 PM    Potassium 3.0 (L) 10/10/2022 01:41 PM    Chloride 104 10/10/2022 01:41 PM    CO2 28 10/10/2022 01:41 PM    Anion gap 7 10/10/2022 01:41 PM    Glucose 84 10/10/2022 01:41 PM    BUN 10 10/10/2022 01:41 PM    Creatinine 0.71 10/10/2022 01:41 PM    BUN/Creatinine ratio 14 10/10/2022 01:41 PM    GFR est AA >60 07/18/2022 07:43 AM    GFR est non-AA >60 07/18/2022 07:43 AM    Calcium 9.1 10/10/2022 01:41 PM    Bilirubin, total 0.6 10/10/2022 01:41 PM    Alk.  phosphatase 182 (H) 10/10/2022 01:41 PM    Protein, total 6.8 10/10/2022 01:41 PM    Albumin 2.9 (L) 10/10/2022 01:41 PM    Globulin 3.9 10/10/2022 01:41 PM    A-G Ratio 0.7 (L) 10/10/2022 01:41 PM    ALT (SGPT) 29 10/10/2022 01:41 PM    AST (SGOT) 54 (H) 10/10/2022 01:41 PM         Imaging  CT Results (most recent):  Results from Hospital Encounter encounter on 10/13/22    CT CHEST ABD PELV W CONT    Narrative  EXAM: CT CHEST ABD PELV W CONT    INDICATION: history of pancreas cancer completed chemotherapy    COMPARISON: February 21, 2022    IV CONTRAST: 100 mL of Isovue-370. ORAL CONTRAST: Oral contrast was administered to better evaluate the bowel. TECHNIQUE:  Following the uneventful intravenous administration of contrast, thin axial  images were obtained through the chest, abdomen and pelvis. Coronal and sagittal  reformats were generated. CT dose reduction was achieved through use of a  standardized protocol tailored for this examination and automatic exposure  control for dose modulation. FINDINGS:    CHEST WALL: Right chest wall port catheter terminates at the superior cavoatrial  junction. THYROID: No nodule. MEDIASTINUM: No mass or lymphadenopathy. EDVIN: No mass or lymphadenopathy. THORACIC AORTA: No dissection or aneurysm. MAIN PULMONARY ARTERY: Normal in caliber. TRACHEA/BRONCHI: Patent. ESOPHAGUS: No wall thickening or dilatation. HEART: Normal in size. PLEURA: No effusion or pneumothorax. LUNGS: No nodule, mass, or airspace disease. LIVER: Diffuse hypoattenuation of the liver consistent with hepatic steatosis. BILIARY TREE: Status post cholecystectomy. Hepaticojejunostomy is within normal  limits. SPLEEN: within normal limits. PANCREAS: Status post Whipple procedure. The residual pancreas is normal in  attenuation. No pancreatic duct dilation. ADRENALS: Unremarkable. KIDNEYS: Bilateral simple renal cysts. STOMACH: Status post distal gastrectomy with gastrojejunostomy. SMALL BOWEL: No dilatation or wall thickening. COLON: No dilatation or wall thickening. APPENDIX: Nonvisualized  PERITONEUM: Postsurgical changes from Whipple procedure. No ascites. Portal vein  branches are patent. RETROPERITONEUM: No lymphadenopathy or aortic aneurysm. REPRODUCTIVE ORGANS: Within normal limits. URINARY BLADDER: No mass or calculus.   BONES: No destructive bone lesion. ABDOMINAL WALL: Incisional hernia containing loops of small bowel in the mid  abdomen. No evidence of incarceration. ADDITIONAL COMMENTS: N/A    Impression  Postsurgical changes from Whipple procedure. No evidence of local recurrence or  metastatic disease. Hepatic steatosis. I have reviewed and agree with all of the pertinent images    Assessment:     Peter James is a 77 y.o. female known to me for history of pancreatic adenocarcinoma in the head of the pancreas. After completing 6 cycles of neoadjuvant chemotherapy, she underwent a pylorus-preserving Whipple procedure with portal lymphadenectomy on 2/23/2022. Her final pathology showed moderately differentiated pancreatic adenocarcinoma, [id number], margins negative. She completed postoperative chemotherapy and is currently JAUN. She returns with an incisional hernia at the lower aspect of her incision. Recommendations:     1. The patient has a medium sized incisional hernia at the lower portion of her incision. I have recommended repair given the size and involvement of intestine. We discussed a laparoscopic repair using absorbable mesh. A complete discussion of the risks, benefits and alternatives to surgery were discussed with the patient who was keen to proceed. 30 mins of time was spent with the patient of which > 50% of the time involved face-to-face counseling of the patient regarding the proposed treatment plan.       Navin Thompson MD  10/27/2022    CC: MD Dr. Kia Denney

## 2022-11-14 ENCOUNTER — TELEPHONE (OUTPATIENT)
Dept: SURGERY | Age: 67
End: 2022-11-14

## 2022-11-14 NOTE — TELEPHONE ENCOUNTER
Patient stated that she was sick and had a coughing flu for 3 days but it wasn't that bad because she had gotten the flu shot. She stated that she feels better now but still has a cough, and she wants to make sure it will not interfere with her upcoming surgery.

## 2022-11-15 NOTE — TELEPHONE ENCOUNTER
Returned call to patient. Two patient identifiers used. Pt stated she had a slight cough from being sick. Pt stated she is feeling better but wanted to know if she was still good for surgery on 11/23/2022. Pt made aware to reach out to us if she develops fever, temp. Pt expressed understanding.

## 2022-11-18 NOTE — PERIOP NOTES
PAT PREOP PHONE INTERVIEW COMPLETED WITH:     PATIENT ADVISED NOT TO EAT ANYTHING AFTER MIDNIGHT, INSTRUCTED TO FOLLOW CLEAR LIQUID DIET AFTER MIDNIGHT . LEAVE ALL VALUABLES AT HOME; DO BRING PICTURE ID, INSURANCE CARD AND ANY COPAY; WEAR COMFORTABLE CLOTHING;  NO PERFUMES, POWDERS, LOTIONS; NO ALCOHOL 24 HOURS BEFORE OR AFTER SURGERY;  WILL NEED TO BE DRIVEN HOME BY FAMILY OR FRIEND;  AVOID TAKING NSAIDS, ASPIRIN, FISH OIL, VITAMIN E OR GLUCOSAMINE/CHONDROITIN DURING THIS TIME PRIOR TO SURGERY;  MAY TAKE TYLENOL. INSTRUCTED TO REPORT  [address] BY SURGEON'S OFFICE. INSTRUCTION PROVIDED FOR CHG SOAP. INSTRUCTED TO  BRING COVID CARD ON DAY OF SURGERY.

## 2022-11-21 ENCOUNTER — TELEPHONE (OUTPATIENT)
Dept: ONCOLOGY | Age: 67
End: 2022-11-21

## 2022-11-21 DIAGNOSIS — I10 HYPERTENSION, UNSPECIFIED TYPE: ICD-10-CM

## 2022-11-21 DIAGNOSIS — E87.6 HYPOKALEMIA: ICD-10-CM

## 2022-11-21 RX ORDER — POTASSIUM CHLORIDE 750 MG/1
10 TABLET, EXTENDED RELEASE ORAL DAILY
Qty: 30 TABLET | Refills: 0 | Status: SHIPPED | OUTPATIENT
Start: 2022-11-21

## 2022-11-21 RX ORDER — HYDROCHLOROTHIAZIDE 12.5 MG/1
12.5 TABLET ORAL DAILY
Qty: 90 TABLET | Refills: 0 | Status: SHIPPED | OUTPATIENT
Start: 2022-11-21 | End: 2023-02-19

## 2022-11-21 NOTE — TELEPHONE ENCOUNTER
[address] at [place]  [phone]    11/21/22 4:09 PM - Received return call from patient. Advised patient of NP's response (see below). The patient voiced understanding. The patient states she has surgery this Wednesday with Dr. Shankar Deutsch to repair a hernia. The patient wants to know if this will interfere with the CT scan she is having done on 1/9/23. Advised this nurse would inquire with the team and she would receive a call back. The patient voiced understanding and gratitude for the call. No further questions or concerns. NP's message: \"Yes one refill. Advise she should follow up with PCP for future refills of HCTZ.  I will send in 3 refills until she gets an appt w PCP\"

## 2022-11-21 NOTE — TELEPHONE ENCOUNTER
[address] at [place]  [phone]      11/21/22 2:07 PM Attempted to call patient via home/cell number listed to update her of Berkley's message. No answer, left voicemail requesting return call. Provided office phone number as well for call back.

## 2022-11-21 NOTE — TELEPHONE ENCOUNTER
[address] at [place]  [phone]      11/21/22 4:29 PM Called patient and advised of response per Comfort Lacy NP. She voiced understanding. No further questions or concerns at this time.

## 2022-11-22 ENCOUNTER — ANESTHESIA EVENT (OUTPATIENT)
Dept: SURGERY | Age: 67
End: 2022-11-22
Payer: MEDICARE

## 2022-11-23 ENCOUNTER — HOSPITAL ENCOUNTER (OUTPATIENT)
Age: 67
Setting detail: OUTPATIENT SURGERY
Discharge: HOME | End: 2022-11-23
Attending: SURGERY | Admitting: SURGERY
Payer: MEDICARE

## 2022-11-23 ENCOUNTER — ANESTHESIA (OUTPATIENT)
Dept: SURGERY | Age: 67
End: 2022-11-23
Payer: MEDICARE

## 2022-11-23 VITALS
OXYGEN SATURATION: 95 % | BODY MASS INDEX: 28.15 KG/M2 | RESPIRATION RATE: 17 BRPM | WEIGHT: 164.9 LBS | HEART RATE: 75 BPM | HEIGHT: 64 IN | SYSTOLIC BLOOD PRESSURE: 115 MMHG | DIASTOLIC BLOOD PRESSURE: 72 MMHG | TEMPERATURE: 98.3 F

## 2022-11-23 DIAGNOSIS — K43.2 INCISIONAL HERNIA, WITHOUT OBSTRUCTION OR GANGRENE: Primary | ICD-10-CM

## 2022-11-23 PROCEDURE — 76210000021 HC REC RM PH II 0.5 TO 1 HR: Performed by: SURGERY

## 2022-11-23 PROCEDURE — 74011000250 HC RX REV CODE- 250: Performed by: NURSE ANESTHETIST, CERTIFIED REGISTERED

## 2022-11-23 PROCEDURE — 2709999900 HC NON-CHARGEABLE SUPPLY: Performed by: SURGERY

## 2022-11-23 PROCEDURE — 74011250636 HC RX REV CODE- 250/636: Performed by: SURGERY

## 2022-11-23 PROCEDURE — C1781 MESH (IMPLANTABLE): HCPCS | Performed by: SURGERY

## 2022-11-23 PROCEDURE — 77030008608 HC TRCR ENDOSC SMTH AMR -B: Performed by: SURGERY

## 2022-11-23 PROCEDURE — 77030012770 HC TRCR OPT FX AMR -B: Performed by: SURGERY

## 2022-11-23 PROCEDURE — 77030018548 HC SUT ETHBND2 J&J -B: Performed by: SURGERY

## 2022-11-23 PROCEDURE — 76010000153 HC OR TIME 1.5 TO 2 HR: Performed by: SURGERY

## 2022-11-23 PROCEDURE — 74011250637 HC RX REV CODE- 250/637: Performed by: ANESTHESIOLOGY

## 2022-11-23 PROCEDURE — 77030039895 HC SYST SMK EVAC LAP COVD -B: Performed by: SURGERY

## 2022-11-23 PROCEDURE — 74011250636 HC RX REV CODE- 250/636: Performed by: ANESTHESIOLOGY

## 2022-11-23 PROCEDURE — 77030040361 HC SLV COMPR DVT MDII -B: Performed by: SURGERY

## 2022-11-23 PROCEDURE — 77030002916 HC SUT ETHLN J&J -A: Performed by: SURGERY

## 2022-11-23 PROCEDURE — 77030031139 HC SUT VCRL2 J&J -A: Performed by: SURGERY

## 2022-11-23 PROCEDURE — 74011000250 HC RX REV CODE- 250: Performed by: SURGERY

## 2022-11-23 PROCEDURE — 77030040922 HC BLNKT HYPOTHRM STRY -A

## 2022-11-23 PROCEDURE — 76060000035 HC ANESTHESIA 2 TO 2.5 HR: Performed by: SURGERY

## 2022-11-23 PROCEDURE — 77030034628 HC LIGASURE LAP SEAL DIV MD COVD -F: Performed by: SURGERY

## 2022-11-23 PROCEDURE — 77030008684 HC TU ET CUF COVD -B: Performed by: ANESTHESIOLOGY

## 2022-11-23 PROCEDURE — 49654 PR LAP, INCISIONAL HERNIA REPAIR,REDUCIBLE: CPT | Performed by: PHYSICIAN ASSISTANT

## 2022-11-23 PROCEDURE — 77030010507 HC ADH SKN DERMBND J&J -B: Performed by: SURGERY

## 2022-11-23 PROCEDURE — 49654 PR LAP, INCISIONAL HERNIA REPAIR,REDUCIBLE: CPT | Performed by: SURGERY

## 2022-11-23 PROCEDURE — 77030037032 HC INSRT SCIS CLICKLLINE DISP STOR -B: Performed by: SURGERY

## 2022-11-23 PROCEDURE — 77030010031 HC SCIS ENDOSC MPLR J&J -C: Performed by: SURGERY

## 2022-11-23 PROCEDURE — 77030013079 HC BLNKT BAIR HGGR 3M -A: Performed by: ANESTHESIOLOGY

## 2022-11-23 PROCEDURE — 77030026438 HC STYL ET INTUB CARD -A: Performed by: ANESTHESIOLOGY

## 2022-11-23 PROCEDURE — 77030025927 HC STPLR FIX SECURSTRP J&J -F: Performed by: SURGERY

## 2022-11-23 PROCEDURE — C9290 INJ, BUPIVACAINE LIPOSOME: HCPCS | Performed by: SURGERY

## 2022-11-23 PROCEDURE — 76210000017 HC OR PH I REC 1.5 TO 2 HR: Performed by: SURGERY

## 2022-11-23 PROCEDURE — 88302 TISSUE EXAM BY PATHOLOGIST: CPT

## 2022-11-23 PROCEDURE — 77030019908 HC STETH ESOPH SIMS -A: Performed by: ANESTHESIOLOGY

## 2022-11-23 PROCEDURE — 74011250636 HC RX REV CODE- 250/636: Performed by: NURSE ANESTHETIST, CERTIFIED REGISTERED

## 2022-11-23 PROCEDURE — 77030002933 HC SUT MCRYL J&J -A: Performed by: SURGERY

## 2022-11-23 PROCEDURE — 77030020829: Performed by: SURGERY

## 2022-11-23 PROCEDURE — 77030035029 HC NDL INSUF VERES DISP COVD -B: Performed by: SURGERY

## 2022-11-23 PROCEDURE — 77030022704 HC SUT VLOC COVD -B: Performed by: SURGERY

## 2022-11-23 PROCEDURE — 77030020269 HC MISC IMPL: Performed by: SURGERY

## 2022-11-23 DEVICE — PHASIX ST MESH WITH ECHO 2 POSITIONING SYSTEM, 10 CM X 15 CM (4" X 6"), ELLIPSE
Type: IMPLANTABLE DEVICE | Site: ABDOMEN | Status: FUNCTIONAL
Brand: PHASIX

## 2022-11-23 RX ORDER — OXYCODONE AND ACETAMINOPHEN 5; 325 MG/1; MG/1
1 TABLET ORAL
Qty: 25 TABLET | Refills: 0 | Status: SHIPPED | OUTPATIENT
Start: 2022-11-23 | End: 2022-11-30

## 2022-11-23 RX ORDER — LIDOCAINE HYDROCHLORIDE 20 MG/ML
INJECTION, SOLUTION EPIDURAL; INFILTRATION; INTRACAUDAL; PERINEURAL AS NEEDED
Status: DISCONTINUED | OUTPATIENT
Start: 2022-11-23 | End: 2022-11-23 | Stop reason: HOSPADM

## 2022-11-23 RX ORDER — SUCCINYLCHOLINE CHLORIDE 20 MG/ML
INJECTION INTRAMUSCULAR; INTRAVENOUS AS NEEDED
Status: DISCONTINUED | OUTPATIENT
Start: 2022-11-23 | End: 2022-11-23 | Stop reason: HOSPADM

## 2022-11-23 RX ORDER — SODIUM CHLORIDE 0.9 % (FLUSH) 0.9 %
5-40 SYRINGE (ML) INJECTION AS NEEDED
Status: DISCONTINUED | OUTPATIENT
Start: 2022-11-23 | End: 2022-11-23 | Stop reason: HOSPADM

## 2022-11-23 RX ORDER — ONDANSETRON 2 MG/ML
INJECTION INTRAMUSCULAR; INTRAVENOUS
Status: DISCONTINUED
Start: 2022-11-23 | End: 2022-11-23 | Stop reason: HOSPADM

## 2022-11-23 RX ORDER — LIDOCAINE HYDROCHLORIDE 10 MG/ML
0.1 INJECTION, SOLUTION EPIDURAL; INFILTRATION; INTRACAUDAL; PERINEURAL AS NEEDED
Status: DISCONTINUED | OUTPATIENT
Start: 2022-11-23 | End: 2022-11-23 | Stop reason: HOSPADM

## 2022-11-23 RX ORDER — HYDROMORPHONE HYDROCHLORIDE 2 MG/ML
INJECTION, SOLUTION INTRAMUSCULAR; INTRAVENOUS; SUBCUTANEOUS AS NEEDED
Status: DISCONTINUED | OUTPATIENT
Start: 2022-11-23 | End: 2022-11-23 | Stop reason: HOSPADM

## 2022-11-23 RX ORDER — MIDAZOLAM HYDROCHLORIDE 1 MG/ML
0.5 INJECTION, SOLUTION INTRAMUSCULAR; INTRAVENOUS
Status: DISCONTINUED | OUTPATIENT
Start: 2022-11-23 | End: 2022-11-23 | Stop reason: HOSPADM

## 2022-11-23 RX ORDER — GLYCOPYRROLATE 0.2 MG/ML
INJECTION INTRAMUSCULAR; INTRAVENOUS AS NEEDED
Status: DISCONTINUED | OUTPATIENT
Start: 2022-11-23 | End: 2022-11-23 | Stop reason: HOSPADM

## 2022-11-23 RX ORDER — SODIUM CHLORIDE 0.9 % (FLUSH) 0.9 %
5-40 SYRINGE (ML) INJECTION EVERY 8 HOURS
Status: DISCONTINUED | OUTPATIENT
Start: 2022-11-23 | End: 2022-11-23 | Stop reason: HOSPADM

## 2022-11-23 RX ORDER — HYDROMORPHONE HYDROCHLORIDE 1 MG/ML
0.2 INJECTION, SOLUTION INTRAMUSCULAR; INTRAVENOUS; SUBCUTANEOUS
Status: DISCONTINUED | OUTPATIENT
Start: 2022-11-23 | End: 2022-11-23 | Stop reason: HOSPADM

## 2022-11-23 RX ORDER — FENTANYL CITRATE 50 UG/ML
25 INJECTION, SOLUTION INTRAMUSCULAR; INTRAVENOUS
Status: DISCONTINUED | OUTPATIENT
Start: 2022-11-23 | End: 2022-11-23 | Stop reason: HOSPADM

## 2022-11-23 RX ORDER — FENTANYL CITRATE 50 UG/ML
50 INJECTION, SOLUTION INTRAMUSCULAR; INTRAVENOUS AS NEEDED
Status: DISCONTINUED | OUTPATIENT
Start: 2022-11-23 | End: 2022-11-23 | Stop reason: HOSPADM

## 2022-11-23 RX ORDER — MIDAZOLAM HYDROCHLORIDE 1 MG/ML
1 INJECTION, SOLUTION INTRAMUSCULAR; INTRAVENOUS AS NEEDED
Status: DISCONTINUED | OUTPATIENT
Start: 2022-11-23 | End: 2022-11-23 | Stop reason: HOSPADM

## 2022-11-23 RX ORDER — NEOSTIGMINE METHYLSULFATE 1 MG/ML
INJECTION, SOLUTION INTRAVENOUS AS NEEDED
Status: DISCONTINUED | OUTPATIENT
Start: 2022-11-23 | End: 2022-11-23 | Stop reason: HOSPADM

## 2022-11-23 RX ORDER — FENTANYL CITRATE 50 UG/ML
INJECTION, SOLUTION INTRAMUSCULAR; INTRAVENOUS AS NEEDED
Status: DISCONTINUED | OUTPATIENT
Start: 2022-11-23 | End: 2022-11-23 | Stop reason: HOSPADM

## 2022-11-23 RX ORDER — OXYCODONE AND ACETAMINOPHEN 5; 325 MG/1; MG/1
1 TABLET ORAL AS NEEDED
Status: DISCONTINUED | OUTPATIENT
Start: 2022-11-23 | End: 2022-11-23 | Stop reason: HOSPADM

## 2022-11-23 RX ORDER — MIDAZOLAM HYDROCHLORIDE 1 MG/ML
INJECTION, SOLUTION INTRAMUSCULAR; INTRAVENOUS AS NEEDED
Status: DISCONTINUED | OUTPATIENT
Start: 2022-11-23 | End: 2022-11-23 | Stop reason: HOSPADM

## 2022-11-23 RX ORDER — SODIUM CHLORIDE 9 MG/ML
50 INJECTION, SOLUTION INTRAVENOUS CONTINUOUS
Status: DISCONTINUED | OUTPATIENT
Start: 2022-11-23 | End: 2022-11-23 | Stop reason: HOSPADM

## 2022-11-23 RX ORDER — ROCURONIUM BROMIDE 10 MG/ML
INJECTION, SOLUTION INTRAVENOUS AS NEEDED
Status: DISCONTINUED | OUTPATIENT
Start: 2022-11-23 | End: 2022-11-23 | Stop reason: HOSPADM

## 2022-11-23 RX ORDER — KETOROLAC TROMETHAMINE 30 MG/ML
15 INJECTION, SOLUTION INTRAMUSCULAR; INTRAVENOUS
Status: DISCONTINUED | OUTPATIENT
Start: 2022-11-23 | End: 2022-11-23 | Stop reason: HOSPADM

## 2022-11-23 RX ORDER — MORPHINE SULFATE 2 MG/ML
2 INJECTION, SOLUTION INTRAMUSCULAR; INTRAVENOUS
Status: DISCONTINUED | OUTPATIENT
Start: 2022-11-23 | End: 2022-11-23 | Stop reason: HOSPADM

## 2022-11-23 RX ORDER — ONDANSETRON 2 MG/ML
INJECTION INTRAMUSCULAR; INTRAVENOUS AS NEEDED
Status: DISCONTINUED | OUTPATIENT
Start: 2022-11-23 | End: 2022-11-23 | Stop reason: HOSPADM

## 2022-11-23 RX ORDER — SODIUM CHLORIDE, SODIUM LACTATE, POTASSIUM CHLORIDE, CALCIUM CHLORIDE 600; 310; 30; 20 MG/100ML; MG/100ML; MG/100ML; MG/100ML
75 INJECTION, SOLUTION INTRAVENOUS CONTINUOUS
Status: DISCONTINUED | OUTPATIENT
Start: 2022-11-23 | End: 2022-11-23 | Stop reason: HOSPADM

## 2022-11-23 RX ORDER — PROPOFOL 10 MG/ML
INJECTION, EMULSION INTRAVENOUS AS NEEDED
Status: DISCONTINUED | OUTPATIENT
Start: 2022-11-23 | End: 2022-11-23 | Stop reason: HOSPADM

## 2022-11-23 RX ORDER — ONDANSETRON 2 MG/ML
4 INJECTION INTRAMUSCULAR; INTRAVENOUS AS NEEDED
Status: DISCONTINUED | OUTPATIENT
Start: 2022-11-23 | End: 2022-11-23 | Stop reason: HOSPADM

## 2022-11-23 RX ORDER — PHENYLEPHRINE HCL IN 0.9% NACL 0.4MG/10ML
SYRINGE (ML) INTRAVENOUS AS NEEDED
Status: DISCONTINUED | OUTPATIENT
Start: 2022-11-23 | End: 2022-11-23 | Stop reason: HOSPADM

## 2022-11-23 RX ORDER — DEXAMETHASONE SODIUM PHOSPHATE 4 MG/ML
INJECTION, SOLUTION INTRA-ARTICULAR; INTRALESIONAL; INTRAMUSCULAR; INTRAVENOUS; SOFT TISSUE AS NEEDED
Status: DISCONTINUED | OUTPATIENT
Start: 2022-11-23 | End: 2022-11-23 | Stop reason: HOSPADM

## 2022-11-23 RX ORDER — DIPHENHYDRAMINE HYDROCHLORIDE 50 MG/ML
12.5 INJECTION, SOLUTION INTRAMUSCULAR; INTRAVENOUS AS NEEDED
Status: DISCONTINUED | OUTPATIENT
Start: 2022-11-23 | End: 2022-11-23 | Stop reason: HOSPADM

## 2022-11-23 RX ADMIN — Medication 80 MCG: at 08:37

## 2022-11-23 RX ADMIN — NEOSTIGMINE METHYLSULFATE 3 MG: 1 INJECTION, SOLUTION INTRAVENOUS at 09:04

## 2022-11-23 RX ADMIN — FENTANYL CITRATE 50 MCG: 50 INJECTION INTRAMUSCULAR; INTRAVENOUS at 08:12

## 2022-11-23 RX ADMIN — LIDOCAINE HYDROCHLORIDE 100 MG: 20 INJECTION, SOLUTION EPIDURAL; INFILTRATION; INTRACAUDAL; PERINEURAL at 07:40

## 2022-11-23 RX ADMIN — ONDANSETRON 4 MG: 2 INJECTION INTRAMUSCULAR; INTRAVENOUS at 10:04

## 2022-11-23 RX ADMIN — OXYCODONE AND ACETAMINOPHEN 1 TABLET: 5; 325 TABLET ORAL at 10:45

## 2022-11-23 RX ADMIN — FENTANYL CITRATE 25 MCG: 50 INJECTION, SOLUTION INTRAMUSCULAR; INTRAVENOUS at 10:15

## 2022-11-23 RX ADMIN — SODIUM CHLORIDE, POTASSIUM CHLORIDE, SODIUM LACTATE AND CALCIUM CHLORIDE 75 ML/HR: 600; 310; 30; 20 INJECTION, SOLUTION INTRAVENOUS at 06:34

## 2022-11-23 RX ADMIN — SODIUM CHLORIDE, POTASSIUM CHLORIDE, SODIUM LACTATE AND CALCIUM CHLORIDE 75 ML/HR: 600; 310; 30; 20 INJECTION, SOLUTION INTRAVENOUS at 10:14

## 2022-11-23 RX ADMIN — ONDANSETRON HYDROCHLORIDE 4 MG: 2 INJECTION, SOLUTION INTRAMUSCULAR; INTRAVENOUS at 09:04

## 2022-11-23 RX ADMIN — MIDAZOLAM 1 MG: 1 INJECTION INTRAMUSCULAR; INTRAVENOUS at 07:26

## 2022-11-23 RX ADMIN — FENTANYL CITRATE 50 MCG: 50 INJECTION INTRAMUSCULAR; INTRAVENOUS at 07:26

## 2022-11-23 RX ADMIN — ROCURONIUM BROMIDE 45 MG: 10 SOLUTION INTRAVENOUS at 07:45

## 2022-11-23 RX ADMIN — GLYCOPYRROLATE 0.4 MG: 0.2 INJECTION INTRAMUSCULAR; INTRAVENOUS at 09:04

## 2022-11-23 RX ADMIN — Medication 80 MCG: at 07:50

## 2022-11-23 RX ADMIN — Medication 80 MCG: at 07:59

## 2022-11-23 RX ADMIN — MIDAZOLAM 1 MG: 1 INJECTION INTRAMUSCULAR; INTRAVENOUS at 07:33

## 2022-11-23 RX ADMIN — HYDROMORPHONE HYDROCHLORIDE 0.5 MG: 2 INJECTION, SOLUTION INTRAMUSCULAR; INTRAVENOUS; SUBCUTANEOUS at 09:10

## 2022-11-23 RX ADMIN — SUCCINYLCHOLINE CHLORIDE 140 MG: 20 INJECTION, SOLUTION INTRAMUSCULAR; INTRAVENOUS at 07:40

## 2022-11-23 RX ADMIN — PROPOFOL 150 MG: 10 INJECTION, EMULSION INTRAVENOUS at 07:40

## 2022-11-23 RX ADMIN — WATER 2 G: 1 INJECTION INTRAMUSCULAR; INTRAVENOUS; SUBCUTANEOUS at 07:50

## 2022-11-23 RX ADMIN — Medication 80 MCG: at 07:40

## 2022-11-23 RX ADMIN — HYDROMORPHONE HYDROCHLORIDE 0.25 MG: 2 INJECTION, SOLUTION INTRAMUSCULAR; INTRAVENOUS; SUBCUTANEOUS at 09:17

## 2022-11-23 RX ADMIN — FENTANYL CITRATE 25 MCG: 50 INJECTION, SOLUTION INTRAMUSCULAR; INTRAVENOUS at 10:08

## 2022-11-23 RX ADMIN — ROCURONIUM BROMIDE 5 MG: 10 SOLUTION INTRAVENOUS at 07:40

## 2022-11-23 RX ADMIN — Medication 80 MCG: at 07:45

## 2022-11-23 RX ADMIN — DEXAMETHASONE SODIUM PHOSPHATE 4 MG: 4 INJECTION, SOLUTION INTRAMUSCULAR; INTRAVENOUS at 07:47

## 2022-11-23 NOTE — BRIEF OP NOTE
Brief Postoperative Note    Patient: Rajeev Blunt  YOB: 1955  MRN: [id number]    Date of Procedure: 11/23/2022     Pre-Op Diagnosis: Incisional hernia, without obstruction or gangrene [K43.2]    Post-Op Diagnosis: Same as preoperative diagnosis. Procedure(s):  LAPAROSCOPIC INCISIONAL HERNIA REPAIR WITH MESH    Surgeon(s): Brian Hoskins MD    Surgical Assistant: Physician Assistant: JESSICA Partida    Anesthesia: General     Estimated Blood Loss (mL): 10 mL    Complications: None    Specimens:   ID Type Source Tests Collected by Time Destination   1 : hernia sac Fresh Abdomen  Brian Hoskins MD 11/23/2022 8270 Pathology        Implants:   Implant Name Type Inv. Item Serial No.  Lot No. LRB No. Used Action   MESH RUI CIR [id number] W/ ECHO 2 POS SYS PHASIX ST - SN/A  MESH RUI CIR [id number] W/ ECHO 2 POS SYS PHASIX ST N/A BARD DAVOL_WD [id number] N/A 1 Implanted       Drains:   [REMOVED] Joe-Sarkar Drain 02/23/22 Right; Inferior Abdomen (Removed)       Findings: 1 loop of small intestine within ventral hernia and some omentum/adhesions. No signs of recurrent malignancy noted. Hernia closed primarily and then reinforced with laparoscopic IPOM repair.       Electronically Signed by Eliel Rivera MD on 11/23/2022 at 9:25 AM

## 2022-11-23 NOTE — ROUTINE PROCESS
Patient: Junior Lang MRN: [id number]  SSN: [id number]   YOB: 1955  Age: 79 y.o. Sex: female     Patient is status post Procedure(s):  LAPAROSCOPIC INCISIONAL HERNIA REPAIR WITH MESH.     Surgeon(s) and Role:     * Eddie Zambrano MD - Primary    Local/Dose/Irrigation:  see [facility]                Venous Access Device (Active)      Peripheral IV 11/23/22 Left Antecubital (Active)                           Dressing/Packing:  Incision 11/23/22 Abdomen-Dressing/Treatment: Surgical glue (11/23/22 8900)    Splint/Cast:  ]    Other:

## 2022-11-23 NOTE — ANESTHESIA PREPROCEDURE EVALUATION
Relevant Problems   CARDIOVASCULAR   (+) Essential hypertension, benign      ENDOCRINE   (+) Hypothyroidism   (+) Obesity, morbid (HCC)   (+) Type 2 diabetes mellitus      PERSONAL HX & FAMILY HX OF CANCER   (+) Malignant neoplasm of head of pancreas (HCC)   (+) Pancreatic adenocarcinoma (HCC)       Anesthetic History   No history of anesthetic complications            Review of Systems / Medical History  Patient summary reviewed, nursing notes reviewed and pertinent labs reviewed    Pulmonary                Comments: Smoking Status: Former - 0.6 pack years  Quit Smokin80     Neuro/Psych   Within defined limits           Cardiovascular    Hypertension              Exercise tolerance: >4 METS     GI/Hepatic/Renal           PUD    Comments: UC  S/P whipple  Endo/Other      Hypothyroidism  Arthritis and cancer  Pertinent negatives: No diabetes   Other Findings   Comments: Malignant neoplasm of head of pancreas ([address] resected     chemotherapy             Physical Exam    Airway  Mallampati: II  TM Distance: 4 - 6 cm  Neck ROM: normal range of motion   Mouth opening: Normal     Cardiovascular  Regular rate and rhythm,  S1 and S2 normal,  no murmur, click, rub, or gallop  Rhythm: regular  Rate: normal         Dental    Dentition: Caps/crowns     Pulmonary  Breath sounds clear to auscultation               Abdominal  GI exam deferred       Other Findings            Anesthetic Plan    ASA: 3  Anesthesia type: general          Induction: Intravenous  Anesthetic plan and risks discussed with: Patient

## 2022-11-23 NOTE — OP NOTES
[address]   OPERATIVE REPORT    Name:  Noelle Thompson  MR#:  [id number]  :  1955  ACCOUNT #:  [de-identified]  DATE OF SERVICE:  2022    PREOPERATIVE DIAGNOSIS:  Incisional hernia. POSTOPERATIVE DIAGNOSIS:  Incisional hernia. PROCEDURE PERFORMED:  Laparoscopic repair of incisional hernia with mesh. SURGEON:  Ash Mejias. Gael Davis MD    ASSISTANT:  JESSICA Leahy.  Sony Hollingsworth assisted throughout the entirety of the case with operation of the camera, retraction, exposure as well as closure. ANESTHESIA:  General.    COMPLICATIONS:  None. SPECIMENS REMOVED:  Hernia sac. Disposition of specimen is to Pathology. IMPLANTS:  A 10 x 15 cm Echo 2 Phasix ST hernia mesh. ESTIMATED BLOOD LOSS:  10 mL. DISPOSITION:  PACU. FINDINGS:  The patient had an approximately 3 x 3 cm hernia defect just above the umbilicus at the inferior aspect of her previous upper midline incision. There was one loop of small intestine within the ventral hernia and some omentum and adhesions. There was no signs of recurrent malignancy noted. Her hernia was closed primarily and then reinforced with a laparoscopic IPOM repair. INDICATIONS:  The patient is a 25-year-old female who had previously undergone a Whipple procedure for pancreatic adenocarcinoma in 2022. She had done well with this and has no evidence of recurrent disease but developed a small hernia at the inferior aspect of her incision. This had become symptomatic for the patient, and she presented for repair. A complete discussion of risks, benefits and alternatives of surgery were had with the patient, and she was in agreement to proceed. We discussed the use of mesh to reinforce the hernia repair, and she wished to utilize an absorbable non-permanent mesh. PROCEDURE:  After informed consent was obtained, the patient was brought back to the operating room.   She was placed under general endotracheal anesthesia in the supine position on the operating room table. Her left arm was tucked. Her abdomen was then prepped and draped in the usual sterile fashion. A proper time-out was performed. With this completed, we placed a Veress needle in the left upper quadrant just beneath the costal margin in the midclavicular line. A standard water drop technique test was performed, and the abdomen was then insufflated to 15 mmHg. We then tunneled into the abdomen using a 5 mm Optiview trocar in the left lateral mid abdomen, and the abdomen was entered safely. The Veress needle site was inspected and without injury, and this was removed. Cursory evaluation of the abdomen was performed, and there was no signs of recurrent metastatic disease within the peritoneum, omentum or other visualized surfaces of the abdomen. The patient did have a loop of small intestine adherent to the hernia sac within the ventral hernia as well as some omentum, but overall adhesions around this area were fairly minimal.  We placed additional trocars including a left lower quadrant 5 mm trocar, and the initial trocar was exchanged out for 12 mm trocar, and then at the site of the Veress needle, a 5 mm trocar was placed. These were all placed after injection of local anesthetic under direct visualization. We then used scissors to sharply dissect the adhesions away from the anterior abdominal wall and the bowel away from the ventral hernia. This came down relatively easily. A LigaSure was utilized to take down some of the adhesions to the anterior abdominal wall and omentum as well. Once this was cleared all the way up to the upper part of our incision, we prepared for repair of the hernia. The upper abdomen at the site of her hepatic and pancreatic anastomoses was not well visualized due to adhesions, but her duodenojejunostomy was visualized and noted to be healthy in appearance.   Once this was completed, we made a small vertical incision overlying the site of the hernia approximately 3 cm in length. We dissected down through the subcutaneous tissue until we encountered the hernia sac, and this was dissected free circumferentially from the surrounding tissues down to the level of the fascia. Using cautery, we excised the hernia sac circumferentially from the edge of the fascia. This was passed off the field as a specimen. We then primarily closed the hernia defect using 0 Ethibond sutures in interrupted figure-of-eight fashion, which nicely closed the defect. There was no tension on this. We did have to divide the base of the umbilicus in order to repair this appropriately which was later re-secured. We reinsufflated the abdomen and then placed a piece of Phasix ST hernia mesh into the abdomen. This was a 10 x 15 cm elliptical piece of mesh. We previously placed 2-0 nylon and 2-0 Prolene stay sutures at the periphery at the 12 o'clock, 3 o'clock, 6 o'clock and 9 o'clock positions. The mesh was oriented appropriately, and the suspending suture in the center of the mesh was used to pull this up through the hernia defect above the umbilicus. Once the mesh was positioned appropriately, we then placed small incisions at the 12 o'clock, 3 o'clock, 6 o'clock and 9 o'clock positions for transfascial fixation using 2-0 nylon and 2-0 Prolene sutures. We then used an absorbable SecureStrap tacking device to place a peripheral row of tacks around the edge of the mesh, and then the Echo 2 Positioning System was removed. Several centrally placed SecureStrap tacks were also placed to assure good approximation of the fascia to the mesh for incorporation. The mesh was nicely opposed to the abdominal wall circumferentially with no folds or loose areas around the periphery. Good hemostasis had been maintained. We then removed the 12 mm trocar and closed this at the fascia using an 0 Vicryl suture on a suture passer. The abdomen was allowed to desufflate.   The remaining trocars were removed. Additional local anesthetic was injected at all incision sites. The umbilicus was re-secured to the fascia using a 2-0 Vicryl suture at its base, and the hernia site incision was closed using interrupted 2-0 Vicryl sutures to reapproximate the deep dermis and subcutaneous tissue, followed by 4-0 Monocryl subcuticular stitch to the skin. All remaining skin incisions were closed using 4-0 Monocryl subcuticular stitch, followed by Dermabond skin adhesive. The patient was then awakened, extubated and taken to the postanesthesia care unit in stable condition. All needle and instrument counts were correct at the completion of the case. I was present and scrubbed throughout the entirety of the case. There were no immediate complications.       MD ANA LUISA [id number]  D:  11/23/2022 9:40  T:  11/23/2022 15:30  JOB #:  [id number]

## 2022-11-23 NOTE — H&P
Date of Surgery Update:  Todd Cazares was seen and examined. History and physical has been reviewed. The patient has been examined. There have been no significant clinical changes since the completion of the originally dated History and Physical.  Patient with incisional hernia from prior whipple procedure that is symptomatic. Plan for laparoscopic repair of ventral hernia with mesh. A complete discussion of the risks, benefits and alternatives to surgery were discussed with the patient who was keen to proceed. Signed By: Nayana Cavanaugh MD     2022 7:26 AM         Please note from the office and include the additional information below:    Past Medical History  Past Medical History:   Diagnosis Date    Arthritis     ostero arthritis, rhemathoid  lower back     Autoimmune disease ([address]     Ulcerative Colitis. Hypertension     CONTROLLED WITH MEDS    Hypothyroid 2011    Incisional hernia     Malignant neoplasm of head of pancreas ([address] 2021    Ulcerative colitis ([address] 2010    Ulcerative colitis ([address] 2010    Ulcerative colitis ([address]         Past Surgical History  Past Surgical History:   Procedure Laterality Date    COLONOSCOPY N/A 2017    COLONOSCOPY performed by Haven Birch MD at [facility] ENDOSCOPY    ENDOSCOPY, COLON, DIAGNOSTIC      HX  SECTION      X1    HX GI      COLONOSCOPY    HX OTHER SURGICAL  2022    pancreatic cancer surgery; Whipple procedure    HX POLYPECTOMY      HX UROLOGICAL      URETHERAL STENT. HX VASCULAR ACCESS      PLACEMENT OF PORT-A-CATH RIGHT SIDE    AK ABDOMEN SURGERY PROC UNLISTED      ENDOSCOPIC, PLACEMENT OF BILIARY STENT    AK ABDOMEN SURGERY PROC UNLISTED  2022    WIPPLE PROCEDURE    AK BREAST SURGERY PROCEDURE UNLISTED      cyst removed from left breast        Social History  The patient Todd Cazares  reports that she quit smoking about 42 years ago. Her smoking use included cigarettes.  She has a 0.60 pack-year smoking history. She has never used smokeless tobacco. She reports that she does not drink alcohol and does not use drugs.      Family History  Family History   Problem Relation Age of Onset    Cancer Mother         pancreatic    Cancer Brother         colon    Anesth Problems Neg Hx           Tammy Webb MD

## 2022-11-23 NOTE — DISCHARGE INSTRUCTIONS
Incisional Hernia Repair      Patient Discharge Instructions    Rajeev Blunt / [id number] : 1955    Admitted 2022 Discharged: 2022     It is important that you take the medication exactly as they are prescribed. Keep your medication in the bottles provided by the pharmacist and keep a list of the medication names, dosages, and times to be taken in your wallet. Do not take other medications without consulting your doctor. Wound care: You may shower starting tomorrow. Do not remove the skin glue on the incision(s), it will fall of on its own in a few weeks. If you have a dressing over your incision, leave it in place for 2 days and then it can be removed. It does not need to be replaced. No heavy lifting or strenuous activity for 4-6 weeks after the operation    May take regular Tylenol and combine with oxycodone/acetaminophen (Percocet, Roxicet, Tylox) as needed for severe pain. Make sure not to exceed daily recommended dose for Tylenol    What to do at Home    See instructions below. Follow-up with Dr. Luz Daniel in 2 week(s). Call the office ([phone]) to schedule your appointment. Information obtained by :  I understand that if any problems occur once I am at home I am to contact my physician. I understand and acknowledge receipt of the instructions indicated above. Physician's or R.N.'s Signature                                                                  Date/Time                                                                                                                                              Patient or Representative Signature                                                          Date/Time     Umbilical Hernia Repair: What to Expect at [address] are likely to have pain for the next few days.  You may also feel like you have the flu, and you may have a low fever and feel tired and nauseated. This is common. You should feel better after a few days and will probably feel much better in 7 days. For several weeks you may feel twinges or pulling in the hernia repair when you move. Y  This care sheet gives you a general idea about how long it will take for you to recover. But each person recovers at a different pace. Follow the steps below to get better as quickly as possible. How can you care for yourself at home? Activity  Rest when you feel tired. Getting enough sleep will help you recover. Sleep with your head up by using three or four pillows. You can also try to sleep with your head up in a recliner chair. Do not sleep on your side or stomach. Try to walk each day. Start by walking a little more than you did the day before. Bit by bit, increase the amount you walk. Walking boosts blood flow and helps prevent pneumonia and constipation. Put ice or a cold pack on the area of your hernia repair for 10 to 20 minutes at a time. Try to do this every 1 to 2 hours for the first 24 hours (when you are awake) or until the swelling goes down. Put a thin cloth between the ice and your skin. Avoid strenuous activities, such as biking, jogging, weight lifting, or aerobic exercise, until your doctor says it is okay. Avoid lifting anything that would make you strain. This may include heavy grocery bags and milk containers, a heavy briefcase or backpack, cat litter or dog food bags, a vacuum , or a child. You may drive when you are no longer taking pain medicine and can quickly move your foot from the gas pedal to the brake. You must also be able to sit comfortably for a long period of time, even if you do not plan to go far. You might get caught in traffic. Most people are able to return to work within 1 to 2 weeks after surgery. You may shower 24 to 48 hours after surgery, if your doctor okays it.  Pat the cut (incision) dry. Do not take a bath for the first 2 weeks, or until your doctor tells you it is okay. Your doctor will tell you when you can have sex again. Diet  You can eat your normal diet. If your stomach is upset, try bland, low-fat foods like plain rice, broiled chicken, toast, and yogurt. Drink plenty of fluids (unless your doctor tells you not to). You may notice that your bowel movements are not regular right after your surgery. This is common. Avoid constipation and straining with bowel movements. You may want to take a fiber supplement every day. If you have not had a bowel movement after a couple of days, ask your doctor about taking a mild laxative. Medicines  Take pain medicines exactly as directed. If the doctor gave you a prescription medicine for pain, take it as prescribed. If you are not taking a prescription pain medicine, take an over-the-counter medicine such as acetaminophen (Tylenol), ibuprofen (Advil, Motrin), or naproxen (Aleve). Read and follow all instructions on the label. Do not take two or more pain medicines at the same time unless the doctor told you to. Many pain medicines have acetaminophen, which is Tylenol. Too much acetaminophen (Tylenol) can be harmful. If your doctor prescribed antibiotics, take them as directed. Do not stop taking them just because you feel better. You need to take the full course of antibiotics. If you think your pain medicine is making you sick to your stomach: Take your medicine after meals (unless your doctor has told you not to). Ask your doctor for a different pain medicine. Incision care  Wash the area daily with warm, soapy water and pat it dry. Follow-up care is a key part of your treatment and safety. Be sure to make and go to all appointments, and call your doctor if you are having problems. It's also a good idea to know your test results and keep a list of the medicines you take. When should you call for help?   Call 911 anytime you think you may need emergency care. For example, call if:  You passed out (lost consciousness). You have sudden chest pain and shortness of breath, or you cough up blood. You have severe pain in your belly. Call your doctor now or seek immediate medical care if:  You are sick to your stomach and cannot keep fluids down. You have signs of a blood clot, such as:  Pain in your calf, back of the knee, thigh, or groin. Redness and swelling in your leg or groin. You have trouble passing urine or stool, especially if you have mild pain or swelling in your lower belly. Bright red blood has soaked through the bandage over your incision. Watch closely for any changes in your health, and be sure to contact your doctor if:  Your swelling is getting worse. Your swelling is not going down. Where can you learn more? Go to [employer]  Enter W550 in the search box to learn more about \"Hernia Repair: What to Expect at Home. \"   © 1570-3943 Healthwise, Incorporated. Care instructions adapted under license by [facility] [employer] [facility] (which disclaims liability or warranty for this information). This care instruction is for use with your licensed healthcare professional. If you have questions about a medical condition or this instruction, always ask your healthcare professional. [address] any warranty or liability for your use of this information. Content Version: 5.9.78206; Last Revised: January 21, 2011      ______________________________________________________________________    Anesthesia Discharge Instructions    After general anesthesia or intervenous sedation, for 24 hours or while taking prescription Narcotics:  Limit your activities  Do not drive or operate hazardous machinery  If you have not urinated within 8 hours after discharge, please contact your surgeon on call.   Do not make important personal or business decisions  Do not drink alcoholic beverages    Report the following to your surgeon:  Excessive pain, swelling, redness or odor of or around the surgical area  Temperature over 100.5 degrees  Nausea and vomiting lasting longer than 4 hours or if unable to take medication  Any signs of decreased circulation or nerve impairment to extremity:  Change in color, persistent numbness, tingling, coldness or increased pain.   Any questions    Took Percocet at 10:45 am  next dose not before 4:45 pm

## 2022-11-23 NOTE — PROGRESS NOTES
I have reviewed discharge instructions with the patient and spouse. The patient and spouse verbalized understanding. Opportunities for questions and clarification provided. Patient discharging home with spouse and son.

## 2022-12-02 NOTE — ANESTHESIA POSTPROCEDURE EVALUATION
Procedure(s):  LAPAROSCOPIC INCISIONAL HERNIA REPAIR WITH MESH.     general    Anesthesia Post Evaluation      Multimodal analgesia: multimodal analgesia used between 6 hours prior to anesthesia start to PACU discharge  Patient location during evaluation: PACU  Patient participation: complete - patient participated  Level of consciousness: awake  Pain management: adequate  Airway patency: patent  Anesthetic complications: no  Cardiovascular status: acceptable  Respiratory status: acceptable  Hydration status: acceptable  Post anesthesia nausea and vomiting:  none  Final Post Anesthesia Temperature Assessment:  Normothermia (36.0-37.5 degrees C)      INITIAL Post-op Vital signs:   Vitals Value Taken Time   /72 11/23/22 1045   Temp 36.8 °C (98.3 °F) 11/23/22 1030   Pulse 75 11/23/22 1055   Resp 17 11/23/22 1055   SpO2 95 % 11/23/22 1055

## 2022-12-06 ENCOUNTER — OFFICE VISIT (OUTPATIENT)
Dept: SURGERY | Age: 67
End: 2022-12-06
Payer: MEDICARE

## 2022-12-06 VITALS
SYSTOLIC BLOOD PRESSURE: 122 MMHG | HEART RATE: 71 BPM | WEIGHT: 168 LBS | TEMPERATURE: 98.2 F | DIASTOLIC BLOOD PRESSURE: 80 MMHG | OXYGEN SATURATION: 98 % | BODY MASS INDEX: 28.68 KG/M2 | HEIGHT: 64 IN | RESPIRATION RATE: 20 BRPM

## 2022-12-06 DIAGNOSIS — C25.9 PANCREATIC ADENOCARCINOMA (HCC): ICD-10-CM

## 2022-12-06 DIAGNOSIS — K43.2 INCISIONAL HERNIA, WITHOUT OBSTRUCTION OR GANGRENE: Primary | ICD-10-CM

## 2022-12-06 PROCEDURE — 99024 POSTOP FOLLOW-UP VISIT: CPT | Performed by: SURGERY

## 2022-12-06 NOTE — PROGRESS NOTES
Identified pt with two pt identifiers (name and ). Reviewed chart in preparation for visit and have obtained necessary documentation. Shannon Montalvo is a 79 y.o. female  Chief Complaint   Patient presents with    Surgical Follow-up     13 days s/p lap incisional hernia repair     Visit Vitals  /80 (BP 1 Location: Left upper arm, BP Patient Position: Sitting, BP Cuff Size: Large adult)   Pulse 71   Temp 98.2 °F (36.8 °C)   Resp 20   Ht 5' 4\" (1.626 m)   Wt 168 lb (76.2 kg)   SpO2 98%   BMI 28.84 kg/m²       1. Have you been to the ER, urgent care clinic since your last visit? Hospitalized since your last visit?no    2. Have you seen or consulted any other health care providers outside of the [address] [place] [address] since your last visit? Include any pap smears or colon screening.  No

## 2022-12-06 NOTE — LETTER
12/6/2022    Patient: Sharan Sierra   YOB: 1955   Date of Visit: 12/6/2022     Janna John MD  [facility] [address]  [address]  [address] [facility] [address]  [facility]    Dear Janna John MD,      Thank you for referring Ms. Ricki Chan to [facility] for evaluation. My notes for this consultation are attached. If you have questions, please do not hesitate to call me. I look forward to following your patient along with you.       Sincerely,    Roopa Martinez MD

## 2022-12-06 NOTE — PROGRESS NOTES
[facility]      Clinic Note - Follow up    [address] returns for scheduled follow up today. She is well-known to me for history of pancreatic adenocarcinoma in the head of the pancreas. After completing 6 cycles of neoadjuvant chemotherapy, she underwent a pylorus-preserving Whipple procedure with portal lymphadenectomy on 2/23/2022. Her final pathology showed moderately differentiated pancreatic adenocarcinoma, [id number], margins negative. She completed postoperative chemotherapy and is currently JAUN. She developed an incisional hernia at the inferior aspect of her midline incision. She is now s/p laparoscopic incisional hernia repair with mesh on 11/23/2022. The patient has done well since surgery. She is no longer on pain medication. She has some soreness but overall is doing well. She is having bowel function and eating well. No issues with her incisions. Objective     Visit Vitals  /80 (BP 1 Location: Left upper arm, BP Patient Position: Sitting, BP Cuff Size: Large adult)   Pulse 71   Temp 98.2 °F (36.8 °C)   Resp 20   Ht 5' 4\" (1.626 m)   Wt 168 lb (76.2 kg)   SpO2 98%   BMI 28.84 kg/m²         PE  GEN - Awake, alert, communicating appropriately. NAD  Pulm - CTAB  CV - RRR  Abd - soft, NT, ND. Surgical incisions well-healed. No signs of recurrent hernia. Ext - warm, well perfused, no edema. All other systems negative unless indicated above. Labs  None      Imaging  None      Assessment     Leonor Spence is a 79 y. o.yr old female well-known to me for history of pancreatic adenocarcinoma in the head of the pancreas. After completing 6 cycles of neoadjuvant chemotherapy, she underwent a pylorus-preserving Whipple procedure with portal lymphadenectomy on 2/23/2022. Her final pathology showed moderately differentiated pancreatic adenocarcinoma, [id number], margins negative. She completed postoperative chemotherapy and is currently JAUN.   She developed an incisional hernia at the inferior aspect of her midline incision. She is now s/p laparoscopic incisional hernia repair with mesh on 11/23/2022. Plan     The patient is doing well after hernia repair. She may slowly increase activity levels as tolerated. I advised against heavy lifting or real strenuous exercise for 6 weeks after surgery. Return to work note was provided. I will plan to see her back in 6 months. She sees oncology with surveillance imaging in January.       Yeni Blandon MD  12/6/2022    CC: MD Dr. Bernadine Mock

## 2022-12-06 NOTE — LETTER
NOTIFICATION RETURN TO WORK / SCHOOL    12/6/2022 11:38 AM    Ms. [address] [id number]      To Whom It May Concern:    Peter James is currently under the care of Carola Aquino. She will return to work/school on: December 12,2022 with lifting restriction of 20 pounds until December 26,2022      If there are questions or concerns please have the patient contact our office.         Sincerely,      Navin Thompson MD/mbw

## 2022-12-19 DIAGNOSIS — E87.6 HYPOKALEMIA: ICD-10-CM

## 2022-12-19 RX ORDER — POTASSIUM CHLORIDE 750 MG/1
10 TABLET, EXTENDED RELEASE ORAL DAILY
Qty: 30 TABLET | Refills: 0 | Status: SHIPPED | OUTPATIENT
Start: 2022-12-19

## 2022-12-28 ENCOUNTER — TELEPHONE (OUTPATIENT)
Dept: INTERNAL MEDICINE CLINIC | Age: 67
End: 2022-12-28

## 2022-12-28 NOTE — TELEPHONE ENCOUNTER
----- Message from Gadiel Quinonez sent at 12/28/2022  1:24 PM EST -----  Subject: Message to Provider    QUESTIONS  Information for Provider? Patient requesting a note to exempt her from   [employer] duty. Please all when note is ready. ---------------------------------------------------------------------------  --------------  Juan Burgos [facility]  [phone]; OK to leave message on voicemail  ---------------------------------------------------------------------------  --------------  SCRIPT ANSWERS  Relationship to Patient?  Self

## 2023-01-09 ENCOUNTER — HOSPITAL ENCOUNTER (OUTPATIENT)
Dept: CT IMAGING | Age: 68
Discharge: HOME | End: 2023-01-09
Attending: NURSE PRACTITIONER
Payer: MEDICARE

## 2023-01-09 DIAGNOSIS — C25.9 PANCREATIC ADENOCARCINOMA (HCC): ICD-10-CM

## 2023-01-09 PROCEDURE — 74177 CT ABD & PELVIS W/CONTRAST: CPT

## 2023-01-09 PROCEDURE — 74011000636 HC RX REV CODE- 636: Performed by: NURSE PRACTITIONER

## 2023-01-09 RX ADMIN — IOPAMIDOL 100 ML: 755 INJECTION, SOLUTION INTRAVENOUS at 17:30

## 2023-01-09 NOTE — TELEPHONE ENCOUNTER
Spoke with patient and advised her that the letter is ready at the  for pick-up. Pt understood and was thankful for the call.

## 2023-01-11 PROBLEM — D69.6 THROMBOCYTOPENIA, UNSPECIFIED (HCC): Status: RESOLVED | Noted: 2022-07-18 | Resolved: 2023-01-11

## 2023-01-11 RX ORDER — SODIUM CHLORIDE 9 MG/ML
10 INJECTION INTRAVENOUS AS NEEDED
OUTPATIENT
Start: 2023-01-16

## 2023-01-11 RX ORDER — HEPARIN 100 UNIT/ML
300-500 SYRINGE INTRAVENOUS AS NEEDED
OUTPATIENT
Start: 2023-01-16

## 2023-01-11 RX ORDER — SODIUM CHLORIDE 0.9 % (FLUSH) 0.9 %
10 SYRINGE (ML) INJECTION AS NEEDED
OUTPATIENT
Start: 2023-01-16

## 2023-01-16 ENCOUNTER — HOSPITAL ENCOUNTER (OUTPATIENT)
Dept: INFUSION THERAPY | Age: 68
Discharge: HOME | End: 2023-01-16
Payer: MEDICARE

## 2023-01-16 ENCOUNTER — TELEPHONE (OUTPATIENT)
Dept: ONCOLOGY | Age: 68
End: 2023-01-16

## 2023-01-16 ENCOUNTER — OFFICE VISIT (OUTPATIENT)
Dept: ONCOLOGY | Age: 68
End: 2023-01-16
Payer: MEDICARE

## 2023-01-16 VITALS
HEART RATE: 80 BPM | DIASTOLIC BLOOD PRESSURE: 79 MMHG | WEIGHT: 167 LBS | TEMPERATURE: 98.1 F | HEIGHT: 64 IN | BODY MASS INDEX: 28.51 KG/M2 | RESPIRATION RATE: 18 BRPM | SYSTOLIC BLOOD PRESSURE: 119 MMHG | OXYGEN SATURATION: 99 %

## 2023-01-16 VITALS
TEMPERATURE: 98.1 F | BODY MASS INDEX: 28.51 KG/M2 | OXYGEN SATURATION: 99 % | DIASTOLIC BLOOD PRESSURE: 79 MMHG | SYSTOLIC BLOOD PRESSURE: 119 MMHG | HEART RATE: 80 BPM | RESPIRATION RATE: 18 BRPM | WEIGHT: 167 LBS | HEIGHT: 64 IN

## 2023-01-16 DIAGNOSIS — E86.0 DEHYDRATION: Primary | ICD-10-CM

## 2023-01-16 DIAGNOSIS — C25.9 PANCREATIC ADENOCARCINOMA (HCC): Primary | ICD-10-CM

## 2023-01-16 DIAGNOSIS — D64.9 NORMOCYTIC ANEMIA, NOT DUE TO BLOOD LOSS: ICD-10-CM

## 2023-01-16 DIAGNOSIS — D70.8 OTHER NEUTROPENIA (HCC): ICD-10-CM

## 2023-01-16 DIAGNOSIS — C25.0 MALIGNANT NEOPLASM OF HEAD OF PANCREAS (HCC): ICD-10-CM

## 2023-01-16 DIAGNOSIS — R91.8 PULMONARY NODULES: ICD-10-CM

## 2023-01-16 DIAGNOSIS — D64.9 NORMOCYTIC ANEMIA: ICD-10-CM

## 2023-01-16 LAB
ALBUMIN SERPL-MCNC: 3.1 G/DL (ref 3.5–5)
ALBUMIN/GLOB SERPL: 0.8 (ref 1.1–2.2)
ALP SERPL-CCNC: 163 U/L (ref 45–117)
ALT SERPL-CCNC: 47 U/L (ref 12–78)
ANION GAP SERPL CALC-SCNC: 3 MMOL/L (ref 5–15)
AST SERPL-CCNC: 59 U/L (ref 15–37)
BASOPHILS # BLD: 0 K/UL (ref 0–0.1)
BASOPHILS NFR BLD: 0 % (ref 0–1)
BILIRUB SERPL-MCNC: 0.5 MG/DL (ref 0.2–1)
BUN SERPL-MCNC: 13 MG/DL (ref 6–20)
BUN/CREAT SERPL: 17 (ref 12–20)
CALCIUM SERPL-MCNC: 8.6 MG/DL (ref 8.5–10.1)
CHLORIDE SERPL-SCNC: 109 MMOL/L (ref 97–108)
CO2 SERPL-SCNC: 26 MMOL/L (ref 21–32)
CREAT SERPL-MCNC: 0.77 MG/DL (ref 0.55–1.02)
DIFFERENTIAL METHOD BLD: ABNORMAL
EOSINOPHIL # BLD: 0.1 K/UL (ref 0–0.4)
EOSINOPHIL NFR BLD: 2 % (ref 0–7)
ERYTHROCYTE [DISTWIDTH] IN BLOOD BY AUTOMATED COUNT: 14.5 % (ref 11.5–14.5)
GLOBULIN SER CALC-MCNC: 3.9 G/DL (ref 2–4)
GLUCOSE SERPL-MCNC: 88 MG/DL (ref 65–100)
HCT VFR BLD AUTO: 30.9 % (ref 35–47)
HGB BLD-MCNC: 10 G/DL (ref 11.5–16)
IMM GRANULOCYTES # BLD AUTO: 0 K/UL (ref 0–0.04)
IMM GRANULOCYTES NFR BLD AUTO: 0 % (ref 0–0.5)
LYMPHOCYTES # BLD: 3.5 K/UL (ref 0.8–3.5)
LYMPHOCYTES NFR BLD: 65 % (ref 12–49)
MCH RBC QN AUTO: 31.9 PG (ref 26–34)
MCHC RBC AUTO-ENTMCNC: 32.4 G/DL (ref 30–36.5)
MCV RBC AUTO: 98.7 FL (ref 80–99)
MONOCYTES # BLD: 0.8 K/UL (ref 0–1)
MONOCYTES NFR BLD: 14 % (ref 5–13)
NEUTS SEG # BLD: 1 K/UL (ref 1.8–8)
NEUTS SEG NFR BLD: 19 % (ref 32–75)
NRBC # BLD: 0 K/UL (ref 0–0.01)
NRBC BLD-RTO: 0 PER 100 WBC
PLATELET # BLD AUTO: 203 K/UL (ref 150–400)
PMV BLD AUTO: 10.8 FL (ref 8.9–12.9)
POTASSIUM SERPL-SCNC: 3.8 MMOL/L (ref 3.5–5.1)
PROT SERPL-MCNC: 7 G/DL (ref 6.4–8.2)
RBC # BLD AUTO: 3.13 M/UL (ref 3.8–5.2)
SODIUM SERPL-SCNC: 138 MMOL/L (ref 136–145)
WBC # BLD AUTO: 5.4 K/UL (ref 3.6–11)

## 2023-01-16 PROCEDURE — 3017F COLORECTAL CA SCREEN DOC REV: CPT | Performed by: INTERNAL MEDICINE

## 2023-01-16 PROCEDURE — 1101F PT FALLS ASSESS-DOCD LE1/YR: CPT | Performed by: INTERNAL MEDICINE

## 2023-01-16 PROCEDURE — 36591 DRAW BLOOD OFF VENOUS DEVICE: CPT

## 2023-01-16 PROCEDURE — 85025 COMPLETE CBC W/AUTO DIFF WBC: CPT

## 2023-01-16 PROCEDURE — G8400 PT W/DXA NO RESULTS DOC: HCPCS | Performed by: INTERNAL MEDICINE

## 2023-01-16 PROCEDURE — G8536 NO DOC ELDER MAL SCRN: HCPCS | Performed by: INTERNAL MEDICINE

## 2023-01-16 PROCEDURE — 74011250636 HC RX REV CODE- 250/636: Performed by: NURSE PRACTITIONER

## 2023-01-16 PROCEDURE — 74011000250 HC RX REV CODE- 250: Performed by: NURSE PRACTITIONER

## 2023-01-16 PROCEDURE — 99214 OFFICE O/P EST MOD 30 MIN: CPT | Performed by: INTERNAL MEDICINE

## 2023-01-16 PROCEDURE — G8432 DEP SCR NOT DOC, RNG: HCPCS | Performed by: INTERNAL MEDICINE

## 2023-01-16 PROCEDURE — 3078F DIAST BP <80 MM HG: CPT | Performed by: INTERNAL MEDICINE

## 2023-01-16 PROCEDURE — G8427 DOCREV CUR MEDS BY ELIG CLIN: HCPCS | Performed by: INTERNAL MEDICINE

## 2023-01-16 PROCEDURE — 80053 COMPREHEN METABOLIC PANEL: CPT

## 2023-01-16 PROCEDURE — 3074F SYST BP LT 130 MM HG: CPT | Performed by: INTERNAL MEDICINE

## 2023-01-16 PROCEDURE — 1090F PRES/ABSN URINE INCON ASSESS: CPT | Performed by: INTERNAL MEDICINE

## 2023-01-16 PROCEDURE — 77030016057 HC NDL HUBR APOL -B

## 2023-01-16 PROCEDURE — G8417 CALC BMI ABV UP PARAM F/U: HCPCS | Performed by: INTERNAL MEDICINE

## 2023-01-16 PROCEDURE — 86301 IMMUNOASSAY TUMOR CA 19-9: CPT

## 2023-01-16 PROCEDURE — 1123F ACP DISCUSS/DSCN MKR DOCD: CPT | Performed by: INTERNAL MEDICINE

## 2023-01-16 RX ORDER — SODIUM CHLORIDE 9 MG/ML
10 INJECTION INTRAVENOUS AS NEEDED
Status: DISCONTINUED | OUTPATIENT
Start: 2023-01-16 | End: 2023-01-17 | Stop reason: HOSPADM

## 2023-01-16 RX ORDER — SODIUM CHLORIDE 0.9 % (FLUSH) 0.9 %
10 SYRINGE (ML) INJECTION AS NEEDED
Status: DISCONTINUED | OUTPATIENT
Start: 2023-01-16 | End: 2023-01-17 | Stop reason: HOSPADM

## 2023-01-16 RX ORDER — HEPARIN 100 UNIT/ML
300-500 SYRINGE INTRAVENOUS AS NEEDED
Status: DISCONTINUED | OUTPATIENT
Start: 2023-01-16 | End: 2023-01-17 | Stop reason: HOSPADM

## 2023-01-16 RX ORDER — SODIUM CHLORIDE 0.9 % (FLUSH) 0.9 %
10 SYRINGE (ML) INJECTION AS NEEDED
OUTPATIENT
Start: 2023-03-06

## 2023-01-16 RX ORDER — SODIUM CHLORIDE 9 MG/ML
10 INJECTION INTRAVENOUS AS NEEDED
OUTPATIENT
Start: 2023-03-06

## 2023-01-16 RX ORDER — HEPARIN 100 UNIT/ML
300-500 SYRINGE INTRAVENOUS AS NEEDED
OUTPATIENT
Start: 2023-03-06

## 2023-01-16 RX ADMIN — HEPARIN 500 UNITS: 100 SYRINGE at 12:58

## 2023-01-16 RX ADMIN — Medication 10 ML: at 12:58

## 2023-01-16 NOTE — PROGRESS NOTES
1. Have you been to the ER, urgent care clinic since your last visit? Hospitalized since your last visit? No    2. Have you seen or consulted any other health care providers outside of the [address] [place] [address] since your last visit? Include any pap smears or colon screening.   No        Visit Vitals  /79   Pulse 80   Temp 98.1 °F (36.7 °C)   Resp 18   Ht 5' 4\" (1.626 m)   Wt 167 lb (75.8 kg)   SpO2 99%   BMI 28.67 kg/m²            Chief Complaint   Patient presents with    Follow-up    Pancreatic Cancer

## 2023-01-16 NOTE — PROGRESS NOTES
[facility] Progress Note    Date: 2023    Name: Stewart Mayberry    MRN: [id number]         : 1955    Ms. Wilfredo Matos Arrived ambulatory and in no distress for PF/Labs. R chest wall port accessed without difficulty, labs drawn & sent for processing. Ms. Paul Vera vitals were reviewed. Visit Vitals  /79   Pulse 80   Temp 98.1 °F (36.7 °C)   Resp 18   Ht 5' 4\" (1.626 m)   Wt 75.8 kg (167 lb)   SpO2 99%   BMI 28.67 kg/m²       Lab results were obtained and reviewed. Pending. Medications:  Medications Administered       0.9% sodium chloride injection 10 mL       Admin Date  2023 Action  Given Dose  10 mL Route  IntraVENous Administered By  Amy Curran RN              heparin (porcine) pf 300-500 Units       Admin Date  2023 Action  Given Dose  500 Units Route  InterCATHeter Administered By  Amy Curran RN                    Ms. Wilfredo Matos tolerated treatment well and was discharged from [place] [address] in stable condition. Port de-accessed, flushed & heparinized per protocol.      Future Appointments   Date Time Provider [place]   2023  1:00 PM [facility]   2023  1:30 PM Cat Hermosillo MD [facility] BS AMB   2023  3:00 PM [facility]it Woodruff [facility]   2023  3:40 PM Luca Villegas MD [facility] BS AMB   2023  1:00 PM SS INF5 [facility]  2023

## 2023-01-16 NOTE — LETTER
1/16/2023    Patient: Kvng Garcia   YOB: 1955   Date of Visit: 1/16/2023     Timbo Sheridan MD  [address] [facility] [address] [place] [address]    Dear Timbo Sheridan MD,      Thank you for referring Ms. Tamara Perez to [email or web address] for evaluation. My notes for this consultation are attached. If you have questions, please do not hesitate to call me. I look forward to following your patient along with you.       Sincerely,    Justen Daley MD

## 2023-01-17 LAB — CANCER AG19-9 SERPL-ACNC: 33 U/ML (ref 0–35)

## 2023-01-19 ENCOUNTER — TELEPHONE (OUTPATIENT)
Dept: ONCOLOGY | Age: 68
End: 2023-01-19

## 2023-01-19 NOTE — TELEPHONE ENCOUNTER
Patient called stated Dr. Nanette Real wanted patient to get CT scan of her lungs. She has been trying to schedule the appointment with the CT/MRI office however the office is stating they do not have an order for a CT scan from our office to schedule. Patient requesting call back and advise.     #[phone]

## 2023-01-19 NOTE — TELEPHONE ENCOUNTER
[address] at [facility]  [phone]    01/19/23- Patient stated she's tried calling central scheduling twice and was told there wasn't an order for CT chest/abd/pel. Reviewed that order is in the system to have 6 weeks after her last CT, prior to follow up with Dr. Nelida Owens on 3/6. Patient verbalized understanding. Call placed to central scheduling, spoke to [facility] who confirmed order is in and viewable. Patient transferred to [facility] to schedule CT.

## 2023-01-23 PROBLEM — G62.0 CHEMOTHERAPY-INDUCED NEUROPATHY (HCC): Status: ACTIVE | Noted: 2023-01-23

## 2023-01-23 PROBLEM — T45.1X5A CHEMOTHERAPY-INDUCED NEUROPATHY (HCC): Status: ACTIVE | Noted: 2023-01-23

## 2023-01-24 NOTE — PROGRESS NOTES
[facility] [place] [address] [facility]  Wilberto Talbot: [phone]  F: [phone]      Reason for Visit:   Sony Barr is a 79 y.o. female who is seen for follow up of pancreatic adenocarcinoma. Hematology/Oncology Treatment History:     Diagnosis: Pancreatic adenocarcinoma    Stage: clinical Stage Ib [T2N0] at diagnosis; Stage III [[id number]] at surgery    Pathology:   -9/26/21 Cytology - brushings from common bile duct: Reactive glandular epithelial cells and bile   -9/28/21 pancreatic head mass biopsy: Adenocarcinoma.   -10/22/21 [facility] mult-cancer panel -negative   -2/23/22 Pancreaticoduodenectomy (Whipple resection): Ductal adenocarcinoma, G2, 1.2cm. Tumor invades peripancreatic soft tissues. LVI negative. PNI present. Margins uninvolved. 5/29 LNs involved with cancer.   y pT1cN2     -Tempus genomic variants: TP53 (0.3%), CDKN2A (10.4%), KRAS p.G12D missense variant exon 2 (8.4%), BOB. No germline pathogenic variants.   -Clinical trials: Trial of Ulixertinib in combo with hydroxychloroquine in patients with advanced GI malignancies ([id number]). Phase II [place], [place] for KRAS. pG12D mutation. Prior Treatment:   1. Neoadjuvant FOLFIRNOX x 6 cycles, 10/18/21-1/10/22  2. PYLORUS PRESERVING WHIPPLE PROCEDURE AND PORTAL LYPHMADENECTOMY, 2/23/22  3. Adjuvant FOLFIRNOX x 6 cycles, 4/12/22 - 7/5/22. Current Treatment: Surveillance     History of Present Illness:   Sony Barr is a pleasant 79 y.o. female who comes in for evaluation of pancreatic cancer. She presented in 9/2021 with obstructive jaundice, transaminitis. ERCP on 9/26/21 notable for distal CBD stricture; possible tumor/mass located in the CBD; underwent biliary stent placement to relieve biliary obstruction. CT A/P revealed mild/mod intrahepatic biliary ductal dilatation; prominence of CBD and hydropic gallbladder, suggestive of stricture/stenosis/mass of distal CBD.  MRI of abdomen showed narrowing of CBD pancreatic head suggestive of extrinisic compression concerning for periampullary mass vs eccentric intraluminal lesion. EUS showed 3.2cm hypoechoic mass in pancreas head. Biopsy revealed adenocarcinoma. Pt met with Dr. Alexandra Alcantar in [place] and plans for neoadjuvant chemotherapy prior to surgery. Interval history:  Patient here for follow up and surveillance of pancreas cancer. Tempus liquid biopsy resulted. Completed CT imaging. Denies cough, SOB, fevers or chills. Reports she has daily formed BM of a moderate to large amount. Not requiring laxatives or stool softeners. Reports she did feel constipated around time of CT imaging. Good appetite and weight is stable. Good energy level. Still working full time. No abdominal pain. Present with . PMHx: OA and Rheumatoid arthritis in lower back, Ulcerative colitis, HTN, Hypothyroidism  PSurgHx: , Cyst removed from left breast  SHx: , has 3 children (1 daughter and 2 sons). Works as  in [employer]. Nonsmoker, no EtOH.  in rehab recovering from [facility]. FHx: Mother  of pancreatic cancer age 80. Brother and son had colon cancer. Review of Systems: A complete review of systems was obtained, reviewed. Pertinent findings reviewed above. Physical Exam:     Visit Vitals  /79   Pulse 80   Temp 98 °F (36.7 °C)   Resp 18   Ht 5' 4\" (1.626 m)   Wt 172 lb (78 kg)   SpO2 100%   BMI 29.52 kg/m²       ECOG PS: 1  General: no distress  Eyes: anicteric sclerae  HENT: oropharynx clear  Neck: supple  Lymphatic: deferred today   Respiratory: CTAB, normal respiratory effort  CV: no peripheral edema, port upper chest.   GI: soft, nontender, nondistended, no masses; Midline surgical scar.    Skin: no rashes; no ecchymoses; no petechiae  Neuro: alert and oriented      Results:     Lab Results   Component Value Date/Time    WBC 4.8 2023 01:13 PM    HGB 10.1 (L) 2023 01:13 PM    HCT 31.2 (L) 03/06/2023 01:13 PM    PLATELET 284 77/54/7969 01:13 PM    MCV 97.8 03/06/2023 01:13 PM    ABS. NEUTROPHILS 1.1 (L) 03/06/2023 01:13 PM    Hemoglobin (POC) 12.6 10/06/2013 08:45 AM    Hematocrit (POC) 37 10/06/2013 08:45 AM     Lab Results   Component Value Date/Time    Sodium 138 01/16/2023 01:00 PM    Potassium 3.8 01/16/2023 01:00 PM    Chloride 109 (H) 01/16/2023 01:00 PM    CO2 26 01/16/2023 01:00 PM    Glucose 88 01/16/2023 01:00 PM    BUN 13 01/16/2023 01:00 PM    Creatinine 0.77 01/16/2023 01:00 PM    GFR est AA >60 07/18/2022 07:43 AM    GFR est non-AA >60 07/18/2022 07:43 AM    Calcium 8.6 01/16/2023 01:00 PM    Sodium (POC) 143 10/06/2013 08:45 AM    Potassium (POC) 3.4 (L) 10/06/2013 08:45 AM    Chloride (POC) 104 10/06/2013 08:45 AM    Glucose (POC) 84 03/02/2022 03:51 PM    BUN (POC) 11 10/06/2013 08:45 AM    Creatinine (POC) 1.0 10/06/2013 08:45 AM    Calcium, ionized (POC) 1.27 10/06/2013 08:45 AM     Lab Results   Component Value Date/Time    Bilirubin, total 0.5 01/16/2023 01:00 PM    ALT (SGPT) 47 01/16/2023 01:00 PM    Alk. phosphatase 163 (H) 01/16/2023 01:00 PM    Protein, total 7.0 01/16/2023 01:00 PM    Albumin 3.1 (L) 01/16/2023 01:00 PM    Globulin 3.9 01/16/2023 01:00 PM     Lab Results   Component Value Date/Time    Reticulocyte count 2.0 03/15/2022 09:38 AM    Iron % saturation 13 (L) 03/15/2022 09:38 AM    TIBC 351 03/15/2022 09:38 AM    Ferritin 177 03/15/2022 09:38 AM    Vitamin B12 1,155 (H) 06/06/2022 08:14 AM    Folate 11.8 03/15/2022 09:38 AM    Sed rate (ESR) 17 09/16/2010 12:04 PM    C-Reactive protein <0.29 01/18/2017 08:45 AM    TSH 2.84 04/12/2022 07:50 AM    ALBA, Direct Detected (A) 09/16/2010 12:04 PM    Lipase 1,214 (H) 09/29/2021 04:10 AM    Hep C virus Ab Interp.  NONREACTIVE 09/24/2021 06:55 PM     Lab Results   Component Value Date/Time    CEA 3.5 09/25/2021 11:46 AM    Carbohydrate Antigen 19-9, (CA 19-9) 33 01/16/2023 01:00 PM       10/18/21 CA 19-9: 138  1/10/22 CA 19-9: 130   4/12/22 CA 19-9: 16   7/5/22 CA 19-9: 19  10/10/22: CA 19-9: 40   1/16/23: CA 19-9: 33     Imaging / Procedures:     9/24/21 US ABD   IMPRESSION  Cholelithiasis. Gallbladder sludge. Prominent CBD with mild intrahepatic ductal dilatation as well. Nonemergent MRI with MRCP to delineate etiology for CBD distention. 9/24/21 CT ABD PELV W CONT  There is mild/moderate intrahepatic biliary ductal dilatation, prominence of the CBD and a hydropic gallbladder. No pancreatic duct dilatation. No clearly  delineated pancreatic head mass. Imaging findings suggestive of stricture/stenosis/mass of the distal CBD, no extrinsic pancreatic head mass is  identified. Gastroenterology consult  Correlation with MRI/MRCP on a nonemergent basis. There is severe degenerative change of the lumbar spine. 9/25/21 MRI ABD WO CONT  IMPRESSION  1. Intrahepatic and extra hepatic biliary dilatation with abrupt narrowing of  the common bile duct pancreatic head just proximal to the ampulla. Suggestion of  extrinsic compression on the duct. This raises the possibility of a  periampullary mass. Alternatively, this may be an eccentric intraluminal lesion. Evaluation is limited. Recommend GI consultation for possible ERCP and EUS. 2.  Questionable small lesion in the left hepatic lobe with mild increased T2  signal and T1 hypointensity. Recommend follow-up imaging a contrast-enhanced  study preferably MRI. 3.  Cholelithiasis. Distended gallbladder with mild gallbladder wall thickening. Correlate for acute cholecystitis    9/26/21 XR ERCP/ERCB / Dr. Yanique Sandoval  Impression: Biliary ductal dilation, with a maximum common duct diameter of 15 mm; Severe stenosis, involving distal CBD /pancreas head area concerning for neoplasia  Interventions: Endoscopic ampullary sphincterotomy and biliary stent placement; brushings from the CBD    9/27/21 CT CHEST W CONT:   IMPRESSION  1.  There is a minimal right pleural effusion. 2. There are small pulmonary nodules present. There is a nodule in the plane of  the right major fissure and posteriorly in left lower lobe. These were not  present on examination of 1/18/2017. These could be on the basis of metastatic  disease. Close follow-up is suggested. There are also 2 small subpleural nodules  anteriorly in the right upper lobe as described above which can also be  evaluated on follow-up. 9/28/21 EUS:  Endoscopic: Esophagus:normal; Stomach: normal; Duodenum/jejunum: normal and protruding biliary stent  Ultrasound: Esophagus: normal findings;     Stomach: normal findings:     Pancreas: Areas examined: the entire gland                          Parenchyma: -Evidence of a hypoechoic mass with poorly defined borders seen in the head of the pancreas. It appeared involving the CBD where a stent is seen, however it did not involve the major vessels. It measured about 3.2 cm in widest diameter on one view. Pancreatic Duct: normal findings, not dilated               Liver: Parenchyma: normal                          Gallbladder: normal                          Bile Duct: the common bile duct is dilated in the proximal and mid portion and a plastic stent could be seen                          Lymph Node: no adenopathy  Impression:   Pancreas head mass with fine needle biopsy showing adenocarcinoma on preliminary cytology  Recommendations: Follow-up on pathology  Follow-up with oncology, will need further evaluation of pulmonary nodule  Surgical oncology consultation  Resume GI lite diet today and can discharge in am from GI standpoint    12/20/2021:  MRI abd w wo cont:  IMPRESSION  1. Positive response to therapy. Decreased size of the pancreatic head mass, which now has a maximum AP diameter of 2.3 cm. No obstruction of the biliary tree or pancreatic duct. No involvement of the SMA. Based on imaging, patient is a candidate for resection. 2. Cholelithiasis.  No acute cholecystitis or biliary obstruction. MRI Pelv w wo cont:  IMPRESSION  No acute process or evidence of malignant neoplasm/ metastatic disease. CT chest w cont:  IMPRESSION  1. Interval apparent resolution of previously seen scattered subcentimeter bilateral pulmonary nodules and right pleural effusion. 2.  Please see separately dictated reports for the MRI abdomen and pelvis obtained the same day for subdiaphragmatic findings. 6/1/22 colonoscopy: The perianal and digital rectal examinations were normal. The terminal ileum appeared normal. Biopsies were taken. The colon (entire examined portion) appeared normal. Small non-bleeding internal hemorrhoids were found during retroflexion (ileium biopsy small intestinal mucosa with lymphoid aggregates, negative for active inflammation; colon bx-colonic mucosa with lymphoid aggregates)    10/13/22 CT ch/abd/pelv:   IMPRESSION  Postsurgical changes from Whipple procedure. No evidence of local recurrence or  metastatic disease. Hepatic steatosis. 1/9/2023 CT ch/abd/pelv:  LUNGS: Numerous new pulmonary nodules are seen bilaterally measuring up to 6 mm,  such as 5 mm right upper lobe (series 3 image 16), right lower lobe (image 40),  left upper lobe (image 20). No focal consolidations. LIVER: Diffuse hepatic steatosis. Unremarkable hepaticojejunostomy. No biliary  dilatation. PANCREAS: Status post Whipple. Residual pancreas is within normal limits. IMPRESSION  1. Status post Whipple procedure. 2.  New subcentimeter pulmonary nodules bilaterally, concerning for metastasis. 3.  No intra-abdominal recurrence or metastasis. 2/27/23 CT ch/abd/pelv:  PLEURA/LUNGS[de-identified] Scattered pulmonary nodules are redemonstrated. 3-15 right upper lobe 5.5 mm previously 3.6 mm. 3-23 right lower lobe 6.2 mm not  previously demonstrated. Lingular nodule on the left at 3-20 4 mm in size new. COLON AND SMALL BOWEL: Fecal stasis is moderate to severe.  There is no free  intraperitoneal air. There is no evidence of incarceration or obstruction. No  mesenteric adenopathy. IMPRESSION  Imaging findings consistent with mild interval progression of metastatic  disease. Pulmonary nodules are new and/or increased in size compared to the previous  study. No acute intraperitoneal/intrathoracic process is identified. Please see above for additional nonemergent incidental findings. Assessment/Recommendations:   79 y.o. female with Ulcerative colitis, Hyopthyroidism, admitted with obstructive jaundice concerning for underlying malignant obstructing mass. 1. Pancreatic adenocarcinoma:  Clinically stage Ib [T2N0], but need further evaluation in future regarding pulmonary nodules and liver lesion. Presented with obstructive jaundice and transaminitis, CA 19-9 was 198 in 9/2021 at diagnosis. Underwent biliary stent placement with improvement in biliary obstruction. 3.2cm mass seen in pancreatic head on EUS, not involving vasculature, possibly resectable. Dr. Rick Andrews in [place] has evaluated patient and recommends consideration of neoadjuvant chemotherapy with close attention to lung nodules and liver lesion. I agree with this recommendation for earlier treatment of micrometastatic disease, ability to determine whether pt has chemosensitive disease. We discussed the risks and benefits of FOLFIRINOX chemotherapy, including potential side effects. These include but are not limited to fatigue, nausea vomiting, diarrhea, neuropathy, taste changes, cold intolerance, esophageal spasm, allergic reactions, alopecia, mucositis, myelosuppression, risk for infection, infertility, and rarely, death. Rarely, a patient may have a condition where they do not metabolize fluorouracil appropriately (called DPD deficiency), and they may have excessive toxicity. A Port-A-Cath will be required in order to deliver the continuous infusion. BRCA 1/2 negative.  The patient has consented to beginning therapy. Pt completed 6 cycles of mFOLFIRINOX in neoadjuvant setting, followed by pancreaticoduodenectomy (Whipple resection) on 2/23/22. Completed 6 cycles of adjuvant chemotherapy. NCCN surveillance: H&P, CA 19-9, chest CT and CT or MRI abd/pelv with contrast every 3-6 mo 2 y, then every 6-12 mo as clinically indicated. Short term follow up chest CT 2/27/23 shows pulmonary nodules which are increased compared to 10/2022, but stable since 1/2023. Given pulmonary nodules are too small to biopsy, will treat with antibiotics and repeat CT in 10 weeks. -- Port flush every 12 weeks. -- Tempus genomic variants: TP53 (0.3%), no treatment options, BOB  -- Repeat chest CT in 10 weeks (5/8/23)   -- Return in 11 weeks labs, port flush, MD visit, review CT of chest.     2. Pulmonary nodules:  New on recent imaging and concerning for metastatic disease. Reviewed with IR and too small to biopsy. -- Trial of doxycycline 100mg BID x 7 days. 3. HTN:   Well managed on HCTZ. 4. H/o Ulcerative procto-sigmoiditis:  April 2017 colonoscopy with one adenoma polyp removed. Repeat colonoscopy was normal on 6/1/22. Repeat 10 years for screening. 5. Normocytic anemia:  Improving. B12/folate/ferritin normal. Mildly low iron sat. Monitor. 6. Hypokalemia / moderate fecal stasis:   On KCL 10 meq daily for hypokalemia likely due to intermittent diarrhea, HCTZ. Fecal stasis on recent imaging, but now having daily soft formed stools. -- Advised prn stool softeners, miralax if no BM > 24 hours. 7. Chemotherapy induced neuropathy:  Grade 1. Present in feet and stable. Monitor. 8. Neutropenia:   Intermittent. No fevers or recent infections. Will continue monitor. I personally saw and evaluated the patient and performed the key components of medical decision making. The history, physical exam, and documentation were performed by Wendy Crabtree NP.   I reviewed and verified the above documentation and modified it as needed. Pt is actually doing quite well on surveillance, eating and drinking weight, gained a few lbs, staying active, no abdominal pain or cough. Will treat with a empiric antibiotics in case the pulmonary nodules represent an atypical infection. Repeat chest CT in 8-10 weeks.      Signed By: Farhat Kern MD

## 2023-02-06 ENCOUNTER — APPOINTMENT (OUTPATIENT)
Dept: INFUSION THERAPY | Age: 68
End: 2023-02-06
Payer: MEDICARE

## 2023-02-07 ENCOUNTER — OFFICE VISIT (OUTPATIENT)
Dept: SURGERY | Age: 68
End: 2023-02-07
Payer: MEDICARE

## 2023-02-07 VITALS
OXYGEN SATURATION: 98 % | HEART RATE: 87 BPM | DIASTOLIC BLOOD PRESSURE: 79 MMHG | HEIGHT: 64 IN | WEIGHT: 172.2 LBS | BODY MASS INDEX: 29.4 KG/M2 | TEMPERATURE: 98.4 F | SYSTOLIC BLOOD PRESSURE: 135 MMHG

## 2023-02-07 DIAGNOSIS — C25.9 PANCREATIC ADENOCARCINOMA (HCC): ICD-10-CM

## 2023-02-07 DIAGNOSIS — K43.2 INCISIONAL HERNIA, WITHOUT OBSTRUCTION OR GANGRENE: Primary | ICD-10-CM

## 2023-02-07 PROCEDURE — 99024 POSTOP FOLLOW-UP VISIT: CPT | Performed by: SURGERY

## 2023-02-07 NOTE — PROGRESS NOTES
Identified pt with two pt identifiers (name and ). Reviewed chart in preparation for visit and have obtained necessary documentation. Rosette Christian is a 79 y.o. female  Chief Complaint   Patient presents with    Surgical Follow-up     2 mth follow up from 2022 s/p Incisional hernia repair 2022 and Hx of pancreatic adenocarcinoma      Visit Vitals  /79 (BP 1 Location: Left upper arm, BP Patient Position: Sitting, BP Cuff Size: Large adult)   Pulse 87   Temp 98.4 °F (36.9 °C) (Oral)   Ht 5' 4\" (1.626 m)   Wt 172 lb 3.2 oz (78.1 kg)   SpO2 98%   BMI 29.56 kg/m²       1. Have you been to the ER, urgent care clinic since your last visit? Hospitalized since your last visit? No    2. Have you seen or consulted any other health care providers outside of the [address] [place] [address] since your last visit? Include any pap smears or colon screening. No    Pt reports rt sided pain 10/10 at times but not constant. Denies any nausea or vomiting normal stools but does have increased gas.

## 2023-02-07 NOTE — LETTER
2/9/2023    Patient: Winston Rodrigues   YOB: 1955   Date of Visit: 2/7/2023     Estuardo Ernandez MD  [address] [facility] [address]  [address] [facility]    Dear Estuardo Ernandez MD,      Thank you for referring Ms. Celina Goetz to [facility] for evaluation. My notes for this consultation are attached. If you have questions, please do not hesitate to call me. I look forward to following your patient along with you.       Sincerely,    Inez Stevens MD

## 2023-02-09 NOTE — PROGRESS NOTES
[facility]      Clinic Note - Follow up    [address] Branham returns for scheduled follow up today. She is well-known to me for history of pancreatic adenocarcinoma in the head of the pancreas. After completing 6 cycles of neoadjuvant chemotherapy, she underwent a pylorus-preserving Whipple procedure with portal lymphadenectomy on 2/23/2022. Her final pathology showed moderately differentiated pancreatic adenocarcinoma, [id number], margins negative. She completed postoperative chemotherapy and is currently JAUN. She developed an incisional hernia at the inferior aspect of her midline incision. She is now s/p laparoscopic incisional hernia repair with mesh on 11/23/2022. The patient complains of some pain to the right of her mid abdomen at the site of one of her transfascial fixation stitches. The pain is intermittent and is a sharp, short-lived stabbing pain. She is otherwise feeling well. She is eating normally and having good bowel function. She has not noted any signs of recurrence of her hernia. She recently had a CT scan that showed some new small pulmonary nodules that are suspicious but also recently had a respiratory infection. She has short interval follow-up CT imaging planned to further evaluate this. The patient denies any changes to the [facility], PSHx, SHx or FHx since their last visit except as mentioned above. ROS is negative except as mentioned in the HPI. Objective     Visit Vitals  /79 (BP 1 Location: Left upper arm, BP Patient Position: Sitting, BP Cuff Size: Large adult)   Pulse 87   Temp 98.4 °F (36.9 °C) (Oral)   Ht 5' 4\" (1.626 m)   Wt 172 lb 3.2 oz (78.1 kg)   SpO2 98%   BMI 29.56 kg/m²         PE  GEN - Awake, alert, communicating appropriately. NAD  Pulm - CTAB  CV - RRR  Abd - soft, NT, ND. The transfascial suture to the right of her hernia repair is palpable with some point tenderness on deep palpation.   No signs of hernia recurrence. Incisions well-healed. No signs of infection. Ext - warm, well perfused, no edema. All other systems negative unless indicated above. Labs  None    Imaging  CT Results (most recent):  Results from Hospital Encounter encounter on 01/09/23    CT CHEST ABD PELV W CONT    Narrative  EXAM: CT CHEST ABD PELV W CONT    INDICATION: h/o pancreatic ca on surveillance    COMPARISON: 10/13/2022    IV CONTRAST: 100 mL of Isovue-370. ORAL CONTRAST: Oral contrast was administered to better evaluate the bowel. TECHNIQUE:  Following the uneventful intravenous administration of contrast, thin axial  images were obtained through the chest, abdomen and pelvis. Coronal and sagittal  reformats were generated. CT dose reduction was achieved through use of a  standardized protocol tailored for this examination and automatic exposure  control for dose modulation. FINDINGS:    CHEST WALL: No mass or axillary lymphadenopathy. Right IJ approach Port-A-Cath  extends into the SVC. THYROID: No nodule. MEDIASTINUM: No mass or lymphadenopathy. EDVIN: No mass or lymphadenopathy. THORACIC AORTA: No dissection or aneurysm. MAIN PULMONARY ARTERY: Normal in caliber. TRACHEA/BRONCHI: Patent. ESOPHAGUS: No wall thickening or dilatation. HEART: Normal in size. Coronary artery calcium: absent  PLEURA: No effusion or pneumothorax. LUNGS: Numerous new pulmonary nodules are seen bilaterally measuring up to 6 mm,  such as 5 mm right upper lobe (series 3 image 16), right lower lobe (image 40),  left upper lobe (image 20). No focal consolidations. LIVER: Diffuse hepatic steatosis. Unremarkable hepaticojejunostomy. No biliary  dilatation. BILIARY TREE: Gallbladder is surgically absent. SPLEEN: within normal limits. PANCREAS: Status post Whipple. Residual pancreas is within normal limits. ADRENALS: Unremarkable. KIDNEYS: No solid mass, calculus, or hydronephrosis.   STOMACH: Distal gastrectomy and gastrojejunostomy, grossly unremarkable. SMALL BOWEL: No dilatation or wall thickening. COLON: No dilatation or wall thickening. APPENDIX: Not visualized. PERITONEUM: No ascites. No pneumoperitoneum. Mildly prominent nonenlarged  mesenteric lymph nodes are seen throughout the abdomen. RETROPERITONEUM: No lymphadenopathy or aortic aneurysm. REPRODUCTIVE ORGANS: Unremarkable. URINARY BLADDER: No mass or calculus. BONES: No destructive bone lesion. ABDOMINAL WALL: Resolution of previously seen incisional hernia. ADDITIONAL COMMENTS: N/A    Impression  1. Status post Whipple procedure. 2.  New subcentimeter pulmonary nodules bilaterally, concerning for metastasis. 3.  No intra-abdominal recurrence or metastasis. 23X      Assessment     Ramiro Goldmann is a 79 y. o.yr old female well-known to me for history of pancreatic adenocarcinoma in the head of the pancreas. After completing 6 cycles of neoadjuvant chemotherapy, she underwent a pylorus-preserving Whipple procedure with portal lymphadenectomy on 2/23/2022. Her final pathology showed moderately differentiated pancreatic adenocarcinoma, [id number], margins negative. She completed postoperative chemotherapy and is currently JAUN. She developed an incisional hernia at the inferior aspect of her midline incision. She is now s/p laparoscopic incisional hernia repair with mesh on 11/23/2022. Plan     I think the pain is related to her transfascial suture. This should improve over time but if it continues to bother her, the suture can be surgically removed. I do not think this would compromise her hernia repair at this point. The patient will let me know if it continues to be an issue. She has follow-up CT imaging with Dr. Sudhakar Alvarado in 6 weeks and follow-up with me in 4 more months.         Vesta Steele MD  2/7/2023    CC: MD Dr. Sigifredo Olivia

## 2023-02-17 DIAGNOSIS — I10 HYPERTENSION, UNSPECIFIED TYPE: ICD-10-CM

## 2023-02-17 NOTE — TELEPHONE ENCOUNTER
Requested Prescriptions     Pending Prescriptions Disp Refills    hydroCHLOROthiazide (HYDRODIURIL) 12.5 mg tablet 90 Tablet 0     Sig: Take 1 Tablet by mouth daily for 90 days. levothyroxine (SYNTHROID) 125 mcg tablet 90 Tablet 1     Sig: Take 1 Tablet by mouth Daily (before breakfast).       Verified pharmacy

## 2023-02-20 RX ORDER — LEVOTHYROXINE SODIUM 125 UG/1
125 TABLET ORAL
Qty: 90 TABLET | Refills: 1 | Status: SHIPPED | OUTPATIENT
Start: 2023-02-20

## 2023-02-20 RX ORDER — HYDROCHLOROTHIAZIDE 12.5 MG/1
12.5 TABLET ORAL DAILY
Qty: 90 TABLET | Refills: 1 | Status: SHIPPED | OUTPATIENT
Start: 2023-02-20 | End: 2023-05-21

## 2023-02-27 ENCOUNTER — HOSPITAL ENCOUNTER (OUTPATIENT)
Dept: CT IMAGING | Age: 68
Discharge: HOME | End: 2023-02-27
Attending: NURSE PRACTITIONER
Payer: MEDICARE

## 2023-02-27 DIAGNOSIS — R91.8 PULMONARY NODULES: ICD-10-CM

## 2023-02-27 DIAGNOSIS — C25.9 PANCREATIC ADENOCARCINOMA (HCC): ICD-10-CM

## 2023-02-27 PROCEDURE — 74177 CT ABD & PELVIS W/CONTRAST: CPT

## 2023-02-27 PROCEDURE — 74011000636 HC RX REV CODE- 636: Performed by: NURSE PRACTITIONER

## 2023-02-27 RX ADMIN — IOPAMIDOL 90 ML: 755 INJECTION, SOLUTION INTRAVENOUS at 16:08

## 2023-03-06 ENCOUNTER — OFFICE VISIT (OUTPATIENT)
Dept: ONCOLOGY | Age: 68
End: 2023-03-06
Payer: MEDICARE

## 2023-03-06 ENCOUNTER — HOSPITAL ENCOUNTER (OUTPATIENT)
Dept: INFUSION THERAPY | Age: 68
Discharge: HOME | End: 2023-03-06
Payer: MEDICARE

## 2023-03-06 VITALS
TEMPERATURE: 98 F | HEART RATE: 80 BPM | DIASTOLIC BLOOD PRESSURE: 79 MMHG | WEIGHT: 174.69 LBS | BODY MASS INDEX: 29.82 KG/M2 | HEIGHT: 64 IN | SYSTOLIC BLOOD PRESSURE: 127 MMHG | RESPIRATION RATE: 18 BRPM | OXYGEN SATURATION: 100 %

## 2023-03-06 VITALS
OXYGEN SATURATION: 100 % | BODY MASS INDEX: 29.37 KG/M2 | SYSTOLIC BLOOD PRESSURE: 127 MMHG | HEIGHT: 64 IN | RESPIRATION RATE: 18 BRPM | HEART RATE: 80 BPM | TEMPERATURE: 98 F | DIASTOLIC BLOOD PRESSURE: 79 MMHG | WEIGHT: 172 LBS

## 2023-03-06 DIAGNOSIS — R91.8 PULMONARY NODULES: ICD-10-CM

## 2023-03-06 DIAGNOSIS — E86.0 DEHYDRATION: Primary | ICD-10-CM

## 2023-03-06 DIAGNOSIS — K59.04 CHRONIC IDIOPATHIC CONSTIPATION: ICD-10-CM

## 2023-03-06 DIAGNOSIS — C25.9 PANCREATIC ADENOCARCINOMA (HCC): Primary | ICD-10-CM

## 2023-03-06 DIAGNOSIS — C25.0 MALIGNANT NEOPLASM OF HEAD OF PANCREAS (HCC): ICD-10-CM

## 2023-03-06 DIAGNOSIS — E87.6 HYPOKALEMIA: ICD-10-CM

## 2023-03-06 DIAGNOSIS — D64.9 NORMOCYTIC ANEMIA: ICD-10-CM

## 2023-03-06 LAB
ALBUMIN SERPL-MCNC: 3.4 G/DL (ref 3.5–5)
ALBUMIN/GLOB SERPL: 0.8 (ref 1.1–2.2)
ALP SERPL-CCNC: 161 U/L (ref 45–117)
ALT SERPL-CCNC: 37 U/L (ref 12–78)
ANION GAP SERPL CALC-SCNC: 2 MMOL/L (ref 5–15)
AST SERPL-CCNC: 30 U/L (ref 15–37)
BASOPHILS # BLD: 0 K/UL (ref 0–0.1)
BASOPHILS NFR BLD: 0 % (ref 0–1)
BILIRUB SERPL-MCNC: 0.8 MG/DL (ref 0.2–1)
BUN SERPL-MCNC: 13 MG/DL (ref 6–20)
BUN/CREAT SERPL: 14 (ref 12–20)
CALCIUM SERPL-MCNC: 9.3 MG/DL (ref 8.5–10.1)
CHLORIDE SERPL-SCNC: 110 MMOL/L (ref 97–108)
CO2 SERPL-SCNC: 26 MMOL/L (ref 21–32)
CREAT SERPL-MCNC: 0.91 MG/DL (ref 0.55–1.02)
DIFFERENTIAL METHOD BLD: ABNORMAL
EOSINOPHIL # BLD: 0.1 K/UL (ref 0–0.4)
EOSINOPHIL NFR BLD: 3 % (ref 0–7)
ERYTHROCYTE [DISTWIDTH] IN BLOOD BY AUTOMATED COUNT: 14.8 % (ref 11.5–14.5)
GLOBULIN SER CALC-MCNC: 4.1 G/DL (ref 2–4)
GLUCOSE SERPL-MCNC: 89 MG/DL (ref 65–100)
HCT VFR BLD AUTO: 31.2 % (ref 35–47)
HGB BLD-MCNC: 10.1 G/DL (ref 11.5–16)
IMM GRANULOCYTES # BLD AUTO: 0 K/UL (ref 0–0.04)
IMM GRANULOCYTES NFR BLD AUTO: 0 % (ref 0–0.5)
LYMPHOCYTES # BLD: 3.1 K/UL (ref 0.8–3.5)
LYMPHOCYTES NFR BLD: 63 % (ref 12–49)
MCH RBC QN AUTO: 31.7 PG (ref 26–34)
MCHC RBC AUTO-ENTMCNC: 32.4 G/DL (ref 30–36.5)
MCV RBC AUTO: 97.8 FL (ref 80–99)
MONOCYTES # BLD: 0.6 K/UL (ref 0–1)
MONOCYTES NFR BLD: 12 % (ref 5–13)
NEUTS SEG # BLD: 1.1 K/UL (ref 1.8–8)
NEUTS SEG NFR BLD: 22 % (ref 32–75)
NRBC # BLD: 0 K/UL (ref 0–0.01)
NRBC BLD-RTO: 0 PER 100 WBC
PLATELET # BLD AUTO: 240 K/UL (ref 150–400)
PMV BLD AUTO: 10.6 FL (ref 8.9–12.9)
POTASSIUM SERPL-SCNC: 3.5 MMOL/L (ref 3.5–5.1)
PROT SERPL-MCNC: 7.5 G/DL (ref 6.4–8.2)
RBC # BLD AUTO: 3.19 M/UL (ref 3.8–5.2)
SODIUM SERPL-SCNC: 138 MMOL/L (ref 136–145)
WBC # BLD AUTO: 4.8 K/UL (ref 3.6–11)

## 2023-03-06 PROCEDURE — 99214 OFFICE O/P EST MOD 30 MIN: CPT | Performed by: INTERNAL MEDICINE

## 2023-03-06 PROCEDURE — 3017F COLORECTAL CA SCREEN DOC REV: CPT | Performed by: INTERNAL MEDICINE

## 2023-03-06 PROCEDURE — 1101F PT FALLS ASSESS-DOCD LE1/YR: CPT | Performed by: INTERNAL MEDICINE

## 2023-03-06 PROCEDURE — 3074F SYST BP LT 130 MM HG: CPT | Performed by: INTERNAL MEDICINE

## 2023-03-06 PROCEDURE — 74011250636 HC RX REV CODE- 250/636: Performed by: NURSE PRACTITIONER

## 2023-03-06 PROCEDURE — 74011000250 HC RX REV CODE- 250: Performed by: NURSE PRACTITIONER

## 2023-03-06 PROCEDURE — 3078F DIAST BP <80 MM HG: CPT | Performed by: INTERNAL MEDICINE

## 2023-03-06 PROCEDURE — 85025 COMPLETE CBC W/AUTO DIFF WBC: CPT

## 2023-03-06 PROCEDURE — 80053 COMPREHEN METABOLIC PANEL: CPT

## 2023-03-06 PROCEDURE — G8510 SCR DEP NEG, NO PLAN REQD: HCPCS | Performed by: INTERNAL MEDICINE

## 2023-03-06 PROCEDURE — G8400 PT W/DXA NO RESULTS DOC: HCPCS | Performed by: INTERNAL MEDICINE

## 2023-03-06 PROCEDURE — G8417 CALC BMI ABV UP PARAM F/U: HCPCS | Performed by: INTERNAL MEDICINE

## 2023-03-06 PROCEDURE — 36591 DRAW BLOOD OFF VENOUS DEVICE: CPT

## 2023-03-06 PROCEDURE — 77030016057 HC NDL HUBR APOL -B

## 2023-03-06 PROCEDURE — 86301 IMMUNOASSAY TUMOR CA 19-9: CPT

## 2023-03-06 PROCEDURE — G8536 NO DOC ELDER MAL SCRN: HCPCS | Performed by: INTERNAL MEDICINE

## 2023-03-06 PROCEDURE — G8427 DOCREV CUR MEDS BY ELIG CLIN: HCPCS | Performed by: INTERNAL MEDICINE

## 2023-03-06 PROCEDURE — 1123F ACP DISCUSS/DSCN MKR DOCD: CPT | Performed by: INTERNAL MEDICINE

## 2023-03-06 PROCEDURE — 1090F PRES/ABSN URINE INCON ASSESS: CPT | Performed by: INTERNAL MEDICINE

## 2023-03-06 RX ORDER — DOXYCYCLINE 100 MG/1
100 CAPSULE ORAL 2 TIMES DAILY
Qty: 14 CAPSULE | Refills: 0 | Status: SHIPPED | OUTPATIENT
Start: 2023-03-06 | End: 2023-03-13

## 2023-03-06 RX ORDER — HEPARIN 100 UNIT/ML
300-500 SYRINGE INTRAVENOUS AS NEEDED
Status: DISCONTINUED | OUTPATIENT
Start: 2023-03-06 | End: 2023-03-07 | Stop reason: HOSPADM

## 2023-03-06 RX ORDER — SODIUM CHLORIDE 9 MG/ML
10 INJECTION INTRAVENOUS AS NEEDED
OUTPATIENT
Start: 2023-05-15

## 2023-03-06 RX ORDER — HEPARIN 100 UNIT/ML
300-500 SYRINGE INTRAVENOUS AS NEEDED
OUTPATIENT
Start: 2023-05-15

## 2023-03-06 RX ORDER — SODIUM CHLORIDE 0.9 % (FLUSH) 0.9 %
10 SYRINGE (ML) INJECTION AS NEEDED
Status: DISCONTINUED | OUTPATIENT
Start: 2023-03-06 | End: 2023-03-07 | Stop reason: HOSPADM

## 2023-03-06 RX ORDER — SODIUM CHLORIDE 0.9 % (FLUSH) 0.9 %
10 SYRINGE (ML) INJECTION AS NEEDED
OUTPATIENT
Start: 2023-05-15

## 2023-03-06 RX ORDER — SODIUM CHLORIDE 9 MG/ML
10 INJECTION INTRAVENOUS AS NEEDED
Status: DISCONTINUED | OUTPATIENT
Start: 2023-03-06 | End: 2023-03-07 | Stop reason: HOSPADM

## 2023-03-06 RX ADMIN — Medication 10 ML: at 13:20

## 2023-03-06 RX ADMIN — HEPARIN 500 UNITS: 100 SYRINGE at 13:20

## 2023-03-06 NOTE — PROGRESS NOTES
Chief Complaint   Patient presents with    Follow-up     Labs, port flush       Visit Vitals  /79   Pulse 80   Temp 98 °F (36.7 °C)   Resp 18   Ht 5' 4\" (1.626 m)   Wt 172 lb (78 kg)   SpO2 100%   BMI 29.52 kg/m²

## 2023-03-06 NOTE — PROGRESS NOTES
[facility] Progress Note    Date: 2023    Name: Maria Elena Mccormick    MRN: [id number]         : 1955    Ms. oSng Johnson Arrived ambulatory and in no distress for PF/Labs. Right chest wall port accessed without difficulty, labs drawn & sent for processing. Ms. Vel Rea vitals were reviewed. Visit Vitals  /79 (BP 1 Location: Left arm, BP Patient Position: Sitting)   Pulse 80   Temp 98 °F (36.7 °C)   Resp 18   Ht 5' 4\" (1.626 m)   Wt 79.2 kg (174 lb 11 oz)   SpO2 100%   BMI 29.99 kg/m²       Lab results were obtained and reviewed. Recent Results (from the past 12 hour(s))   CBC WITH AUTOMATED DIFF    Collection Time: 23  1:13 PM   Result Value Ref Range    WBC 4.8 3.6 - 11.0 K/uL    RBC 3.19 (L) 3.80 - 5.20 M/uL    HGB 10.1 (L) 11.5 - 16.0 g/dL    HCT 31.2 (L) 35.0 - 47.0 %    MCV 97.8 80.0 - 99.0 FL    MCH 31.7 26.0 - 34.0 PG    MCHC 32.4 30.0 - 36.5 g/dL    RDW 14.8 (H) 11.5 - 14.5 %    PLATELET 033 681 - 121 K/uL    MPV 10.6 8.9 - 12.9 FL    NRBC 0.0 0  WBC    ABSOLUTE NRBC 0.00 0.00 - 0.01 K/uL    NEUTROPHILS 22 (L) 32 - 75 %    LYMPHOCYTES 63 (H) 12 - 49 %    MONOCYTES 12 5 - 13 %    EOSINOPHILS 3 0 - 7 %    BASOPHILS 0 0 - 1 %    IMMATURE GRANULOCYTES 0 0.0 - 0.5 %    ABS. NEUTROPHILS 1.1 (L) 1.8 - 8.0 K/UL    ABS. LYMPHOCYTES 3.1 0.8 - 3.5 K/UL    ABS. MONOCYTES 0.6 0.0 - 1.0 K/UL    ABS. EOSINOPHILS 0.1 0.0 - 0.4 K/UL    ABS. BASOPHILS 0.0 0.0 - 0.1 K/UL    ABS. IMM.  GRANS. 0.0 0.00 - 0.04 K/UL    DF AUTOMATED     METABOLIC PANEL, COMPREHENSIVE    Collection Time: 23  1:13 PM   Result Value Ref Range    Sodium 138 136 - 145 mmol/L    Potassium 3.5 3.5 - 5.1 mmol/L    Chloride 110 (H) 97 - 108 mmol/L    CO2 26 21 - 32 mmol/L    Anion gap 2 (L) 5 - 15 mmol/L    Glucose 89 65 - 100 mg/dL    BUN 13 6 - 20 MG/DL    Creatinine 0.91 0.55 - 1.02 MG/DL    BUN/Creatinine ratio 14 12 - 20      eGFR >60 >60 ml/min/1.73m2    Calcium 9.3 8.5 - 10.1 MG/DL    Bilirubin, total 0.8 0.2 - 1.0 MG/DL ALT (SGPT) 37 12 - 78 U/L    AST (SGOT) 30 15 - 37 U/L    Alk. phosphatase 161 (H) 45 - 117 U/L    Protein, total 7.5 6.4 - 8.2 g/dL    Albumin 3.4 (L) 3.5 - 5.0 g/dL    Globulin 4.1 (H) 2.0 - 4.0 g/dL    A-G Ratio 0.8 (L) 1.1 - 2.2         Medications:  Medications Administered       0.9% sodium chloride injection 10 mL       Admin Date  03/06/2023 Action  Given Dose  10 mL Route  IntraVENous Administered By  Amie Arenas RN              heparin (porcine) pf 300-500 Units       Admin Date  03/06/2023 Action  Given Dose  500 Units Route  InterCATHeter Administered By  Amie Arenas RN              sodium chloride (NS) flush 10 mL       Admin Date  03/06/2023 Action  Given Dose  10 mL Route  IntraVENous Administered By  Amie Arenas RN                     Ms. Charleen Smith tolerated treatment well and was discharged from [place] [address] in stable condition. Port de-accessed, flushed & heparinized per protocol. She is to return on May 15 2023 at 1300 for her next appointment.     Bridgett Bourgeois RN  March 6, 2023

## 2023-03-07 LAB — CANCER AG19-9 SERPL-ACNC: 30 U/ML (ref 0–35)

## 2023-03-13 PROBLEM — E11.9 TYPE 2 DIABETES MELLITUS (HCC): Status: RESOLVED | Noted: 2022-03-15 | Resolved: 2023-03-13

## 2023-04-24 ENCOUNTER — APPOINTMENT (OUTPATIENT)
Dept: INFUSION THERAPY | Age: 68
End: 2023-04-24
Payer: MEDICARE

## 2023-04-24 DIAGNOSIS — E87.6 HYPOKALEMIA: ICD-10-CM

## 2023-04-24 RX ORDER — POTASSIUM CHLORIDE 750 MG/1
TABLET, EXTENDED RELEASE ORAL
Qty: 90 TABLET | Refills: 0 | Status: SHIPPED | OUTPATIENT
Start: 2023-04-24

## 2023-05-02 DIAGNOSIS — R91.8 PULMONARY NODULES: Primary | ICD-10-CM

## 2023-05-08 ENCOUNTER — HOSPITAL ENCOUNTER (OUTPATIENT)
Facility: HOSPITAL | Age: 68
Discharge: HOME | End: 2023-05-11
Payer: MEDICARE

## 2023-05-08 DIAGNOSIS — C25.9 PANCREATIC ADENOCARCINOMA (HCC): ICD-10-CM

## 2023-05-08 DIAGNOSIS — R91.8 PULMONARY NODULES: ICD-10-CM

## 2023-05-08 PROCEDURE — 71260 CT THORAX DX C+: CPT

## 2023-05-08 PROCEDURE — 6360000004 HC RX CONTRAST MEDICATION: Performed by: NURSE PRACTITIONER

## 2023-05-08 RX ADMIN — IOPAMIDOL 80 ML: 755 INJECTION, SOLUTION INTRAVENOUS at 14:51

## 2023-05-15 ENCOUNTER — HOSPITAL ENCOUNTER (OUTPATIENT)
Facility: HOSPITAL | Age: 68
Setting detail: INFUSION SERIES
End: 2023-05-15
Payer: MEDICARE

## 2023-05-15 ENCOUNTER — OFFICE VISIT (OUTPATIENT)
Age: 68
End: 2023-05-15
Payer: MEDICARE

## 2023-05-15 ENCOUNTER — HOSPITAL ENCOUNTER (OUTPATIENT)
Dept: INFUSION THERAPY | Age: 68
End: 2023-05-15

## 2023-05-15 VITALS
WEIGHT: 181.2 LBS | OXYGEN SATURATION: 100 % | HEART RATE: 77 BPM | TEMPERATURE: 98.1 F | DIASTOLIC BLOOD PRESSURE: 75 MMHG | HEIGHT: 64 IN | SYSTOLIC BLOOD PRESSURE: 133 MMHG | BODY MASS INDEX: 30.93 KG/M2

## 2023-05-15 VITALS
HEART RATE: 77 BPM | RESPIRATION RATE: 16 BRPM | TEMPERATURE: 98.1 F | SYSTOLIC BLOOD PRESSURE: 133 MMHG | OXYGEN SATURATION: 100 % | WEIGHT: 181.2 LBS | HEIGHT: 64 IN | DIASTOLIC BLOOD PRESSURE: 75 MMHG | BODY MASS INDEX: 30.93 KG/M2

## 2023-05-15 DIAGNOSIS — R91.8 LUNG NODULES: ICD-10-CM

## 2023-05-15 DIAGNOSIS — E11.9 TYPE 2 DIABETES MELLITUS WITHOUT COMPLICATION, WITHOUT LONG-TERM CURRENT USE OF INSULIN (HCC): ICD-10-CM

## 2023-05-15 DIAGNOSIS — D64.9 NORMOCYTIC ANEMIA: ICD-10-CM

## 2023-05-15 DIAGNOSIS — I10 PRIMARY HYPERTENSION: ICD-10-CM

## 2023-05-15 DIAGNOSIS — C25.9 MALIGNANT NEOPLASM OF PANCREAS, UNSPECIFIED LOCATION OF MALIGNANCY (HCC): Primary | ICD-10-CM

## 2023-05-15 DIAGNOSIS — C25.9 PANCREATIC ADENOCARCINOMA (HCC): Primary | ICD-10-CM

## 2023-05-15 LAB
ALBUMIN SERPL-MCNC: 3.2 G/DL (ref 3.5–5)
ALBUMIN/GLOB SERPL: 0.8 (ref 1.1–2.2)
ALP SERPL-CCNC: 160 U/L (ref 45–117)
ALT SERPL-CCNC: 49 U/L (ref 12–78)
ANION GAP SERPL CALC-SCNC: 1 MMOL/L (ref 5–15)
AST SERPL-CCNC: 38 U/L (ref 15–37)
BASOPHILS # BLD: 0 K/UL (ref 0–0.1)
BASOPHILS NFR BLD: 0 % (ref 0–1)
BILIRUB SERPL-MCNC: 1 MG/DL (ref 0.2–1)
BUN SERPL-MCNC: 19 MG/DL (ref 6–20)
BUN/CREAT SERPL: 23 (ref 12–20)
CALCIUM SERPL-MCNC: 8.9 MG/DL (ref 8.5–10.1)
CHLORIDE SERPL-SCNC: 111 MMOL/L (ref 97–108)
CO2 SERPL-SCNC: 26 MMOL/L (ref 21–32)
CREAT SERPL-MCNC: 0.82 MG/DL (ref 0.55–1.02)
DIFFERENTIAL METHOD BLD: ABNORMAL
EOSINOPHIL # BLD: 0.1 K/UL (ref 0–0.4)
EOSINOPHIL NFR BLD: 2 % (ref 0–7)
ERYTHROCYTE [DISTWIDTH] IN BLOOD BY AUTOMATED COUNT: 14.2 % (ref 11.5–14.5)
GLOBULIN SER CALC-MCNC: 3.8 G/DL (ref 2–4)
GLUCOSE SERPL-MCNC: 91 MG/DL (ref 65–100)
HCT VFR BLD AUTO: 30.6 % (ref 35–47)
HGB BLD-MCNC: 10.1 G/DL (ref 11.5–16)
IMM GRANULOCYTES # BLD AUTO: 0 K/UL (ref 0–0.04)
IMM GRANULOCYTES NFR BLD AUTO: 0 % (ref 0–0.5)
LYMPHOCYTES # BLD: 3.2 K/UL (ref 0.8–3.5)
LYMPHOCYTES NFR BLD: 65 % (ref 12–49)
MCH RBC QN AUTO: 32.4 PG (ref 26–34)
MCHC RBC AUTO-ENTMCNC: 33 G/DL (ref 30–36.5)
MCV RBC AUTO: 98.1 FL (ref 80–99)
MONOCYTES # BLD: 0.6 K/UL (ref 0–1)
MONOCYTES NFR BLD: 13 % (ref 5–13)
NEUTS SEG # BLD: 1 K/UL (ref 1.8–8)
NEUTS SEG NFR BLD: 20 % (ref 32–75)
NRBC # BLD: 0 K/UL (ref 0–0.01)
NRBC BLD-RTO: 0 PER 100 WBC
PLATELET # BLD AUTO: 202 K/UL (ref 150–400)
PMV BLD AUTO: 10.4 FL (ref 8.9–12.9)
POTASSIUM SERPL-SCNC: 3.5 MMOL/L (ref 3.5–5.1)
PROT SERPL-MCNC: 7 G/DL (ref 6.4–8.2)
RBC # BLD AUTO: 3.12 M/UL (ref 3.8–5.2)
RBC MORPH BLD: ABNORMAL
SODIUM SERPL-SCNC: 138 MMOL/L (ref 136–145)
WBC # BLD AUTO: 4.9 K/UL (ref 3.6–11)

## 2023-05-15 PROCEDURE — 1090F PRES/ABSN URINE INCON ASSESS: CPT | Performed by: INTERNAL MEDICINE

## 2023-05-15 PROCEDURE — 3075F SYST BP GE 130 - 139MM HG: CPT | Performed by: INTERNAL MEDICINE

## 2023-05-15 PROCEDURE — 99214 OFFICE O/P EST MOD 30 MIN: CPT | Performed by: INTERNAL MEDICINE

## 2023-05-15 PROCEDURE — 2022F DILAT RTA XM EVC RTNOPTHY: CPT | Performed by: INTERNAL MEDICINE

## 2023-05-15 PROCEDURE — 1036F TOBACCO NON-USER: CPT | Performed by: INTERNAL MEDICINE

## 2023-05-15 PROCEDURE — G8427 DOCREV CUR MEDS BY ELIG CLIN: HCPCS | Performed by: INTERNAL MEDICINE

## 2023-05-15 PROCEDURE — 85025 COMPLETE CBC W/AUTO DIFF WBC: CPT

## 2023-05-15 PROCEDURE — 3078F DIAST BP <80 MM HG: CPT | Performed by: INTERNAL MEDICINE

## 2023-05-15 PROCEDURE — 6360000002 HC RX W HCPCS: Performed by: INTERNAL MEDICINE

## 2023-05-15 PROCEDURE — 86301 IMMUNOASSAY TUMOR CA 19-9: CPT

## 2023-05-15 PROCEDURE — 3017F COLORECTAL CA SCREEN DOC REV: CPT | Performed by: INTERNAL MEDICINE

## 2023-05-15 PROCEDURE — 1123F ACP DISCUSS/DSCN MKR DOCD: CPT | Performed by: INTERNAL MEDICINE

## 2023-05-15 PROCEDURE — G8400 PT W/DXA NO RESULTS DOC: HCPCS | Performed by: INTERNAL MEDICINE

## 2023-05-15 PROCEDURE — 36591 DRAW BLOOD OFF VENOUS DEVICE: CPT

## 2023-05-15 PROCEDURE — 3046F HEMOGLOBIN A1C LEVEL >9.0%: CPT | Performed by: INTERNAL MEDICINE

## 2023-05-15 PROCEDURE — 80053 COMPREHEN METABOLIC PANEL: CPT

## 2023-05-15 PROCEDURE — 2580000003 HC RX 258: Performed by: INTERNAL MEDICINE

## 2023-05-15 PROCEDURE — 36415 COLL VENOUS BLD VENIPUNCTURE: CPT

## 2023-05-15 PROCEDURE — G8417 CALC BMI ABV UP PARAM F/U: HCPCS | Performed by: INTERNAL MEDICINE

## 2023-05-15 RX ORDER — SODIUM CHLORIDE 9 MG/ML
25 INJECTION, SOLUTION INTRAVENOUS PRN
Status: DISCONTINUED | OUTPATIENT
Start: 2023-05-15 | End: 2023-05-16 | Stop reason: HOSPADM

## 2023-05-15 RX ORDER — SODIUM CHLORIDE 0.9 % (FLUSH) 0.9 %
5-40 SYRINGE (ML) INJECTION PRN
Status: DISCONTINUED | OUTPATIENT
Start: 2023-05-15 | End: 2023-05-16 | Stop reason: HOSPADM

## 2023-05-15 RX ORDER — SODIUM CHLORIDE 9 MG/ML
25 INJECTION, SOLUTION INTRAVENOUS PRN
Status: CANCELLED | OUTPATIENT
Start: 2023-08-07

## 2023-05-15 RX ORDER — HEPARIN SODIUM (PORCINE) LOCK FLUSH IV SOLN 100 UNIT/ML 100 UNIT/ML
500 SOLUTION INTRAVENOUS PRN
Status: CANCELLED | OUTPATIENT
Start: 2023-08-07

## 2023-05-15 RX ORDER — SODIUM CHLORIDE 0.9 % (FLUSH) 0.9 %
5-40 SYRINGE (ML) INJECTION PRN
Status: CANCELLED | OUTPATIENT
Start: 2023-08-07

## 2023-05-15 RX ORDER — HEPARIN SODIUM (PORCINE) LOCK FLUSH IV SOLN 100 UNIT/ML 100 UNIT/ML
500 SOLUTION INTRAVENOUS PRN
Status: DISCONTINUED | OUTPATIENT
Start: 2023-05-15 | End: 2023-05-16 | Stop reason: HOSPADM

## 2023-05-15 RX ADMIN — HEPARIN 500 UNITS: 100 SYRINGE at 13:43

## 2023-05-15 RX ADMIN — SODIUM CHLORIDE, PRESERVATIVE FREE 10 ML: 5 INJECTION INTRAVENOUS at 13:43

## 2023-05-15 ASSESSMENT — PAIN SCALES - GENERAL: PAINLEVEL_OUTOF10: 0

## 2023-05-15 NOTE — PROGRESS NOTES
[facility] Progress Note    Date: May 15, 2023      1330: Pt arrived ambulatory to [facility] for port flush + labs in stable condition. Port accessed with positive blood return. Labs drawn and sent for processing. Labs pending. Patient Vitals for the past 12 hrs:   Temp Pulse Resp BP SpO2   05/15/23 1315 98.1 °F (36.7 °C) 77 16 133/75 100 %       Medications Administered         heparin flush 100 UNIT/ML injection 500 Units Admin Date  05/15/2023 Action  Given Dose  500 Units Route  IntraCATHeter Administered By  Jessica Rubio, MICHAEL        sodium chloride flush 0.9 % injection 5-40 mL Admin Date  05/15/2023 Action  Given Dose  10 mL Route  IntraVENous Administered By  Jessica Rubio, MICHAEL                2525: Tolerated treatment well, no adverse reactions noted. Port flushed, heparinized and de- accessed per protocol. D/Cd from [facility] ambulatory and in no distress.          Jessica Rubio RN, RN  May 15, 2023

## 2023-05-15 NOTE — PROGRESS NOTES
Duong Molina is a 79 y.o. female here for follow up of Pancreatic adenocarcinoma. Patient with no complaints of pain at this time.

## 2023-05-16 ENCOUNTER — TELEPHONE (OUTPATIENT)
Age: 68
End: 2023-05-16

## 2023-05-16 LAB — CANCER AG19-9 SERPL-ACNC: 38 U/ML (ref 0–35)

## 2023-05-16 NOTE — TELEPHONE ENCOUNTER
05/16/23 3:13 PM Attempted to place return call via contact number provided. No answer, left message requesting return call. Provided office phone number as well for call back. 05/17/23 10:05 AM Spoke with patient. She stated she received message from [address] [place] [address] who initially advised that physician would review patient's case and determine if biopsy was feasible. Then, would call patient back. Patient stated she placed return call to clarify if procedure was scheduled or not. Patient stated she was asked to contact her physician's office to confirm if biopsy was needed. Unable to see appointment notes on imaging order. Per chart review, Dr. Alicja Valero recommended CT-guided biopsy of lung nodule. No record of incoming call from CT department indicating that there are any issues or concerns. Discussed this with patient. This nurse will contact central scheduling to verify that there aren't additional questions pertaining to order/procedure. Patient voiced understanding. 10:27 AM Call placed to central scheduling. Spoke with Clarisa Quiñones. She did not see any notes indicating that someone attempted to contact patient. Confirmed that the expected workflow is for central scheduling to fax request for biopsy to CT department for review. Initiated this process. Scheduling to call patient directly once records are reviewed and department advises of appointment. Patient updated of above. No further questions or concerns at this time.

## 2023-05-16 NOTE — TELEPHONE ENCOUNTER
Pt received a call from central scheduling but they said they couldn't call her until she talked to the doctor and doctor spoke with them - she doesn't understand what's going on    CB# [phone]

## 2023-05-30 ENCOUNTER — TELEPHONE (OUTPATIENT)
Age: 68
End: 2023-05-30

## 2023-05-30 DIAGNOSIS — R91.8 LUNG NODULES: ICD-10-CM

## 2023-05-30 DIAGNOSIS — C25.9 MALIGNANT NEOPLASM OF PANCREAS, UNSPECIFIED LOCATION OF MALIGNANCY (HCC): Primary | ICD-10-CM

## 2023-05-30 NOTE — TELEPHONE ENCOUNTER
Pt  showed up to her appt for biopsy at the hospital to find out appt had been canceled.  Pt never received a call and doesn't understand why it was cancelled as pt took off work for this    CB# [phone]

## 2023-05-30 NOTE — TELEPHONE ENCOUNTER
05/30/23 2:03 PM Attempted to place return call to patient via home/mobile number listed. No answer, left message requesting call back. Provided office phone number as well. Per Dr. Lisa Wade, after radiology further reviewed patient's case, one nodule is too close to heart to biopsy and the other is not large enough to biopsy. Dr. Lisa Wade recommends follow up CT in late July to monitor status. Patient has existing MD visit 08/07 to review results. Orders placed by Dr. Lisa Wade for CT chest/abdomen/pelvis. 3:37 PM Received return call from patient. Advised of above. Patient voiced understanding and is agreeable to plan. No further questions or concerns at this time.

## 2023-07-18 ENCOUNTER — HOSPITAL ENCOUNTER (OUTPATIENT)
Facility: HOSPITAL | Age: 68
Discharge: HOME | End: 2023-07-21
Attending: INTERNAL MEDICINE
Payer: MEDICARE

## 2023-07-18 DIAGNOSIS — C25.9 MALIGNANT NEOPLASM OF PANCREAS, UNSPECIFIED LOCATION OF MALIGNANCY (HCC): ICD-10-CM

## 2023-07-18 DIAGNOSIS — R91.8 LUNG NODULES: ICD-10-CM

## 2023-07-18 PROCEDURE — 71260 CT THORAX DX C+: CPT

## 2023-07-18 PROCEDURE — 6360000004 HC RX CONTRAST MEDICATION: Performed by: INTERNAL MEDICINE

## 2023-07-18 RX ADMIN — IOPAMIDOL 100 ML: 755 INJECTION, SOLUTION INTRAVENOUS at 09:39

## 2023-07-21 NOTE — PROGRESS NOTES
[facility] at [address], [address] [place] [address] [place]: [phone]  F: [phone]        Reason for Visit:   Saud Lamas is a 79 y.o. female who is seen for follow up of pancreatic adenocarcinoma. Hematology/Oncology Treatment History:       Diagnosis: Pancreatic adenocarcinoma     Stage: clinical Stage Ib [T2N0] at diagnosis; Stage III [[id number]] at surgery     Pathology:    -9/26/21 Cytology - brushings from common bile duct: Reactive glandular epithelial cells and bile    -9/28/21 pancreatic head mass biopsy: Adenocarcinoma.    -10/22/21 [facility] mult-cancer panel -negative    -2/23/22 Pancreaticoduodenectomy (Whipple resection): Ductal adenocarcinoma, G2, 1.2cm. Tumor invades peripancreatic soft tissues. LVI negative. PNI present. Margins uninvolved. 5/29 LNs involved with cancer.    y pT1cN2       -Tempus genomic variants: TP53 (0.3%), CDKN2A (10.4%), KRAS p.G12D missense variant exon 2 (8.4%), AKI. No germline pathogenic variants.    -Clinical trials: Trial of Ulixertinib in combo with hydroxychloroquine in patients with advanced GI malignancies ([id number]). Phase II [place], [place] for KRAS. pG12D mutation. Prior Treatment:   1. Neoadjuvant FOLFIRNOX x 6 cycles, 10/18/21-1/10/22  2. PYLORUS PRESERVING WHIPPLE PROCEDURE AND PORTAL LYPHMADENECTOMY, 2/23/22  3. Adjuvant FOLFIRNOX x 6 cycles, 4/12/22 - 7/5/22. Current Treatment: Surveillance         History of Present Illness:   Saud Lamas is a pleasant 79 y.o. female who comes in for evaluation of pancreatic cancer. She presented in 9/2021 with obstructive jaundice, transaminitis. ERCP on 9/26/21 notable  for distal CBD stricture; possible tumor/mass located in the CBD; underwent biliary stent placement to relieve biliary obstruction.  CT A/P revealed mild/mod intrahepatic biliary ductal dilatation; prominence of CBD and hydropic gallbladder, suggestive  of stricture/stenosis/mass of

## 2023-08-07 ENCOUNTER — HOSPITAL ENCOUNTER (OUTPATIENT)
Facility: HOSPITAL | Age: 68
Setting detail: INFUSION SERIES
End: 2023-08-07
Payer: MEDICARE

## 2023-08-07 ENCOUNTER — OFFICE VISIT (OUTPATIENT)
Age: 68
End: 2023-08-07
Payer: MEDICARE

## 2023-08-07 VITALS
RESPIRATION RATE: 16 BRPM | HEIGHT: 64 IN | DIASTOLIC BLOOD PRESSURE: 85 MMHG | SYSTOLIC BLOOD PRESSURE: 131 MMHG | HEART RATE: 82 BPM | TEMPERATURE: 98 F | WEIGHT: 201 LBS | OXYGEN SATURATION: 100 % | BODY MASS INDEX: 34.31 KG/M2

## 2023-08-07 VITALS
SYSTOLIC BLOOD PRESSURE: 131 MMHG | BODY MASS INDEX: 34.31 KG/M2 | OXYGEN SATURATION: 100 % | HEART RATE: 82 BPM | WEIGHT: 201 LBS | TEMPERATURE: 98 F | DIASTOLIC BLOOD PRESSURE: 85 MMHG | RESPIRATION RATE: 16 BRPM | HEIGHT: 64 IN

## 2023-08-07 DIAGNOSIS — Z00.00 HEALTHCARE MAINTENANCE: ICD-10-CM

## 2023-08-07 DIAGNOSIS — C25.9 PANCREATIC ADENOCARCINOMA (HCC): Primary | ICD-10-CM

## 2023-08-07 DIAGNOSIS — I10 PRIMARY HYPERTENSION: ICD-10-CM

## 2023-08-07 DIAGNOSIS — D64.9 NORMOCYTIC ANEMIA, NOT DUE TO BLOOD LOSS: ICD-10-CM

## 2023-08-07 DIAGNOSIS — C25.9 MALIGNANT NEOPLASM OF PANCREAS, UNSPECIFIED LOCATION OF MALIGNANCY (HCC): Primary | ICD-10-CM

## 2023-08-07 DIAGNOSIS — R91.8 PULMONARY NODULES: ICD-10-CM

## 2023-08-07 LAB
ALBUMIN SERPL-MCNC: 3.2 G/DL (ref 3.5–5)
ALBUMIN/GLOB SERPL: 0.7 (ref 1.1–2.2)
ALP SERPL-CCNC: 145 U/L (ref 45–117)
ALT SERPL-CCNC: 28 U/L (ref 12–78)
ANION GAP SERPL CALC-SCNC: 5 MMOL/L (ref 5–15)
AST SERPL-CCNC: 24 U/L (ref 15–37)
BASOPHILS # BLD: 0 K/UL (ref 0–0.1)
BASOPHILS NFR BLD: 0 % (ref 0–1)
BILIRUB SERPL-MCNC: 0.6 MG/DL (ref 0.2–1)
BUN SERPL-MCNC: 14 MG/DL (ref 6–20)
BUN/CREAT SERPL: 18 (ref 12–20)
CALCIUM SERPL-MCNC: 9.4 MG/DL (ref 8.5–10.1)
CHLORIDE SERPL-SCNC: 110 MMOL/L (ref 97–108)
CO2 SERPL-SCNC: 24 MMOL/L (ref 21–32)
CREAT SERPL-MCNC: 0.78 MG/DL (ref 0.55–1.02)
DIFFERENTIAL METHOD BLD: ABNORMAL
EOSINOPHIL # BLD: 0.2 K/UL (ref 0–0.4)
EOSINOPHIL NFR BLD: 3 % (ref 0–7)
ERYTHROCYTE [DISTWIDTH] IN BLOOD BY AUTOMATED COUNT: 13.5 % (ref 11.5–14.5)
GLOBULIN SER CALC-MCNC: 4.3 G/DL (ref 2–4)
GLUCOSE SERPL-MCNC: 91 MG/DL (ref 65–100)
HCT VFR BLD AUTO: 31.9 % (ref 35–47)
HGB BLD-MCNC: 10.6 G/DL (ref 11.5–16)
IMM GRANULOCYTES # BLD AUTO: 0 K/UL (ref 0–0.04)
IMM GRANULOCYTES NFR BLD AUTO: 0 % (ref 0–0.5)
LYMPHOCYTES # BLD: 3.3 K/UL (ref 0.8–3.5)
LYMPHOCYTES NFR BLD: 58 % (ref 12–49)
MCH RBC QN AUTO: 32.6 PG (ref 26–34)
MCHC RBC AUTO-ENTMCNC: 33.2 G/DL (ref 30–36.5)
MCV RBC AUTO: 98.2 FL (ref 80–99)
MONOCYTES # BLD: 0.6 K/UL (ref 0–1)
MONOCYTES NFR BLD: 11 % (ref 5–13)
NEUTS SEG # BLD: 1.6 K/UL (ref 1.8–8)
NEUTS SEG NFR BLD: 28 % (ref 32–75)
NRBC # BLD: 0 K/UL (ref 0–0.01)
NRBC BLD-RTO: 0 PER 100 WBC
PLATELET # BLD AUTO: 234 K/UL (ref 150–400)
PMV BLD AUTO: 10.1 FL (ref 8.9–12.9)
POTASSIUM SERPL-SCNC: 3.7 MMOL/L (ref 3.5–5.1)
PROT SERPL-MCNC: 7.5 G/DL (ref 6.4–8.2)
RBC # BLD AUTO: 3.25 M/UL (ref 3.8–5.2)
SODIUM SERPL-SCNC: 139 MMOL/L (ref 136–145)
WBC # BLD AUTO: 5.8 K/UL (ref 3.6–11)

## 2023-08-07 PROCEDURE — 1036F TOBACCO NON-USER: CPT | Performed by: INTERNAL MEDICINE

## 2023-08-07 PROCEDURE — 2580000003 HC RX 258: Performed by: INTERNAL MEDICINE

## 2023-08-07 PROCEDURE — G8427 DOCREV CUR MEDS BY ELIG CLIN: HCPCS | Performed by: INTERNAL MEDICINE

## 2023-08-07 PROCEDURE — 36415 COLL VENOUS BLD VENIPUNCTURE: CPT

## 2023-08-07 PROCEDURE — 99214 OFFICE O/P EST MOD 30 MIN: CPT | Performed by: INTERNAL MEDICINE

## 2023-08-07 PROCEDURE — 3017F COLORECTAL CA SCREEN DOC REV: CPT | Performed by: INTERNAL MEDICINE

## 2023-08-07 PROCEDURE — 80053 COMPREHEN METABOLIC PANEL: CPT

## 2023-08-07 PROCEDURE — 86301 IMMUNOASSAY TUMOR CA 19-9: CPT

## 2023-08-07 PROCEDURE — 36591 DRAW BLOOD OFF VENOUS DEVICE: CPT

## 2023-08-07 PROCEDURE — 3075F SYST BP GE 130 - 139MM HG: CPT | Performed by: INTERNAL MEDICINE

## 2023-08-07 PROCEDURE — 85025 COMPLETE CBC W/AUTO DIFF WBC: CPT

## 2023-08-07 PROCEDURE — 1090F PRES/ABSN URINE INCON ASSESS: CPT | Performed by: INTERNAL MEDICINE

## 2023-08-07 PROCEDURE — 1123F ACP DISCUSS/DSCN MKR DOCD: CPT | Performed by: INTERNAL MEDICINE

## 2023-08-07 PROCEDURE — G8417 CALC BMI ABV UP PARAM F/U: HCPCS | Performed by: INTERNAL MEDICINE

## 2023-08-07 PROCEDURE — G8400 PT W/DXA NO RESULTS DOC: HCPCS | Performed by: INTERNAL MEDICINE

## 2023-08-07 PROCEDURE — 3079F DIAST BP 80-89 MM HG: CPT | Performed by: INTERNAL MEDICINE

## 2023-08-07 RX ORDER — SODIUM CHLORIDE 9 MG/ML
25 INJECTION, SOLUTION INTRAVENOUS PRN
OUTPATIENT
Start: 2023-10-30

## 2023-08-07 RX ORDER — SODIUM CHLORIDE 0.9 % (FLUSH) 0.9 %
5-40 SYRINGE (ML) INJECTION PRN
OUTPATIENT
Start: 2023-10-30

## 2023-08-07 RX ORDER — SODIUM CHLORIDE 0.9 % (FLUSH) 0.9 %
5-40 SYRINGE (ML) INJECTION PRN
Status: DISCONTINUED | OUTPATIENT
Start: 2023-08-07 | End: 2023-08-08 | Stop reason: HOSPADM

## 2023-08-07 RX ORDER — HEPARIN 100 UNIT/ML
500 SYRINGE INTRAVENOUS PRN
OUTPATIENT
Start: 2023-10-30

## 2023-08-07 RX ORDER — HEPARIN 100 UNIT/ML
500 SYRINGE INTRAVENOUS PRN
Status: DISCONTINUED | OUTPATIENT
Start: 2023-08-07 | End: 2023-08-08 | Stop reason: HOSPADM

## 2023-08-07 RX ORDER — SODIUM CHLORIDE 9 MG/ML
25 INJECTION, SOLUTION INTRAVENOUS PRN
Status: DISCONTINUED | OUTPATIENT
Start: 2023-08-07 | End: 2023-08-08 | Stop reason: HOSPADM

## 2023-08-07 RX ADMIN — Medication 20 ML: at 13:40

## 2023-08-07 RX ADMIN — Medication 10 ML: at 13:35

## 2023-08-07 ASSESSMENT — PATIENT HEALTH QUESTIONNAIRE - PHQ9
SUM OF ALL RESPONSES TO PHQ QUESTIONS 1-9: 0
2. FEELING DOWN, DEPRESSED OR HOPELESS: 0
SUM OF ALL RESPONSES TO PHQ QUESTIONS 1-9: 0
1. LITTLE INTEREST OR PLEASURE IN DOING THINGS: 0
SUM OF ALL RESPONSES TO PHQ QUESTIONS 1-9: 0
SUM OF ALL RESPONSES TO PHQ QUESTIONS 1-9: 0
SUM OF ALL RESPONSES TO PHQ9 QUESTIONS 1 & 2: 0

## 2023-08-07 ASSESSMENT — PAIN SCALES - GENERAL: PAINLEVEL_OUTOF10: 0

## 2023-08-09 LAB — CANCER AG19-9 SERPL-ACNC: 67 U/ML (ref 0–35)

## 2023-08-13 DIAGNOSIS — I10 ESSENTIAL (PRIMARY) HYPERTENSION: ICD-10-CM

## 2023-08-14 RX ORDER — HYDROCHLOROTHIAZIDE 12.5 MG/1
TABLET ORAL
Qty: 90 TABLET | Refills: 0 | Status: SHIPPED | OUTPATIENT
Start: 2023-08-14

## 2023-09-07 ENCOUNTER — HOSPITAL ENCOUNTER (OUTPATIENT)
Facility: HOSPITAL | Age: 68
Discharge: HOME | End: 2023-09-07
Payer: MEDICARE

## 2023-09-07 DIAGNOSIS — M54.16 LUMBAR RADICULOPATHY: ICD-10-CM

## 2023-09-07 PROCEDURE — 72131 CT LUMBAR SPINE W/O DYE: CPT

## 2023-09-25 DIAGNOSIS — E87.6 HYPOKALEMIA: ICD-10-CM

## 2023-09-25 RX ORDER — POTASSIUM CHLORIDE 750 MG/1
10 TABLET, EXTENDED RELEASE ORAL DAILY
Qty: 90 TABLET | Refills: 0 | Status: SHIPPED | OUTPATIENT
Start: 2023-09-25

## 2023-10-12 ENCOUNTER — TELEPHONE (OUTPATIENT)
Age: 68
End: 2023-10-12

## 2023-10-16 ENCOUNTER — TELEPHONE (OUTPATIENT)
Age: 68
End: 2023-10-16

## 2023-10-30 ENCOUNTER — APPOINTMENT (OUTPATIENT)
Facility: HOSPITAL | Age: 68
End: 2023-10-30
Payer: MEDICARE

## 2023-11-09 ENCOUNTER — HOSPITAL ENCOUNTER (OUTPATIENT)
Facility: HOSPITAL | Age: 68
Discharge: HOME | End: 2023-11-09
Payer: MEDICARE

## 2023-11-09 DIAGNOSIS — C25.9 MALIGNANT NEOPLASM OF PANCREAS, UNSPECIFIED LOCATION OF MALIGNANCY (HCC): ICD-10-CM

## 2023-11-09 PROCEDURE — 71260 CT THORAX DX C+: CPT

## 2023-11-09 PROCEDURE — 6360000004 HC RX CONTRAST MEDICATION: Performed by: NURSE PRACTITIONER

## 2023-11-09 RX ADMIN — IOPAMIDOL 100 ML: 755 INJECTION, SOLUTION INTRAVENOUS at 14:42

## 2023-11-10 NOTE — PROGRESS NOTES
[facility] at [address], [address] [place] [address] [place]: [phone]  F: [phone]        Reason for Visit:   Jason Torres is a 76 y.o. female who is seen for follow up of pancreatic adenocarcinoma. Hematology/Oncology Treatment History:       Diagnosis: Pancreatic adenocarcinoma     Stage: clinical Stage Ib [T2N0] at diagnosis; Stage III [jiR2mX3] at surgery     Pathology:    -9/26/21 Cytology - brushings from common bile duct: Reactive glandular epithelial cells and bile    -9/28/21 pancreatic head mass biopsy: Adenocarcinoma.    -10/22/21 [facility] mult-cancer panel -negative    -2/23/22 Pancreaticoduodenectomy (Whipple resection): Ductal adenocarcinoma, G2, 1.2cm. Tumor invades peripancreatic soft tissues. LVI negative. PNI present. Margins uninvolved. 5/29 LNs involved with cancer.    y pT1cN2       -Tempus genomic variants: TP53 (0.3%), CDKN2A (10.4%), KRAS p.G12D missense variant exon 2 (8.4%), AKI. No germline pathogenic variants.    -Clinical trials: Trial of Ulixertinib in combo with hydroxychloroquine in patients with advanced GI malignancies ([id number]). Phase II [place], [place] for KRAS. pG12D mutation. Prior Treatment:   1. Neoadjuvant FOLFIRNOX x 6 cycles, 10/18/21-1/10/22  2. PYLORUS PRESERVING WHIPPLE PROCEDURE AND PORTAL LYPHMADENECTOMY, 2/23/22  3. Adjuvant FOLFIRNOX x 6 cycles, 4/12/22 - 7/5/22. Current Treatment: Surveillance         History of Present Illness:   Jason Torres is a pleasant 79 y.o. female who comes in for evaluation of pancreatic cancer. She presented in 9/2021 with obstructive jaundice, transaminitis. ERCP on 9/26/21 notable  for distal CBD stricture; possible tumor/mass located in the CBD; underwent biliary stent placement to relieve biliary obstruction.  CT A/P revealed mild/mod intrahepatic biliary ductal dilatation; prominence of CBD and hydropic gallbladder, suggestive  of stricture/stenosis/mass of

## 2023-11-13 ENCOUNTER — HOSPITAL ENCOUNTER (OUTPATIENT)
Facility: HOSPITAL | Age: 68
Setting detail: INFUSION SERIES
Discharge: HOME | End: 2023-11-13
Payer: MEDICARE

## 2023-11-13 ENCOUNTER — OFFICE VISIT (OUTPATIENT)
Age: 68
End: 2023-11-13
Payer: MEDICARE

## 2023-11-13 VITALS
BODY MASS INDEX: 34.83 KG/M2 | WEIGHT: 204 LBS | SYSTOLIC BLOOD PRESSURE: 134 MMHG | HEART RATE: 82 BPM | RESPIRATION RATE: 18 BRPM | TEMPERATURE: 97.8 F | HEIGHT: 64 IN | OXYGEN SATURATION: 98 % | DIASTOLIC BLOOD PRESSURE: 89 MMHG

## 2023-11-13 VITALS
SYSTOLIC BLOOD PRESSURE: 134 MMHG | RESPIRATION RATE: 18 BRPM | HEIGHT: 64 IN | OXYGEN SATURATION: 98 % | TEMPERATURE: 97.8 F | BODY MASS INDEX: 34.48 KG/M2 | DIASTOLIC BLOOD PRESSURE: 89 MMHG | HEART RATE: 82 BPM

## 2023-11-13 DIAGNOSIS — C25.9 PANCREATIC ADENOCARCINOMA (HCC): Primary | ICD-10-CM

## 2023-11-13 DIAGNOSIS — C25.9 MALIGNANT NEOPLASM OF PANCREAS, UNSPECIFIED LOCATION OF MALIGNANCY (HCC): Primary | ICD-10-CM

## 2023-11-13 DIAGNOSIS — R91.8 PULMONARY NODULES: ICD-10-CM

## 2023-11-13 DIAGNOSIS — I10 PRIMARY HYPERTENSION: ICD-10-CM

## 2023-11-13 DIAGNOSIS — Z12.31 ENCOUNTER FOR SCREENING MAMMOGRAM FOR MALIGNANT NEOPLASM OF BREAST: ICD-10-CM

## 2023-11-13 DIAGNOSIS — D70.9 NEUTROPENIA, UNSPECIFIED TYPE (HCC): ICD-10-CM

## 2023-11-13 DIAGNOSIS — Z00.00 HEALTH CARE MAINTENANCE: ICD-10-CM

## 2023-11-13 DIAGNOSIS — C25.9 MALIGNANT NEOPLASM OF PANCREAS, UNSPECIFIED LOCATION OF MALIGNANCY (HCC): ICD-10-CM

## 2023-11-13 DIAGNOSIS — R91.8 PULMONARY NODULES/LESIONS, MULTIPLE: ICD-10-CM

## 2023-11-13 LAB
ALBUMIN SERPL-MCNC: 3.4 G/DL (ref 3.5–5)
ALBUMIN/GLOB SERPL: 0.8 (ref 1.1–2.2)
ALP SERPL-CCNC: 139 U/L (ref 45–117)
ALT SERPL-CCNC: 26 U/L (ref 12–78)
ANION GAP SERPL CALC-SCNC: 4 MMOL/L (ref 5–15)
AST SERPL-CCNC: 24 U/L (ref 15–37)
BASOPHILS # BLD: 0 K/UL (ref 0–0.1)
BASOPHILS NFR BLD: 0 % (ref 0–1)
BILIRUB SERPL-MCNC: 1.1 MG/DL (ref 0.2–1)
BUN SERPL-MCNC: 14 MG/DL (ref 6–20)
BUN/CREAT SERPL: 15 (ref 12–20)
CALCIUM SERPL-MCNC: 9 MG/DL (ref 8.5–10.1)
CHLORIDE SERPL-SCNC: 107 MMOL/L (ref 97–108)
CO2 SERPL-SCNC: 27 MMOL/L (ref 21–32)
CREAT SERPL-MCNC: 0.92 MG/DL (ref 0.55–1.02)
DIFFERENTIAL METHOD BLD: ABNORMAL
EOSINOPHIL # BLD: 0.2 K/UL (ref 0–0.4)
EOSINOPHIL NFR BLD: 3 % (ref 0–7)
ERYTHROCYTE [DISTWIDTH] IN BLOOD BY AUTOMATED COUNT: 13.6 % (ref 11.5–14.5)
FERRITIN SERPL-MCNC: 28 NG/ML (ref 8–252)
GLOBULIN SER CALC-MCNC: 4.2 G/DL (ref 2–4)
GLUCOSE SERPL-MCNC: 84 MG/DL (ref 65–100)
HCT VFR BLD AUTO: 34.4 % (ref 35–47)
HGB BLD-MCNC: 11.2 G/DL (ref 11.5–16)
IMM GRANULOCYTES # BLD AUTO: 0 K/UL (ref 0–0.04)
IMM GRANULOCYTES NFR BLD AUTO: 0 % (ref 0–0.5)
IRON SATN MFR SERPL: 24 % (ref 20–50)
IRON SERPL-MCNC: 99 UG/DL (ref 35–150)
LYMPHOCYTES # BLD: 3.4 K/UL (ref 0.8–3.5)
LYMPHOCYTES NFR BLD: 59 % (ref 12–49)
MCH RBC QN AUTO: 31.8 PG (ref 26–34)
MCHC RBC AUTO-ENTMCNC: 32.6 G/DL (ref 30–36.5)
MCV RBC AUTO: 97.7 FL (ref 80–99)
MONOCYTES # BLD: 0.7 K/UL (ref 0–1)
MONOCYTES NFR BLD: 12 % (ref 5–13)
NEUTS SEG # BLD: 1.5 K/UL (ref 1.8–8)
NEUTS SEG NFR BLD: 26 % (ref 32–75)
NRBC # BLD: 0 K/UL (ref 0–0.01)
NRBC BLD-RTO: 0 PER 100 WBC
PLATELET # BLD AUTO: 226 K/UL (ref 150–400)
PMV BLD AUTO: 10.4 FL (ref 8.9–12.9)
POTASSIUM SERPL-SCNC: 3.5 MMOL/L (ref 3.5–5.1)
PROT SERPL-MCNC: 7.6 G/DL (ref 6.4–8.2)
RBC # BLD AUTO: 3.52 M/UL (ref 3.8–5.2)
SODIUM SERPL-SCNC: 138 MMOL/L (ref 136–145)
TIBC SERPL-MCNC: 419 UG/DL (ref 250–450)
WBC # BLD AUTO: 5.7 K/UL (ref 3.6–11)

## 2023-11-13 PROCEDURE — 36415 COLL VENOUS BLD VENIPUNCTURE: CPT

## 2023-11-13 PROCEDURE — 80053 COMPREHEN METABOLIC PANEL: CPT

## 2023-11-13 PROCEDURE — 86301 IMMUNOASSAY TUMOR CA 19-9: CPT

## 2023-11-13 PROCEDURE — 3079F DIAST BP 80-89 MM HG: CPT | Performed by: INTERNAL MEDICINE

## 2023-11-13 PROCEDURE — 1123F ACP DISCUSS/DSCN MKR DOCD: CPT | Performed by: INTERNAL MEDICINE

## 2023-11-13 PROCEDURE — G8400 PT W/DXA NO RESULTS DOC: HCPCS | Performed by: INTERNAL MEDICINE

## 2023-11-13 PROCEDURE — 83550 IRON BINDING TEST: CPT

## 2023-11-13 PROCEDURE — 1090F PRES/ABSN URINE INCON ASSESS: CPT | Performed by: INTERNAL MEDICINE

## 2023-11-13 PROCEDURE — 2580000003 HC RX 258: Performed by: INTERNAL MEDICINE

## 2023-11-13 PROCEDURE — 83540 ASSAY OF IRON: CPT

## 2023-11-13 PROCEDURE — 3075F SYST BP GE 130 - 139MM HG: CPT | Performed by: INTERNAL MEDICINE

## 2023-11-13 PROCEDURE — 82728 ASSAY OF FERRITIN: CPT

## 2023-11-13 PROCEDURE — G8427 DOCREV CUR MEDS BY ELIG CLIN: HCPCS | Performed by: INTERNAL MEDICINE

## 2023-11-13 PROCEDURE — G8484 FLU IMMUNIZE NO ADMIN: HCPCS | Performed by: INTERNAL MEDICINE

## 2023-11-13 PROCEDURE — 99214 OFFICE O/P EST MOD 30 MIN: CPT | Performed by: INTERNAL MEDICINE

## 2023-11-13 PROCEDURE — 3017F COLORECTAL CA SCREEN DOC REV: CPT | Performed by: INTERNAL MEDICINE

## 2023-11-13 PROCEDURE — 85025 COMPLETE CBC W/AUTO DIFF WBC: CPT

## 2023-11-13 PROCEDURE — G8417 CALC BMI ABV UP PARAM F/U: HCPCS | Performed by: INTERNAL MEDICINE

## 2023-11-13 PROCEDURE — 36591 DRAW BLOOD OFF VENOUS DEVICE: CPT

## 2023-11-13 PROCEDURE — 1036F TOBACCO NON-USER: CPT | Performed by: INTERNAL MEDICINE

## 2023-11-13 RX ORDER — SODIUM CHLORIDE 9 MG/ML
25 INJECTION, SOLUTION INTRAVENOUS PRN
Status: DISCONTINUED | OUTPATIENT
Start: 2023-11-13 | End: 2023-11-14 | Stop reason: HOSPADM

## 2023-11-13 RX ORDER — SODIUM CHLORIDE 0.9 % (FLUSH) 0.9 %
5-40 SYRINGE (ML) INJECTION PRN
Status: DISCONTINUED | OUTPATIENT
Start: 2023-11-13 | End: 2023-11-14 | Stop reason: HOSPADM

## 2023-11-13 RX ORDER — HEPARIN 100 UNIT/ML
500 SYRINGE INTRAVENOUS PRN
Status: DISCONTINUED | OUTPATIENT
Start: 2023-11-13 | End: 2023-11-14 | Stop reason: HOSPADM

## 2023-11-13 RX ORDER — SODIUM CHLORIDE 0.9 % (FLUSH) 0.9 %
5-40 SYRINGE (ML) INJECTION PRN
OUTPATIENT
Start: 2024-01-21

## 2023-11-13 RX ORDER — HEPARIN 100 UNIT/ML
500 SYRINGE INTRAVENOUS PRN
OUTPATIENT
Start: 2024-01-21

## 2023-11-13 RX ORDER — SODIUM CHLORIDE 9 MG/ML
25 INJECTION, SOLUTION INTRAVENOUS PRN
OUTPATIENT
Start: 2024-01-21

## 2023-11-13 RX ADMIN — SODIUM CHLORIDE, PRESERVATIVE FREE 20 ML: 5 INJECTION INTRAVENOUS at 13:30

## 2023-11-13 ASSESSMENT — PAIN SCALES - GENERAL: PAINLEVEL_OUTOF10: 0

## 2023-11-13 ASSESSMENT — PATIENT HEALTH QUESTIONNAIRE - PHQ9
SUM OF ALL RESPONSES TO PHQ9 QUESTIONS 1 & 2: 0
SUM OF ALL RESPONSES TO PHQ QUESTIONS 1-9: 0
1. LITTLE INTEREST OR PLEASURE IN DOING THINGS: 0
SUM OF ALL RESPONSES TO PHQ QUESTIONS 1-9: 0
SUM OF ALL RESPONSES TO PHQ QUESTIONS 1-9: 0
2. FEELING DOWN, DEPRESSED OR HOPELESS: 0
SUM OF ALL RESPONSES TO PHQ QUESTIONS 1-9: 0

## 2023-11-13 NOTE — PROGRESS NOTES
Patient called     She also had diarrhea on Thursday  She took Imodium and this improved   She has only had 2 BMs in 24 hours  She has no nausea now  She wonders if she needs fluids  Since she is able to take po I counseled her different ways in which she can increase fluid intake by mouth  She voiced understanding No Contractions

## 2023-11-14 LAB — CANCER AG19-9 SERPL-ACNC: 214 U/ML (ref 0–35)

## 2023-11-16 ENCOUNTER — TELEPHONE (OUTPATIENT)
Age: 68
End: 2023-11-16

## 2023-11-16 NOTE — TELEPHONE ENCOUNTER
11/16/23 8:24 AM called pt to review labs and advise to hold off on scheduling port removal. Left VM to return call.

## 2023-11-17 ENCOUNTER — TELEPHONE (OUTPATIENT)
Age: 68
End: 2023-11-17

## 2023-11-17 NOTE — TELEPHONE ENCOUNTER
11/17/23 11:26 AM Called patient and advised her cancer antigen has increased, so please hold off on port removal. Discussed Dr. Kashif Tamayo plans to discuss case with tumor board and I will call her back with recommendations. She verbalized understanding.

## 2023-11-20 NOTE — PROGRESS NOTES
Branden Gonzáles (:  1955) is a 76 y.o. female,Established patient, here for evaluation of the following chief complaint(s):  Medicare AWV (Pt is fasting//) and Medication Refill         ASSESSMENT/PLAN:  1. Medicare annual wellness visit, subsequent  2. Essential (primary) hypertension-cont with hctz and tolerating medicine   3. Malignant neoplasm of head of pancreas (HCC)-being followed by  and looking at area of the lungs that are growing and Ca 19-9 that is higher   4. Type 2 diabetes mellitus without complication, without long-term current use of insulin (HCC)-cont to work on eating right   -      DIABETES FOOT EXAM  -     Hemoglobin A1C; Future  -     Lipid Panel; Future  5. Atrophy of thyroid (acquired)- will get labs through  and do recheck on labs   -     TSH; Future  -     T4, Free; Future  6. Chronic pain of both knees- will discuss with        Return for Medicare Annual Wellness Visit in 1 year. Hctz  Follow   Recheck  levothyroxine    Subjective   SUBJECTIVE/OBJECTIVE:  HPI    She originally  Presented with obstructive jaundice and transaminitis, CA 19-9 was 198 in 2021 at diagnosis. Underwent biliary stent placement with improvement  in biliary obstruction. 3.2cm mass seen in pancreatic head on EUS, not involving vasculature, possibly resectable. Dr. Abisai Oh in [place] had evaluated patient and recommends consideration of neoadjuvant  chemotherapy with close attention to lung nodules and liver lesion. BRCA 1/2 negative. Pt completed 6 cycles of mFOLFIRINOX in neoadjuvant setting, followed by pancreaticoduodenectomy (Whipple resection) on 22. Then, completed 6 cycles of adjuvant chemotherapy. NCCN surveillance: H&P, CA 19-9, chest CT and CT or MRI abd/pelv with contrast every 3-6 mo 2 y, then every 6-12 mo as clinically indicated.    Pt had enlarging pulmonary nodules from Oct 2022, 2023, May 2023, but too small to biopsy per IR.   2023 CT showed

## 2023-11-21 ENCOUNTER — TELEPHONE (OUTPATIENT)
Age: 68
End: 2023-11-21

## 2023-11-21 ENCOUNTER — OFFICE VISIT (OUTPATIENT)
Age: 68
End: 2023-11-21
Payer: MEDICARE

## 2023-11-21 VITALS
HEART RATE: 71 BPM | BODY MASS INDEX: 35 KG/M2 | WEIGHT: 205 LBS | RESPIRATION RATE: 16 BRPM | TEMPERATURE: 98.4 F | SYSTOLIC BLOOD PRESSURE: 116 MMHG | DIASTOLIC BLOOD PRESSURE: 78 MMHG | HEIGHT: 64 IN | OXYGEN SATURATION: 98 %

## 2023-11-21 DIAGNOSIS — I10 ESSENTIAL (PRIMARY) HYPERTENSION: ICD-10-CM

## 2023-11-21 DIAGNOSIS — M25.561 CHRONIC PAIN OF BOTH KNEES: ICD-10-CM

## 2023-11-21 DIAGNOSIS — R91.8 PULMONARY NODULES/LESIONS, MULTIPLE: ICD-10-CM

## 2023-11-21 DIAGNOSIS — E11.9 TYPE 2 DIABETES MELLITUS WITHOUT COMPLICATION, WITHOUT LONG-TERM CURRENT USE OF INSULIN (HCC): ICD-10-CM

## 2023-11-21 DIAGNOSIS — E03.4 ATROPHY OF THYROID (ACQUIRED): ICD-10-CM

## 2023-11-21 DIAGNOSIS — C25.9 MALIGNANT NEOPLASM OF PANCREAS, UNSPECIFIED LOCATION OF MALIGNANCY (HCC): Primary | ICD-10-CM

## 2023-11-21 DIAGNOSIS — M25.562 CHRONIC PAIN OF BOTH KNEES: ICD-10-CM

## 2023-11-21 DIAGNOSIS — G89.29 CHRONIC PAIN OF BOTH KNEES: ICD-10-CM

## 2023-11-21 DIAGNOSIS — C25.0 MALIGNANT NEOPLASM OF HEAD OF PANCREAS (HCC): ICD-10-CM

## 2023-11-21 DIAGNOSIS — Z00.00 MEDICARE ANNUAL WELLNESS VISIT, SUBSEQUENT: Primary | ICD-10-CM

## 2023-11-21 PROCEDURE — 3046F HEMOGLOBIN A1C LEVEL >9.0%: CPT | Performed by: INTERNAL MEDICINE

## 2023-11-21 PROCEDURE — 1123F ACP DISCUSS/DSCN MKR DOCD: CPT | Performed by: INTERNAL MEDICINE

## 2023-11-21 PROCEDURE — 3074F SYST BP LT 130 MM HG: CPT | Performed by: INTERNAL MEDICINE

## 2023-11-21 PROCEDURE — PBSHW PNEUMOCOCCAL, PCV20, PREVNAR 20, (AGE 6W+), IM, PF: Performed by: INTERNAL MEDICINE

## 2023-11-21 PROCEDURE — G8400 PT W/DXA NO RESULTS DOC: HCPCS | Performed by: INTERNAL MEDICINE

## 2023-11-21 PROCEDURE — 2022F DILAT RTA XM EVC RTNOPTHY: CPT | Performed by: INTERNAL MEDICINE

## 2023-11-21 PROCEDURE — 1090F PRES/ABSN URINE INCON ASSESS: CPT | Performed by: INTERNAL MEDICINE

## 2023-11-21 PROCEDURE — 99214 OFFICE O/P EST MOD 30 MIN: CPT | Performed by: INTERNAL MEDICINE

## 2023-11-21 PROCEDURE — 90677 PCV20 VACCINE IM: CPT | Performed by: INTERNAL MEDICINE

## 2023-11-21 PROCEDURE — G8427 DOCREV CUR MEDS BY ELIG CLIN: HCPCS | Performed by: INTERNAL MEDICINE

## 2023-11-21 PROCEDURE — G8417 CALC BMI ABV UP PARAM F/U: HCPCS | Performed by: INTERNAL MEDICINE

## 2023-11-21 PROCEDURE — 3078F DIAST BP <80 MM HG: CPT | Performed by: INTERNAL MEDICINE

## 2023-11-21 PROCEDURE — G0439 PPPS, SUBSEQ VISIT: HCPCS | Performed by: INTERNAL MEDICINE

## 2023-11-21 PROCEDURE — 1036F TOBACCO NON-USER: CPT | Performed by: INTERNAL MEDICINE

## 2023-11-21 PROCEDURE — G8484 FLU IMMUNIZE NO ADMIN: HCPCS | Performed by: INTERNAL MEDICINE

## 2023-11-21 PROCEDURE — 3017F COLORECTAL CA SCREEN DOC REV: CPT | Performed by: INTERNAL MEDICINE

## 2023-11-21 RX ORDER — HYDROCHLOROTHIAZIDE 12.5 MG/1
TABLET ORAL
Qty: 90 TABLET | Refills: 0 | Status: SHIPPED | OUTPATIENT
Start: 2023-11-21

## 2023-11-21 SDOH — ECONOMIC STABILITY: FOOD INSECURITY: WITHIN THE PAST 12 MONTHS, YOU WORRIED THAT YOUR FOOD WOULD RUN OUT BEFORE YOU GOT MONEY TO BUY MORE.: NEVER TRUE

## 2023-11-21 SDOH — ECONOMIC STABILITY: INCOME INSECURITY: HOW HARD IS IT FOR YOU TO PAY FOR THE VERY BASICS LIKE FOOD, HOUSING, MEDICAL CARE, AND HEATING?: NOT HARD AT ALL

## 2023-11-21 SDOH — ECONOMIC STABILITY: FOOD INSECURITY: WITHIN THE PAST 12 MONTHS, THE FOOD YOU BOUGHT JUST DIDN'T LAST AND YOU DIDN'T HAVE MONEY TO GET MORE.: NEVER TRUE

## 2023-11-21 SDOH — ECONOMIC STABILITY: HOUSING INSECURITY
IN THE LAST 12 MONTHS, WAS THERE A TIME WHEN YOU DID NOT HAVE A STEADY PLACE TO SLEEP OR SLEPT IN A SHELTER (INCLUDING NOW)?: NO

## 2023-11-21 ASSESSMENT — PATIENT HEALTH QUESTIONNAIRE - PHQ9
SUM OF ALL RESPONSES TO PHQ9 QUESTIONS 1 & 2: 0
SUM OF ALL RESPONSES TO PHQ QUESTIONS 1-9: 0
1. LITTLE INTEREST OR PLEASURE IN DOING THINGS: 0
2. FEELING DOWN, DEPRESSED OR HOPELESS: 0
SUM OF ALL RESPONSES TO PHQ QUESTIONS 1-9: 0

## 2023-11-21 ASSESSMENT — LIFESTYLE VARIABLES
HOW MANY STANDARD DRINKS CONTAINING ALCOHOL DO YOU HAVE ON A TYPICAL DAY: PATIENT DOES NOT DRINK
HOW OFTEN DO YOU HAVE A DRINK CONTAINING ALCOHOL: NEVER

## 2023-11-21 NOTE — TELEPHONE ENCOUNTER
23 2:32 PM   Verified name and . Called patient and advised Dr. Ash Wilson discussed her case with thoracic tumor board. Advised the lung nodules are still very small and too small to biopsy. There is a subcarinal LN that is somewhat larger, but given waxing and waning sizes, she recommends waiting and checking another CT in 3 months rather than waiting 6 months. CT ordered for the week of 24 and will see patient for labs, port flush on 24 to review results. Patient verbalized understanding.

## 2024-01-01 ENCOUNTER — HOSPICE ADMISSION (OUTPATIENT)
Age: 69
End: 2024-01-01
Payer: MEDICARE

## 2024-01-01 ENCOUNTER — HOME CARE VISIT (OUTPATIENT)
Age: 69
End: 2024-01-01
Payer: MEDICARE

## 2024-01-01 ENCOUNTER — HOME CARE VISIT (OUTPATIENT)
Facility: HOME HEALTH | Age: 69
End: 2024-01-01
Payer: MEDICARE

## 2024-01-01 ENCOUNTER — HOSPITAL ENCOUNTER (OUTPATIENT)
Facility: HOSPITAL | Age: 69
Setting detail: INFUSION SERIES
End: 2024-01-01

## 2024-01-01 VITALS
OXYGEN SATURATION: 95 % | DIASTOLIC BLOOD PRESSURE: 84 MMHG | HEART RATE: 104 BPM | RESPIRATION RATE: 20 BRPM | SYSTOLIC BLOOD PRESSURE: 122 MMHG

## 2024-01-01 VITALS
HEART RATE: 120 BPM | DIASTOLIC BLOOD PRESSURE: 96 MMHG | RESPIRATION RATE: 16 BRPM | SYSTOLIC BLOOD PRESSURE: 123 MMHG | OXYGEN SATURATION: 96 %

## 2024-01-01 DIAGNOSIS — C25.0 MALIGNANT NEOPLASM OF HEAD OF PANCREAS (HCC): Primary | ICD-10-CM

## 2024-01-01 DIAGNOSIS — C25.9 PANCREATIC ADENOCARCINOMA (HCC): ICD-10-CM

## 2024-01-01 PROCEDURE — 0651 HSPC ROUTINE HOME CARE

## 2024-01-01 PROCEDURE — G0299 HHS/HOSPICE OF RN EA 15 MIN: HCPCS

## 2024-01-01 PROCEDURE — 0656 HSPC GENERAL INPATIENT

## 2024-01-01 PROCEDURE — 3331090004 HSPC SERVICE INTENSITY ADD-ON

## 2024-01-01 PROCEDURE — 2500000001 HSPC NON INJECTABLE MED

## 2024-01-01 PROCEDURE — G0156 HHCP-SVS OF AIDE,EA 15 MIN: HCPCS

## 2024-01-01 RX ORDER — EPINEPHRINE 1 MG/ML
0.3 INJECTION, SOLUTION INTRAMUSCULAR; SUBCUTANEOUS PRN
Status: CANCELLED | OUTPATIENT
Start: 2024-01-01

## 2024-01-01 RX ORDER — SODIUM CHLORIDE 0.9 % (FLUSH) 0.9 %
5-40 SYRINGE (ML) INJECTION PRN
Status: CANCELLED | OUTPATIENT
Start: 2024-01-01

## 2024-01-01 RX ORDER — DIPHENHYDRAMINE HYDROCHLORIDE 50 MG/ML
50 INJECTION INTRAMUSCULAR; INTRAVENOUS
Status: CANCELLED | OUTPATIENT
Start: 2024-01-01

## 2024-01-01 RX ORDER — ACETAMINOPHEN 325 MG/1
650 TABLET ORAL
Status: CANCELLED | OUTPATIENT
Start: 2024-01-01

## 2024-01-01 RX ORDER — HEPARIN 100 UNIT/ML
500 SYRINGE INTRAVENOUS PRN
Status: CANCELLED | OUTPATIENT
Start: 2024-01-01

## 2024-01-01 RX ORDER — SODIUM CHLORIDE 9 MG/ML
5-250 INJECTION, SOLUTION INTRAVENOUS PRN
Status: CANCELLED | OUTPATIENT
Start: 2024-01-01

## 2024-01-01 RX ORDER — PACLITAXEL 100 MG/20ML
100 INJECTION, POWDER, LYOPHILIZED, FOR SUSPENSION INTRAVENOUS ONCE
Status: CANCELLED | OUTPATIENT
Start: 2024-01-01 | End: 2024-01-01

## 2024-01-01 RX ORDER — ALBUTEROL SULFATE 90 UG/1
4 AEROSOL, METERED RESPIRATORY (INHALATION) PRN
Status: CANCELLED | OUTPATIENT
Start: 2024-01-01

## 2024-01-01 RX ORDER — MEPERIDINE HYDROCHLORIDE 25 MG/ML
12.5 INJECTION INTRAMUSCULAR; INTRAVENOUS; SUBCUTANEOUS PRN
Status: CANCELLED | OUTPATIENT
Start: 2024-01-01

## 2024-01-01 RX ORDER — ONDANSETRON 2 MG/ML
8 INJECTION INTRAMUSCULAR; INTRAVENOUS
Status: CANCELLED | OUTPATIENT
Start: 2024-01-01

## 2024-01-01 RX ORDER — FAMOTIDINE 10 MG/ML
20 INJECTION, SOLUTION INTRAVENOUS
Status: CANCELLED | OUTPATIENT
Start: 2024-01-01

## 2024-01-01 RX ORDER — ONDANSETRON 2 MG/ML
8 INJECTION INTRAMUSCULAR; INTRAVENOUS ONCE
Status: CANCELLED | OUTPATIENT
Start: 2024-01-01 | End: 2024-01-01

## 2024-01-01 RX ORDER — SODIUM CHLORIDE 9 MG/ML
INJECTION, SOLUTION INTRAVENOUS CONTINUOUS
Status: CANCELLED | OUTPATIENT
Start: 2024-01-01

## 2024-01-01 ASSESSMENT — ENCOUNTER SYMPTOMS
DIARRHEA: 1
ABDOMINAL PAIN: 1
COUGH: 1
CONSTIPATION: 1
INDIGESTION: 1
FLATUS: 1
DYSPNEA ACTIVITY LEVEL: AT REST
CHEST TIGHTNESS: 1
VOMITING: 1
COUGH CHARACTERISTICS: MOIST

## 2024-01-22 ENCOUNTER — APPOINTMENT (OUTPATIENT)
Facility: HOSPITAL | Age: 69
End: 2024-01-22
Payer: OTHER GOVERNMENT

## 2024-01-25 ENCOUNTER — TELEPHONE (OUTPATIENT)
Age: 69
End: 2024-01-25

## 2024-01-25 NOTE — TELEPHONE ENCOUNTER
Patient called and stated that she has been experiencing some coughing. She said that it is more so at night and is uncomfortable when laying down. Patient also stated that she is also coughing up a clear mucus as well. Requested a call back.       # [phone]

## 2024-01-25 NOTE — TELEPHONE ENCOUNTER
01/25/24 1:21 PM Attempted to return call to patient x 2 via contact number provided. First call, patient answered but call immediately disconnected. Second attempt, phone rang to air--no response when this nurse spoke. Will attempt to call patient again later if no call back received.      1:33 PM Received return call. Verified patient ID x 2. Patient with complaints of cough with clear to white mucous. Notes voice is hoarse but denies sore throat. Also denies fevers and increased SOB. Patient describes that when she feels drainage in throat, this causes \"tickle\" that provokes cough. Cough worse at night and patient states at times it sounds like she has asthma. Patient shared she had a cold in November and cough has not resolved since. Advised that patient contact PCP regarding above symptoms since she is not currently under active treatment. While on phone, patient stated she is scheduled for CT chest/abdomen/pelvis on 01/29 and inquiring if she should keep this appointment due to cough. Encouraged her to proceed with appointment as scheduled, if able. Advised this nurse would update Dr. Smith and ABDULKADIR Sue, as well. If any changes or additional recommendations, will call patient back. She voiced understanding.

## 2024-01-29 ENCOUNTER — HOSPITAL ENCOUNTER (OUTPATIENT)
Facility: HOSPITAL | Age: 69
Discharge: HOME | End: 2024-02-01
Payer: MEDICARE

## 2024-01-29 DIAGNOSIS — C25.9 MALIGNANT NEOPLASM OF PANCREAS, UNSPECIFIED LOCATION OF MALIGNANCY (HCC): ICD-10-CM

## 2024-01-29 DIAGNOSIS — R91.8 PULMONARY NODULES/LESIONS, MULTIPLE: ICD-10-CM

## 2024-01-29 PROCEDURE — 74177 CT ABD & PELVIS W/CONTRAST: CPT

## 2024-01-29 PROCEDURE — 6360000004 HC RX CONTRAST MEDICATION: Performed by: EMERGENCY MEDICINE

## 2024-01-29 RX ADMIN — IOPAMIDOL 100 ML: 755 INJECTION, SOLUTION INTRAVENOUS at 15:28

## 2024-01-31 ENCOUNTER — APPOINTMENT (OUTPATIENT)
Facility: HOSPITAL | Age: 69
End: 2024-01-31
Payer: MEDICARE

## 2024-01-31 ENCOUNTER — HOSPITAL ENCOUNTER (EMERGENCY)
Facility: HOSPITAL | Age: 69
Discharge: HOME | End: 2024-01-31
Attending: EMERGENCY MEDICINE
Payer: MEDICARE

## 2024-01-31 VITALS
DIASTOLIC BLOOD PRESSURE: 80 MMHG | HEART RATE: 96 BPM | TEMPERATURE: 98.7 F | RESPIRATION RATE: 17 BRPM | OXYGEN SATURATION: 100 % | SYSTOLIC BLOOD PRESSURE: 143 MMHG

## 2024-01-31 DIAGNOSIS — C78.01 MALIGNANT NEOPLASM METASTATIC TO BOTH LUNGS (HCC): ICD-10-CM

## 2024-01-31 DIAGNOSIS — C78.02 MALIGNANT NEOPLASM METASTATIC TO BOTH LUNGS (HCC): ICD-10-CM

## 2024-01-31 DIAGNOSIS — R05.2 SUBACUTE COUGH: Primary | ICD-10-CM

## 2024-01-31 PROCEDURE — 71046 X-RAY EXAM CHEST 2 VIEWS: CPT

## 2024-01-31 PROCEDURE — 99283 EMERGENCY DEPT VISIT LOW MDM: CPT

## 2024-01-31 RX ORDER — BENZONATATE 100 MG/1
100 CAPSULE ORAL 3 TIMES DAILY PRN
Qty: 30 CAPSULE | Refills: 0 | Status: SHIPPED | OUTPATIENT
Start: 2024-01-31 | End: 2024-02-10

## 2024-01-31 RX ORDER — ALBUTEROL SULFATE 90 UG/1
2 AEROSOL, METERED RESPIRATORY (INHALATION) EVERY 4 HOURS PRN
Qty: 18 G | Refills: 0 | Status: SHIPPED | OUTPATIENT
Start: 2024-01-31

## 2024-01-31 ASSESSMENT — LIFESTYLE VARIABLES
HOW OFTEN DO YOU HAVE A DRINK CONTAINING ALCOHOL: NEVER
HOW MANY STANDARD DRINKS CONTAINING ALCOHOL DO YOU HAVE ON A TYPICAL DAY: PATIENT DOES NOT DRINK

## 2024-01-31 NOTE — ED TRIAGE NOTES
Pt ambulated to the treatment area with a steady gait. Pt states \"I have had a cough off and on for 3 weeks. I had a scan on Monday for some nodules on my lungs. I called my doctor they they can't see me until feb. And I am not able to sleep well.\" Pt denies fevers.

## 2024-01-31 NOTE — ED PROVIDER NOTES
[facility] EMERGENCY DEPT  EMERGENCY DEPARTMENT ENCOUNTER      Pt Name: Katiuska Fair  MRN: [id number]  Birthdate 1955  Date of evaluation: 2024  Provider: Raven Rahman MD    CHIEF COMPLAINT       Chief Complaint   Patient presents with    Cough         HISTORY OF PRESENT ILLNESS   (Location/Symptom, Timing/Onset, Context/Setting, Quality, Duration, Modifying Factors, Severity)  Note limiting factors.   Patient is a 68-year-old with pancreatic cancer metastatic to the lungs and mediastinum; worse on the right than the left.  She comes into the emergency department for right-sided chest pain that is not responding to Tylenol as well as cough that has been ongoing since November and keeping her up at night.  She is scheduled to see her primary care provider in a week and her oncologist a couple days after that but needs something to help her manage her symptoms.  Review of recent outpatient CT scan demonstrates interval worsening of the patient's pulmonary nodules and mediastinal masses.    The history is provided by the patient.         Review of External Medical Records:     Nursing Notes were reviewed.    REVIEW OF SYSTEMS    (2-9 systems for level 4, 10 or more for level 5)     Review of Systems    Except as noted above the remainder of the review of systems was reviewed and negative.       PAST MEDICAL HISTORY     Past Medical History:   Diagnosis Date    Arthritis     ostero arthritis, rhemathoid  lower back     Autoimmune disease (HCC)     Ulcerative Colitis.     Hypertension     CONTROLLED WITH MEDS    Hypothyroid 2011    Incisional hernia     Malignant neoplasm of head of pancreas (HCC) 2021    Ulcerative colitis (HCC)     Ulcerative colitis (HCC) 2010    Ulcerative colitis (HCC) 2010         SURGICAL HISTORY       Past Surgical History:   Procedure Laterality Date    BREAST SURGERY      cyst removed from left breast     SECTION      X1    COLONOSCOPY

## 2024-02-06 ENCOUNTER — OFFICE VISIT (OUTPATIENT)
Age: 69
End: 2024-02-06
Payer: OTHER GOVERNMENT

## 2024-02-06 VITALS
SYSTOLIC BLOOD PRESSURE: 128 MMHG | BODY MASS INDEX: 32.98 KG/M2 | TEMPERATURE: 98.1 F | HEART RATE: 88 BPM | DIASTOLIC BLOOD PRESSURE: 84 MMHG | OXYGEN SATURATION: 98 % | WEIGHT: 193.2 LBS | HEIGHT: 64 IN | RESPIRATION RATE: 16 BRPM

## 2024-02-06 DIAGNOSIS — G89.29 CHRONIC PAIN OF BOTH KNEES: ICD-10-CM

## 2024-02-06 DIAGNOSIS — M25.561 CHRONIC PAIN OF BOTH KNEES: ICD-10-CM

## 2024-02-06 DIAGNOSIS — R05.2 SUBACUTE COUGH: Primary | ICD-10-CM

## 2024-02-06 DIAGNOSIS — M25.562 CHRONIC PAIN OF BOTH KNEES: ICD-10-CM

## 2024-02-06 DIAGNOSIS — E11.9 TYPE 2 DIABETES MELLITUS WITHOUT COMPLICATION, WITHOUT LONG-TERM CURRENT USE OF INSULIN (HCC): ICD-10-CM

## 2024-02-06 DIAGNOSIS — I10 ESSENTIAL (PRIMARY) HYPERTENSION: ICD-10-CM

## 2024-02-06 DIAGNOSIS — C25.0 MALIGNANT NEOPLASM OF HEAD OF PANCREAS (HCC): ICD-10-CM

## 2024-02-06 DIAGNOSIS — E03.4 ATROPHY OF THYROID (ACQUIRED): ICD-10-CM

## 2024-02-06 PROCEDURE — 99215 OFFICE O/P EST HI 40 MIN: CPT | Performed by: INTERNAL MEDICINE

## 2024-02-06 PROCEDURE — 3074F SYST BP LT 130 MM HG: CPT | Performed by: INTERNAL MEDICINE

## 2024-02-06 PROCEDURE — 3079F DIAST BP 80-89 MM HG: CPT | Performed by: INTERNAL MEDICINE

## 2024-02-06 PROCEDURE — 1123F ACP DISCUSS/DSCN MKR DOCD: CPT | Performed by: INTERNAL MEDICINE

## 2024-02-06 RX ORDER — OXYCODONE HYDROCHLORIDE 5 MG/1
5 TABLET ORAL EVERY 6 HOURS PRN
Qty: 30 TABLET | Refills: 0 | Status: SHIPPED | OUTPATIENT
Start: 2024-02-06 | End: 2024-02-16

## 2024-02-06 ASSESSMENT — PATIENT HEALTH QUESTIONNAIRE - PHQ9
SUM OF ALL RESPONSES TO PHQ9 QUESTIONS 1 & 2: 1
SUM OF ALL RESPONSES TO PHQ QUESTIONS 1-9: 1
1. LITTLE INTEREST OR PLEASURE IN DOING THINGS: 0
SUM OF ALL RESPONSES TO PHQ QUESTIONS 1-9: 1
2. FEELING DOWN, DEPRESSED OR HOPELESS: 1

## 2024-02-08 ENCOUNTER — OFFICE VISIT (OUTPATIENT)
Age: 69
End: 2024-02-08
Payer: OTHER GOVERNMENT

## 2024-02-08 ENCOUNTER — HOSPITAL ENCOUNTER (OUTPATIENT)
Facility: HOSPITAL | Age: 69
Setting detail: INFUSION SERIES
Discharge: HOME | End: 2024-02-08
Payer: OTHER GOVERNMENT

## 2024-02-08 VITALS
SYSTOLIC BLOOD PRESSURE: 140 MMHG | OXYGEN SATURATION: 97 % | BODY MASS INDEX: 33.44 KG/M2 | RESPIRATION RATE: 18 BRPM | HEART RATE: 89 BPM | TEMPERATURE: 97.9 F | WEIGHT: 195.9 LBS | DIASTOLIC BLOOD PRESSURE: 85 MMHG | HEIGHT: 64 IN

## 2024-02-08 VITALS
SYSTOLIC BLOOD PRESSURE: 140 MMHG | BODY MASS INDEX: 33.44 KG/M2 | HEART RATE: 89 BPM | DIASTOLIC BLOOD PRESSURE: 85 MMHG | WEIGHT: 195.9 LBS | RESPIRATION RATE: 18 BRPM | HEIGHT: 64 IN | OXYGEN SATURATION: 97 % | TEMPERATURE: 97.9 F

## 2024-02-08 DIAGNOSIS — R59.0 MEDIASTINAL LYMPHADENOPATHY: ICD-10-CM

## 2024-02-08 DIAGNOSIS — R91.8 LUNG NODULES: ICD-10-CM

## 2024-02-08 DIAGNOSIS — G89.3 NEOPLASM RELATED PAIN: ICD-10-CM

## 2024-02-08 DIAGNOSIS — C25.9 MALIGNANT NEOPLASM OF PANCREAS, UNSPECIFIED LOCATION OF MALIGNANCY (HCC): Primary | ICD-10-CM

## 2024-02-08 DIAGNOSIS — Z71.89 GOALS OF CARE, COUNSELING/DISCUSSION: ICD-10-CM

## 2024-02-08 DIAGNOSIS — C25.9 PANCREATIC ADENOCARCINOMA (HCC): Primary | ICD-10-CM

## 2024-02-08 DIAGNOSIS — R05.2 SUBACUTE COUGH: ICD-10-CM

## 2024-02-08 LAB
ALBUMIN SERPL-MCNC: 3.4 G/DL (ref 3.5–5)
ALBUMIN/GLOB SERPL: 0.8 (ref 1.1–2.2)
ALP SERPL-CCNC: 113 U/L (ref 45–117)
ALT SERPL-CCNC: 31 U/L (ref 12–78)
ANION GAP SERPL CALC-SCNC: 4 MMOL/L (ref 5–15)
AST SERPL-CCNC: 30 U/L (ref 15–37)
BASOPHILS # BLD: 0 K/UL (ref 0–0.1)
BASOPHILS NFR BLD: 0 % (ref 0–1)
BILIRUB SERPL-MCNC: 1 MG/DL (ref 0.2–1)
BUN SERPL-MCNC: 7 MG/DL (ref 6–20)
BUN/CREAT SERPL: 9 (ref 12–20)
CALCIUM SERPL-MCNC: 9.3 MG/DL (ref 8.5–10.1)
CHLORIDE SERPL-SCNC: 103 MMOL/L (ref 97–108)
CO2 SERPL-SCNC: 28 MMOL/L (ref 21–32)
CREAT SERPL-MCNC: 0.75 MG/DL (ref 0.55–1.02)
DIFFERENTIAL METHOD BLD: ABNORMAL
EOSINOPHIL # BLD: 0.3 K/UL (ref 0–0.4)
EOSINOPHIL NFR BLD: 5 % (ref 0–7)
ERYTHROCYTE [DISTWIDTH] IN BLOOD BY AUTOMATED COUNT: 13.2 % (ref 11.5–14.5)
GLOBULIN SER CALC-MCNC: 4.2 G/DL (ref 2–4)
GLUCOSE SERPL-MCNC: 108 MG/DL (ref 65–100)
HCT VFR BLD AUTO: 33.7 % (ref 35–47)
HGB BLD-MCNC: 11.6 G/DL (ref 11.5–16)
IMM GRANULOCYTES # BLD AUTO: 0 K/UL (ref 0–0.04)
IMM GRANULOCYTES NFR BLD AUTO: 0 % (ref 0–0.5)
LYMPHOCYTES # BLD: 2.8 K/UL (ref 0.8–3.5)
LYMPHOCYTES NFR BLD: 49 % (ref 12–49)
MCH RBC QN AUTO: 32.3 PG (ref 26–34)
MCHC RBC AUTO-ENTMCNC: 34.4 G/DL (ref 30–36.5)
MCV RBC AUTO: 93.9 FL (ref 80–99)
MONOCYTES # BLD: 0.8 K/UL (ref 0–1)
MONOCYTES NFR BLD: 14 % (ref 5–13)
NEUTS SEG # BLD: 1.9 K/UL (ref 1.8–8)
NEUTS SEG NFR BLD: 32 % (ref 32–75)
NRBC # BLD: 0 K/UL (ref 0–0.01)
NRBC BLD-RTO: 0 PER 100 WBC
PLATELET # BLD AUTO: 238 K/UL (ref 150–400)
PMV BLD AUTO: 9.6 FL (ref 8.9–12.9)
POTASSIUM SERPL-SCNC: 3.6 MMOL/L (ref 3.5–5.1)
PROT SERPL-MCNC: 7.6 G/DL (ref 6.4–8.2)
RBC # BLD AUTO: 3.59 M/UL (ref 3.8–5.2)
SODIUM SERPL-SCNC: 135 MMOL/L (ref 136–145)
WBC # BLD AUTO: 5.8 K/UL (ref 3.6–11)

## 2024-02-08 PROCEDURE — 80053 COMPREHEN METABOLIC PANEL: CPT

## 2024-02-08 PROCEDURE — 86301 IMMUNOASSAY TUMOR CA 19-9: CPT

## 2024-02-08 PROCEDURE — 36591 DRAW BLOOD OFF VENOUS DEVICE: CPT

## 2024-02-08 PROCEDURE — 85025 COMPLETE CBC W/AUTO DIFF WBC: CPT

## 2024-02-08 PROCEDURE — 2580000003 HC RX 258: Performed by: INTERNAL MEDICINE

## 2024-02-08 PROCEDURE — 99215 OFFICE O/P EST HI 40 MIN: CPT | Performed by: INTERNAL MEDICINE

## 2024-02-08 RX ORDER — HEPARIN 100 UNIT/ML
500 SYRINGE INTRAVENOUS PRN
OUTPATIENT
Start: 2024-04-28

## 2024-02-08 RX ORDER — SODIUM CHLORIDE 9 MG/ML
25 INJECTION, SOLUTION INTRAVENOUS PRN
OUTPATIENT
Start: 2024-04-28

## 2024-02-08 RX ORDER — SODIUM CHLORIDE 0.9 % (FLUSH) 0.9 %
5-40 SYRINGE (ML) INJECTION PRN
OUTPATIENT
Start: 2024-04-28

## 2024-02-08 RX ORDER — ACETAMINOPHEN AND CODEINE PHOSPHATE 300; 15 MG/1; MG/1
1 TABLET ORAL EVERY 4 HOURS PRN
Qty: 28 TABLET | Refills: 0 | Status: SHIPPED | OUTPATIENT
Start: 2024-02-08 | End: 2024-02-22

## 2024-02-08 RX ORDER — SODIUM CHLORIDE 0.9 % (FLUSH) 0.9 %
5-40 SYRINGE (ML) INJECTION PRN
Status: DISCONTINUED | OUTPATIENT
Start: 2024-02-08 | End: 2024-02-09 | Stop reason: HOSPADM

## 2024-02-08 RX ADMIN — SODIUM CHLORIDE, PRESERVATIVE FREE 20 ML: 5 INJECTION INTRAVENOUS at 13:11

## 2024-02-08 ASSESSMENT — PAIN SCALES - GENERAL: PAINLEVEL_OUTOF10: 8

## 2024-02-08 ASSESSMENT — PAIN DESCRIPTION - LOCATION: LOCATION: SHOULDER

## 2024-02-08 ASSESSMENT — PATIENT HEALTH QUESTIONNAIRE - PHQ9
SUM OF ALL RESPONSES TO PHQ QUESTIONS 1-9: 0
SUM OF ALL RESPONSES TO PHQ QUESTIONS 1-9: 0
2. FEELING DOWN, DEPRESSED OR HOPELESS: 0
SUM OF ALL RESPONSES TO PHQ9 QUESTIONS 1 & 2: 0
SUM OF ALL RESPONSES TO PHQ QUESTIONS 1-9: 0
SUM OF ALL RESPONSES TO PHQ QUESTIONS 1-9: 0
1. LITTLE INTEREST OR PLEASURE IN DOING THINGS: 0

## 2024-02-08 ASSESSMENT — PAIN DESCRIPTION - ORIENTATION: ORIENTATION: RIGHT

## 2024-02-08 NOTE — PROGRESS NOTES
[facility] Progress Note    Date: February 8, 2024      1300: Pt arrived ambulatory to [facility] for Port Flush + Lab Work in stable condition.  Assessment completed. Port accessed with positive blood return. Labs drawn and sent for processing.       Patient Vitals for the past 12 hrs:   Temp Pulse Resp BP SpO2   02/08/24 1301 97.9 °F (36.6 °C) 89 18 (!) 140/85 97 %       Medications Administered         sodium chloride flush 0.9 % injection 5-40 mL Admin Date  02/08/2024 Action  Given Dose  20 mL Route  IntraVENous Administered By  Fabiana Terry, RN            1315:  Tolerated treatment well, no adverse reactions noted. Port flushed and de- accessed per protocol. D/Cd from [facility] ambulatory and in no distress.  Patient is aware of next scheduled [facility] appointment.    Future Appointments   Date Time Provider Department Center   5/2/2024  1:00 PM [facility]   7/25/2024  1:00 PM SS INF4 CH4 <1H [facility]         Fabiana Terry RN  February 8, 2024

## 2024-02-08 NOTE — PROGRESS NOTES
Chief Complaint   Patient presents with    Follow-up           Vitals:    02/08/24 1332   BP: (!) 140/85   Pulse: 89   Resp: 18   Temp: 97.9 °F (36.6 °C)   SpO2: 97%            1. Have you been to the ER, urgent care clinic since your last visit?  Hospitalized since your last visit?  Yes ER Back pain/cough  2. Have you seen or consulted any other health care providers outside of the [facility] since your last visit?  Include any pap smears or colon screening. Yes PCP      
9)  *  Stable 6 mm right upper lobe (image 12)  *  Enlarging 9 mm left upper lobe (image 18), previously 6 mm  *  Enlarging 5 mm right lower lobe (image 43), previously 2 mm  *  Enlarging 6 mm left lower lobe (image 50), previously 4 mm  INCIDENTALLY IMAGED UPPER ABDOMEN: Status post Whipple. Trace pneumobilia,  likely postoperative. Subcentimeter right renal hypodensity is too small to  accurately characterize; no dedicated follow-up recommended.  BONES: No destructive bone lesion. Degenerative changes.  IMPRESSION:  Interval slight progression of pulmonary nodule burden, with notable examples as  outlined above.    7/18/23 CT ch/abd/pelvis:  LUNGS: Bilateral subcentimeter pulmonary nodules again seen. Several nodules are  stable in size and several nodules have decreased in size in the interval.  Representative examples include stable 6 mm nodules at the right lung apex  (series 3 images 12 and 10); 4 mm right lower lobe nodule superior segment  (image 23), previously 5 mm; 4 mm nodule in the left costophrenic sulcus  posteriorly, previously 6 mm (image 48); stable 9 mm nodule in the medial left  upper lobe (image 15).   IMPRESSION:  1.  Interval decrease in size of pulmonary nodules.  2.  No metastatic disease in the abdomen/pelvis.    7/21/23 CT ch/abd/pelv:  LUNGS: Bilateral subcentimeter pulmonary nodules again seen. Several nodules are stable in size and several nodules have decreased in size in the interval.  Representative examples include stable 6 mm nodules at the right lung apex (series 3 images 12 and 10); 4 mm right lower lobe nodule superior segment (image 23), previously 5 mm; 4 mm nodule in the left costophrenic sulcus posteriorly, previously 6 mm (image 48); stable 9 mm nodule in the medial left upper lobe (image 15)  IMPRESSION:  1.  Interval decrease in size of pulmonary nodules.  2.  No metastatic disease in the abdomen/pelvis.    11/9/23 CT c/a/p:  FINDINGS:  THYROID: No nodule.  MEDIASTINUM:

## 2024-02-10 LAB — CANCER AG19-9 SERPL-ACNC: 2491 U/ML (ref 0–35)

## 2024-02-19 DIAGNOSIS — C25.9 MALIGNANT NEOPLASM OF PANCREAS, UNSPECIFIED LOCATION OF MALIGNANCY (HCC): ICD-10-CM

## 2024-02-19 DIAGNOSIS — G89.3 NEOPLASM RELATED PAIN: ICD-10-CM

## 2024-02-19 RX ORDER — ACETAMINOPHEN AND CODEINE PHOSPHATE 300; 15 MG/1; MG/1
1 TABLET ORAL EVERY 4 HOURS PRN
Qty: 28 TABLET | Refills: 0 | Status: SHIPPED | OUTPATIENT
Start: 2024-02-19 | End: 2024-03-04

## 2024-02-19 RX ORDER — ALBUTEROL SULFATE 90 UG/1
2 AEROSOL, METERED RESPIRATORY (INHALATION) EVERY 4 HOURS PRN
Qty: 18 G | Refills: 0 | Status: SHIPPED | OUTPATIENT
Start: 2024-02-19

## 2024-02-19 NOTE — TELEPHONE ENCOUNTER
Medication refill for     acetaminophen-Codeine (TYLENOL #2) 300-15 MG per tablet     albuterol sulfate HFA (VENTOLIN HFA) 108 (90 Base) MCG/ACT inhaler    [facility]/pharmacy #[id number] - [place], [place] - [address] [place] [phone] - F [phone]

## 2024-02-23 DIAGNOSIS — I10 ESSENTIAL (PRIMARY) HYPERTENSION: ICD-10-CM

## 2024-02-23 RX ORDER — HYDROCHLOROTHIAZIDE 12.5 MG/1
TABLET ORAL
Qty: 90 TABLET | Refills: 1 | Status: SHIPPED | OUTPATIENT
Start: 2024-02-23

## 2024-02-26 ENCOUNTER — TELEPHONE (OUTPATIENT)
Age: 69
End: 2024-02-26

## 2024-02-26 DIAGNOSIS — R05.2 SUBACUTE COUGH: ICD-10-CM

## 2024-02-26 DIAGNOSIS — Z71.89 GOALS OF CARE, COUNSELING/DISCUSSION: ICD-10-CM

## 2024-02-26 DIAGNOSIS — C25.9 MALIGNANT NEOPLASM OF PANCREAS, UNSPECIFIED LOCATION OF MALIGNANCY (HCC): Primary | ICD-10-CM

## 2024-02-26 DIAGNOSIS — G89.3 NEOPLASM RELATED PAIN: ICD-10-CM

## 2024-02-26 RX ORDER — OXYCODONE HYDROCHLORIDE 5 MG/1
10 TABLET ORAL EVERY 4 HOURS PRN
Qty: 60 TABLET | Refills: 0 | Status: SHIPPED | OUTPATIENT
Start: 2024-02-26 | End: 2024-03-11

## 2024-02-26 RX ORDER — PREDNISONE 20 MG/1
20 TABLET ORAL
Qty: 5 TABLET | Refills: 0 | Status: SHIPPED | OUTPATIENT
Start: 2024-02-26 | End: 2024-03-02

## 2024-02-26 NOTE — TELEPHONE ENCOUNTER
02/26/24 10:55 AM Called Dr. Rosenberg's office to request pathology report from [facility], if available. Call transferred to nurse. Left message requesting report but also inquired about expected result date if report is not yet available. Provided office phone number as well for call back.     02/28/24 10:02 AM Called Dr. Rosenberg's office--pathology report from [facility] not yet available. Will call back at later date to follow up.

## 2024-02-26 NOTE — TELEPHONE ENCOUNTER
02/26/24 1:35 PM Return call placed to patient. Verified patient ID x 2. Patient with complaints of ongoing rib/back pain in area of her lungs, as well as right shoulder pain. Patient suspecting right shoulder pain could be due to position that she sleeps in. Patient also with complaints of continued cough and SOB. Above symptoms remain unchanged--not worsening but also not improving with interventions. Patient states she takes Tylenol # 3 at night and then will take Oxycodone 5 mg a few hours later if pain is still present. She states that these medications make her sleepy, but do not help alleviate her pain. Patient utilizes heating pad which does help a little. Patient describes pain as being a sensitive type pain. Patient had difficulty elaborating this--stating it feels sensitive to touch but also hurting. She rated pain as a 7 out of 10 at time of call, but pain worsens at nighttime. She also states pain is constant during the day, but faint. Patient also notes some abdominal soreness which she attributes to her cough. Patient using inhaler more regularly--every 4 hours and feels that this helps her cough. Patient attempted tessalon perles with no improvement. Advised this nurse would forward above to Dr. Smith and call patient back with further recommendations. Patient voiced understanding.      3:23 PM Called patient. Advised of recommendations per Dr. Smith. Discussed that patient should not take her pain medication when driving/at work since this medication makes her drowsy. Patient voiced understanding. No further questions or concerns at this time.

## 2024-02-26 NOTE — TELEPHONE ENCOUNTER
Patient called and stated that she wanted to know if there was anything else to recommend for the discomfort in her lungs. Patient stated that she has been having a hard time sleeping. Requested a call back to discuss.       # [phone]

## 2024-02-29 ENCOUNTER — TELEPHONE (OUTPATIENT)
Age: 69
End: 2024-02-29

## 2024-03-01 ENCOUNTER — HOSPITAL ENCOUNTER (OUTPATIENT)
Facility: HOSPITAL | Age: 69
End: 2024-03-01
Attending: INTERNAL MEDICINE
Payer: MEDICARE

## 2024-03-01 ENCOUNTER — HOSPITAL ENCOUNTER (OUTPATIENT)
Facility: HOSPITAL | Age: 69
Discharge: HOME | End: 2024-03-01
Attending: INTERNAL MEDICINE
Payer: MEDICARE

## 2024-03-01 DIAGNOSIS — C25.9 MALIGNANT NEOPLASM OF PANCREAS, UNSPECIFIED LOCATION OF MALIGNANCY (HCC): ICD-10-CM

## 2024-03-01 DIAGNOSIS — R59.0 MEDIASTINAL LYMPHADENOPATHY: ICD-10-CM

## 2024-03-01 DIAGNOSIS — R91.8 LUNG NODULES: ICD-10-CM

## 2024-03-01 LAB
GLUCOSE BLD STRIP.AUTO-MCNC: 99 MG/DL (ref 65–117)
SERVICE CMNT-IMP: NORMAL

## 2024-03-01 PROCEDURE — 3430000000 HC RX DIAGNOSTIC RADIOPHARMACEUTICAL: Performed by: INTERNAL MEDICINE

## 2024-03-01 PROCEDURE — 82962 GLUCOSE BLOOD TEST: CPT

## 2024-03-01 PROCEDURE — 78815 PET IMAGE W/CT SKULL-THIGH: CPT

## 2024-03-01 PROCEDURE — A9609 HC RX DIAGNOSTIC RADIOPHARMACEUTICAL: HCPCS | Performed by: INTERNAL MEDICINE

## 2024-03-01 RX ORDER — FLUDEOXYGLUCOSE F-18 500 MCI/ML
10 INJECTION INTRAVENOUS
Status: COMPLETED | OUTPATIENT
Start: 2024-03-01 | End: 2024-03-01

## 2024-03-01 RX ADMIN — FLUDEOXYGLUCOSE F-18 10 MILLICURIE: 500 INJECTION INTRAVENOUS at 14:35

## 2024-03-06 ENCOUNTER — APPOINTMENT (OUTPATIENT)
Facility: HOSPITAL | Age: 69
End: 2024-03-06
Payer: OTHER GOVERNMENT

## 2024-03-06 ENCOUNTER — HOSPITAL ENCOUNTER (EMERGENCY)
Facility: HOSPITAL | Age: 69
Discharge: HOME | End: 2024-03-06
Attending: EMERGENCY MEDICINE
Payer: OTHER GOVERNMENT

## 2024-03-06 VITALS
BODY MASS INDEX: 31.58 KG/M2 | HEART RATE: 97 BPM | DIASTOLIC BLOOD PRESSURE: 86 MMHG | OXYGEN SATURATION: 97 % | WEIGHT: 185 LBS | SYSTOLIC BLOOD PRESSURE: 141 MMHG | RESPIRATION RATE: 16 BRPM | TEMPERATURE: 97.9 F | HEIGHT: 64 IN

## 2024-03-06 DIAGNOSIS — R05.3 CHRONIC COUGH: ICD-10-CM

## 2024-03-06 DIAGNOSIS — M54.6 ACUTE RIGHT-SIDED THORACIC BACK PAIN: Primary | ICD-10-CM

## 2024-03-06 LAB
ALBUMIN SERPL-MCNC: 3.6 G/DL (ref 3.5–5)
ALBUMIN/GLOB SERPL: 0.9 (ref 1.1–2.2)
ALP SERPL-CCNC: 193 U/L (ref 45–117)
ALT SERPL-CCNC: 63 U/L (ref 12–78)
ANION GAP SERPL CALC-SCNC: 2 MMOL/L (ref 5–15)
AST SERPL-CCNC: 43 U/L (ref 15–37)
BASOPHILS # BLD: 0 K/UL (ref 0–0.1)
BASOPHILS NFR BLD: 0 % (ref 0–1)
BILIRUB SERPL-MCNC: 1.5 MG/DL (ref 0.2–1)
BUN SERPL-MCNC: 6 MG/DL (ref 6–20)
BUN/CREAT SERPL: 7 (ref 12–20)
CALCIUM SERPL-MCNC: 9.6 MG/DL (ref 8.5–10.1)
CHLORIDE SERPL-SCNC: 100 MMOL/L (ref 97–108)
CO2 SERPL-SCNC: 30 MMOL/L (ref 21–32)
CREAT SERPL-MCNC: 0.83 MG/DL (ref 0.55–1.02)
DIFFERENTIAL METHOD BLD: ABNORMAL
EOSINOPHIL # BLD: 0.2 K/UL (ref 0–0.4)
EOSINOPHIL NFR BLD: 3 % (ref 0–7)
ERYTHROCYTE [DISTWIDTH] IN BLOOD BY AUTOMATED COUNT: 13.2 % (ref 11.5–14.5)
GLOBULIN SER CALC-MCNC: 4.2 G/DL (ref 2–4)
GLUCOSE SERPL-MCNC: 107 MG/DL (ref 65–100)
HCT VFR BLD AUTO: 38.1 % (ref 35–47)
HGB BLD-MCNC: 13.1 G/DL (ref 11.5–16)
IMM GRANULOCYTES # BLD AUTO: 0 K/UL (ref 0–0.04)
IMM GRANULOCYTES NFR BLD AUTO: 1 % (ref 0–0.5)
LYMPHOCYTES # BLD: 2.5 K/UL (ref 0.8–3.5)
LYMPHOCYTES NFR BLD: 38 % (ref 12–49)
MCH RBC QN AUTO: 32.4 PG (ref 26–34)
MCHC RBC AUTO-ENTMCNC: 34.4 G/DL (ref 30–36.5)
MCV RBC AUTO: 94.3 FL (ref 80–99)
MONOCYTES # BLD: 0.8 K/UL (ref 0–1)
MONOCYTES NFR BLD: 12 % (ref 5–13)
NEUTS SEG # BLD: 3 K/UL (ref 1.8–8)
NEUTS SEG NFR BLD: 46 % (ref 32–75)
NRBC # BLD: 0 K/UL (ref 0–0.01)
NRBC BLD-RTO: 0 PER 100 WBC
NT PRO BNP: 105 PG/ML
PLATELET # BLD AUTO: 254 K/UL (ref 150–400)
PMV BLD AUTO: 9.3 FL (ref 8.9–12.9)
POTASSIUM SERPL-SCNC: 3.6 MMOL/L (ref 3.5–5.1)
PROT SERPL-MCNC: 7.8 G/DL (ref 6.4–8.2)
RBC # BLD AUTO: 4.04 M/UL (ref 3.8–5.2)
SODIUM SERPL-SCNC: 132 MMOL/L (ref 136–145)
TROPONIN I SERPL HS-MCNC: 12 NG/L (ref 0–51)
WBC # BLD AUTO: 6.5 K/UL (ref 3.6–11)

## 2024-03-06 PROCEDURE — 2500000003 HC RX 250 WO HCPCS: Performed by: EMERGENCY MEDICINE

## 2024-03-06 PROCEDURE — 6360000002 HC RX W HCPCS: Performed by: EMERGENCY MEDICINE

## 2024-03-06 PROCEDURE — 6360000004 HC RX CONTRAST MEDICATION: Performed by: EMERGENCY MEDICINE

## 2024-03-06 PROCEDURE — 96374 THER/PROPH/DIAG INJ IV PUSH: CPT

## 2024-03-06 PROCEDURE — 36415 COLL VENOUS BLD VENIPUNCTURE: CPT

## 2024-03-06 PROCEDURE — 85025 COMPLETE CBC W/AUTO DIFF WBC: CPT

## 2024-03-06 PROCEDURE — 2580000003 HC RX 258: Performed by: EMERGENCY MEDICINE

## 2024-03-06 PROCEDURE — 99285 EMERGENCY DEPT VISIT HI MDM: CPT

## 2024-03-06 PROCEDURE — 71275 CT ANGIOGRAPHY CHEST: CPT

## 2024-03-06 PROCEDURE — 83880 ASSAY OF NATRIURETIC PEPTIDE: CPT

## 2024-03-06 PROCEDURE — 96375 TX/PRO/DX INJ NEW DRUG ADDON: CPT

## 2024-03-06 PROCEDURE — 84484 ASSAY OF TROPONIN QUANT: CPT

## 2024-03-06 PROCEDURE — 80053 COMPREHEN METABOLIC PANEL: CPT

## 2024-03-06 RX ORDER — METHYLPREDNISOLONE 4 MG/1
TABLET ORAL
Qty: 1 KIT | Refills: 0 | Status: SHIPPED | OUTPATIENT
Start: 2024-03-06

## 2024-03-06 RX ORDER — HYDROMORPHONE HYDROCHLORIDE 1 MG/ML
1 INJECTION, SOLUTION INTRAMUSCULAR; INTRAVENOUS; SUBCUTANEOUS
Status: DISCONTINUED | OUTPATIENT
Start: 2024-03-06 | End: 2024-03-06 | Stop reason: HOSPADM

## 2024-03-06 RX ORDER — IBUPROFEN 800 MG/1
800 TABLET ORAL EVERY 6 HOURS PRN
Qty: 21 TABLET | Refills: 0 | Status: SHIPPED | OUTPATIENT
Start: 2024-03-06 | End: 2024-03-08

## 2024-03-06 RX ORDER — ACETAMINOPHEN 500 MG
1000 TABLET ORAL EVERY 6 HOURS PRN
Qty: 40 TABLET | Refills: 0 | Status: SHIPPED | OUTPATIENT
Start: 2024-03-06

## 2024-03-06 RX ORDER — ONDANSETRON 2 MG/ML
4 INJECTION INTRAMUSCULAR; INTRAVENOUS
Status: DISCONTINUED | OUTPATIENT
Start: 2024-03-06 | End: 2024-03-06 | Stop reason: HOSPADM

## 2024-03-06 RX ORDER — 0.9 % SODIUM CHLORIDE 0.9 %
1000 INTRAVENOUS SOLUTION INTRAVENOUS ONCE
Status: COMPLETED | OUTPATIENT
Start: 2024-03-06 | End: 2024-03-06

## 2024-03-06 RX ADMIN — SODIUM CHLORIDE 1000 ML: 9 INJECTION, SOLUTION INTRAVENOUS at 08:07

## 2024-03-06 RX ADMIN — IOPAMIDOL 100 ML: 755 INJECTION, SOLUTION INTRAVENOUS at 08:17

## 2024-03-06 RX ADMIN — ONDANSETRON 4 MG: 2 INJECTION INTRAMUSCULAR; INTRAVENOUS at 08:07

## 2024-03-06 RX ADMIN — HYDROMORPHONE HYDROCHLORIDE 1 MG: 1 INJECTION, SOLUTION INTRAMUSCULAR; INTRAVENOUS; SUBCUTANEOUS at 08:07

## 2024-03-06 ASSESSMENT — PAIN - FUNCTIONAL ASSESSMENT
PAIN_FUNCTIONAL_ASSESSMENT: 0-10

## 2024-03-06 ASSESSMENT — PAIN DESCRIPTION - DESCRIPTORS
DESCRIPTORS: DISCOMFORT
DESCRIPTORS: DISCOMFORT

## 2024-03-06 ASSESSMENT — PAIN DESCRIPTION - ORIENTATION
ORIENTATION: RIGHT
ORIENTATION: RIGHT

## 2024-03-06 ASSESSMENT — PAIN DESCRIPTION - LOCATION
LOCATION: BACK

## 2024-03-06 ASSESSMENT — PAIN SCALES - GENERAL
PAINLEVEL_OUTOF10: 3
PAINLEVEL_OUTOF10: 9
PAINLEVEL_OUTOF10: 3
PAINLEVEL_OUTOF10: 10

## 2024-03-06 NOTE — ED NOTES
Discharge instructions provided. Pt verbalized understanding. Opportunity provided for questions. Pt discharged home.

## 2024-03-06 NOTE — ED TRIAGE NOTES
Pt arrives to the ER for complaints of right sided back pain, pt reports that she was recently diagnosed with pancreatic cancer.     Pt reports worsening pain with movement.     Pt reports that she feels she is unable to get comfortable and feels fatigued.     Denies any chest pain or shortness of breath.

## 2024-03-06 NOTE — ED PROVIDER NOTES
(*)     Alk Phosphatase 193 (*)     Globulin 4.2 (*)     Albumin/Globulin Ratio 0.9 (*)     All other components within normal limits   TROPONIN   BRAIN NATRIURETIC PEPTIDE       All other labs were within normal range or not returned as of this dictation.    EMERGENCY DEPARTMENT COURSE and DIFFERENTIAL DIAGNOSIS/MDM:   Vitals:    Vitals:    03/06/24 0735   BP: (!) 155/90   Pulse: 93   Resp: 16   Temp: 97.5 °F (36.4 °C)   TempSrc: Oral   SpO2: 97%   Weight: 83.9 kg (185 lb)   Height: 1.626 m (5' 4\")           Medical Decision Making  68 y.o. female presents with right sided back and chest pain. This is a chronic ongoing issue and has worsened. Possible this is due to metastatic disease but given malignancy she is higher risk for PE so CTA ordered for definitive evaluation. CT shows no changes to underlying disease process and no signs of PE. No clear explanation for pain but could be referred from hilar mass effect or micrometastases to bone. Dilaudid controlled her pain well here, she is asking about switching her pain regimen which I recommended she do under the direction of her primary team. No signs of ACS or heart failure on lab screening, pain appears noncardiac. Can schedule tylenol/NSAIDs along with oxycodone at home and provided steroids to help with any metastatic inflammatory changes which may be contributing. Plan to follow up with PCP as needed and return precautions discussed for worsening or new concerning symptoms.     Problems Addressed:  Acute right-sided thoracic back pain: chronic illness or injury with exacerbation, progression, or side effects of treatment  Chronic cough: chronic illness or injury    Amount and/or Complexity of Data Reviewed  Labs: ordered. Decision-making details documented in ED Course.  Radiology: ordered and independent interpretation performed. Decision-making details documented in ED Course.    Risk  OTC drugs.  Prescription drug management.  Parenteral controlled

## 2024-03-08 ENCOUNTER — TELEPHONE (OUTPATIENT)
Age: 69
End: 2024-03-08

## 2024-03-08 ENCOUNTER — OFFICE VISIT (OUTPATIENT)
Age: 69
End: 2024-03-08
Payer: MEDICARE

## 2024-03-08 VITALS
SYSTOLIC BLOOD PRESSURE: 128 MMHG | OXYGEN SATURATION: 98 % | HEIGHT: 64 IN | HEART RATE: 100 BPM | TEMPERATURE: 98.1 F | RESPIRATION RATE: 18 BRPM | BODY MASS INDEX: 31.1 KG/M2 | DIASTOLIC BLOOD PRESSURE: 90 MMHG | WEIGHT: 182.2 LBS

## 2024-03-08 DIAGNOSIS — C25.9 MALIGNANT NEOPLASM OF PANCREAS METASTATIC TO LUNG (HCC): Primary | ICD-10-CM

## 2024-03-08 DIAGNOSIS — R05.2 SUBACUTE COUGH: ICD-10-CM

## 2024-03-08 DIAGNOSIS — G89.3 CHRONIC NEOPLASM-RELATED PAIN: Primary | ICD-10-CM

## 2024-03-08 DIAGNOSIS — E87.6 HYPOKALEMIA: ICD-10-CM

## 2024-03-08 DIAGNOSIS — C79.51 CANCER, METASTATIC TO BONE (HCC): ICD-10-CM

## 2024-03-08 DIAGNOSIS — Z71.89 GOALS OF CARE, COUNSELING/DISCUSSION: ICD-10-CM

## 2024-03-08 DIAGNOSIS — I10 PRIMARY HYPERTENSION: ICD-10-CM

## 2024-03-08 DIAGNOSIS — C78.00 MALIGNANT NEOPLASM OF PANCREAS METASTATIC TO LUNG (HCC): Primary | ICD-10-CM

## 2024-03-08 DIAGNOSIS — G89.3 CHRONIC NEOPLASM-RELATED PAIN: ICD-10-CM

## 2024-03-08 PROCEDURE — 99215 OFFICE O/P EST HI 40 MIN: CPT | Performed by: INTERNAL MEDICINE

## 2024-03-08 RX ORDER — ALBUTEROL SULFATE 90 UG/1
2 AEROSOL, METERED RESPIRATORY (INHALATION) EVERY 4 HOURS PRN
Qty: 18 G | Refills: 5 | Status: SHIPPED | OUTPATIENT
Start: 2024-03-08

## 2024-03-08 RX ORDER — HYDROMORPHONE HYDROCHLORIDE 2 MG/1
2 TABLET ORAL EVERY 8 HOURS PRN
Qty: 20 TABLET | Refills: 0 | Status: SHIPPED | OUTPATIENT
Start: 2024-03-08 | End: 2024-04-07

## 2024-03-08 ASSESSMENT — PATIENT HEALTH QUESTIONNAIRE - PHQ9
2. FEELING DOWN, DEPRESSED OR HOPELESS: 0
SUM OF ALL RESPONSES TO PHQ QUESTIONS 1-9: 0
SUM OF ALL RESPONSES TO PHQ9 QUESTIONS 1 & 2: 0
1. LITTLE INTEREST OR PLEASURE IN DOING THINGS: 0
SUM OF ALL RESPONSES TO PHQ QUESTIONS 1-9: 0

## 2024-03-08 NOTE — TELEPHONE ENCOUNTER
Would like a refill of the Voltaren 1% gel.      We can call this into patients pharmacy:  [facility]/pharmacy #[id number] - [place], [place] - [address] - P [phone] - F [phone] [address] [address]  Phone: [phone]  Fax: [phone]

## 2024-03-08 NOTE — PROGRESS NOTES
Chief Complaint   Patient presents with    Follow-up           Vitals:    03/08/24 0920   BP: (!) 128/90   Pulse: 100   Resp: 18   Temp: 98.1 °F (36.7 °C)   SpO2: 98%            1. Have you been to the ER, urgent care clinic since your last visit?  Hospitalized since your last visit?  Yes [facility] in upper back and dehydration  2. Have you seen or consulted any other health care providers outside of the [facility] since your last visit?  Include any pap smears or colon screening. No      
Whipple  procedure.  COLON: No dilatation or wall thickening.  APPENDIX: Not visualized.  PERITONEUM: No ascites or pneumoperitoneum.  RETROPERITONEUM: No lymphadenopathy or aortic aneurysm.  REPRODUCTIVE ORGANS: Normal uterus.  URINARY BLADDER: No mass or calculus.  BONES: Degenerative change.  ADDITIONAL COMMENTS: N/A  IMPRESSION:  Imaging findings are consistent with interval progression of disease.   Increased subcarinal adenopathy with new hilar adenopathy and new right  perihilar mass lesion.  Scattered pulmonary nodules are also increased in size from previous study.    3/4/24 PET:  FINDINGS:  HEAD/NECK: No apparent foci of abnormal hypermetabolism. Cerebral evaluation is  limited by normal intense activity.  CHEST: Hypermetabolic prevascular, AP window, precarinal, subcarinal, and right  hilar lymph nodes are noted, maximum SUV of a right hilar lymph node is 9.8.  ABDOMEN/PELVIS: There is a focus of increased tracer which corresponds to a soft  tissue abnormality which appears to be arising from the tail of the pancreas,  maximum SUV 11.5.  SKELETON: Numerous osseous PET avid foci are noted, throughout the cervical,  thoracic, lumbar spine, scapula bilaterally, right clavicle, left ribs, sacrum,  iliac wings bilaterally, and visualized femur bilaterally. Mandibular foci may  be related to dental disease. Additionally, there are small metabolic foci in  the buttock and shoulder soft tissues bilaterally.  IMPRESSION:  Hypermetabolic mediastinal and right hilar lymphadenopathy. Numerous  hypermetabolic osseous foci and soft tissue foci. Findings are compatible with  metastatic disease. Focus of increased tracer activity corresponds to a soft  tissue abnormality which appears to be arising from the tail of the pancreas.          Assssment/Recommendations:   68 y.o. female with Ulcerative colitis, Hyopthyroidism, here for pancreatic adenocarcinoma     1. Metastatic pancreatic adenocarcinoma:  Initial diagnosis

## 2024-03-08 NOTE — TELEPHONE ENCOUNTER
Patient was calling for refill of:     albuterol sulfate HFA (VENTOLIN HFA) 108 (90 Base) MCG/ACT inhaler     We can call this into patients pharmacy:  [facility]/pharmacy #[id number] - [place], [place] - [address] [place] - P [phone] - F [phone] [address] [address]  Phone: [phone]  Fax: [phone]     Patient also wondering how she would be able to get this renewed without calling us monthly

## 2024-03-11 ENCOUNTER — TELEPHONE (OUTPATIENT)
Age: 69
End: 2024-03-11

## 2024-03-11 DIAGNOSIS — C25.0 MALIGNANT NEOPLASM OF HEAD OF PANCREAS (HCC): Primary | ICD-10-CM

## 2024-03-11 DIAGNOSIS — G89.3 CHRONIC NEOPLASM-RELATED PAIN: ICD-10-CM

## 2024-03-11 DIAGNOSIS — C25.9 PANCREATIC ADENOCARCINOMA (HCC): ICD-10-CM

## 2024-03-11 RX ORDER — HEPARIN 100 UNIT/ML
500 SYRINGE INTRAVENOUS PRN
OUTPATIENT
Start: 2024-04-01

## 2024-03-11 RX ORDER — SODIUM CHLORIDE 9 MG/ML
INJECTION, SOLUTION INTRAVENOUS CONTINUOUS
OUTPATIENT
Start: 2024-03-18

## 2024-03-11 RX ORDER — ONDANSETRON 2 MG/ML
8 INJECTION INTRAMUSCULAR; INTRAVENOUS
OUTPATIENT
Start: 2024-04-01

## 2024-03-11 RX ORDER — PACLITAXEL 100 MG/20ML
125 INJECTION, POWDER, LYOPHILIZED, FOR SUSPENSION INTRAVENOUS ONCE
OUTPATIENT
Start: 2024-03-25 | End: 2024-03-22

## 2024-03-11 RX ORDER — LEVOTHYROXINE SODIUM 0.12 MG/1
125 TABLET ORAL
Qty: 90 TABLET | Refills: 1 | Status: SHIPPED | OUTPATIENT
Start: 2024-03-11

## 2024-03-11 RX ORDER — SODIUM CHLORIDE 9 MG/ML
5-250 INJECTION, SOLUTION INTRAVENOUS PRN
OUTPATIENT
Start: 2024-03-25

## 2024-03-11 RX ORDER — HEPARIN 100 UNIT/ML
500 SYRINGE INTRAVENOUS PRN
OUTPATIENT
Start: 2024-03-25

## 2024-03-11 RX ORDER — ALBUTEROL SULFATE 90 UG/1
4 AEROSOL, METERED RESPIRATORY (INHALATION) PRN
OUTPATIENT
Start: 2024-03-18

## 2024-03-11 RX ORDER — HEPARIN 100 UNIT/ML
500 SYRINGE INTRAVENOUS PRN
OUTPATIENT
Start: 2024-03-18

## 2024-03-11 RX ORDER — MEPERIDINE HYDROCHLORIDE 25 MG/ML
12.5 INJECTION INTRAMUSCULAR; INTRAVENOUS; SUBCUTANEOUS PRN
OUTPATIENT
Start: 2024-03-18

## 2024-03-11 RX ORDER — DIPHENHYDRAMINE HYDROCHLORIDE 50 MG/ML
50 INJECTION INTRAMUSCULAR; INTRAVENOUS
OUTPATIENT
Start: 2024-04-01

## 2024-03-11 RX ORDER — ALBUTEROL SULFATE 90 UG/1
4 AEROSOL, METERED RESPIRATORY (INHALATION) PRN
OUTPATIENT
Start: 2024-03-25

## 2024-03-11 RX ORDER — SODIUM CHLORIDE 9 MG/ML
5-250 INJECTION, SOLUTION INTRAVENOUS PRN
OUTPATIENT
Start: 2024-03-18

## 2024-03-11 RX ORDER — EPINEPHRINE 1 MG/ML
0.3 INJECTION, SOLUTION, CONCENTRATE INTRAVENOUS PRN
OUTPATIENT
Start: 2024-03-25

## 2024-03-11 RX ORDER — ACETAMINOPHEN 325 MG/1
650 TABLET ORAL
OUTPATIENT
Start: 2024-03-25

## 2024-03-11 RX ORDER — ONDANSETRON 2 MG/ML
8 INJECTION INTRAMUSCULAR; INTRAVENOUS
OUTPATIENT
Start: 2024-03-25

## 2024-03-11 RX ORDER — ONDANSETRON 2 MG/ML
8 INJECTION INTRAMUSCULAR; INTRAVENOUS ONCE
OUTPATIENT
Start: 2024-03-18 | End: 2024-03-15

## 2024-03-11 RX ORDER — ONDANSETRON 2 MG/ML
8 INJECTION INTRAMUSCULAR; INTRAVENOUS
OUTPATIENT
Start: 2024-03-18

## 2024-03-11 RX ORDER — PACLITAXEL 100 MG/20ML
125 INJECTION, POWDER, LYOPHILIZED, FOR SUSPENSION INTRAVENOUS ONCE
OUTPATIENT
Start: 2024-03-18 | End: 2024-03-15

## 2024-03-11 RX ORDER — SODIUM CHLORIDE 0.9 % (FLUSH) 0.9 %
5-40 SYRINGE (ML) INJECTION PRN
OUTPATIENT
Start: 2024-04-01

## 2024-03-11 RX ORDER — SODIUM CHLORIDE 9 MG/ML
5-250 INJECTION, SOLUTION INTRAVENOUS PRN
OUTPATIENT
Start: 2024-04-01

## 2024-03-11 RX ORDER — PACLITAXEL 100 MG/20ML
125 INJECTION, POWDER, LYOPHILIZED, FOR SUSPENSION INTRAVENOUS ONCE
OUTPATIENT
Start: 2024-04-01 | End: 2024-03-29

## 2024-03-11 RX ORDER — ONDANSETRON 2 MG/ML
8 INJECTION INTRAMUSCULAR; INTRAVENOUS ONCE
OUTPATIENT
Start: 2024-04-01 | End: 2024-03-29

## 2024-03-11 RX ORDER — ONDANSETRON 2 MG/ML
8 INJECTION INTRAMUSCULAR; INTRAVENOUS ONCE
OUTPATIENT
Start: 2024-03-25 | End: 2024-03-22

## 2024-03-11 RX ORDER — SODIUM CHLORIDE 9 MG/ML
INJECTION, SOLUTION INTRAVENOUS CONTINUOUS
OUTPATIENT
Start: 2024-03-25

## 2024-03-11 RX ORDER — SODIUM CHLORIDE 0.9 % (FLUSH) 0.9 %
5-40 SYRINGE (ML) INJECTION PRN
OUTPATIENT
Start: 2024-03-18

## 2024-03-11 RX ORDER — MEPERIDINE HYDROCHLORIDE 25 MG/ML
12.5 INJECTION INTRAMUSCULAR; INTRAVENOUS; SUBCUTANEOUS PRN
OUTPATIENT
Start: 2024-04-01

## 2024-03-11 RX ORDER — SODIUM CHLORIDE 9 MG/ML
INJECTION, SOLUTION INTRAVENOUS CONTINUOUS
OUTPATIENT
Start: 2024-04-01

## 2024-03-11 RX ORDER — DIPHENHYDRAMINE HYDROCHLORIDE 50 MG/ML
50 INJECTION INTRAMUSCULAR; INTRAVENOUS
OUTPATIENT
Start: 2024-03-25

## 2024-03-11 RX ORDER — ACETAMINOPHEN 325 MG/1
650 TABLET ORAL
OUTPATIENT
Start: 2024-03-18

## 2024-03-11 RX ORDER — MEPERIDINE HYDROCHLORIDE 25 MG/ML
12.5 INJECTION INTRAMUSCULAR; INTRAVENOUS; SUBCUTANEOUS PRN
OUTPATIENT
Start: 2024-03-25

## 2024-03-11 RX ORDER — SODIUM CHLORIDE 0.9 % (FLUSH) 0.9 %
5-40 SYRINGE (ML) INJECTION PRN
OUTPATIENT
Start: 2024-03-25

## 2024-03-11 RX ORDER — ALBUTEROL SULFATE 90 UG/1
4 AEROSOL, METERED RESPIRATORY (INHALATION) PRN
OUTPATIENT
Start: 2024-04-01

## 2024-03-11 RX ORDER — ACETAMINOPHEN 325 MG/1
650 TABLET ORAL
OUTPATIENT
Start: 2024-04-01

## 2024-03-11 RX ORDER — EPINEPHRINE 1 MG/ML
0.3 INJECTION, SOLUTION, CONCENTRATE INTRAVENOUS PRN
OUTPATIENT
Start: 2024-04-01

## 2024-03-11 RX ORDER — DIPHENHYDRAMINE HYDROCHLORIDE 50 MG/ML
50 INJECTION INTRAMUSCULAR; INTRAVENOUS
OUTPATIENT
Start: 2024-03-18

## 2024-03-11 RX ORDER — EPINEPHRINE 1 MG/ML
0.3 INJECTION, SOLUTION, CONCENTRATE INTRAVENOUS PRN
OUTPATIENT
Start: 2024-03-18

## 2024-03-11 RX ORDER — HYDROMORPHONE HYDROCHLORIDE 2 MG/1
2 TABLET ORAL EVERY 8 HOURS PRN
Qty: 20 TABLET | Refills: 0 | Status: SHIPPED | OUTPATIENT
Start: 2024-03-11 | End: 2024-04-10

## 2024-03-11 RX ORDER — POTASSIUM CHLORIDE 750 MG/1
10 TABLET, EXTENDED RELEASE ORAL DAILY
Qty: 90 TABLET | Refills: 1 | Status: SHIPPED | OUTPATIENT
Start: 2024-03-11

## 2024-03-11 NOTE — TELEPHONE ENCOUNTER
of above as well in case of any further recommendations based on patient update. Unable to verify if patient was able to obtain transportation for upcoming appointment, patient had to disconnect call.     03/14/24 10:02 AM Called patient. Verified patient ID x 2. Confirmed that patient is able to attend lab and MD appointments tomorrow. Patient does not wish to receive chemotherapy tomorrow. She explained that she thinks she will want to proceed with chemotherapy treatment on a different date, but would like to further discuss with Dr. Smith at appointment. Will keep appointment as is and update ABDULKADIR Sue, of above. Will call patient back if provider prefers to reschedule appointments so MD visit is consolidated with date of treatment. While on phone, patient reported continued pain in arms and right side. Pain has not worsened. Patient took Dilaudid twice yesterday and took Oxycodone around 2 AM this morning. Patient states medications help to take the edge off but pain does not fully resolve. Pain also worse with movement. Patient not currently taking over the counter medications for pain. Patient also with complaints of continued constipation--she reports she was able to have small bowel movement on Tuesday, 03/12, after administering fleets enema. Patient states abdominal discomfort/bloating resolved. Still passing gas and feels urge to have bowel movement. Patient taking Dulcolax and Miralax daily. She inquired if she should take these medications before or after food. Discussed that either is appropriate. Advised this nurse would forward above to Linette. Advised that provider will likely discuss above further during office visit tomorrow, but will call patient back if any new recommendations in the meantime. Patient voiced understanding.

## 2024-03-12 NOTE — TELEPHONE ENCOUNTER
[facility] Palliative Medicine Office  Nursing Note  [phone]  Fax [phone]      Name:  Katiuska Fair  YOB: 1955    Received outpatient Palliative Medicine referral from Dr. Fiona Smith to see patient for symptom management and supportive care. Chart  reviewed. Katiuska Fair is a 68 y.o. female with adenocarcinoma of pancreas with lung nodules.  Patient's most recent office visit with Dr. Smith was on 2/8/24.        Nurse called patient and explained [facility] outpatient Palliative Medicine services. Appointment scheduled for 3/19/24 at 10:30am with Dr. Jaylyn Real.      Tali Prado RN, Gerontological Nursing-BC, [facility]   Speech Therapy:    Speech Therapy Triage Note    Speech Therapy order received and discharged following our established triage process. A screen for Communication/ Cognition was completed by Physical therapy. No reports of new onset dysphagia. Additionally, no reports of swallowing difficulty. See the results and recommendations in their plan of care note. Results indicate no Acute care level Speech Therapy is indicated at this time. Please reorder should new concerns arise.     Recommendations    OT:                PT:

## 2024-03-13 NOTE — PROGRESS NOTES
[facility] [place] at [facility]   [address], [address] [address]   W: [phone]  F: [phone]        Reason for Visit:   Katiuska Fair is a 68 y.o. female who is seen for follow up of pancreatic adenocarcinoma.      Hematology/Oncology Treatment History:       Diagnosis: Pancreatic adenocarcinoma     Stage: clinical Stage Ib [T2N0] at diagnosis; Stage III [fuT2pG2] at surgery     Pathology:    -9/26/21 Cytology - brushings from common bile duct: Reactive glandular epithelial cells and bile    -9/28/21 pancreatic head mass biopsy: Adenocarcinoma.    -10/22/21 [facility] mult-cancer panel -negative    -2/23/22 Pancreaticoduodenectomy (Whipple resection): Ductal adenocarcinoma, G2, 1.2cm. Tumor invades peripancreatic soft tissues. LVI negative. PNI present. Margins uninvolved.  5/29 LNs involved with cancer.    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 No germline pathogenic variants.    -Clinical trials: Trial of Ulixertinib in combo with hydroxychloroquine in patients with advanced GI malignancies ([id number]). Phase II [place], [place] for KRAS. pG12D mutation.    -2/22/24 LN, N7, EBUS guided FNA: Positive for malignant cells, consistent with non-small cell carcinoma. Insufficient cells to perform IHC for further classification     Prior Treatment:   1. Neoadjuvant FOLFIRINOX x 6 cycles, 10/18/21-1/10/22  2. PYLORUS PRESERVING WHIPPLE PROCEDURE AND PORTAL LYPHMADENECTOMY, 2/23/22  3. Adjuvant FOLFIRINOX x 6 cycles, 4/12/22 - 7/5/22.      Current Treatment: planning in progress for Maury-Abraxane to potentially start 3/15/24      History of Present Illness:   Katiuska Fair is a pleasant 68 y.o. female who comes in for follow up of pancreatic cancer. She presented in 9/2021 with obstructive jaundice, transaminitis. ERCP on 9/26/21 notable  for distal CBD stricture; possible tumor/mass located in the

## 2024-03-15 ENCOUNTER — TELEPHONE (OUTPATIENT)
Age: 69
End: 2024-03-15

## 2024-03-15 ENCOUNTER — OFFICE VISIT (OUTPATIENT)
Age: 69
End: 2024-03-15
Payer: MEDICARE

## 2024-03-15 ENCOUNTER — HOSPITAL ENCOUNTER (OUTPATIENT)
Facility: HOSPITAL | Age: 69
Setting detail: INFUSION SERIES
Discharge: HOME | End: 2024-03-15
Payer: MEDICARE

## 2024-03-15 ENCOUNTER — CLINICAL DOCUMENTATION (OUTPATIENT)
Age: 69
End: 2024-03-15

## 2024-03-15 VITALS
OXYGEN SATURATION: 98 % | HEIGHT: 64 IN | RESPIRATION RATE: 16 BRPM | WEIGHT: 174.7 LBS | TEMPERATURE: 97.7 F | BODY MASS INDEX: 29.82 KG/M2 | SYSTOLIC BLOOD PRESSURE: 126 MMHG | DIASTOLIC BLOOD PRESSURE: 87 MMHG | HEART RATE: 76 BPM

## 2024-03-15 VITALS
SYSTOLIC BLOOD PRESSURE: 130 MMHG | DIASTOLIC BLOOD PRESSURE: 85 MMHG | TEMPERATURE: 97.2 F | BODY MASS INDEX: 29.99 KG/M2 | HEART RATE: 62 BPM | HEIGHT: 64 IN | OXYGEN SATURATION: 99 %

## 2024-03-15 DIAGNOSIS — C78.01 SMALL CELL CARCINOMA METASTATIC TO RIGHT LUNG (HCC): ICD-10-CM

## 2024-03-15 DIAGNOSIS — K59.03 DRUG-INDUCED CONSTIPATION: ICD-10-CM

## 2024-03-15 DIAGNOSIS — C25.9 PANCREATIC ADENOCARCINOMA (HCC): Primary | ICD-10-CM

## 2024-03-15 DIAGNOSIS — T45.1X5A CINV (CHEMOTHERAPY-INDUCED NAUSEA AND VOMITING): ICD-10-CM

## 2024-03-15 DIAGNOSIS — C25.0 MALIGNANT NEOPLASM OF HEAD OF PANCREAS (HCC): ICD-10-CM

## 2024-03-15 DIAGNOSIS — C25.9 PANCREATIC ADENOCARCINOMA (HCC): ICD-10-CM

## 2024-03-15 DIAGNOSIS — R11.2 CINV (CHEMOTHERAPY-INDUCED NAUSEA AND VOMITING): ICD-10-CM

## 2024-03-15 DIAGNOSIS — C79.51 CANCER, METASTATIC TO BONE (HCC): ICD-10-CM

## 2024-03-15 DIAGNOSIS — E87.6 HYPOKALEMIA: ICD-10-CM

## 2024-03-15 DIAGNOSIS — C25.0 MALIGNANT NEOPLASM OF HEAD OF PANCREAS (HCC): Primary | ICD-10-CM

## 2024-03-15 LAB
ALBUMIN SERPL-MCNC: 3.2 G/DL (ref 3.5–5)
ALBUMIN/GLOB SERPL: 0.8 (ref 1.1–2.2)
ALP SERPL-CCNC: 229 U/L (ref 45–117)
ALT SERPL-CCNC: 66 U/L (ref 12–78)
ANION GAP SERPL CALC-SCNC: 7 MMOL/L (ref 5–15)
AST SERPL-CCNC: 40 U/L (ref 15–37)
BASOPHILS # BLD: 0 K/UL (ref 0–0.1)
BASOPHILS NFR BLD: 0 % (ref 0–1)
BILIRUB SERPL-MCNC: 1.5 MG/DL (ref 0.2–1)
BUN SERPL-MCNC: 10 MG/DL (ref 6–20)
BUN/CREAT SERPL: 12 (ref 12–20)
CALCIUM SERPL-MCNC: 9.2 MG/DL (ref 8.5–10.1)
CHLORIDE SERPL-SCNC: 98 MMOL/L (ref 97–108)
CO2 SERPL-SCNC: 28 MMOL/L (ref 21–32)
CREAT SERPL-MCNC: 0.83 MG/DL (ref 0.55–1.02)
DIFFERENTIAL METHOD BLD: ABNORMAL
EOSINOPHIL # BLD: 0.1 K/UL (ref 0–0.4)
EOSINOPHIL NFR BLD: 2 % (ref 0–7)
ERYTHROCYTE [DISTWIDTH] IN BLOOD BY AUTOMATED COUNT: 13.4 % (ref 11.5–14.5)
GLOBULIN SER CALC-MCNC: 4 G/DL (ref 2–4)
GLUCOSE SERPL-MCNC: 119 MG/DL (ref 65–100)
HBV SURFACE AB SER QL: NONREACTIVE
HBV SURFACE AB SER-ACNC: <3.1 MIU/ML
HBV SURFACE AG SER QL: <0.1 INDEX
HBV SURFACE AG SER QL: NEGATIVE
HCT VFR BLD AUTO: 40.2 % (ref 35–47)
HGB BLD-MCNC: 14 G/DL (ref 11.5–16)
IMM GRANULOCYTES # BLD AUTO: 0.1 K/UL (ref 0–0.04)
IMM GRANULOCYTES NFR BLD AUTO: 1 % (ref 0–0.5)
LYMPHOCYTES # BLD: 2.1 K/UL (ref 0.8–3.5)
LYMPHOCYTES NFR BLD: 32 % (ref 12–49)
MCH RBC QN AUTO: 31.8 PG (ref 26–34)
MCHC RBC AUTO-ENTMCNC: 34.8 G/DL (ref 30–36.5)
MCV RBC AUTO: 91.4 FL (ref 80–99)
MONOCYTES # BLD: 0.9 K/UL (ref 0–1)
MONOCYTES NFR BLD: 13 % (ref 5–13)
NEUTS SEG # BLD: 3.4 K/UL (ref 1.8–8)
NEUTS SEG NFR BLD: 52 % (ref 32–75)
NRBC # BLD: 0 K/UL (ref 0–0.01)
NRBC BLD-RTO: 0 PER 100 WBC
PLATELET # BLD AUTO: 232 K/UL (ref 150–400)
PMV BLD AUTO: 9.7 FL (ref 8.9–12.9)
POTASSIUM SERPL-SCNC: 3.3 MMOL/L (ref 3.5–5.1)
PROT SERPL-MCNC: 7.2 G/DL (ref 6.4–8.2)
RBC # BLD AUTO: 4.4 M/UL (ref 3.8–5.2)
SODIUM SERPL-SCNC: 133 MMOL/L (ref 136–145)
WBC # BLD AUTO: 6.6 K/UL (ref 3.6–11)

## 2024-03-15 PROCEDURE — 85025 COMPLETE CBC W/AUTO DIFF WBC: CPT

## 2024-03-15 PROCEDURE — 36591 DRAW BLOOD OFF VENOUS DEVICE: CPT

## 2024-03-15 PROCEDURE — 86706 HEP B SURFACE ANTIBODY: CPT

## 2024-03-15 PROCEDURE — 80053 COMPREHEN METABOLIC PANEL: CPT

## 2024-03-15 PROCEDURE — 86301 IMMUNOASSAY TUMOR CA 19-9: CPT

## 2024-03-15 PROCEDURE — 87340 HEPATITIS B SURFACE AG IA: CPT

## 2024-03-15 PROCEDURE — 99214 OFFICE O/P EST MOD 30 MIN: CPT | Performed by: NURSE PRACTITIONER

## 2024-03-15 PROCEDURE — 86704 HEP B CORE ANTIBODY TOTAL: CPT

## 2024-03-15 RX ORDER — ONDANSETRON 8 MG/1
8 TABLET, ORALLY DISINTEGRATING ORAL EVERY 8 HOURS PRN
Qty: 60 TABLET | Refills: 3 | Status: SHIPPED | OUTPATIENT
Start: 2024-03-15

## 2024-03-15 RX ORDER — POLYETHYLENE GLYCOL 3350 17 G/17G
17 POWDER, FOR SOLUTION ORAL DAILY PRN
COMMUNITY

## 2024-03-15 RX ORDER — OXYCODONE HYDROCHLORIDE AND ACETAMINOPHEN 5; 325 MG/1; MG/1
TABLET ORAL
COMMUNITY
End: 2024-03-15

## 2024-03-15 RX ORDER — DOCUSATE SODIUM 100 MG/1
100 CAPSULE, LIQUID FILLED ORAL 2 TIMES DAILY
COMMUNITY

## 2024-03-15 RX ORDER — PROCHLORPERAZINE MALEATE 10 MG
10 TABLET ORAL EVERY 6 HOURS PRN
Qty: 30 TABLET | Refills: 3 | Status: SHIPPED | OUTPATIENT
Start: 2024-03-15

## 2024-03-15 RX ORDER — LIDOCAINE AND PRILOCAINE 25; 25 MG/G; MG/G
CREAM TOPICAL
Qty: 30 G | Refills: 3 | Status: SHIPPED | OUTPATIENT
Start: 2024-03-15

## 2024-03-15 RX ORDER — OXYCODONE HYDROCHLORIDE 5 MG/1
5 TABLET ORAL EVERY 4 HOURS PRN
COMMUNITY

## 2024-03-15 ASSESSMENT — PAIN SCALES - GENERAL: PAINLEVEL_OUTOF10: 9

## 2024-03-15 ASSESSMENT — PAIN DESCRIPTION - LOCATION: LOCATION: BACK;SHOULDER

## 2024-03-15 ASSESSMENT — PAIN DESCRIPTION - ORIENTATION: ORIENTATION: RIGHT

## 2024-03-15 NOTE — TELEPHONE ENCOUNTER
[facility] [place] at [facility]  [phone]    03/15/24 3:29 PM EDT - Placed call to patient per NP request. Verified ID X 2 Advised patient of appointment change for 3/18 office visit. Patient voiced understanding. No further questions at this time

## 2024-03-15 NOTE — PROGRESS NOTES
Katiuska Fair is a 68 y.o. female here for follow up of pancreatic cancer. Patient with complaints of bilateral shoulder and right sided pain, rates as a 9 out of 10.

## 2024-03-15 NOTE — TELEPHONE ENCOUNTER
03/15/24 12:49 PM Blood specimen collected in [facility] for Tempus liquid biopsy testing. Order completed in Epic. Copy of insurance card, office note, and financial screening form to be sent with specimen via [employer]. Scheduled [employer] -- # [id number]. Will continue to monitor.

## 2024-03-15 NOTE — TELEPHONE ENCOUNTER
[facility] [place] at [facility]  [phone]    03/15/24 2:39 PM EDT - Placed call to patient per NP request. Attempted to call X 3 LM for CB x3 with office number.  
English

## 2024-03-15 NOTE — PROGRESS NOTES
Pharmacy Note- Chemotherapy Education    Katiuska Fair is a  68 y.o.female  diagnosed with pancreatic cancer here today for chemotherapy counseling. Ms. Fair is being treated with gemcitabine and nab-paclitaxel.   Provided education on side effects and premedications.    Side effects of chemotherapy reviewed included s/s infection, anemia, thrombocytopenia, nausea/vomiting, fatigue, hair loss/alopecia, muscle/joint pain, skin and nail changes, diarrhea/constipation, flu-like illness, allergic reactions, sore mouth, peripheral neuropathy (patient has had this in the past with prior treatments, resolved), fluid retention, and rare but serious side effects such as changes in the lungs. Patient was counseled to report any changes in breathing.     Patient given ways to manage these side effects and when to contact office.     Drug interactions  - Blood pressure fluctuations (especially hypotension)  - Increased risk of CNS depression when prochlorperazine is taken within 3 hours of opioids    [facility] [place] Handout of medications provided to patient. Ms. Fair verbalized understanding of the information presented and all of the patient's questions were answered.    Chemotherapy/Immunotherapy education and consent discussed with the patient and the patient denied any questions or concerns.  A hard copy of the chemotherapy consent was offered to the patient and the patient accepted.    Ara Delgado, LemuelD, BCPS

## 2024-03-15 NOTE — TELEPHONE ENCOUNTER
“Called patient to advise/confirm upcoming appt with Dr. Real on 03/19/24  at 10:30 at  [facility].  No answer, left voicemail requesting call back to confirm appointment.”

## 2024-03-15 NOTE — PROGRESS NOTES
Outpatient Infusion Center Progress Note        Date: 03/15/24    Name: Katiuska Fair    MRN: [id number]         : 1955      Ms. Fair admitted to [facility] for C1D1 of Gemzar/Abraxane (HELD) ambulatory in stable condition. Assessment completed. No new concerns voiced.  RCW port a cath accessed using 0.75in pichardo, blood return obtained and port flushed without difficulty.  Labs drawn and sent for processing.      Patient proceeded to appointment with Dr. Smith's team.        Vitals:    03/15/24 0810   BP: 126/87   Pulse: 76   Resp: 16   Temp: 97.7 °F (36.5 °C)   TempSrc: Temporal   SpO2: 98%   Weight: 79.2 kg (174 lb 11.2 oz)   Height: 1.626 m (5' 4\")           Results for orders placed or performed during the hospital encounter of 03/15/24   Comprehensive metabolic panel   Result Value Ref Range    Sodium 133 (L) 136 - 145 mmol/L    Potassium 3.3 (L) 3.5 - 5.1 mmol/L    Chloride 98 97 - 108 mmol/L    CO2 28 21 - 32 mmol/L    Anion Gap 7 5 - 15 mmol/L    Glucose 119 (H) 65 - 100 mg/dL    BUN 10 6 - 20 MG/DL    Creatinine 0.83 0.55 - 1.02 MG/DL    Bun/Cre Ratio 12 12 - 20      Est, Glom Filt Rate >60 >60 ml/min/1.73m2    Calcium 9.2 8.5 - 10.1 MG/DL    Total Bilirubin 1.5 (H) 0.2 - 1.0 MG/DL    ALT 66 12 - 78 U/L    AST 40 (H) 15 - 37 U/L    Alk Phosphatase 229 (H) 45 - 117 U/L    Total Protein 7.2 6.4 - 8.2 g/dL    Albumin 3.2 (L) 3.5 - 5.0 g/dL    Globulin 4.0 2.0 - 4.0 g/dL    Albumin/Globulin Ratio 0.8 (L) 1.1 - 2.2     CBC With Auto Differential   Result Value Ref Range    WBC 6.6 3.6 - 11.0 K/uL    RBC 4.40 3.80 - 5.20 M/uL    Hemoglobin 14.0 11.5 - 16.0 g/dL    Hematocrit 40.2 35.0 - 47.0 %    MCV 91.4 80.0 - 99.0 FL    MCH 31.8 26.0 - 34.0 PG    MCHC 34.8 30.0 - 36.5 g/dL    RDW 13.4 11.5 - 14.5 %    Platelets 232 150 - 400 K/uL    MPV 9.7 8.9 - 12.9 FL    Nucleated RBCs 0.0 0  WBC    nRBC 0.00 0.00 - 0.01 K/uL    Neutrophils % 52 32 - 75 %    Lymphocytes % 32 12 - 49 %    Monocytes % 13 5 - 13 %

## 2024-03-17 LAB — CANCER AG19-9 SERPL-ACNC: ABNORMAL U/ML (ref 0–35)

## 2024-03-18 ENCOUNTER — HOSPITAL ENCOUNTER (OUTPATIENT)
Facility: HOSPITAL | Age: 69
Setting detail: INFUSION SERIES
Discharge: HOME | End: 2024-03-18
Payer: MEDICARE

## 2024-03-18 ENCOUNTER — TELEPHONE (OUTPATIENT)
Age: 69
End: 2024-03-18

## 2024-03-18 VITALS
OXYGEN SATURATION: 94 % | RESPIRATION RATE: 18 BRPM | HEIGHT: 64 IN | WEIGHT: 173.7 LBS | SYSTOLIC BLOOD PRESSURE: 139 MMHG | HEART RATE: 100 BPM | BODY MASS INDEX: 29.65 KG/M2 | DIASTOLIC BLOOD PRESSURE: 66 MMHG | TEMPERATURE: 98.2 F

## 2024-03-18 DIAGNOSIS — C25.9 PANCREATIC ADENOCARCINOMA (HCC): Primary | ICD-10-CM

## 2024-03-18 DIAGNOSIS — C25.0 MALIGNANT NEOPLASM OF HEAD OF PANCREAS (HCC): ICD-10-CM

## 2024-03-18 LAB
ALBUMIN SERPL-MCNC: 3.2 G/DL (ref 3.5–5)
ALBUMIN/GLOB SERPL: 0.8 (ref 1.1–2.2)
ALP SERPL-CCNC: 256 U/L (ref 45–117)
ALT SERPL-CCNC: 60 U/L (ref 12–78)
ANION GAP SERPL CALC-SCNC: 8 MMOL/L (ref 5–15)
AST SERPL-CCNC: 41 U/L (ref 15–37)
BASOPHILS # BLD: 0 K/UL (ref 0–0.1)
BASOPHILS NFR BLD: 0 % (ref 0–1)
BILIRUB SERPL-MCNC: 1.6 MG/DL (ref 0.2–1)
BUN SERPL-MCNC: 6 MG/DL (ref 6–20)
BUN/CREAT SERPL: 7 (ref 12–20)
CALCIUM SERPL-MCNC: 9.2 MG/DL (ref 8.5–10.1)
CHLORIDE SERPL-SCNC: 98 MMOL/L (ref 97–108)
CO2 SERPL-SCNC: 27 MMOL/L (ref 21–32)
CREAT SERPL-MCNC: 0.81 MG/DL (ref 0.55–1.02)
DIFFERENTIAL METHOD BLD: ABNORMAL
EOSINOPHIL # BLD: 0.2 K/UL (ref 0–0.4)
EOSINOPHIL NFR BLD: 2 % (ref 0–7)
ERYTHROCYTE [DISTWIDTH] IN BLOOD BY AUTOMATED COUNT: 13.2 % (ref 11.5–14.5)
GLOBULIN SER CALC-MCNC: 4 G/DL (ref 2–4)
GLUCOSE SERPL-MCNC: 118 MG/DL (ref 65–100)
HCT VFR BLD AUTO: 40.7 % (ref 35–47)
HGB BLD-MCNC: 14.2 G/DL (ref 11.5–16)
IMM GRANULOCYTES # BLD AUTO: 0 K/UL (ref 0–0.04)
IMM GRANULOCYTES NFR BLD AUTO: 0 % (ref 0–0.5)
LYMPHOCYTES # BLD: 2.9 K/UL (ref 0.8–3.5)
LYMPHOCYTES NFR BLD: 40 % (ref 12–49)
MCH RBC QN AUTO: 32.3 PG (ref 26–34)
MCHC RBC AUTO-ENTMCNC: 34.9 G/DL (ref 30–36.5)
MCV RBC AUTO: 92.5 FL (ref 80–99)
MONOCYTES # BLD: 1 K/UL (ref 0–1)
MONOCYTES NFR BLD: 14 % (ref 5–13)
NEUTS SEG # BLD: 3 K/UL (ref 1.8–8)
NEUTS SEG NFR BLD: 44 % (ref 32–75)
NRBC # BLD: 0 K/UL (ref 0–0.01)
NRBC BLD-RTO: 0 PER 100 WBC
PLATELET # BLD AUTO: 204 K/UL (ref 150–400)
PMV BLD AUTO: 10.3 FL (ref 8.9–12.9)
POTASSIUM SERPL-SCNC: 3.2 MMOL/L (ref 3.5–5.1)
PROT SERPL-MCNC: 7.2 G/DL (ref 6.4–8.2)
RBC # BLD AUTO: 4.4 M/UL (ref 3.8–5.2)
SODIUM SERPL-SCNC: 133 MMOL/L (ref 136–145)
WBC # BLD AUTO: 7.2 K/UL (ref 3.6–11)

## 2024-03-18 PROCEDURE — 96375 TX/PRO/DX INJ NEW DRUG ADDON: CPT

## 2024-03-18 PROCEDURE — 6360000002 HC RX W HCPCS: Performed by: NURSE PRACTITIONER

## 2024-03-18 PROCEDURE — 96413 CHEMO IV INFUSION 1 HR: CPT

## 2024-03-18 PROCEDURE — 2580000003 HC RX 258: Performed by: INTERNAL MEDICINE

## 2024-03-18 PROCEDURE — 2580000003 HC RX 258: Performed by: NURSE PRACTITIONER

## 2024-03-18 PROCEDURE — 6360000002 HC RX W HCPCS: Performed by: INTERNAL MEDICINE

## 2024-03-18 PROCEDURE — 80053 COMPREHEN METABOLIC PANEL: CPT

## 2024-03-18 PROCEDURE — 85025 COMPLETE CBC W/AUTO DIFF WBC: CPT

## 2024-03-18 PROCEDURE — 6370000000 HC RX 637 (ALT 250 FOR IP): Performed by: NURSE PRACTITIONER

## 2024-03-18 PROCEDURE — 96417 CHEMO IV INFUS EACH ADDL SEQ: CPT

## 2024-03-18 RX ORDER — SODIUM CHLORIDE 0.9 % (FLUSH) 0.9 %
5-40 SYRINGE (ML) INJECTION PRN
Status: DISCONTINUED | OUTPATIENT
Start: 2024-03-18 | End: 2024-03-19 | Stop reason: HOSPADM

## 2024-03-18 RX ORDER — POTASSIUM CHLORIDE 750 MG/1
40 TABLET, FILM COATED, EXTENDED RELEASE ORAL ONCE
Status: COMPLETED | OUTPATIENT
Start: 2024-03-18 | End: 2024-03-18

## 2024-03-18 RX ORDER — ONDANSETRON 2 MG/ML
8 INJECTION INTRAMUSCULAR; INTRAVENOUS
Status: DISCONTINUED | OUTPATIENT
Start: 2024-03-18 | End: 2024-03-19 | Stop reason: HOSPADM

## 2024-03-18 RX ORDER — FAMOTIDINE 10 MG/ML
20 INJECTION, SOLUTION INTRAVENOUS
Status: DISCONTINUED | OUTPATIENT
Start: 2024-03-18 | End: 2024-03-19 | Stop reason: HOSPADM

## 2024-03-18 RX ORDER — ALBUTEROL SULFATE 90 UG/1
4 AEROSOL, METERED RESPIRATORY (INHALATION) PRN
Status: DISCONTINUED | OUTPATIENT
Start: 2024-03-18 | End: 2024-03-19 | Stop reason: HOSPADM

## 2024-03-18 RX ORDER — PACLITAXEL 100 MG/20ML
100 INJECTION, POWDER, LYOPHILIZED, FOR SUSPENSION INTRAVENOUS ONCE
Status: CANCELLED | OUTPATIENT
Start: 2024-03-18 | End: 2024-03-18

## 2024-03-18 RX ORDER — SODIUM CHLORIDE 9 MG/ML
5-250 INJECTION, SOLUTION INTRAVENOUS PRN
Status: DISCONTINUED | OUTPATIENT
Start: 2024-03-18 | End: 2024-03-19 | Stop reason: HOSPADM

## 2024-03-18 RX ORDER — MEPERIDINE HYDROCHLORIDE 25 MG/ML
12.5 INJECTION INTRAMUSCULAR; INTRAVENOUS; SUBCUTANEOUS PRN
Status: DISCONTINUED | OUTPATIENT
Start: 2024-03-18 | End: 2024-03-19 | Stop reason: HOSPADM

## 2024-03-18 RX ORDER — PACLITAXEL 100 MG/20ML
100 INJECTION, POWDER, LYOPHILIZED, FOR SUSPENSION INTRAVENOUS ONCE
Status: COMPLETED | OUTPATIENT
Start: 2024-03-18 | End: 2024-03-18

## 2024-03-18 RX ORDER — DIPHENHYDRAMINE HYDROCHLORIDE 50 MG/ML
50 INJECTION INTRAMUSCULAR; INTRAVENOUS
Status: DISCONTINUED | OUTPATIENT
Start: 2024-03-18 | End: 2024-03-19 | Stop reason: HOSPADM

## 2024-03-18 RX ORDER — ACETAMINOPHEN 325 MG/1
650 TABLET ORAL
Status: DISCONTINUED | OUTPATIENT
Start: 2024-03-18 | End: 2024-03-19 | Stop reason: HOSPADM

## 2024-03-18 RX ORDER — ONDANSETRON 2 MG/ML
8 INJECTION INTRAMUSCULAR; INTRAVENOUS ONCE
Status: COMPLETED | OUTPATIENT
Start: 2024-03-18 | End: 2024-03-18

## 2024-03-18 RX ORDER — SODIUM CHLORIDE 9 MG/ML
INJECTION, SOLUTION INTRAVENOUS CONTINUOUS
Status: DISCONTINUED | OUTPATIENT
Start: 2024-03-18 | End: 2024-03-19 | Stop reason: HOSPADM

## 2024-03-18 RX ORDER — EPINEPHRINE 1 MG/ML
0.3 INJECTION, SOLUTION INTRAMUSCULAR; SUBCUTANEOUS PRN
Status: DISCONTINUED | OUTPATIENT
Start: 2024-03-18 | End: 2024-03-19 | Stop reason: HOSPADM

## 2024-03-18 RX ADMIN — PACLITAXEL 193 MG: 100 INJECTION, POWDER, LYOPHILIZED, FOR SUSPENSION INTRAVENOUS at 11:10

## 2024-03-18 RX ADMIN — GEMCITABINE HYDROCHLORIDE 1544 MG: 38 INJECTION, SOLUTION INTRAVENOUS at 12:20

## 2024-03-18 RX ADMIN — SODIUM CHLORIDE 25 ML/HR: 9 INJECTION, SOLUTION INTRAVENOUS at 09:58

## 2024-03-18 RX ADMIN — POTASSIUM CHLORIDE 40 MEQ: 750 TABLET, FILM COATED, EXTENDED RELEASE ORAL at 12:44

## 2024-03-18 RX ADMIN — ONDANSETRON 8 MG: 2 INJECTION INTRAMUSCULAR; INTRAVENOUS at 09:59

## 2024-03-18 ASSESSMENT — PAIN DESCRIPTION - LOCATION: LOCATION: BACK;SHOULDER

## 2024-03-18 ASSESSMENT — PAIN SCALES - GENERAL: PAINLEVEL_OUTOF10: 10

## 2024-03-18 ASSESSMENT — PAIN DESCRIPTION - DESCRIPTORS: DESCRIPTORS: ACHING;DISCOMFORT

## 2024-03-18 ASSESSMENT — PAIN DESCRIPTION - ORIENTATION: ORIENTATION: RIGHT

## 2024-03-18 ASSESSMENT — PAIN DESCRIPTION - PAIN TYPE: TYPE: CHRONIC PAIN

## 2024-03-18 NOTE — PROGRESS NOTES
OUTPATIENT INFUSION CENTER     DISCHARGE INSTRUCTIONS FOR:  CHEMOTHERAPY / BIOTHERAPY     Chemotherapy has the potential to cause many side effects.  The following are general precautions that chemo patients should take:     1. Practice good hand washing:              * Use soap and water for at least 15 seconds, covering all areas of hands.              * Always wash hands before eating.              * Wash hands after contact with public surfaces such as door knobs and                 handles, shopping carts, telephones and elevator buttons.     2. Get plenty of rest:    * You will likely experience fatigue three to five days following your treatment.  It may last as long as seven days.     3. Drink plenty of fluids.  Water is best.     4. Eat a well balanced diet:  * Small frequent meals may help if you are having trouble with nausea or your  appetite.  Some people also do well with nutritional supplements.     5. Pace yourself with daily activities:   * Take frequent breaks and ask for help if you need it.     6. Exercise is very important:  * It will increase circulation and will help the fatigue.  Do what you can each day.      7. If your regimen results in hair loss:  *  You will likely notice effects between two and three weeks following your first treatment.  Some lose all hair while others only experience thinning.     8. Practice good oral hygiene:              *  Notify your M.D. immediately if any mouth sores or discomfort develop.     9. Protect yourself from the sun.     Signs/Symptoms of an allergic reaction and/or some side effects may require immediate medical attention.  Notify your physician if you develop one or more of the following:      Temperature of 100.5 degrees or greater;   Skin redness, itching, swelling, blistering, weeping, crusting, rash, or hives;   Wheezing, chest tightness, cough, or shortness of breath;   Swelling of the face, eyelids, lips, tongue, or throat;  Severe, persistent  headache;  Stuffy nose, runny nose, sneezing;   Red (bloodshot), itchy, swollen, or watery eyes;   Stomach  pain, nausea, vomiting, diarrhea, or bloody stools;  Mouth sores           Your physician should also be aware of the following symptoms:     Persistent and unresolved nausea and/or vomiting;   Persistent and unresolved diarrhea or constipation;   Numbness/tingling/burning of the extremities, including the fingers and toes;   Bleeding or unexplained bruising;  Unexplained redness/swelling/pain in the arms or legs;  Shortness of breath or fatigue that worsens;   Pain with urination or blood in the urine;   Chills;  Cough, especially a productive cough;  Mouth sores or a white coating of the tongue;  Redness, swelling, pain or drainage at the port-a-cath or IV site;  Increased feeling of bloating or water retention;  Excessive weight loss or gain;  Ringing in the ears;  Difficulty swallowing;  Dizziness, vertigo, lightheadedness or fainting.        For 48 hours after receiving chemotherapy, patients and caregivers should follow these precautions:     -Flush toilets twice each time they are used. If possible, patients should use a separate toilet from others in the home. Always wash hands with soap and water after using the toilet.  -Caregivers must wear gloves when handling the patients’ blood, urine, stool, or emesis. Dispose of the gloves after each use and wash your hands.  -After using any devices for bodily waste, patients should thoroughly wash their hands and the devices with soap and water. Dry the devices with paper towels, and discard the towels.  -Any sheets or clothes soiled with bodily fluids should be machine-washed twice in hot water with regular laundry detergent. Do not hand wash. If you cannot wash them right away, place them in a sealed plastic bag.  -Absorbable undergarments should be placed in sealed plastic bags for disposal.  -If caregivers accidentally come in contact with bodily fluids,

## 2024-03-18 NOTE — TELEPHONE ENCOUNTER
Received call from Dr. Smith's office. Patient is requesting to reschedule NP appt w/ Dr. Real on 3/19. Called patient on both numbers and got voicemail. Left message for a call back.

## 2024-03-18 NOTE — PROGRESS NOTES
[facility] Progress Note    Date: 2024    Name: Katiuska Fair    MRN: [id number]         : 1955    Ms. Fair Arrived ambulatory and in no distress for C1D1 of Gemzar Abraxane Regimen.  Assessment was completed, pain in R lung area.  R chest wall port accessed without difficulty, labs drawn & sent for processing by MICHAEL Suarez.      Patient proceed to appointment with Dr. Smith.    Ms. Fair's vitals were reviewed.  Vitals:    24 0747   BP: 127/86   Pulse: (!) 102   Resp: 18   Temp: 98.2 °F (36.8 °C)   SpO2: 94%       Lab results were obtained and reviewed.  Recent Results (from the past 12 hour(s))   Comprehensive metabolic panel    Collection Time: 24  7:54 AM   Result Value Ref Range    Sodium 133 (L) 136 - 145 mmol/L    Potassium 3.2 (L) 3.5 - 5.1 mmol/L    Chloride 98 97 - 108 mmol/L    CO2 27 21 - 32 mmol/L    Anion Gap 8 5 - 15 mmol/L    Glucose 118 (H) 65 - 100 mg/dL    BUN 6 6 - 20 MG/DL    Creatinine 0.81 0.55 - 1.02 MG/DL    Bun/Cre Ratio 7 (L) 12 - 20      Est, Glom Filt Rate >60 >60 ml/min/1.73m2    Calcium 9.2 8.5 - 10.1 MG/DL    Total Bilirubin 1.6 (H) 0.2 - 1.0 MG/DL    ALT 60 12 - 78 U/L    AST 41 (H) 15 - 37 U/L    Alk Phosphatase 256 (H) 45 - 117 U/L    Total Protein 7.2 6.4 - 8.2 g/dL    Albumin 3.2 (L) 3.5 - 5.0 g/dL    Globulin 4.0 2.0 - 4.0 g/dL    Albumin/Globulin Ratio 0.8 (L) 1.1 - 2.2     CBC With Auto Differential    Collection Time: 24  7:54 AM   Result Value Ref Range    WBC 7.2 3.6 - 11.0 K/uL    RBC 4.40 3.80 - 5.20 M/uL    Hemoglobin 14.2 11.5 - 16.0 g/dL    Hematocrit 40.7 35.0 - 47.0 %    MCV 92.5 80.0 - 99.0 FL    MCH 32.3 26.0 - 34.0 PG    MCHC 34.9 30.0 - 36.5 g/dL    RDW 13.2 11.5 - 14.5 %    Platelets 204 150 - 400 K/uL    MPV 10.3 8.9 - 12.9 FL    Nucleated RBCs 0.0 0  WBC    nRBC 0.00 0.00 - 0.01 K/uL    Neutrophils % 44 32 - 75 %    Lymphocytes % 40 12 - 49 %    Monocytes % 14 (H) 5 - 13 %    Eosinophils % 2 0 - 7 %    Basophils % 0 0 - 1

## 2024-03-18 NOTE — TELEPHONE ENCOUNTER
The patient is requesting a call back to discuss receiving documentation stating that she (CAN NOT ) do jury duty due to not feeling well and because her pancreatic cancer has returned. [phone]

## 2024-03-18 NOTE — TELEPHONE ENCOUNTER
03/18/24 2:12 PM Received incoming call from patient. Patient inquiring which anti-nausea medication she should take first should she have any nausea. Discussed that patient received IV anti-nausea medications today so will likely not require oral anti emetic today. However, advised that patient attempt Zofran first should she have any nausea. Discussed that if her nausea is not well controlled with Zofran, then she can alternate with Compazine if needed. Discussed potential side effects of constipation with Zofran and sedation with Compazine. Patient voiced understanding. No further questions or concerns at this time.

## 2024-03-19 ENCOUNTER — TELEPHONE (OUTPATIENT)
Facility: HOSPITAL | Age: 69
End: 2024-03-19

## 2024-03-19 ENCOUNTER — OFFICE VISIT (OUTPATIENT)
Age: 69
End: 2024-03-19
Payer: MEDICARE

## 2024-03-19 VITALS — RESPIRATION RATE: 20 BRPM

## 2024-03-19 DIAGNOSIS — T40.2X5A CONSTIPATION DUE TO OPIOID THERAPY: ICD-10-CM

## 2024-03-19 DIAGNOSIS — G89.3 PAIN FROM BONE METASTASES (HCC): ICD-10-CM

## 2024-03-19 DIAGNOSIS — G62.0 CHEMOTHERAPY-INDUCED PERIPHERAL NEUROPATHY (HCC): ICD-10-CM

## 2024-03-19 DIAGNOSIS — T45.1X5A CHEMOTHERAPY-INDUCED PERIPHERAL NEUROPATHY (HCC): ICD-10-CM

## 2024-03-19 DIAGNOSIS — K59.03 CONSTIPATION DUE TO OPIOID THERAPY: ICD-10-CM

## 2024-03-19 DIAGNOSIS — C79.51 PAIN FROM BONE METASTASES (HCC): ICD-10-CM

## 2024-03-19 DIAGNOSIS — C25.9 PANCREATIC ADENOCARCINOMA (HCC): Primary | ICD-10-CM

## 2024-03-19 LAB — HBV CORE AB SERPL QL IA: NEGATIVE

## 2024-03-19 PROCEDURE — G8400 PT W/DXA NO RESULTS DOC: HCPCS | Performed by: INTERNAL MEDICINE

## 2024-03-19 PROCEDURE — 99205 OFFICE O/P NEW HI 60 MIN: CPT | Performed by: INTERNAL MEDICINE

## 2024-03-19 PROCEDURE — 3017F COLORECTAL CA SCREEN DOC REV: CPT | Performed by: INTERNAL MEDICINE

## 2024-03-19 PROCEDURE — G8427 DOCREV CUR MEDS BY ELIG CLIN: HCPCS | Performed by: INTERNAL MEDICINE

## 2024-03-19 PROCEDURE — G8484 FLU IMMUNIZE NO ADMIN: HCPCS | Performed by: INTERNAL MEDICINE

## 2024-03-19 PROCEDURE — G8417 CALC BMI ABV UP PARAM F/U: HCPCS | Performed by: INTERNAL MEDICINE

## 2024-03-19 PROCEDURE — 1036F TOBACCO NON-USER: CPT | Performed by: INTERNAL MEDICINE

## 2024-03-19 PROCEDURE — 1123F ACP DISCUSS/DSCN MKR DOCD: CPT | Performed by: INTERNAL MEDICINE

## 2024-03-19 PROCEDURE — 1090F PRES/ABSN URINE INCON ASSESS: CPT | Performed by: INTERNAL MEDICINE

## 2024-03-19 NOTE — PROGRESS NOTES
Palliative Medicine Office Visit  Palliative Medicine Nurse Check In  [phone]    Patient Name: Katiuska Fair  YOB: 1955      Date of Office Visit: 3/19/24    New Patient appt    From Check In Sheet (scanned in Media):  Has a medical provider talked with you about cardiopulmonary resuscitation (CPR)?   [] Yes   [x] No   [] Unable to obtain    Nurse reminder to complete or update ACP FlowSheet:    Is ACP on the Problem List?    [] Yes    [x] No  IF ACP Document is ON FILE; Nurse to place ACP on Problem List     Is there an ACP Note in Chart Review/Note?    [] Yes    [x] No   If NO: ALERT PROVIDER         3/19/2024    10:30 AM   Demographics   Marital Status        Is there anything that we should know about you as a person in order to provide you the best care possible?     Have you been to the ER, urgent care clinic since your last visit?   [x] Yes   [] No   [] Unable to obtain -  ER visit on 3/6/24    Have you been hospitalized since your last visit?   [] Yes   [x] No   [] Unable to obtain    Have you seen or consulted any other health care providers outside of the [facility] since your last visit?   [] Yes   [] No   [] Unable to obtain    Functional status (describe):   Present to clinic in W/C      Bottle review (for opioid pain medication):  Medication 1: Oxy IR 5 mg   Date filled:   Directions: 1 every 4 prn  # filled: 2/26/24 #60,   # left:   # pills taking per day: Takes in mid morning  Last dose taken:    Medication 2: Dilaudid 2 mg  Date filled:   Directions: 1 every 8 prn  # filled: 20 on 3/8//24, #20 on 3/11/24  # left:   # pills taking per day: Takes at night  Last dose taken:                      
history.  Social History     Tobacco Use    Smoking status: Former     Current packs/day: 0.00     Types: Cigarettes     Quit date: 1980     Years since quittin.2    Smokeless tobacco: Never   Substance Use Topics    Alcohol use: No     Allergies   Allergen Reactions    Sulfa Antibiotics Swelling      Current Outpatient Medications   Medication Sig    polyethylene glycol (MIRALAX) 17 g packet Take 1 packet by mouth daily as needed for Constipation    oxyCODONE (ROXICODONE) 5 MG immediate release tablet Take 1 tablet by mouth every 4 hours as needed for Pain.    lidocaine-prilocaine (EMLA) 2.5-2.5 % cream Apply dime size amount to port site 30 minutes before access to prevent pain.    prochlorperazine (COMPAZINE) 10 MG tablet Take 1 tablet by mouth every 6 hours as needed (nausea)    ondansetron (ZOFRAN-ODT) 8 MG TBDP disintegrating tablet Place 1 tablet under the tongue every 8 hours as needed for Nausea or Vomiting    potassium chloride (KLOR-CON M10) 10 MEQ extended release tablet TAKE 1 TABLET BY MOUTH EVERY DAY    levothyroxine (SYNTHROID) 125 MCG tablet TAKE 1 TABLET BY MOUTH EVERY DAY BEFORE BREAKFAST    HYDROmorphone (DILAUDID) 2 MG tablet Take 1 tablet by mouth every 8 hours as needed for Pain for up to 30 days. Max Daily Amount: 6 mg    albuterol sulfate HFA (VENTOLIN HFA) 108 (90 Base) MCG/ACT inhaler Inhale 2 puffs into the lungs every 4 hours as needed for Wheezing or Shortness of Breath (Cough)    diclofenac sodium (VOLTAREN) 1 % GEL Apply 2 g topically 4 times daily    hydroCHLOROthiazide 12.5 MG tablet TAKE 1 TABLET BY MOUTH EVERY DAY    Spacer/Aero-Holding Chambers BRANDON 1 Device by Does not apply route daily as needed (cough)    docusate sodium (COLACE) 100 MG capsule Take 1 capsule by mouth 2 times daily (Patient not taking: Reported on 3/19/2024)     No current facility-administered medications for this visit.          LAB and other DATA REVIEWED:     Lab Results   Component Value

## 2024-03-19 NOTE — PATIENT INSTRUCTIONS
Planning:              3/19/2024    11:16 AM   Demographics   Marital Status            The Palliative Medicine Team is here to support you and your family.       Sincerely,      Jaylyn Real MD   and the Palliative Medicine Team

## 2024-03-20 NOTE — TELEPHONE ENCOUNTER
Received call from pt reporting that she just took dose of Dilaudid 2mg po prior to call for acute onset pain; also reporting increased nausea without vomiting; inquiring if ok to take Zofran as well. Advised ok to do so and to call back for unrelieved or new onset symptoms.

## 2024-03-21 DIAGNOSIS — G89.3 CHRONIC NEOPLASM-RELATED PAIN: ICD-10-CM

## 2024-03-21 DIAGNOSIS — I10 ESSENTIAL (PRIMARY) HYPERTENSION: ICD-10-CM

## 2024-03-21 RX ORDER — HYDROMORPHONE HYDROCHLORIDE 2 MG/1
2 TABLET ORAL EVERY 8 HOURS PRN
Qty: 20 TABLET | Refills: 0 | Status: SHIPPED | OUTPATIENT
Start: 2024-03-21 | End: 2024-04-20

## 2024-03-21 NOTE — TELEPHONE ENCOUNTER
Palliative Medicine Clinic   [place]: [phone]    Patient Name: Katiuska Fair  YOB: 1955    Medication Refill Request Triage    Requested by:    [x]  Patient  []  Support person:      Last Pall Med Clinic Visit:  Visit date not found    Next Pall Med Clinic Visit: 4/23/2024     Preferred Pharmacy: [facility]  Backup Pharmacy:  [facility]    Medications requested:  Dilaudid and Voltaren    Is patient completely out?  []   YES  [x]   NO  If NO, how many pills does patient have left?  _Dilaudid: 6 _    Other pertinent information shared:

## 2024-03-21 NOTE — TELEPHONE ENCOUNTER
Palliative Medicine Clinic   [place]: [phone]    Patient Name: Katiuska Fair  YOB: 1955    Medication Refill Request    Patient is scheduled for follow up:  []  YES  []   NO  Next Pall Med Clinic Visit:     PDMP reviewed:  [] YES   []  System down / Unable  []  NO- Patient fills out of state    Medication:diclofenac sodium (VOLTAREN) 1 % GEL   Dose and directions:Apply 2 g topically 4 times daily   Number dispensed:100 g  Date filled (PDMP or Pharmacy):  # left:    Medication:HYDROmorphone (DILAUDID) 2 MG tablet   Dose and directions:Take 1 tablet by mouth every 8 hours   Number dispensed:20  Date filled (PDMP or Pharmacy):3/11/2024  #left:    Appropriate for refill:  [x]  YES  []  Early Request - Requires MD/NP Review      Other pertinent information for prescriber:

## 2024-03-25 ENCOUNTER — HOSPITAL ENCOUNTER (OUTPATIENT)
Facility: HOSPITAL | Age: 69
Setting detail: INFUSION SERIES
Discharge: HOME | End: 2024-03-25
Payer: MEDICARE

## 2024-03-25 VITALS
WEIGHT: 169.7 LBS | RESPIRATION RATE: 18 BRPM | HEIGHT: 64 IN | SYSTOLIC BLOOD PRESSURE: 113 MMHG | HEART RATE: 101 BPM | OXYGEN SATURATION: 100 % | TEMPERATURE: 97 F | DIASTOLIC BLOOD PRESSURE: 80 MMHG | BODY MASS INDEX: 28.97 KG/M2

## 2024-03-25 DIAGNOSIS — C25.9 PANCREATIC ADENOCARCINOMA (HCC): Primary | ICD-10-CM

## 2024-03-25 DIAGNOSIS — C25.0 MALIGNANT NEOPLASM OF HEAD OF PANCREAS (HCC): ICD-10-CM

## 2024-03-25 LAB
ALBUMIN SERPL-MCNC: 2.6 G/DL (ref 3.5–5)
ALBUMIN/GLOB SERPL: 0.7 (ref 1.1–2.2)
ALP SERPL-CCNC: 225 U/L (ref 45–117)
ALT SERPL-CCNC: 38 U/L (ref 12–78)
ANION GAP SERPL CALC-SCNC: 11 MMOL/L (ref 5–15)
AST SERPL-CCNC: 36 U/L (ref 15–37)
BASOPHILS # BLD: 0 K/UL (ref 0–0.1)
BASOPHILS NFR BLD: 0 % (ref 0–1)
BILIRUB SERPL-MCNC: 1.2 MG/DL (ref 0.2–1)
BUN SERPL-MCNC: 14 MG/DL (ref 6–20)
BUN/CREAT SERPL: 23 (ref 12–20)
CALCIUM SERPL-MCNC: 8.8 MG/DL (ref 8.5–10.1)
CHLORIDE SERPL-SCNC: 94 MMOL/L (ref 97–108)
CO2 SERPL-SCNC: 24 MMOL/L (ref 21–32)
CREAT SERPL-MCNC: 0.62 MG/DL (ref 0.55–1.02)
DIFFERENTIAL METHOD BLD: ABNORMAL
DNA RANGE(S) EXAMINED NAR: NORMAL
EOSINOPHIL # BLD: 0.2 K/UL (ref 0–0.4)
EOSINOPHIL NFR BLD: 3 % (ref 0–7)
ERYTHROCYTE [DISTWIDTH] IN BLOOD BY AUTOMATED COUNT: 12.7 % (ref 11.5–14.5)
GENE DIS ANL INTERP-IMP: POSITIVE
GENE DIS ASSESSED: NORMAL
GLOBULIN SER CALC-MCNC: 4 G/DL (ref 2–4)
GLUCOSE SERPL-MCNC: 94 MG/DL (ref 65–100)
HCT VFR BLD AUTO: 37.8 % (ref 35–47)
HGB BLD-MCNC: 13.6 G/DL (ref 11.5–16)
IMM GRANULOCYTES # BLD AUTO: 0 K/UL (ref 0–0.04)
IMM GRANULOCYTES NFR BLD AUTO: 1 % (ref 0–0.5)
LYMPHOCYTES # BLD: 1.6 K/UL (ref 0.8–3.5)
LYMPHOCYTES NFR BLD: 32 % (ref 12–49)
MCH RBC QN AUTO: 32.3 PG (ref 26–34)
MCHC RBC AUTO-ENTMCNC: 36 G/DL (ref 30–36.5)
MCV RBC AUTO: 89.8 FL (ref 80–99)
MONOCYTES # BLD: 0.2 K/UL (ref 0–1)
MONOCYTES NFR BLD: 4 % (ref 5–13)
MSI CA SPEC-IMP: NOT DETECTED
NEUTS SEG # BLD: 3.1 K/UL (ref 1.8–8)
NEUTS SEG NFR BLD: 61 % (ref 32–75)
NRBC # BLD: 0 K/UL (ref 0–0.01)
NRBC BLD-RTO: 0 PER 100 WBC
PLATELET # BLD AUTO: 119 K/UL (ref 150–400)
PMV BLD AUTO: 9.6 FL (ref 8.9–12.9)
POTASSIUM SERPL-SCNC: 2.9 MMOL/L (ref 3.5–5.1)
PROT SERPL-MCNC: 6.6 G/DL (ref 6.4–8.2)
RBC # BLD AUTO: 4.21 M/UL (ref 3.8–5.2)
REASON FOR STUDY: NORMAL
SODIUM SERPL-SCNC: 129 MMOL/L (ref 136–145)
TEMPUS LCA: NORMAL
TEMPUS PORTAL: NORMAL
TEMPUS THERAPY1: NORMAL
TEMPUS THERAPY2: NORMAL
TEMPUS THERAPY3: NORMAL
TEMPUS THERAPY4: NORMAL
TEMPUS THERAPY5: NORMAL
TEMPUS THERAPYCOUNT: 5
TEMPUS TRIALCOUNT: 3
TEMPUS TRIALMATCHES1: NORMAL
TEMPUS TRIALMATCHES2: NORMAL
TEMPUS TRIALMATCHES3: NORMAL
WBC # BLD AUTO: 5.1 K/UL (ref 3.6–11)

## 2024-03-25 PROCEDURE — 2580000003 HC RX 258: Performed by: NURSE PRACTITIONER

## 2024-03-25 PROCEDURE — 96375 TX/PRO/DX INJ NEW DRUG ADDON: CPT

## 2024-03-25 PROCEDURE — 96361 HYDRATE IV INFUSION ADD-ON: CPT

## 2024-03-25 PROCEDURE — 85025 COMPLETE CBC W/AUTO DIFF WBC: CPT

## 2024-03-25 PROCEDURE — 2580000003 HC RX 258: Performed by: INTERNAL MEDICINE

## 2024-03-25 PROCEDURE — 6370000000 HC RX 637 (ALT 250 FOR IP): Performed by: NURSE PRACTITIONER

## 2024-03-25 PROCEDURE — 96417 CHEMO IV INFUS EACH ADDL SEQ: CPT

## 2024-03-25 PROCEDURE — 96413 CHEMO IV INFUSION 1 HR: CPT

## 2024-03-25 PROCEDURE — 6360000002 HC RX W HCPCS: Performed by: INTERNAL MEDICINE

## 2024-03-25 PROCEDURE — 80053 COMPREHEN METABOLIC PANEL: CPT

## 2024-03-25 PROCEDURE — 36415 COLL VENOUS BLD VENIPUNCTURE: CPT

## 2024-03-25 RX ORDER — ONDANSETRON 2 MG/ML
8 INJECTION INTRAMUSCULAR; INTRAVENOUS ONCE
Status: COMPLETED | OUTPATIENT
Start: 2024-03-25 | End: 2024-03-25

## 2024-03-25 RX ORDER — SODIUM CHLORIDE 0.9 % (FLUSH) 0.9 %
5-40 SYRINGE (ML) INJECTION PRN
Status: DISCONTINUED | OUTPATIENT
Start: 2024-03-25 | End: 2024-03-26 | Stop reason: HOSPADM

## 2024-03-25 RX ORDER — SODIUM CHLORIDE 9 MG/ML
5-250 INJECTION, SOLUTION INTRAVENOUS PRN
Status: DISCONTINUED | OUTPATIENT
Start: 2024-03-25 | End: 2024-03-26 | Stop reason: HOSPADM

## 2024-03-25 RX ORDER — POTASSIUM CHLORIDE 750 MG/1
60 TABLET, FILM COATED, EXTENDED RELEASE ORAL ONCE
Status: COMPLETED | OUTPATIENT
Start: 2024-03-25 | End: 2024-03-25

## 2024-03-25 RX ORDER — PACLITAXEL 100 MG/20ML
125 INJECTION, POWDER, LYOPHILIZED, FOR SUSPENSION INTRAVENOUS ONCE
Status: COMPLETED | OUTPATIENT
Start: 2024-03-25 | End: 2024-03-25

## 2024-03-25 RX ORDER — 0.9 % SODIUM CHLORIDE 0.9 %
500 INTRAVENOUS SOLUTION INTRAVENOUS ONCE
Status: COMPLETED | OUTPATIENT
Start: 2024-03-25 | End: 2024-03-25

## 2024-03-25 RX ORDER — HYDROMORPHONE HYDROCHLORIDE 4 MG/1
2 TABLET ORAL EVERY 8 HOURS PRN
Qty: 45 TABLET | Refills: 0 | Status: SHIPPED | OUTPATIENT
Start: 2024-03-25 | End: 2024-04-24

## 2024-03-25 RX ADMIN — GEMCITABINE HYDROCHLORIDE 1930 MG: 2 INJECTION, SOLUTION INTRAVENOUS at 12:36

## 2024-03-25 RX ADMIN — SODIUM CHLORIDE, PRESERVATIVE FREE 20 ML: 5 INJECTION INTRAVENOUS at 13:12

## 2024-03-25 RX ADMIN — SODIUM CHLORIDE 500 ML: 9 INJECTION, SOLUTION INTRAVENOUS at 09:07

## 2024-03-25 RX ADMIN — PACLITAXEL 241.5 MG: 100 INJECTION, POWDER, LYOPHILIZED, FOR SUSPENSION INTRAVENOUS at 11:43

## 2024-03-25 RX ADMIN — POTASSIUM CHLORIDE 60 MEQ: 750 TABLET, FILM COATED, EXTENDED RELEASE ORAL at 11:53

## 2024-03-25 RX ADMIN — ONDANSETRON 8 MG: 2 INJECTION INTRAMUSCULAR; INTRAVENOUS at 09:03

## 2024-03-25 ASSESSMENT — PAIN DESCRIPTION - DESCRIPTORS: DESCRIPTORS: ACHING;DISCOMFORT

## 2024-03-25 ASSESSMENT — PAIN DESCRIPTION - LOCATION: LOCATION: BACK;SHOULDER

## 2024-03-25 ASSESSMENT — PAIN DESCRIPTION - ORIENTATION: ORIENTATION: RIGHT

## 2024-03-25 ASSESSMENT — PAIN SCALES - GENERAL: PAINLEVEL_OUTOF10: 7

## 2024-03-25 NOTE — PROGRESS NOTES
- 5.1 mmol/L    Chloride 94 (L) 97 - 108 mmol/L    CO2 24 21 - 32 mmol/L    Anion Gap 11 5 - 15 mmol/L    Glucose 94 65 - 100 mg/dL    BUN 14 6 - 20 MG/DL    Creatinine 0.62 0.55 - 1.02 MG/DL    Bun/Cre Ratio 23 (H) 12 - 20      Est, Glom Filt Rate >90 >60 ml/min/1.73m2    Calcium 8.8 8.5 - 10.1 MG/DL    Total Bilirubin 1.2 (H) 0.2 - 1.0 MG/DL    ALT 38 12 - 78 U/L    AST 36 15 - 37 U/L    Alk Phosphatase 225 (H) 45 - 117 U/L    Total Protein 6.6 6.4 - 8.2 g/dL    Albumin 2.6 (L) 3.5 - 5.0 g/dL    Globulin 4.0 2.0 - 4.0 g/dL    Albumin/Globulin Ratio 0.7 (L) 1.1 - 2.2          Pre-medications  were administered as ordered and chemotherapy was initiated.  Medications Administered         gemcitabine HCl (GEMZAR) 1,930 mg in sodium chloride 0.9 % 250 mL chemo IVPB Admin Date  03/25/2024 Action  New Bag Dose  1,930 mg Rate  651.6 mL/hr Route  IntraVENous Administered By  Fabiana Terry RN        ondansetron (ZOFRAN) injection 8 mg Admin Date  03/25/2024 Action  Given Dose  8 mg Rate   Route  IntraVENous Administered By  Fabiana Terry RN        PACLitaxel-protein bound (ABRAXANE) chemo IVPB 241.5 mg Admin Date  03/25/2024 Action  New Bag Dose  241.5 mg Rate  96.6 mL/hr Route  IntraVENous Administered By  Fabiana Terry RN        potassium chloride (KLOR-CON) extended release tablet 60 mEq Admin Date  03/25/2024 Action  Given Dose  60 mEq Rate   Route  Oral Administered By  Fabiana Terry RN        sodium chloride 0.9 % bolus 500 mL Admin Date  03/25/2024 Action  New Bag Dose  500 mL Rate  967.7 mL/hr Route  IntraVENous Administered By  Fabiana Terry RN        sodium chloride flush 0.9 % injection 5-40 mL Admin Date  03/25/2024 Action  Given Dose  20 mL Rate   Route  IntraVENous Administered By  Fabiana Terry RN            Two nurses verified prior to administering: Drug name, Drug dose, Infusion volume or drug volume when prepared in a syringe, Rate of administration, Route of administration,  Expiration dates and/or times, Appearance and physical integrity of the drugs, Rate set on infusion pump, when used, and Sequencing of drug administration.    1315: Patient tolerated treatment well.  Port maintained positive blood return throughout treatment. Port flushed and de accessed per protocol. Patient was discharged in stable condition. Patient is aware of next scheduled [facility] appointment on 4/1/24.    Future Appointments   Date Time Provider Department Center   4/1/2024  7:30 AM SS INF1 CH1 >4H [facility]   4/1/2024  8:15 AM Fiona Smith MD [facility]   4/15/2024  7:30 AM SS INF1 CH1 >4H [facility]   4/22/2024  7:30 AM SS INF1 CH1 >4H [facility]   4/23/2024 12:30 PM Jaylyn Real MD [facility]   4/29/2024  7:30 AM SS INF1 CH1 >4H [facility]       Fabiana Terry RN  March 25, 2024

## 2024-03-26 ENCOUNTER — PATIENT MESSAGE (OUTPATIENT)
Age: 69
End: 2024-03-26

## 2024-03-26 NOTE — PROGRESS NOTES
[facility] [place] [facility]   [address], [address] [place] [address]   W: [phone]  F: [phone]        Reason for Visit:   Katiuska Fair is a 68 y.o. female who is seen for follow up of pancreatic adenocarcinoma.      Hematology/Oncology Treatment History:       Diagnosis: Pancreatic adenocarcinoma     Stage: clinical Stage Ib [T2N0] at diagnosis; Stage III [biF8rF8] at surgery     Pathology:    -9/26/21 Cytology - brushings from common bile duct: Reactive glandular epithelial cells and bile    -9/28/21 pancreatic head mass biopsy: Adenocarcinoma.    -10/22/21 [facility] mult-cancer panel -negative    -2/23/22 Pancreaticoduodenectomy (Whipple resection): Ductal adenocarcinoma, G2, 1.2cm. Tumor invades peripancreatic soft tissues. LVI negative. PNI present. Margins uninvolved.  5/29 LNs involved with cancer.    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 No germline pathogenic variants.    -Clinical trials: Trial of Ulixertinib in combo with hydroxychloroquine in patients with advanced GI malignancies ([id number]). Phase II [place], [place] for KRAS. pG12D mutation.    -2/22/24 LN, N7, EBUS guided FNA: Positive for malignant cells, consistent with non-small cell carcinoma. Insufficient cells to perform IHC for further classification    -Tempus liquid bx: MSI not detected, BRCA2 p. (gem/cisplatin pancreatic ca); Niraparib (ovarian); Olaparib (pancreatic); Rucaparib (pancreatic), Talazoparib (breast), CDKN2A, TP53 p. Y220C, TP53 p.H193R, TP53 p.K132R     Prior Treatment:   1. Neoadjuvant FOLFIRINOX x 6 cycles, 10/18/21-1/10/22  2. PYLORUS PRESERVING WHIPPLE PROCEDURE AND PORTAL LYPHMADENECTOMY, 2/23/22  3. Adjuvant FOLFIRINOX x 6 cycles, 4/12/22 - 7/5/22.      Current Treatment: [place]-Abraxane, started 3/15/24 - current.      History of Present Illness:   Katiuska Fair is a pleasant 68 y.o. female who

## 2024-03-26 NOTE — TELEPHONE ENCOUNTER
Palliative brief note-    I returned patients call, however she was not feeling well, stated that she had problem with new prescription for pain medicine, but would call team tomorrow to discuss.    I called her pharmacy, they received Rx for Dilaudid 4mg, take 1/2 tab (2mg) every 8 hours as needed, but did not have 45 tabs in stock, were only able to dispense 16 tabs to patient.  Pharmacist stated med is currently on backorder, not certain when will be in stock, but indicated she will need new rx for remainder of the dilaudid.

## 2024-03-26 NOTE — PROGRESS NOTES
Palliative On Call provider brief note-  Mrs Fair called palliative office shortly before 5, received answering service, message was routed to Dr. Albrecht, who forwarded message to me.     I returned  call, she stated that there was a problem with her medications, but that she would speak with primary team tomorrow morning about it, as she was not feeling well at time of my call, wanted to lay down and rest.

## 2024-03-29 ENCOUNTER — APPOINTMENT (OUTPATIENT)
Facility: HOSPITAL | Age: 69
End: 2024-03-29
Payer: MEDICARE

## 2024-04-01 ENCOUNTER — OFFICE VISIT (OUTPATIENT)
Age: 69
End: 2024-04-01
Payer: MEDICARE

## 2024-04-01 ENCOUNTER — HOSPITAL ENCOUNTER (OUTPATIENT)
Facility: HOSPITAL | Age: 69
Setting detail: INFUSION SERIES
Discharge: HOME | End: 2024-04-01
Payer: MEDICARE

## 2024-04-01 VITALS
SYSTOLIC BLOOD PRESSURE: 109 MMHG | BODY MASS INDEX: 28.31 KG/M2 | HEART RATE: 114 BPM | OXYGEN SATURATION: 100 % | HEIGHT: 64 IN | DIASTOLIC BLOOD PRESSURE: 73 MMHG | RESPIRATION RATE: 18 BRPM | TEMPERATURE: 98.6 F

## 2024-04-01 VITALS
RESPIRATION RATE: 18 BRPM | TEMPERATURE: 98 F | SYSTOLIC BLOOD PRESSURE: 106 MMHG | WEIGHT: 164.9 LBS | BODY MASS INDEX: 28.15 KG/M2 | DIASTOLIC BLOOD PRESSURE: 79 MMHG | HEIGHT: 64 IN | HEART RATE: 102 BPM | OXYGEN SATURATION: 97 %

## 2024-04-01 DIAGNOSIS — G89.3 NEOPLASM RELATED PAIN: ICD-10-CM

## 2024-04-01 DIAGNOSIS — C25.0 MALIGNANT NEOPLASM OF HEAD OF PANCREAS (HCC): ICD-10-CM

## 2024-04-01 DIAGNOSIS — D69.6 THROMBOCYTOPENIA (HCC): ICD-10-CM

## 2024-04-01 DIAGNOSIS — T45.1X5A CINV (CHEMOTHERAPY-INDUCED NAUSEA AND VOMITING): ICD-10-CM

## 2024-04-01 DIAGNOSIS — C25.9 PANCREATIC ADENOCARCINOMA (HCC): Primary | ICD-10-CM

## 2024-04-01 DIAGNOSIS — E87.1 HYPONATREMIA: ICD-10-CM

## 2024-04-01 DIAGNOSIS — C25.0 MALIGNANT NEOPLASM OF HEAD OF PANCREAS (HCC): Primary | ICD-10-CM

## 2024-04-01 DIAGNOSIS — R53.83 OTHER FATIGUE: ICD-10-CM

## 2024-04-01 DIAGNOSIS — L08.9 SKIN INFECTION: ICD-10-CM

## 2024-04-01 DIAGNOSIS — R53.83 LETHARGY: ICD-10-CM

## 2024-04-01 DIAGNOSIS — R22.0 MASS OF CHIN: ICD-10-CM

## 2024-04-01 DIAGNOSIS — R11.2 CINV (CHEMOTHERAPY-INDUCED NAUSEA AND VOMITING): ICD-10-CM

## 2024-04-01 DIAGNOSIS — Z51.11 CHEMOTHERAPY MANAGEMENT, ENCOUNTER FOR: ICD-10-CM

## 2024-04-01 DIAGNOSIS — E87.6 HYPOKALEMIA: ICD-10-CM

## 2024-04-01 DIAGNOSIS — C78.01 SMALL CELL CARCINOMA METASTATIC TO RIGHT LUNG (HCC): ICD-10-CM

## 2024-04-01 DIAGNOSIS — R21 RASH: ICD-10-CM

## 2024-04-01 LAB
ANION GAP SERPL CALC-SCNC: 7 MMOL/L (ref 5–15)
BASOPHILS # BLD: 0 K/UL (ref 0–0.1)
BASOPHILS NFR BLD: 1 % (ref 0–1)
BUN SERPL-MCNC: 14 MG/DL (ref 6–20)
BUN/CREAT SERPL: 20 (ref 12–20)
CALCIUM SERPL-MCNC: 8.9 MG/DL (ref 8.5–10.1)
CHLORIDE SERPL-SCNC: 90 MMOL/L (ref 97–108)
CO2 SERPL-SCNC: 28 MMOL/L (ref 21–32)
CREAT SERPL-MCNC: 0.7 MG/DL (ref 0.55–1.02)
DIFFERENTIAL METHOD BLD: ABNORMAL
EOSINOPHIL # BLD: 0 K/UL (ref 0–0.4)
EOSINOPHIL NFR BLD: 1 % (ref 0–7)
ERYTHROCYTE [DISTWIDTH] IN BLOOD BY AUTOMATED COUNT: 12.3 % (ref 11.5–14.5)
GLUCOSE SERPL-MCNC: 107 MG/DL (ref 65–100)
HCT VFR BLD AUTO: 36.6 % (ref 35–47)
HGB BLD-MCNC: 13 G/DL (ref 11.5–16)
IMM GRANULOCYTES # BLD AUTO: 0 K/UL
IMM GRANULOCYTES NFR BLD AUTO: 0 %
LYMPHOCYTES # BLD: 1.4 K/UL (ref 0.8–3.5)
LYMPHOCYTES NFR BLD: 48 % (ref 12–49)
MCH RBC QN AUTO: 32.3 PG (ref 26–34)
MCHC RBC AUTO-ENTMCNC: 35.5 G/DL (ref 30–36.5)
MCV RBC AUTO: 91 FL (ref 80–99)
MONOCYTES # BLD: 0.2 K/UL (ref 0–1)
MONOCYTES NFR BLD: 6 % (ref 5–13)
MYELOCYTES NFR BLD MANUAL: 1 %
NEUTS BAND NFR BLD MANUAL: 5 % (ref 0–6)
NEUTS SEG # BLD: 1.3 K/UL (ref 1.8–8)
NEUTS SEG NFR BLD: 38 % (ref 32–75)
NRBC # BLD: 0 K/UL (ref 0–0.01)
NRBC BLD-RTO: 0 PER 100 WBC
PLATELET # BLD AUTO: 81 K/UL (ref 150–400)
PMV BLD AUTO: 10 FL (ref 8.9–12.9)
POTASSIUM SERPL-SCNC: 3.1 MMOL/L (ref 3.5–5.1)
RBC # BLD AUTO: 4.02 M/UL (ref 3.8–5.2)
RBC MORPH BLD: ABNORMAL
SODIUM SERPL-SCNC: 125 MMOL/L (ref 136–145)
WBC # BLD AUTO: 3 K/UL (ref 3.6–11)

## 2024-04-01 PROCEDURE — 3074F SYST BP LT 130 MM HG: CPT | Performed by: NURSE PRACTITIONER

## 2024-04-01 PROCEDURE — 2580000003 HC RX 258: Performed by: INTERNAL MEDICINE

## 2024-04-01 PROCEDURE — G8417 CALC BMI ABV UP PARAM F/U: HCPCS | Performed by: NURSE PRACTITIONER

## 2024-04-01 PROCEDURE — 1123F ACP DISCUSS/DSCN MKR DOCD: CPT | Performed by: NURSE PRACTITIONER

## 2024-04-01 PROCEDURE — 1090F PRES/ABSN URINE INCON ASSESS: CPT | Performed by: NURSE PRACTITIONER

## 2024-04-01 PROCEDURE — 3017F COLORECTAL CA SCREEN DOC REV: CPT | Performed by: NURSE PRACTITIONER

## 2024-04-01 PROCEDURE — 99215 OFFICE O/P EST HI 40 MIN: CPT | Performed by: NURSE PRACTITIONER

## 2024-04-01 PROCEDURE — 6370000000 HC RX 637 (ALT 250 FOR IP): Performed by: NURSE PRACTITIONER

## 2024-04-01 PROCEDURE — G2211 COMPLEX E/M VISIT ADD ON: HCPCS | Performed by: NURSE PRACTITIONER

## 2024-04-01 PROCEDURE — 85025 COMPLETE CBC W/AUTO DIFF WBC: CPT

## 2024-04-01 PROCEDURE — 6360000002 HC RX W HCPCS: Performed by: INTERNAL MEDICINE

## 2024-04-01 PROCEDURE — 96361 HYDRATE IV INFUSION ADD-ON: CPT

## 2024-04-01 PROCEDURE — 6360000002 HC RX W HCPCS: Performed by: NURSE PRACTITIONER

## 2024-04-01 PROCEDURE — 36415 COLL VENOUS BLD VENIPUNCTURE: CPT

## 2024-04-01 PROCEDURE — 96413 CHEMO IV INFUSION 1 HR: CPT

## 2024-04-01 PROCEDURE — 3078F DIAST BP <80 MM HG: CPT | Performed by: NURSE PRACTITIONER

## 2024-04-01 PROCEDURE — G8400 PT W/DXA NO RESULTS DOC: HCPCS | Performed by: NURSE PRACTITIONER

## 2024-04-01 PROCEDURE — 96417 CHEMO IV INFUS EACH ADDL SEQ: CPT

## 2024-04-01 PROCEDURE — 2580000003 HC RX 258: Performed by: NURSE PRACTITIONER

## 2024-04-01 PROCEDURE — 1036F TOBACCO NON-USER: CPT | Performed by: NURSE PRACTITIONER

## 2024-04-01 PROCEDURE — 96375 TX/PRO/DX INJ NEW DRUG ADDON: CPT

## 2024-04-01 PROCEDURE — G8427 DOCREV CUR MEDS BY ELIG CLIN: HCPCS | Performed by: NURSE PRACTITIONER

## 2024-04-01 PROCEDURE — 80048 BASIC METABOLIC PNL TOTAL CA: CPT

## 2024-04-01 RX ORDER — PACLITAXEL 100 MG/20ML
100 INJECTION, POWDER, LYOPHILIZED, FOR SUSPENSION INTRAVENOUS ONCE
Status: COMPLETED | OUTPATIENT
Start: 2024-04-01 | End: 2024-04-01

## 2024-04-01 RX ORDER — PACLITAXEL 100 MG/20ML
100 INJECTION, POWDER, LYOPHILIZED, FOR SUSPENSION INTRAVENOUS ONCE
Status: CANCELLED | OUTPATIENT
Start: 2024-04-01 | End: 2024-04-01

## 2024-04-01 RX ORDER — POTASSIUM CHLORIDE 750 MG/1
40 TABLET, FILM COATED, EXTENDED RELEASE ORAL ONCE
Status: COMPLETED | OUTPATIENT
Start: 2024-04-01 | End: 2024-04-01

## 2024-04-01 RX ORDER — 0.9 % SODIUM CHLORIDE 0.9 %
250 INTRAVENOUS SOLUTION INTRAVENOUS ONCE
Status: COMPLETED | OUTPATIENT
Start: 2024-04-01 | End: 2024-04-01

## 2024-04-01 RX ORDER — SODIUM CHLORIDE 9 MG/ML
5-250 INJECTION, SOLUTION INTRAVENOUS PRN
Status: DISCONTINUED | OUTPATIENT
Start: 2024-04-01 | End: 2024-04-02 | Stop reason: HOSPADM

## 2024-04-01 RX ORDER — SODIUM CHLORIDE 0.9 % (FLUSH) 0.9 %
5-40 SYRINGE (ML) INJECTION PRN
Status: DISCONTINUED | OUTPATIENT
Start: 2024-04-01 | End: 2024-04-02 | Stop reason: HOSPADM

## 2024-04-01 RX ORDER — ONDANSETRON HYDROCHLORIDE 8 MG/1
8 TABLET, FILM COATED ORAL EVERY 8 HOURS PRN
Qty: 30 TABLET | Refills: 3 | Status: SHIPPED | OUTPATIENT
Start: 2024-04-01

## 2024-04-01 RX ORDER — TRIAMCINOLONE ACETONIDE 0.25 MG/G
OINTMENT TOPICAL
Qty: 80 G | Refills: 1 | Status: SHIPPED | OUTPATIENT
Start: 2024-04-01 | End: 2024-04-08

## 2024-04-01 RX ORDER — CEPHALEXIN 500 MG/1
500 CAPSULE ORAL 4 TIMES DAILY
Qty: 28 CAPSULE | Refills: 0 | Status: SHIPPED | OUTPATIENT
Start: 2024-04-01 | End: 2024-04-08

## 2024-04-01 RX ORDER — 0.9 % SODIUM CHLORIDE 0.9 %
250 INTRAVENOUS SOLUTION INTRAVENOUS ONCE
Status: DISCONTINUED | OUTPATIENT
Start: 2024-04-01 | End: 2024-04-01

## 2024-04-01 RX ORDER — ONDANSETRON 2 MG/ML
8 INJECTION INTRAMUSCULAR; INTRAVENOUS ONCE
Status: COMPLETED | OUTPATIENT
Start: 2024-04-01 | End: 2024-04-01

## 2024-04-01 RX ADMIN — SODIUM CHLORIDE, PRESERVATIVE FREE 20 ML: 5 INJECTION INTRAVENOUS at 14:43

## 2024-04-01 RX ADMIN — ONDANSETRON 8 MG: 2 INJECTION INTRAMUSCULAR; INTRAVENOUS at 10:53

## 2024-04-01 RX ADMIN — POTASSIUM CHLORIDE 40 MEQ: 750 TABLET, FILM COATED, EXTENDED RELEASE ORAL at 10:50

## 2024-04-01 RX ADMIN — PACLITAXEL 184 MG: 100 INJECTION, POWDER, LYOPHILIZED, FOR SUSPENSION INTRAVENOUS at 12:31

## 2024-04-01 RX ADMIN — GEMCITABINE HYDROCHLORIDE 1472 MG: 2 INJECTION, SOLUTION INTRAVENOUS at 14:05

## 2024-04-01 RX ADMIN — SODIUM CHLORIDE 250 ML: 9 INJECTION, SOLUTION INTRAVENOUS at 10:53

## 2024-04-01 ASSESSMENT — PATIENT HEALTH QUESTIONNAIRE - PHQ9
2. FEELING DOWN, DEPRESSED OR HOPELESS: NOT AT ALL
SUM OF ALL RESPONSES TO PHQ QUESTIONS 1-9: 0
1. LITTLE INTEREST OR PLEASURE IN DOING THINGS: NOT AT ALL
SUM OF ALL RESPONSES TO PHQ QUESTIONS 1-9: 0
SUM OF ALL RESPONSES TO PHQ9 QUESTIONS 1 & 2: 0

## 2024-04-01 ASSESSMENT — PAIN DESCRIPTION - DESCRIPTORS: DESCRIPTORS: DISCOMFORT;ACHING

## 2024-04-01 ASSESSMENT — PAIN DESCRIPTION - ORIENTATION: ORIENTATION: RIGHT

## 2024-04-01 ASSESSMENT — PAIN DESCRIPTION - LOCATION: LOCATION: BACK;SHOULDER

## 2024-04-01 ASSESSMENT — PAIN SCALES - GENERAL: PAINLEVEL_OUTOF10: 6

## 2024-04-01 NOTE — PROGRESS NOTES
[facility] VISIT NOTE  Date: 2024    Name: Katiuska Fair    MRN: [id number]         : 1955    Ms. Fair Arrived in wheelchair and in no distress for C1D15 of Gemzar+Abraxane Regimen.  Assessment was completed, no acute issues at this time, pt c/o new rashes and itching which come and go, Nausea and unable to take anti-nausea med due to it makes her more sick, diarrhea.  Notified to ABDULKADIR Sue. Chest wall port accessed without difficulty by the assessment nurse, labs drawn & sent for processing. Pt proceeded to MD appointment with Medical  Oncology.       Upon pt return from MD appointment, low PLT result notified to indira Sue to treat order received with new reduce dosages.       Vitals:    24 0808 24 1445   BP: 105/84 106/79   Pulse: (!) 114 (!) 102   Resp: 18 18   Temp:  98 °F (36.7 °C)   TempSrc:  Temporal   SpO2: 96% 97%   Weight: 74.8 kg (164 lb 14.4 oz)    Height: 1.626 m (5' 4\")           Lab results were obtained and reviewed.  Recent Results (from the past 12 hour(s))   Basic Metabolic Panel    Collection Time: 24  8:12 AM   Result Value Ref Range    Sodium 125 (L) 136 - 145 mmol/L    Potassium 3.1 (L) 3.5 - 5.1 mmol/L    Chloride 90 (L) 97 - 108 mmol/L    CO2 28 21 - 32 mmol/L    Anion Gap 7 5 - 15 mmol/L    Glucose 107 (H) 65 - 100 mg/dL    BUN 14 6 - 20 MG/DL    Creatinine 0.70 0.55 - 1.02 MG/DL    Bun/Cre Ratio 20 12 - 20      Est, Glom Filt Rate >90 >60 ml/min/1.73m2    Calcium 8.9 8.5 - 10.1 MG/DL   CBC With Auto Differential    Collection Time: 24  8:27 AM   Result Value Ref Range    WBC 3.0 (L) 3.6 - 11.0 K/uL    RBC 4.02 3.80 - 5.20 M/uL    Hemoglobin 13.0 11.5 - 16.0 g/dL    Hematocrit 36.6 35.0 - 47.0 %    MCV 91.0 80.0 - 99.0 FL    MCH 32.3 26.0 - 34.0 PG    MCHC 35.5 30.0 - 36.5 g/dL    RDW 12.3 11.5 - 14.5 %    Platelets 81 (L) 150 - 400 K/uL    MPV 10.0 8.9 - 12.9 FL    Nucleated RBCs 0.0 0  WBC    nRBC 0.00 0.00 - 0.01 K/uL    Neutrophils %  38 32 - 75 %    Band Neutrophils 5 0 - 6 %    Lymphocytes % 48 12 - 49 %    Monocytes % 6 5 - 13 %    Eosinophils % 1 0 - 7 %    Basophils % 1 0 - 1 %    Myelocytes 1 (H) 0 %    Immature Granulocytes 0 %    Neutrophils Absolute 1.3 (L) 1.8 - 8.0 K/UL    Lymphocytes Absolute 1.4 0.8 - 3.5 K/UL    Monocytes Absolute 0.2 0.0 - 1.0 K/UL    Eosinophils Absolute 0.0 0.0 - 0.4 K/UL    Basophils Absolute 0.0 0.0 - 0.1 K/UL    Absolute Immature Granulocyte 0.0 K/UL    Differential Type MANUAL      RBC Comment NORMOCYTIC, NORMOCHROMIC         Medications received:  Medications Administered         ondansetron (ZOFRAN) injection 8 mg Admin Date  04/01/2024 Action  Given Dose  8 mg Rate   Route  IntraVENous Administered By  Noris Singh RN        potassium chloride (KLOR-CON) extended release tablet 40 mEq Admin Date  04/01/2024 Action  Given Dose  40 mEq Rate   Route  Oral Administered By  Noris Singh RN        sodium chloride 0.9 % bolus 250 mL Admin Date  04/01/2024 Action  New Bag Dose  250 mL Rate  483.9 mL/hr Route  IntraVENous Administered By  Noris Singh RN             Two nurses verified prior to administering:    Drug name  Drug dose  Infusion volume or drug volume when prepared in a syringe  Rate of administration  Route of administration  Expiration dates and/or times  Appearance and physical integrity of the drugs  Rate set on infusion pump, when used  Sequencing of drug administration        Ms. Fair tolerated treatment well and was [facility] in stable condition at 1445. Port de-accessed, flushed per protocol. She is to return on  April 15, 2024 at 0730 for her next appointment.    Noris Singh RN  April 1, 2024

## 2024-04-01 NOTE — PROGRESS NOTES
Chief Complaint   Patient presents with    Follow-up           Vitals:    04/01/24 0851   BP: 109/73   Pulse: (!) 114   Resp: 18   Temp: 98.6 °F (37 °C)   SpO2: 100%      C/O Nausea this morning. Itchy rash on arms, legs, chest x 1 week.      1. Have you been to the ER, urgent care clinic since your last visit?  Hospitalized since your last visit?  No  2. Have you seen or consulted any other health care providers outside of the [facility] since your last visit?  Include any pap smears or colon screening. No

## 2024-04-03 ENCOUNTER — HOSPITAL ENCOUNTER (EMERGENCY)
Facility: HOSPITAL | Age: 69
Discharge: HOME | End: 2024-04-03
Attending: EMERGENCY MEDICINE
Payer: MEDICARE

## 2024-04-03 ENCOUNTER — APPOINTMENT (OUTPATIENT)
Facility: HOSPITAL | Age: 69
End: 2024-04-03
Payer: MEDICARE

## 2024-04-03 VITALS
OXYGEN SATURATION: 100 % | BODY MASS INDEX: 28 KG/M2 | SYSTOLIC BLOOD PRESSURE: 108 MMHG | DIASTOLIC BLOOD PRESSURE: 81 MMHG | HEIGHT: 64 IN | WEIGHT: 164 LBS | RESPIRATION RATE: 20 BRPM | HEART RATE: 105 BPM | TEMPERATURE: 98.6 F

## 2024-04-03 DIAGNOSIS — R07.89 ATYPICAL CHEST PAIN: Primary | ICD-10-CM

## 2024-04-03 DIAGNOSIS — R22.0 SWELLING, MASS, OR LUMP ON FACE: Primary | ICD-10-CM

## 2024-04-03 DIAGNOSIS — G89.3 CANCER RELATED PAIN: ICD-10-CM

## 2024-04-03 LAB
ALBUMIN SERPL-MCNC: 2.6 G/DL (ref 3.5–5)
ALBUMIN/GLOB SERPL: 0.6 (ref 1.1–2.2)
ALP SERPL-CCNC: 206 U/L (ref 45–117)
ALT SERPL-CCNC: 50 U/L (ref 12–78)
ANION GAP SERPL CALC-SCNC: 7 MMOL/L (ref 5–15)
AST SERPL-CCNC: 46 U/L (ref 15–37)
BASOPHILS # BLD: 0 K/UL (ref 0–0.1)
BASOPHILS NFR BLD: 0 % (ref 0–1)
BILIRUB SERPL-MCNC: 1.3 MG/DL (ref 0.2–1)
BUN SERPL-MCNC: 13 MG/DL (ref 6–20)
BUN/CREAT SERPL: 19 (ref 12–20)
CALCIUM SERPL-MCNC: 9.1 MG/DL (ref 8.5–10.1)
CHLORIDE SERPL-SCNC: 94 MMOL/L (ref 97–108)
CO2 SERPL-SCNC: 28 MMOL/L (ref 21–32)
CREAT SERPL-MCNC: 0.68 MG/DL (ref 0.55–1.02)
DIFFERENTIAL METHOD BLD: ABNORMAL
EOSINOPHIL # BLD: 0 K/UL (ref 0–0.4)
EOSINOPHIL NFR BLD: 0 % (ref 0–7)
ERYTHROCYTE [DISTWIDTH] IN BLOOD BY AUTOMATED COUNT: 12.6 % (ref 11.5–14.5)
GLOBULIN SER CALC-MCNC: 4.2 G/DL (ref 2–4)
GLUCOSE SERPL-MCNC: 103 MG/DL (ref 65–100)
HCT VFR BLD AUTO: 35.1 % (ref 35–47)
HGB BLD-MCNC: 12.6 G/DL (ref 11.5–16)
IMM GRANULOCYTES # BLD AUTO: 0 K/UL
IMM GRANULOCYTES NFR BLD AUTO: 0 %
LIPASE SERPL-CCNC: 75 U/L (ref 13–75)
LYMPHOCYTES # BLD: 0.6 K/UL (ref 0.8–3.5)
LYMPHOCYTES NFR BLD: 18 % (ref 12–49)
MAGNESIUM SERPL-MCNC: 2.1 MG/DL (ref 1.6–2.4)
MCH RBC QN AUTO: 32.6 PG (ref 26–34)
MCHC RBC AUTO-ENTMCNC: 35.9 G/DL (ref 30–36.5)
MCV RBC AUTO: 90.7 FL (ref 80–99)
MONOCYTES # BLD: 0 K/UL (ref 0–1)
MONOCYTES NFR BLD: 0 % (ref 5–13)
NEUTS BAND NFR BLD MANUAL: 1 % (ref 0–6)
NEUTS SEG # BLD: 2.7 K/UL (ref 1.8–8)
NEUTS SEG NFR BLD: 81 % (ref 32–75)
NRBC # BLD: 0 K/UL (ref 0–0.01)
NRBC BLD-RTO: 0 PER 100 WBC
NT PRO BNP: 32 PG/ML
PLATELET # BLD AUTO: 65 K/UL (ref 150–400)
PMV BLD AUTO: 10.4 FL (ref 8.9–12.9)
POTASSIUM SERPL-SCNC: 3.6 MMOL/L (ref 3.5–5.1)
PROT SERPL-MCNC: 6.8 G/DL (ref 6.4–8.2)
RBC # BLD AUTO: 3.87 M/UL (ref 3.8–5.2)
RBC MORPH BLD: ABNORMAL
SODIUM SERPL-SCNC: 129 MMOL/L (ref 136–145)
TROPONIN I SERPL HS-MCNC: 5 NG/L (ref 0–51)
WBC # BLD AUTO: 3.3 K/UL (ref 3.6–11)

## 2024-04-03 PROCEDURE — 99285 EMERGENCY DEPT VISIT HI MDM: CPT

## 2024-04-03 PROCEDURE — 83690 ASSAY OF LIPASE: CPT

## 2024-04-03 PROCEDURE — 2580000003 HC RX 258: Performed by: EMERGENCY MEDICINE

## 2024-04-03 PROCEDURE — 96375 TX/PRO/DX INJ NEW DRUG ADDON: CPT

## 2024-04-03 PROCEDURE — 6360000004 HC RX CONTRAST MEDICATION: Performed by: EMERGENCY MEDICINE

## 2024-04-03 PROCEDURE — 83880 ASSAY OF NATRIURETIC PEPTIDE: CPT

## 2024-04-03 PROCEDURE — 71275 CT ANGIOGRAPHY CHEST: CPT

## 2024-04-03 PROCEDURE — 36415 COLL VENOUS BLD VENIPUNCTURE: CPT

## 2024-04-03 PROCEDURE — 96374 THER/PROPH/DIAG INJ IV PUSH: CPT

## 2024-04-03 PROCEDURE — 84484 ASSAY OF TROPONIN QUANT: CPT

## 2024-04-03 PROCEDURE — 85025 COMPLETE CBC W/AUTO DIFF WBC: CPT

## 2024-04-03 PROCEDURE — 83735 ASSAY OF MAGNESIUM: CPT

## 2024-04-03 PROCEDURE — 93005 ELECTROCARDIOGRAM TRACING: CPT | Performed by: EMERGENCY MEDICINE

## 2024-04-03 PROCEDURE — 74177 CT ABD & PELVIS W/CONTRAST: CPT

## 2024-04-03 PROCEDURE — 6360000002 HC RX W HCPCS: Performed by: EMERGENCY MEDICINE

## 2024-04-03 PROCEDURE — 80053 COMPREHEN METABOLIC PANEL: CPT

## 2024-04-03 RX ORDER — SODIUM CHLORIDE, SODIUM LACTATE, POTASSIUM CHLORIDE, AND CALCIUM CHLORIDE .6; .31; .03; .02 G/100ML; G/100ML; G/100ML; G/100ML
1000 INJECTION, SOLUTION INTRAVENOUS ONCE
Status: COMPLETED | OUTPATIENT
Start: 2024-04-03 | End: 2024-04-03

## 2024-04-03 RX ORDER — ONDANSETRON 2 MG/ML
4 INJECTION INTRAMUSCULAR; INTRAVENOUS ONCE
Status: COMPLETED | OUTPATIENT
Start: 2024-04-03 | End: 2024-04-03

## 2024-04-03 RX ORDER — KETOROLAC TROMETHAMINE 15 MG/ML
15 INJECTION, SOLUTION INTRAMUSCULAR; INTRAVENOUS ONCE
Status: COMPLETED | OUTPATIENT
Start: 2024-04-03 | End: 2024-04-03

## 2024-04-03 RX ORDER — KETOROLAC TROMETHAMINE 10 MG/1
10 TABLET, FILM COATED ORAL EVERY 6 HOURS PRN
Qty: 20 TABLET | Refills: 0 | Status: SHIPPED | OUTPATIENT
Start: 2024-04-03

## 2024-04-03 RX ADMIN — KETOROLAC TROMETHAMINE 15 MG: 15 INJECTION, SOLUTION INTRAMUSCULAR; INTRAVENOUS at 14:29

## 2024-04-03 RX ADMIN — IOPAMIDOL 100 ML: 755 INJECTION, SOLUTION INTRAVENOUS at 14:03

## 2024-04-03 RX ADMIN — ONDANSETRON 4 MG: 2 INJECTION INTRAMUSCULAR; INTRAVENOUS at 14:28

## 2024-04-03 RX ADMIN — SODIUM CHLORIDE, POTASSIUM CHLORIDE, SODIUM LACTATE AND CALCIUM CHLORIDE 1000 ML: 600; 310; 30; 20 INJECTION, SOLUTION INTRAVENOUS at 14:36

## 2024-04-03 ASSESSMENT — PAIN SCALES - GENERAL
PAINLEVEL_OUTOF10: 8
PAINLEVEL_OUTOF10: 8
PAINLEVEL_OUTOF10: 6

## 2024-04-03 ASSESSMENT — PAIN DESCRIPTION - ORIENTATION
ORIENTATION: LEFT;RIGHT
ORIENTATION: RIGHT;LEFT

## 2024-04-03 ASSESSMENT — PAIN DESCRIPTION - DESCRIPTORS
DESCRIPTORS: ACHING
DESCRIPTORS: ACHING

## 2024-04-03 ASSESSMENT — PAIN DESCRIPTION - LOCATION
LOCATION: CHEST
LOCATION: CHEST

## 2024-04-03 ASSESSMENT — PAIN - FUNCTIONAL ASSESSMENT: PAIN_FUNCTIONAL_ASSESSMENT: 0-10

## 2024-04-03 NOTE — ED PROVIDER NOTES
68-year-old female who presented to the emergency department due to chest pain and abdominal pain.  Care signed out to me by previous physician pending results of her imaging.  Please see previous notes for details.    Imaging shows significant progression of the patient's metastatic disease.  However there is no evidence of any PEs, infectious processes, bowel obstructions, or anything needing urgent intervention.  Her pain is much better controlled after Toradol.  Patient's symptoms likely related to cancer related pain.  Patient says she is not interested in taking any other medications to control her pain at home but I did prescribe her some oral Toradol so she need to fill it.  She will continue taking the hydromorphone that has been prescribed to her.  I encouraged her to return should she develop any worsening or new symptoms.  Patient discharged in stable condition     Go Amaya MD  04/03/24 [id number]

## 2024-04-03 NOTE — DISCHARGE INSTRUCTIONS
Please follow-up with your palliative care doctors and your oncologist.  Return to the emergency department with worsening symptoms or new symptoms you find concerning.  If needed you can fill the Toradol prescription if the Dilaudid you are taking at home is not helping

## 2024-04-03 NOTE — ED PROVIDER NOTES
EMERGENCY DEPARTMENT PHYSICIAN NOTE     Patient: Katiuska Fair     Time of Service: 4/3/2024  1:34 PM     Chief complaint:   Chief Complaint   Patient presents with    Chest Pain        HISTORY:  Patient is a 68 y.o. female who presents to the emergency department with complaints of chest tightness.       Past Medical History:   Diagnosis Date    Arthritis     ostero arthritis, rhemathoid  lower back     Autoimmune disease (HCC)     Ulcerative Colitis.     Hypertension     CONTROLLED WITH MEDS    Hypothyroid 2011    Incisional hernia     Malignant neoplasm of head of pancreas (HCC) 2021    Ulcerative colitis (HCC)     Ulcerative colitis (HCC) 2010    Ulcerative colitis (HCC) 2010        Past Surgical History:   Procedure Laterality Date    BREAST SURGERY      cyst removed from left breast     SECTION      X1    COLONOSCOPY      COLONOSCOPY N/A 2017    COLONOSCOPY performed by Say Mclaughlin MD at [facility] ENDOSCOPY    GI      COLONOSCOPY    HERNIA REPAIR  2022    Lap Incisional hernia repair w/mesh. Dr. PACO Almanza    LYMPHADENECTOMY  2022    Portal lymphadenectomy-Dr. PACO Almanza    OTHER SURGICAL HISTORY  2022    pancreatic cancer surgery; Whipple procedure    POLYPECTOMY      UT UNLISTED PROCEDURE ABDOMEN PERITONEUM & OMENTUM  2022    WIPPLE PROCEDURE    UT UNLISTED PROCEDURE ABDOMEN PERITONEUM & OMENTUM      ENDOSCOPIC, PLACEMENT OF BILIARY STENT    UROLOGICAL SURGERY      URETHERAL STENT.    VASCULAR SURGERY      PLACEMENT OF PORT-A-CATH RIGHT SIDE        Family History   Problem Relation Age of Onset    Anesth Problems Neg Hx     Cancer Brother         colon    Cancer Mother         pancreatic        Social History     Socioeconomic History    Marital status:    Tobacco Use    Smoking status: Former     Current packs/day: 0.00     Types: Cigarettes     Quit date: 1980     Years since quittin.2    Smokeless tobacco: Never   Vaping Use

## 2024-04-03 NOTE — ED TRIAGE NOTES
Pt arrives to the ER for complaints of right and left sided chest pain that started yesterday with worsening pain today.     Pt reports that she also has chest tightness.     Pt states she is unsure if she feels short of breath.     PMH of pancreatic cancer with mets to bone. Pt is receiving active chemotherapy, last treatment was 4/1.

## 2024-04-04 ENCOUNTER — TELEPHONE (OUTPATIENT)
Age: 69
End: 2024-04-04

## 2024-04-04 NOTE — TELEPHONE ENCOUNTER
[facility]  [phone]    04/04/24 8:49 AM EDT - Attempted to reach the patient to advise her of the NP's message below.  There was no answer.  Left a voicemail for the patient to call back at their earliest convenience along with the phone number to our office.        NP's message: \"Can you call her and advise she does not need to come in for labs today since she had labs yesterday. Her sodium improved on labs yesterday, which is great. I would like her to come in next week one day to check labs. Please ask her which days she prefers then update Judy and I. Thank you\"        [facility]  [phone]    04/04/24 9:22 AM EDT - Attempted to reach the patient.  There was no answer.  Left a voicemail for the patient to call back at their earliest convenience along with the phone number to our office.

## 2024-04-05 ENCOUNTER — TELEPHONE (OUTPATIENT)
Age: 69
End: 2024-04-05

## 2024-04-05 DIAGNOSIS — C25.0 MALIGNANT NEOPLASM OF HEAD OF PANCREAS (HCC): Primary | ICD-10-CM

## 2024-04-05 DIAGNOSIS — E87.1 HYPONATREMIA: ICD-10-CM

## 2024-04-05 NOTE — TELEPHONE ENCOUNTER
Called patient about her missed lab apt that was on 4/4 she stated sorry she forgot. She is now added for 4/9

## 2024-04-06 LAB
EKG ATRIAL RATE: 115 BPM
EKG DIAGNOSIS: NORMAL
EKG P AXIS: 47 DEGREES
EKG P-R INTERVAL: 144 MS
EKG Q-T INTERVAL: 330 MS
EKG QRS DURATION: 88 MS
EKG QTC CALCULATION (BAZETT): 456 MS
EKG R AXIS: 7 DEGREES
EKG T AXIS: 22 DEGREES
EKG VENTRICULAR RATE: 115 BPM

## 2024-04-06 PROCEDURE — 93010 ELECTROCARDIOGRAM REPORT: CPT | Performed by: SPECIALIST

## 2024-04-08 ENCOUNTER — HOSPITAL ENCOUNTER (INPATIENT)
Facility: HOSPITAL | Age: 69
LOS: 3 days | Discharge: HOME | End: 2024-04-11
Attending: EMERGENCY MEDICINE | Admitting: INTERNAL MEDICINE
Payer: MEDICARE

## 2024-04-08 DIAGNOSIS — R53.1 GENERAL WEAKNESS: ICD-10-CM

## 2024-04-08 DIAGNOSIS — R62.7 FAILURE TO THRIVE IN ADULT: ICD-10-CM

## 2024-04-08 DIAGNOSIS — E86.0 DEHYDRATION: Primary | ICD-10-CM

## 2024-04-08 PROBLEM — C25.9 PANCREATIC CANCER (HCC): Status: ACTIVE | Noted: 2024-04-08

## 2024-04-08 LAB
ALBUMIN SERPL-MCNC: 2.7 G/DL (ref 3.5–5)
ALBUMIN/GLOB SERPL: 0.6 (ref 1.1–2.2)
ALP SERPL-CCNC: 186 U/L (ref 45–117)
ALT SERPL-CCNC: 57 U/L (ref 12–78)
ANION GAP SERPL CALC-SCNC: 11 MMOL/L (ref 5–15)
AST SERPL-CCNC: 38 U/L (ref 15–37)
BASOPHILS # BLD: 0 K/UL (ref 0–0.1)
BASOPHILS NFR BLD: 1 % (ref 0–1)
BILIRUB SERPL-MCNC: 0.9 MG/DL (ref 0.2–1)
BUN SERPL-MCNC: 11 MG/DL (ref 6–20)
BUN/CREAT SERPL: 13 (ref 12–20)
CALCIUM SERPL-MCNC: 9.1 MG/DL (ref 8.5–10.1)
CHLORIDE SERPL-SCNC: 93 MMOL/L (ref 97–108)
CO2 SERPL-SCNC: 27 MMOL/L (ref 21–32)
COMMENT:: NORMAL
CREAT SERPL-MCNC: 0.84 MG/DL (ref 0.55–1.02)
DIFFERENTIAL METHOD BLD: ABNORMAL
EOSINOPHIL # BLD: 0 K/UL (ref 0–0.4)
EOSINOPHIL NFR BLD: 0 % (ref 0–7)
ERYTHROCYTE [DISTWIDTH] IN BLOOD BY AUTOMATED COUNT: 12.9 % (ref 11.5–14.5)
GLOBULIN SER CALC-MCNC: 4.5 G/DL (ref 2–4)
GLUCOSE SERPL-MCNC: 105 MG/DL (ref 65–100)
HCT VFR BLD AUTO: 36.3 % (ref 35–47)
HGB BLD-MCNC: 12.8 G/DL (ref 11.5–16)
IMM GRANULOCYTES # BLD AUTO: 0 K/UL
IMM GRANULOCYTES NFR BLD AUTO: 0 %
LYMPHOCYTES # BLD: 1.4 K/UL (ref 0.8–3.5)
LYMPHOCYTES NFR BLD: 40 % (ref 12–49)
MAGNESIUM SERPL-MCNC: 1.9 MG/DL (ref 1.6–2.4)
MCH RBC QN AUTO: 32.3 PG (ref 26–34)
MCHC RBC AUTO-ENTMCNC: 35.3 G/DL (ref 30–36.5)
MCV RBC AUTO: 91.7 FL (ref 80–99)
METAMYELOCYTES NFR BLD MANUAL: 2 %
MONOCYTES # BLD: 0.4 K/UL (ref 0–1)
MONOCYTES NFR BLD: 11 % (ref 5–13)
MYELOCYTES NFR BLD MANUAL: 1 %
NEUTS BAND NFR BLD MANUAL: 9 % (ref 0–6)
NEUTS SEG # BLD: 1.6 K/UL (ref 1.8–8)
NEUTS SEG NFR BLD: 36 % (ref 32–75)
NRBC # BLD: 0 K/UL (ref 0–0.01)
NRBC BLD-RTO: 0 PER 100 WBC
PLATELET # BLD AUTO: 128 K/UL (ref 150–400)
PMV BLD AUTO: 10.1 FL (ref 8.9–12.9)
POTASSIUM SERPL-SCNC: 3.5 MMOL/L (ref 3.5–5.1)
PROT SERPL-MCNC: 7.2 G/DL (ref 6.4–8.2)
RBC # BLD AUTO: 3.96 M/UL (ref 3.8–5.2)
RBC MORPH BLD: ABNORMAL
SODIUM SERPL-SCNC: 131 MMOL/L (ref 136–145)
SPECIMEN HOLD: NORMAL
WBC # BLD AUTO: 3.5 K/UL (ref 3.6–11)

## 2024-04-08 PROCEDURE — 2580000003 HC RX 258: Performed by: PHYSICIAN ASSISTANT

## 2024-04-08 PROCEDURE — 2500000003 HC RX 250 WO HCPCS: Performed by: INTERNAL MEDICINE

## 2024-04-08 PROCEDURE — 83735 ASSAY OF MAGNESIUM: CPT

## 2024-04-08 PROCEDURE — 36415 COLL VENOUS BLD VENIPUNCTURE: CPT

## 2024-04-08 PROCEDURE — 96374 THER/PROPH/DIAG INJ IV PUSH: CPT

## 2024-04-08 PROCEDURE — 99285 EMERGENCY DEPT VISIT HI MDM: CPT

## 2024-04-08 PROCEDURE — 85025 COMPLETE CBC W/AUTO DIFF WBC: CPT

## 2024-04-08 PROCEDURE — 2500000003 HC RX 250 WO HCPCS: Performed by: PHYSICIAN ASSISTANT

## 2024-04-08 PROCEDURE — A4216 STERILE WATER/SALINE, 10 ML: HCPCS | Performed by: INTERNAL MEDICINE

## 2024-04-08 PROCEDURE — 96375 TX/PRO/DX INJ NEW DRUG ADDON: CPT

## 2024-04-08 PROCEDURE — 93005 ELECTROCARDIOGRAM TRACING: CPT | Performed by: PHYSICIAN ASSISTANT

## 2024-04-08 PROCEDURE — C9113 INJ PANTOPRAZOLE SODIUM, VIA: HCPCS | Performed by: INTERNAL MEDICINE

## 2024-04-08 PROCEDURE — 6360000002 HC RX W HCPCS: Performed by: PHYSICIAN ASSISTANT

## 2024-04-08 PROCEDURE — 6360000002 HC RX W HCPCS: Performed by: INTERNAL MEDICINE

## 2024-04-08 PROCEDURE — 1100000000 HC RM PRIVATE

## 2024-04-08 PROCEDURE — 80053 COMPREHEN METABOLIC PANEL: CPT

## 2024-04-08 PROCEDURE — 96361 HYDRATE IV INFUSION ADD-ON: CPT

## 2024-04-08 PROCEDURE — 2580000003 HC RX 258: Performed by: INTERNAL MEDICINE

## 2024-04-08 RX ORDER — 0.9 % SODIUM CHLORIDE 0.9 %
1000 INTRAVENOUS SOLUTION INTRAVENOUS ONCE
Status: COMPLETED | OUTPATIENT
Start: 2024-04-08 | End: 2024-04-08

## 2024-04-08 RX ORDER — HYDROMORPHONE HYDROCHLORIDE 1 MG/ML
1 INJECTION, SOLUTION INTRAMUSCULAR; INTRAVENOUS; SUBCUTANEOUS ONCE
Status: COMPLETED | OUTPATIENT
Start: 2024-04-08 | End: 2024-04-08

## 2024-04-08 RX ORDER — ACETAMINOPHEN 325 MG/1
650 TABLET ORAL EVERY 6 HOURS PRN
Status: DISCONTINUED | OUTPATIENT
Start: 2024-04-08 | End: 2024-04-11 | Stop reason: HOSPADM

## 2024-04-08 RX ORDER — POTASSIUM CHLORIDE 7.45 MG/ML
10 INJECTION INTRAVENOUS PRN
Status: DISCONTINUED | OUTPATIENT
Start: 2024-04-08 | End: 2024-04-09

## 2024-04-08 RX ORDER — SODIUM CHLORIDE 0.9 % (FLUSH) 0.9 %
5-40 SYRINGE (ML) INJECTION EVERY 12 HOURS SCHEDULED
Status: DISCONTINUED | OUTPATIENT
Start: 2024-04-08 | End: 2024-04-11 | Stop reason: HOSPADM

## 2024-04-08 RX ORDER — ONDANSETRON 4 MG/1
4 TABLET, ORALLY DISINTEGRATING ORAL EVERY 8 HOURS PRN
Status: DISCONTINUED | OUTPATIENT
Start: 2024-04-08 | End: 2024-04-11 | Stop reason: HOSPADM

## 2024-04-08 RX ORDER — ENOXAPARIN SODIUM 100 MG/ML
40 INJECTION SUBCUTANEOUS DAILY
Status: DISCONTINUED | OUTPATIENT
Start: 2024-04-09 | End: 2024-04-08

## 2024-04-08 RX ORDER — MAGNESIUM SULFATE IN WATER 40 MG/ML
2000 INJECTION, SOLUTION INTRAVENOUS PRN
Status: DISCONTINUED | OUTPATIENT
Start: 2024-04-08 | End: 2024-04-09

## 2024-04-08 RX ORDER — FENTANYL CITRATE 50 UG/ML
50 INJECTION, SOLUTION INTRAMUSCULAR; INTRAVENOUS
Status: COMPLETED | OUTPATIENT
Start: 2024-04-08 | End: 2024-04-08

## 2024-04-08 RX ORDER — SODIUM CHLORIDE 9 MG/ML
INJECTION, SOLUTION INTRAVENOUS PRN
Status: DISCONTINUED | OUTPATIENT
Start: 2024-04-08 | End: 2024-04-11 | Stop reason: HOSPADM

## 2024-04-08 RX ORDER — POTASSIUM CHLORIDE 750 MG/1
40 TABLET, FILM COATED, EXTENDED RELEASE ORAL PRN
Status: DISCONTINUED | OUTPATIENT
Start: 2024-04-08 | End: 2024-04-09

## 2024-04-08 RX ORDER — PROCHLORPERAZINE EDISYLATE 5 MG/ML
10 INJECTION INTRAMUSCULAR; INTRAVENOUS EVERY 6 HOURS PRN
Status: DISCONTINUED | OUTPATIENT
Start: 2024-04-08 | End: 2024-04-11 | Stop reason: HOSPADM

## 2024-04-08 RX ORDER — HYDROMORPHONE HYDROCHLORIDE 1 MG/ML
1 INJECTION, SOLUTION INTRAMUSCULAR; INTRAVENOUS; SUBCUTANEOUS
Status: DISCONTINUED | OUTPATIENT
Start: 2024-04-08 | End: 2024-04-09

## 2024-04-08 RX ORDER — ONDANSETRON 2 MG/ML
4 INJECTION INTRAMUSCULAR; INTRAVENOUS ONCE
Status: COMPLETED | OUTPATIENT
Start: 2024-04-08 | End: 2024-04-08

## 2024-04-08 RX ORDER — ONDANSETRON 2 MG/ML
4 INJECTION INTRAMUSCULAR; INTRAVENOUS EVERY 6 HOURS PRN
Status: DISCONTINUED | OUTPATIENT
Start: 2024-04-08 | End: 2024-04-11 | Stop reason: HOSPADM

## 2024-04-08 RX ORDER — SODIUM CHLORIDE, SODIUM LACTATE, POTASSIUM CHLORIDE, CALCIUM CHLORIDE 600; 310; 30; 20 MG/100ML; MG/100ML; MG/100ML; MG/100ML
INJECTION, SOLUTION INTRAVENOUS CONTINUOUS
Status: DISCONTINUED | OUTPATIENT
Start: 2024-04-08 | End: 2024-04-11 | Stop reason: HOSPADM

## 2024-04-08 RX ORDER — SODIUM CHLORIDE 0.9 % (FLUSH) 0.9 %
5-40 SYRINGE (ML) INJECTION PRN
Status: DISCONTINUED | OUTPATIENT
Start: 2024-04-08 | End: 2024-04-11 | Stop reason: HOSPADM

## 2024-04-08 RX ORDER — ACETAMINOPHEN 650 MG/1
650 SUPPOSITORY RECTAL EVERY 6 HOURS PRN
Status: DISCONTINUED | OUTPATIENT
Start: 2024-04-08 | End: 2024-04-11 | Stop reason: HOSPADM

## 2024-04-08 RX ORDER — POLYETHYLENE GLYCOL 3350 17 G/17G
17 POWDER, FOR SOLUTION ORAL DAILY PRN
Status: DISCONTINUED | OUTPATIENT
Start: 2024-04-08 | End: 2024-04-11 | Stop reason: HOSPADM

## 2024-04-08 RX ADMIN — HYDROMORPHONE HYDROCHLORIDE 1 MG: 1 INJECTION, SOLUTION INTRAMUSCULAR; INTRAVENOUS; SUBCUTANEOUS at 18:22

## 2024-04-08 RX ADMIN — PANTOPRAZOLE SODIUM 40 MG: 40 INJECTION, POWDER, FOR SOLUTION INTRAVENOUS at 20:38

## 2024-04-08 RX ADMIN — ONDANSETRON 4 MG: 2 INJECTION INTRAMUSCULAR; INTRAVENOUS at 22:36

## 2024-04-08 RX ADMIN — SODIUM CHLORIDE, POTASSIUM CHLORIDE, SODIUM LACTATE AND CALCIUM CHLORIDE: 600; 310; 30; 20 INJECTION, SOLUTION INTRAVENOUS at 21:30

## 2024-04-08 RX ADMIN — ONDANSETRON 4 MG: 2 INJECTION INTRAMUSCULAR; INTRAVENOUS at 16:11

## 2024-04-08 RX ADMIN — HYDROMORPHONE HYDROCHLORIDE 1 MG: 1 INJECTION, SOLUTION INTRAMUSCULAR; INTRAVENOUS; SUBCUTANEOUS at 22:36

## 2024-04-08 RX ADMIN — SODIUM CHLORIDE 1000 ML: 9 INJECTION, SOLUTION INTRAVENOUS at 16:10

## 2024-04-08 RX ADMIN — FENTANYL CITRATE 50 MCG: 50 INJECTION INTRAMUSCULAR; INTRAVENOUS at 16:12

## 2024-04-08 ASSESSMENT — PAIN DESCRIPTION - DESCRIPTORS
DESCRIPTORS: ACHING

## 2024-04-08 ASSESSMENT — PAIN DESCRIPTION - ORIENTATION
ORIENTATION: RIGHT;LEFT
ORIENTATION: LOWER;UPPER
ORIENTATION: RIGHT;LEFT

## 2024-04-08 ASSESSMENT — PAIN - FUNCTIONAL ASSESSMENT
PAIN_FUNCTIONAL_ASSESSMENT: PREVENTS OR INTERFERES SOME ACTIVE ACTIVITIES AND ADLS
PAIN_FUNCTIONAL_ASSESSMENT: 0-10
PAIN_FUNCTIONAL_ASSESSMENT: ACTIVITIES ARE NOT PREVENTED

## 2024-04-08 ASSESSMENT — PAIN DESCRIPTION - LOCATION
LOCATION: ABDOMEN

## 2024-04-08 ASSESSMENT — ENCOUNTER SYMPTOMS
ABDOMINAL PAIN: 1
NAUSEA: 1
VOMITING: 1

## 2024-04-08 ASSESSMENT — PAIN SCALES - GENERAL
PAINLEVEL_OUTOF10: 2
PAINLEVEL_OUTOF10: 10
PAINLEVEL_OUTOF10: 4

## 2024-04-08 NOTE — ED TRIAGE NOTES
Pt arrives with the c.c. of vomiting, pt reports has pancreatic cancer and is not getting treatment, pt reports this morning started to have \"black\" emesis, pt reports is noticing some \"bed sores\" as well.

## 2024-04-08 NOTE — ED PROVIDER NOTES
[facility] EMERGENCY DEPT  EMERGENCY DEPARTMENT ENCOUNTER      Pt Name: Katiuska Fair  MRN: [id number]  Birthdate 1955  Date of evaluation: 2024  Provider: KIERA Renteria    CHIEF COMPLAINT       Chief Complaint   Patient presents with    Emesis    Fatigue         HISTORY OF PRESENT ILLNESS   (Location/Symptom, Timing/Onset, Context/Setting, Quality, Duration, Modifying Factors, Severity)  Note limiting factors.   68 year old F hx pancreatic CA, HTN, UC presenting for abdominal pain, vomiting, and bed sores.  Notes has not checked fever.  Has seen palliative for symptom control, is not currently on hospice but \"I think they are going to be setting me up for it.\"  No longer receiving chemo.  Here for 3 days of nausea, vomiting, diarrhea.  Some gen abd pain.  Worsening fatigue, weakness.      Onc: Dr. Smith    PMHx: as above    The history is provided by the patient and medical records.         Review of External Medical Records:     Nursing Notes were reviewed.    REVIEW OF SYSTEMS    (2-9 systems for level 4, 10 or more for level 5)     Review of Systems   Constitutional:  Positive for fatigue.   Gastrointestinal:  Positive for abdominal pain, nausea and vomiting.   Neurological:  Positive for weakness.       Except as noted above the remainder of the review of systems was reviewed and negative.       PAST MEDICAL HISTORY     Past Medical History:   Diagnosis Date    Arthritis     ostero arthritis, rhemathoid  lower back     Autoimmune disease (HCC)     Ulcerative Colitis.     Hypertension     CONTROLLED WITH MEDS    Hypothyroid 2011    Incisional hernia     Malignant neoplasm of head of pancreas (HCC) 2021    Ulcerative colitis (HCC)     Ulcerative colitis (HCC) 2010    Ulcerative colitis (HCC) 2010         SURGICAL HISTORY       Past Surgical History:   Procedure Laterality Date    BREAST SURGERY      cyst removed from left breast     SECTION      X1    COLONOSCOPY  care plan with consultants       Ada Choi PA  04/08/24 1927

## 2024-04-09 PROBLEM — D63.0 ANEMIA IN NEOPLASTIC DISEASE: Status: ACTIVE | Noted: 2024-01-01

## 2024-04-09 PROBLEM — C79.9 METASTASIS FROM PANCREATIC CANCER (HCC): Status: ACTIVE | Noted: 2024-01-01

## 2024-04-09 PROBLEM — D69.59 CHEMOTHERAPY-INDUCED THROMBOCYTOPENIA: Status: ACTIVE | Noted: 2024-04-09

## 2024-04-09 PROBLEM — T45.1X5A CHEMOTHERAPY-INDUCED THROMBOCYTOPENIA: Status: ACTIVE | Noted: 2024-04-09

## 2024-04-09 PROBLEM — Z71.89 GOALS OF CARE, COUNSELING/DISCUSSION: Status: ACTIVE | Noted: 2024-04-09

## 2024-04-09 PROBLEM — G89.3 NEOPLASM RELATED PAIN: Status: ACTIVE | Noted: 2024-01-01

## 2024-04-09 PROBLEM — C25.9 METASTASIS FROM PANCREATIC CANCER (HCC): Status: ACTIVE | Noted: 2024-04-09

## 2024-04-09 LAB
ANION GAP SERPL CALC-SCNC: 8 MMOL/L (ref 5–15)
BUN SERPL-MCNC: 9 MG/DL (ref 6–20)
BUN/CREAT SERPL: 13 (ref 12–20)
CALCIUM SERPL-MCNC: 8.5 MG/DL (ref 8.5–10.1)
CHLORIDE SERPL-SCNC: 98 MMOL/L (ref 97–108)
CO2 SERPL-SCNC: 28 MMOL/L (ref 21–32)
CREAT SERPL-MCNC: 0.69 MG/DL (ref 0.55–1.02)
CRP SERPL-MCNC: 9.6 MG/DL (ref 0–0.3)
EKG ATRIAL RATE: 118 BPM
EKG DIAGNOSIS: NORMAL
EKG P AXIS: 47 DEGREES
EKG P-R INTERVAL: 144 MS
EKG Q-T INTERVAL: 316 MS
EKG QRS DURATION: 82 MS
EKG QTC CALCULATION (BAZETT): 442 MS
EKG R AXIS: 1 DEGREES
EKG T AXIS: 41 DEGREES
EKG VENTRICULAR RATE: 118 BPM
ERYTHROCYTE [DISTWIDTH] IN BLOOD BY AUTOMATED COUNT: 13.1 % (ref 11.5–14.5)
GLUCOSE SERPL-MCNC: 93 MG/DL (ref 65–100)
HCT VFR BLD AUTO: 31.1 % (ref 35–47)
HGB BLD-MCNC: 10.7 G/DL (ref 11.5–16)
MAGNESIUM SERPL-MCNC: 1.8 MG/DL (ref 1.6–2.4)
MCH RBC QN AUTO: 32.1 PG (ref 26–34)
MCHC RBC AUTO-ENTMCNC: 34.4 G/DL (ref 30–36.5)
MCV RBC AUTO: 93.4 FL (ref 80–99)
NRBC # BLD: 0.02 K/UL (ref 0–0.01)
NRBC BLD-RTO: 0.6 PER 100 WBC
PHOSPHATE SERPL-MCNC: 2.6 MG/DL (ref 2.6–4.7)
PLATELET # BLD AUTO: 113 K/UL (ref 150–400)
PMV BLD AUTO: 9.3 FL (ref 8.9–12.9)
POTASSIUM SERPL-SCNC: 3.6 MMOL/L (ref 3.5–5.1)
RBC # BLD AUTO: 3.33 M/UL (ref 3.8–5.2)
SODIUM SERPL-SCNC: 134 MMOL/L (ref 136–145)
WBC # BLD AUTO: 3.6 K/UL (ref 3.6–11)

## 2024-04-09 PROCEDURE — C9113 INJ PANTOPRAZOLE SODIUM, VIA: HCPCS | Performed by: INTERNAL MEDICINE

## 2024-04-09 PROCEDURE — 36415 COLL VENOUS BLD VENIPUNCTURE: CPT

## 2024-04-09 PROCEDURE — 6360000002 HC RX W HCPCS: Performed by: INTERNAL MEDICINE

## 2024-04-09 PROCEDURE — 86140 C-REACTIVE PROTEIN: CPT

## 2024-04-09 PROCEDURE — 85027 COMPLETE CBC AUTOMATED: CPT

## 2024-04-09 PROCEDURE — 6370000000 HC RX 637 (ALT 250 FOR IP): Performed by: FAMILY MEDICINE

## 2024-04-09 PROCEDURE — A4216 STERILE WATER/SALINE, 10 ML: HCPCS | Performed by: INTERNAL MEDICINE

## 2024-04-09 PROCEDURE — 51798 US URINE CAPACITY MEASURE: CPT

## 2024-04-09 PROCEDURE — 80048 BASIC METABOLIC PNL TOTAL CA: CPT

## 2024-04-09 PROCEDURE — 84100 ASSAY OF PHOSPHORUS: CPT

## 2024-04-09 PROCEDURE — 83735 ASSAY OF MAGNESIUM: CPT

## 2024-04-09 PROCEDURE — 99223 1ST HOSP IP/OBS HIGH 75: CPT | Performed by: INTERNAL MEDICINE

## 2024-04-09 PROCEDURE — 51702 INSERT TEMP BLADDER CATH: CPT

## 2024-04-09 PROCEDURE — 99223 1ST HOSP IP/OBS HIGH 75: CPT | Performed by: NURSE PRACTITIONER

## 2024-04-09 PROCEDURE — 2500000003 HC RX 250 WO HCPCS: Performed by: INTERNAL MEDICINE

## 2024-04-09 PROCEDURE — 93010 ELECTROCARDIOGRAM REPORT: CPT | Performed by: INTERNAL MEDICINE

## 2024-04-09 PROCEDURE — 1100000000 HC RM PRIVATE

## 2024-04-09 PROCEDURE — 94761 N-INVAS EAR/PLS OXIMETRY MLT: CPT

## 2024-04-09 PROCEDURE — 2580000003 HC RX 258: Performed by: INTERNAL MEDICINE

## 2024-04-09 RX ORDER — HYDROMORPHONE HYDROCHLORIDE 1 MG/ML
1 INJECTION, SOLUTION INTRAMUSCULAR; INTRAVENOUS; SUBCUTANEOUS
Status: DISCONTINUED | OUTPATIENT
Start: 2024-04-09 | End: 2024-04-11 | Stop reason: HOSPADM

## 2024-04-09 RX ORDER — PROCHLORPERAZINE MALEATE 5 MG/1
5 TABLET ORAL EVERY 6 HOURS PRN
Status: DISCONTINUED | OUTPATIENT
Start: 2024-04-09 | End: 2024-04-11 | Stop reason: HOSPADM

## 2024-04-09 RX ORDER — HYDROMORPHONE HYDROCHLORIDE 1 MG/ML
4 SOLUTION ORAL
Status: DISCONTINUED | OUTPATIENT
Start: 2024-04-09 | End: 2024-04-11 | Stop reason: HOSPADM

## 2024-04-09 RX ADMIN — SODIUM CHLORIDE, POTASSIUM CHLORIDE, SODIUM LACTATE AND CALCIUM CHLORIDE: 600; 310; 30; 20 INJECTION, SOLUTION INTRAVENOUS at 17:19

## 2024-04-09 RX ADMIN — PANTOPRAZOLE SODIUM 40 MG: 40 INJECTION, POWDER, FOR SOLUTION INTRAVENOUS at 06:46

## 2024-04-09 RX ADMIN — PROCHLORPERAZINE EDISYLATE 10 MG: 5 INJECTION INTRAMUSCULAR; INTRAVENOUS at 22:58

## 2024-04-09 RX ADMIN — ONDANSETRON 4 MG: 2 INJECTION INTRAMUSCULAR; INTRAVENOUS at 17:34

## 2024-04-09 RX ADMIN — PANTOPRAZOLE SODIUM 40 MG: 40 INJECTION, POWDER, FOR SOLUTION INTRAVENOUS at 18:21

## 2024-04-09 RX ADMIN — HYDROMORPHONE HYDROCHLORIDE 1 MG: 1 INJECTION, SOLUTION INTRAMUSCULAR; INTRAVENOUS; SUBCUTANEOUS at 10:32

## 2024-04-09 RX ADMIN — PROCHLORPERAZINE EDISYLATE 10 MG: 5 INJECTION INTRAMUSCULAR; INTRAVENOUS at 10:40

## 2024-04-09 RX ADMIN — HYDROMORPHONE HYDROCHLORIDE 4 MG: 5 SOLUTION ORAL at 22:57

## 2024-04-09 RX ADMIN — HYDROMORPHONE HYDROCHLORIDE 1 MG: 1 INJECTION, SOLUTION INTRAMUSCULAR; INTRAVENOUS; SUBCUTANEOUS at 06:55

## 2024-04-09 RX ADMIN — HYDROMORPHONE HYDROCHLORIDE 4 MG: 5 SOLUTION ORAL at 18:21

## 2024-04-09 RX ADMIN — ONDANSETRON 4 MG: 2 INJECTION INTRAMUSCULAR; INTRAVENOUS at 06:43

## 2024-04-09 ASSESSMENT — PAIN DESCRIPTION - DESCRIPTORS
DESCRIPTORS: DISCOMFORT
DESCRIPTORS: DISCOMFORT;ACHING

## 2024-04-09 ASSESSMENT — PAIN DESCRIPTION - ORIENTATION
ORIENTATION: RIGHT;LEFT
ORIENTATION: RIGHT;LEFT

## 2024-04-09 ASSESSMENT — PAIN DESCRIPTION - LOCATION
LOCATION: ABDOMEN
LOCATION: ABDOMEN

## 2024-04-09 ASSESSMENT — PAIN SCALES - GENERAL
PAINLEVEL_OUTOF10: 9
PAINLEVEL_OUTOF10: 0
PAINLEVEL_OUTOF10: 10
PAINLEVEL_OUTOF10: 4
PAINLEVEL_OUTOF10: 8

## 2024-04-09 NOTE — PROGRESS NOTES
Palliative Medicine      Code Status: DNR    Advance Care Planning:    Primary Decision Maker: Bin Fair - Spouse - [phone]    Pt does not have AMD on file, was too drowsy to complete during visit today.  In absence of verified Medical POA,  Bin Fair is legal NOK and would be surrogate decision maker if pt is unable to speak for herself.     Patient / Family Encounter Documentation    Participants (names): Pt,  Bin, Oncology NP Jacinda, Palliative Medicine (ABDULKADIR Shin, LILLY Dickerson)    Narrative: Met with pt and  at bedside; Oncology NP Jacinda was at bedside as well.  Pt was initially alert and engaged but became drowsy as visit progressed, received nausea and pain meds during visit.  Pt lives at home with  and a dear friend.  Pt has 3 adult children who live locally (2 sons, 1 dtr); one son resides in a group home.  Pt's sister is a strong source of support.  Pt had been working as a  at [employer] but is no longer able to work due to symptoms from advanced pancreatic cancer.    Psychosocial Issues Identified/ Resilience Factors:   Coping with advanced disease and poor prognosis; pt was tearful,  stepped out mid visit to deal with his emotions in his own way.  Pt shared privately that  has cancer, as well, shared that dtr was recently hospitalized but did not elaborate.  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      Caregiver [place]:  Low to Moderate  Does the caregiver feel confident administering medication? No concerns expressed  Does the caregiver need any help connecting with community resources? No  Does the caregiver feel confident assisting with activities of daily living? No concerns expressed    Goals of Care / Plan:  Attending Physician met with pt prior to Palliative visit, placed hospice consult.  Pt shared that she is not interested in hearing \"numbers\" such

## 2024-04-09 NOTE — PLAN OF CARE
Problem: Safety - Adult  Goal: Free from fall injury  Outcome: Progressing     Problem: Skin/Tissue Integrity  Goal: Absence of new skin breakdown  Description: 1.  Monitor for areas of redness and/or skin breakdown  2.  Assess vascular access sites hourly  3.  Every 4-6 hours minimum:  Change oxygen saturation probe site  4.  Every 4-6 hours:  If on nasal continuous positive airway pressure, respiratory therapy assess nares and determine need for appliance change or resting period.  Outcome: Progressing     Problem: ABCDS Injury Assessment  Goal: Absence of physical injury  Outcome: Progressing     Problem: Discharge Planning  Goal: Discharge to home or other facility with appropriate resources  Outcome: Progressing     Problem: Pain  Goal: Verbalizes/displays adequate comfort level or baseline comfort level  Outcome: Progressing

## 2024-04-09 NOTE — CARE COORDINATION
4/9/2024 1:13 PM CM met with pt, discussed hospice services and offered choice of agency. [facility] selected as preference.  Referral sent to [facility] via [employer].  CM spoke with Анна with [facility] regarding referral.  CM will follow.     4/9/2024  11:09 AM Consult for hospice received. CM attempted to meet with pt. Pt reported she just received pain medications and requested CM follow up at a later time.  CM will follow up.  GREY Cat        04/09/24 1312   Service Assessment   Patient Orientation Alert and Oriented   Cognition Alert   History Provided By Patient   Primary Caregiver Self   Accompanied By/Relationship Pt's nephew and grandson   Support Systems Spouse/Significant Other   PCP Verified by CM Yes   Discharge Planning   Type of Residence House   Living Arrangements Spouse/Significant Other   Services At/After Discharge   Transition of Care Consult (CM Consult) Hospice   Internal Hospice Yes   Services At/After Discharge Hospice   Condition of Participation: Discharge Planning   The Plan for Transition of Care is related to the following treatment goals: hospice   The Patient and/or Patient Representative was provided with a Choice of Provider? Patient   The Patient and/Or Patient Representative agree with the Discharge Plan? Yes   Freedom of Choice list was provided with basic dialogue that supports the patient's individualized plan of care/goals, treatment preferences, and shares the quality data associated with the providers?  Yes

## 2024-04-09 NOTE — PROGRESS NOTES
Hospice medical director    Chart reviewed with Анна Allen-hospice liaison.  She has met with patient and son at the bedside.  Patient's  coming to the bedside later.  Patient with worsening symptoms related to her metastatic pancreatic cancer.  Patient debating transition to comfort focused care with support hospice.  We have provided a information session.  Patient ideally would like to return home with hospice at least to start.  At this time, she has been having nausea and pain which is made it challenging on managing her symptoms.  She did tell Анна that oral Dilaudid was effective at home.  She would like to at least try oral medication before potential admission to the hospice house for GIP level care/IV medication management.  Patient has received at least 4 doses of IV Dilaudid over the last 24 hours.    Pain management-we will order Dilaudid 4 mg sublingual which is equivalent to 1 mg IV and we have instructed the bedside nursing team and the patient to attempt oral medication first to see if effective and to make sure she can tolerate so that she is not in any crisis as she returns home  Nausea-she has Zofran but I also ordered oral Compazine to try if needed.  We also could add other options such as Haldol or Zyprexa for nausea.  Will continue to monitor.  Our team will follow closely in the hospital and anticipate either home discharge versus GIP admission if not tolerating oral medication  Appreciate the consult

## 2024-04-09 NOTE — PROGRESS NOTES
Music Therapy Assessment  [facility]    Katiuska Fair [id number]     1955  68 y.o.  female    Patient Telephone Number: [phone] (home)   Nondenominational Affiliation: Bahai   Language: English   Patient Active Problem List    Diagnosis Date Noted    Neoplasm related pain 04/09/2024    Chemotherapy-induced thrombocytopenia 04/09/2024    Anemia in neoplastic disease 04/09/2024    Goals of care, counseling/discussion 04/09/2024    Metastasis from pancreatic cancer (HCC) 04/09/2024    Pancreatic cancer (HCC) 04/08/2024    Hyponatremia 04/01/2024    Type 2 diabetes mellitus 05/15/2023    Chemotherapy-induced neuropathy ([facility]) 01/23/2023    Incisional hernia, without obstruction or gangrene 11/23/2022    Pancreatic adenocarcinoma ([facility]) 02/23/2022    Dehydration 11/29/2021    Prevention of chemotherapy-induced neutropenia 11/01/2021    Malignant neoplasm of head of pancreas ([facility]) 09/28/2021    Hypokalemia due to loss of potassium 09/25/2021    Common bile duct dilation 09/24/2021    Elevated LFTs 09/24/2021    Calculus of gallbladder with biliary obstruction but without cholecystitis 09/24/2021    Obesity, morbid ([facility]) 01/29/2018    Hypothyroidism 03/23/2011    Essential hypertension, benign 03/23/2011    Ulcerative colitis ([facility]) 09/16/2010        Date: 4/9/2024            Total Time Calculated: 27 min          [facility] B4 MULTI-SPECIALTY ORTHOPEDICS 2    Mental Status:   [x] Alert [  ] Forgetful [  ]  Confused  [  ] Minimally responsive  [  ] Sleeping    Communication Status: [  ] Impaired Speech [  ] Nonverbal -N/A    Physical Status:   [  ] Oxygen in use  [  ] Hard of Hearing [  ] Vision Impaired  [  ] Ambulatory  [  ] Ambulatory with assistance [  ] Non-ambulatory -N/A    Music Preferences, Background: N/A: Please see Session Observations below.     Clinical Problem to be addressed: Comfort; Emotional support    Goal(s) met in session: N/A: Please see Session Observations below.   Physical/Pain management

## 2024-04-09 NOTE — CONSULTS
[facility]  [address], [address] [place] [address]  W: [phone]  F: [phone]      Reason for Visit:   Katiuska Fair is a 68 y.o. female who is seen today for evaluation of metastatic pancreatic cancer ; worsening symptoms    Hematology/Oncology Treatment History:     Diagnosis: Pancreatic adenocarcinoma     Stage: clinical Stage Ib [T2N0] at diagnosis; Stage III [waG2jG7] at surgery     Pathology:    -9/26/21 Cytology - brushings from common bile duct: Reactive glandular epithelial cells and bile    -9/28/21 pancreatic head mass biopsy: Adenocarcinoma.    -10/22/21 [facility]ae mult-cancer panel -negative    -2/23/22 Pancreaticoduodenectomy (Whipple resection): Ductal adenocarcinoma, G2, 1.2cm. Tumor invades peripancreatic soft tissues. LVI negative. PNI present. Margins uninvolved.  5/29 LNs involved with cancer.    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 No germline pathogenic variants.    -Clinical trials: Trial of Ulixertinib in combo with hydroxychloroquine in patients with advanced GI malignancies ([id number]). Phase II [place], [place] for KRAS. pG12D mutation.     -2/22/24 LN, N7, EBUS guided FNA: Positive for malignant cells, consistent with non-small cell carcinoma. Insufficient cells to perform IHC for further classification  -[facility] NGS: BRCA2 mutation    Prior Treatment:   1. Neoadjuvant FOLFIRINOX x 6 cycles, 10/18/21-1/10/22  2. PYLORUS PRESERVING WHIPPLE PROCEDURE AND PORTAL LYPHMADENECTOMY, 2/23/22  3. Adjuvant FOLFIRINOX x 6 cycles, 4/12/22 - 7/5/22.      Current Treatment: Gallia-Abraxane started 3/18/24       History of Present Illness:   Katiuska Fair is a pleasant 68 y.o. female who was admitted on 4/8/24 for abd pain, \"black emesis vomiting / and possible bedsores. ED labs Na 131 Alk Phos 186     Pt states since starting chemo had not been able to eat and having diarrhea.  APTT 27.3 02/15/2022    MARY Detected (A) 09/16/2010    CRP 9.60 (H) 04/09/2024    LIPASE 75 04/03/2024    TSH 2.84 04/12/2022      Lab Results   Component Value Date    CEA 3.5 09/25/2021     88285 (H) 03/15/2024        3/4/24 PET scan   IMPRESSION:  Hypermetabolic mediastinal and right hilar lymphadenopathy. Numerous  hypermetabolic osseous foci and soft tissue foci. Findings are compatible with  metastatic disease. Focus of increased tracer activity corresponds to a soft  tissue abnormality which appears to be arising from the tail of the pancreas.     4/3/24 CTA CHEST A/P W WO CONT  IMPRESSION:  Rapid, diffuse progression of metastatic disease:  1. Hepatic metastases significantly increased in size and number.  2. New pancreatic body metastasis.  3. New renal metastases.  4. Slightly increased pulmonary metastases.  5. Increased osseous metastases.  6. Probably stable tumor in the right hilum and mediastinum.    Test results above have been reviewed.      Signed By: Ann Schoeneweis, APRN - NP   4/9/24 at 11:28 AM EDT     Assessment/Recommendations:     Metastatic pancreatic adenocarcinoma:  Initial diagnosis with clinical stage Ib [T2N0] disease, CA 19-9 = 198 at diagnosis. BRCA 1/2 negative. Completed 6 cycles of mFOLFIRINOX in neoadjuvant setting, followed by pancreaticoduodenectomy (Whipple resection) by Dr. Almanza on 2/23/22. Then, completed 6 cycles of adjuvant chemotherapy. Had been doing well on surveillance, but then developed waxing and waning pulmonary nodules which were too small to biopsy since as far back as Oct 2022.      Now has metastatic pancreas cancer based on EBUS biopsy of N7 node and PET showing disease in mediastinum, bones and tail of pancreas. Pt started on palliative chemotherapy with [place]-Abraxane on 3/18/24. Had a long discussion with patient that scans show disease progression compared to Jan 2024, but that she only started treatment 3/18/24. Therefore, I cannot call this a

## 2024-04-09 NOTE — H&P
Hospitalist Admission Note    NAME:  Katiuska Fair   :  1955   MRN:  [id number]     Date/Time:  2024 9:03 PM    Patient PCP: Fransisca Ragsdale MD  ________________________________________________________________________    Given the patient's current clinical presentation, I have a high level of concern for decompensation if discharged from the emergency department.  Complex decision making was performed, which includes reviewing the patient's available past medical records, laboratory results, and x-ray films.       My assessment of this patient's clinical condition and my plan of care is as follows.    Assessment / Plan:    68F hx metastatic pancreatic cancer p/w intractable nausea vomiting, abdominal pain and e/o worsening disease.     #Coffee Ground Emesis: Recent dose of ketorolac, but I suspect her sx more likely gastritis and progression of disease.    - IV PPI, pain control    #Metastatic pancreatic cancer / FTT: She follows with Dr. Smith as well as Dr. Real of Palliative care. We reviewed the rapid progression of her disease as revealed in imaging and as e/o her current symptoms. She is realistic. We will plan to have Palliative care see her tomorrow to talk about symptom control as well as advance care planning (see my additional note). For now, IV dilaudid for pain given her intolerance of PO. No NSAIDs in s/o above, also avoid steroid for now          I have personally reviewed the radiographs, laboratory data in Epic and decisions and statements above are based partially on this personal interpretation.    Code Status: Full Code  DVT Prophylaxis: SCD's  GI Prophylaxis: PPI       Subjective:   CHIEF COMPLAINT: \"abd pain\"    HISTORY OF PRESENT ILLNESS:     Katiuska is a 68 y.o.  F hx hx metastatic pancreatic cancer who presents with worsening pain, nausea, vomiting. She had presented several days ago and imaging revealed rapid progression of her disease. She has not yet had a chance to    potassium chloride (KLOR-CON M10) 10 MEQ extended release tablet TAKE 1 TABLET BY MOUTH EVERY DAY 3/11/24   Fransisca Ragsdale MD   levothyroxine (SYNTHROID) 125 MCG tablet TAKE 1 TABLET BY MOUTH EVERY DAY BEFORE BREAKFAST 3/11/24   Fransisca Ragsdale MD   albuterol sulfate HFA (VENTOLIN HFA) 108 (90 Base) MCG/ACT inhaler Inhale 2 puffs into the lungs every 4 hours as needed for Wheezing or Shortness of Breath (Cough) 3/8/24   Fransisca Ragsdale MD   hydroCHLOROthiazide 12.5 MG tablet TAKE 1 TABLET BY MOUTH EVERY DAY 2/23/24   Fransisca Ragsdale MD   Spacer/Aero-Holding Chambers BRANDON 1 Device by Does not apply route daily as needed (cough) 1/31/24   Raven Rahman MD       REVIEW OF SYSTEMS:  See HPI for details  General: negative for fever, chills, sweats, +weakness, +weight loss  Eyes: negative for blurred vision, eye pain, loss of vision, diplopia  Ear Nose and Throat: negative for rhinorrhea, pharyngitis, otalgia, tinnitus, speech or swallowing difficulties  Respiratory:  negative for pleuritic pain, cough, sputum production, wheezing, SOB, [place]  Cardiology:  negative for chest pain, palpitations, orthopnea, PND, edema, syncope   Gastrointestinal: negative for abdominal pain, N/V, dysphagia, change in bowel habits, bleeding  Genitourinary: negative for frequency, urgency, dysuria, hematuria, incontinence  Muskuloskeletal : negative for arthralgia, myalgia  Hematology: negative for easy bruising, bleeding, lymphadenopathy  Dermatological: negative for rash, ulceration, mole change, new lesion  Endocrine: negative for hot flashes or polydipsia  Neurological: negative for headache, dizziness, confusion, focal weakness, paresthesia, memory loss, gait disturbance  Psychological: negative for anxiety, depression, agitation      Objective:   VITALS:    Vitals:    04/08/24 2030   BP: 94/68   Pulse: (!) 106   Resp: 14   Temp:    SpO2: 94%     PHYSICAL EXAM:    Physical Exam:    Gen: Frail, pleasant,

## 2024-04-09 NOTE — PROGRESS NOTES
[facility]  Good Help to Those in Need  [phone]     Patient Name: Katiuska Fair  YOB: 1955  Age: 68 y.o.    [facility]osp[facility] RN Note:  Hospice consult received, reviewing chart. Will follow up with Unit Nurse and Care Manager to discuss plan of care, patient status and discharge disposition w    Patient seen briefly at bedside. Patient awake and alert in bed. Good insight to her illness and symptom mgmt. She does not wish to pursue further cancer treatment, would like to return home with hospice.  I explained limitations of medications at home and that we would need to see her symptoms managed orally to ensure she is comfortable at home. She is agreeable to trialing oral route of medications in order to get home.    She reports that she takes PO dilaudid at home which works well but she struggles with remembering when to take it or taking it before the pain gets to bad. She has oral Zofran at home  which she reports is not effective in treating her nausea.    Case reviewed with hospice medical director Dr Granda who will place orders for oral medications to determine effectiveness of medications. Ms aFir would like to have further hospice discussion detailing home care once her  can be present. She will text me with what time she would like to meet tomorrow.    Anticipate getting patient home w/ hospice as long as symptoms can be managed on oral regimen. If this is not the case, we can discuss options for higher level of care such as [facility].    Please attempt oral route of medications as first line use for symptom relief, may use IV if symptoms are not well managed on oral meds    Thank you for the opportunity to be of service to this patient.     Анна Allen RN, BSN, CHPN  Clinical Nurse Liaison  [facility]  [phone] Mobile  [phone] Office   Available on [employer]

## 2024-04-09 NOTE — CONSULTS
and discussion with attending the goal is hospice.  Pain:  She has iv dilaudid 1mg ordered every 3 hours if needed. She is having some end of dose failure so I will increase the dosing interval to every 2 hours as needed. Responds well to current dose    Nausea & Vomiting:  pt has iv compazine and iv zofran ordered. Nausea persists once meds wear off.   Diarrhea: better today but had an episode yesterday. She feels it has slowed down because she is not eating  Poor appetite: c/o dysguesia and intolerance to smells which induces vomiting. She has mainly been eating broths that her friends make.  Code Status: changed to DNR today after discussion with Dr. Ordonez  Plan: hospice consult placed by the attending. Will continue to assist with symptoms  Will follow along with you  Please call with any palliative questions or concerns.  Palliative Care Team is available via perfect serve or via phone.  Initial consult note routed to primary continuity provider and/or primary health care team members    Plan upon discharge   [] Outpatient Palliative Clinic  [] Home Based Palliative Care  [] Primary Care at Home  [] Hospice at home  [] Hospice at the facility  [] Inpatient hospice ([facility])  [] Rehab (Skilled vs IPR)  [] Facility Long Term Care  [] Home with Home Health  [x] Undetermined at this time      ADVANCE CARE PLANNING:   [x] [facility] Interdisciplinary Team has updated the ACP Navigator with Health Care Decision Maker and Patient Capacity      Confirm Advance Directive: None  Patient Would Like to Complete Advance Directive: No/refused (will do in morning)    Current Code Status: DNR     .     Goals of Care and Interventions  Patient/Health Care Proxy Stated Goals: Comfort  Medical Interventions: Comfort measures  Artificially Administered Nutrition: No feeding tube     Please refer to Palliative Medicine ACP notes or assessment and plan above for further details.    PALLIATIVE ASSESSMENT:      Palliative  Performance Scale (PPS):  PPS: 30    ECOG:   ECOG Status : Limited self-care [3]    Modified ESAS:  Modified-[place] Symptom Assessment Scale (ESAS)  Tiredness Score: 9  Drowsiness Score: 8  Pain Score: 3  Anxiety Score: Not anxious  Nausea Score: 7  Appetite Score: Worst possible appetite  Dyspnea Score: No shortness of breath    Clinical Pain Assessment (nonverbal scale for severity on nonverbal patients):   Clinical Pain Assessment  Severity: 3  Location: rt side of back then wrapping around stomach, shoulders  Character: described as nerve pain  Duration: comes and goes all day  Factors: opiiods helping  Frequency: all day             Vital Signs: Blood pressure 97/66, pulse (!) 107, temperature 98.4 °F (36.9 °C), temperature source Oral, resp. rate 20, height 1.626 m (5' 4\"), weight 74.4 kg (164 lb), SpO2 95 %.    PHYSICAL ASSESSMENT:   General: [x] Oriented x3  [] Well appearing  [] Intubated  [x]Ill appearing  []Other:  Mental Status: [] Normal mental status exam  [x] Drowsy  [] Confused  [] Alert and NAD []Other:  Cardiovascular: [] Regular rate/rhythm  [] Arrhythmia  [] Other:   [x] deferred  Chest: [x] Effort normal  []Lungs clear  [] Respiratory distress  []Tachypnea  [] Other:  Abdomen: [x] Soft/non-tender  [] Normal appearance  [] Distended  [] Ascites  [] Other:  [] deferred  Neurological: [x] Normal speech  [] Normal sensation  []Deficits present: [] Unable to assess  Extremity: [] Normal skin color/temp  [] Clubbing/cyanosis  [x] + edema lower ext [] Other:    Wt Readings from Last 15 Encounters:   04/08/24 74.4 kg (164 lb)   04/03/24 74.4 kg (164 lb)   04/01/24 74.8 kg (164 lb 14.4 oz)   03/25/24 77 kg (169 lb 11.2 oz)   03/18/24 78.8 kg (173 lb 11.2 oz)   03/15/24 79.2 kg (174 lb 11.2 oz)   03/08/24 82.6 kg (182 lb 3.2 oz)   03/06/24 83.9 kg (185 lb)   02/08/24 88.9 kg (195 lb 14.4 oz)   02/08/24 88.9 kg (195 lb 14.4 oz)   02/06/24 87.6 kg (193 lb 3.2 oz)   11/21/23 93 kg (205 lb)   11/13/23 92.5

## 2024-04-09 NOTE — PROGRESS NOTES
[facility]  [address] [place] [address] [phone]        Hospitalist Progress Note      NAME: Katiuska Fair   :  1955  MRM:  [id number]    Date/Time of service: 2024  10:11 AM       Subjective:     Chief Complaint:  Patient was personally seen and examined by me during this time period.  Chart reviewed.  Still with abd pain, nausea, vomiting       Objective:       Vitals:       Last 24hrs VS reviewed since prior progress note. Most recent are:    Vitals:    24 0805   BP: 98/74   Pulse: (!) 108   Resp: 17   Temp: 98.1 °F (36.7 °C)   SpO2: 93%     SpO2 Readings from Last 6 Encounters:   24 93%   24 100%   24 97%   24 100%   24 100%   24 94%          Intake/Output Summary (Last 24 hours) at 2024 1011  Last data filed at 2024 0749  Gross per 24 hour   Intake --   Output 400 ml   Net -400 ml        Exam:     Physical Exam:    Gen:  frail, ill-appearing, mild distress  HEENT:  Pink conjunctivae, PERRL, hearing intact to voice, moist mucous membranes  Neck:  Supple, without masses, thyroid non-tender  Resp:  No accessory muscle use, clear breath sounds without wheezes rales or rhonchi  Card:  No murmurs, normal S1, S2 without thrills, bruits or peripheral edema  Abd:  Soft, diffused pain  Musc:  No cyanosis or clubbing  Skin:  No rashes   Neuro:  Cranial nerves 3-12 are grossly intact, follows commands appropriately  Psych:  Good insight, oriented to person, place and time, alert    Medications Reviewed: (see below)    Lab Data Reviewed: (see below)    ______________________________________________________________________    Medications:     Current Facility-Administered Medications   Medication Dose Route Frequency    sodium chloride flush 0.9 % injection 5-40 mL  5-40 mL IntraVENous 2 times per day    sodium chloride flush 0.9 % injection 5-40 mL  5-40 mL IntraVENous PRN    0.9 % sodium chloride infusion

## 2024-04-09 NOTE — ACP (ADVANCE CARE PLANNING)
[facility]                                   Advance Care Planning Note    Name: Katiuska Fair  YOB: 1955  MRN: [id number]  Admission Date: 4/8/2024  3:46 PM    Date of discussion: 4/8/2024    Active Diagnoses:    Metastatic Pancreatic Cancer        These active diagnoses are of sufficient risk that focused discussion on advance care planning is indicated in order to allow the patient to thoughtfully consider personal goals of care, and if situations arise that prevent the ability to personally give input, to ensure appropriate representation of their personal desires for different levels and aggressiveness of care.     Discussion:     Persons present and participating in discussion: Katiuska Fair, Alfonzo Batres MD    Topics Discussed:  Patient's medical condition and diagnosis: [ x ] yes [  ] no   Surrogate decision maker: [x  ] yes [  ] no   Patient's current physical function/cognitive function/frailty: [  x] yes [  ] no   Code Status: [ x ] yes [  ] no   Artificial Nutrition / Dialysis / Non-Invasive Ventilation / Blood Transfusion: [  ] yes [ x ] no  Potential Resources for home (durable medical equipment, home nursing, home O2): [  x] yes [  ] no    Overview of Discussion: She is very pleasant and most insightful to the severity of her cancer, especially with regards to recent rapid progression. However, she is undecided this evening on what to do next and also about her code status. She would like to remain full code this evening, but wants to discuss further with Palliative Care team tomorrow. We did not discuss transitioning to Hospice specifically, but she does understand that her current treatment plan is not effective.     Time Spent:     Total time spent face-to-face in education and discussion: 16 minutes.     Alfonzo Batres MD  Date of Service:  4/8/2024  9:15 PM

## 2024-04-09 NOTE — ACP (ADVANCE CARE PLANNING)
Advance Care Planning     Advance Care Planning (ACP) Physician/NP/PA Conversation    Date of Conversation: 4/8/2024  Diagnosis: metastatic pancreatic cancer    Conducted with: Patient with Decision Making Capacity    Healthcare Decision Maker:   No healthcare decision makers have been documented.  Click here to complete HealthCare Decision Makers including selection of the Healthcare Decision Maker Relationship (ie \"Primary\")         Care Preferences:    Hospitalization:  \"If your health worsens and it becomes clear that your chance of recovery is unlikely, what would be your preference regarding hospitalization?\"  Comfort care    Ventilation:  \"If you were unable to breath on your own and your chance of recovery was unlikely, what would be your preference about the use of a ventilator (breathing machine) if it was available to you?\"  DNR    Resuscitation:  \"In the event your heart stopped as a result of an underlying serious health condition, would you want attempts made to restart your heart, or would you prefer a natural death?\"  DNR    Conversation Outcomes / Follow-Up Plan:  Discussed with patient regarding current medical issues, prognosis, and treatment.  She confirmed DNR status.  She also stated that she is realistic about her condition and the goal is to control symptoms (abd pain, nausea/vomiting).  Declined further chemo.  Would like home hospice.  Lives with  who also has cancer     Length of Voluntary ACP Conversation in minutes:  20 minutes    Sergey Ordonez MD

## 2024-04-10 PROCEDURE — 2580000003 HC RX 258: Performed by: INTERNAL MEDICINE

## 2024-04-10 PROCEDURE — 6360000002 HC RX W HCPCS: Performed by: INTERNAL MEDICINE

## 2024-04-10 PROCEDURE — 1100000000 HC RM PRIVATE

## 2024-04-10 PROCEDURE — C9113 INJ PANTOPRAZOLE SODIUM, VIA: HCPCS | Performed by: INTERNAL MEDICINE

## 2024-04-10 PROCEDURE — 94761 N-INVAS EAR/PLS OXIMETRY MLT: CPT

## 2024-04-10 PROCEDURE — 6370000000 HC RX 637 (ALT 250 FOR IP): Performed by: FAMILY MEDICINE

## 2024-04-10 PROCEDURE — 2580000003 HC RX 258: Performed by: NURSE PRACTITIONER

## 2024-04-10 PROCEDURE — A4216 STERILE WATER/SALINE, 10 ML: HCPCS | Performed by: INTERNAL MEDICINE

## 2024-04-10 PROCEDURE — 6370000000 HC RX 637 (ALT 250 FOR IP): Performed by: INTERNAL MEDICINE

## 2024-04-10 RX ORDER — 0.9 % SODIUM CHLORIDE 0.9 %
250 INTRAVENOUS SOLUTION INTRAVENOUS ONCE
Status: COMPLETED | OUTPATIENT
Start: 2024-04-10 | End: 2024-04-10

## 2024-04-10 RX ADMIN — PROCHLORPERAZINE EDISYLATE 10 MG: 5 INJECTION INTRAMUSCULAR; INTRAVENOUS at 17:52

## 2024-04-10 RX ADMIN — PROCHLORPERAZINE MALEATE 5 MG: 5 TABLET ORAL at 11:38

## 2024-04-10 RX ADMIN — SODIUM CHLORIDE, POTASSIUM CHLORIDE, SODIUM LACTATE AND CALCIUM CHLORIDE: 600; 310; 30; 20 INJECTION, SOLUTION INTRAVENOUS at 04:03

## 2024-04-10 RX ADMIN — PANTOPRAZOLE SODIUM 40 MG: 40 INJECTION, POWDER, FOR SOLUTION INTRAVENOUS at 20:49

## 2024-04-10 RX ADMIN — ONDANSETRON 4 MG: 4 TABLET, ORALLY DISINTEGRATING ORAL at 11:29

## 2024-04-10 RX ADMIN — SODIUM CHLORIDE 250 ML: 9 INJECTION, SOLUTION INTRAVENOUS at 06:16

## 2024-04-10 RX ADMIN — SODIUM CHLORIDE, PRESERVATIVE FREE 10 ML: 5 INJECTION INTRAVENOUS at 20:50

## 2024-04-10 RX ADMIN — SODIUM CHLORIDE, PRESERVATIVE FREE 10 ML: 5 INJECTION INTRAVENOUS at 20:49

## 2024-04-10 RX ADMIN — PANTOPRAZOLE SODIUM 40 MG: 40 INJECTION, POWDER, FOR SOLUTION INTRAVENOUS at 06:19

## 2024-04-10 RX ADMIN — HYDROMORPHONE HYDROCHLORIDE 4 MG: 5 SOLUTION ORAL at 17:52

## 2024-04-10 RX ADMIN — HYDROMORPHONE HYDROCHLORIDE 4 MG: 5 SOLUTION ORAL at 11:29

## 2024-04-10 ASSESSMENT — PAIN DESCRIPTION - LOCATION: LOCATION: ABDOMEN;GENERALIZED

## 2024-04-10 ASSESSMENT — PAIN SCALES - GENERAL
PAINLEVEL_OUTOF10: 10
PAINLEVEL_OUTOF10: 4
PAINLEVEL_OUTOF10: 7
PAINLEVEL_OUTOF10: 4

## 2024-04-10 NOTE — PROGRESS NOTES
[facility]  [address] [place] [address] [phone]        Hospitalist Progress Note      NAME: Katiuska Fair   :  1955  MRM:  [id number]    Date/Time of service: 4/10/2024  12:19 PM       Subjective:     Chief Complaint:  Patient was personally seen and examined by me during this time period.  Chart reviewed.  Tolerated some diet this AM.  Less nauseated        Objective:       Vitals:       Last 24hrs VS reviewed since prior progress note. Most recent are:    Vitals:    04/10/24 1158   BP: 95/62   Pulse: (!) 114   Resp: 18   Temp: 99 °F (37.2 °C)   SpO2: 93%     SpO2 Readings from Last 6 Encounters:   04/10/24 93%   24 100%   24 97%   24 100%   24 100%   24 94%          Intake/Output Summary (Last 24 hours) at 4/10/2024 1219  Last data filed at 4/10/2024 0609  Gross per 24 hour   Intake --   Output 500 ml   Net -500 ml          Exam:     Physical Exam:    Gen:  frail, ill-appearing, NAD  HEENT:  Pink conjunctivae, PERRL, hearing intact to voice, moist mucous membranes  Neck:  Supple, without masses, thyroid non-tender  Resp:  No accessory muscle use, clear breath sounds without wheezes rales or rhonchi  Card:  No murmurs, normal S1, S2 without thrills, bruits or peripheral edema  Abd:  Soft, diffused pain  Musc:  No cyanosis or clubbing  Skin:  No rashes   Neuro:  Cranial nerves 3-12 are grossly intact, follows commands appropriately  Psych:  Good insight, oriented to person, place and time, alert    Medications Reviewed: (see below)    Lab Data Reviewed: (see below)    ______________________________________________________________________    Medications:     Current Facility-Administered Medications   Medication Dose Route Frequency    HYDROmorphone HCl PF (DILAUDID) injection 1 mg  1 mg IntraVENous Q2H PRN    HYDROmorphone (DILAUDID) 1 MG/ML oral solution 4 mg  4 mg SubLINGual Q1H PRN    prochlorperazine (COMPAZINE) tablet 5 mg  Plan discussed with: Patient, nursing, hospice      Discussed:  Care Plan    Prophylaxis:  SCD's    Disposition:   home hospice  pending            ___________________________________________________    Attending Physician: Sergey Ordonez MD

## 2024-04-10 NOTE — PLAN OF CARE
Problem: Safety - Adult  Goal: Free from fall injury  Outcome: Progressing     Problem: Skin/Tissue Integrity  Goal: Absence of new skin breakdown  Description: 1.  Monitor for areas of redness and/or skin breakdown  2.  Assess vascular access sites hourly  3.  Every 4-6 hours minimum:  Change oxygen saturation probe site  4.  Every 4-6 hours:  If on nasal continuous positive airway pressure, respiratory therapy assess nares and determine need for appliance change or resting period.  Outcome: Progressing     Problem: ABCDS Injury Assessment  Goal: Absence of physical injury  Outcome: Progressing     Problem: Discharge Planning  Goal: Discharge to home or other facility with appropriate resources  Outcome: Progressing     Problem: Pain  Goal: Verbalizes/displays adequate comfort level or baseline comfort level  Outcome: Progressing     Problem: Gastrointestinal - Adult  Goal: Minimal or absence of nausea and vomiting  Outcome: Progressing

## 2024-04-10 NOTE — PROGRESS NOTES
[facility]  Good Help to Those in Need  [phone]    Hospice Nursing PRE-Admission   Discharge Summary  Patient Name: Katiuska Fair  YOB: 1955  Age: 68 y.o.       Date of PLANNED Hospice Admission: 24  Hospice Attending: Dr Granda   Primary Care Physician: Fransisca Ragsdale MD     Home Hospice Address:[address] [place] [address]-     Primary Contact and Phone:spouse, Bin Fair [phone]      ADVANCE CARE PLANNING    Code Status: DNR  Durable DNR: [x]  Yes  []  No      2024     1:22 PM   Demographics   Marital Status            Amish: Jewish   Home: TBD    HOSPICE SUMMARY     Verbal CTI of terminal diagnosis with life expectancy of 6 months or less received from: Dr Granda    For the Hospice Diagnosis of: met. Pancreatic cancer    NCD: please address on admission      CLINICAL INFORMATION   Allergies:   Allergies   Allergen Reactions    Sulfa Antibiotics Swelling         Currently this patient has:  [] Supplemental O2     [] PICC    [] PORT   [] Dale Catheter [] Ostomy  [] NG Tube  [] PEG Tube    [] Wounds       Does patient have an AICD device:  [] Yes     [x] No    Has ICD been deactivated?             [] Yes     [x] No         COVID Screening: Negative      ASSESSMENT & PLAN     1. Symptom Issues Identified: pain, nausea    2. Spiritual Issues  Identified: Family is practicing Jewish    3.  Psych/ Social/ Emotional Issues Identified: lives w/ spouse. Lots of family support locally including adult son and sister     Acuity Assessment Tool  CLI: *  PSY: *  SPI: *  ENV: *  OTH: *         CARE COORDINATION           Hospice Consents: signed by patient and scanned    2. DME Ordered/Company/Delivery Plan: none at this time      3.   Unique home needs for safety: Bariatric patient  Yno    4. Symptom Kit and other Medications Needs: 4mg SL dilaudid q3hr PRN, 0.5mg Ativan solution q6hr PRN, 5mg Compazine oral q6 prn ordered for [facility]  pharmacy pickup by spouse today. No additional meds ordred. Will need ongoing medication education    5. Home Admission Reservation: 04/11/24 3pm    6.    Transportation by: spouse   Scheduled for tomorrow afternoon         7.    Verbal Report/Handoff given to: pre-admit sent to admit and intake teams      8. Phone number to Hospital [phone]    9. Supplies/Wound Care: address on admission

## 2024-04-10 NOTE — CARE COORDINATION
4/10/2024 3:04 PM   Care Management Progress Note    Admitting diagnosis:  Dehydration [E86.0]  Pancreatic cancer (HCC) [C25.9]  General weakness [R53.1]  Failure to thrive in adult [R62.7]    Admit Date:  4/8/2024  3:46 PM    RUR:  15%  Risk Level: []Low [x]Moderate []High    Transition of care plan:  Ongoing medical management, discharge on 4/11  Palliative consulted  Anticipated discharge plan: Home with family and home hospice through [facility]. [facility] to admit pt to home hospice at 3PM on 4/11  2nd IMM given on 4/10  Outpatient follow-up.  Discharge transport: Pt's , will take pt home at 2PM on 4/11       04/10/24 1504   Discharge Planning   Type of Residence House   Living Arrangements Spouse/Significant Other   Patient expects to be discharged to: House   Services At/After Discharge   Transition of Care Consult (CM Consult) Hospice   Internal Hospice Yes   Services At/After Discharge Hospice   Mode of Transport at Discharge Other (see comment)  (Pt's )   Hospital Transport Time of Discharge 1400   Condition of Participation: Discharge Planning   The Plan for Transition of Care is related to the following treatment goals: hospice   The Patient and/or Patient Representative was provided with a Choice of Provider? Patient   The Patient and/Or Patient Representative agree with the Discharge Plan? Yes   Freedom of Choice list was provided with basic dialogue that supports the patient's individualized plan of care/goals, treatment preferences, and shares the quality data associated with the providers?  Yes

## 2024-04-10 NOTE — PROGRESS NOTES
Nutrition Assessment     Type and Reason for Visit: Initial, Positive Nutrition Screen    Nutrition Recommendations/Plan:   Continue Regular diet - no pork, no peanuts, no corn, no sugar.   Monitor plan of care - likely to d/c with home hospice 4/11.      Malnutrition Assessment:  Malnutrition Status:  Moderate malnutrition (04/10/24 1328)    Context:  Acute Illness     Findings of the 6 clinical characteristics of malnutrition:  Energy Intake:  75% or less of estimated energy requirements for 7 or more days  Weight Loss:  Greater than 7.5% over 3 months     Body Fat Loss:  Unable to assess     Muscle Mass Loss:  Unable to assess    Fluid Accumulation:  No significant fluid accumulation     Strength:  Not Performed         Nutrition Assessment:   Pt screened for MST - unsure wt loss. Related to pancreatic cancer. Pt meets malnutrition criteria due to wt loss of 41 lbs (20%) over the last 5 months. PO intake has been insufficient to maintain weight. Appetite poor. Pt/family meeting with hospice today and planning to move to home hospice tomorrow. No aggressive nutritional intervention is needed at this time. Please consult if any nutrition concerns arise.     Meal Intake:   No data found.  Supplement Intake:  No data found.      Wt Readings from Last 30 Encounters:   04/08/24 74.4 kg (164 lb)   04/03/24 74.4 kg (164 lb)   04/01/24 74.8 kg (164 lb 14.4 oz)   03/25/24 77 kg (169 lb 11.2 oz)   03/18/24 78.8 kg (173 lb 11.2 oz)   03/15/24 79.2 kg (174 lb 11.2 oz)   03/08/24 82.6 kg (182 lb 3.2 oz)   03/06/24 83.9 kg (185 lb)   02/08/24 88.9 kg (195 lb 14.4 oz)   02/08/24 88.9 kg (195 lb 14.4 oz)   02/06/24 87.6 kg (193 lb 3.2 oz)   11/21/23 93 kg (205 lb)   11/13/23 92.5 kg (204 lb)   08/07/23 91.2 kg (201 lb)   08/07/23 91.2 kg (201 lb)   05/15/23 82.2 kg (181 lb 3.2 oz)   05/15/23 82.2 kg (181 lb 3.2 oz)   03/06/23 78 kg (172 lb)   02/07/23 78.1 kg (172 lb 3.2 oz)   01/16/23 75.8 kg (167 lb)   12/06/22 76.2 kg (168  lb)   10/27/22 76.9 kg (169 lb 9.6 oz)   10/10/22 76.2 kg (168 lb)   10/10/22 76.7 kg (169 lb)   08/02/22 81 kg (178 lb 8 oz)   07/18/22 82.6 kg (182 lb)   07/05/22 86.3 kg (190 lb 3.2 oz)   06/20/22 87.7 kg (193 lb 6.4 oz)   06/06/22 89.7 kg (197 lb 12.8 oz)   05/23/22 83 kg (183 lb)            Estimated Daily Nutrient Needs:  Energy (kcal):  1638 Weight Used for Energy Requirements: Current     Protein (g):  74-89 Weight Used for Protein Requirements: Current        Fluid (ml/day):  1645 Method Used for Fluid Requirements: 1 ml/kcal    Nutrition Related Findings:     Wound Type: None    Current Nutrition Therapies:    ADULT DIET; Regular; No Pork, No Peanuts, No Corn    Anthropometric Measures:  Height: 162.6 cm (5' 4.02\")  Current Body Wt: 74.4 kg (164 lb 0.4 oz)   BMI: 28.1    Nutrition Diagnosis:   Inadequate protein intake related to altered GI function, early satiety, pain as evidenced by GI abnormality    Nutrition Interventions:   Food and/or Nutrient Delivery: Continue Current Diet  Nutrition Education/Counseling: No recommendation at this time  Coordination of Nutrition Care: Continue to monitor while inpatient, Interdisciplinary Rounds       Goals:     Goals: PO intake 50% or greater, within 2 days       Nutrition Monitoring and Evaluation:   Behavioral-Environmental Outcomes: None Identified     Physical Signs/Symptoms Outcomes: Biochemical Data, Weight    Discharge Planning:    Continue Oral Nutrition Supplement, Continue current diet     Francois Montemayor RD  Contact: [phone]

## 2024-04-10 NOTE — PROGRESS NOTES
[facility]  Good Help to Those in Need  [phone]     Patient Name: Katiuska Fair  YOB: 1955  Age: 68 y.o.    [facility] RN Note:  Hospice meeting planned for 11am this morning with patient and family to discuss plan of care moving forward      Plan to DC home with hospice tomorrow,  will transport patient home. Hospice admission set for 3pm 04/11/24 Full notes to follow    Thank you for the opportunity to be of service to this patient.  Анна Allen RN, BSN, CHPN  Clinical Nurse Liaison  [facility]  [phone] Mobile  [phone] Office   Available on [employer]

## 2024-04-10 NOTE — PROGRESS NOTES
Physician Progress Note      PATIENT:               ADAMARIS ISLAS  CSN #:                  [id number]  :                       1955  ADMIT DATE:       2024 3:46 PM  DISCH DATE:  RESPONDING  PROVIDER #:        Sergey LYNN Do, MD          QUERY TEXT:    68yoF patient admitted with nausea, vomiting and abdominal pain due to   Metastatic pancreatic cancer/failure to thrive , noted to have Sodium of  131.    Pt receieved 1 L NSS IVF bolus in ED.  If possible, please document in   progress notes and discharge summary if you are evaluating and/or treating any   of the following:    The medical record reflects the following:  Risk Factors: noted for Pancreatic CA, nausea, vomiting and abdominal pain  Clinical Indicators: reports 3 days of nausea , vomiting, worsening fatigue,   weakness; Sodium of 131 (*) - 134  ED note 'On exam, patient appears chronically ill, weak, almost lethargic.'   Described as weak, chronically ill, somewhat listless.'  Treatment: given 1L NSS IVF bolus in ED, monitor, Hospice consult    Thank you,  Kellie Mancuso RN, CDI  Options provided:  -- Hyponatremia  -- Low Sodium not clinically significant  -- Other - I will add my own diagnosis  -- Disagree - Not applicable / Not valid  -- Disagree - Clinically unable to determine / Unknown  -- Refer to Clinical Documentation Reviewer    PROVIDER RESPONSE TEXT:    Low sodium is not clinically significant.    Query created by: Kellie Mancuso on 2024 11:08 AM      Electronically signed by:  Sergey LYNN Do, MD 2024 11:43 PM

## 2024-04-11 ENCOUNTER — HOME CARE VISIT (OUTPATIENT)
Facility: HOME HEALTH | Age: 69
End: 2024-04-11
Payer: MEDICARE

## 2024-04-11 VITALS
HEIGHT: 64 IN | DIASTOLIC BLOOD PRESSURE: 66 MMHG | SYSTOLIC BLOOD PRESSURE: 94 MMHG | WEIGHT: 164 LBS | BODY MASS INDEX: 28 KG/M2 | OXYGEN SATURATION: 92 % | RESPIRATION RATE: 16 BRPM | TEMPERATURE: 98.2 F | HEART RATE: 108 BPM

## 2024-04-11 VITALS
RESPIRATION RATE: 16 BRPM | SYSTOLIC BLOOD PRESSURE: 85 MMHG | DIASTOLIC BLOOD PRESSURE: 65 MMHG | OXYGEN SATURATION: 91 % | HEART RATE: 110 BPM

## 2024-04-11 PROCEDURE — C9113 INJ PANTOPRAZOLE SODIUM, VIA: HCPCS | Performed by: INTERNAL MEDICINE

## 2024-04-11 PROCEDURE — 2580000003 HC RX 258: Performed by: INTERNAL MEDICINE

## 2024-04-11 PROCEDURE — 6360000002 HC RX W HCPCS: Performed by: INTERNAL MEDICINE

## 2024-04-11 PROCEDURE — 99232 SBSQ HOSP IP/OBS MODERATE 35: CPT | Performed by: INTERNAL MEDICINE

## 2024-04-11 PROCEDURE — A4216 STERILE WATER/SALINE, 10 ML: HCPCS | Performed by: INTERNAL MEDICINE

## 2024-04-11 PROCEDURE — G0299 HHS/HOSPICE OF RN EA 15 MIN: HCPCS

## 2024-04-11 PROCEDURE — 6370000000 HC RX 637 (ALT 250 FOR IP): Performed by: FAMILY MEDICINE

## 2024-04-11 RX ADMIN — HYDROMORPHONE HYDROCHLORIDE 4 MG: 5 SOLUTION ORAL at 10:35

## 2024-04-11 RX ADMIN — PROCHLORPERAZINE EDISYLATE 10 MG: 5 INJECTION INTRAMUSCULAR; INTRAVENOUS at 10:35

## 2024-04-11 RX ADMIN — SODIUM CHLORIDE, POTASSIUM CHLORIDE, SODIUM LACTATE AND CALCIUM CHLORIDE: 600; 310; 30; 20 INJECTION, SOLUTION INTRAVENOUS at 05:50

## 2024-04-11 RX ADMIN — SODIUM CHLORIDE, PRESERVATIVE FREE 10 ML: 5 INJECTION INTRAVENOUS at 08:12

## 2024-04-11 RX ADMIN — PANTOPRAZOLE SODIUM 40 MG: 40 INJECTION, POWDER, FOR SOLUTION INTRAVENOUS at 05:39

## 2024-04-11 ASSESSMENT — PAIN SCALES - GENERAL: PAINLEVEL_OUTOF10: 7

## 2024-04-11 ASSESSMENT — PAIN DESCRIPTION - LOCATION: LOCATION: ABDOMEN

## 2024-04-11 ASSESSMENT — PAIN DESCRIPTION - ORIENTATION: ORIENTATION: LOWER;MID

## 2024-04-11 ASSESSMENT — PAIN DESCRIPTION - DESCRIPTORS: DESCRIPTORS: ACHING;PRESSURE

## 2024-04-11 ASSESSMENT — ENCOUNTER SYMPTOMS: PAIN LOCATION - PAIN QUALITY: SHARP

## 2024-04-11 ASSESSMENT — PAIN DESCRIPTION - ONSET: ONSET: ON-GOING

## 2024-04-11 NOTE — PROGRESS NOTES
AVS reviewed with pt and spouse at bedside, verbalized understanding of new medication and follow up appointments. Denies further questions at this time. PIV removed, home with hines. belongings gathered at bedside. To be discharged to main entrance for d/c

## 2024-04-11 NOTE — DISCHARGE SUMMARY
[facility]  [address] [place] [address] [phone]          Hospitalist Discharge Summary     Patient ID:  Katiuska Fair  [id number]  68 y.o.  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 *         Hospital Course:     69 yo hx of HTN, UC, metastatic pancreatic cancer, presented w/ worsening abd pain, nausea, vomiting     1) Metastatic pancreatitic cancer/failure to thrive: repeat CT scan with worsening metastatic disease.  Poor prognosis.  Patient has decided on home hospice.  Hospice team is transitioning meds to oral/sublingal to make sure patient can go home today     2) N/V/Abd pain: due to above.  Cont sublingual antiemetics through hospice      3) HTN: BP low.  Off meds     4) UC: not active    PCP: Fransisca Ragsdale MD     Consults: hospice, oncology    Significant Diagnostic Studies: CT scan    Discharge Exam:  Physical Exam:    Gen:  frail, ill-appearing, NAD  HEENT:  Pink conjunctivae, PERRL, hearing intact to voice, moist mucous membranes  Neck:  Supple, without masses, thyroid non-tender  Resp:  No accessory muscle use, clear breath sounds without wheezes rales or rhonchi  Card:  No murmurs, normal S1, S2 without thrills, bruits or peripheral edema  Abd:  Soft, diffused pain  Musc:  No cyanosis or clubbing  Skin:  No rashes   Neuro:  Cranial nerves 3-12 are grossly intact,  (DILAUDID) 4 MG tablet Comments:   Reason for Stopping:         diclofenac sodium (VOLTAREN) 1 % GEL Comments:   Reason for Stopping:         docusate sodium (COLACE) 100 MG capsule Comments:   Reason for Stopping:         polyethylene glycol (MIRALAX) 17 g packet Comments:   Reason for Stopping:         oxyCODONE (ROXICODONE) 5 MG immediate release tablet Comments:   Reason for Stopping:         lidocaine-prilocaine (EMLA) 2.5-2.5 % cream Comments:   Reason for Stopping:         hydroCHLOROthiazide 12.5 MG tablet Comments:   Reason for Stopping:             Activity: activity as tolerated  Diet: regular diet  Wound Care: none needed    Follow-up with  Fransisca Ragsdale MD  [address] [address]  [facility], located [facility] [address] [phone]    Schedule an appointment as soon as possible for a visit in 1 week(s)  As needed    Jamaal Granda MD  [address] [address] [phone]            Follow-up tests/labs none     Signed:  Sergey Ordonez MD  4/11/2024  9:14 AM  **I personally spent 35 min on discharge**

## 2024-04-11 NOTE — PALLIATIVE CARE DISCHARGE
Goals of Care/Treatment Preferences    The Palliative Medicine team was consulted as part of your/your loved one's care in the hospital. Our team is a supportive service; we strive to relieve suffering and improve quality of life.    We reviewed advance care planning information, which includes the following:  Primary Decision Maker: Bin Fair - Spouse - [phone]  Patient's Healthcare Decision Maker is:: Legal Next of Kin  Confirm Advance Directive: None  Does the patient have other document types?: DDNR    Patient/Health Care Proxy Stated Goals: Comfort    We reviewed / discussed your code status as:   Code Status: DNR     “Full Code” means perform CPR in the event of cardiac arrest.      “DNR” means do NOT perform CPR in the event of cardiac arrest.      “Partial Code” means you have specific preferences, please discuss with your healthcare team.      “No Order” means this issue was not addressed / resolved during your stay    Medical Interventions: Comfort measures    Other Instructions: You will be returning home with support from [facility].  You have a Durable Do Not Resuscitate Order in place, which should travel with you.  When you are in a facility, this form should be placed on your chart. Once you are home, it is recommended that the DDNR form be placed in a visible location such as on the refrigerator or bedroom door.    Artificially Administered Nutrition: No feeding tube    Because of the importance of this information, we are providing you with a printed copy to share with other healthcare providers after this hospitalization is complete.

## 2024-04-11 NOTE — PROGRESS NOTES
Physician Progress Note      PATIENT:               ADAMARIS ISLAS  CSN #:                  [id number]  :                       1955  ADMIT DATE:       2024 3:46 PM  DISCH DATE:  RESPONDING  PROVIDER #:        Sergey LYNN Do, MD          QUERY TEXT:    Pt admitted with Metastatic pancreatitic cancer/failure to thrive and has   malnutrition documented in 4/10 by a Registered Dietitian.  Please further   specify type of malnutrition with documentation in the medical record.    The medical record reflects the following:  Risk Factors: weight loss, FTT, Metastatic pancreatic CA  Clinical Indicators: RD assessment:  Pt meets malnutrition criteria due to wt   loss of 41 lbs (20%) over the last 5 months. PO intake has been insufficient   to maintain weight. Appetite poor  Malnutrition Assessment:  Malnutrition Status:  Moderate malnutrition (04/10/24 1328)  Context:  Acute Illness  Findings of the 6 clinical characteristics of malnutrition:  Energy Intake:  75% or less of estimated energy requirements for 7 or more   days  Weight Loss:  Greater than 7.5% over 3 months  Body Fat Loss:  Unable to assess  Muscle Mass Loss:  Unable to assess  Fluid Accumulation:  No significant fluid accumulation    Treatment: Oral nutrition supplement, continue current diet, Hospice consult.    ASPEN Criteria:    https://aspenjournals.onlinelibrary.james.com/doi/full/10.1177/774521041856030  5  Options provided:  -- Moderate Malnutrition  -- Moderate Protein calorie malnutrition  -- Other - I will add my own diagnosis  -- Disagree - Not applicable / Not valid  -- Disagree - Clinically unable to determine / Unknown  -- Refer to Clinical Documentation Reviewer    PROVIDER RESPONSE TEXT:    This patient has moderate protein calorie malnutrition.    Query created by: Kellie Mancuso on 4/10/2024 5:18 PM      Electronically signed by:  Sergey LYNN Do, MD 4/10/2024 11:05 PM

## 2024-04-11 NOTE — PROGRESS NOTES
chemotherapy with [place] on 3/18/24.     Had a long discussion with patient that scans show disease progression compared to Jan 2024, but that she only started treatment 3/18/24. Therefore, I cannot call this a treatment failure. However, pt does have overwhelming symptoms and decreased performance status due to metastatic cancer, pain and chemotherapy and is unlikely to be able to tolerate continuing on treatment. Ultimately, the decision is hers on whether to switch to comfort care or continue trying chemotherapy a while longer. I advised her to at least have a hospice information session. While pt does have BRCA mutation with future potential use of Olaparib, this is usually only recommended for stable disease. High TMB makes her a candidate for immunotherapy (IO), but her disease is likely growing too rapidly for IO to have an effect. Given her significant suffering, I feel hospice would be best.  -- Transitioning to comfort care with the support of hospice.      2. Neoplasm related pain:  Chronic and related to pancreas cancer in R scapula, R paraspinal region as well as encasing R 7th rib and 6th intercostal space. Palliative medicine following and has increased frequency of Dilaudid for now.      3. Anemia and thrombocytopenia:  2/2 chemotherapy     4. H/o Ulcerative procto-sigmoiditis:  April 2017 colonoscopy with one adenoma polyp removed. Repeat colonoscopy was normal on 6/1/22. Repeat 10 years for screening.      5. Cough:  Likely 2/2 pulm nodules. Tessalon perles.       Goals of care:  Discussed the prognosis of metastatic pancreatic cancer, which is incurable. Any treatment would be palliative to improve quality and duration of life, but no cure. Estimated prognosis with treatment is 6-18 months depending on response. However, pt has continued disease progression and worsening performance status, but making it difficult to continue with treatment. Hospice info session ordered.       Signed By:  Fiona Smith MD   4/11/24 at 1:23 PM EDT

## 2024-04-11 NOTE — CARE COORDINATION
4/11/2024 12:40 PM   Care Management Progress Note     Admitting diagnosis:  Dehydration [E86.0]  Pancreatic cancer (HCC) [C25.9]  General weakness [R53.1]  Failure to thrive in adult [R62.7]     Admit Date:  4/8/2024  3:46 PM     RUR:  15%  Risk Level: []Low [x]Moderate []High     Transition of care plan:  Discharge home today  Palliative consulted  Anticipated discharge plan: Home with family and home hospice through [facility]. [facility] to admit pt to home hospice at 3PM  2nd IMM given on 4/10  Outpatient follow-up.  Discharge transport: Pt's , will take pt home at 2PM     04/10/24 1504   Discharge Planning   Type of Residence House   Living Arrangements Spouse/Significant Other   Patient expects to be discharged to: House   Services At/After Discharge   Transition of Care Consult (CM Consult) Hospice   Internal Hospice Yes   Services At/After Discharge Hospice   Mode of Transport at Discharge Other (see comment)  (Pt's )   Hospital Transport Time of Discharge 1400   Condition of Participation: Discharge Planning   The Plan for Transition of Care is related to the following treatment goals: hospice   The Patient and/or Patient Representative was provided with a Choice of Provider? Patient   The Patient and/Or Patient Representative agree with the Discharge Plan? Yes   Freedom of Choice list was provided with basic dialogue that supports the patient's individualized plan of care/goals, treatment preferences, and shares the quality data associated with the providers?  Yes

## 2024-04-11 NOTE — DISCHARGE INSTRUCTIONS
to benefit from this type of care.  The hospice team includes doctors, nurses, counselors, therapists, social workers, pastors, home health aides, and trained volunteers. You can get care in your own home or in a hospice center. Some hospice workers also go to nursing homes or hospitals.  How can you care for yourself at home?  Prepare a list of advance directives. These are instructions to your doctor and family members about what kind of care you want if you become unable to speak or express yourself.  Find out if your health insurance covers hospice care.  Find hospice programs in your area. People who can help include your doctor, your state health department, and your insurance company.  Decide what kinds of hospice services you want. It helps to know what you want before you enter a hospice program.  Think about some questions when preparing for hospice care.  Who do you want to make decisions about your medical care if you are not able to? Many people choose their partner, child, or friend.  What are you most afraid of that might happen? You might be afraid of having pain or losing your independence. Let your hospice team know your fears. The team can help you.  Where would you prefer to die? Choices include your home, a hospital, or a nursing home.  Do you want to donate organs when you die? Make sure that your family clearly understands this.  Do you want any Oriental orthodox rites or practices to be done before you die? Let your hospice team know what you want.  Where can you learn more?  Go to https://[email or web address].net/patientEd and enter N994 to learn more about \"Hospice: Care Instructions.\"  Current as of: November 16, 2023               Content Version: 14.0  © 5192-6701 [employer].   Care instructions adapted under license by [employer]. If you have questions about a medical condition or this instruction, always ask your healthcare professional. [employer] disclaims any  warranty or liability for your use of this information.

## 2024-04-12 ENCOUNTER — HOME CARE VISIT (OUTPATIENT)
Facility: HOME HEALTH | Age: 69
End: 2024-04-12
Payer: MEDICARE

## 2024-04-12 ENCOUNTER — APPOINTMENT (OUTPATIENT)
Facility: HOSPITAL | Age: 69
End: 2024-04-12
Payer: MEDICARE

## 2024-04-12 ENCOUNTER — HOME HEALTH/ HOSPICE FACE TO FACE (OUTPATIENT)
Age: 69
End: 2024-04-12

## 2024-04-12 VITALS
HEART RATE: 108 BPM | RESPIRATION RATE: 18 BRPM | SYSTOLIC BLOOD PRESSURE: 114 MMHG | DIASTOLIC BLOOD PRESSURE: 81 MMHG | OXYGEN SATURATION: 94 %

## 2024-04-12 PROCEDURE — G0299 HHS/HOSPICE OF RN EA 15 MIN: HCPCS

## 2024-04-12 ASSESSMENT — ENCOUNTER SYMPTOMS
COUGH: 1
FLATUS: 1
INDIGESTION: 1
PAIN LOCATION - PAIN QUALITY: PRESSURE
DIARRHEA: 1
VOMITING: 1
COUGH CHARACTERISTICS: DRY
ABDOMINAL PAIN: 1
CONSTIPATION: 1
NAUSEA: 1

## 2024-04-12 NOTE — HOSPICE
with distant metastases at presentation OR    ________  B. Progression from an earlier stage of disease to metastatic disease with either:                          ________  1. continued decline in spite of therapy                          ________  2. patient declines further disease directed therapy            SPIRITUAL/Social/Emotional/psych: Mandaen,  is a Mandaen .       Dr. Granda_____ contacted, agrees to serve as attending provider for hospice and provided verbal certification of terminal illness with prognosis of 6 months or less life expectancy. Orders for hospice admission, medications and plan of treatment received. Medication reconciliation completed.        Currently this patient has:                        PORT:      yes     Dale Catheter:     yes          MEDS:  I have reviewed the patient's medication list with MD and identified the following:  Nonformulary medications: none  Unrelated medications: none     IDT communication to include MD, SN, SW, CH and support team.

## 2024-04-12 NOTE — HOSPICE
[facility]   Good Help to Those in Need  [phone]     Patient Name:  Katiuska Fair  YOB: 1955         Date of Provider Hospice Visit: 04/12/24     Level of Care:   [] General Inpatient (GIP)              [x] Routine                   [] Respite     Current Location of Care: Home      Date of Original Hospice Admission:  4/11/2024  Hospice Medical Director at time of admission: Dr. Jamaal Granda      Principle Hospice Diagnosis:  Pancreatic cancer   Diagnoses RELATED to the terminal prognosis: Cancer related pain, chemo induced thrombocytopenia, anemia, metastatic disease, n/v  Other Diagnoses: Hypothyroidism, HTN     HOSPICE SUMMARY   Katiuska Fair is a 67 yo female admitted to [facility] on routine level of care at home. Resides in private home with spouse who is primary caregiver. Pt reports that spouse is also undergoing treatment for metastatic cancer.    Review of notes indicates that pt has had multiple ER visits/hospitalizations in the last several months. Of late pt has demonstrated poor tolerance of chemotherapy; and recent imaging indicates significant disease progression with increased number of pancreatic lesions with increased pulmonary, hepatic, and osseous metastasis; and new bilateral renal lesions. Pt elected hospice for comfort care, symptom management, and available support services.     The patient's principle diagnosis has resulted in weakness/debility, cancer related pain, n/v, and anorexia.      Functionally, the patient's Karnofsky and/or Palliative Performance Scale is estimated at 50.     Objective information that support this patients limited prognosis includes: See note above.       The patient/family chose comfort measures with the support of Hospice.      HOSPICE DIAGNOSES   Active Symptoms:  1.  Metastatic pancreatic cancer  2.  Cancer related pain  3.  N/V  4.  Anorexia  5.  Hypthyroidism  6.  Weakness/debility  7.  Pruritus  8.  OAKLEY  9.

## 2024-04-13 NOTE — HOSPICE
Pt received resting in bed.  She has just had visitors and is fatigued but did well on providing information today during initial RNCM and NP Nelly Gomez visit today.    Bowel care discussed.  Pt has supply of senna and is advised to use if she feels need to have BM but is not able to eliminate.  She is passing gas.  Has just started eating 4-5 bites of solid foods in several servings during the day as tolerated.  Not feeling urge to have BM.  Her tendency has been diarrhea and she uses imodium.  Advised to call if she needs guidance.  Catheter care provided at the time of the visit.  Demonstrated to family friend present how to empty catheter drainage bag.  Pt has just started eating again this week.  She is tolerating foods such as eggs and potatoes.  Nausea is still present and advised to use the compazine prior to onset of nausea and prior to attempts to eat.  She is worried about supply and more will be ordered for delivery early next week.  Sipping water.  She is sensitive to smells.  Reviewed use of lorazepam and haloperidol for nausea but pt states no haldol-only as a last resort.  Pt is taking hydromorphone approximately 2 times a day.  She wishes to remain as alert as possible and is willing to tolerate pain.  Understands to \"stay ahead of the pain\" and prefilled syringes are made for her in the lower concentration of hydromorphone.  New supply of hydromorphone 4mg/ml has been delayed and will arrive in the next day or 2.  Pt is understanding to take 4 of the 1 ml syringes for her dose of pain medication.  Reviewed all medications several times and provided a printed list from the computer.    Wound care provided this day and pt tolerated well.  Reviewed 24/7 number to call.  Pt will most likely relocate to her sisters home over the weekend while the work is done on the afshin in the home.

## 2024-04-14 ENCOUNTER — HOME CARE VISIT (OUTPATIENT)
Age: 69
End: 2024-04-14
Payer: MEDICARE

## 2024-04-15 ENCOUNTER — HOME CARE VISIT (OUTPATIENT)
Age: 69
End: 2024-04-15
Payer: MEDICARE

## 2024-04-15 ENCOUNTER — HOME CARE VISIT (OUTPATIENT)
Facility: HOME HEALTH | Age: 69
End: 2024-04-15
Payer: MEDICARE

## 2024-04-15 VITALS
DIASTOLIC BLOOD PRESSURE: 78 MMHG | OXYGEN SATURATION: 97 % | HEART RATE: 105 BPM | RESPIRATION RATE: 18 BRPM | SYSTOLIC BLOOD PRESSURE: 114 MMHG

## 2024-04-15 PROCEDURE — G0156 HHCP-SVS OF AIDE,EA 15 MIN: HCPCS

## 2024-04-15 PROCEDURE — G0299 HHS/HOSPICE OF RN EA 15 MIN: HCPCS

## 2024-04-15 PROCEDURE — G0155 HHCP-SVS OF CSW,EA 15 MIN: HCPCS

## 2024-04-15 ASSESSMENT — ENCOUNTER SYMPTOMS
VOMITING: 1
ABDOMINAL PAIN: 1
INDIGESTION: 1
DIARRHEA: 1
NAUSEA: 1
CONSTIPATION: 1
STOOL DESCRIPTION: SOFT
FLATUS: 1

## 2024-04-15 NOTE — HOSPICE
MSW visited patient to provide support and complete intial social work assessment. Upon arrival patient was awake in bed watching TV. Patient was alert and oriented x 4. Spouse was present. MSW  introduced herself and role of social work. Patient had no report of pain. Patient stated that the medication she's currently taking is helping with the pain. Patient stated that the transition into hospice has went smoothly thus far. Patient shared her appreciation for hospice staff and how attentive and supportive everyone has been since admission. Patient stated that her food intake has increased these past few days. Patient reported that she has been able to eat atleast twice a day. Patient reported that she has been sleeping well. Patient discussed her support system. Patient stated that her sisters alternate days when they come to the home to assist with her care. Patient reported that her children also stop by when they are available. Patient reported that her spouse has prostate cancer which has spread throughout his body therefore he need assistance with caring for her. Patient shared that her support system is strong however, she requested information about paid help for when she begins to decline. Patient stated that she will likely need the extra support during morning hours when her sisters are unavailable and her spouse is not feeling well. MSW provided patient with education on support and paid help. MSW provided patient and spouse with Hospice Resource Guide. MSW provided patient and family with emotional support through empathetic listening. MSW will continue to monitor and assess needs.

## 2024-04-16 ENCOUNTER — HOME CARE VISIT (OUTPATIENT)
Facility: HOME HEALTH | Age: 69
End: 2024-04-16
Payer: MEDICARE

## 2024-04-16 PROCEDURE — G0299 HHS/HOSPICE OF RN EA 15 MIN: HCPCS

## 2024-04-16 ASSESSMENT — ENCOUNTER SYMPTOMS
CONSTIPATION: 1
DIARRHEA: 1
NAUSEA: 1
STOOL DESCRIPTION: SOFT
ABDOMINAL PAIN: 1

## 2024-04-16 NOTE — HOSPICE
PRN visit, family calling in with urgent need.    Pt is reporting discomfort and not sure of the cause but is feeling the need to pass urine and has gotten up to the toilet several times to try.  No urine is leaking around catheter and catheter found to not be draining.  On inspection tubing is twisted and kinked.  Once it is freed from securing device urine started to drain for total of 1200 ml and pt felt relief.  Pt expressing that she wound take imodium due to having 2 BMs already today.  Questioned the need for imodium and advised she will be more at risk for constipation especially with use of opioid.  Understanding verbalized and she will not use the imodium.  Pt is feeling dehydrated and this is discussed.  Encouraged to sip fluids through out the day or use ice ships to moisten mouth.  Use of electrolyte packets discussed and advised to dilute them well if used to avoid upset stomach.  Understanding verbalized.  Pt and spouse agree to call with any concerns.

## 2024-04-16 NOTE — HOSPICE
Routine visit.  Pt has had a busy day with visits from HHA and SW.  Would have deferred visit until tomorrow but prefilled syringes are depleted.  New concentration of hydromorphone is delivered and 27 prefilled syringes made from the 4mg/ml bottle.  Approximately 2 ml leaked into the bag during transit.    Old concentration is wasted with pts sister Aurora as witness so that there are no med errors.  New ziplock bag labeled and pt and her sister understand only 1 oral syringe needed for a dose.  Pt is resting in bed and appearing comfortable and content.  She endorses some nausea and is taking the compazine.  Informed new supply of 10mg tablets due to deliver tomorrow.  She continues to take 2 doses of pain medication daily.  She states her appetite has picked up and she ate eggs again today but got a late start and will eat again in a while.  She reports having BMs over the weekend and that most recently it was soft but had form instead of loose.  Her dressing on right buttock is clean dry and intact and pt deferred care to that area this day stating she had a bath and feels satisfied.  All questions and concerns addressed at this time.  Pt states GINNY feels she needs an OBT and it will be requested.    Pt and family agree to call with any needs or concerns.

## 2024-04-18 ENCOUNTER — HOME CARE VISIT (OUTPATIENT)
Facility: HOME HEALTH | Age: 69
End: 2024-04-18
Payer: MEDICARE

## 2024-04-18 VITALS
OXYGEN SATURATION: 96 % | DIASTOLIC BLOOD PRESSURE: 80 MMHG | HEART RATE: 98 BPM | RESPIRATION RATE: 18 BRPM | SYSTOLIC BLOOD PRESSURE: 118 MMHG

## 2024-04-18 PROCEDURE — G0299 HHS/HOSPICE OF RN EA 15 MIN: HCPCS

## 2024-04-18 ASSESSMENT — ENCOUNTER SYMPTOMS
ABDOMINAL PAIN: 1
DIARRHEA: 1
NAUSEA: 1
STOOL DESCRIPTION: SMALL
VOMITING: 1
COUGH: 1

## 2024-04-19 ENCOUNTER — TELEPHONE (OUTPATIENT)
Age: 69
End: 2024-04-19

## 2024-04-19 NOTE — TELEPHONE ENCOUNTER
“Called patient to advise/confirm upcoming appt with Dr. Real on 04/23/24  at 12:30 [facility].  No answer, left voicemail requesting call back to confirm appointment.”

## 2024-04-19 NOTE — HOSPICE
Routine visit.  Pts sister is present.  Pt resting in bed and she appears comfortable.  She reports her pain level to be \"pretty good\".  Pt reports diarrhea yesterday.  Small formed BM today.  Had nausea this morning with emesis and discussed options for managing GI symptoms.  Pt prefers a natural approach if possible and has restarted taking baking soda in warm water 2 times a day.   Pt is tolerating small amounts of food.  SHe is taking ice chips made with diluted electrolyte water from a powder she trusts.  Discussed Biotene for dry mouth and pt already has some of the mouthwash.  Some upper airway mucous pt is able to cough and clear.  Pt is mentioning PT and asked if it is something we can provide.  SHe wants to strenghten her legs to better ambulate.  Understands it is limited and would be agreeable to a consult.  Her goal is to be able to get up and use the bathroom.  Pt continues to take the dilaudid 4 mg 2 times a day.  Understanding verbalized to not let pain get severe.  She reports it is managed. Prefilled syringes are available.  Pt and family agree to call with concerns.

## 2024-04-20 ENCOUNTER — HOME CARE VISIT (OUTPATIENT)
Facility: HOME HEALTH | Age: 69
End: 2024-04-20
Payer: MEDICARE

## 2024-04-20 VITALS — HEART RATE: 88 BPM | RESPIRATION RATE: 18 BRPM

## 2024-04-20 PROCEDURE — G0299 HHS/HOSPICE OF RN EA 15 MIN: HCPCS

## 2024-04-20 ASSESSMENT — ENCOUNTER SYMPTOMS: PAIN LOCATION - PAIN QUALITY: ACHING, GNAWING
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
## 2024-04-22 ENCOUNTER — HOME CARE VISIT (OUTPATIENT)
Facility: HOME HEALTH | Age: 69
End: 2024-04-22
Payer: MEDICARE

## 2024-04-22 VITALS
OXYGEN SATURATION: 95 % | DIASTOLIC BLOOD PRESSURE: 70 MMHG | SYSTOLIC BLOOD PRESSURE: 109 MMHG | HEART RATE: 96 BPM | RESPIRATION RATE: 16 BRPM

## 2024-04-22 PROCEDURE — G0156 HHCP-SVS OF AIDE,EA 15 MIN: HCPCS

## 2024-04-22 PROCEDURE — G0299 HHS/HOSPICE OF RN EA 15 MIN: HCPCS

## 2024-04-22 RX ADMIN — Medication 10 MG: at 14:00

## 2024-04-22 ASSESSMENT — ENCOUNTER SYMPTOMS
DIARRHEA: 1
ABDOMINAL PAIN: 1
STOOL DESCRIPTION: LIQUID
NAUSEA: 1

## 2024-04-23 ENCOUNTER — HOME CARE VISIT (OUTPATIENT)
Facility: HOME HEALTH | Age: 69
End: 2024-04-23
Payer: MEDICARE

## 2024-04-23 VITALS — OXYGEN SATURATION: 96 % | HEART RATE: 102 BPM | RESPIRATION RATE: 20 BRPM

## 2024-04-23 PROCEDURE — G0299 HHS/HOSPICE OF RN EA 15 MIN: HCPCS

## 2024-04-23 ASSESSMENT — ENCOUNTER SYMPTOMS: PAIN LOCATION - PAIN QUALITY: PRESSURE

## 2024-04-23 NOTE — HOSPICE
Routine visit.  Pts friend Alysia is present and helping her today.  Her spouse is also at the home.  Pt has had several days and nights with increased nausea and vomiting what she describes as being black thick mucous containing liquid.  She states subsequent episodes the emesis gets lighter in color and looks like regular digestive fluid.  She also had some uncontrolled diarrhea in the middle of the night making for poor rest. She is also experiencing dizziness and weakness in her current condition.  She is sipping on water during the visit today.  Pt with active bowel sounds in upper quadrants and lower quadrants of abdomen less active.  Tenderness across the top half of the abdomen.  Asked if emesis is the appearance of coffee grounds or cola and pt states she did think it looked like coffee ground material.  Discussed with NP Nelly at end of day when reviewing patients and will offer pt a PPI.  She has extensive history with colitis and also has specific remedies in mind for some of her symptoms preferring a more natural approach if possible.  Pt states she took a dose of compazine this morning but it is not documented in her book.  Given a dose at the visit and advised to take again in the evening prior to sleep.  Also suggested she take a dose of lorazepam prior to bed and prefilled syringes are made.  Refill requested of hydromorphone 4mg/ml.  Pt has supply of 15 prefilled syringes remaining.   Will refill at next visit.   Pt was bathed and is visibly fatigued.  Sacrum buttock with Triad paste applied by friend and not visualized by nurse this day.  Refill of compazine and hydromorphone requested for routine delivery.    Encouraged to call with concerns.

## 2024-04-24 ENCOUNTER — HOME CARE VISIT (OUTPATIENT)
Facility: HOME HEALTH | Age: 69
End: 2024-04-24
Payer: MEDICARE

## 2024-04-24 ENCOUNTER — HOME CARE VISIT (OUTPATIENT)
Age: 69
End: 2024-04-24
Payer: MEDICARE

## 2024-04-24 VITALS
TEMPERATURE: 98.7 F | SYSTOLIC BLOOD PRESSURE: 118 MMHG | RESPIRATION RATE: 16 BRPM | HEART RATE: 98 BPM | OXYGEN SATURATION: 96 % | DIASTOLIC BLOOD PRESSURE: 78 MMHG

## 2024-04-24 PROCEDURE — G0151 HHCP-SERV OF PT,EA 15 MIN: HCPCS

## 2024-04-24 PROCEDURE — G0156 HHCP-SVS OF AIDE,EA 15 MIN: HCPCS

## 2024-04-24 ASSESSMENT — ENCOUNTER SYMPTOMS: DYSPNEA ACTIVITY LEVEL: AT REST
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
## 2024-04-25 ENCOUNTER — HOME CARE VISIT (OUTPATIENT)
Facility: HOME HEALTH | Age: 69
End: 2024-04-25
Payer: MEDICARE

## 2024-04-25 VITALS
HEART RATE: 100 BPM | OXYGEN SATURATION: 95 % | RESPIRATION RATE: 18 BRPM | SYSTOLIC BLOOD PRESSURE: 120 MMHG | DIASTOLIC BLOOD PRESSURE: 78 MMHG

## 2024-04-25 PROCEDURE — G0299 HHS/HOSPICE OF RN EA 15 MIN: HCPCS

## 2024-04-25 ASSESSMENT — ENCOUNTER SYMPTOMS
STOOL DESCRIPTION: LIQUID
DIARRHEA: 1
INDIGESTION: 1
COUGH: 1
VOMITING: 1
CHEST TIGHTNESS: 1
ABDOMINAL PAIN: 1
COUGH CHARACTERISTICS: MOIST
BOWEL INCONTINENCE: 1

## 2024-04-26 ENCOUNTER — HOME CARE VISIT (OUTPATIENT)
Facility: HOME HEALTH | Age: 69
End: 2024-04-26
Payer: MEDICARE

## 2024-04-26 PROCEDURE — G0156 HHCP-SVS OF AIDE,EA 15 MIN: HCPCS
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
## 2024-04-28 ENCOUNTER — HOME CARE VISIT (OUTPATIENT)
Age: 69
End: 2024-04-28
Payer: MEDICARE

## 2024-04-30 PROBLEM — R11.2 INTRACTABLE NAUSEA AND VOMITING: Status: ACTIVE | Noted: 2024-01-01

## 2024-04-30 PROBLEM — Z51.5 HOSPICE CARE: Status: ACTIVE | Noted: 2024-01-01

## 2024-04-30 NOTE — HOSPICE
Patient with planned respite that is requested a day earlier due to multiple unmanaged symptoms with several medications in use at home.  Pt and spouse agreeable to starting respite today.  Bed available and transport arrangements made by REYES.  Report called to Akash at [facility].  [facility] transport arrived earlier than expected.  2 crews needed due to logistics of the home.

## 2024-04-30 NOTE — HOSPICE
Pts friend Alysia is also present and pt wants her nearby. Her sister Aurora is calm and states she is being strong for her sister at this time however she is understanding that pt is declining and also understanding that if pt goes to respite as planned she may decline further and not return home.   Aurora states pt has a son in a group home that is autistic and they are planning to have him make a visit.  Several \"sisters\" from the [facility] arrived to visit and pt is exhausted and wants a brief visit with them with her sister and family friend Alysia present.  Pt continues to have \"rough\" days especially in the morning.   Her weakness is increasing and today she had a A visit for bathing and was exhausted when this writer was present.  She could only stay awake 5 minutes.  She is experiencing more pain and encouraged to use the dilaudid.  New order received to increase the frequency to hourly as needed.  Pt is also reporting some difficulty breathing and mobilizing mucous.  Discussion occured about using dilaudid to manage shortness of breath with or without lorazepam.  Minimal stool is being passed but pt also not eating.  She has normoactive bowel sounds, passed gas yesterday.  Only had 3 spoons of broth yesterday.  Pt reporting dyspnea with difficulty coughing strongly enouch to clear mucous.  Her friend Alysia witnessed an episode this morning and interventions are discussed.  Pt is offered nebulized medications in place of her albuterol inhaler but is afraid to try this for the first time at home and will consider trying it at [facility].  Discussed non-pharm interventions for dyspnea and pharmacological interventions also discussed with new order for dilaudid with a change in frequency.  Plan is for pt to go to [facility] for respite while renovations are completed at the home in the kitchen, bathroom and pts bedroom.  Pt will be transported on stretcher by [facility] and  to visit tomorrow to review paperwork and get

## 2024-05-02 ENCOUNTER — APPOINTMENT (OUTPATIENT)
Facility: HOSPITAL | Age: 69
End: 2024-05-02
Payer: MEDICARE

## 2024-10-21 NOTE — PROGRESS NOTES
Electrolytes, kidney function are normal. WBC is high due to neulasta and platelet count is low - this is due to treatment - monitor for nose bleeds / blood in stool. Patient seen and examined. Pertinent labs, imaging and telemetry reviewed. I agree with the above:     Patient with acute on chronic HFrEF exacerbation.   -Patient with hypotension yesterday requiring pressors and with AF with RVR s/p amio bolus and currently NSR.   -Patient likely with cardiogenic shock with shock liver, BHAVESH on CKD which is worsening and elevated LA.   -Keep MAP >65, can consider levophed or dobutamine if needed.   -Continue with bumex infusion.   -Strict I&Os.   -Discussed case with CCU attending and will be transferred to [facility] for likely PA catheter and possible CVVH.   -Discussed with son at bedside.

## (undated) DEVICE — KIT COLON W/ 1.1OZ LUB AND 2 END

## (undated) DEVICE — ENDOSCOPIC ULTRASOUND FINE NEEDLE BIOPSY (FNB) DEVICE: Brand: ACQUIRE

## (undated) DEVICE — AGENT HEMSTAT W2XL14IN OXIDIZED REGENERATED CELOS ABSRB FOR

## (undated) DEVICE — GARMENT,MEDLINE,DVT,INT,CALF,MED, GEN2: Brand: MEDLINE

## (undated) DEVICE — BALLOON DILATATION CATHETER: Brand: HURRICANE™ RX

## (undated) DEVICE — DRAPE FLD WRM W44XL66IN C6L FOR INTRATEMP SYS THERMABASIN

## (undated) DEVICE — TUBING, SUCTION, 1/4" X 10', STRAIGHT: Brand: MEDLINE

## (undated) DEVICE — RETRIEVAL BALLOON CATHETER: Brand: EXTRACTOR™ PRO RX

## (undated) DEVICE — SUTURE PERMAHAND SZ 3-0 L30IN NONABSORBABLE BLK L26MM SH C017D

## (undated) DEVICE — SUTURE PDS II SZ 1 L96IN ABSRB VLT TP-1 L65MM 1/2 CIR Z880G

## (undated) DEVICE — TROCAR: Brand: KII® SLEEVE

## (undated) DEVICE — SUTURE PROL SZ 2-0 L36IN NONABSORBABLE BLU SH L26MM 1/2 CIR 8523H

## (undated) DEVICE — BW-412T DISP COMBO CLEANING BRUSH: Brand: SINGLE USE COMBINATION CLEANING BRUSH

## (undated) DEVICE — (D)FCPS GRSP 9MM 230CMLX2.4MM -- DISC BY MFR

## (undated) DEVICE — Device

## (undated) DEVICE — CUFF RMFG BP INF SZ 11 DISP -- LAWSON OEM ITEM 238915

## (undated) DEVICE — 3M™ CUROS™ DISINFECTING CAP FOR NEEDLELESS CONNECTORS 270/CARTON 20 CARTONS/CASE CFF1-270: Brand: CUROS™

## (undated) DEVICE — SUT ETHLN 2-0 18IN FS BLK --

## (undated) DEVICE — DRAIN SURG 19FR 0.25IN SIL RND W/ TRCR INDIC DOT RADPQ FULL

## (undated) DEVICE — BASIN ST MAJOR-NO CAUTERY: Brand: MEDLINE INDUSTRIES, INC.

## (undated) DEVICE — INTENDED FOR TISSUE SEPARATION, AND OTHER PROCEDURES THAT REQUIRE A SHARP SURGICAL BLADE TO PUNCTURE OR CUT.: Brand: BARD-PARKER ® CARBON RIB-BACK BLADES

## (undated) DEVICE — POSITIONER HD REST SUPINE LAT

## (undated) DEVICE — ELECTRODE,RADIOTRANSLUCENT,FOAM,3PK: Brand: MEDLINE

## (undated) DEVICE — BAG SPEC BIOHZRD 10 X 10 IN --

## (undated) DEVICE — SOL INJ SOD CL 0.9% 500ML BG --

## (undated) DEVICE — CANNULA CUSH AD W/ 14FT TBG

## (undated) DEVICE — SUTURE PERMAHAND SZ 2-0 L30IN NONABSORBABLE BLK SH L26MM C016D

## (undated) DEVICE — BAG BELONG PT PERS CLEAR HANDL

## (undated) DEVICE — HYPODERMIC SAFETY NEEDLE: Brand: MAGELLAN

## (undated) DEVICE — Z DISCONTINUED USE 2272114 DRAPE SURG UTIL 26X15 IN W/ TAPE N INVASIVE MULTLYR DISP

## (undated) DEVICE — (D)SENSOR RMFG 02 PULS OXMTR -- DISC BY MFR USE ITEM 133445

## (undated) DEVICE — CLIP INT SM WIDE RED TI TRNSVRS GRV CHEVRON SHP W/ PRECIS

## (undated) DEVICE — CATH IV AUTOGRD BC PNK 20GA 25 -- INSYTE

## (undated) DEVICE — WIREGUIDED CYTOLOGY BRUSH: Brand: RX CYTOLOGY BRUSH

## (undated) DEVICE — SUTURE PDS II SZ 4-0 L27IN ABSRB VLT L17MM RB-1 1/2 CIR Z304H

## (undated) DEVICE — SUTURE VCRL SZ 3-0 L27IN ABSRB UD L26MM SH 1/2 CIR J416H

## (undated) DEVICE — PACK,BASIC,SIRUS,V: Brand: MEDLINE

## (undated) DEVICE — CANN NASAL O2 CAPNOGRAPHY AD -- FILTERLINE

## (undated) DEVICE — INSUFFLATION NEEDLE: Brand: SURGINEEDLE

## (undated) DEVICE — RESERVOIR,SUCTION,100CC,SILICONE: Brand: MEDLINE

## (undated) DEVICE — 4-PORT MANIFOLD: Brand: NEPTUNE 2

## (undated) DEVICE — SUTURE VCRL SZ 2-0 L27IN ABSRB UD L26MM SH 1/2 CIR J417H

## (undated) DEVICE — PROVE COVER: Brand: UNBRANDED

## (undated) DEVICE — SUTURE PERMAHAND SZ 2-0 L30IN NONABSORBABLE BLK SILK W/O A305H

## (undated) DEVICE — STAPLER INT DIA5MM 25 ABSRB STRP FIX DISP FOR HERN MESH

## (undated) DEVICE — GLOVE ORANGE PI 7 1/2   MSG9075

## (undated) DEVICE — SUTURE ETHBND EXCEL SZ 0 L18IN NONABSORBABLE GRN L36MM CT-1 CX21D

## (undated) DEVICE — DECANTER BAG 9": Brand: MEDLINE INDUSTRIES, INC.

## (undated) DEVICE — REM POLYHESIVE ADULT PATIENT RETURN ELECTRODE: Brand: VALLEYLAB

## (undated) DEVICE — POWER SHELL: Brand: SIGNIA

## (undated) DEVICE — DRAPE,LAP,CHOLE,W/TROUGHS,STERILE: Brand: MEDLINE

## (undated) DEVICE — SOLIDIFIER MEDC 1200ML -- CONVERT TO 356117

## (undated) DEVICE — MINOR BASIN -SMH: Brand: MEDLINE INDUSTRIES, INC.

## (undated) DEVICE — SUTURE SZ 0 27IN 5/8 CIR UR-6  TAPER PT VIOLET ABSRB VICRYL J603H

## (undated) DEVICE — PENCIL SMK EVAC 10 FT BLADE ELECTRD ROCKER FOR TELSCP

## (undated) DEVICE — SNARE ENDOSCP M L240CM W27MM SHTH DIA2.4MM CHN 2.8MM OVL

## (undated) DEVICE — SPONGE LAP 18X18IN STRL -- 5/PK

## (undated) DEVICE — SKIN TEMPERATURE SENSOR: Brand: DEROYAL

## (undated) DEVICE — FORCEPS BX L240CM JAW DIA2.8MM L CAP W/ NDL MIC MESH TOOTH

## (undated) DEVICE — ARTICULATING RELOAD WITH TRI-STAPLE TECHNOLOGY: Brand: ENDO GIA

## (undated) DEVICE — TOTAL TRAY, 16FR 10ML SIL FOLEY, URN: Brand: MEDLINE

## (undated) DEVICE — SCISSORS ENDOSCP DIA5MM CRV MPLR CAUT W/ RATCH HNDL

## (undated) DEVICE — Device: Brand: ENDO SMARTCAP

## (undated) DEVICE — SUTURE MCRYL SZ 4-0 L27IN ABSRB UD L19MM PS-2 1/2 CIR PRIM Y426H

## (undated) DEVICE — 1200 GUARD II KIT W/5MM TUBE W/O VAC TUBE: Brand: GUARDIAN

## (undated) DEVICE — WRAP SURG W1.31XL1.34M CARD FOR PT 165-172CM THERMOWRP

## (undated) DEVICE — SYSTEM EVAC SMOKE LAPARSCOPIC

## (undated) DEVICE — SUTURE PDS II SZ 5-0 L30IN ABSRB VLT RB-2 L13MM 1/2 CIR Z148H

## (undated) DEVICE — ENDO CARRY-ON PROCEDURE KIT INCLUDES ENZYMATIC SPONGE, GAUZE, BIOHAZARD LABEL, TRAY, LUBRICANT, DIRTY SCOPE LABEL, WATER LABEL, TRAY, DRAWSTRING PAD, AND DEFENDO 4-PIECE KIT.: Brand: ENDO CARRY-ON PROCEDURE KIT

## (undated) DEVICE — BLADE ASSEMB CLP HAIR FINE --

## (undated) DEVICE — SUT PROL 3-0 36IN SH DA BLU --

## (undated) DEVICE — DEVICE LCK BILI RAP EXCHG OLPS --

## (undated) DEVICE — VESSEL LOOPS,MINI, RED: Brand: DEVON

## (undated) DEVICE — BLOCK BITE BLOX W DENTAL RIM --

## (undated) DEVICE — TRAP SUC MUCOUS 70ML -- MEDICHOICE MEDLINE

## (undated) DEVICE — SIMPLICITY FLUFF UNDERPAD 23X36, MODERATE: Brand: SIMPLICITY

## (undated) DEVICE — CLICKLINE SCISSORS INSERT: Brand: CLICKLINE

## (undated) DEVICE — GLOVE SURG SZ 8 L12IN FNGR THK79MIL GRN LTX FREE

## (undated) DEVICE — BASIN EMSIS 16OZ GRAPHITE PLAS KID SHP MOLD GRAD FOR ORAL

## (undated) DEVICE — CANNULATING SPHINCTEROTOME: Brand: JAGTOME™ REVOLUTION RX

## (undated) DEVICE — SOLUTION IRRIG 1000ML STRL H2O USP PLAS POUR BTL

## (undated) DEVICE — INFLATION DEVICE: Brand: ENCORE 26

## (undated) DEVICE — SUTURE V-LOC 180 SZ 0 L12IN ABSRB GRN L37MM GS-21 1/2 CIR VLOCL0316

## (undated) DEVICE — DRAPE,LAPAROTOMY,T,PEDI,STERILE: Brand: MEDLINE

## (undated) DEVICE — SEALER LAP L37CM MARYLAND JAW OPN NANO COAT MULTIFUNCTIONAL

## (undated) DEVICE — SET ADMIN 16ML TBNG L100IN 2 Y INJ SITE IV PIGGY BK DISP

## (undated) DEVICE — DERMABOND SKIN ADH 0.7ML -- DERMABOND ADVANCED 12/BX

## (undated) DEVICE — DRAPE,REIN 53X77,STERILE: Brand: MEDLINE

## (undated) DEVICE — NEEDLE HYPO 25GA L1.5IN BVL ORIENTED ECLIPSE

## (undated) DEVICE — TUBING ADMIN SET INTRAV ARTERI -- CONVERT TO ITEM 340436

## (undated) DEVICE — TROCAR: Brand: KII® OPTICAL ACCESS SYSTEM

## (undated) DEVICE — TRIPLE LUMEN NEEDLE KNIFE: Brand: RX NEEDLE KNIFE XL

## (undated) DEVICE — DRAPE,UTILTY,TAPE,15X26, 4EA/PK: Brand: MEDLINE

## (undated) DEVICE — PREP SKN CHLRAPRP APL 26ML STR --

## (undated) DEVICE — KIT,1200CC CANISTER,3/16"X6' TUBING: Brand: MEDLINE INDUSTRIES, INC.

## (undated) DEVICE — SUTURE PERMAHAND SZ 3-0 L18IN NONABSORBABLE BLK L26MM SH C013D

## (undated) DEVICE — GENERAL LAPAROSCOPY - SMH: Brand: MEDLINE INDUSTRIES, INC.

## (undated) DEVICE — KENDALL RADIOLUCENT FOAM MONITORING ELECTRODE -RECTANGULAR SHAPE: Brand: KENDALL

## (undated) DEVICE — SYR 50ML SLIP TIP NSAF LF STRL --

## (undated) DEVICE — YANKAUER,POOLE TIP,STERILE,50/CS: Brand: MEDLINE

## (undated) DEVICE — HANDLE LT SNAP ON ULT DURABLE LENS FOR TRUMPF ALC DISPOSABLE

## (undated) DEVICE — LAPAROSCOPIC TROCAR SLEEVE/SINGLE USE: Brand: KII® OPTICAL ACCESS SYSTEM

## (undated) DEVICE — VESSEL CLUDE MEDIUM: Brand: VESSEL CLUDE

## (undated) DEVICE — CLIP LIG M BLU TI HRT SHP WIRE HORZ 600 PER BX

## (undated) DEVICE — BITEBLOCK ENDOSCP 60FR MAXI WHT POLYETH STURDY W/ VELC WVN

## (undated) DEVICE — C-ARM: Brand: UNBRANDED

## (undated) DEVICE — GUIDEWIRE ENDOSCP WRK L450CM DIA0.035IN STD SHFT STR RND

## (undated) DEVICE — COVER LT HNDL PLAS RIG 1 PER PK

## (undated) DEVICE — 3M™ TEGADERM™ TRANSPARENT FILM DRESSING FRAME STYLE, 1626W, 4 IN X 4-3/4 IN (10 CM X 12 CM), 50/CT 4CT/CASE: Brand: 3M™ TEGADERM™

## (undated) DEVICE — SOLUTION IRRIG 1000ML 0.9% SOD CHL USP POUR PLAS BTL

## (undated) DEVICE — SUTURE ETHLN SZ 2-0 L18IN NONABSORBABLE BLK L26MM FS 3/8 664G

## (undated) DEVICE — CATH IV AUTOGRD BC BLU 22GA 25 -- INSYTE

## (undated) DEVICE — CONTAINER SPEC 20 ML LID NEUT BUFF FORMALIN 10 % POLYPR STS

## (undated) DEVICE — SUTURE MCRYL SZ 4-0 L18IN ABSRB UD L19MM PS-2 3/8 CIR PRIM Y496G

## (undated) DEVICE — SYR 10ML LUER LOK 1/5ML GRAD --

## (undated) DEVICE — CONTAINER,SPECIMEN,4OZ,OR STRL: Brand: MEDLINE

## (undated) DEVICE — SUT MCRYL 4-0 27IN PS2 UD --